# Patient Record
Sex: FEMALE | Race: WHITE | Employment: OTHER | URBAN - METROPOLITAN AREA
[De-identification: names, ages, dates, MRNs, and addresses within clinical notes are randomized per-mention and may not be internally consistent; named-entity substitution may affect disease eponyms.]

---

## 2017-01-03 ENCOUNTER — APPOINTMENT (OUTPATIENT)
Dept: CARDIAC REHAB | Age: 67
End: 2017-01-03
Payer: MEDICARE

## 2017-01-05 ENCOUNTER — HOSPITAL ENCOUNTER (OUTPATIENT)
Dept: PHYSICAL THERAPY | Age: 67
Discharge: HOME | End: 2017-01-05
Payer: MEDICARE

## 2017-01-05 ENCOUNTER — APPOINTMENT (OUTPATIENT)
Dept: CARDIAC REHAB | Age: 67
End: 2017-01-05
Payer: MEDICARE

## 2017-01-10 ENCOUNTER — HOSPITAL ENCOUNTER (OUTPATIENT)
Dept: PHYSICAL THERAPY | Age: 67
Discharge: HOME | End: 2017-01-10
Payer: MEDICARE

## 2017-01-10 ENCOUNTER — HOSPITAL ENCOUNTER (OUTPATIENT)
Dept: CARDIAC REHAB | Age: 67
Discharge: HOME | End: 2017-01-10
Payer: MEDICARE

## 2017-01-10 PROCEDURE — 97110 THERAPEUTIC EXERCISES: CPT

## 2017-01-10 PROCEDURE — 93798 PHYS/QHP OP CAR RHAB W/ECG: CPT

## 2017-01-10 NOTE — PROGRESS NOTES
Babita Silva   (XIF:4/34/6191) [address] at   [facility]  [address], [address], [place]  [phone]   [phone]       Outpatient PHYSICAL THERAPY: Daily Note 1/10/2017  Fall Risk Score: 0 (5+ = High Risk)    ICD-10: Treatment Diagnosis: Pain in right knee (M25.561)                                                      Effusion, right knee (M25.461)                                                      Aftercare following joint replacement surgery (Z47.1)    REFERRING PHYSICIAN: Simon Trevino MD MD Orders: PT eval and treat  Return Physician Appointment: Not scheduled  MEDICAL/REFERRING DIAGNOSIS: R TKA  DATE OF ONSET: 10/10/16   PRIOR LEVEL OF FUNCTION: Independent  PRECAUTIONS/ALLERGIES:   Allergies   Allergen Reactions    Latex Rash    Sulfa (Sulfonamide Antibiotics) Anaphylaxis    Macrobid [Nitrofurantoin Monohyd/M-Cryst] Other (comments)     Causes Gout    Other Plant, Animal, Environmental Itching     Pt itched after wearing a plastic blood pressure cuff. Pt reports BP will be higher in right arm     Oxycodone Other (comments)     Hallucination, anxiety    Tape [Adhesive] Rash     Paper tape ok       ASSESSMENT:  ?????? ? ? This section established at most recent assessment?????????? Babita Silva presents approx 7 weeks s/p R TKA. Pt demonstrates mild joint effusion, decreased ROM and strength, impaired balance, difficulty with stairs and difficulty walking long distances. Pt will benefit from skilled PT services to address impairments and maximize her function. PROBLEM LIST (Impairments causing functional limitations):  1. Decreased Strength affecting function  2. Edema affecting function  3. Decreased ADL/Functional Activities  4. Decreased Flexibility/joint mobility  5. Increased Pain affecting function  6.  Decreased Activity tolerance   GOALS: (Goals have been discussed and agreed upon with patient.)  SHORT-TERM FUNCTIONAL GOALS: Time Frame: 4 weeks  1. Pt will be I and compliant with initial HEP  2.  Pt will increase R knee flexion to at least 110 degrees  3. Pt will increase B LE strength by at least 1/3 grade where needed  4. Pt will navigate stairs with step-to pattern without requiring railing  DISCHARGE GOALS: Time Frame: 8 weeks  1. Pt will demo at least 115 degrees of R knee flexion in order to be fully functional with stairs and bending/squatting  2. Pt will demo at least 4/5 B LE strength for ability to squat and bend for function  3. Pt will be able to walk at least 30 minutes with no rest breaks  4. Pt will score at least 50/80 on LEFS  5. Pt will navigate stairs with reciprocal pattern and no railings 80% of the time  REHABILITATION POTENTIAL FOR STATED GOALS: GoodPLAN OF CARE:  INTERVENTIONS PLANNED: (Benefits and precautions of physical therapy have been discussed with the patient.)  1. balance exercise  2. cold  3. home exercise program (HEP)  4. manual therapy  5. range of motion: active/assisted/passive  6. therapeutic exercise/strengthening  TREATMENT PLAN EFFECTIVE DATES: 11/29/16 TO 2/3/17  FREQUENCY/DURATION: Follow patient 2 times a week for 8 weeks to address above goals. Regarding Estefani Esparza's therapy, I certify that the treatment plan above will be carried out by a therapist or under their direction. Thank you for this referral,  Rhonda Castro                     SUBJECTIVE:  Present Symptoms:  Pt has had several cancellations recently due to having stomach issues and pain. She reports she has stomach ulcers that have been causing her a lot of pain. Pain Intensity 1: 5     History of Present Injury/Illness (Reason for Referral): Babita Silva presents 6-7 weeks s/p R TKA after several years of R knee OA. Pt was in hospital for 5 days, had home care for 3 weeks and has been home for approx. 3 weeks with no therapy. Pt has pain in L knee as well.   Pt also had valvular heart surgery 6/26/16 which complicated her timing for her TKA. Dominant Side: right  Past Medical History:   Past Medical History   Diagnosis Date    Adverse effect of anesthesia      woke up on a vent - panic      Arthritis     Chronic kidney disease      pt denies 9/19/16    Chronic pain      r/t arthritis    Follow-up visit for aortic valve replacement with bioprosthetic valve 7/25/2016    GERD (gastroesophageal reflux disease)      controlled w/med    Hypertension      controlled w/med    Kidney stones     Morbid obesity ([address]     Psychiatric disorder      claustraphobia (severe)    PUD (peptic ulcer disease) 06/2016     dx 2016    Valvular heart disease      Pt has nerve damage and associated foot drop on the L side from a fall in mid 1990's    Current Medications:   Current Outpatient Prescriptions:     amoxicillin-clavulanate (AUGMENTIN) 1,000-62.5 mg per tablet, Take 1 Tab by mouth two (2) times a day., Disp: 20 Tab, Rfl: 0    HYDROcodone-acetaminophen (NORCO) 7.5-325 mg per tablet, Take 1 Tab by mouth three (3) times daily as needed for Pain. Max Daily Amount: 3 Tabs., Disp: 90 Tab, Rfl: 0    zolpidem (AMBIEN) 5 mg tablet, Take 1 Tab by mouth nightly as needed for Sleep. Max Daily Amount: 5 mg., Disp: 90 Tab, Rfl: 1    amLODIPine-Olmesartan (LEA) 5-40 mg tab, Take 1 Tab by mouth daily. , Disp: 90 Tab, Rfl: 4    apixaban (ELIQUIS) 2.5 mg tablet, Take 1 Tab by mouth every twelve (12) hours. (Patient taking differently: Take 2.5 mg by mouth every twelve (12) hours. Patient states Dr. Purvi Sheth took her off Eliquis.), Disp: 60 Tab, Rfl: 0    atorvastatin (LIPITOR) 40 mg tablet, Take 1 Tab by mouth daily. (Patient taking differently: Take 40 mg by mouth daily. Per anesthesia protocol:instructed to take am of surgery.), Disp: 90 Tab, Rfl: 4    esomeprazole (NEXIUM) 40 mg capsule, Take 1 Cap by mouth two (2) times a day.  (Patient taking differently: Take 40 mg by mouth daily.), Disp: 90 Cap, Rfl: 1    carvedilol (COREG) 12.5 mg tablet, Take 1 Tab by mouth two (2) times daily (with meals). , Disp: 180 Tab, Rfl: 4    calcium-vitamin D 600 mg(1,500mg) -200 unit tab, Take 1 Tab by mouth two (2) times daily (with meals). , Disp: , Rfl:     polyethylene glycol (MIRALAX) 17 gram/dose powder, Take 17 g by mouth daily as needed. , Disp: , Rfl:     potassium chloride SR (KLOR-CON 10) 10 mEq tablet, Take 1 Tab by mouth daily. (Patient taking differently: Take 10 mEq by mouth daily. Per anesthesia protocol:instructed to take am of surgery. Indications: HYPOKALEMIA PREVENTION), Disp: 21 Tab, Rfl: 0    ferrous sulfate 325 mg (65 mg iron) tablet, Take 1 Tab by mouth daily (with breakfast). , Disp: 30 Tab, Rfl: 1    furosemide (LASIX) 20 mg tablet, Take 1 Tab by mouth every morning., Disp: 21 Tab, Rfl: 0    senna-docusate (PERICOLACE) 8.6-50 mg per tablet, Take 2 Tabs by mouth nightly., Disp: 20 Tab, Rfl: 0    febuxostat (ULORIC) 40 mg tab tablet, Take 1 Tab by mouth every morning. Indications: GOUT PREVENTION (Patient taking differently: Take 40 mg by mouth every morning. Take day of surgery per anesthesia protocol. Indications: GOUT PREVENTION), Disp: 90 Tab, Rfl: 3    cyanocobalamin (VITAMIN B-12) 1,000 mcg/mL injection, Use weekly for 4 weeks, then once monthly (Patient taking differently: Use weekly for 4 weeks, then once monthly Stop seven days prior to surgery per anesthesia protocol.), Disp: 4 Vial, Rfl: 2     Date Last Reviewed: 1/10/2017    Social History/Home Situation: Lives with son, no pets, in 1-floor house with ramp in back, 6-8 HEAVENLY bilateral railings    DME: Using shower seat currently     Work/Activity History: Retired, activities include housework. Pt has not been able to walk long distances for over 2 years ago due to heart and knee issues  OBJECTIVE:    Outcome Measure:    Tool Used: Lower Extremity Functional Scale (LEFS)  Score:  Initial: 36/80 Most Recent: X/80 (Date: -- )   Interpretation of Score: 20 questions each scored on a 5 point scale with 0 representing \"extreme difficulty or unable to perform\" and 4 representing \"no difficulty\". The lower the score, the greater the functional disability. 80/80 represents no disability. Minimal detectable change is 9 points. Score 80 79-63 62-48 47-32 31-16 15-1 0   Modifier CH CI CJ CK CL CM CN     ? Mobility - Walking and Moving Around:     - CURRENT STATUS: CK - 40%-59% impaired, limited or restricted    - GOAL STATUS:  CJ - 20%-39% impaired, limited or restricted    - D/C STATUS:  ---------------To be determined---------------    Observation/Orthostatic Postural Assessment:   Mild genu varus on the L, mild foot pronation bilaterally. Decreased muscle mass of R quads noted, mild visible swelling the R knee joint. Surgical incision pink and healed except 1 place actively healing just below patella. Palpation:   Ttp medial tibial plateau, medial/lateral joint lines  ROM:       Knee AROM R L   Flexion 99 122   Extension (6) (9)     Ankle AROM R L   Dorsiflexion 5 0                  Strength:     Out of 5  LE MMT R L   Hip Flexion 3+ 4   Hip ER 4- 4   Hip Abduction 3 4-   Knee Flexion 4 5   Knee Extension 4 5   Ankle DF 4 4     Quad set:  Poor on R    Unable to test hip extension; pt unable to lie prone due to dizziness                  Functional Mobility:         Gait: Decreased theo, normal and symmetrical step length     Stairs - ascending:  Uses 1 rail, step-to leading L     Stairs - descending:  Uses bilateral railings, step-to leading R    Balance:   Tandem stance:  R front: 30s  L front: 24s                     SLS:  R: 2s  L: 10s  TREATMENT:    (In addition to Assessment/Re-Assessment sessions the following treatments were rendered)  THERAPEUTIC EXERCISE: (40 minutes):  Exercises per grid below to improve mobility, strength and balance.   Required visual, manual and tactile cues to promote proper body alignment, promote proper body posture and promote proper body mechanics. Progressed resistance, range and repetitions as indicated. Date:  11/29/16 Date:  12/6/16 Date:  12/9/16 Date:  12/15/16 Date:  12/27/16    Activity/Exercise Parameters Parameters Parameters      Pt education Findings, POC, HEP        Nustep L 1.5, 10 min L 1.5, 10 min L 1, 10 min L 3.0, 8 min L 2.0, 12 min Bike 5 min then nustep L1.0 for 10 min   Knee flexion stretch  Long sitting propped up 10s holds x5, with assist  Foot on chair- 15x reps, then 20s holds x3 - Standing knee flexed w/ therapist assist - 1-2 min holds x2  - Standing foot on chair in front, 30s holds x3    Patellar glides   X 10 ea way      Knee extension stretch   Ankle propped on foam roll 1 min x 2      Quad set   5 sec hold x 10; and isometric hold against therapist pressure 5 sec hold x 10      Straight leg raise   2 x 8  Focus on keeping quad contraction and limiting knee extension lag      SAQ  3s holds x30 3 sec hold   2 x 10 1.5# 3 x 10, 3s holds     Heel slide   With belt for self-overpressure, in reclined supine, 2 x 10      Hamstring curls  Standing, 2#, 3 x 10   Standing 2# 3 x 10     Clamshells  3 x 10       Sit to stand   X 10      Wall squat   Against door, ROM tolerance x 10 2 x 10 3 x 10    Shuttle press    3 bands, 3 x 10  Leg press 40# 3 x 10   Heel raises    2 x 10, 75% weight on R     Hamstring curl machine     25# 3 x 10 25# 3 x 10   Knee extension machine     25# 3 x 10 25# 3 x 10   Standing hip abduction     2 x 10 B    Steps      6\":   - step-ups 15x B  - Side step-ups 15x B         Manual Therapy (      0):   Therapeutic Modalities:  NOT TODAY - Game ready to R knee, 38 degrees, low compression for 10 minutes                                                                                                 HEP: Instructed to start going for walks.   ______________________________________________________________________________________________________    Treatment Assessment:  Pt was more fatigued than usual with therex today reporting many times that she felt \"weak. \"  However she was able to perform all exercises as desired      Please explain any variance from above plan of care: None  Progression/Medical Necessity:   · Skilled intervention continues to be required due to decreased functional status. Compliance with Program/Exercises: Will assess as treatment progresses. Reason for Continuation of Services/Other Comments:  · Patient continues to require skilled intervention due to decreased functional status. Recommendations/Intent for next treatment session: Treatment next visit will focus on advancements to more challenging activities.       Total Treatment Duration:  PT Patient Time In/Time Out  Time In: 0915  Time Out: 0955    Soledad Kelly

## 2017-01-12 ENCOUNTER — HOSPITAL ENCOUNTER (OUTPATIENT)
Dept: CARDIAC REHAB | Age: 67
End: 2017-01-12
Payer: MEDICARE

## 2017-01-17 ENCOUNTER — HOSPITAL ENCOUNTER (OUTPATIENT)
Dept: PHYSICAL THERAPY | Age: 67
Discharge: HOME | End: 2017-01-17
Payer: MEDICARE

## 2017-01-17 ENCOUNTER — APPOINTMENT (OUTPATIENT)
Dept: CARDIAC REHAB | Age: 67
End: 2017-01-17
Payer: MEDICARE

## 2017-01-17 NOTE — PROGRESS NOTES
Anil Chowdary   (TYT:4/28/9759) [address]  at   [facility]  [address], [address], [place] [address]  [phone]   [phone]         OUTPATIENT DAILY NOTE    NAME/AGE/GENDER: Anil Chowdary is a 78 y.o. female. DATE: 1/17/2017    Pt called to cancel her PT appointments this week. She reports her left knee (the non-surgical knee) has been hurting her more and more, and that she is walking with a walker at home. She is following up with her MD this week regarding this situation. I explained the role of PT in keeping the left knee strong and mobile if she does decide to continue therapy. She stated she would call this clinic after her MD appt to update us on her status.     Varsha Arroyo, PT, DPT

## 2017-01-19 ENCOUNTER — APPOINTMENT (OUTPATIENT)
Dept: CARDIAC REHAB | Age: 67
End: 2017-01-19
Payer: MEDICARE

## 2017-01-19 ENCOUNTER — HOSPITAL ENCOUNTER (OUTPATIENT)
Dept: PHYSICAL THERAPY | Age: 67
Discharge: HOME | End: 2017-01-19
Payer: MEDICARE

## 2017-01-19 NOTE — PROGRESS NOTES
Amaya Howe   (PXK:3/33/9985) [address]  at   [facility]  [address], [address], [place] [address]  [phone]   [phone]         OUTPATIENT DAILY NOTE    NAME/AGE/GENDER: Amaya Howe is a 78 y.o. female. DATE: 1/19/2017    Pt called to cancel her PT appointments this week. She reports her left knee (the non-surgical knee) has been hurting her more and more, and that she is walking with a walker at home. She is following up with her MD this week regarding this situation. I explained the role of PT in keeping the left knee strong and mobile if she does decide to continue therapy. She stated she would call this clinic after her MD appt to update us on her status.     Rocky Avila, PT, DPT

## 2017-01-24 ENCOUNTER — APPOINTMENT (OUTPATIENT)
Dept: CARDIAC REHAB | Age: 67
End: 2017-01-24
Payer: MEDICARE

## 2017-01-26 ENCOUNTER — APPOINTMENT (OUTPATIENT)
Dept: CARDIAC REHAB | Age: 67
End: 2017-01-26
Payer: MEDICARE

## 2017-01-31 ENCOUNTER — APPOINTMENT (OUTPATIENT)
Dept: CARDIAC REHAB | Age: 67
End: 2017-01-31
Payer: MEDICARE

## 2017-02-02 ENCOUNTER — APPOINTMENT (OUTPATIENT)
Dept: CARDIAC REHAB | Age: 67
End: 2017-02-02

## 2017-02-07 ENCOUNTER — HOSPITAL ENCOUNTER (OUTPATIENT)
Dept: CARDIAC REHAB | Age: 67
Discharge: HOME | End: 2017-02-07

## 2017-02-09 ENCOUNTER — HOSPITAL ENCOUNTER (OUTPATIENT)
Dept: CARDIAC REHAB | Age: 67
Discharge: HOME | End: 2017-02-09

## 2017-02-14 ENCOUNTER — APPOINTMENT (OUTPATIENT)
Dept: CARDIAC REHAB | Age: 67
End: 2017-02-14

## 2017-02-15 PROBLEM — M1A.09X0 CHRONIC GOUT OF MULTIPLE SITES: Chronic | Status: ACTIVE | Noted: 2017-02-15

## 2017-02-15 PROBLEM — M17.12 PRIMARY OSTEOARTHRITIS OF LEFT KNEE: Status: ACTIVE | Noted: 2017-02-15

## 2017-02-16 ENCOUNTER — APPOINTMENT (OUTPATIENT)
Dept: CARDIAC REHAB | Age: 67
End: 2017-02-16

## 2017-02-21 ENCOUNTER — APPOINTMENT (OUTPATIENT)
Dept: CARDIAC REHAB | Age: 67
End: 2017-02-21

## 2017-02-23 ENCOUNTER — APPOINTMENT (OUTPATIENT)
Dept: CARDIAC REHAB | Age: 67
End: 2017-02-23

## 2017-03-06 NOTE — PROGRESS NOTES
Laura Larkin   (MQX:4/67/9742) [address]  at   [facility]  [address], [address], [place] [address]  [phone]   [phone]       Outpatient PHYSICAL THERAPY: Discontinuation Summary 3/6/17  Fall Risk Score: 0 (5+ = High Risk)    ICD-10: Treatment Diagnosis: Pain in right knee (M25.561)                                                      Effusion, right knee (M25.461)                                                      Aftercare following joint replacement surgery (Z47.1)    REFERRING PHYSICIAN: Sherita Greene MD MD Orders: PT eval and treat  MEDICAL/REFERRING DIAGNOSIS: R TKA  DATE OF ONSET: 10/10/16     ASSESSMENT:  ?????? ? ? This section established at most recent assessment?????????? Laura Larkin participated in 5 skilled visits of PT after her initial evaluation for R TKA which occurred on 11/29/16. She had 9 cancellations between 11/29 and 1/19/16 due to illnesses, 2 due to her family being in town, and 2 at the end due to her other knee bothering her. After speaking with her on the phone regarding the last cancellation on 1/19, she was going to be following up with her MD and stated she would contact this clinic to follow-up. She never called to follow-up and was not seen in this clinic after 1/19. Goals below were not able to be assessed due to patient not being seen in this clinic since 1/0/17. SHORT-TERM FUNCTIONAL GOALS: Time Frame: 4 weeks  1. Pt will be I and compliant with initial HEP  2.  Pt will increase R knee flexion to at least 110 degrees  3. Pt will increase B LE strength by at least 1/3 grade where needed  4. Pt will navigate stairs with step-to pattern without requiring railing  DISCHARGE GOALS: Time Frame: 8 weeks  1. Pt will demo at least 115 degrees of R knee flexion in order to be fully functional with stairs and bending/squatting  2. Pt will demo at least 4/5 B LE strength for ability to squat and bend for function  3.  Pt will be able to walk at least 30 minutes with no rest breaks  4. Pt will score at least 50/80 on LEFS  5.  Pt will navigate stairs with reciprocal pattern and no railings 80% of the time      TREATMENT PLAN EFFECTIVE DATES: 11/29/16 TO 2/3/17    Thank you for this referral,  Varsha Arroyo, PT

## 2017-03-10 RX ORDER — CEFAZOLIN SODIUM IN 0.9 % NACL 2 G/50 ML
2 INTRAVENOUS SOLUTION, PIGGYBACK (ML) INTRAVENOUS ONCE
Status: CANCELLED | OUTPATIENT
Start: 2017-03-10 | End: 2017-03-10

## 2017-03-10 NOTE — H&P
[address] [facility]  Pre Operative History and Physical Exam    Patient ID:  Gage Snider  [id number]  23 y.o.  1950    Today: March 10, 2017          CC:  Left  Knee pain    HPI:   The patient has end stage arthritis of the left knee. The patient was evaluated and examined during a consultation prior to today. The patient complains of knee pain with activities, reports pain as mostly occurring along the joint lines, reports stiffness of the knee with prolonged inactivity, and swelling/pain at the end of the day and after increased physical activity. The pain affects the patients activities of daily living and quality of life. The patient has attempted and exhausted conservative treatment. There have been no changes to the patient's orthopedic condition since the last office visit.     Past Medical History:  Past Medical History:   Diagnosis Date    Adverse effect of anesthesia     woke up on a vent - panic      Arthritis     Chronic kidney disease     pt denies 9/19/16    Chronic pain     r/t arthritis    Follow-up visit for aortic valve replacement with bioprosthetic valve 7/25/2016    GERD (gastroesophageal reflux disease)     controlled w/med    Hypertension     controlled w/med    Kidney stones     Morbid obesity ([address]     Psychiatric disorder     claustraphobia (severe)    PUD (peptic ulcer disease) 06/2016    dx 2016    Valvular heart disease        Past Surgical History:  Past Surgical History:   Procedure Laterality Date    COLONOSCOPY N/A 6/15/2016    COLONOSCOPY/ BMI 41  ROOM 2235 performed by Luis [facility] MD at [facility] ENDOSCOPY    HX AORTIC VALVE REPLACEMENT  6/9/2016    Dr. Rosmery Colby    HX BILATERAL SALPINGO-OOPHORECTOMY      HX CERVICAL FUSION      HX GASTRIC BYPASS      ans reversal     HX GI      repaired \"hole in stomach\" from gastric bypass    HX HEART CATHETERIZATION      no stents     HX HEART VALVE SURGERY      HX HYSTERECTOMY      HX KNEE REPLACEMENT Right 10/10/2016    Dr. Dina Ross, POA    HX LAP CHOLECYSTECTOMY      HX SHOULDER ARTHROSCOPY Right      times 2 \"shaved bone\"     HX UROLOGICAL  Multiple dates    Mulitiple open Nephrolithotomies        Medications:     Prior to Admission medications    Medication Sig Start Date End Date Taking? Authorizing Provider   docusate sodium (STOOL SOFTENER) 50 mg capsule Take 50 mg by mouth once as needed for Constipation. Historical Provider   carvedilol (COREG) 25 mg tablet Take 1 Tab by mouth two (2) times daily (with meals). 2/15/17   Leti Michaud MD   febuxostat (ULORIC) 40 mg tab tablet Take 1 Tab by mouth every morning. Indications: GOUT PREVENTION 2/15/17   Leti Michaud MD   HYDROcodone-acetaminophen [facility]) 7.5-325 mg per tablet Take 1 Tab by mouth three (3) times daily as needed for Pain. Max Daily Amount: 4 Tabs. 2/15/17   Leti Michaud MD   HYDROcodone-acetaminophen [facility]) 7.5-325 mg per tablet Take 1 Tab by mouth every six (6) hours as needed for Pain. Max Daily Amount: 4 Tabs. 3/15/17   Leti Michaud MD   amLODIPine (NORVASC) 10 mg tablet Take 1 Tab by mouth daily. 1/25/17   Leti Michaud MD   olmesartan (BENICAR) 40 mg tablet Take 1 Tab by mouth daily. 1/25/17   Leti Michaud MD   zolpidem (AMBIEN) 5 mg tablet Take 1 Tab by mouth nightly as needed for Sleep. Max Daily Amount: 5 mg. 11/15/16   Leti Michaud MD   apixaban (ELIQUIS) 2.5 mg tablet Take 1 Tab by mouth every twelve (12) hours. Patient taking differently: Take 2.5 mg by mouth every twelve (12) hours. Patient states Dr. Mallika Whitfield took her off Eliquis. 10/11/16   Kianna Slater MD   atorvastatin (LIPITOR) 40 mg tablet Take 1 Tab by mouth daily. Patient taking differently: Take 40 mg by mouth daily. Per anesthesia protocol:instructed to take am of surgery. 8/1/16   Leti Michaud MD   esomeprazole (NEXIUM) 40 mg capsule Take 1 Cap by mouth two (2) times a day.   Patient taking differently: Take 40 mg by mouth daily. 8/1/16   Tra Bray MD   calcium-vitamin D 600 mg(1,500mg) -200 unit tab Take 1 Tab by mouth two (2) times daily (with meals). Historical Provider   polyethylene glycol (MIRALAX) 17 gram/dose powder Take 17 g by mouth daily as needed. Historical Provider   potassium chloride SR (KLOR-CON 10) 10 mEq tablet Take 1 Tab by mouth daily. Patient taking differently: Take 10 mEq by mouth daily. Per anesthesia protocol:instructed to take am of surgery. Indications: HYPOKALEMIA PREVENTION 6/17/16   Georgiana Arana NP   ferrous sulfate 325 mg (65 mg iron) tablet Take 1 Tab by mouth daily (with breakfast). 6/17/16   Georgiana Arana NP   furosemide (LASIX) 20 mg tablet Take 1 Tab by mouth every morning. 6/17/16   Georgiana Arana NP   cyanocobalamin (VITAMIN B-12) 1,000 mcg/mL injection Use weekly for 4 weeks, then once monthly  Patient taking differently: Use weekly for 4 weeks, then once monthly  Stop seven days prior to surgery per anesthesia protocol. 9/24/15   Tra Bray MD       Family History:     Family History   Problem Relation Age of Onset    Cancer Father      lung- smoker    Hypertension Father     Lung Disease Father     Cancer Mother      lung -smoker    Lung Disease Mother     Diabetes Maternal Grandmother     Diabetes Paternal Grandmother     Breast Cancer Neg Hx        Social History:      Social History   Substance Use Topics    Smoking status: Never Smoker    Smokeless tobacco: Never Used    Alcohol use No         Allergies: Allergies   Allergen Reactions    Latex Rash    Sulfa (Sulfonamide Antibiotics) Anaphylaxis    Macrobid [Nitrofurantoin Monohyd/M-Cryst] Other (comments)     Causes Gout    Other Plant, Animal, Environmental Itching     Pt itched after wearing a plastic blood pressure cuff.   Pt reports BP will be higher in right arm     Oxycodone Other (comments)     Hallucination, anxiety    Tape [Adhesive] Rash     Paper tape ok        Vitals: There were no vitals taken for this visit. Objective:         General: No Acute distress                   HEENT: Normocephalic/atramatic                   Lungs:  Breathing non-labored                   Heart:   RRR                    Abdomen: soft       Extremities:  Pain with ROM of the left knee. Trace effusion. Crepitus present. Distally the patient shows no neurologic deficit. Antalgic gait appreciated. Meds:   Current Outpatient Prescriptions   Medication Sig    docusate sodium (STOOL SOFTENER) 50 mg capsule Take 50 mg by mouth once as needed for Constipation.  carvedilol (COREG) 25 mg tablet Take 1 Tab by mouth two (2) times daily (with meals).  febuxostat (ULORIC) 40 mg tab tablet Take 1 Tab by mouth every morning. Indications: GOUT PREVENTION    HYDROcodone-acetaminophen (NORCO) 7.5-325 mg per tablet Take 1 Tab by mouth three (3) times daily as needed for Pain. Max Daily Amount: 4 Tabs.  [START ON 3/15/2017] HYDROcodone-acetaminophen (NORCO) 7.5-325 mg per tablet Take 1 Tab by mouth every six (6) hours as needed for Pain. Max Daily Amount: 4 Tabs.  amLODIPine (NORVASC) 10 mg tablet Take 1 Tab by mouth daily.  olmesartan (BENICAR) 40 mg tablet Take 1 Tab by mouth daily.  zolpidem (AMBIEN) 5 mg tablet Take 1 Tab by mouth nightly as needed for Sleep. Max Daily Amount: 5 mg.  apixaban (ELIQUIS) 2.5 mg tablet Take 1 Tab by mouth every twelve (12) hours. (Patient taking differently: Take 2.5 mg by mouth every twelve (12) hours. Patient states Dr. Raheem Lopez took her off Eliquis.)    atorvastatin (LIPITOR) 40 mg tablet Take 1 Tab by mouth daily. (Patient taking differently: Take 40 mg by mouth daily. Per anesthesia protocol:instructed to take am of surgery.)    esomeprazole (NEXIUM) 40 mg capsule Take 1 Cap by mouth two (2) times a day.  (Patient taking differently: Take 40 mg by mouth daily.)    calcium-vitamin D 600 mg(1,500mg) -200 unit tab Take 1 Tab by mouth two (2) times daily (with meals).  polyethylene glycol (MIRALAX) 17 gram/dose powder Take 17 g by mouth daily as needed.  potassium chloride SR (KLOR-CON 10) 10 mEq tablet Take 1 Tab by mouth daily. (Patient taking differently: Take 10 mEq by mouth daily. Per anesthesia protocol:instructed to take am of surgery. Indications: HYPOKALEMIA PREVENTION)    ferrous sulfate 325 mg (65 mg iron) tablet Take 1 Tab by mouth daily (with breakfast).  furosemide (LASIX) 20 mg tablet Take 1 Tab by mouth every morning.  cyanocobalamin (VITAMIN B-12) 1,000 mcg/mL injection Use weekly for 4 weeks, then once monthly (Patient taking differently: Use weekly for 4 weeks, then once monthly  Stop seven days prior to surgery per anesthesia protocol.)     No current facility-administered medications for this encounter.         Patient Active Problem List   Diagnosis Code    Essential hypertension, benign I10    Arthritis M19.90    GERD (gastroesophageal reflux disease) K21.9    Nocturnal hypoxemia G47.34    Mixed hyperlipidemia E78.2    Morbid obesity ([address] E66.01    Vitamin B12 deficiency E53.8    Vitamin D deficiency E55.9    Impaired fasting blood sugar R73.01    Carotid artery stenosis without cerebral infarction I65.29    Aortic valve stenosis I35.0    Pulmonary hypertension (HCC) I27.2    Atelectasis J98.11    CKD (chronic kidney disease) stage 3, GFR 30-59 ml/min N18.3    Chronic anemia D64.9    Chronic pain G89.29    Fibromyalgia M79.7    Full dentures Z97.2, K08.109    Pleural effusion J90    Postoperative atrial fibrillation (HCC) I97.89, I48.91    Iron deficiency anemia D50.9    Gastritis K29.70    Gastric ulcer K25.9    Acute diastolic CHF (congestive heart failure) (HCC) I50.31    Duodenal ulcer K26.9    Paroxysmal atrial fibrillation (HCC) I48.0    Follow-up visit for aortic valve replacement with bioprosthetic valve Z09, Z95.4    Peptic ulcer disease K27.9    UTI (urinary tract infection) N39.0    Snoring R06.83    Preop cardiovascular exam Z01.810    OA (osteoarthritis) of knee M17.9    Chronic gout of multiple sites M1A. 75T1    Primary osteoarthritis of left knee M17.12       Assessment:   1. Arthritis of the Left knee    Plan:   The patient has failed previous conservative treatment for this condition including anti-inflammatories, injections and lifestyle modifications. The necessity for joint replacement is present. Risks, benefits, alternatives and possible complications of left knee arthroplasty have been discussed with the patient including but not limited to infection, bleeding, damage to nerves and/or blood vessels, stiffness of the knee after surgery, potential for patellar maltracking, potential for fracture both intra-op and post-op, polyethylene wear, implant loosening, risk for revision surgery should any of the above occur, venous thrombo-embolism including deep vein thrombosis and pulmonary embolism, and potential for continued pain after surgery. We have also previously discussed the potential of morbidity and mortality associated with, but not limited to, the actual surgical procedure, anesthesia, prior medical conditions, and/or the administration of medications perioperatively. The patient has been given the opportunity to ask questions all of which have been answered and the patient wishes to proceed. The patient will be admitted the day of surgery for left total knee replacement.         Signed By: Emily Paris MD  March 10, 2017

## 2017-03-15 ENCOUNTER — HOSPITAL ENCOUNTER (OUTPATIENT)
Dept: SURGERY | Age: 67
Discharge: HOME | End: 2017-03-15
Attending: ORTHOPAEDIC SURGERY
Payer: MEDICARE

## 2017-03-15 VITALS
WEIGHT: 232.6 LBS | DIASTOLIC BLOOD PRESSURE: 63 MMHG | SYSTOLIC BLOOD PRESSURE: 155 MMHG | HEART RATE: 62 BPM | HEIGHT: 65 IN | BODY MASS INDEX: 38.75 KG/M2 | OXYGEN SATURATION: 97 % | TEMPERATURE: 97.9 F | RESPIRATION RATE: 18 BRPM

## 2017-03-15 LAB
ALBUMIN SERPL BCP-MCNC: 3.6 G/DL (ref 3.2–4.6)
ANION GAP BLD CALC-SCNC: 11 MMOL/L (ref 7–16)
APPEARANCE UR: CLEAR
APTT PPP: 25.3 SEC (ref 25.3–32.9)
BACTERIA SPEC CULT: ABNORMAL
BASOPHILS # BLD AUTO: 0 K/UL (ref 0–0.2)
BASOPHILS # BLD: 0 % (ref 0–2)
BILIRUB UR QL: NEGATIVE
BUN SERPL-MCNC: 21 MG/DL (ref 8–23)
CALCIUM SERPL-MCNC: 8.9 MG/DL (ref 8.3–10.4)
CHLORIDE SERPL-SCNC: 104 MMOL/L (ref 98–107)
CO2 SERPL-SCNC: 26 MMOL/L (ref 21–32)
COLOR UR: YELLOW
CREAT SERPL-MCNC: 1.13 MG/DL (ref 0.6–1)
DIFFERENTIAL METHOD BLD: ABNORMAL
EOSINOPHIL # BLD: 0.1 K/UL (ref 0–0.8)
EOSINOPHIL NFR BLD: 1 % (ref 0.5–7.8)
ERYTHROCYTE [DISTWIDTH] IN BLOOD BY AUTOMATED COUNT: 15.4 % (ref 11.9–14.6)
EST. AVERAGE GLUCOSE BLD GHB EST-MCNC: 137 MG/DL
GLUCOSE SERPL-MCNC: 137 MG/DL (ref 65–100)
GLUCOSE UR STRIP.AUTO-MCNC: NEGATIVE MG/DL
HBA1C MFR BLD: 6.4 % (ref 4.8–6)
HCT VFR BLD AUTO: 38.7 % (ref 35.8–46.3)
HGB BLD-MCNC: 12.1 G/DL (ref 11.7–15.4)
HGB UR QL STRIP: NEGATIVE
IMM GRANULOCYTES # BLD: 0 K/UL (ref 0–0.5)
IMM GRANULOCYTES NFR BLD AUTO: 0.1 % (ref 0–5)
INR PPP: 1 (ref 0.9–1.2)
KETONES UR QL STRIP.AUTO: NEGATIVE MG/DL
LEUKOCYTE ESTERASE UR QL STRIP.AUTO: NEGATIVE
LYMPHOCYTES # BLD AUTO: 15 % (ref 13–44)
LYMPHOCYTES # BLD: 1.1 K/UL (ref 0.5–4.6)
MCH RBC QN AUTO: 23.8 PG (ref 26.1–32.9)
MCHC RBC AUTO-ENTMCNC: 31.3 G/DL (ref 31.4–35)
MCV RBC AUTO: 76.2 FL (ref 79.6–97.8)
MONOCYTES # BLD: 0.7 K/UL (ref 0.1–1.3)
MONOCYTES NFR BLD AUTO: 10 % (ref 4–12)
NEUTS SEG # BLD: 5.5 K/UL (ref 1.7–8.2)
NEUTS SEG NFR BLD AUTO: 74 % (ref 43–78)
NITRITE UR QL STRIP.AUTO: NEGATIVE
PH UR STRIP: 6 [PH] (ref 5–9)
PLATELET # BLD AUTO: 184 K/UL (ref 150–450)
PMV BLD AUTO: 10.3 FL (ref 10.8–14.1)
POTASSIUM SERPL-SCNC: 4.2 MMOL/L (ref 3.5–5.1)
PROT UR STRIP-MCNC: NEGATIVE MG/DL
PROTHROMBIN TIME: 10.5 SEC (ref 9.6–12)
RBC # BLD AUTO: 5.08 M/UL (ref 4.05–5.25)
SERVICE CMNT-IMP: ABNORMAL
SODIUM SERPL-SCNC: 141 MMOL/L (ref 136–145)
SP GR UR REFRACTOMETRY: 1.01 (ref 1–1.02)
UROBILINOGEN UR QL STRIP.AUTO: 0.2 EU/DL (ref 0.2–1)
WBC # BLD AUTO: 7.5 K/UL (ref 4.3–11.1)

## 2017-03-15 PROCEDURE — 85730 THROMBOPLASTIN TIME PARTIAL: CPT | Performed by: ORTHOPAEDIC SURGERY

## 2017-03-15 PROCEDURE — 83036 HEMOGLOBIN GLYCOSYLATED A1C: CPT | Performed by: ORTHOPAEDIC SURGERY

## 2017-03-15 PROCEDURE — 85610 PROTHROMBIN TIME: CPT | Performed by: ORTHOPAEDIC SURGERY

## 2017-03-15 PROCEDURE — 82040 ASSAY OF SERUM ALBUMIN: CPT | Performed by: ORTHOPAEDIC SURGERY

## 2017-03-15 PROCEDURE — 81003 URINALYSIS AUTO W/O SCOPE: CPT | Performed by: ORTHOPAEDIC SURGERY

## 2017-03-15 PROCEDURE — 87641 MR-STAPH DNA AMP PROBE: CPT | Performed by: ORTHOPAEDIC SURGERY

## 2017-03-15 PROCEDURE — 85025 COMPLETE CBC W/AUTO DIFF WBC: CPT | Performed by: ORTHOPAEDIC SURGERY

## 2017-03-15 PROCEDURE — 36415 COLL VENOUS BLD VENIPUNCTURE: CPT | Performed by: ORTHOPAEDIC SURGERY

## 2017-03-15 PROCEDURE — 80307 DRUG TEST PRSMV CHEM ANLYZR: CPT | Performed by: ORTHOPAEDIC SURGERY

## 2017-03-15 PROCEDURE — 80048 BASIC METABOLIC PNL TOTAL CA: CPT | Performed by: ORTHOPAEDIC SURGERY

## 2017-03-15 RX ORDER — ASPIRIN 81 MG/1
81 TABLET ORAL DAILY
COMMUNITY
End: 2017-03-24

## 2017-03-15 NOTE — PERIOP NOTES
Patient verified name, , and surgery as listed in [facility]. Type 3 surgery, Joint assessment complete. Labs per surgeon: cbc,bmp,pt,ptt,ua, mssa, urine nicotene,albumin, hgb a1c ; results pending. EKG:dated 17 and cardiologist note same date printed and placed in chart for reference. Cardiologist note states \" low CV risk for left knee replacement\". Noted in EMR: prior ekgs, echo,cath report. Hibiclens and instructions given per hospital policy. Nasal Swab collected per MD order and instructions for Mupirocin nasal ointment if required. Patient provided with handouts including Guide to Surgery, Pain Management, Hand Hygiene, Blood Transfusion Education, and [place] [facility] Brochure. Patient answered medical/surgical history questions at their best of ability. All prior to admission medications documented in [facility]. Original medication prescription bottle not visualized during patient appointment. Patient instructed to hold all vitamins 7 days prior to surgery and NSAIDS 5 days prior to surgery. Medications to be held prior to surgery :none. Patient instructed to continue previous medications as prescribed prior to surgery and to take the following medications the day of surgery according to anesthesia guidelines with a small sip of water: aspirin, atorvastatin, carvedilol, nexium, uloric, norco if needed. Patient taught back and verbalized understanding.

## 2017-03-15 NOTE — PERIOP NOTES
Urine nicotene pending. All other lab results are within limits per anesthesia protocol. Copy of labs faxed to PCP and surgeon.

## 2017-03-15 NOTE — ADVANCED PRACTICE NURSE
Total Joint Surgery Preoperative Chart Review      Patient ID:  Vianca Hatch  [id number]  28 y.o.  1950  Surgeon: Camden Powell  Date of Surgery: 3/21/2017  Procedure: Total Left Knee Arthroplasty  Primary Care Physician: Te Umanzor [phone]       Subjective:   Vianca Hatch is a 75 y.o. WHITE OR  female who presents for preoperative evaluation for Total Left Knee arthroplasty. This is a preoperative chart review note based on data collected by the nurse at the surgical Pre-Assessment visit.     Past Medical History:   Diagnosis Date    Adverse effect of anesthesia     woke up on a vent s/p cholecystectomy- panic      Arthritis     Chronic kidney disease     pt denies 9/19/16    Chronic pain     r/t arthritis    Follow-up visit for aortic valve replacement with bioprosthetic valve 7/25/2016    GERD (gastroesophageal reflux disease)     controlled w/med    Hypertension     controlled w/med    Kidney stones     Morbid obesity ([address]     Murmur, cardiac     Nausea & vomiting     Psychiatric disorder     claustraphobia (severe)    PUD (peptic ulcer disease) 06/2016    dx 2016    Valvular heart disease 2016    AVR      Past Surgical History:   Procedure Laterality Date    COLONOSCOPY N/A 6/15/2016    COLONOSCOPY/ BMI 41  ROOM 2235 performed by John [facility] at [facility] ENDOSCOPY    HX AORTIC VALVE REPLACEMENT  6/9/2016    Dr. Rebel Allred    HX BILATERAL SALPINGO-OOPHORECTOMY      HX CERVICAL FUSION      HX GASTRIC BYPASS      ans reversal     HX GI      repaired \"hole in stomach\" from gastric bypass    HX HEART CATHETERIZATION      no stents     HX HEART VALVE SURGERY      HX HYSTERECTOMY      HX KNEE REPLACEMENT Right 10/10/2016    Dr. Camden Powell, POA    HX LAP CHOLECYSTECTOMY      HX SHOULDER ARTHROSCOPY Right      times 2 \"shaved bone\"     HX UROLOGICAL  Multiple dates    Mulitiple open Nephrolithotomies     Family History   Problem Relation Age of Onset    Cancer Father lung- smoker    Hypertension Father     Lung Disease Father     Cancer Mother      lung -smoker    Lung Disease Mother     Diabetes Maternal Grandmother     Diabetes Paternal Grandmother     Breast Cancer Neg Hx       Social History   Substance Use Topics    Smoking status: Never Smoker    Smokeless tobacco: Never Used    Alcohol use No       Prior to Admission medications    Medication Sig Start Date End Date Taking? Authorizing Provider   aspirin delayed-release 81 mg tablet Take 81 mg by mouth daily. Yes Historical Provider   docusate sodium (STOOL SOFTENER) 50 mg capsule Take 50 mg by mouth nightly. Historical Provider   carvedilol (COREG) 25 mg tablet Take 1 Tab by mouth two (2) times daily (with meals). 2/15/17   Thanh Castro MD   febuxostat (ULORIC) 40 mg tab tablet Take 1 Tab by mouth every morning. Indications: GOUT PREVENTION 2/15/17   Thanh Castro MD   HYDROcodone-acetaminophen [facility]) 7.5-325 mg per tablet Take 1 Tab by mouth every six (6) hours as needed for Pain. Max Daily Amount: 4 Tabs. 3/15/17   Thanh Castro MD   olmesartan (BENICAR) 40 mg tablet Take 1 Tab by mouth daily. 1/25/17   Thanh Castro MD   zolpidem (AMBIEN) 5 mg tablet Take 1 Tab by mouth nightly as needed for Sleep. Max Daily Amount: 5 mg. 11/15/16   Thanh Castro MD   atorvastatin (LIPITOR) 40 mg tablet Take 1 Tab by mouth daily. Patient taking differently: Take 40 mg by mouth daily. Per anesthesia protocol:instructed to take am of surgery. 8/1/16   Thanh Castro MD   esomeprazole (NEXIUM) 40 mg capsule Take 1 Cap by mouth two (2) times a day. Patient taking differently: Take 40 mg by mouth daily. 8/1/16   Thanh Castro MD   calcium-vitamin D 600 mg(1,500mg) -200 unit tab Take 1 Tab by mouth two (2) times daily (with meals). Historical Provider   polyethylene glycol (MIRALAX) 17 gram/dose powder Take 17 g by mouth daily as needed.     Historical Provider potassium chloride SR (KLOR-CON 10) 10 mEq tablet Take 1 Tab by mouth daily. Patient taking differently: Take 10 mEq by mouth daily. Per anesthesia protocol:instructed to take am of surgery. Indications: HYPOKALEMIA PREVENTION 6/17/16   Varsha Ramon NP   ferrous sulfate 325 mg (65 mg iron) tablet Take 1 Tab by mouth daily (with breakfast). 6/17/16   Varsha Ramon NP   furosemide (LASIX) 20 mg tablet Take 1 Tab by mouth every morning. 6/17/16   Varsha Ramon NP   cyanocobalamin (VITAMIN B-12) 1,000 mcg/mL injection Use weekly for 4 weeks, then once monthly  Patient taking differently: Use weekly for 4 weeks, then once monthly  Stop seven days prior to surgery per anesthesia protocol. 9/24/15   Babar Vasquez MD     Allergies   Allergen Reactions    Latex Rash    Sulfa (Sulfonamide Antibiotics) Anaphylaxis    Macrobid [Nitrofurantoin Monohyd/M-Cryst] Other (comments)     Causes Gout    Other Plant, Animal, Environmental Itching     Pt itched after wearing a plastic blood pressure cuff.   Pt reports BP will be higher in right arm     Oxycodone Other (comments)     Hallucination, anxiety with oxycontin    Tape [Adhesive] Rash     Paper tape ok          Objective:     Physical Exam:   Patient Vitals for the past 24 hrs:   Temp Pulse Resp BP SpO2   03/15/17 0902 97.9 °F (36.6 °C) 62 18 155/63 97 %       ECG:    EKG Results     Procedure 720 Value Units Date/Time    EKG, 12 LEAD, INITIAL [[id number]]     Order Status:  Canceled           Data Review:   Labs:   Recent Results (from the past 24 hour(s))   URINALYSIS W/ RFLX MICROSCOPIC    Collection Time: 03/15/17  9:40 AM   Result Value Ref Range    Color YELLOW      Appearance CLEAR      Specific gravity 1.008 1.001 - 1.023      pH (UA) 6.0 5.0 - 9.0      Protein NEGATIVE  NEG mg/dL    Glucose NEGATIVE  mg/dL    Ketone NEGATIVE  NEG mg/dL    Bilirubin NEGATIVE  NEG      Blood NEGATIVE  NEG      Urobilinogen 0.2 0.2 - 1.0 EU/dL    Nitrites NEGATIVE  NEG      Leukocyte Esterase NEGATIVE  NEG     MSSA/MRSA SC BY PCR, NASAL SWAB    Collection Time: 03/15/17  9:40 AM   Result Value Ref Range    Special Requests: NO SPECIAL REQUESTS      Culture result: (A)       MRSA target DNA not detected, SA target DNA detected. A MRSA negative, SA positive test result does not preclude MRSA nasal colonization. CBC WITH AUTOMATED DIFF    Collection Time: 03/15/17 10:00 AM   Result Value Ref Range    WBC 7.5 4.3 - 11.1 K/uL    RBC 5.08 4.05 - 5.25 M/uL    HGB 12.1 11.7 - 15.4 g/dL    HCT 38.7 35.8 - 46.3 %    MCV 76.2 (L) 79.6 - 97.8 FL    MCH 23.8 (L) 26.1 - 32.9 PG    MCHC 31.3 (L) 31.4 - 35.0 g/dL    RDW 15.4 (H) 11.9 - 14.6 %    PLATELET 049 903 - 193 K/uL    MPV 10.3 (L) 10.8 - 14.1 FL    DF AUTOMATED      NEUTROPHILS 74 43 - 78 %    LYMPHOCYTES 15 13 - 44 %    MONOCYTES 10 4.0 - 12.0 %    EOSINOPHILS 1 0.5 - 7.8 %    BASOPHILS 0 0.0 - 2.0 %    IMMATURE GRANULOCYTES 0.1 0.0 - 5.0 %    ABS. NEUTROPHILS 5.5 1.7 - 8.2 K/UL    ABS. LYMPHOCYTES 1.1 0.5 - 4.6 K/UL    ABS. MONOCYTES 0.7 0.1 - 1.3 K/UL    ABS. EOSINOPHILS 0.1 0.0 - 0.8 K/UL    ABS. BASOPHILS 0.0 0.0 - 0.2 K/UL    ABS. IMM.  GRANS. 0.0 0.0 - 0.5 K/UL   PROTHROMBIN TIME + INR    Collection Time: 03/15/17 10:00 AM   Result Value Ref Range    Prothrombin time 10.5 9.6 - 12.0 sec    INR 1.0 0.9 - 1.2     PTT    Collection Time: 03/15/17 10:00 AM   Result Value Ref Range    aPTT 25.3 25.3 - 76.0 SEC   METABOLIC PANEL, BASIC    Collection Time: 03/15/17 10:00 AM   Result Value Ref Range    Sodium 141 136 - 145 mmol/L    Potassium 4.2 3.5 - 5.1 mmol/L    Chloride 104 98 - 107 mmol/L    CO2 26 21 - 32 mmol/L    Anion gap 11 7 - 16 mmol/L    Glucose 137 (H) 65 - 100 mg/dL    BUN 21 8 - 23 MG/DL    Creatinine 1.13 (H) 0.6 - 1.0 MG/DL    GFR est AA >60 >60 ml/min/1.73m2    GFR est non-AA 51 (L) >60 ml/min/1.73m2    Calcium 8.9 8.3 - 10.4 MG/DL   ALBUMIN    Collection Time: 03/15/17 10:00 AM   Result Value Ref Range Albumin 3.6 3.2 - 4.6 g/dL   HEMOGLOBIN A1C WITH EAG    Collection Time: 03/15/17 10:00 AM   Result Value Ref Range    Hemoglobin A1c 6.4 (H) 4.8 - 6.0 %    Est. average glucose 137 mg/dL         Problem List:  )  Patient Active Problem List   Diagnosis Code    Essential hypertension, benign I10    Arthritis M19.90    GERD (gastroesophageal reflux disease) K21.9    Nocturnal hypoxemia G47.34    Mixed hyperlipidemia E78.2    Morbid obesity ([address] E66.01    Vitamin B12 deficiency E53.8    Vitamin D deficiency E55.9    Impaired fasting blood sugar R73.01    Carotid artery stenosis without cerebral infarction I65.29    Aortic valve stenosis I35.0    Pulmonary hypertension (HCC) I27.2    Atelectasis J98.11    CKD (chronic kidney disease) stage 3, GFR 30-59 ml/min N18.3    Chronic anemia D64.9    Chronic pain G89.29    Fibromyalgia M79.7    Full dentures Z97.2, K08.109    Pleural effusion J90    Postoperative atrial fibrillation (HCC) I97.89, I48.91    Iron deficiency anemia D50.9    Gastritis K29.70    Gastric ulcer K25.9    Acute diastolic CHF (congestive heart failure) (HCC) I50.31    Duodenal ulcer K26.9    Paroxysmal atrial fibrillation (HCC) I48.0    Follow-up visit for aortic valve replacement with bioprosthetic valve Z09, Z95.4    Peptic ulcer disease K27.9    UTI (urinary tract infection) N39.0    Snoring R06.83    Preop cardiovascular exam Z01.810    OA (osteoarthritis) of knee M17.9    Chronic gout of multiple sites M1A. 28N8    Primary osteoarthritis of left knee M17.12       Total Joint Surgery Pre-Assessment Recommendations:             Signed By: LEESA Rodriguez    March 15, 2017

## 2017-03-15 NOTE — PERIOP NOTES
Recent Results (from the past 12 hour(s))   URINALYSIS W/ RFLX MICROSCOPIC    Collection Time: 03/15/17  9:40 AM   Result Value Ref Range    Color YELLOW      Appearance CLEAR      Specific gravity 1.008 1.001 - 1.023      pH (UA) 6.0 5.0 - 9.0      Protein NEGATIVE  NEG mg/dL    Glucose NEGATIVE  mg/dL    Ketone NEGATIVE  NEG mg/dL    Bilirubin NEGATIVE  NEG      Blood NEGATIVE  NEG      Urobilinogen 0.2 0.2 - 1.0 EU/dL    Nitrites NEGATIVE  NEG      Leukocyte Esterase NEGATIVE  NEG     MSSA/MRSA SC BY PCR, NASAL SWAB    Collection Time: 03/15/17  9:40 AM   Result Value Ref Range    Special Requests: NO SPECIAL REQUESTS      Culture result: (A)       MRSA target DNA not detected, SA target DNA detected. A MRSA negative, SA positive test result does not preclude MRSA nasal colonization. CBC WITH AUTOMATED DIFF    Collection Time: 03/15/17 10:00 AM   Result Value Ref Range    WBC 7.5 4.3 - 11.1 K/uL    RBC 5.08 4.05 - 5.25 M/uL    HGB 12.1 11.7 - 15.4 g/dL    HCT 38.7 35.8 - 46.3 %    MCV 76.2 (L) 79.6 - 97.8 FL    MCH 23.8 (L) 26.1 - 32.9 PG    MCHC 31.3 (L) 31.4 - 35.0 g/dL    RDW 15.4 (H) 11.9 - 14.6 %    PLATELET 355 973 - 150 K/uL    MPV 10.3 (L) 10.8 - 14.1 FL    DF AUTOMATED      NEUTROPHILS 74 43 - 78 %    LYMPHOCYTES 15 13 - 44 %    MONOCYTES 10 4.0 - 12.0 %    EOSINOPHILS 1 0.5 - 7.8 %    BASOPHILS 0 0.0 - 2.0 %    IMMATURE GRANULOCYTES 0.1 0.0 - 5.0 %    ABS. NEUTROPHILS 5.5 1.7 - 8.2 K/UL    ABS. LYMPHOCYTES 1.1 0.5 - 4.6 K/UL    ABS. MONOCYTES 0.7 0.1 - 1.3 K/UL    ABS. EOSINOPHILS 0.1 0.0 - 0.8 K/UL    ABS. BASOPHILS 0.0 0.0 - 0.2 K/UL    ABS. IMM.  GRANS. 0.0 0.0 - 0.5 K/UL   PROTHROMBIN TIME + INR    Collection Time: 03/15/17 10:00 AM   Result Value Ref Range    Prothrombin time 10.5 9.6 - 12.0 sec    INR 1.0 0.9 - 1.2     PTT    Collection Time: 03/15/17 10:00 AM   Result Value Ref Range    aPTT 25.3 25.3 - 14.6 SEC   METABOLIC PANEL, BASIC    Collection Time: 03/15/17 10:00 AM   Result Value Ref Range    Sodium 141 136 - 145 mmol/L    Potassium 4.2 3.5 - 5.1 mmol/L    Chloride 104 98 - 107 mmol/L    CO2 26 21 - 32 mmol/L    Anion gap 11 7 - 16 mmol/L    Glucose 137 (H) 65 - 100 mg/dL    BUN 21 8 - 23 MG/DL    Creatinine 1.13 (H) 0.6 - 1.0 MG/DL    GFR est AA >60 >60 ml/min/1.73m2    GFR est non-AA 51 (L) >60 ml/min/1.73m2    Calcium 8.9 8.3 - 10.4 MG/DL   ALBUMIN    Collection Time: 03/15/17 10:00 AM   Result Value Ref Range    Albumin 3.6 3.2 - 4.6 g/dL   HEMOGLOBIN A1C WITH EAG    Collection Time: 03/15/17 10:00 AM   Result Value Ref Range    Hemoglobin A1c 6.4 (H) 4.8 - 6.0 %    Est. average glucose 137 mg/dL

## 2017-03-16 LAB
COTININE UR QL SCN: NEGATIVE NG/ML
DRUG SCREEN COMMENT:, 753798: NORMAL

## 2017-03-16 NOTE — PERIOP NOTES
Prescription for Mupirocin ointment 22g tube, apply intranasally to both nostrils BID x5 days pre-operatively or for a total of 10 doses; called to [facility] at [phone]. Patient notified of positive MSSA nasal swab, verbalizes understanding of usage starting on 3/20/17.      3/15/2017 11:25 AM - Carlos Alberto, [facility]   Component Results   Component Value Flag Ref Range Units Status   Special Requests: NO SPECIAL REQUESTS     Final   Culture result:  (A)    Final   MRSA target DNA not detected, SA target DNA detected.   A MRSA negative, SA positive test result does not preclude MRSA nasal colonization.

## 2017-03-17 NOTE — PERIOP NOTES
3/16/2017 12:37 PM - Carlos Alberto, Lab In [employer] [place]   Component Results   Component Value Flag Ref Range Units Status   Cotinine Screen, urine NEGATIVE   Agvppg=291 ng/mL Final   Drug Screen Comment: Comment     Final

## 2017-03-20 ENCOUNTER — ANESTHESIA EVENT (OUTPATIENT)
Dept: SURGERY | Age: 67
DRG: 470 | End: 2017-03-20
Payer: MEDICARE

## 2017-03-21 ENCOUNTER — SURGERY (OUTPATIENT)
Age: 67
End: 2017-03-21

## 2017-03-21 ENCOUNTER — APPOINTMENT (OUTPATIENT)
Dept: GENERAL RADIOLOGY | Age: 67
DRG: 470 | End: 2017-03-21
Attending: ORTHOPAEDIC SURGERY
Payer: MEDICARE

## 2017-03-21 ENCOUNTER — HOSPITAL ENCOUNTER (INPATIENT)
Age: 67
LOS: 3 days | Discharge: NURSING, REHAB OR LONG-TERM CARE | DRG: 470 | End: 2017-03-24
Attending: ORTHOPAEDIC SURGERY | Admitting: ORTHOPAEDIC SURGERY
Payer: MEDICARE

## 2017-03-21 ENCOUNTER — ANESTHESIA (OUTPATIENT)
Dept: SURGERY | Age: 67
DRG: 470 | End: 2017-03-21
Payer: MEDICARE

## 2017-03-21 LAB
ABO + RH BLD: NORMAL
BLOOD GROUP ANTIBODIES SERPL: NORMAL
GLUCOSE BLD STRIP.AUTO-MCNC: 134 MG/DL (ref 65–100)
HGB BLD-MCNC: 10.6 G/DL (ref 11.7–15.4)
SPECIMEN EXP DATE BLD: NORMAL

## 2017-03-21 PROCEDURE — 77030002933 HC SUT MCRYL J&J -A: Performed by: ORTHOPAEDIC SURGERY

## 2017-03-21 PROCEDURE — 77030034479 HC ADH SKN CLSR PRINEO J&J -B: Performed by: ORTHOPAEDIC SURGERY

## 2017-03-21 PROCEDURE — 77030033067 HC SUT PDO STRATFX SPIR J&J -B: Performed by: ORTHOPAEDIC SURGERY

## 2017-03-21 PROCEDURE — 94762 N-INVAS EAR/PLS OXIMTRY CONT: CPT

## 2017-03-21 PROCEDURE — 77030003665 HC NDL SPN BBMI -A: Performed by: ANESTHESIOLOGY

## 2017-03-21 PROCEDURE — 77030020782 HC GWN BAIR PAWS FLX 3M -B: Performed by: ANESTHESIOLOGY

## 2017-03-21 PROCEDURE — 0SRD0JA REPLACEMENT OF LEFT KNEE JOINT WITH SYNTHETIC SUBSTITUTE, UNCEMENTED, OPEN APPROACH: ICD-10-PCS | Performed by: ORTHOPAEDIC SURGERY

## 2017-03-21 PROCEDURE — 65270000029 HC RM PRIVATE

## 2017-03-21 PROCEDURE — 77030011640 HC PAD GRND REM COVD -A: Performed by: ORTHOPAEDIC SURGERY

## 2017-03-21 PROCEDURE — 77030032490 HC SLV COMPR SCD KNE COVD -B

## 2017-03-21 PROCEDURE — C1776 JOINT DEVICE (IMPLANTABLE): HCPCS | Performed by: ORTHOPAEDIC SURGERY

## 2017-03-21 PROCEDURE — 77030003602 HC NDL NRV BLK BBMI -B: Performed by: ANESTHESIOLOGY

## 2017-03-21 PROCEDURE — 85018 HEMOGLOBIN: CPT | Performed by: ORTHOPAEDIC SURGERY

## 2017-03-21 PROCEDURE — 74011250636 HC RX REV CODE- 250/636: Performed by: ANESTHESIOLOGY

## 2017-03-21 PROCEDURE — 77030033138 HC SUT PGA STRATFX J&J -B: Performed by: ORTHOPAEDIC SURGERY

## 2017-03-21 PROCEDURE — 76210000016 HC OR PH I REC 1 TO 1.5 HR: Performed by: ORTHOPAEDIC SURGERY

## 2017-03-21 PROCEDURE — 74011250636 HC RX REV CODE- 250/636

## 2017-03-21 PROCEDURE — 97161 PT EVAL LOW COMPLEX 20 MIN: CPT

## 2017-03-21 PROCEDURE — 77030034849: Performed by: ORTHOPAEDIC SURGERY

## 2017-03-21 PROCEDURE — 74011000250 HC RX REV CODE- 250: Performed by: ANESTHESIOLOGY

## 2017-03-21 PROCEDURE — 74011000250 HC RX REV CODE- 250

## 2017-03-21 PROCEDURE — 77030003666 HC NDL SPINAL BD -A: Performed by: ORTHOPAEDIC SURGERY

## 2017-03-21 PROCEDURE — 77030012890

## 2017-03-21 PROCEDURE — 76010000162 HC OR TIME 1.5 TO 2 HR INTENSV-TIER 1: Performed by: ORTHOPAEDIC SURGERY

## 2017-03-21 PROCEDURE — 77030013727 HC IRR FAN PULSVC ZIMM -B: Performed by: ORTHOPAEDIC SURGERY

## 2017-03-21 PROCEDURE — 77030036688 HC BLNKT CLD THER S2SG -B

## 2017-03-21 PROCEDURE — 82962 GLUCOSE BLOOD TEST: CPT

## 2017-03-21 PROCEDURE — 74011000250 HC RX REV CODE- 250: Performed by: ORTHOPAEDIC SURGERY

## 2017-03-21 PROCEDURE — 76060000034 HC ANESTHESIA 1.5 TO 2 HR: Performed by: ORTHOPAEDIC SURGERY

## 2017-03-21 PROCEDURE — 77030031139 HC SUT VCRL2 J&J -A: Performed by: ORTHOPAEDIC SURGERY

## 2017-03-21 PROCEDURE — 74011000302 HC RX REV CODE- 302: Performed by: ORTHOPAEDIC SURGERY

## 2017-03-21 PROCEDURE — 36415 COLL VENOUS BLD VENIPUNCTURE: CPT | Performed by: ORTHOPAEDIC SURGERY

## 2017-03-21 PROCEDURE — 76942 ECHO GUIDE FOR BIOPSY: CPT | Performed by: ORTHOPAEDIC SURGERY

## 2017-03-21 PROCEDURE — 94760 N-INVAS EAR/PLS OXIMETRY 1: CPT

## 2017-03-21 PROCEDURE — 74011250636 HC RX REV CODE- 250/636: Performed by: ORTHOPAEDIC SURGERY

## 2017-03-21 PROCEDURE — 77030002922 HC SUT FBRWRE ARTH -B: Performed by: ORTHOPAEDIC SURGERY

## 2017-03-21 PROCEDURE — 86580 TB INTRADERMAL TEST: CPT | Performed by: ORTHOPAEDIC SURGERY

## 2017-03-21 PROCEDURE — 97165 OT EVAL LOW COMPLEX 30 MIN: CPT

## 2017-03-21 PROCEDURE — 77030018836 HC SOL IRR NACL ICUM -A: Performed by: ORTHOPAEDIC SURGERY

## 2017-03-21 PROCEDURE — 74011250637 HC RX REV CODE- 250/637: Performed by: ORTHOPAEDIC SURGERY

## 2017-03-21 PROCEDURE — 86900 BLOOD TYPING SEROLOGIC ABO: CPT | Performed by: ORTHOPAEDIC SURGERY

## 2017-03-21 PROCEDURE — 76010010054 HC POST OP PAIN BLOCK: Performed by: ORTHOPAEDIC SURGERY

## 2017-03-21 PROCEDURE — 77030027138 HC INCENT SPIROMETER -A

## 2017-03-21 PROCEDURE — 77030007880 HC KT SPN EPDRL BBMI -B: Performed by: ANESTHESIOLOGY

## 2017-03-21 DEVICE — BASEPLT TIB PC TRITNM SZ 5 -- TRIATHLON: Type: IMPLANTABLE DEVICE | Site: KNEE | Status: FUNCTIONAL

## 2017-03-21 DEVICE — IMPLANTABLE DEVICE: Type: IMPLANTABLE DEVICE | Site: KNEE | Status: FUNCTIONAL

## 2017-03-21 DEVICE — COMPNT FEM CR TRIATHLN 5 L PA --: Type: IMPLANTABLE DEVICE | Site: KNEE | Status: FUNCTIONAL

## 2017-03-21 DEVICE — COMPONENT PAT DIA35MM THK10MM SUPERIOR/INFERIOR KNEE: Type: IMPLANTABLE DEVICE | Site: KNEE | Status: FUNCTIONAL

## 2017-03-21 RX ORDER — DEXAMETHASONE SODIUM PHOSPHATE 4 MG/ML
INJECTION, SOLUTION INTRA-ARTICULAR; INTRALESIONAL; INTRAMUSCULAR; INTRAVENOUS; SOFT TISSUE AS NEEDED
Status: DISCONTINUED | OUTPATIENT
Start: 2017-03-21 | End: 2017-03-21 | Stop reason: HOSPADM

## 2017-03-21 RX ORDER — CEFAZOLIN SODIUM IN 0.9 % NACL 2 G/50 ML
2 INTRAVENOUS SOLUTION, PIGGYBACK (ML) INTRAVENOUS ONCE
Status: COMPLETED | OUTPATIENT
Start: 2017-03-21 | End: 2017-03-21

## 2017-03-21 RX ORDER — GUAIFENESIN 100 MG/5ML
81 LIQUID (ML) ORAL 2 TIMES DAILY
Status: DISCONTINUED | OUTPATIENT
Start: 2017-03-21 | End: 2017-03-24 | Stop reason: HOSPADM

## 2017-03-21 RX ORDER — HYDROMORPHONE HYDROCHLORIDE 2 MG/ML
0.5 INJECTION, SOLUTION INTRAMUSCULAR; INTRAVENOUS; SUBCUTANEOUS
Status: DISCONTINUED | OUTPATIENT
Start: 2017-03-21 | End: 2017-03-21 | Stop reason: HOSPADM

## 2017-03-21 RX ORDER — OLMESARTAN MEDOXOMIL 20 MG/1
40 TABLET ORAL DAILY
Status: DISCONTINUED | OUTPATIENT
Start: 2017-03-22 | End: 2017-03-24

## 2017-03-21 RX ORDER — ROPIVACAINE HYDROCHLORIDE 2 MG/ML
INJECTION, SOLUTION EPIDURAL; INFILTRATION; PERINEURAL AS NEEDED
Status: DISCONTINUED | OUTPATIENT
Start: 2017-03-21 | End: 2017-03-21 | Stop reason: HOSPADM

## 2017-03-21 RX ORDER — LIDOCAINE HYDROCHLORIDE 10 MG/ML
0.1 INJECTION INFILTRATION; PERINEURAL AS NEEDED
Status: DISCONTINUED | OUTPATIENT
Start: 2017-03-21 | End: 2017-03-21 | Stop reason: HOSPADM

## 2017-03-21 RX ORDER — LANOLIN ALCOHOL/MO/W.PET/CERES
325 CREAM (GRAM) TOPICAL
Status: DISCONTINUED | OUTPATIENT
Start: 2017-03-22 | End: 2017-03-24 | Stop reason: HOSPADM

## 2017-03-21 RX ORDER — CARVEDILOL 25 MG/1
25 TABLET ORAL 2 TIMES DAILY WITH MEALS
Status: DISCONTINUED | OUTPATIENT
Start: 2017-03-21 | End: 2017-03-24 | Stop reason: HOSPADM

## 2017-03-21 RX ORDER — FERROUS SULFATE, DRIED 160(50) MG
1 TABLET, EXTENDED RELEASE ORAL 2 TIMES DAILY WITH MEALS
Status: DISCONTINUED | OUTPATIENT
Start: 2017-03-21 | End: 2017-03-24 | Stop reason: HOSPADM

## 2017-03-21 RX ORDER — MIDAZOLAM HYDROCHLORIDE 1 MG/ML
INJECTION, SOLUTION INTRAMUSCULAR; INTRAVENOUS AS NEEDED
Status: DISCONTINUED | OUTPATIENT
Start: 2017-03-21 | End: 2017-03-21 | Stop reason: HOSPADM

## 2017-03-21 RX ORDER — OXYCODONE HCL 10 MG/1
10 TABLET, FILM COATED, EXTENDED RELEASE ORAL EVERY 12 HOURS
Status: DISCONTINUED | OUTPATIENT
Start: 2017-03-21 | End: 2017-03-21

## 2017-03-21 RX ORDER — FLUMAZENIL 0.1 MG/ML
0.2 INJECTION INTRAVENOUS
Status: DISCONTINUED | OUTPATIENT
Start: 2017-03-21 | End: 2017-03-21 | Stop reason: HOSPADM

## 2017-03-21 RX ORDER — FUROSEMIDE 20 MG/1
20 TABLET ORAL DAILY
Status: DISCONTINUED | OUTPATIENT
Start: 2017-03-22 | End: 2017-03-24 | Stop reason: HOSPADM

## 2017-03-21 RX ORDER — SODIUM CHLORIDE 0.9 % (FLUSH) 0.9 %
5-10 SYRINGE (ML) INJECTION EVERY 8 HOURS
Status: DISCONTINUED | OUTPATIENT
Start: 2017-03-21 | End: 2017-03-21

## 2017-03-21 RX ORDER — ONDANSETRON 2 MG/ML
4 INJECTION INTRAMUSCULAR; INTRAVENOUS
Status: DISCONTINUED | OUTPATIENT
Start: 2017-03-21 | End: 2017-03-21 | Stop reason: HOSPADM

## 2017-03-21 RX ORDER — NALOXONE HYDROCHLORIDE 0.4 MG/ML
0.1 INJECTION, SOLUTION INTRAMUSCULAR; INTRAVENOUS; SUBCUTANEOUS
Status: DISCONTINUED | OUTPATIENT
Start: 2017-03-21 | End: 2017-03-21 | Stop reason: HOSPADM

## 2017-03-21 RX ORDER — PROPOFOL 10 MG/ML
INJECTION, EMULSION INTRAVENOUS
Status: DISCONTINUED | OUTPATIENT
Start: 2017-03-21 | End: 2017-03-21 | Stop reason: HOSPADM

## 2017-03-21 RX ORDER — LIDOCAINE HYDROCHLORIDE 20 MG/ML
INJECTION, SOLUTION EPIDURAL; INFILTRATION; INTRACAUDAL; PERINEURAL AS NEEDED
Status: DISCONTINUED | OUTPATIENT
Start: 2017-03-21 | End: 2017-03-21 | Stop reason: HOSPADM

## 2017-03-21 RX ORDER — BUPIVACAINE HYDROCHLORIDE 7.5 MG/ML
INJECTION, SOLUTION INTRASPINAL AS NEEDED
Status: DISCONTINUED | OUTPATIENT
Start: 2017-03-21 | End: 2017-03-21 | Stop reason: HOSPADM

## 2017-03-21 RX ORDER — SODIUM CHLORIDE 0.9 % (FLUSH) 0.9 %
5-10 SYRINGE (ML) INJECTION AS NEEDED
Status: DISCONTINUED | OUTPATIENT
Start: 2017-03-21 | End: 2017-03-21 | Stop reason: HOSPADM

## 2017-03-21 RX ORDER — SODIUM CHLORIDE, SODIUM LACTATE, POTASSIUM CHLORIDE, CALCIUM CHLORIDE 600; 310; 30; 20 MG/100ML; MG/100ML; MG/100ML; MG/100ML
100 INJECTION, SOLUTION INTRAVENOUS CONTINUOUS
Status: DISCONTINUED | OUTPATIENT
Start: 2017-03-21 | End: 2017-03-21 | Stop reason: HOSPADM

## 2017-03-21 RX ORDER — HYDROMORPHONE HYDROCHLORIDE 1 MG/ML
1 INJECTION, SOLUTION INTRAMUSCULAR; INTRAVENOUS; SUBCUTANEOUS
Status: DISCONTINUED | OUTPATIENT
Start: 2017-03-21 | End: 2017-03-24 | Stop reason: HOSPADM

## 2017-03-21 RX ORDER — ONDANSETRON 2 MG/ML
INJECTION INTRAMUSCULAR; INTRAVENOUS AS NEEDED
Status: DISCONTINUED | OUTPATIENT
Start: 2017-03-21 | End: 2017-03-21 | Stop reason: HOSPADM

## 2017-03-21 RX ORDER — HYDROMORPHONE HYDROCHLORIDE 2 MG/1
2 TABLET ORAL
Status: DISCONTINUED | OUTPATIENT
Start: 2017-03-21 | End: 2017-03-24 | Stop reason: HOSPADM

## 2017-03-21 RX ORDER — GABAPENTIN 100 MG/1
100 CAPSULE ORAL 2 TIMES DAILY
Status: DISCONTINUED | OUTPATIENT
Start: 2017-03-21 | End: 2017-03-24 | Stop reason: HOSPADM

## 2017-03-21 RX ORDER — NALOXONE HYDROCHLORIDE 0.4 MG/ML
.2-.4 INJECTION, SOLUTION INTRAMUSCULAR; INTRAVENOUS; SUBCUTANEOUS
Status: DISCONTINUED | OUTPATIENT
Start: 2017-03-21 | End: 2017-03-24 | Stop reason: HOSPADM

## 2017-03-21 RX ORDER — NEOMYCIN AND POLYMYXIN B SULFATES 40; 200000 MG/ML; [USP'U]/ML
SOLUTION IRRIGATION AS NEEDED
Status: DISCONTINUED | OUTPATIENT
Start: 2017-03-21 | End: 2017-03-21 | Stop reason: HOSPADM

## 2017-03-21 RX ORDER — NALBUPHINE HYDROCHLORIDE 10 MG/ML
5 INJECTION, SOLUTION INTRAMUSCULAR; INTRAVENOUS; SUBCUTANEOUS
Status: DISCONTINUED | OUTPATIENT
Start: 2017-03-21 | End: 2017-03-21 | Stop reason: HOSPADM

## 2017-03-21 RX ORDER — KETOROLAC TROMETHAMINE 30 MG/ML
30 INJECTION, SOLUTION INTRAMUSCULAR; INTRAVENOUS ONCE
Status: COMPLETED | OUTPATIENT
Start: 2017-03-22 | End: 2017-03-22

## 2017-03-21 RX ORDER — POLYETHYLENE GLYCOL 3350 17 G/17G
17 POWDER, FOR SOLUTION ORAL
Status: DISCONTINUED | OUTPATIENT
Start: 2017-03-21 | End: 2017-03-21 | Stop reason: SDUPTHER

## 2017-03-21 RX ORDER — SODIUM CHLORIDE 9 MG/ML
100 INJECTION, SOLUTION INTRAVENOUS CONTINUOUS
Status: DISPENSED | OUTPATIENT
Start: 2017-03-21 | End: 2017-03-22

## 2017-03-21 RX ORDER — SODIUM CHLORIDE 0.9 % (FLUSH) 0.9 %
5-10 SYRINGE (ML) INJECTION AS NEEDED
Status: DISCONTINUED | OUTPATIENT
Start: 2017-03-21 | End: 2017-03-24 | Stop reason: HOSPADM

## 2017-03-21 RX ORDER — TRANEXAMIC ACID 100 MG/ML
INJECTION, SOLUTION INTRAVENOUS AS NEEDED
Status: DISCONTINUED | OUTPATIENT
Start: 2017-03-21 | End: 2017-03-21 | Stop reason: HOSPADM

## 2017-03-21 RX ORDER — AMOXICILLIN 250 MG
2 CAPSULE ORAL DAILY
Status: DISCONTINUED | OUTPATIENT
Start: 2017-03-22 | End: 2017-03-24 | Stop reason: HOSPADM

## 2017-03-21 RX ORDER — SODIUM CHLORIDE 0.9 % (FLUSH) 0.9 %
5-10 SYRINGE (ML) INJECTION EVERY 8 HOURS
Status: DISCONTINUED | OUTPATIENT
Start: 2017-03-21 | End: 2017-03-21 | Stop reason: HOSPADM

## 2017-03-21 RX ORDER — ATORVASTATIN CALCIUM 40 MG/1
40 TABLET, FILM COATED ORAL DAILY
Status: DISCONTINUED | OUTPATIENT
Start: 2017-03-22 | End: 2017-03-23

## 2017-03-21 RX ORDER — CEFAZOLIN SODIUM IN 0.9 % NACL 2 G/50 ML
2 INTRAVENOUS SOLUTION, PIGGYBACK (ML) INTRAVENOUS EVERY 8 HOURS
Status: COMPLETED | OUTPATIENT
Start: 2017-03-21 | End: 2017-03-22

## 2017-03-21 RX ORDER — ACETAMINOPHEN 500 MG
1000 TABLET ORAL EVERY 6 HOURS
Status: DISCONTINUED | OUTPATIENT
Start: 2017-03-22 | End: 2017-03-24 | Stop reason: HOSPADM

## 2017-03-21 RX ORDER — DIPHENHYDRAMINE HCL 25 MG
25 CAPSULE ORAL
Status: DISCONTINUED | OUTPATIENT
Start: 2017-03-21 | End: 2017-03-24 | Stop reason: HOSPADM

## 2017-03-21 RX ORDER — KETOROLAC TROMETHAMINE 30 MG/ML
INJECTION, SOLUTION INTRAMUSCULAR; INTRAVENOUS AS NEEDED
Status: DISCONTINUED | OUTPATIENT
Start: 2017-03-21 | End: 2017-03-21 | Stop reason: HOSPADM

## 2017-03-21 RX ORDER — MIDAZOLAM HYDROCHLORIDE 1 MG/ML
2 INJECTION, SOLUTION INTRAMUSCULAR; INTRAVENOUS
Status: DISCONTINUED | OUTPATIENT
Start: 2017-03-21 | End: 2017-03-21 | Stop reason: HOSPADM

## 2017-03-21 RX ORDER — ZOLPIDEM TARTRATE 5 MG/1
5 TABLET ORAL
Status: DISCONTINUED | OUTPATIENT
Start: 2017-03-21 | End: 2017-03-24 | Stop reason: HOSPADM

## 2017-03-21 RX ORDER — MORPHINE SULFATE 10 MG/ML
INJECTION, SOLUTION INTRAMUSCULAR; INTRAVENOUS AS NEEDED
Status: DISCONTINUED | OUTPATIENT
Start: 2017-03-21 | End: 2017-03-21 | Stop reason: HOSPADM

## 2017-03-21 RX ORDER — SODIUM CHLORIDE, SODIUM LACTATE, POTASSIUM CHLORIDE, CALCIUM CHLORIDE 600; 310; 30; 20 MG/100ML; MG/100ML; MG/100ML; MG/100ML
INJECTION, SOLUTION INTRAVENOUS
Status: DISCONTINUED | OUTPATIENT
Start: 2017-03-21 | End: 2017-03-21 | Stop reason: HOSPADM

## 2017-03-21 RX ORDER — POLYETHYLENE GLYCOL 3350 17 G/17G
17 POWDER, FOR SOLUTION ORAL
Status: DISCONTINUED | OUTPATIENT
Start: 2017-03-21 | End: 2017-03-24 | Stop reason: HOSPADM

## 2017-03-21 RX ORDER — FENTANYL CITRATE 50 UG/ML
100 INJECTION, SOLUTION INTRAMUSCULAR; INTRAVENOUS ONCE
Status: DISCONTINUED | OUTPATIENT
Start: 2017-03-21 | End: 2017-03-21 | Stop reason: HOSPADM

## 2017-03-21 RX ORDER — FEBUXOSTAT 40 MG/1
40 TABLET, FILM COATED ORAL DAILY
Status: DISCONTINUED | OUTPATIENT
Start: 2017-03-22 | End: 2017-03-24 | Stop reason: HOSPADM

## 2017-03-21 RX ORDER — CYCLOBENZAPRINE HCL 10 MG
5 TABLET ORAL 3 TIMES DAILY
Status: DISCONTINUED | OUTPATIENT
Start: 2017-03-21 | End: 2017-03-24 | Stop reason: HOSPADM

## 2017-03-21 RX ORDER — MIDAZOLAM HYDROCHLORIDE 1 MG/ML
2 INJECTION, SOLUTION INTRAMUSCULAR; INTRAVENOUS ONCE
Status: DISCONTINUED | OUTPATIENT
Start: 2017-03-21 | End: 2017-03-21 | Stop reason: HOSPADM

## 2017-03-21 RX ORDER — DEXAMETHASONE SODIUM PHOSPHATE 100 MG/10ML
10 INJECTION INTRAMUSCULAR; INTRAVENOUS ONCE
Status: COMPLETED | OUTPATIENT
Start: 2017-03-22 | End: 2017-03-22

## 2017-03-21 RX ORDER — TRANEXAMIC ACID 650 1/1
1300 TABLET ORAL DAILY
Status: DISPENSED | OUTPATIENT
Start: 2017-03-21 | End: 2017-03-23

## 2017-03-21 RX ADMIN — HYDROMORPHONE HYDROCHLORIDE 2 MG: 2 TABLET ORAL at 16:41

## 2017-03-21 RX ADMIN — NEOMYCIN AND POLYMYXIN B SULFATES 3 AMPULE: 40; 200000 SOLUTION IRRIGATION at 08:13

## 2017-03-21 RX ADMIN — MIDAZOLAM HYDROCHLORIDE 1 MG: 1 INJECTION, SOLUTION INTRAMUSCULAR; INTRAVENOUS at 07:55

## 2017-03-21 RX ADMIN — BUPIVACAINE HYDROCHLORIDE 1.8 ML: 7.5 INJECTION, SOLUTION INTRASPINAL at 07:19

## 2017-03-21 RX ADMIN — CALCIUM CARBONATE 500 MG (1,250 MG)-VITAMIN D3 200 UNIT TABLET 1 TABLET: at 16:41

## 2017-03-21 RX ADMIN — HYDROMORPHONE HYDROCHLORIDE 2 MG: 2 TABLET ORAL at 23:50

## 2017-03-21 RX ADMIN — ASPIRIN 81 MG CHEWABLE TABLET 81 MG: 81 TABLET CHEWABLE at 16:41

## 2017-03-21 RX ADMIN — KETOROLAC TROMETHAMINE 30 MG: 30 INJECTION, SOLUTION INTRAMUSCULAR; INTRAVENOUS at 08:12

## 2017-03-21 RX ADMIN — CYCLOBENZAPRINE HYDROCHLORIDE 5 MG: 10 TABLET, FILM COATED ORAL at 21:59

## 2017-03-21 RX ADMIN — HYDROMORPHONE HYDROCHLORIDE 1 MG: 1 INJECTION, SOLUTION INTRAMUSCULAR; INTRAVENOUS; SUBCUTANEOUS at 14:32

## 2017-03-21 RX ADMIN — CEFAZOLIN 2 G: 1 INJECTION, POWDER, FOR SOLUTION INTRAMUSCULAR; INTRAVENOUS; PARENTERAL at 07:07

## 2017-03-21 RX ADMIN — MIDAZOLAM HYDROCHLORIDE 1 MG: 1 INJECTION, SOLUTION INTRAMUSCULAR; INTRAVENOUS at 07:15

## 2017-03-21 RX ADMIN — SODIUM CHLORIDE, SODIUM LACTATE, POTASSIUM CHLORIDE, CALCIUM CHLORIDE: 600; 310; 30; 20 INJECTION, SOLUTION INTRAVENOUS at 07:05

## 2017-03-21 RX ADMIN — CEFAZOLIN 2 G: 1 INJECTION, POWDER, FOR SOLUTION INTRAMUSCULAR; INTRAVENOUS; PARENTERAL at 14:33

## 2017-03-21 RX ADMIN — ONDANSETRON 4 MG: 2 INJECTION INTRAMUSCULAR; INTRAVENOUS at 07:45

## 2017-03-21 RX ADMIN — HYDROMORPHONE HYDROCHLORIDE 1 MG: 1 INJECTION, SOLUTION INTRAMUSCULAR; INTRAVENOUS; SUBCUTANEOUS at 18:38

## 2017-03-21 RX ADMIN — GABAPENTIN 100 MG: 100 CAPSULE ORAL at 21:59

## 2017-03-21 RX ADMIN — MIDAZOLAM HYDROCHLORIDE 1 MG: 1 INJECTION, SOLUTION INTRAMUSCULAR; INTRAVENOUS at 07:45

## 2017-03-21 RX ADMIN — ROPIVACAINE HYDROCHLORIDE 60 ML: 2 INJECTION, SOLUTION EPIDURAL; INFILTRATION at 08:13

## 2017-03-21 RX ADMIN — CARVEDILOL 25 MG: 25 TABLET, FILM COATED ORAL at 16:41

## 2017-03-21 RX ADMIN — HYDROMORPHONE HYDROCHLORIDE 2 MG: 2 TABLET ORAL at 12:01

## 2017-03-21 RX ADMIN — MORPHINE SULFATE 10 MG: 10 INJECTION INTRAMUSCULAR; INTRAVENOUS; SUBCUTANEOUS at 08:14

## 2017-03-21 RX ADMIN — PROPOFOL 50 MCG/KG/MIN: 10 INJECTION, EMULSION INTRAVENOUS at 07:31

## 2017-03-21 RX ADMIN — CYCLOBENZAPRINE HYDROCHLORIDE 5 MG: 10 TABLET, FILM COATED ORAL at 16:42

## 2017-03-21 RX ADMIN — TRANEXAMIC ACID 1000 MG: 100 INJECTION, SOLUTION INTRAVENOUS at 07:32

## 2017-03-21 RX ADMIN — LIDOCAINE HYDROCHLORIDE 0.1 ML: 10 INJECTION, SOLUTION INFILTRATION; PERINEURAL at 06:23

## 2017-03-21 RX ADMIN — SODIUM CHLORIDE, SODIUM LACTATE, POTASSIUM CHLORIDE, AND CALCIUM CHLORIDE 100 ML/HR: 600; 310; 30; 20 INJECTION, SOLUTION INTRAVENOUS at 06:21

## 2017-03-21 RX ADMIN — SODIUM CHLORIDE 100 ML/HR: 900 INJECTION, SOLUTION INTRAVENOUS at 10:00

## 2017-03-21 RX ADMIN — SODIUM CHLORIDE, SODIUM LACTATE, POTASSIUM CHLORIDE, CALCIUM CHLORIDE: 600; 310; 30; 20 INJECTION, SOLUTION INTRAVENOUS at 07:45

## 2017-03-21 RX ADMIN — LIDOCAINE HYDROCHLORIDE 20 MG: 20 INJECTION, SOLUTION EPIDURAL; INFILTRATION; INTRACAUDAL; PERINEURAL at 07:31

## 2017-03-21 RX ADMIN — MIDAZOLAM HYDROCHLORIDE 1 MG: 1 INJECTION, SOLUTION INTRAMUSCULAR; INTRAVENOUS at 07:12

## 2017-03-21 RX ADMIN — TRANEXAMIC ACID 1000 MG: 100 INJECTION, SOLUTION INTRAVENOUS at 08:38

## 2017-03-21 RX ADMIN — TUBERCULIN PURIFIED PROTEIN DERIVATIVE 5 UNITS: 5 INJECTION, SOLUTION INTRADERMAL at 06:00

## 2017-03-21 RX ADMIN — CEFAZOLIN 2 G: 1 INJECTION, POWDER, FOR SOLUTION INTRAMUSCULAR; INTRAVENOUS; PARENTERAL at 23:50

## 2017-03-21 RX ADMIN — DEXAMETHASONE SODIUM PHOSPHATE 10 MG: 4 INJECTION, SOLUTION INTRA-ARTICULAR; INTRALESIONAL; INTRAMUSCULAR; INTRAVENOUS; SOFT TISSUE at 07:45

## 2017-03-21 NOTE — ANESTHESIA PREPROCEDURE EVALUATION
Anesthetic History   No history of anesthetic complications       Comments: States she had a reaction to plastic BP cuff. Claustrophobia, likes to breath through circuit without mask for induction. Review of Systems / Medical History  Patient summary reviewed and pertinent labs reviewed    Pulmonary  Within defined limits                 Neuro/Psych         Psychiatric history (Claustrophobia)     Cardiovascular    Hypertension: well controlled        Dysrhythmias : atrial fibrillation      Exercise tolerance: >4 METS: Syncopal spells and [place],  no chest pain  Comments: S/p AVR 6/2016. Cardiologist aware she is having TKA and thinks she is low cardiac risk. Has bovine valve and is on ASA 81mg.    GI/Hepatic/Renal     GERD: well controlled    Renal disease: stones      Comments: S/p gastric bypass Endo/Other        Morbid obesity and arthritis     Other Findings   Comments: Gout  Chronic pain  Fibromyalgia           Physical Exam    Airway  Mallampati: II  TM Distance: 4 - 6 cm  Neck ROM: normal range of motion   Mouth opening: Normal     Cardiovascular  Regular rate and rhythm,  S1 and S2 normal,  no murmur, click, rub, or gallop  Rhythm: regular  Rate: normal         Dental    Dentition: Full upper dentures and Lower partial plate     Pulmonary  Breath sounds clear to auscultation               Abdominal  GI exam deferred       Other Findings            Anesthetic Plan    ASA: 3  Anesthesia type: spinal      Post-op pain plan if not by surgeon: peripheral nerve block single      Anesthetic plan and risks discussed with: Patient      Did well with right TKA 10/2016

## 2017-03-21 NOTE — ANESTHESIA PROCEDURE NOTES
Spinal Block    Start time: 3/21/2017 7:18 AM  End time: 3/21/2017 7:20 AM  Performed by: Ebony Hayes  Authorized by: Ebony Hayes     Pre-procedure:   Indications: primary anesthetic  Preanesthetic Checklist: patient identified, risks and benefits discussed, anesthesia consent, patient being monitored and timeout performed    Timeout Time: 07:17          Spinal Block:   Patient Position:  Seated  Prep Region:  Lumbar  Prep: chlorhexidine and patient draped      Location:  L3-4  Technique:  Single shot  Local:  Lidocaine 1%  Local Dose (mL):  2    Needle:   Needle Type:  Pencan  Needle Gauge:  25 G  Attempts:  1      Events: CSF confirmed, no blood with aspiration and no paresthesia        Assessment:  Insertion:  Uncomplicated  Patient tolerance:  Patient tolerated the procedure well with no immediate complications

## 2017-03-21 NOTE — ANESTHESIA PROCEDURE NOTES
Peripheral Block    Start time: 3/21/2017 6:58 AM  End time: 3/21/2017 7:01 AM  Performed by: Hugo Patiño  Authorized by: Hugo Patiño       Pre-procedure: Indications: at surgeon's request and post-op pain management    Preanesthetic Checklist: patient identified, risks and benefits discussed, site marked, timeout performed, anesthesia consent given and patient being monitored    Timeout Time: 06:57          Block Type:   Block Type:   Adductor canal  Monitoring:  Standard ASA monitoring, continuous pulse ox, frequent vital sign checks, heart rate, responsive to questions and oxygen  Injection Technique:  Single shot  Procedures: ultrasound guided    Patient Position: supine  Prep: chlorhexidine    Location:  Mid thigh  Needle Type:  Stimuplex  Needle Gauge:  22 G  Needle Localization:  Ultrasound guidance  Medication Injected:  0.2%  ropivacaine  Adds:  Epi 1:200K  Volume (mL):  20    Assessment:  Number of attempts:  1  Injection Assessment:  Incremental injection every 5 mL, local visualized surrounding nerve on ultrasound, negative aspiration for CSF, negative aspiration for blood, no paresthesia, no intravascular symptoms and ultrasound image on chart  Patient tolerance:  Patient tolerated the procedure well with no immediate complications

## 2017-03-21 NOTE — PROGRESS NOTES
TRANSFER - IN REPORT:    Verbal report received from [place]) on [employer]  being received from PACU(unit) for routine progression of care      Report consisted of patients Situation, Background, Assessment and   Recommendations(SBAR). Information from the following report(s) SBAR, OR Summary, Procedure Summary, Intake/Output, MAR, Accordion, Recent Results and Med Rec Status was reviewed with the receiving nurse. Opportunity for questions and clarification was provided. Assessment completed upon patients arrival to unit and care assumed.

## 2017-03-21 NOTE — H&P
[facility]  Pre Operative History and Physical Exam    Patient ID:  Nina Bustamante  [id number]  39 y.o.  1950    Today: March 21, 2017          CC:  Left  Knee pain    HPI:   The patient has end stage arthritis of the left knee. The patient was evaluated and examined during a consultation prior to today. The patient complains of knee pain with activities, reports pain as mostly occurring along the joint lines, reports stiffness of the knee with prolonged inactivity, and swelling/pain at the end of the day and after increased physical activity. The pain affects the patients activities of daily living and quality of life. The patient has attempted and exhausted conservative treatment. There have been no changes to the patient's orthopedic condition since the last office visit.     Past Medical History:  Past Medical History:   Diagnosis Date    Adverse effect of anesthesia     woke up on a vent s/p cholecystectomy- panic      Arthritis     Chronic kidney disease     pt denies 9/19/16    Chronic pain     r/t arthritis    Follow-up visit for aortic valve replacement with bioprosthetic valve 7/25/2016    GERD (gastroesophageal reflux disease)     controlled w/med    Hypertension     controlled w/med    Kidney stones     Morbid obesity ([address]     Murmur, cardiac     Nausea & vomiting     Psychiatric disorder     claustraphobia (severe)    PUD (peptic ulcer disease) 06/2016    dx 2016    Valvular heart disease 2016    AVR       Past Surgical History:  Past Surgical History:   Procedure Laterality Date    COLONOSCOPY N/A 6/15/2016    COLONOSCOPY/ BMI 41  ROOM 2235 performed by Dede [facility] at [facility] ENDOSCOPY    HX AORTIC VALVE REPLACEMENT  6/9/2016    Dr. Kendrick Ibrahim    HX BILATERAL SALPINGO-OOPHORECTOMY      HX CERVICAL FUSION      HX GASTRIC BYPASS      ans reversal     HX GI      repaired \"hole in stomach\" from gastric bypass    HX HEART CATHETERIZATION      no stents     HX HEART VALVE SURGERY      HX HYSTERECTOMY      HX KNEE REPLACEMENT Right 10/10/2016    Dr. Arnel Mujica, PO[facility]    HX LAP CHOLECYSTECTOMY      HX SHOULDER ARTHROSCOPY Right      times 2 \"shaved bone\"     HX UROLOGICAL  Multiple dates    Mulitiple open Nephrolithotomies        Medications:     Prior to Admission medications    Medication Sig Start Date End Date Taking? Authorizing Provider   aspirin delayed-release 81 mg tablet Take 81 mg by mouth daily. Yes Historical Provider   carvedilol (COREG) 25 mg tablet Take 1 Tab by mouth two (2) times daily (with meals). 2/15/17  Yes Gerson Saravia MD   febuxostat (ULORIC) 40 mg tab tablet Take 1 Tab by mouth every morning. Indications: GOUT PREVENTION 2/15/17  Yes Gerson Saravia MD   HYDROcodone-acetaminophen [facility]) 7.5-325 mg per tablet Take 1 Tab by mouth every six (6) hours as needed for Pain. Max Daily Amount: 4 Tabs. 3/15/17  Yes Gerson Saravia MD   olmesartan (BENICAR) 40 mg tablet Take 1 Tab by mouth daily. 1/25/17  Yes Gerson Saravia MD   zolpidem (AMBIEN) 5 mg tablet Take 1 Tab by mouth nightly as needed for Sleep. Max Daily Amount: 5 mg. 11/15/16  Yes Gerson Saravia MD   atorvastatin (LIPITOR) 40 mg tablet Take 1 Tab by mouth daily. Patient taking differently: Take 40 mg by mouth daily. Per anesthesia protocol:instructed to take am of surgery. 8/1/16  Yes Gerson Saravia MD   esomeprazole (NEXIUM) 40 mg capsule Take 1 Cap by mouth two (2) times a day. Patient taking differently: Take 40 mg by mouth daily. 8/1/16  Yes Gerson Saravia MD   potassium chloride SR (KLOR-CON 10) 10 mEq tablet Take 1 Tab by mouth daily. Patient taking differently: Take 10 mEq by mouth daily. Per anesthesia protocol:instructed to take am of surgery. Indications: HYPOKALEMIA PREVENTION 6/17/16  Yes Sissy Childs NP   furosemide (LASIX) 20 mg tablet Take 1 Tab by mouth every morning.  6/17/16  Yes Sissy Childs NP   docusate sodium (STOOL SOFTENER) 50 mg capsule Take 50 mg by mouth nightly. Historical Provider   calcium-vitamin D 600 mg(1,500mg) -200 unit tab Take 1 Tab by mouth two (2) times daily (with meals). Historical Provider   polyethylene glycol (MIRALAX) 17 gram/dose powder Take 17 g by mouth daily as needed. Historical Provider   ferrous sulfate 325 mg (65 mg iron) tablet Take 1 Tab by mouth daily (with breakfast). 6/17/16   Татьяна More NP   cyanocobalamin (VITAMIN B-12) 1,000 mcg/mL injection Use weekly for 4 weeks, then once monthly  Patient taking differently: Use weekly for 4 weeks, then once monthly  Stop seven days prior to surgery per anesthesia protocol. 9/24/15   Viji Greenberg MD       Family History:     Family History   Problem Relation Age of Onset    Cancer Father      lung- smoker    Hypertension Father     Lung Disease Father     Cancer Mother      lung -smoker    Lung Disease Mother     Diabetes Maternal Grandmother     Diabetes Paternal Grandmother     Breast Cancer Neg Hx        Social History:      Social History   Substance Use Topics    Smoking status: Never Smoker    Smokeless tobacco: Never Used    Alcohol use No         Allergies: Allergies   Allergen Reactions    Latex Rash    Sulfa (Sulfonamide Antibiotics) Anaphylaxis    Macrobid [Nitrofurantoin Monohyd/M-Cryst] Other (comments)     Causes Gout    Other Plant, Animal, Environmental Itching     Pt itched after wearing a plastic blood pressure cuff.   Pt reports BP will be higher in right arm     Oxycodone Other (comments)     Hallucination, anxiety with oxycontin    Tape [Adhesive] Rash     Paper tape ok        Vitals:     Visit Vitals    /58 (BP 1 Location: Left arm, BP Patient Position: At rest)    Pulse 63    Temp 97.7 °F (36.5 °C)    Resp 18    Ht 5' 5\" (1.651 m)    Wt 105.5 kg (232 lb 9.6 oz)    SpO2 94%    BMI 38.71 kg/m2         Objective:         General: No Acute distress HEENT: Normocephalic/atramatic                   Lungs:  Breathing non-labored                   Heart:   RRR                    Abdomen: soft       Extremities:  Pain with ROM of the left knee. Trace effusion. Crepitus present. Distally the patient shows no neurologic deficit. Antalgic gait appreciated.      Meds:   Current Facility-Administered Medications   Medication Dose Route Frequency    lidocaine (XYLOCAINE) 10 mg/mL (1 %) injection 0.1 mL  0.1 mL SubCUTAneous PRN    lactated ringers infusion  100 mL/hr IntraVENous CONTINUOUS    sodium chloride (NS) flush 5-10 mL  5-10 mL IntraVENous Q8H    sodium chloride (NS) flush 5-10 mL  5-10 mL IntraVENous PRN    midazolam (VERSED) injection 2 mg  2 mg IntraVENous ONCE PRN    midazolam (VERSED) injection 2 mg  2 mg IntraVENous ONCE    fentaNYL citrate (PF) injection 100 mcg  100 mcg IntraVENous ONCE    ceFAZolin in 0.9% NS (ANCEF) IVPB soln 2 g  2 g IntraVENous ONCE       Patient Active Problem List   Diagnosis Code    Essential hypertension, benign I10    Arthritis M19.90    GERD (gastroesophageal reflux disease) K21.9    Nocturnal hypoxemia G47.34    Mixed hyperlipidemia E78.2    Morbid obesity (HCC) E66.01    Vitamin B12 deficiency E53.8    Vitamin D deficiency E55.9    Impaired fasting blood sugar R73.01    Carotid artery stenosis without cerebral infarction I65.29    Aortic valve stenosis I35.0    Pulmonary hypertension (HCC) I27.2    Atelectasis J98.11    CKD (chronic kidney disease) stage 3, GFR 30-59 ml/min N18.3    Chronic anemia D64.9    Chronic pain G89.29    Fibromyalgia M79.7    Full dentures Z97.2, K08.109    Pleural effusion J90    Postoperative atrial fibrillation (HCC) I97.89, I48.91    Iron deficiency anemia D50.9    Gastritis K29.70    Gastric ulcer K25.9    Acute diastolic CHF (congestive heart failure) (HCC) I50.31    Duodenal ulcer K26.9    Paroxysmal atrial fibrillation (HCC) I48.0    Follow-up visit for aortic valve replacement with bioprosthetic valve Z09, Z95.4    Peptic ulcer disease K27.9    UTI (urinary tract infection) N39.0    Snoring R06.83    Preop cardiovascular exam Z01.810    OA (osteoarthritis) of knee M17.9    Chronic gout of multiple sites M1A. 63O9    Primary osteoarthritis of left knee M17.12       Assessment:   1. Arthritis of the Left knee    Plan:   The patient has failed previous conservative treatment for this condition including anti-inflammatories, injections and lifestyle modifications. The necessity for joint replacement is present. Risks, benefits, alternatives and possible complications of left knee arthroplasty have been discussed with the patient including but not limited to infection, bleeding, damage to nerves and/or blood vessels, stiffness of the knee after surgery, potential for patellar maltracking, potential for fracture both intra-op and post-op, polyethylene wear, implant loosening, risk for revision surgery should any of the above occur, venous thrombo-embolism including deep vein thrombosis and pulmonary embolism, and potential for continued pain after surgery. We have also previously discussed the potential of morbidity and mortality associated with, but not limited to, the actual surgical procedure, anesthesia, prior medical conditions, and/or the administration of medications perioperatively. The patient has been given the opportunity to ask questions all of which have been answered and the patient wishes to proceed. The patient will be admitted the day of surgery for left total knee replacement.         Signed By: Emily Paris MD  March 21, 2017

## 2017-03-21 NOTE — PROGRESS NOTES
Problem: Mobility Impaired (Adult and Pediatric)  Goal: *Acute Goals and Plan of Care (Insert Text)  GOALS (1-4 days):  (1.)Ms. Robert Damon will move from supine to sit and sit to supine in bed with STAND BY ASSIST.  (2.)Ms. Robert Damon will transfer from bed to chair and chair to bed with STAND BY ASSIST using the least restrictive device. (3.)Ms. Robert Damon will ambulate with STAND BY ASSIST for 200 feet with the least restrictive device. (4.)Ms. Robert Damon will ambulate up/down 2 steps with bilateral railing with CONTACT GUARD ASSIST with no device. (5.)Ms. Robert Damon will increase left knee ROM to 5°-80°.  ________________________________________________________________________________________________       PHYSICAL THERAPY JOINT CAMP TKA: INITIAL ASSESSMENT, PM 3/21/2017  INPATIENT: Hospital Day: 1  [place] / [place] / Product Type: Medicare /      NAME/AGE/GENDER: Robby Horner is a 75 y.o. female   PRIMARY DIAGNOSIS:  Primary osteoarthritis of left knee [M17.12]              Procedure(s) and Anesthesia Type:     * LEFT KNEE ARTHROPLASTY TOTAL / LETTY - Spinal (Left)  ICD-10: Treatment Diagnosis:        · Pain in Left Knee (M25.562)  · Stiffness of Left Knee, Not elsewhere classified (M25.662)  · Difficulty in walking, Not elsewhere classified (R26.2)       ASSESSMENT:      Ms. Robert Damon presents with limited rom and strength of left LE as well as decreased functional mobility and gait s/p left tka. She plans to go to rehab. This section established at most recent assessment   PROBLEM LIST (Impairments causing functional limitations):  1. Decreased Strength  2. Decreased ADL/Functional Activities  3. Decreased Transfer Abilities  4. Decreased Ambulation Ability/Technique  5. Decreased Balance  6. Increased Pain  7. Decreased Activity Tolerance  8.  Decreased [place] with Home Exercise Program    INTERVENTIONS PLANNED: (Benefits and precautions of physical therapy have been discussed with the patient.)  1. Bed Mobility  2. Gait Training  3. Home Exercise Program (HEP)  4. Therapeutic Exercise/Strengthening  5. Transfer Training  6. Range of Motion: active/assisted/passive  7. Therapeutic Activities  8. Group Therapy      TREATMENT PLAN: Frequency/Duration: Follow patient BID   to address above goals. Rehabilitation Potential For Stated Goals: GOOD      RECOMMENDED REHABILITATION/EQUIPMENT: (at time of discharge pending progress): Continue Skilled Therapy, Rehab and pt. plans on rehab. HISTORY:   History of Present Injury/Illness (Reason for Referral):  S/p left tka  Past Medical History/Comorbidities:   Ms. Livan Mcgill  has a past medical history of Adverse effect of anesthesia; Arthritis; Chronic kidney disease; Chronic pain; Follow-up visit for aortic valve replacement with bioprosthetic valve (7/25/2016); GERD (gastroesophageal reflux disease); Hypertension; Kidney stones; Morbid obesity ([address] Murmur, cardiac; Nausea & vomiting; Psychiatric disorder; PUD (peptic ulcer disease) (06/2016); and Valvular heart disease (2016). She also has no past medical history of Aneurysm ([address] Asthma; Autoimmune disease ([address] Cancer ([address] Chronic obstructive pulmonary disease ([address] Coagulation disorder ([address] Diabetes ([address] Difficult intubation; Liver disease; Malignant hyperthermia due to anesthesia; Pseudocholinesterase deficiency; Rheumatic fever; Seizures ([address] Sleep apnea; Stroke [facility]); Thromboembolus ([address] or Thyroid disease. Ms. Livan Mcgill  has a past surgical history that includes urological (Multiple dates); shoulder arthroscopy (Right); lap cholecystectomy; gastric bypass; gi; cervical fusion; hysterectomy; bilateral salpingo-oophorectomy; heart catheterization; aortic valve replacement (6/9/2016); colonoscopy (N/A, 6/15/2016); knee replacement (Right, 10/10/2016); and heart valve surgery.   Social History/Living Environment:   Home Environment: Private residence  # Steps to Enter: 0  One/Two Story Residence: One story  Living Alone: No  Support Systems: Family member(s)  Patient Expects to be Discharged to[de-identified] Rehabilitation facility  Current DME Used/Available at Home: Shower chair, Walker, Commode, bedside  Prior Level of Function/Work/Activity:  independent   Number of Personal Factors/Comorbidities that affect the Plan of Care: 0: LOW COMPLEXITY   EXAMINATION:   Most Recent Physical Functioning:   Gross Assessment: Yes  Gross Assessment  AROM: Within functional limits (right LE)  Strength: Generally decreased, functional (right LE)           LLE AROM  L Knee Flexion: 60  L Knee Extension: 15   Can pump ankles        Bed Mobility  Supine to Sit: Additional time;Contact guard assistance  Sit to Supine: Additional time;Contact guard assistance     Transfers  Sit to Stand: Additional time;Minimum assistance  Stand to Sit: Additional time;Minimum assistance     Balance  Sitting: Intact  Standing: Pull to stand; With support    Posture  Posture (WDL): Exceptions to WDL  Posture Assessment: Rounded shoulders           Weight Bearing Status  Left Side Weight Bearing: As tolerated  Distance (ft): 3 Feet (ft)  Ambulation - Level of Assistance: Additional time;Minimal assistance  Assistive Device: Walker, rolling  Speed/Lauren: Delayed  Step Length: Left shortened;Right shortened  Stance: Left decreased  Gait Abnormalities: Antalgic  Interventions: Verbal cues; Tactile cues; Safety awareness training;Manual cues      Braces/Orthotics:      Left Knee Cold  Type: Cryocuff       Body Structures Involved:  1. Bones  2. Joints  3. Muscles  4. Ligaments Body Functions Affected:  1. Movement Related Activities and Participation Affected:  1.  Mobility   Number of elements that affect the Plan of Care: 1-2: LOW COMPLEXITY   CLINICAL PRESENTATION:   Presentation: Stable and uncomplicated: LOW COMPLEXITY   CLINICAL DECISION MAKIN [facility] [address] [id number] AM-PAC 6 Clicks   Basic Mobility Inpatient Short Form  How much difficulty does the patient currently have. .. Unable A Lot A Little None   1. Turning over in bed (including adjusting bedclothes, sheets and blankets)? [ ] 1   [ ] 2   [X] 3   [ ] 4   2. Sitting down on and standing up from a chair with arms ( e.g., wheelchair, bedside commode, etc.)   [ ] 1   [ ] 2   [X] 3   [ ] 4   3. Moving from lying on back to sitting on the side of the bed? [ ] 1   [ ] 2   [X] 3   [ ] 4   How much help from another person does the patient currently need. .. Total A Lot A Little None   4. Moving to and from a bed to a chair (including a wheelchair)? [ ] 1   [ ] 2   [X] 3   [ ] 4   5. Need to walk in hospital room? [ ] 1   [ ] 2   [X] 3   [ ] 4   6. Climbing 3-5 steps with a railing? [ ] 1   [X] 2   [ ] 3   [ ] 4   © 2007, Trustees of [address] [place] [address], under license to [employer]. All rights reserved       Score:  Initial: 17 Most Recent: X (Date: -- )     Interpretation of Tool:  Represents activities that are increasingly more difficult (i.e. Bed mobility, Transfers, Gait). Score 24 23 22-20 19-15 14-10 9-7 6       Modifier CH CI CJ CK CL CM CN         · Mobility - Walking and Moving Around:               - CURRENT STATUS:    CK - 40%-59% impaired, limited or restricted               - GOAL STATUS:           CJ - 20%-39% impaired, limited or restricted               - D/C STATUS:                       ---------------To be determined---------------  [place] / [place] / Product Type: Medicare /       Medical Necessity:     · Patient is expected to demonstrate progress in strength, range of motion and balance to decrease assistance required with theraputic exercises and functional mobility. Reason for Services/Other Comments:  · Patient continues to require present interventions due to patient's inability to perform theraputic exercises and functional mobility independently.    Use of outcome tool(s) and clinical judgement create a POC that gives a: Clear prediction of patient's progress: LOW COMPLEXITY                 TREATMENT:   (In addition to Assessment/Re-Assessment sessions the following treatments were rendered)      Pre-treatment Symptoms/Complaints:  none  Pain: Initial:      Post Session:  0      Assessment/Reassessment only, no treatment provided today        Date:    Date:    Date:      ACTIVITY/EXERCISE AM PM AM PM AM PM   GROUP THERAPY  [ ]  [ ]  [ ]  [ ]  [ ]  [ ]   Ankle Pumps               Quad Sets               Gluteal Sets               Hip ABd/ADduction               Straight Leg Raises               Knee Slides               Short Arc Quads               Long Arc Quads               Chair Slides                               B = bilateral; AA = active assistive; A = active; P = passive       Treatment/Session Assessment:         Response to Treatment:  Pt. Did well. Education:  [X] Home Exercises  [X] Fall Precautions  [ ] Hip Precautions [ ] Going Home Video  [X] Knee/Hip Prosthesis Review  [X] Walker Management/Safety [ ] Adaptive Equipment as Needed         Interdisciplinary Collaboration:   · Occupational Therapist  · Registered Nurse     After treatment position/precautions:   · Supine in bed  · Bed/Chair-wheels locked  · Bed in low position  · Call light within reach     Compliance with Program/Exercises: Will assess as treatment progresses. Recommendations/Intent for next treatment session:  Treatment next visit will focus on increasing Ms. Esparza's independence with bed mobility, transfers, gait training, strength/ROM exercises, modalities for pain, and patient education.        Total Treatment Duration:  PT Patient Time In/Time Out  Time In: 1250  Time Out: [address] Pradhan

## 2017-03-21 NOTE — ANESTHESIA POSTPROCEDURE EVALUATION
Post-Anesthesia Evaluation and Assessment    Patient: Corry Hinton MRN: [id number]  SSN: [id number]    YOB: 1950  Age: 75 y.o. Sex: female       Cardiovascular Function/Vital Signs  Visit Vitals    /75    Pulse 60    Temp 36.6 °C (97.9 °F)    Resp 16    Ht 5' 5\" (1.651 m)    Wt 105.5 kg (232 lb 9.6 oz)    SpO2 99%    BMI 38.71 kg/m2       Patient is status post spinal anesthesia for Procedure(s):  LEFT KNEE ARTHROPLASTY TOTAL / LETTY. Nausea/Vomiting: None    Postoperative hydration reviewed and adequate. Pain:  Pain Scale 1: Numeric (0 - 10) (03/21/17 0932)  Pain Intensity 1: 0 (03/21/17 0932)   Managed    Neurological Status:   Neuro (WDL): Exceptions to WDL (03/21/17 0902)  Neuro  Neurologic State: Drowsy (03/21/17 0902)  Cognition: Follows commands (03/21/17 0902)  LUE Motor Response: Purposeful (03/21/17 0902)  LLE Motor Response: Pharmacologically paralyzed (03/21/17 0902)  RUE Motor Response: Purposeful (03/21/17 0902)  RLE Motor Response: Pharmacologically paralyzed (03/21/17 0902)   At baseline    Mental Status and Level of Consciousness: Arousable    Pulmonary Status:   O2 Device: Nasal cannula (03/21/17 0932)   Adequate oxygenation and airway patent    Complications related to anesthesia: None    Post-anesthesia assessment completed.  No concerns    Signed By: Lupe Jacobsen MD     March 21, 2017

## 2017-03-21 NOTE — PROGRESS NOTES
Pt resting quietly in bed. Pt c/o pain. See MAR  No needs at present. Bed Low and locked. Call light w/ in reach.

## 2017-03-21 NOTE — CONSULTS
MD Elaina   Medical Director  [address] [place] [address], [address] [facility]  Tel: [phone]     Physical Medicine & Rehabilitation Note-consult    Patient: Francoise Shone MRN: [id number]  SSN: [id number]    YOB: 1950  Age: 75 y.o. Sex: female      Admit Date: 3/21/2017  Admitting Physician: Emily Paris MD    Medical Decision Making/Plan/Recommend:  Gait impairment and functional deficits. Therapy progressing steadily, without unusual barriers to progress. Patient plans for SNF discharge. Best care option is admission to a sub acute rehab program at [facility]. She will benefit from continued daily skilled rehabilitation efforts and regular medical and nursing care at SNF. Patient to continue PT, OT  To focus on left TKA ROM, strengthening, mobility, transfers, gait training. Will follow progress. Chief Complaint : Gait dysfunction secondary to below.   Admit Diagnosis: Primary osteoarthritis of left knee [M17.12]  left total knee arthroplasty  Pain  DVT risk  Post op hemorrhagic anemia  Hypertension  Morbid obesity ([address]  PUD (peptic ulcer disease)  Acute Rehab Dx:  Gait impairment  deconditioning  Mobility and ambulation deficits  Self Care/ADL deficits    Medical Dx:  Past Medical History:   Diagnosis Date    Adverse effect of anesthesia     woke up on a vent s/p cholecystectomy- panic      Arthritis     Chronic kidney disease     pt denies 9/19/16    Chronic pain     r/t arthritis    Follow-up visit for aortic valve replacement with bioprosthetic valve 7/25/2016    GERD (gastroesophageal reflux disease)     controlled w/med    Hypertension     controlled w/med    Kidney stones     Morbid obesity ([address]     Murmur, cardiac     Nausea & vomiting     Psychiatric disorder     claustraphobia (severe)    PUD (peptic ulcer disease) 06/2016    dx 2016    Valvular heart disease 2016    AVR     Subjective:     Date of Evaluation:  March 21, 2017    HPI: Amaya Howe is a 75 y.o. female patient at [address] [place] [address] who was admitted on 3/21/2017  by Vicente Goodson MD with below mentioned medical history, is being seen for Physical Medicine and Rehabilitation consult. Amaya Howe presented with worseneing left knee pain when weight bearing, walking and with activity due to end stage DJD. The symptoms were not adequately managed with conservative management and has limited the patient's function. She underwent a left total knee arthroplasty per Dr. Vicente Goodson MD on 3/21/2017. The patient's post operative course has been uncomplicated. Patient is to be WBAT LLE. Patient is starting to stand, taking steps working with acute PT and OT. She is making expected gains with ambulation, mobility, and ROM. She shows significant functional deficits, gait dysfunciton due to knee pain, decreased ROM and strength. We are consulted to assist with rehab needs and placement. Amaya Howe is seen and examined today. Medical Records reviewed. Pt had R TKA 10/2016 with good result. She denies any other functional deficits. She has been independent with ambulation, prior to admission, limited by left knee pain. Current Level of Function: bed mobility - CGA, transfers - min A, decreased balance , ambulation -  3' with min A using a RW .     Prior Level of Function/Work/Activity:  independent      Family History   Problem Relation Age of Onset    Cancer Father      lung- smoker    Hypertension Father     Lung Disease Father     Cancer Mother      lung -smoker    Lung Disease Mother     Diabetes Maternal Grandmother     Diabetes Paternal Grandmother     Breast Cancer Neg Hx       Social History   Substance Use Topics    Smoking status: Never Smoker    Smokeless tobacco: Never Used    Alcohol use No     Past Surgical History:   Procedure Laterality Date    COLONOSCOPY N/A 6/15/2016    COLONOSCOPY/ BMI 41  ROOM 2235 performed by Marcy Hager MD at [facility] ENDOSCOPY    HX AORTIC VALVE REPLACEMENT  6/9/2016    Dr. Yolanda Hartman    HX BILATERAL SALPINGO-OOPHORECTOMY      HX CERVICAL FUSION      HX GASTRIC BYPASS      ans reversal     HX GI      repaired \"hole in stomach\" from gastric bypass    HX HEART CATHETERIZATION      no stents     HX HEART VALVE SURGERY      HX HYSTERECTOMY      HX KNEE REPLACEMENT Right 10/10/2016    Dr. Derrick Spain, PO[facility]    HX LAP CHOLECYSTECTOMY      HX SHOULDER ARTHROSCOPY Right      times 2 \"shaved bone\"     HX UROLOGICAL  Multiple dates    Mulitiple open Nephrolithotomies      Prior to Admission medications    Medication Sig Start Date End Date Taking? Authorizing Provider   aspirin delayed-release 81 mg tablet Take 81 mg by mouth daily. Yes Historical Provider   carvedilol (COREG) 25 mg tablet Take 1 Tab by mouth two (2) times daily (with meals). 2/15/17  Yes Thanh Castro MD   febuxostat (ULORIC) 40 mg tab tablet Take 1 Tab by mouth every morning. Indications: GOUT PREVENTION 2/15/17  Yes Thanh Castro MD   HYDROcodone-acetaminophen [facility]) 7.5-325 mg per tablet Take 1 Tab by mouth every six (6) hours as needed for Pain. Max Daily Amount: 4 Tabs. 3/15/17  Yes Thanh Castro MD   olmesartan (BENICAR) 40 mg tablet Take 1 Tab by mouth daily. 1/25/17  Yes Thanh Castro MD   zolpidem (AMBIEN) 5 mg tablet Take 1 Tab by mouth nightly as needed for Sleep. Max Daily Amount: 5 mg. 11/15/16  Yes Thanh Castro MD   atorvastatin (LIPITOR) 40 mg tablet Take 1 Tab by mouth daily. Patient taking differently: Take 40 mg by mouth daily. Per anesthesia protocol:instructed to take am of surgery. 8/1/16  Yes Thanh Castro MD   esomeprazole (NEXIUM) 40 mg capsule Take 1 Cap by mouth two (2) times a day. Patient taking differently: Take 40 mg by mouth daily. 8/1/16  Yes Thanh Castro MD   potassium chloride SR (KLOR-CON 10) 10 mEq tablet Take 1 Tab by mouth daily.   Patient taking differently: Take 10 mEq by mouth daily. Per anesthesia protocol:instructed to take am of surgery. Indications: HYPOKALEMIA PREVENTION 16  Yes Ander Wheeler NP   furosemide (LASIX) 20 mg tablet Take 1 Tab by mouth every morning. 16  Yes Ander Wheeler NP   docusate sodium (STOOL SOFTENER) 50 mg capsule Take 50 mg by mouth nightly. Historical Provider   calcium-vitamin D 600 mg(1,500mg) -200 unit tab Take 1 Tab by mouth two (2) times daily (with meals). Historical Provider   polyethylene glycol (MIRALAX) 17 gram/dose powder Take 17 g by mouth daily as needed. Historical Provider   ferrous sulfate 325 mg (65 mg iron) tablet Take 1 Tab by mouth daily (with breakfast). 16   Ander Wheeler NP   cyanocobalamin (VITAMIN B-12) 1,000 mcg/mL injection Use weekly for 4 weeks, then once monthly  Patient taking differently: Use weekly for 4 weeks, then once monthly  Stop seven days prior to surgery per anesthesia protocol. 9/24/15   Frannie Lr MD     Allergies   Allergen Reactions    Latex Rash    Sulfa (Sulfonamide Antibiotics) Anaphylaxis    Macrobid [Nitrofurantoin Monohyd/M-Cryst] Other (comments)     Causes Gout    Other Plant, Animal, Environmental Itching     Pt itched after wearing a plastic blood pressure cuff. Pt reports BP will be higher in right arm     Oxycodone Other (comments)     Hallucination, anxiety with oxycontin    Tape [Adhesive] Rash     Paper tape ok        Review of Systems: +left knee pain, +antalgic gait. Denies chest pain, shortness of breath, cough, headache, visual problems, abdominal pain, dysurea, calf pain. Pertinent positives are as noted in the medical records and unremarkable otherwise. Objective:     Vitals:  Blood pressure 166/70, pulse 74, temperature 97.7 °F (36.5 °C), resp. rate 17, height 5' 5\" (1.651 m), weight 232 lb 9.6 oz (105.5 kg), SpO2 96 %.   Temp (24hrs), Av.2 °F (36.2 °C), Min:95.5 °F (35.3 °C), Max:97.9 °F (36.6 °C)    BMI (calculated): 38.7 (03/20/17 3623)   Intake and Output:  03/20 0701 - 03/21 1900  In: 2190 [P.O.:390; I.V.:1800]  Out: 550 [Urine:425]    Physical Exam:   General: Alert and age appropriately oriented. No acute cardio respiratory distress. HEENT: Normocephalic, no conjunctival pallor. Oral mucosa moist without cyanosis. No JVD. Lungs: Clear to auscultation bilaterally. Respiration even and unlabored   Heart: Regular rate and rhythm, S1, S2   3/6 MAYNOR. Abdomen: Soft, non-tender, non-distended. Genitourinary: defered   Neuromuscular:      Grossly no focal motor deficits. Left knee extension strong  Left ankle dorsiflexion 5/5  Left ankle plantarflexion 5/5  No sensory deficits distally BLE to soft touch. Skin/extremity: Non tender calves BLE. No rashes, no marginal erythema. Labs/Studies:  Recent Results (from the past 72 hour(s))   TYPE & SCREEN    Collection Time: 03/21/17  6:10 AM   Result Value Ref Range    Crossmatch Expiration 03/24/2017     ABO/Rh(D) A POSITIVE     Antibody screen NEG    GLUCOSE, POC    Collection Time: 03/21/17  6:30 AM   Result Value Ref Range    Glucose (POC) 134 (H) 65 - 100 mg/dL   HEMOGLOBIN    Collection Time: 03/21/17  7:36 PM   Result Value Ref Range    HGB 10.6 (L) 11.7 - 15.4 g/dL       Functional Assessment:  Reviewed participation and progress in therapies  Gross Assessment  AROM: Within functional limits (right LE) (03/21/17 1408)  Strength: Generally decreased, functional (right LE) (03/21/17 1408)   Bed Mobility  Supine to Sit: Additional time;Contact guard assistance (03/21/17 1408)  Sit to Supine: Additional time;Contact guard assistance (03/21/17 1408)   Balance  Sitting: Intact (03/21/17 1408)  Standing: Pull to stand; With support (03/21/17 1408)               Bed/Mat Mobility  Supine to Sit: Additional time;Contact guard assistance (03/21/17 1408)  Sit to Supine: Additional time;Contact guard assistance (03/21/17 1408)  Sit to Stand: Additional time;Minimum assistance (03/21/17 1408)     Ambulation:  Gait  Speed/Lauren: Delayed (03/21/17 1408)  Step Length: Left shortened;Right shortened (03/21/17 1408)  Stance: Left decreased (03/21/17 1408)  Gait Abnormalities: Antalgic (03/21/17 1408)  Ambulation - Level of Assistance: Additional time;Minimal assistance (03/21/17 1408)  Distance (ft): 3 Feet (ft) (03/21/17 1408)  Assistive Device: Walker, rolling (03/21/17 1408)  Interventions: Verbal cues; Tactile cues; Safety awareness training;Manual cues (03/21/17 1408)    Impression/Plan:     Active Problems:    OA (osteoarthritis) of knee (10/10/2016)        Current Facility-Administered Medications   Medication Dose Route Frequency Provider Last Rate Last Dose    [START ON 3/22/2017] atorvastatin (LIPITOR) tablet 40 mg  40 mg Oral DAILY Vicente Goodson MD        carvedilol (COREG) tablet 25 mg  25 mg Oral BID WITH MEALS Vicente Goodson MD   25 mg at 03/21/17 1641    [START ON 3/22/2017] ferrous sulfate tablet 325 mg  325 mg Oral DAILY WITH BREAKFAST Vicente Goodson MD        [START ON 3/22/2017] furosemide (LASIX) tablet 20 mg  20 mg Oral DAILY Vicente Goodson MD        [START ON 3/22/2017] olmesartan (BENICAR) tablet 40 mg  40 mg Oral DAILY Vicente Goodson MD        zolpidem (AMBIEN) tablet 5 mg  5 mg Oral QHS PRN Vicente Goodson MD        0.9% sodium chloride infusion  100 mL/hr IntraVENous CONTINUOUS Vicente Goodson  mL/hr at 03/21/17 1000 100 mL/hr at 03/21/17 1000    sodium chloride (NS) flush 5-10 mL  5-10 mL IntraVENous PRN Vicente Goodson MD        [START ON 3/22/2017] acetaminophen (TYLENOL) tablet 1,000 mg  1,000 mg Oral Q6H Vicente Goodson MD        naloxone [facility]) injection 0.2-0.4 mg  0.2-0.4 mg IntraVENous Q10MIN PRN Vicente Goodson MD        [START ON 3/22/2017] dexamethasone (DECADRON) injection 10 mg  10 mg IntraVENous Miah Hugo MD  diphenhydrAMINE (BENADRYL) capsule 25 mg  25 mg Oral Q4H PRN Becca Plunkett MD        ceFAZolin in 0.9% NS (ANCEF) IVPB soln 2 g  2 g IntraVENous Q8H Becca Plunkett  mL/hr at 03/21/17 1433 2 g at 03/21/17 1433    HYDROmorphone (DILAUDID) tablet 2 mg  2 mg Oral Q4H PRN Becca Plunkett MD   2 mg at 03/21/17 1641    HYDROmorphone (PF) (DILAUDID) injection 1 mg  1 mg IntraVENous Q3H PRN Becca Plunkett MD   1 mg at 03/21/17 1838    [START ON 3/22/2017] senna-docusate (Emily Pilon) 8.6-50 mg per tablet 2 Tab  2 Tab Oral DAILY Becca Plunkett MD        aspirin chewable tablet 81 mg  81 mg Oral BID Becca Plunkett MD   81 mg at 03/21/17 1641    cyclobenzaprine (FLEXERIL) tablet 5 mg  5 mg Oral TID Becca Plunkett MD   5 mg at 03/21/17 2159    gabapentin (NEURONTIN) capsule 100 mg  100 mg Oral BID Becca Plunkett MD   100 mg at 03/21/17 2159    [START ON 3/22/2017] ketorolac (TORADOL) injection 30 mg  30 mg IntraVENous ONCE Becca Plunkett MD        tranexamic acid (LYSTEDA) tablet 1,300 mg  1,300 mg Oral DAILY Becca Plunkett MD        calcium-vitamin D (OS-SHANDRA) 500 mg-200 unit tablet  1 Tab Oral BID WITH MEALS Becca Plunkett MD   1 Tab at 03/21/17 1641    [START ON 3/22/2017] febuxostat (ULORIC) tablet 40 mg  40 mg Oral DAILY Becca Plunkett MD        polyethylene glycol (MIRALAX) packet 17 g  17 g Oral DAILY PRN Becca Plunkett MD            Recommendations: Recommend sub acute rehab at [facility]. Continue Acute Rehab Program. PT, OT  to focus on  gait training, transfer training, balance activities, ROM and strengthening exercises. Coordination of rehab/medical care. Counseling of Physical Medicine & Rehab care issues management. Monitoring and management of rehab conditions per the plan of care/orders. Will follow along with you for PM&R issues and provide you follow up. Will follow with SW/ /Admissions Coordinators regarding insurance approvals and acceptance.       Rehabilitation Management/ Medical Management: 1. Devices:Walkers, Type: Rolling Walker  2. Consult:Rehab team including PT, OT,  and . 3. Disposition Rehab-discussed with patient. 4. Thigh-high or knee-high NADER's when out of bed. 5. DVT Prophylaxis - per ortho. Patient started on aspirin bid. 6. Incentive spirometer Q1H while awake  7. Post op hemorrhagic anemia-monitor. asymptomatic   8. Activity: WBAT LLE  9. Planned Labs: CBC,BMP  10. Pain Control: Fair control. Continue scheduled tylenol,  and  PRN meds. 11. Wound Care: Keep left TKA wound clean and dry and reinforce dressing PRN. May remove Aquacel 1 week post op ad replace with new one. Remove staples 12-14 post surgery, when incision appears appropriately closed and apply benzoin and 1/2\" steristrips. Follow up with Dr Charity Rod  2 weeks after discharge from rehab. Follow up with ORTHO per instructions. Thank you for the opportunity to participate in the care of this patient.     Signed By: Gladys Chery MD     March 21, 2017

## 2017-03-21 NOTE — OP NOTES
[address] [facility]  Total Knee Arthroplasty  Patient:Estefani Esparza   : 1950  Medical Record [id number]      Pre-operative Diagnosis:  Primary osteoarthritis of left knee [M17.12]  Post-operative Diagnosis: Primary osteoarthritis of left knee [M17.12]  Location: [place] [address]    Date of Procedure: 3/21/2017  Surgeon: Hilda Guidry MD  Assistant: None    Anesthesia: Spinal and adductor nerve block    Procedure:  Left Total Knee Arthroplasty     Tourniquet Time: 0 minutes    EBL: less than 485ME    Complications: None    Patient Condition upon Completion of Procedure: Stable    Implants:   Implant Name Type Inv. Item Serial No.  Lot No. LRB No. Used   COMPNT FEM CR TRIATHLN 5 L PA --  - [id number]  COMPNT FEM CR TRIATHLN 5 L PA --  [id number] [employer] HOW [id number] Left 1   BASEPLT TIB PC TRITNM SZ 5 -- TRIATHLON - [id number]  BASEPLT TIB PC TRITNM SZ 5 -- TRIATHLON [id number] [employer] [facility] [id number] Left 1   INSERT TIB CR TRIATHLN 5 11MM --  - [id number]  INSERT TIB CR TRIATHLN 5 11MM --  [id number] [employer] [facility] [id number] Left 1   PAT ASYM MTL-BK 10MM SZ A35 -- TRIATHLON - [id number]   PAT ASYM MTL-BK 10MM SZ A35 -- TRIATHLON AY3L [employer] HOW [id number] Left 1       Intra-Operative Findings: Pre-operatively we assesd the motion of the patients knee and noted that the knee lacked approximately 5 degrees of extension. Intra-operatively we noted that the articular surfaces were arthritis with cartilage loss of both the medial and lateral compartments. The patella was examined and found to be in poor condition. The patella was resurfaced. With trial components in place we assessed knee motion and found that the knee was able to fully extend. Flexion was felt to be 140 degrees. Description of Procedure:    Sebas Melvin was brought to the operating room. A adductor canal block had been done in the preop holding area.  After proper identification was performed  spinal anesthesia was administered. A leary catheter was placed and one gram tranexemic acid was given. The patient was positioned supine on the operating room table. The left leg and was then prepped and draped in the usual sterile manner. Prior to the incision being made a timeout was called identifying the patient, procedure, operative side and surgeon. An anterior longitudinal incision was made just medial to the tibial tubercle and extending proximal.  A medial parapatellar capsular incision was performed. The medial capsular flap was elevated around to the midcoronal plane. The patella was subluxed laterally and patellar fat pat partially excised. The knee was flexed and externally rotated. The articular surface revealed cartilage loss with exposed bone and bone spurs throughout all three compartments. The anterior cruciate ligament was resected. We first addressed our distal femoral resection. Using intramedullary instrumentation we resected 8mm of distal femur using a 6 degrees valgus cut angle. Medial and lateral condylar cuts were verified to be level using the capture of the distal femoral cutting jig. We next addressed our proximal tibia. Using extramedullary intrstrumentation we resected 2mm of bone as measured from the more involved compartment. Our cut was verified to be perpendicular to the meachanical axis of the tibia as referenced from the tibial tubercle, the long axis of the tibia, the center of the ankle, and the patients 2nd toe. Our slope was cut with the goal of 0-3 degrees posterior slope. At this point a spacer block was used to verify that the knee was able to obtain full extension and was balanced in extension. We did not have to resect additional bone to achieve this goal. Once the knee was felt to fully extend and showed good balance the distal femoral pins were removed and we moved on to finishing our distal femoral preparation.     Prior to sizing our distal femur we marked [place]'s Line and our transepicondylar axis. Using sizing instrumentation the femur was sized to a #5 component. The anterior and posterior cuts along with the anterior and posterior chamfer cuts were then made using the 4-in-1 cutting block. Osteophytes were removed from the tibial and femoral surfaces. We assessed the PCL and felt it to be stable and in good condition. Medial and lateral meniscus were removed along any redundant tissue. Any posterior osteophytes were removed carefully. The tibia was then sized to a #5 component. The tibial base plate was pinned into place with what we felt to be appropriate rotation with the center of the tibial tray bisecting the function between the medial and middle 1/3rd of the tibial tubercle. We then performed a trial of the knee. We felt that with a 11 mm polyethylene trial in place the knee had acceptable varus and valgus stability throughout arc of motion, was able to fully extend, was not too tight in flexion, and had acceptable stability with anterior drawer testing. No additional surgical releases were required. Again our PCL was assessed and felt to be in good condition and was felt to be very stable. The patella was then everted and examined. The patella was felt to be in poor condition and the decision was made to  resurface the patella. Bone was resected to accomodate a size 35mm patella button. A trial reduction revealed appropriate tracking through the patellofemoral groove with no lateral retinacular release needed. The trial polyethylene component and femoral component were removed. Again we assessed the position and rotation of the tibial tray. We did not have to adjust its position. Once we verified that its position was acceptable the proximal tibia was punched and drilled per protocol for a cementless tibia.     All trial components were removed and cut surfaces were irrigated and debrided of loose bone and/or soft tissue. A #5 [employer] cementless tritanium-backed tibial component was impacted into place with good seating noted at time of final impaction. We then impacted a #5 [employer] cementless hydroxyapatite-backed femoral component into place with good seating noted of the implant at final impaction. We then inserted our final polyethylene component which was 11 mm thick. We then inserted a 35mm tritanium-backed cementless patellar component. The patellar component was noted to be fully seated after compression. We did assess the patellar components fixation and ability to be elevated using a tonsil and felt the component to be very stable. Estefani Esparza's knee was placed through a range of motion and noted to be stable as mentioned above with the trial components. We again checked patellar tracking and felt the patellar component tracked well within the trochlear groove. A lavage of diluted betadine solution of 17.5 ml Betadine in 500 of 0.9% normal saline was allowed to soak in the wound for 3 minutes after implanting of the prosthesis. The wound was irrigated with saline again before closure. We then carefully injected periarticular cocktail about and capsule and subcutaneous tissue. In addition, one additional gram of IV transexemic acid was given at completion of the case for a total of two grams for the procedure. No drain was placed. The capsular layer was closed using a combination of 0-Stratafix bidirectional barbed suture and #2 Fiberwire, while the subcutaneous layers were closed using a 2-0 Vicryl interrupted suture. The skin was closed using  3-0 Stratafix barbed monocril suture and the Prineo skin closure system. A sterile dressing was applied. A cryo pad was applied on the operative leg. The sponge count and needle counts were correct. Patient was transferred to the hospital stretcher and transported to recovery in stable condition.       Signed By: Kianna Slater MD

## 2017-03-21 NOTE — PROGRESS NOTES
03/21/17 1045   Oxygen Therapy   O2 Sat (%) 94 %   Pulse via Oximetry 64 beats per minute   O2 Device Room air   O2 Flow Rate (L/min) 0 l/min   ETCO2 (mmHg) 21 mmHg   Pt instructed in the use of Incentive Spirometry. Joint camp notes reviewed; C/S # CB13 at bedside.  No distress noted

## 2017-03-21 NOTE — PROGRESS NOTES
Admission Assessment Complete. Pt had LTKA today . Pt is A&Ox 3.  +2 pedal pulses. Bilateral LE pharmacologically paralyzed. Dressing is clean, dry and intact. IVF inusing. Martini is draining straw yellow urine. Pt denies any pain or need at this time. Family at bedside. Oriented to room. Bed low and locked. Side rails x3. Call light with in reach. Pt verbalizes understanding of call light.

## 2017-03-22 LAB
ANION GAP BLD CALC-SCNC: 11 MMOL/L (ref 7–16)
BUN SERPL-MCNC: 25 MG/DL (ref 8–23)
CALCIUM SERPL-MCNC: 8.4 MG/DL (ref 8.3–10.4)
CHLORIDE SERPL-SCNC: 107 MMOL/L (ref 98–107)
CO2 SERPL-SCNC: 20 MMOL/L (ref 21–32)
CREAT SERPL-MCNC: 0.95 MG/DL (ref 0.6–1)
GLUCOSE BLD STRIP.AUTO-MCNC: 130 MG/DL (ref 65–100)
GLUCOSE SERPL-MCNC: 117 MG/DL (ref 65–100)
HGB BLD-MCNC: 10.4 G/DL (ref 11.7–15.4)
POTASSIUM SERPL-SCNC: 5.2 MMOL/L (ref 3.5–5.1)
SODIUM SERPL-SCNC: 138 MMOL/L (ref 136–145)

## 2017-03-22 PROCEDURE — 36415 COLL VENOUS BLD VENIPUNCTURE: CPT | Performed by: ORTHOPAEDIC SURGERY

## 2017-03-22 PROCEDURE — 97110 THERAPEUTIC EXERCISES: CPT

## 2017-03-22 PROCEDURE — 82962 GLUCOSE BLOOD TEST: CPT

## 2017-03-22 PROCEDURE — 94762 N-INVAS EAR/PLS OXIMTRY CONT: CPT

## 2017-03-22 PROCEDURE — 74011250637 HC RX REV CODE- 250/637: Performed by: ORTHOPAEDIC SURGERY

## 2017-03-22 PROCEDURE — 94760 N-INVAS EAR/PLS OXIMETRY 1: CPT

## 2017-03-22 PROCEDURE — 85018 HEMOGLOBIN: CPT | Performed by: ORTHOPAEDIC SURGERY

## 2017-03-22 PROCEDURE — 97116 GAIT TRAINING THERAPY: CPT

## 2017-03-22 PROCEDURE — 74011250636 HC RX REV CODE- 250/636: Performed by: ORTHOPAEDIC SURGERY

## 2017-03-22 PROCEDURE — 80048 BASIC METABOLIC PNL TOTAL CA: CPT | Performed by: ORTHOPAEDIC SURGERY

## 2017-03-22 PROCEDURE — 65270000029 HC RM PRIVATE

## 2017-03-22 RX ORDER — GUAIFENESIN 100 MG/5ML
81 LIQUID (ML) ORAL 2 TIMES DAILY
Qty: 60 TAB | Refills: 0 | Status: SHIPPED | OUTPATIENT
Start: 2017-03-22 | End: 2017-08-28

## 2017-03-22 RX ORDER — GABAPENTIN 100 MG/1
100 CAPSULE ORAL 2 TIMES DAILY
Qty: 60 CAP | Refills: 0 | Status: SHIPPED | OUTPATIENT
Start: 2017-03-22 | End: 2017-04-20

## 2017-03-22 RX ORDER — CYCLOBENZAPRINE HCL 10 MG
5 TABLET ORAL 3 TIMES DAILY
Qty: 60 TAB | Refills: 0 | Status: SHIPPED | OUTPATIENT
Start: 2017-03-22 | End: 2017-04-20

## 2017-03-22 RX ORDER — ACETAMINOPHEN 500 MG
1000 TABLET ORAL EVERY 6 HOURS
Qty: 120 TAB | Refills: 0 | Status: SHIPPED | OUTPATIENT
Start: 2017-03-22 | End: 2017-04-20 | Stop reason: ALTCHOICE

## 2017-03-22 RX ORDER — AMOXICILLIN 250 MG
2 CAPSULE ORAL DAILY
Qty: 120 TAB | Refills: 0 | Status: SHIPPED | OUTPATIENT
Start: 2017-03-22 | End: 2017-05-25

## 2017-03-22 RX ORDER — HYDROMORPHONE HYDROCHLORIDE 2 MG/1
2 TABLET ORAL
Qty: 90 TAB | Refills: 0 | Status: SHIPPED | OUTPATIENT
Start: 2017-03-22 | End: 2017-04-20 | Stop reason: SINTOL

## 2017-03-22 RX ADMIN — ASPIRIN 81 MG CHEWABLE TABLET 81 MG: 81 TABLET CHEWABLE at 08:29

## 2017-03-22 RX ADMIN — HYDROMORPHONE HYDROCHLORIDE 1 MG: 1 INJECTION, SOLUTION INTRAMUSCULAR; INTRAVENOUS; SUBCUTANEOUS at 07:20

## 2017-03-22 RX ADMIN — CARVEDILOL 25 MG: 25 TABLET, FILM COATED ORAL at 08:28

## 2017-03-22 RX ADMIN — HYDROMORPHONE HYDROCHLORIDE 1 MG: 1 INJECTION, SOLUTION INTRAMUSCULAR; INTRAVENOUS; SUBCUTANEOUS at 21:21

## 2017-03-22 RX ADMIN — HYDROMORPHONE HYDROCHLORIDE 2 MG: 2 TABLET ORAL at 11:17

## 2017-03-22 RX ADMIN — CALCIUM CARBONATE 500 MG (1,250 MG)-VITAMIN D3 200 UNIT TABLET 1 TABLET: at 16:09

## 2017-03-22 RX ADMIN — SENNOSIDES AND DOCUSATE SODIUM 2 TABLET: 8.6; 5 TABLET ORAL at 08:26

## 2017-03-22 RX ADMIN — ASPIRIN 81 MG CHEWABLE TABLET 81 MG: 81 TABLET CHEWABLE at 17:08

## 2017-03-22 RX ADMIN — ACETAMINOPHEN 1000 MG: 500 TABLET, FILM COATED ORAL at 17:08

## 2017-03-22 RX ADMIN — GABAPENTIN 100 MG: 100 CAPSULE ORAL at 08:27

## 2017-03-22 RX ADMIN — FUROSEMIDE 20 MG: 20 TABLET ORAL at 08:28

## 2017-03-22 RX ADMIN — HYDROMORPHONE HYDROCHLORIDE 1 MG: 1 INJECTION, SOLUTION INTRAMUSCULAR; INTRAVENOUS; SUBCUTANEOUS at 13:48

## 2017-03-22 RX ADMIN — KETOROLAC TROMETHAMINE 30 MG: 30 INJECTION, SOLUTION INTRAMUSCULAR at 17:08

## 2017-03-22 RX ADMIN — ACETAMINOPHEN 1000 MG: 500 TABLET, FILM COATED ORAL at 11:17

## 2017-03-22 RX ADMIN — FERROUS SULFATE TAB 325 MG (65 MG ELEMENTAL FE) 325 MG: 325 (65 FE) TAB at 08:28

## 2017-03-22 RX ADMIN — HYDROMORPHONE HYDROCHLORIDE 1 MG: 1 INJECTION, SOLUTION INTRAMUSCULAR; INTRAVENOUS; SUBCUTANEOUS at 17:08

## 2017-03-22 RX ADMIN — CARVEDILOL 25 MG: 25 TABLET, FILM COATED ORAL at 17:08

## 2017-03-22 RX ADMIN — OLMESARTAN MEDOXOMIL 20 MG: 20 TABLET, FILM COATED ORAL at 08:27

## 2017-03-22 RX ADMIN — HYDROMORPHONE HYDROCHLORIDE 1 MG: 1 INJECTION, SOLUTION INTRAMUSCULAR; INTRAVENOUS; SUBCUTANEOUS at 01:35

## 2017-03-22 RX ADMIN — HYDROMORPHONE HYDROCHLORIDE 1 MG: 1 INJECTION, SOLUTION INTRAMUSCULAR; INTRAVENOUS; SUBCUTANEOUS at 04:00

## 2017-03-22 RX ADMIN — DEXAMETHASONE SODIUM PHOSPHATE 10 MG: 10 INJECTION INTRAMUSCULAR; INTRAVENOUS at 11:17

## 2017-03-22 RX ADMIN — CALCIUM CARBONATE 500 MG (1,250 MG)-VITAMIN D3 200 UNIT TABLET 1 TABLET: at 08:27

## 2017-03-22 RX ADMIN — HYDROMORPHONE HYDROCHLORIDE 2 MG: 2 TABLET ORAL at 16:09

## 2017-03-22 RX ADMIN — CYCLOBENZAPRINE HYDROCHLORIDE 5 MG: 10 TABLET, FILM COATED ORAL at 21:20

## 2017-03-22 RX ADMIN — TRANEXAMIC ACID 1300 MG: 650 TABLET, FILM COATED ORAL at 08:26

## 2017-03-22 RX ADMIN — GABAPENTIN 100 MG: 100 CAPSULE ORAL at 17:08

## 2017-03-22 RX ADMIN — CYCLOBENZAPRINE HYDROCHLORIDE 5 MG: 10 TABLET, FILM COATED ORAL at 16:08

## 2017-03-22 RX ADMIN — CYCLOBENZAPRINE HYDROCHLORIDE 5 MG: 10 TABLET, FILM COATED ORAL at 08:27

## 2017-03-22 NOTE — PROGRESS NOTES
Shift Assessment Complete. Pt is post op day 1 from [facility]. Pt is A&Ox 3.  +2 pedal pulses with purposeful movement in all four extremities. Dressing is clean, dry and intact. PIV capped. Bed low and locked. Side rails x3. Call light with in reach. Pt verbalizes understanding of call light.

## 2017-03-22 NOTE — PROGRESS NOTES
Problem: Mobility Impaired (Adult and Pediatric)  Goal: *Acute Goals and Plan of Care (Insert Text)  GOALS (1-4 days):  (1.)Ms. Kasie Lei will move from supine to sit and sit to supine in bed with STAND BY ASSIST.  (2.)Ms. Kasie Lei will transfer from bed to chair and chair to bed with STAND BY ASSIST using the least restrictive device. (3.)Ms. Kasie Lei will ambulate with STAND BY ASSIST for 200 feet with the least restrictive device. (4.)Ms. Kasie Lei will ambulate up/down 2 steps with bilateral railing with CONTACT GUARD ASSIST with no device. (5.)Ms. Kasie Lei will increase left knee ROM to 5°-80°.  ________________________________________________________________________________________________       PHYSICAL THERAPY JOINT CAMP TKA: Daily Note and AM 3/22/2017  INPATIENT: Hospital Day: 2  [place] / Product Type: Medicare /      NAME/AGE/GENDER: Wilver Dooley is a 75 y.o. female   PRIMARY DIAGNOSIS:  Primary osteoarthritis of left knee [M17.12]              Procedure(s) and Anesthesia Type:     * LEFT KNEE ARTHROPLASTY TOTAL / LETTY - Spinal (Left)  ICD-10: Treatment Diagnosis:        · Pain in Left Knee (M25.562)  · Stiffness of Left Knee, Not elsewhere classified (M25.662)  · Difficulty in walking, Not elsewhere classified (R26.2)       ASSESSMENT:      Ms. Kasie Lei presents with limited rom and strength of left LE as well as decreased functional mobility and gait s/p left tka. She plans to go to rehab. Pt. Reports doing well this am.  She ambulated in the room with walker and performed tka exercises with cues and assistance. No questions. This section established at most recent assessment   PROBLEM LIST (Impairments causing functional limitations):  1. Decreased Strength  2. Decreased ADL/Functional Activities  3. Decreased Transfer Abilities  4. Decreased Ambulation Ability/Technique  5. Decreased Balance  6. Increased Pain  7.  Decreased Activity Tolerance  8. Decreased [place] with Home Exercise Program    INTERVENTIONS PLANNED: (Benefits and precautions of physical therapy have been discussed with the patient.)  1. Bed Mobility  2. Gait Training  3. Home Exercise Program (HEP)  4. Therapeutic Exercise/Strengthening  5. Transfer Training  6. Range of Motion: active/assisted/passive  7. Therapeutic Activities  8. Group Therapy      TREATMENT PLAN: Frequency/Duration: Follow patient BID   to address above goals. Rehabilitation Potential For Stated Goals: GOOD      RECOMMENDED REHABILITATION/EQUIPMENT: (at time of discharge pending progress): Continue Skilled Therapy, Rehab and pt. plans on rehab. HISTORY:   History of Present Injury/Illness (Reason for Referral):  S/p left tka  Past Medical History/Comorbidities:   Ms. Caridad Fernando  has a past medical history of Adverse effect of anesthesia; Arthritis; Chronic kidney disease; Chronic pain; Follow-up visit for aortic valve replacement with bioprosthetic valve (7/25/2016); GERD (gastroesophageal reflux disease); Hypertension; Kidney stones; Morbid obesity ([address] Murmur, cardiac; Nausea & vomiting; Psychiatric disorder; PUD (peptic ulcer disease) (06/2016); and Valvular heart disease (2016). She also has no past medical history of Aneurysm ([address] Asthma; Autoimmune disease ([address] Cancer ([address] Chronic obstructive pulmonary disease ([address] Coagulation disorder ([address] Diabetes ([address] Difficult intubation; Liver disease; Malignant hyperthermia due to anesthesia; Pseudocholinesterase deficiency; Rheumatic fever; Seizures ([address] Sleep apnea; Stroke [facility]); Thromboembolus ([address] or Thyroid disease.   Ms. Caridad Fernando  has a past surgical history that includes urological (Multiple dates); shoulder arthroscopy (Right); lap cholecystectomy; gastric bypass; gi; cervical fusion; hysterectomy; bilateral salpingo-oophorectomy; heart catheterization; aortic valve replacement (6/9/2016); colonoscopy (N/A, 6/15/2016); knee replacement (Right, 10/10/2016); and heart valve surgery. Social History/Living Environment:   Home Environment: Private residence  # Steps to Enter: 0  One/Two Story Residence: One story  Living Alone: No  Support Systems: Family member(s)  Patient Expects to be Discharged to[de-identified] Rehabilitation facility  Current DME Used/Available at Home: Shower chair, Walker, Commode, bedside  Prior Level of Function/Work/Activity:  independent   Number of Personal Factors/Comorbidities that affect the Plan of Care: 0: LOW COMPLEXITY   EXAMINATION:   Most Recent Physical Functioning:                  LLE AROM  L Knee Flexion: 60  L Knee Extension: 10   Can pump ankles        Bed Mobility  Supine to Sit: Contact guard assistance     Transfers  Sit to Stand: Minimum assistance  Stand to Sit: Minimum assistance  Bed to Chair: Minimum assistance     Balance  Sitting: Intact  Standing: With support;Pull to stand                Weight Bearing Status  Left Side Weight Bearing: As tolerated  Distance (ft): 15 Feet (ft)  Ambulation - Level of Assistance: Contact guard assistance  Assistive Device: Walker, rolling  Speed/Lauren: Delayed  Step Length: Left shortened;Right shortened  Stance: Left decreased  Gait Abnormalities: Antalgic;Decreased step clearance  Interventions: Safety awareness training;Verbal cues      Braces/Orthotics:      Left Knee Cold  Type: Cryocuff       Body Structures Involved:  1. Bones  2. Joints  3. Muscles  4. Ligaments Body Functions Affected:  1. Movement Related Activities and Participation Affected:  1. Mobility   Number of elements that affect the Plan of Care: 1-2: LOW COMPLEXITY   CLINICAL PRESENTATION:   Presentation: Stable and uncomplicated: LOW COMPLEXITY   CLINICAL DECISION MAKIN [facility] [address] [id number] AM-PAC 6 Clicks   Basic Mobility Inpatient Short Form  How much difficulty does the patient currently have. .. Unable A Lot A Little None   1.   Turning over in bed (including adjusting bedclothes, sheets and blankets)? [ ] 1   [ ] 2   [X] 3   [ ] 4   2. Sitting down on and standing up from a chair with arms ( e.g., wheelchair, bedside commode, etc.)   [ ] 1   [ ] 2   [X] 3   [ ] 4   3. Moving from lying on back to sitting on the side of the bed? [ ] 1   [ ] 2   [X] 3   [ ] 4   How much help from another person does the patient currently need. .. Total A Lot A Little None   4. Moving to and from a bed to a chair (including a wheelchair)? [ ] 1   [ ] 2   [X] 3   [ ] 4   5. Need to walk in hospital room? [ ] 1   [ ] 2   [X] 3   [ ] 4   6. Climbing 3-5 steps with a railing? [ ] 1   [X] 2   [ ] 3   [ ] 4   © 2007, Trustees of [address] [place], [place] [address], under license to [employer]. All rights reserved       Score:  Initial: 17 Most Recent: X (Date: -- )     Interpretation of Tool:  Represents activities that are increasingly more difficult (i.e. Bed mobility, Transfers, Gait). Score 24 23 22-20 19-15 14-10 9-7 6       Modifier CH CI CJ CK CL CM CN         · Mobility - Walking and Moving Around:               - CURRENT STATUS:    CK - 40%-59% impaired, limited or restricted               - GOAL STATUS:           CJ - 20%-39% impaired, limited or restricted               - D/C STATUS:                       ---------------To be determined---------------  [place] / Product Type: Medicare /       Medical Necessity:     · Patient is expected to demonstrate progress in strength, range of motion and balance to decrease assistance required with theraputic exercises and functional mobility. Reason for Services/Other Comments:  · Patient continues to require present interventions due to patient's inability to perform theraputic exercises and functional mobility independently.    Use of outcome tool(s) and clinical judgement create a POC that gives a: Clear prediction of patient's progress: LOW COMPLEXITY                 TREATMENT:   (In addition to Assessment/Re-Assessment sessions the following treatments were rendered)      Pre-treatment Symptoms/Complaints:  none  Pain: Initial:     0 Post Session:  5      Gait Training (15 Minutes):  Gait training to improve and/or restore physical functioning as related to mobility, strength and balance. Ambulated 15 Feet (ft) with Contact guard assistance using a Walker, rolling and minimal Safety awareness training;Verbal cues related to their stance phase to promote proper body alignment, promote proper body posture and promote proper body mechanics. Therapeutic Exercise: (15 Minutes):  Exercises per grid below to improve mobility and strength. Required minimal visual, verbal and manual cues to promote proper body alignment, promote proper body posture and promote proper body mechanics. Progressed range and repetitions as indicated. Date:  3/22  Date:    Date:      ACTIVITY/EXERCISE AM PM AM PM AM PM   GROUP THERAPY  [ ]  [ ]  [ ]  [ ]  [ ]  [ ]   Ankle Pumps 15 a             Quad Sets  10a             Gluteal Sets  10a             Hip ABd/ADduction  10a             Straight Leg Raises  10aa             Knee Slides  10aa             Short Arc Quads               Long Arc Quads               Chair Slides                               B = bilateral; AA = active assistive; A = active; P = passive       Treatment/Session Assessment:         Response to Treatment:  Pt. Doing fine. Education:  [X] Home Exercises  [X] Fall Precautions  [ ] Hip Precautions [ ] Going Home Video  [X] Knee/Hip Prosthesis Review  [X] Walker Management/Safety [ ] Adaptive Equipment as Needed         Interdisciplinary Collaboration:   · Registered Nurse and Rehabilitation Attendant     After treatment position/precautions:   · Up in chair, Bed/Chair-wheels locked, Bed in low position and Call light within reach     Compliance with Program/Exercises: Will assess as treatment progresses.      Recommendations/Intent for next treatment session:  Treatment next visit will focus on increasing Ms. Esparza's independence with bed mobility, transfers, gait training, strength/ROM exercises, modalities for pain, and patient education.        Total Treatment Duration:  PT Patient Time In/Time Out  Time In: 0830  Time Out: [facility],

## 2017-03-22 NOTE — PROGRESS NOTES
03/21/17 2057   Oxygen Therapy   O2 Sat (%) 96 %   Pulse via Oximetry 72 beats per minute   O2 Device Room air   O2 Flow Rate (L/min) 0 l/min    Continuous sat monitor set up as ordered for  [facility] use.

## 2017-03-22 NOTE — PROGRESS NOTES
Problem: Mobility Impaired (Adult and Pediatric)  Goal: *Acute Goals and Plan of Care (Insert Text)  GOALS (1-4 days):  (1.)Ms. Carol Gan will move from supine to sit and sit to supine in bed with STAND BY ASSIST.  (2.)Ms. Carol Gan will transfer from bed to chair and chair to bed with STAND BY ASSIST using the least restrictive device. (3.)Ms. Carol Gan will ambulate with STAND BY ASSIST for 200 feet with the least restrictive device. (4.)Ms. Carol Gan will ambulate up/down 2 steps with bilateral railing with CONTACT GUARD ASSIST with no device. (5.)Ms. Carol Gan will increase left knee ROM to 5°-80°.  ________________________________________________________________________________________________       PHYSICAL THERAPY JOINT CAMP TKA: Daily Note and PM 3/22/2017  INPATIENT: Hospital Day: 2  [place] / Product Type: Medicare /      NAME/AGE/GENDER: Ree Carrasco is a 75 y.o. female   PRIMARY DIAGNOSIS:  Primary osteoarthritis of left knee [M17.12]              Procedure(s) and Anesthesia Type:     * LEFT KNEE ARTHROPLASTY TOTAL / LETTY - Spinal (Left)  ICD-10: Treatment Diagnosis:        · Pain in Left Knee (M25.562)  · Stiffness of Left Knee, Not elsewhere classified (M25.662)  · Difficulty in walking, Not elsewhere classified (R26.2)       ASSESSMENT:      Ms. Carol Gan presents with limited rom and strength of left LE as well as decreased functional mobility and gait s/p left tka. She plans to go to rehab. Pt. Increased her gait distance into the hernandez with walker CGA. Some left foot drop which pt. Reports is chronic from her back problems. She performed tka exercises with cues and assistance. No questions. This section established at most recent assessment   PROBLEM LIST (Impairments causing functional limitations):  1. Decreased Strength  2. Decreased ADL/Functional Activities  3. Decreased Transfer Abilities  4. Decreased Ambulation Ability/Technique  5.  Decreased Balance  6. Increased Pain  7. Decreased Activity Tolerance  8. Decreased [place] with Home Exercise Program    INTERVENTIONS PLANNED: (Benefits and precautions of physical therapy have been discussed with the patient.)  1. Bed Mobility  2. Gait Training  3. Home Exercise Program (HEP)  4. Therapeutic Exercise/Strengthening  5. Transfer Training  6. Range of Motion: active/assisted/passive  7. Therapeutic Activities  8. Group Therapy      TREATMENT PLAN: Frequency/Duration: Follow patient BID   to address above goals. Rehabilitation Potential For Stated Goals: GOOD      RECOMMENDED REHABILITATION/EQUIPMENT: (at time of discharge pending progress): Continue Skilled Therapy, Rehab and pt. plans on rehab. HISTORY:   History of Present Injury/Illness (Reason for Referral):  S/p left tka  Past Medical History/Comorbidities:   Ms. Luana Gaspar  has a past medical history of Adverse effect of anesthesia; Arthritis; Chronic kidney disease; Chronic pain; Follow-up visit for aortic valve replacement with bioprosthetic valve (7/25/2016); GERD (gastroesophageal reflux disease); Hypertension; Kidney stones; Morbid obesity ([address] Murmur, cardiac; Nausea & vomiting; Psychiatric disorder; PUD (peptic ulcer disease) (06/2016); and Valvular heart disease (2016). She also has no past medical history of Aneurysm ([address] Asthma; Autoimmune disease ([address] Cancer ([address] Chronic obstructive pulmonary disease ([address] Coagulation disorder ([address] Diabetes ([address] Difficult intubation; Liver disease; Malignant hyperthermia due to anesthesia; Pseudocholinesterase deficiency; Rheumatic fever; Seizures ([address] Sleep apnea; Stroke [facility]); Thromboembolus ([address] or Thyroid disease.   Ms. Luana Gaspar  has a past surgical history that includes urological (Multiple dates); shoulder arthroscopy (Right); lap cholecystectomy; gastric bypass; gi; cervical fusion; hysterectomy; bilateral salpingo-oophorectomy; heart catheterization; aortic valve replacement (2016); colonoscopy (N/A, 6/15/2016); knee replacement (Right, 10/10/2016); and heart valve surgery. Social History/Living Environment:   Home Environment: Private residence  # Steps to Enter: 0  One/Two Story Residence: One story  Living Alone: No  Support Systems: Family member(s)  Patient Expects to be Discharged to[de-identified] Rehabilitation facility  Current DME Used/Available at Home: Shower chair, Walker, Commode, bedside  Prior Level of Function/Work/Activity:  independent   Number of Personal Factors/Comorbidities that affect the Plan of Care: 0: LOW COMPLEXITY   EXAMINATION:   Most Recent Physical Functioning:                  LLE AROM  L Knee Flexion: 60  L Knee Extension: 10   Can pump ankles        Bed Mobility  Supine to Sit: Contact guard assistance  Sit to Supine: Minimum assistance     Transfers  Sit to Stand: Minimum assistance  Stand to Sit: Contact guard assistance  Bed to Chair: Minimum assistance     Balance  Sitting: Intact  Standing: Pull to stand; With support                Weight Bearing Status  Left Side Weight Bearing: As tolerated  Distance (ft): 60 Feet (ft)  Ambulation - Level of Assistance: Contact guard assistance  Assistive Device: Walker, rolling  Speed/Lauren: Delayed  Step Length: Left shortened;Right shortened  Stance: Left decreased  Gait Abnormalities: Antalgic; Foot drop (left foot drop chronic)  Interventions: Safety awareness training;Verbal cues      Braces/Orthotics:      Left Knee Cold  Type: Cryocuff       Body Structures Involved:  1. Bones  2. Joints  3. Muscles  4. Ligaments Body Functions Affected:  1. Movement Related Activities and Participation Affected:  1.  Mobility   Number of elements that affect the Plan of Care: 1-2: LOW COMPLEXITY   CLINICAL PRESENTATION:   Presentation: Stable and uncomplicated: LOW COMPLEXITY   CLINICAL DECISION MAKIN [facility] [address] [address] AM-PAC 6 Clicks   Basic Mobility Inpatient Short Form  How much difficulty does the patient currently have. .. Unable A Lot A Little None   1. Turning over in bed (including adjusting bedclothes, sheets and blankets)? [ ] 1   [ ] 2   [X] 3   [ ] 4   2. Sitting down on and standing up from a chair with arms ( e.g., wheelchair, bedside commode, etc.)   [ ] 1   [ ] 2   [X] 3   [ ] 4   3. Moving from lying on back to sitting on the side of the bed? [ ] 1   [ ] 2   [X] 3   [ ] 4   How much help from another person does the patient currently need. .. Total A Lot A Little None   4. Moving to and from a bed to a chair (including a wheelchair)? [ ] 1   [ ] 2   [X] 3   [ ] 4   5. Need to walk in hospital room? [ ] 1   [ ] 2   [X] 3   [ ] 4   6. Climbing 3-5 steps with a railing? [ ] 1   [X] 2   [ ] 3   [ ] 4   © 2007, Trustees of [address] [place] [address] [address] [address], under license to [employer]. All rights reserved       Score:  Initial: 17 Most Recent: X (Date: -- )     Interpretation of Tool:  Represents activities that are increasingly more difficult (i.e. Bed mobility, Transfers, Gait). Score 24 23 22-20 19-15 14-10 9-7 6       Modifier CH CI CJ CK CL CM CN         · Mobility - Walking and Moving Around:               - CURRENT STATUS:    CK - 40%-59% impaired, limited or restricted               - GOAL STATUS:           CJ - 20%-39% impaired, limited or restricted               - D/C STATUS:                       ---------------To be determined---------------  [place] / Product Type: Medicare /       Medical Necessity:     · Patient is expected to demonstrate progress in strength, range of motion and balance to decrease assistance required with theraputic exercises and functional mobility. Reason for Services/Other Comments:  · Patient continues to require present interventions due to patient's inability to perform theraputic exercises and functional mobility independently.    Use of outcome tool(s) and clinical judgement create a POC that gives a: Clear prediction of patient's progress: LOW COMPLEXITY                 TREATMENT:   (In addition to Assessment/Re-Assessment sessions the following treatments were rendered)      Pre-treatment Symptoms/Complaints:  No complaints  Pain: Initial:     0 Post Session:  5      Gait Training (15 Minutes):  Gait training to improve and/or restore physical functioning as related to mobility, strength and balance. Ambulated 60 Feet (ft) with Contact guard assistance using a Walker, rolling and minimal Safety awareness training;Verbal cues related to their stance phase to promote proper body alignment, promote proper body posture and promote proper body mechanics. Therapeutic Exercise: (15 Minutes):  Exercises per grid below to improve mobility and strength. Required minimal visual, verbal and manual cues to promote proper body alignment, promote proper body posture and promote proper body mechanics. Progressed range and repetitions as indicated. Date:  3/22  Date:    Date:      ACTIVITY/EXERCISE AM PM AM PM AM PM   GROUP THERAPY  [ ]  [ ]  [ ]  [ ]  [ ]  [ ]   Ankle Pumps 15 a 15 a           Quad Sets  10a  15a           Gluteal Sets  10a  15a           Hip ABd/ADduction  10a  15a           Straight Leg Raises  10aa  15aa           Knee Slides  10aa  15aa           Short Arc Quads               Long Arc Quads               Chair Slides                               B = bilateral; AA = active assistive; A = active; P = passive       Treatment/Session Assessment:         Response to Treatment:  Pt. Making progress.      Education:  [X] Home Exercises  [X] Fall Precautions  [ ] Hip Precautions [ ] Going Home Video  [X] Knee/Hip Prosthesis Review  [X] Walker Management/Safety [ ] Adaptive Equipment as Needed         Interdisciplinary Collaboration:   · Registered Nurse     After treatment position/precautions:   · Supine in bed, Bed/Chair-wheels locked, Bed in low position and Call light within reach Compliance with Program/Exercises: Will assess as treatment progresses. No questions. Recommendations/Intent for next treatment session:  Treatment next visit will focus on increasing Ms. Esparza's independence with bed mobility, transfers, gait training, strength/ROM exercises, modalities for pain, and patient education.        Total Treatment Duration:  PT Patient Time In/Time Out  Time In: 1430  Time Out: [address], PT

## 2017-03-22 NOTE — PROGRESS NOTES
Care Management Interventions  Mode of Transport at Discharge: Self  Transition of Care Consult (CM Consult): SNF  Occupational Therapy Consult: Yes  Current Support Network: Own Home  Confirm Follow Up Transport: Family  Plan discussed with Pt/Family/Caregiver: Yes  Freedom of Choice Offered: Yes  Discharge Location  Discharge Placement: Skilled nursing facility    Patient is a 75 yr old female admitted for a left TKA. Patient reports she has made arrangements to go to [facility] for rehab on d/c. I called and spoke with their admission coordinator and confirmed plans. Referral sent for their review. We will make plans for transfer on Friday 3-24 after her three night Inpt stay. Will follow.   [place] Con[place]

## 2017-03-22 NOTE — PROGRESS NOTES
Pt resting quietly in bed. Pt denies pain at this time. No needs at present. Bed Low and locked. Call light w/ in reach.

## 2017-03-22 NOTE — PROGRESS NOTES
2017         Post Op day: 1 Day Post-Op     Admit Date: 3/21/2017  Admit Diagnosis: Primary osteoarthritis of left knee [M17.12]        Subjective: Patient doing well. No concerns/complaints. No shortness of breath, chest pain or nausea/vomiting. Pain well controlled     Objective:   Visit Vitals    /71 (BP 1 Location: Right arm, BP Patient Position: At rest)    Pulse 65    Temp 96.7 °F (35.9 °C)    Resp 16    Ht 5' 5\" (1.651 m)    Wt 105.5 kg (232 lb 9.6 oz)    SpO2 95%    BMI 38.71 kg/m2    Temp (24hrs), Av °F (36.1 °C), Min:95.5 °F (35.3 °C), Max:97.9 °F (36.6 °C)    Lab Results   Component Value Date/Time    HGB 10.6 2017 07:36 PM     Extremity Exam  Prieno intact. Incision benign (no erythema, induration)  +Tibialis Anterior and Gastroc-Soleus left lower extremity  Sensation intact to light touch  Extremity perfused  TEDS/SCDS  No sign of DVT     Assessment / Plan :  WBAT LLE  PT/OT  ASA along with SCDs/TEDS for DVT prophylaxis in house.  Home on ASA 81mg BID  DIspo-Home with HH  Patient Active Problem List   Diagnosis Code    Essential hypertension, benign I10    Arthritis M19.90    GERD (gastroesophageal reflux disease) K21.9    Nocturnal hypoxemia G47.34    Mixed hyperlipidemia E78.2    Morbid obesity ([address] E66.01    Vitamin B12 deficiency E53.8    Vitamin D deficiency E55.9    Impaired fasting blood sugar R73.01    Carotid artery stenosis without cerebral infarction I65.29    Aortic valve stenosis I35.0    Pulmonary hypertension (HCC) I27.2    Atelectasis J98.11    CKD (chronic kidney disease) stage 3, GFR 30-59 ml/min N18.3    Chronic anemia D64.9    Chronic pain G89.29    Fibromyalgia M79.7    Full dentures Z97.2, K08.109    Pleural effusion J90    Postoperative atrial fibrillation (HCC) I97.89, I48.91    Iron deficiency anemia D50.9    Gastritis K29.70    Gastric ulcer K25.9    Acute diastolic CHF (congestive heart failure) ([facility]) I50.31    Duodenal ulcer K26.9    Paroxysmal atrial fibrillation (HCC) I48.0    Follow-up visit for aortic valve replacement with bioprosthetic valve Z09, Z95.4    Peptic ulcer disease K27.9    UTI (urinary tract infection) N39.0    Snoring R06.83    Preop cardiovascular exam Z01.810    OA (osteoarthritis) of knee M17.9    Chronic gout of multiple sites M1A. 22H7    Primary osteoarthritis of left knee M17.12          Signed By: Rachel Srivastava MD

## 2017-03-22 NOTE — PROGRESS NOTES
HOB elevated, no acute distress, pulse ox monitoring 98%, neurovascular checks WNL, ace wrap dressing dry and intact, iceman, pain level 5, right knee, no complaints of calf pain.

## 2017-03-22 NOTE — PROGRESS NOTES
LLE ace wrap dressing removed, scant amount serosanguinous drainage distal end incision line, well approximated.

## 2017-03-23 LAB
HGB BLD-MCNC: 9.8 G/DL (ref 11.7–15.4)
MM INDURATION POC: 0 MM (ref 0–5)
MM INDURATION POC: 0 MM (ref 0–5)
PPD POC: NORMAL NEGATIVE
PPD POC: NORMAL NEGATIVE

## 2017-03-23 PROCEDURE — 65270000029 HC RM PRIVATE

## 2017-03-23 PROCEDURE — 36415 COLL VENOUS BLD VENIPUNCTURE: CPT | Performed by: ORTHOPAEDIC SURGERY

## 2017-03-23 PROCEDURE — 74011250637 HC RX REV CODE- 250/637: Performed by: ORTHOPAEDIC SURGERY

## 2017-03-23 PROCEDURE — 97110 THERAPEUTIC EXERCISES: CPT

## 2017-03-23 PROCEDURE — 97150 GROUP THERAPEUTIC PROCEDURES: CPT

## 2017-03-23 PROCEDURE — 74011250636 HC RX REV CODE- 250/636: Performed by: ORTHOPAEDIC SURGERY

## 2017-03-23 PROCEDURE — 85018 HEMOGLOBIN: CPT | Performed by: ORTHOPAEDIC SURGERY

## 2017-03-23 PROCEDURE — 97116 GAIT TRAINING THERAPY: CPT

## 2017-03-23 RX ORDER — KETOROLAC TROMETHAMINE 15 MG/ML
15 INJECTION, SOLUTION INTRAMUSCULAR; INTRAVENOUS ONCE
Status: COMPLETED | OUTPATIENT
Start: 2017-03-23 | End: 2017-03-23

## 2017-03-23 RX ORDER — KETOROLAC TROMETHAMINE 30 MG/ML
30 INJECTION, SOLUTION INTRAMUSCULAR; INTRAVENOUS ONCE
Status: CANCELLED | OUTPATIENT
Start: 2017-03-23 | End: 2017-03-24

## 2017-03-23 RX ORDER — KETOROLAC TROMETHAMINE 30 MG/ML
15 INJECTION, SOLUTION INTRAMUSCULAR; INTRAVENOUS
Status: DISCONTINUED | OUTPATIENT
Start: 2017-03-23 | End: 2017-03-23

## 2017-03-23 RX ORDER — PANTOPRAZOLE SODIUM 40 MG/1
40 TABLET, DELAYED RELEASE ORAL
Status: DISCONTINUED | OUTPATIENT
Start: 2017-03-23 | End: 2017-03-24 | Stop reason: HOSPADM

## 2017-03-23 RX ADMIN — CARVEDILOL 25 MG: 25 TABLET, FILM COATED ORAL at 08:22

## 2017-03-23 RX ADMIN — CYCLOBENZAPRINE HYDROCHLORIDE 5 MG: 10 TABLET, FILM COATED ORAL at 19:50

## 2017-03-23 RX ADMIN — HYDROMORPHONE HYDROCHLORIDE 2 MG: 2 TABLET ORAL at 17:50

## 2017-03-23 RX ADMIN — CYCLOBENZAPRINE HYDROCHLORIDE 5 MG: 10 TABLET, FILM COATED ORAL at 08:19

## 2017-03-23 RX ADMIN — GABAPENTIN 100 MG: 100 CAPSULE ORAL at 08:23

## 2017-03-23 RX ADMIN — HYDROMORPHONE HYDROCHLORIDE 2 MG: 2 TABLET ORAL at 11:41

## 2017-03-23 RX ADMIN — Medication 10 ML: at 19:50

## 2017-03-23 RX ADMIN — CARVEDILOL 25 MG: 25 TABLET, FILM COATED ORAL at 17:50

## 2017-03-23 RX ADMIN — HYDROMORPHONE HYDROCHLORIDE 2 MG: 2 TABLET ORAL at 06:49

## 2017-03-23 RX ADMIN — OLMESARTAN MEDOXOMIL 20 MG: 20 TABLET, FILM COATED ORAL at 08:19

## 2017-03-23 RX ADMIN — CALCIUM CARBONATE 500 MG (1,250 MG)-VITAMIN D3 200 UNIT TABLET 1 TABLET: at 08:23

## 2017-03-23 RX ADMIN — ACETAMINOPHEN 1000 MG: 500 TABLET, FILM COATED ORAL at 23:47

## 2017-03-23 RX ADMIN — ACETAMINOPHEN 1000 MG: 500 TABLET, FILM COATED ORAL at 11:41

## 2017-03-23 RX ADMIN — ASPIRIN 81 MG CHEWABLE TABLET 81 MG: 81 TABLET CHEWABLE at 17:50

## 2017-03-23 RX ADMIN — HYDROMORPHONE HYDROCHLORIDE 2 MG: 2 TABLET ORAL at 23:47

## 2017-03-23 RX ADMIN — CALCIUM CARBONATE 500 MG (1,250 MG)-VITAMIN D3 200 UNIT TABLET 1 TABLET: at 17:50

## 2017-03-23 RX ADMIN — HYDROMORPHONE HYDROCHLORIDE 1 MG: 1 INJECTION, SOLUTION INTRAMUSCULAR; INTRAVENOUS; SUBCUTANEOUS at 19:51

## 2017-03-23 RX ADMIN — ACETAMINOPHEN 1000 MG: 500 TABLET, FILM COATED ORAL at 03:01

## 2017-03-23 RX ADMIN — SENNOSIDES AND DOCUSATE SODIUM 2 TABLET: 8.6; 5 TABLET ORAL at 08:21

## 2017-03-23 RX ADMIN — ACETAMINOPHEN 1000 MG: 500 TABLET, FILM COATED ORAL at 06:49

## 2017-03-23 RX ADMIN — KETOROLAC TROMETHAMINE 15 MG: 15 INJECTION, SOLUTION INTRAMUSCULAR; INTRAVENOUS at 19:50

## 2017-03-23 RX ADMIN — HYDROMORPHONE HYDROCHLORIDE 1 MG: 1 INJECTION, SOLUTION INTRAMUSCULAR; INTRAVENOUS; SUBCUTANEOUS at 03:06

## 2017-03-23 RX ADMIN — ACETAMINOPHEN 1000 MG: 500 TABLET, FILM COATED ORAL at 17:49

## 2017-03-23 RX ADMIN — FUROSEMIDE 20 MG: 20 TABLET ORAL at 08:21

## 2017-03-23 RX ADMIN — PANTOPRAZOLE SODIUM 40 MG: 40 TABLET, DELAYED RELEASE ORAL at 09:00

## 2017-03-23 RX ADMIN — GABAPENTIN 100 MG: 100 CAPSULE ORAL at 17:50

## 2017-03-23 RX ADMIN — FERROUS SULFATE TAB 325 MG (65 MG ELEMENTAL FE) 325 MG: 325 (65 FE) TAB at 08:23

## 2017-03-23 RX ADMIN — HYDROMORPHONE HYDROCHLORIDE 1 MG: 1 INJECTION, SOLUTION INTRAMUSCULAR; INTRAVENOUS; SUBCUTANEOUS at 14:52

## 2017-03-23 RX ADMIN — CYCLOBENZAPRINE HYDROCHLORIDE 5 MG: 10 TABLET, FILM COATED ORAL at 17:49

## 2017-03-23 RX ADMIN — ASPIRIN 81 MG CHEWABLE TABLET 81 MG: 81 TABLET CHEWABLE at 08:19

## 2017-03-23 NOTE — PROGRESS NOTES
Problem: Mobility Impaired (Adult and Pediatric)  Goal: *Acute Goals and Plan of Care (Insert Text)  GOALS (1-4 days):  (1.)Ms. Jayson Clements will move from supine to sit and sit to supine in bed with STAND BY ASSIST.  (2.)Ms. Jayson Clements will transfer from bed to chair and chair to bed with STAND BY ASSIST using the least restrictive device. (3.)Ms. Jayson Clements will ambulate with STAND BY ASSIST for 200 feet with the least restrictive device. (4.)Ms. Jayson Clements will ambulate up/down 2 steps with bilateral railing with CONTACT GUARD ASSIST with no device. (5.)Ms. Jayson Clements will increase left knee ROM to 5°-80°.  ________________________________________________________________________________________________       PHYSICAL THERAPY JOINT CAMP TKA: Daily Note and AM 3/23/2017  INPATIENT: Hospital Day: 3  [place] / [place] / Product Type: Medicare /      NAME/AGE/GENDER: Jaime Pablo is a 75 y.o. female   PRIMARY DIAGNOSIS:  Primary osteoarthritis of left knee [M17.12]              Procedure(s) and Anesthesia Type:     * LEFT KNEE ARTHROPLASTY TOTAL / LETTY - Spinal (Left)  ICD-10: Treatment Diagnosis:        · Pain in Left Knee (M25.562)  · Stiffness of Left Knee, Not elsewhere classified (M25.662)  · Difficulty in walking, Not elsewhere classified (R26.2)       ASSESSMENT:      Ms. Jayson Clements presents with limited rom and strength of left LE as well as decreased functional mobility and gait s/p left tka. She plans to go to rehab. Pt. Doing well this am with increased distance with walker. She performed tka exercises with cues and assistance, needs help with several exercises. No questions. This section established at most recent assessment   PROBLEM LIST (Impairments causing functional limitations):  1. Decreased Strength  2. Decreased ADL/Functional Activities  3. Decreased Transfer Abilities  4. Decreased Ambulation Ability/Technique  5. Decreased Balance  6. Increased Pain  7.  Decreased Activity Tolerance  8. Decreased [place] with Home Exercise Program    INTERVENTIONS PLANNED: (Benefits and precautions of physical therapy have been discussed with the patient.)  1. Bed Mobility  2. Gait Training  3. Home Exercise Program (HEP)  4. Therapeutic Exercise/Strengthening  5. Transfer Training  6. Range of Motion: active/assisted/passive  7. Therapeutic Activities  8. Group Therapy      TREATMENT PLAN: Frequency/Duration: Follow patient BID   to address above goals. Rehabilitation Potential For Stated Goals: GOOD      RECOMMENDED REHABILITATION/EQUIPMENT: (at time of discharge pending progress): Continue Skilled Therapy, Rehab and pt. plans on rehab. HISTORY:   History of Present Injury/Illness (Reason for Referral):  S/p left tka  Past Medical History/Comorbidities:   Ms. Shine Arreola  has a past medical history of Adverse effect of anesthesia; Arthritis; Chronic kidney disease; Chronic pain; Follow-up visit for aortic valve replacement with bioprosthetic valve (7/25/2016); GERD (gastroesophageal reflux disease); Hypertension; Kidney stones; Morbid obesity ([address] Murmur, cardiac; Nausea & vomiting; Psychiatric disorder; PUD (peptic ulcer disease) (06/2016); and Valvular heart disease (2016). She also has no past medical history of Aneurysm ([address] Asthma; Autoimmune disease ([address] Cancer ([address] Chronic obstructive pulmonary disease ([address] Coagulation disorder ([address] Diabetes ([address] Difficult intubation; Liver disease; Malignant hyperthermia due to anesthesia; Pseudocholinesterase deficiency; Rheumatic fever; Seizures ([address] Sleep apnea; Stroke [facility]); Thromboembolus ([address] or Thyroid disease.   Ms. Shine Arreola  has a past surgical history that includes urological (Multiple dates); shoulder arthroscopy (Right); lap cholecystectomy; gastric bypass; gi; cervical fusion; hysterectomy; bilateral salpingo-oophorectomy; heart catheterization; aortic valve replacement (6/9/2016); colonoscopy (N/A, 6/15/2016); knee replacement (Right, 10/10/2016); and heart valve surgery. Social History/Living Environment:   Home Environment: Private residence  # Steps to Enter: 0  One/Two Story Residence: One story  Living Alone: No  Support Systems: Family member(s)  Patient Expects to be Discharged to[de-identified] Rehabilitation facility  Current DME Used/Available at Home: Shower chair, Walker, Commode, bedside  Prior Level of Function/Work/Activity:  independent   Number of Personal Factors/Comorbidities that affect the Plan of Care: 0: LOW COMPLEXITY   EXAMINATION:   Most Recent Physical Functioning:                  LLE AROM  L Knee Flexion: 86  L Knee Extension: 9   Can pump ankles  LLE Strength  L Knee Flexion: 2+  L Knee Extension: 2+           Transfers  Sit to Stand: Contact guard assistance  Stand to Sit: Contact guard assistance  Bed to Chair: Contact guard assistance     Balance  Sitting: Intact  Standing: With support                Weight Bearing Status  Left Side Weight Bearing: As tolerated  Distance (ft): 300 Feet (ft)  Ambulation - Level of Assistance: Contact guard assistance  Assistive Device: Walker, rolling  Speed/Lauren: Delayed  Step Length: Left shortened;Right shortened  Stance: Left decreased  Gait Abnormalities: Antalgic;Decreased step clearance  Interventions: Safety awareness training;Verbal cues      Braces/Orthotics:      Left Knee Cold  Type: Cryocuff       Body Structures Involved:  1. Bones  2. Joints  3. Muscles  4. Ligaments Body Functions Affected:  1. Movement Related Activities and Participation Affected:  1. Mobility   Number of elements that affect the Plan of Care: 1-2: LOW COMPLEXITY   CLINICAL PRESENTATION:   Presentation: Stable and uncomplicated: LOW COMPLEXITY   CLINICAL DECISION MAKIN [facility] [place] [address] [address] AM-PAC 6 Clicks   Basic Mobility Inpatient Short Form  How much difficulty does the patient currently have. .. Unable A Lot A Little None   1.   Turning over in bed (including adjusting bedclothes, sheets and blankets)? [ ] 1   [ ] 2   [X] 3   [ ] 4   2. Sitting down on and standing up from a chair with arms ( e.g., wheelchair, bedside commode, etc.)   [ ] 1   [ ] 2   [X] 3   [ ] 4   3. Moving from lying on back to sitting on the side of the bed? [ ] 1   [ ] 2   [X] 3   [ ] 4   How much help from another person does the patient currently need. .. Total A Lot A Little None   4. Moving to and from a bed to a chair (including a wheelchair)? [ ] 1   [ ] 2   [X] 3   [ ] 4   5. Need to walk in hospital room? [ ] 1   [ ] 2   [X] 3   [ ] 4   6. Climbing 3-5 steps with a railing? [ ] 1   [X] 2   [ ] 3   [ ] 4   © 2007, Trustees of [address] [place] [address], under license to [employer]. All rights reserved       Score:  Initial: 17 Most Recent: X (Date: -- )     Interpretation of Tool:  Represents activities that are increasingly more difficult (i.e. Bed mobility, Transfers, Gait). Score 24 23 22-20 19-15 14-10 9-7 6       Modifier CH CI CJ CK CL CM CN         · Mobility - Walking and Moving Around:               - CURRENT STATUS:    CK - 40%-59% impaired, limited or restricted               - GOAL STATUS:           CJ - 20%-39% impaired, limited or restricted               - D/C STATUS:                       ---------------To be determined---------------  [place] / [place] / Product Type: Medicare /       Medical Necessity:     · Patient is expected to demonstrate progress in strength, range of motion and balance to decrease assistance required with theraputic exercises and functional mobility. Reason for Services/Other Comments:  · Patient continues to require present interventions due to patient's inability to perform theraputic exercises and functional mobility independently.    Use of outcome tool(s) and clinical judgement create a POC that gives a: Clear prediction of patient's progress: LOW COMPLEXITY                 TREATMENT: (In addition to Assessment/Re-Assessment sessions the following treatments were rendered)      Pre-treatment Symptoms/Complaints:  No complaints  Pain: Initial:     0 Post Session:  Did not rate      Gait Training (15 Minutes):  Gait training to improve and/or restore physical functioning as related to mobility, strength and balance. Ambulated 300 Feet (ft) with Contact guard assistance using a Walker, rolling and minimal Safety awareness training;Verbal cues related to their stance phase to promote proper body alignment, promote proper body posture and promote proper body mechanics. Therapeutic Exercise: (45 Minutes):  Exercises per grid below to improve mobility and strength. Required minimal visual, verbal and manual cues to promote proper body alignment, promote proper body posture and promote proper body mechanics. Progressed range and repetitions as indicated. Date:  3/22  Date:  3/23  Date:      ACTIVITY/EXERCISE AM PM AM PM AM PM   GROUP THERAPY  [ ]  [ ]  [x ]  [ ]  [ ]  [ ]   Ankle Pumps 15 a 15 a 20a          Quad Sets  10a  15a  20a         Gluteal Sets  10a  15a  20a         Hip ABd/ADduction  10a  15a  20a         Straight Leg Raises  10aa  15aa  20aa         Knee Slides  10aa  15aa  20aa         Short Arc Quads      20aa         Long Arc Quads               Chair Slides      20aa                         B = bilateral; AA = active assistive; A = active; P = passive       Treatment/Session Assessment:         Response to Treatment:  Pt. Progressing with therapy.      Education:  [X] Home Exercises  [X] Fall Precautions  [ ] Hip Precautions [ ] Going Home Video  [X] Knee/Hip Prosthesis Review  [X] Walker Management/Safety [ ] Adaptive Equipment as Needed         Interdisciplinary Collaboration:   · Physical Therapist, Physical Therapy Assistant and Rehabilitation Attendant     After treatment position/precautions:   · Up in chair, Bed/Chair-wheels locked, Bed in low position and Call light within reach     Compliance with Program/Exercises: Will assess as treatment progresses. No questions. Recommendations/Intent for next treatment session:  Treatment next visit will focus on increasing Ms. Esparza's independence with bed mobility, transfers, gait training, strength/ROM exercises, modalities for pain, and patient education.        Total Treatment Duration:  PT Patient Time In/Time Out  Time In: 1030  Time Out: Jaclyn Rojas., PT

## 2017-03-23 NOTE — PROGRESS NOTES
2017         Post Op day: 2 Days Post-Op     Admit Date: 3/21/2017  Admit Diagnosis: Primary osteoarthritis of left knee [M17.12]        Subjective: Patient doing well. No concerns/complaints. No shortness of breath, chest pain or nausea/vomiting. Did well with PT yesterday. Pain well controlled     Objective:   Visit Vitals    /66    Pulse 61    Temp 96.1 °F (35.6 °C)    Resp 16    Ht 5' 5\" (1.651 m)    Wt 105.5 kg (232 lb 9.6 oz)    SpO2 96%    BMI 38.71 kg/m2    Temp (24hrs), Av.7 °F (35.9 °C), Min:96 °F (35.6 °C), Max:97.5 °F (36.4 °C)    Lab Results   Component Value Date/Time    HGB 9.8 2017 04:00 AM     Extremity Exam  Prieno intact. Incision benign (no erythema, induration)  +Tibialis Anterior and Gastroc-Soleus left lower extremity  Sensation intact to light touch  Extremity perfused  TEDS/SCDS  No sign of DVT     Assessment / Plan :  WBAT LLE  PT/OT  ASA along with SCDs/TEDS for DVT prophylaxis in house.  Home on ASA 81mg BID  DIspo-SNF  Patient Active Problem List   Diagnosis Code    Essential hypertension, benign I10    Arthritis M19.90    GERD (gastroesophageal reflux disease) K21.9    Nocturnal hypoxemia G47.34    Mixed hyperlipidemia E78.2    Morbid obesity ([facility] E66.01    Vitamin B12 deficiency E53.8    Vitamin D deficiency E55.9    Impaired fasting blood sugar R73.01    Carotid artery stenosis without cerebral infarction I65.29    Aortic valve stenosis I35.0    Pulmonary hypertension (HCC) I27.2    Atelectasis J98.11    CKD (chronic kidney disease) stage 3, GFR 30-59 ml/min N18.3    Chronic anemia D64.9    Chronic pain G89.29    Fibromyalgia M79.7    Full dentures Z97.2, K08.109    Pleural effusion J90    Postoperative atrial fibrillation (HCC) I97.89, I48.91    Iron deficiency anemia D50.9    Gastritis K29.70    Gastric ulcer K25.9    Acute diastolic CHF (congestive heart failure) (HCC) I50.31    Duodenal ulcer K26.9    Paroxysmal atrial fibrillation ([address] I48.0    Follow-up visit for aortic valve replacement with bioprosthetic valve Z09, Z95.4    Peptic ulcer disease K27.9    UTI (urinary tract infection) N39.0    Snoring R06.83    Preop cardiovascular exam Z01.810    OA (osteoarthritis) of knee M17.9    Chronic gout of multiple sites M1A. 95H9    Primary osteoarthritis of left knee M17.12          Signed By: Ferdinand Mcnair MD

## 2017-03-23 NOTE — PROGRESS NOTES
Shift Assessment Complete. Pt is post op day 2 from [address]. Pt is A&Ox 3.  +2 pedal pulses with purposeful movement in all four extremities. Dressing is clean, dry and intact. PIV capped. Pt denies any pain or need at this time. Bed low and locked. Side rails x3. Call light with in reach. Pt verbalizes understanding of call light.

## 2017-03-23 NOTE — PROGRESS NOTES
OT Note:  Attempted to see patient for OT ADL. Pt's daughter assisted patient with shower and dressing. Will continue to follow.     Bennie Pablo OTR/L

## 2017-03-23 NOTE — PROGRESS NOTES
03/22/17 2209   Oxygen Therapy   O2 Sat (%) 95 %   Pulse via Oximetry 65 beats per minute   O2 Device Room air   O2 Flow Rate (L/min) 0 l/min    Continuous sat monitor ordered QHS

## 2017-03-23 NOTE — PROGRESS NOTES
Shift assessment complete. Patient is A&O x4. Prineo dressing to left knee dry and intact. Movement and sensation present to BLE's. Positive dorsi and plantar flexion. Pulses +2. SCDs and Ice man on. IS at bedside and encouraged. No difficulties with urination. Pain is well controlled with ordered medication. Bed low and locked, call light within reach, side rails up x3. Plan of care for the night discussed with patient. Opportunity for questions provided. Instructed to call for any assistance needed. Pt. verbalized understanding.

## 2017-03-23 NOTE — PROGRESS NOTES
Problem: Mobility Impaired (Adult and Pediatric)  Goal: *Acute Goals and Plan of Care (Insert Text)  GOALS (1-4 days):  (1.)Ms. Luana Gaspar will move from supine to sit and sit to supine in bed with STAND BY ASSIST.  (2.)Ms. Luana Gaspar will transfer from bed to chair and chair to bed with STAND BY ASSIST using the least restrictive device. (3.)Ms. Luana Gaspar will ambulate with STAND BY ASSIST for 200 feet with the least restrictive device. (4.)Ms. Luana Gaspar will ambulate up/down 2 steps with bilateral railing with CONTACT GUARD ASSIST with no device. (5.)Ms. Luana Gaspar will increase left knee ROM to 5°-80°.  ________________________________________________________________________________________________       PHYSICAL THERAPY JOINT CAMP TKA: Daily Note and PM 3/23/2017  INPATIENT: Hospital Day: 3  [place] / [place] / Product Type: Medicare /      NAME/AGE/GENDER: Corry Hinton is a 75 y.o. female   PRIMARY DIAGNOSIS:  Primary osteoarthritis of left knee [M17.12]              Procedure(s) and Anesthesia Type:     * LEFT KNEE ARTHROPLASTY TOTAL / LETTY - Spinal (Left)  ICD-10: Treatment Diagnosis:        · Pain in Left Knee (M25.562)  · Stiffness of Left Knee, Not elsewhere classified (M25.662)  · Difficulty in walking, Not elsewhere classified (R26.2)       ASSESSMENT:      Ms. Luana Gaspar presents with limited rom and strength of left LE as well as decreased functional mobility and gait s/p left tka. She plans to go to rehab. Pt. Agreeable for therapy. Pt. Did tka exercises and ambulated with walker again this pm.  Plans for rehab tomorrow. No questions. This section established at most recent assessment   PROBLEM LIST (Impairments causing functional limitations):  1. Decreased Strength  2. Decreased ADL/Functional Activities  3. Decreased Transfer Abilities  4. Decreased Ambulation Ability/Technique  5. Decreased Balance  6. Increased Pain  7. Decreased Activity Tolerance  8.  Decreased [place] with Home Exercise Program    INTERVENTIONS PLANNED: (Benefits and precautions of physical therapy have been discussed with the patient.)  1. Bed Mobility  2. Gait Training  3. Home Exercise Program (HEP)  4. Therapeutic Exercise/Strengthening  5. Transfer Training  6. Range of Motion: active/assisted/passive  7. Therapeutic Activities  8. Group Therapy      TREATMENT PLAN: Frequency/Duration: Follow patient BID   to address above goals. Rehabilitation Potential For Stated Goals: GOOD      RECOMMENDED REHABILITATION/EQUIPMENT: (at time of discharge pending progress): Continue Skilled Therapy, Rehab and pt. plans on rehab. HISTORY:   History of Present Injury/Illness (Reason for Referral):  S/p left tka  Past Medical History/Comorbidities:   Ms. Marcin Riley  has a past medical history of Adverse effect of anesthesia; Arthritis; Chronic kidney disease; Chronic pain; Follow-up visit for aortic valve replacement with bioprosthetic valve (7/25/2016); GERD (gastroesophageal reflux disease); Hypertension; Kidney stones; Morbid obesity ([address] Murmur, cardiac; Nausea & vomiting; Psychiatric disorder; PUD (peptic ulcer disease) (06/2016); and Valvular heart disease (2016). She also has no past medical history of Aneurysm ([address] Asthma; Autoimmune disease ([address] Cancer ([address] Chronic obstructive pulmonary disease ([address] Coagulation disorder ([address] Diabetes ([address] Difficult intubation; Liver disease; Malignant hyperthermia due to anesthesia; Pseudocholinesterase deficiency; Rheumatic fever; Seizures ([address] Sleep apnea; Stroke [facility]); Thromboembolus ([address] or Thyroid disease.   Ms. Marcin Riley  has a past surgical history that includes urological (Multiple dates); shoulder arthroscopy (Right); lap cholecystectomy; gastric bypass; gi; cervical fusion; hysterectomy; bilateral salpingo-oophorectomy; heart catheterization; aortic valve replacement (6/9/2016); colonoscopy (N/A, 6/15/2016); knee replacement (Right, 10/10/2016); and heart valve surgery. Social History/Living Environment:   Home Environment: Private residence  # Steps to Enter: 0  One/Two Story Residence: One story  Living Alone: No  Support Systems: Family member(s)  Patient Expects to be Discharged to[de-identified] Rehabilitation facility  Current DME Used/Available at Home: Shower chair, Walker, Commode, bedside  Prior Level of Function/Work/Activity:  independent   Number of Personal Factors/Comorbidities that affect the Plan of Care: 0: LOW COMPLEXITY   EXAMINATION:   Most Recent Physical Functioning:                  LLE AROM  L Knee Flexion: 86  L Knee Extension: 9   Can pump ankles  LLE Strength  L Knee Flexion: 2+  L Knee Extension: 2+           Transfers  Sit to Stand: Contact guard assistance  Stand to Sit: Contact guard assistance  Bed to Chair: Contact guard assistance     Balance  Sitting: Intact  Standing: With support                Weight Bearing Status  Left Side Weight Bearing: As tolerated  Distance (ft): 300 Feet (ft)  Ambulation - Level of Assistance: Contact guard assistance  Assistive Device: Walker, rolling  Speed/Lauren: Delayed  Step Length: Left shortened;Right shortened  Stance: Left decreased  Gait Abnormalities: Antalgic;Decreased step clearance  Interventions: Safety awareness training;Verbal cues      Braces/Orthotics:      Left Knee Cold  Type: Cryocuff       Body Structures Involved:  1. Bones  2. Joints  3. Muscles  4. Ligaments Body Functions Affected:  1. Movement Related Activities and Participation Affected:  1. Mobility   Number of elements that affect the Plan of Care: 1-2: LOW COMPLEXITY   CLINICAL PRESENTATION:   Presentation: Stable and uncomplicated: LOW COMPLEXITY   CLINICAL DECISION MAKIN [facility] [address] [id number] AM-PAC 6 Clicks   Basic Mobility Inpatient Short Form  How much difficulty does the patient currently have. .. Unable A Lot A Little None   1.   Turning over in bed (including adjusting bedclothes, sheets and blankets)? [ ] 1   [ ] 2   [X] 3   [ ] 4   2. Sitting down on and standing up from a chair with arms ( e.g., wheelchair, bedside commode, etc.)   [ ] 1   [ ] 2   [X] 3   [ ] 4   3. Moving from lying on back to sitting on the side of the bed? [ ] 1   [ ] 2   [X] 3   [ ] 4   How much help from another person does the patient currently need. .. Total A Lot A Little None   4. Moving to and from a bed to a chair (including a wheelchair)? [ ] 1   [ ] 2   [X] 3   [ ] 4   5. Need to walk in hospital room? [ ] 1   [ ] 2   [X] 3   [ ] 4   6. Climbing 3-5 steps with a railing? [ ] 1   [X] 2   [ ] 3   [ ] 4   © 2007, Trustees of [address] [place] [id number], under license to [employer]. All rights reserved       Score:  Initial: 17 Most Recent: X (Date: -- )     Interpretation of Tool:  Represents activities that are increasingly more difficult (i.e. Bed mobility, Transfers, Gait). Score 24 23 22-20 19-15 14-10 9-7 6       Modifier CH CI CJ CK CL CM CN         · Mobility - Walking and Moving Around:               - CURRENT STATUS:    CK - 40%-59% impaired, limited or restricted               - GOAL STATUS:           CJ - 20%-39% impaired, limited or restricted               - D/C STATUS:                       ---------------To be determined---------------  [place] / [place] / Product Type: Medicare /       Medical Necessity:     · Patient is expected to demonstrate progress in strength, range of motion and balance to decrease assistance required with theraputic exercises and functional mobility. Reason for Services/Other Comments:  · Patient continues to require present interventions due to patient's inability to perform theraputic exercises and functional mobility independently.    Use of outcome tool(s) and clinical judgement create a POC that gives a: Clear prediction of patient's progress: LOW COMPLEXITY                 TREATMENT:   (In addition to Assessment/Re-Assessment sessions the following treatments were rendered)      Pre-treatment Symptoms/Complaints:  No complaints  Pain: Initial:     did not rate Post Session:  5      Gait Training (10 Minutes):  Gait training to improve and/or restore physical functioning as related to mobility, strength and balance. Ambulated 300 Feet (ft) with Contact guard assistance using a Walker, rolling and minimal Safety awareness training;Verbal cues related to their stance phase to promote proper body alignment, promote proper body posture and promote proper body mechanics. Therapeutic Exercise: (15 Minutes):  Exercises per grid below to improve mobility and strength. Required minimal visual, verbal and manual cues to promote proper body alignment, promote proper body posture and promote proper body mechanics. Progressed range and repetitions as indicated. Date:  3/22  Date:  3/23  Date:      ACTIVITY/EXERCISE AM PM AM PM AM PM   GROUP THERAPY  [ ]  [ ]  [x ]  [ ]  [ ]  [ ]   Ankle Pumps 15 a 15 a 20a  20 a       Quad Sets  10a  15a  20a  20a       Gluteal Sets  10a  15a  20a  20a       Hip ABd/ADduction  10a  15a  20a  20a       Straight Leg Raises  10aa  15aa  20aa  20aa       Knee Slides  10aa  15aa  20aa  20aa       Short Arc Quads      20aa  20aa       Long Arc Quads               Chair Slides      20aa  20aa                       B = bilateral; AA = active assistive; A = active; P = passive       Treatment/Session Assessment:         Response to Treatment:  Pt. Making progress, doing good.      Education:  [X] Home Exercises  [X] Fall Precautions  [ ] Hip Precautions [ ] Going Home Video  [X] Knee/Hip Prosthesis Review  [X] Walker Management/Safety [ ] Adaptive Equipment as Needed         Interdisciplinary Collaboration:   · Physical Therapist, Physical Therapy Assistant and Rehabilitation Attendant     After treatment position/precautions:   · Up in chair, Bed/Chair-wheels locked, Bed in low position, Caregiver at bedside, Call light within reach, RN notified and Family at bedside     Compliance with Program/Exercises: Will assess as treatment progresses. No questions. Recommendations/Intent for next treatment session:  Treatment next visit will focus on increasing Ms. Esparza's independence with bed mobility, transfers, gait training, strength/ROM exercises, modalities for pain, and patient education.        Total Treatment Duration:  PT Patient Time In/Time Out  Time In: 1345  Time Out: Carolin Corado [id number], PT

## 2017-03-23 NOTE — PROGRESS NOTES
Dilaudid 1 mg iv for c/o pain at 10. Dressing dry and intact to lt. Knee. neuro vas. cks good to both feet. Had to wake her up. She falls asleep quickly. Refused pain pill.

## 2017-03-24 VITALS
HEART RATE: 56 BPM | SYSTOLIC BLOOD PRESSURE: 177 MMHG | BODY MASS INDEX: 38.75 KG/M2 | DIASTOLIC BLOOD PRESSURE: 72 MMHG | WEIGHT: 232.6 LBS | OXYGEN SATURATION: 96 % | RESPIRATION RATE: 16 BRPM | TEMPERATURE: 96.9 F | HEIGHT: 65 IN

## 2017-03-24 LAB — HGB BLD-MCNC: 9.8 G/DL (ref 11.7–15.4)

## 2017-03-24 PROCEDURE — 36416 COLLJ CAPILLARY BLOOD SPEC: CPT | Performed by: ORTHOPAEDIC SURGERY

## 2017-03-24 PROCEDURE — 85018 HEMOGLOBIN: CPT | Performed by: ORTHOPAEDIC SURGERY

## 2017-03-24 PROCEDURE — 74011250637 HC RX REV CODE- 250/637: Performed by: ORTHOPAEDIC SURGERY

## 2017-03-24 PROCEDURE — 97150 GROUP THERAPEUTIC PROCEDURES: CPT

## 2017-03-24 PROCEDURE — 97116 GAIT TRAINING THERAPY: CPT

## 2017-03-24 RX ORDER — OLMESARTAN MEDOXOMIL 20 MG/1
20 TABLET ORAL DAILY
Status: DISCONTINUED | OUTPATIENT
Start: 2017-03-25 | End: 2017-03-24 | Stop reason: HOSPADM

## 2017-03-24 RX ADMIN — HYDROMORPHONE HYDROCHLORIDE 2 MG: 2 TABLET ORAL at 08:11

## 2017-03-24 RX ADMIN — Medication 10 ML: at 06:15

## 2017-03-24 RX ADMIN — ASPIRIN 81 MG CHEWABLE TABLET 81 MG: 81 TABLET CHEWABLE at 08:13

## 2017-03-24 RX ADMIN — HYDROMORPHONE HYDROCHLORIDE 2 MG: 2 TABLET ORAL at 11:16

## 2017-03-24 RX ADMIN — CALCIUM CARBONATE 500 MG (1,250 MG)-VITAMIN D3 200 UNIT TABLET 1 TABLET: at 08:12

## 2017-03-24 RX ADMIN — PANTOPRAZOLE SODIUM 40 MG: 40 TABLET, DELAYED RELEASE ORAL at 06:15

## 2017-03-24 RX ADMIN — CARVEDILOL 25 MG: 25 TABLET, FILM COATED ORAL at 08:12

## 2017-03-24 RX ADMIN — FUROSEMIDE 20 MG: 20 TABLET ORAL at 08:12

## 2017-03-24 RX ADMIN — HYDROMORPHONE HYDROCHLORIDE 2 MG: 2 TABLET ORAL at 04:05

## 2017-03-24 RX ADMIN — OLMESARTAN MEDOXOMIL 20 MG: 20 TABLET, FILM COATED ORAL at 08:12

## 2017-03-24 RX ADMIN — ACETAMINOPHEN 1000 MG: 500 TABLET, FILM COATED ORAL at 06:15

## 2017-03-24 RX ADMIN — SENNOSIDES AND DOCUSATE SODIUM 2 TABLET: 8.6; 5 TABLET ORAL at 08:13

## 2017-03-24 RX ADMIN — CYCLOBENZAPRINE HYDROCHLORIDE 5 MG: 10 TABLET, FILM COATED ORAL at 08:12

## 2017-03-24 RX ADMIN — FERROUS SULFATE TAB 325 MG (65 MG ELEMENTAL FE) 325 MG: 325 (65 FE) TAB at 08:12

## 2017-03-24 RX ADMIN — GABAPENTIN 100 MG: 100 CAPSULE ORAL at 08:12

## 2017-03-24 NOTE — PROGRESS NOTES
TRANSFER - OUT REPORT:    Verbal report given to Beatriz DIMAS(name) on Evelio Morales  being transferred to [facility](unit) for routine progression of care       Report consisted of patients Situation, Background, Assessment and   Recommendations(SBAR). Information from the following report(s) SBAR, Kardex, OR Summary, Procedure Summary, Intake/Output and MAR was reviewed with the receiving nurse. Lines:       Opportunity for questions and clarification was provided.       Patient transported with:

## 2017-03-24 NOTE — PROGRESS NOTES
Patient is alert and oriented x4. Able to verbalize needs. Resting quietly with no distress noted. Prineo to left knee is dry and intact. Neurovascular and peripheral vascular checks WNL. Voiding clear yellow urine. Pain is being managed with PRN Dilaudid, tolerating well. Bed low and locked. Call light within reach. Instructed to call for assistance. Patient verbalizes understanding. Will monitor.

## 2017-03-24 NOTE — DISCHARGE SUMMARY
Samantha Lazaro MD  Medical Director  [address] [place] [address], [address] [facility]  Tel: [phone]       REHABILITATION DISCHARGE SUMMARY     Patient: Corry Hinton MRN: [id number]  SSN: [id number]    YOB: 1950  Age: 75 y.o. Sex: female      Date: 3/24/2017  Admit Date: 3/21/2017  Discharge Date: 3/24/2017    Admitting Physician: Sylvia Hall MD   Primary Care Physician: Rogelio Frank MD     Admission Condition: good    Chief Complaint : Gait dysfunction secondary to below. Admit Diagnosis: Primary osteoarthritis of left knee [M17.12]  left total knee arthroplasty  Pain  DVT risk  Post op hemorrhagic anemia  Hypertension  Morbid obesity ([address]  PUD (peptic ulcer disease)  Acute Rehab Dx:  Gait impairment  deconditioning  Mobility and ambulation deficits  Self Care/ADL deficits       HPI: Corry Hinton is a 75 y.o. female patient at [address] [place] [address] who was admitted on 3/21/2017 by Sylvia Hall MD with below mentioned medical history, is being seen for Physical Medicine and Rehabilitation. Corry Hinton presented with worseneing left knee pain when weight bearing, walking and with activity due to end stage DJD. The symptoms were not adequately managed with conservative management and has limited the patient's function. She underwent a left total knee arthroplasty per Dr. Sylvia Hall MD on 3/21/2017. The patient's post operative course has been uncomplicated. Patient is to be WBAT LLE. Patient is starting to stand, taking steps working with acute PT and OT. She is making expected gains with ambulation, mobility, and ROM. She shows significant functional deficits, gait dysfunciton due to knee pain, decreased ROM and strength. Corry Hinton is seen and examined today. Medical Records reviewed. Pt had R TKA 10/2016 with good result. She denies any other functional deficits.  She has been independent with ambulation, prior to admission, limited by left knee pain.        Rehabiitation Course:   Functional  Level On Admission: Walk: Moderate [place]  Functional Level At Discharge: Walk: Contact Guard Assist  Home Architecture: Home Situation  Home Environment: Private residence (03/21/17 1000)  # Steps to Enter: 0 (03/21/17 1000)  One/Two Story Residence: One story (03/21/17 1000)  Living Alone: No (03/21/17 1000)  Support Systems: Family member(s) (03/21/17 1000)  Patient Expects to be Discharged to[de-identified] Rehabilitation facility (03/21/17 1000)  Current DME Used/Available at Home: Shower chair;Walker;Commode, bedside (03/21/17 1000)     Past Medical History:   Diagnosis Date    Adverse effect of anesthesia     woke up on a vent s/p cholecystectomy- panic      Arthritis     Chronic kidney disease     pt denies 9/19/16    Chronic pain     r/t arthritis    Follow-up visit for aortic valve replacement with bioprosthetic valve 7/25/2016    GERD (gastroesophageal reflux disease)     controlled w/med    Hypertension     controlled w/med    Kidney stones     Morbid obesity ([address]     Murmur, cardiac     Nausea & vomiting     Psychiatric disorder     claustraphobia (severe)    PUD (peptic ulcer disease) 06/2016    dx 2016    Valvular heart disease 2016    AVR      Past Surgical History:   Procedure Laterality Date    COLONOSCOPY N/A 6/15/2016    COLONOSCOPY/ BMI 41  ROOM 2235 performed by Leanne Forte MD at [facility] ENDOSCOPY    HX AORTIC VALVE REPLACEMENT  6/9/2016    Dr. Yesy Hearn    HX BILATERAL SALPINGO-OOPHORECTOMY      HX CERVICAL FUSION      HX GASTRIC BYPASS      ans reversal     HX GI      repaired \"hole in stomach\" from gastric bypass    HX HEART CATHETERIZATION      no stents     HX HEART VALVE SURGERY      HX HYSTERECTOMY      HX KNEE REPLACEMENT Right 10/10/2016    Dr. Kenya Arauz, POA    HX LAP CHOLECYSTECTOMY      HX SHOULDER ARTHROSCOPY Right      times 2 \"shaved bone\"     HX UROLOGICAL  Multiple dates    Mulitiple open Nephrolithotomies      Family History   Problem Relation Age of Onset    Cancer Father      lung- smoker    Hypertension Father     Lung Disease Father     Cancer Mother      lung -smoker    Lung Disease Mother     Diabetes Maternal Grandmother     Diabetes Paternal Grandmother     Breast Cancer Neg Hx       Social History   Substance Use Topics    Smoking status: Never Smoker    Smokeless tobacco: Never Used    Alcohol use No       Allergies   Allergen Reactions    Latex Rash    Sulfa (Sulfonamide Antibiotics) Anaphylaxis    Macrobid [Nitrofurantoin Monohyd/M-Cryst] Other (comments)     Causes Gout    Other Plant, Animal, Environmental Itching     Pt itched after wearing a plastic blood pressure cuff. Pt reports BP will be higher in right arm     Oxycodone Other (comments)     Hallucination, anxiety with oxycontin    Tape [Adhesive] Rash     Paper tape ok       Prior to Admission medications    Medication Sig Start Date End Date Taking? Authorizing Provider   acetaminophen (TYLENOL) 500 mg tablet Take 2 Tabs by mouth every six (6) hours. 3/22/17  Yes Kianna Slater MD   aspirin 81 mg chewable tablet Take 1 Tab by mouth two (2) times a day. 3/22/17  Yes Kianna Slater MD   cyclobenzaprine (FLEXERIL) 10 mg tablet Take 0.5 Tabs by mouth three (3) times daily. 3/22/17  Yes Kianna Slater MD   gabapentin (NEURONTIN) 100 mg capsule Take 1 Cap by mouth two (2) times a day. 3/22/17  Yes Kianna Slater MD   HYDROmorphone (DILAUDID) 2 mg tablet Take 1 Tab by mouth every four (4) hours as needed. Max Daily Amount: 12 mg. 3/22/17  Yes Kianna Slater MD   senna-docusate (PERICOLACE) 8.6-50 mg per tablet Take 2 Tabs by mouth daily. 3/22/17  Yes Kianna Slater MD   aspirin delayed-release 81 mg tablet Take 81 mg by mouth daily. Yes Historical Provider   carvedilol (COREG) 25 mg tablet Take 1 Tab by mouth two (2) times daily (with meals).  2/15/17  Yes Leti Michaud MD   febuxostat (ULORIC) 40 mg tab tablet Take 1 Tab by mouth every morning. Indications: GOUT PREVENTION 2/15/17  Yes Te Umanzor MD   HYDROcodone-acetaminophen [facility]) 7.5-325 mg per tablet Take 1 Tab by mouth every six (6) hours as needed for Pain. Max Daily Amount: 4 Tabs. 3/15/17  Yes Te Umanzor MD   olmesartan (BENICAR) 40 mg tablet Take 1 Tab by mouth daily. 1/25/17  Yes Te Umanzor MD   zolpidem (AMBIEN) 5 mg tablet Take 1 Tab by mouth nightly as needed for Sleep. Max Daily Amount: 5 mg. 11/15/16  Yes Te Umanzor MD   atorvastatin (LIPITOR) 40 mg tablet Take 1 Tab by mouth daily. Patient taking differently: Take 40 mg by mouth daily. Per anesthesia protocol:instructed to take am of surgery. 8/1/16  Yes Te Umanzor MD   esomeprazole (NEXIUM) 40 mg capsule Take 1 Cap by mouth two (2) times a day. Patient taking differently: Take 40 mg by mouth daily. 8/1/16  Yes Te Umanzor MD   potassium chloride SR (KLOR-CON 10) 10 mEq tablet Take 1 Tab by mouth daily. Patient taking differently: Take 10 mEq by mouth daily. Per anesthesia protocol:instructed to take am of surgery. Indications: HYPOKALEMIA PREVENTION 6/17/16  Yes Bernadette Diallo NP   furosemide (LASIX) 20 mg tablet Take 1 Tab by mouth every morning. 6/17/16  Yes Bernadette Diallo NP   docusate sodium (STOOL SOFTENER) 50 mg capsule Take 50 mg by mouth nightly. Historical Provider   calcium-vitamin D 600 mg(1,500mg) -200 unit tab Take 1 Tab by mouth two (2) times daily (with meals). Historical Provider   polyethylene glycol (MIRALAX) 17 gram/dose powder Take 17 g by mouth daily as needed. Historical Provider   ferrous sulfate 325 mg (65 mg iron) tablet Take 1 Tab by mouth daily (with breakfast).  6/17/16   Bernadette Diallo NP   cyanocobalamin (VITAMIN B-12) 1,000 mcg/mL injection Use weekly for 4 weeks, then once monthly  Patient taking differently: Use weekly for 4 weeks, then once monthly  Stop seven days prior to surgery per anesthesia protocol.  9/24/15   Ricardo Gay MD       Current Medications:  Current Facility-Administered Medications   Medication Dose Route Frequency Provider Last Rate Last Dose    [START ON 3/25/2017] olmesartan (BENICAR) tablet 20 mg  20 mg Oral DAILY Emely Zuñiga MD        pantoprazole (PROTONIX) tablet 40 mg  40 mg Oral ACB Emely Zuñiga MD   40 mg at 03/24/17 0615    carvedilol (COREG) tablet 25 mg  25 mg Oral BID WITH MEALS Emely Zuñiga MD   25 mg at 03/24/17 4121    ferrous sulfate tablet 325 mg  325 mg Oral DAILY WITH BREAKFAST Emely Zuñiga MD   325 mg at 03/24/17 2247    furosemide (LASIX) tablet 20 mg  20 mg Oral DAILY Emely Zuñiga MD   20 mg at 03/24/17 6397    zolpidem (AMBIEN) tablet 5 mg  5 mg Oral QHS PRN Emely Zuñiga MD        sodium chloride (NS) flush 5-10 mL  5-10 mL IntraVENous PRN Emely Zuñiga MD   10 mL at 03/24/17 0615    acetaminophen (TYLENOL) tablet 1,000 mg  1,000 mg Oral Q6H Emely Zuñiga MD   1,000 mg at 03/24/17 0615    naloxone (NARCAN) injection 0.2-0.4 mg  0.2-0.4 mg IntraVENous Q10MIN PRN Emely Zuñiga MD        diphenhydrAMINE (BENADRYL) capsule 25 mg  25 mg Oral Q4H PRN Emely Zuñiga MD        HYDROmorphone (DILAUDID) tablet 2 mg  2 mg Oral Q4H PRN Emely Zuñiga MD   2 mg at 03/24/17 0811    HYDROmorphone (PF) (DILAUDID) injection 1 mg  1 mg IntraVENous Q3H PRN Emely Zuñiga MD   1 mg at 03/23/17 1951    senna-docusate (PERICOLACE) 8.6-50 mg per tablet 2 Tab  2 Tab Oral DAILY Emely Zuñiga MD   2 Tab at 03/24/17 0813    aspirin chewable tablet 81 mg  81 mg Oral BID Emely Zuñiga MD   81 mg at 03/24/17 0813    cyclobenzaprine (FLEXERIL) tablet 5 mg  5 mg Oral TID Emely Zuñiga MD   5 mg at 03/24/17 1454    gabapentin (NEURONTIN) capsule 100 mg  100 mg Oral BID Emely Zuñiga MD   100 mg at 03/24/17 8703    calcium-vitamin D (OS-SHANDRA) 500 mg-200 unit tablet  1 Tab Oral BID WITH Galina Lyn MD 1 Tab at 03/24/17 8852    febuxostat (ULORIC) tablet 40 mg  40 mg Oral DAILY Becca Plunkett MD        polyethylene glycol (MIRALAX) packet 17 g  17 g Oral DAILY PRN Becca Plunkett MD            Review of Systems: Denies chest pain, shortness of breath, cough, headache, visual problems, abdominal pain, dysurea, calf pain. Pertinent positives are as noted in the medical records and unremarkable otherwise. Vital Signs:   Patient Vitals for the past 8 hrs:   BP Temp Pulse Resp SpO2   03/24/17 0759 177/72 96.9 °F (36.1 °C) (!) 56 16 96 %   03/24/17 0400 134/68 96 °F (35.6 °C) 62 18 97 %        Physical Exam:   General: Alert and age appropriately oriented. No acute cardio respiratory distress. HEENT: Normocephalic. Oral mucosa moist without cyanosis. Lungs: Clear to auscultation  bilaterally. Respiration even and unlabored   Heart: Regular rate and rhythm, S1, S2   3/6 MAYNOR. Abdomen: Soft, non-tender, nondistended. Bowel sounds present   Genitourinary: defered   Neuromuscular:      Grossly no focal neurological deficits noted. L Ankle dorsiflexion 5/5   L Ankle plantarflexion 5/5  R Ankle dorsiflexion 5/5   R Ankle plantarflexion 5/5  No sensory deficits. Skin/extremity: No rashes, no erythema. Calves soft. Wound covered.      Skin Incision(s)/Wound(s):        Lab Review:   Recent Results (from the past 72 hour(s))   HEMOGLOBIN    Collection Time: 03/21/17  7:36 PM   Result Value Ref Range    HGB 10.6 (L) 11.7 - 15.4 g/dL   HEMOGLOBIN    Collection Time: 03/22/17  6:10 AM   Result Value Ref Range    HGB 10.4 (L) 11.7 - 53.3 g/dL   METABOLIC PANEL, BASIC    Collection Time: 03/22/17  6:10 AM   Result Value Ref Range    Sodium 138 136 - 145 mmol/L    Potassium 5.2 (H) 3.5 - 5.1 mmol/L    Chloride 107 98 - 107 mmol/L    CO2 20 (L) 21 - 32 mmol/L    Anion gap 11 7 - 16 mmol/L    Glucose 117 (H) 65 - 100 mg/dL    BUN 25 (H) 8 - 23 MG/DL    Creatinine 0.95 0.6 - 1.0 MG/DL    GFR est AA >60 >60 ml/min/1.73m2 GFR est non-AA >60 >60 ml/min/1.73m2    Calcium 8.4 8.3 - 10.4 MG/DL   PLEASE READ & DOCUMENT PPD TEST IN 24 HRS    Collection Time: 03/22/17 10:02 AM   Result Value Ref Range    PPD neg Negative    mm Induration 0 mm   GLUCOSE, POC    Collection Time: 03/22/17 10:09 PM   Result Value Ref Range    Glucose (POC) 130 (H) 65 - 100 mg/dL   HEMOGLOBIN    Collection Time: 03/23/17  4:00 AM   Result Value Ref Range    HGB 9.8 (L) 11.7 - 15.4 g/dL   PLEASE READ & DOCUMENT PPD TEST IN 48 HRS    Collection Time: 03/23/17 10:03 AM   Result Value Ref Range    PPD neg Negative    mm Induration 0 mm   HEMOGLOBIN    Collection Time: 03/24/17  4:35 AM   Result Value Ref Range    HGB 9.8 (L) 11.7 - 15.4 g/dL       PT Initial  PT Most Recent   AROM: Within functional limits (right LE) (03/21/17 1408) Within functional limits (right LE) (03/21/17 1408)         Strength: Generally decreased, functional (right LE) (03/21/17 1408) Generally decreased, functional (right LE) (03/21/17 1408)                           Distance (ft): 3 Feet (ft) (03/21/17 1408) 300 Feet (ft) (03/23/17 1400)   Assistive Device: Walker, rolling (03/21/17 1408) Walker, rolling (03/23/17 1400)       OT Initial OT Most Recent                                               ST Initial ST Most Recent                        Active Problems:    OA (osteoarthritis) of knee (10/10/2016)          Condition on discharge from [facility] to SNF:  good  Rehabilitation  potential : Good   Goals in Rehab : Patient to reach maximal rehabilitation potential in functional mobility,ambulation and ADL/ self care skills ; to improve strength and endurance  Plan / Disposition :STR-Rehab  as discussed with patient/ family and the Case management/ Social service team  Expected Length Of Stay : Depending on pace of progress in mobility and self care in PT and OT ,therapies    Discharge Instructions:  Rehabilitation Management/ Medical Management: 1. Devices:Walkers, Type: Rolling Walker  2. Consult:Rehab team including PT, OT,  and . 3. Disposition Rehab-discussed with patient. 4. Thigh-high or knee-high NADER's when out of bed. 5. DVT Prophylaxis - per ortho. Patient started on aspirin bid x 30 days. Please notify surgeon at [facility] if DVT diagnosed. 6. Incentive spirometer Q1H while awake  7. Post op hemorrhagic anemia- monitor. 8. Activity:WBAT LLE   9. Planned Labs: CBC,BMP  10. Pain Control: fair control. Continue scheduled tylenol, celebrex and  PRN meds. Continue current management. 11. Wound Care: Keep wound clean and dry and reinforce dressing PRN. May remove Aquacel 1 week post op ad replace with new one. Remove staples 12-14 post surgery, when incision appears appropriately closed and apply benzoin and 1/2\" steristrips. 12. In case of complications: Please notify surgeon at [facility] if suspect infection, cellulitis, wound dehiscence or instrument failure, and if considering starting antibiotic. Follow up with ORTHO per instructions. [address] [place] [address] [phone]  Follow up with Dr Eleno Smith  2 weeks after discharge from rehab. [phone]. Transfer Medications:       acetaminophen (TYLENOL) tablet 1,000 mg Ordered Dose: 1,000 mg Route: Oral Frequency: EVERY 8 HOURS x 1 week then change to prn. Current Discharge Medication List      START taking these medications    Details           aspirin 81 mg chewable tablet Take 1 Tab by mouth two (2) times a day x 30 days then stop. Take with food or milk or full glass of water. cyclobenzaprine (FLEXERIL) 10 mg tablet Take 0.5 Tabs by mouth three (3) times daily as needed. gabapentin (NEURONTIN) 100 mg capsule Take 1 Cap by mouth two (2) times a day. HYDROmorphone (DILAUDID) 2 mg tablet Take 1 Tab by mouth every four (4) hours as needed.  Max Daily Amount: 12 mg.        senna-docusate (PERICOLACE) 8.6-50 mg per tablet Take 2 Tabs by mouth daily. CONTINUE these medications which have NOT CHANGED    Details   carvedilol (COREG) 25 mg tablet Take 1 Tab by mouth two (2) times daily (with meals). Associated Diagnoses: Essential hypertension, benign      febuxostat (ULORIC) 40 mg tab tablet Take 1 Tab by mouth every morning. Indications: GOUT PREVENTION         olmesartan (BENICAR) 40 mg tablet Take 1 Tab by mouth daily. atorvastatin (LIPITOR) 40 mg tablet Take 1 Tab by mouth daily. HOLD.          esomeprazole (NEXIUM) 40 mg capsule Take 1 Cap by mouth two (2) times a day. Associated Diagnoses: Peptic ulcer disease      potassium chloride SR (KLOR-CON 10) 10 mEq tablet Take 1 Tab by mouth daily. Associated Diagnoses: Hypokalemia      furosemide (LASIX) 20 mg tablet Take 1 Tab by mouth every morning. calcium-vitamin D 600 mg(1,500mg) -200 unit tab Take 1 Tab by mouth two (2) times daily (with meals). polyethylene glycol (MIRALAX) 17 gram/dose powder Take 17 g by mouth daily as needed. ferrous sulfate 325 mg (65 mg iron) tablet Take 1 Tab by mouth daily (with breakfast). cyanocobalamin (VITAMIN B-12) 1,000 mcg/mL injection Use weekly for 4 weeks, then once monthly       Associated Diagnoses: Other B-complex deficiencies         STOP taking these medications       aspirin delayed-release 81 mg tablet Comments:   Reason for Stopping:         HYDROcodone-acetaminophen (NORCO) 7.5-325 mg per tablet Comments:   Reason for Stopping:         docusate sodium (STOOL SOFTENER) 50 mg capsule Comments:   Reason for Stopping:             O.K. Admit to sub acute rehab today. Discharge time: 35 minutes.     Signed By: Dre Crawford MD     March 24, 2017

## 2017-03-24 NOTE — PROGRESS NOTES
2017         Post Op day: 3 Days Post-Op     Admit Date: 3/21/2017  Admit Diagnosis: Primary osteoarthritis of left knee [M17.12]        Subjective: Patient doing well. No concerns/complaints. No shortness of breath, chest pain or nausea/vomiting. Did well with PT yesterday. Pain well controlled     Objective:   Visit Vitals    /68 (BP 1 Location: Right arm, BP Patient Position: At rest)    Pulse 62    Temp 96 °F (35.6 °C)    Resp 18    Ht 5' 5\" (1.651 m)    Wt 105.5 kg (232 lb 9.6 oz)    SpO2 97%    BMI 38.71 kg/m2    Temp (24hrs), Av.4 °F (35.8 °C), Min:95.5 °F (35.3 °C), Max:97.7 °F (36.5 °C)    Lab Results   Component Value Date/Time    HGB 9.8 2017 04:35 AM     Extremity Exam  Prieno intact. Incision benign (no erythema, induration)  +Tibialis Anterior and Gastroc-Soleus left lower extremity  Sensation intact to light touch  Extremity perfused  TEDS/SCDS  No sign of DVT     Assessment / Plan :  WBAT LLE  PT/OT  ASA along with SCDs/TEDS for DVT prophylaxis in house.  Home on ASA 81mg BID  DIspo-SNF  Patient Active Problem List   Diagnosis Code    Essential hypertension, benign I10    Arthritis M19.90    GERD (gastroesophageal reflux disease) K21.9    Nocturnal hypoxemia G47.34    Mixed hyperlipidemia E78.2    Morbid obesity ([facility] E66.01    Vitamin B12 deficiency E53.8    Vitamin D deficiency E55.9    Impaired fasting blood sugar R73.01    Carotid artery stenosis without cerebral infarction I65.29    Aortic valve stenosis I35.0    Pulmonary hypertension (HCC) I27.2    Atelectasis J98.11    CKD (chronic kidney disease) stage 3, GFR 30-59 ml/min N18.3    Chronic anemia D64.9    Chronic pain G89.29    Fibromyalgia M79.7    Full dentures Z97.2, K08.109    Pleural effusion J90    Postoperative atrial fibrillation (HCC) I97.89, I48.91    Iron deficiency anemia D50.9    Gastritis K29.70    Gastric ulcer K25.9    Acute diastolic CHF (congestive heart failure) ([facility] [address] I50.31    Duodenal ulcer K26.9    Paroxysmal atrial fibrillation (HCC) I48.0    Follow-up visit for aortic valve replacement with bioprosthetic valve Z09, Z95.4    Peptic ulcer disease K27.9    UTI (urinary tract infection) N39.0    Snoring R06.83    Preop cardiovascular exam Z01.810    OA (osteoarthritis) of knee M17.9    Chronic gout of multiple sites M1A. 34F3    Primary osteoarthritis of left knee M17.12          Signed By: Diana Madrid MD

## 2017-03-24 NOTE — PROGRESS NOTES
Problem: Mobility Impaired (Adult and Pediatric)  Goal: *Acute Goals and Plan of Care (Insert Text)  GOALS (1-4 days):  (1.)Ms. Torrie Bush will move from supine to sit and sit to supine in bed with STAND BY ASSIST.  (2.)Ms. Torrie Bush will transfer from bed to chair and chair to bed with STAND BY ASSIST using the least restrictive device. Goal met  (3.)Ms. Torrie Bush will ambulate with STAND BY ASSIST for 200 feet with the least restrictive device. Goal met  (4.)Ms. Torrie Bush will ambulate up/down 2 steps with bilateral railing with CONTACT GUARD ASSIST with no device. (5.)Ms. Torrie Bush will increase left knee ROM to 5°-80°.  ________________________________________________________________________________________________       PHYSICAL THERAPY JOINT CAMP TKA: Daily Note and AM 3/24/2017  INPATIENT: Hospital Day: 4  [place] / [place] / Product Type: Medicare /      NAME/AGE/GENDER: Solange Osorio is a 75 y.o. female   PRIMARY DIAGNOSIS:  Primary osteoarthritis of left knee [M17.12]              Procedure(s) and Anesthesia Type:     * LEFT KNEE ARTHROPLASTY TOTAL / LETTY - Spinal (Left)  ICD-10: Treatment Diagnosis:        · Pain in Left Knee (M25.562)  · Stiffness of Left Knee, Not elsewhere classified (M25.662)  · Difficulty in walking, Not elsewhere classified (R26.2)       ASSESSMENT:      Ms. Torrie Bush presents with limited rom and strength of left LE as well as decreased functional mobility and gait s/p left tka. She plans to go to rehab. Pt. Reporting pain but otherwise good. She did well again with ambulation with walker SBA. She performed tka exercises with cues and assistance. No questions. She plans on rehab today. This section established at most recent assessment   PROBLEM LIST (Impairments causing functional limitations):  1. Decreased Strength  2. Decreased ADL/Functional Activities  3. Decreased Transfer Abilities  4. Decreased Ambulation Ability/Technique  5.  Decreased Balance  6. Increased Pain  7. Decreased Activity Tolerance  8. Decreased [place] with Home Exercise Program    INTERVENTIONS PLANNED: (Benefits and precautions of physical therapy have been discussed with the patient.)  1. Bed Mobility  2. Gait Training  3. Home Exercise Program (HEP)  4. Therapeutic Exercise/Strengthening  5. Transfer Training  6. Range of Motion: active/assisted/passive  7. Therapeutic Activities  8. Group Therapy      TREATMENT PLAN: Frequency/Duration: Follow patient BID   to address above goals. Rehabilitation Potential For Stated Goals: GOOD      RECOMMENDED REHABILITATION/EQUIPMENT: (at time of discharge pending progress): Continue Skilled Therapy, Rehab and pt. plans on rehab. HISTORY:   History of Present Injury/Illness (Reason for Referral):  S/p left tka  Past Medical History/Comorbidities:   Ms. Aureliano Garcia  has a past medical history of Adverse effect of anesthesia; Arthritis; Chronic kidney disease; Chronic pain; Follow-up visit for aortic valve replacement with bioprosthetic valve (7/25/2016); GERD (gastroesophageal reflux disease); Hypertension; Kidney stones; Morbid obesity ([address] Murmur, cardiac; Nausea & vomiting; Psychiatric disorder; PUD (peptic ulcer disease) (06/2016); and Valvular heart disease (2016). She also has no past medical history of Aneurysm ([address] Asthma; Autoimmune disease ([address] Cancer ([address] Chronic obstructive pulmonary disease ([address] Coagulation disorder ([address] Diabetes ([address] Difficult intubation; Liver disease; Malignant hyperthermia due to anesthesia; Pseudocholinesterase deficiency; Rheumatic fever; Seizures ([address] Sleep apnea; Stroke [facility]); Thromboembolus ([address] or Thyroid disease.   Ms. Aureliano Garcia  has a past surgical history that includes urological (Multiple dates); shoulder arthroscopy (Right); lap cholecystectomy; gastric bypass; gi; cervical fusion; hysterectomy; bilateral salpingo-oophorectomy; heart catheterization; aortic valve replacement (2016); colonoscopy (N/A, 6/15/2016); knee replacement (Right, 10/10/2016); and heart valve surgery. Social History/Living Environment:   Home Environment: Private residence  # Steps to Enter: 0  One/Two Story Residence: One story  Living Alone: No  Support Systems: Family member(s)  Patient Expects to be Discharged to[de-identified] Rehabilitation facility  Current DME Used/Available at Home: Shower chair, Walker, Commode, bedside  Prior Level of Function/Work/Activity:  independent   Number of Personal Factors/Comorbidities that affect the Plan of Care: 0: LOW COMPLEXITY   EXAMINATION:   Most Recent Physical Functioning:                  LLE AROM  L Knee Flexion: 80  L Knee Extension: 7   Can pump ankles  LLE Strength  L Knee Flexion: 2+  L Knee Extension: 2+           Transfers  Sit to Stand: Stand-by asssistance  Stand to Sit: Stand-by asssistance  Bed to Chair: Stand-by asssistance     Balance  Sitting: Intact  Standing: With support                Weight Bearing Status  Left Side Weight Bearing: As tolerated  Distance (ft): 300 Feet (ft)  Ambulation - Level of Assistance: Stand-by asssistance  Assistive Device: Walker, rolling  Speed/Lauren: Delayed  Step Length: Left shortened;Right shortened  Stance: Left decreased  Gait Abnormalities: Antalgic;Decreased step clearance  Interventions: Safety awareness training;Verbal cues      Braces/Orthotics:      Left Knee Cold  Type: Cryocuff       Body Structures Involved:  1. Bones  2. Joints  3. Muscles  4. Ligaments Body Functions Affected:  1. Movement Related Activities and Participation Affected:  1. Mobility   Number of elements that affect the Plan of Care: 1-2: LOW COMPLEXITY   CLINICAL PRESENTATION:   Presentation: Stable and uncomplicated: LOW COMPLEXITY   CLINICAL DECISION MAKIN [facility] [address] [id number] AM-PAC 6 Clicks   Basic Mobility Inpatient Short Form  How much difficulty does the patient currently have. .. Unable A Lot A Little None   1.   Turning over in bed (including adjusting bedclothes, sheets and blankets)? [ ] 1   [ ] 2   [X] 3   [ ] 4   2. Sitting down on and standing up from a chair with arms ( e.g., wheelchair, bedside commode, etc.)   [ ] 1   [ ] 2   [X] 3   [ ] 4   3. Moving from lying on back to sitting on the side of the bed? [ ] 1   [ ] 2   [X] 3   [ ] 4   How much help from another person does the patient currently need. .. Total A Lot A Little None   4. Moving to and from a bed to a chair (including a wheelchair)? [ ] 1   [ ] 2   [X] 3   [ ] 4   5. Need to walk in hospital room? [ ] 1   [ ] 2   [X] 3   [ ] 4   6. Climbing 3-5 steps with a railing? [ ] 1   [X] 2   [ ] 3   [ ] 4   © 2007, Trustees of [address] [place] [address], under license to [employer]. All rights reserved       Score:  Initial: 17 Most Recent: X (Date: -- )     Interpretation of Tool:  Represents activities that are increasingly more difficult (i.e. Bed mobility, Transfers, Gait). Score 24 23 22-20 19-15 14-10 9-7 6       Modifier CH CI CJ CK CL CM CN         · Mobility - Walking and Moving Around:               - CURRENT STATUS:    CK - 40%-59% impaired, limited or restricted               - GOAL STATUS:           CJ - 20%-39% impaired, limited or restricted               - D/C STATUS:                       ---------------To be determined---------------  [place] / [place] / Product Type: Medicare /       Medical Necessity:     · Patient is expected to demonstrate progress in strength, range of motion and balance to decrease assistance required with theraputic exercises and functional mobility. Reason for Services/Other Comments:  · Patient continues to require present interventions due to patient's inability to perform theraputic exercises and functional mobility independently.    Use of outcome tool(s) and clinical judgement create a POC that gives a: Clear prediction of patient's progress: LOW COMPLEXITY TREATMENT:   (In addition to Assessment/Re-Assessment sessions the following treatments were rendered)      Pre-treatment Symptoms/Complaints:  No complaints  Pain: Initial:     did not rate Post Session:  9 with walking      Gait Training (15 Minutes):  Gait training to improve and/or restore physical functioning as related to mobility, strength and balance. Ambulated 300 Feet (ft) with Stand-by asssistance using a Walker, rolling and minimal Safety awareness training;Verbal cues related to their stance phase to promote proper body alignment, promote proper body posture and promote proper body mechanics. Therapeutic Exercise: (30 Minutes):  Exercises per grid below to improve mobility and strength. Required minimal visual, verbal and manual cues to promote proper body alignment, promote proper body posture and promote proper body mechanics. Progressed range and repetitions as indicated. Date:  3/22  Date:  3/23  Date:  3/24    ACTIVITY/EXERCISE AM PM AM PM AM PM   GROUP THERAPY  [ ]  [ ]  [x ]  [ ]  [ x]  [ ]   Ankle Pumps 15 a 15 a 20a  20 a  25a     Quad Sets  10a  15a  20a  20a  25a     Gluteal Sets  10a  15a  20a  20a  25a     Hip ABd/ADduction  10a  15a  20a  20a  25a     Straight Leg Raises  10aa  15aa  20aa  20aa  25aa     Knee Slides  10aa  15aa  20aa  20aa  25aa     Short Arc Quads      20aa  20aa  25aa     Long Arc Quads               Chair Slides      20aa  [address]                     B = bilateral; AA = active assistive; A = active; P = passive       Treatment/Session Assessment:         Response to Treatment:  Pt. Has done well.      Education:  [X] Home Exercises  [X] Fall Precautions  [ ] Hip Precautions [ ] Going Home Video  [X] Knee/Hip Prosthesis Review  [X] Walker Management/Safety [ ] Adaptive Equipment as Needed         Interdisciplinary Collaboration:   · Rehabilitation Attendant     After treatment position/precautions:   · Up in chair, Bed/Chair-wheels locked, Bed in low position and Call light within reach     Compliance with Program/Exercises: yes. .  No questions. Recommendations/Intent for next treatment session:  Treatment next visit will focus on increasing Ms. Esparza's independence with bed mobility, transfers, gait training, strength/ROM exercises, modalities for pain, and patient education.        Total Treatment Duration:  PT Patient Time In/Time Out  Time In: 0900  Time Out: [address], PT

## 2017-03-26 ENCOUNTER — PATIENT OUTREACH (OUTPATIENT)
Dept: CASE MANAGEMENT | Age: 67
End: 2017-03-26

## 2017-03-26 NOTE — PROGRESS NOTES
Per chart review patient was discharged to SNF following hospital visit. MIGUEL care coordinator will f/u with patient in 21 days for [facility] [place] outreach. This note will not be viewable in [address]

## 2017-04-13 ENCOUNTER — HOME HEALTH ADMISSION (OUTPATIENT)
Dept: HOME HEALTH SERVICES | Facility: HOME HEALTH | Age: 67
End: 2017-04-13
Payer: MEDICARE

## 2017-04-16 ENCOUNTER — PATIENT OUTREACH (OUTPATIENT)
Dept: CASE MANAGEMENT | Age: 67
End: 2017-04-16

## 2017-04-16 NOTE — PROGRESS NOTES
Initial SNF f/u outreach attempt to patient was unsuccessful. Second outreach attempt will be made within 5 days. This note will not be viewable in [address]

## 2017-04-17 ENCOUNTER — HOME CARE VISIT (OUTPATIENT)
Dept: SCHEDULING | Facility: HOME HEALTH | Age: 67
End: 2017-04-17
Payer: MEDICARE

## 2017-04-17 VITALS — RESPIRATION RATE: 16 BRPM | OXYGEN SATURATION: 98 % | HEART RATE: 62 BPM | TEMPERATURE: 97.7 F

## 2017-04-17 PROCEDURE — 400013 HH SOC

## 2017-04-17 PROCEDURE — G0151 HHCP-SERV OF PT,EA 15 MIN: HCPCS

## 2017-04-17 PROCEDURE — 3331090002 HH PPS REVENUE DEBIT

## 2017-04-17 PROCEDURE — 3331090001 HH PPS REVENUE CREDIT

## 2017-04-18 PROCEDURE — 3331090002 HH PPS REVENUE DEBIT

## 2017-04-18 PROCEDURE — 3331090001 HH PPS REVENUE CREDIT

## 2017-04-19 ENCOUNTER — HOME CARE VISIT (OUTPATIENT)
Dept: SCHEDULING | Facility: HOME HEALTH | Age: 67
End: 2017-04-19
Payer: MEDICARE

## 2017-04-19 VITALS
HEART RATE: 70 BPM | TEMPERATURE: 98.5 F | DIASTOLIC BLOOD PRESSURE: 90 MMHG | SYSTOLIC BLOOD PRESSURE: 170 MMHG | RESPIRATION RATE: 18 BRPM

## 2017-04-19 PROCEDURE — G0157 HHC PT ASSISTANT EA 15: HCPCS

## 2017-04-19 PROCEDURE — 3331090001 HH PPS REVENUE CREDIT

## 2017-04-19 PROCEDURE — 3331090002 HH PPS REVENUE DEBIT

## 2017-04-20 ENCOUNTER — HOME CARE VISIT (OUTPATIENT)
Dept: SCHEDULING | Facility: HOME HEALTH | Age: 67
End: 2017-04-20
Payer: MEDICARE

## 2017-04-20 VITALS — OXYGEN SATURATION: 98 % | DIASTOLIC BLOOD PRESSURE: 88 MMHG | SYSTOLIC BLOOD PRESSURE: 170 MMHG | HEART RATE: 76 BPM

## 2017-04-20 PROCEDURE — G0152 HHCP-SERV OF OT,EA 15 MIN: HCPCS

## 2017-04-20 PROCEDURE — 3331090001 HH PPS REVENUE CREDIT

## 2017-04-20 PROCEDURE — 3331090002 HH PPS REVENUE DEBIT

## 2017-04-21 ENCOUNTER — PATIENT OUTREACH (OUTPATIENT)
Dept: CASE MANAGEMENT | Age: 67
End: 2017-04-21

## 2017-04-21 ENCOUNTER — HOME CARE VISIT (OUTPATIENT)
Dept: SCHEDULING | Facility: HOME HEALTH | Age: 67
End: 2017-04-21
Payer: MEDICARE

## 2017-04-21 VITALS
SYSTOLIC BLOOD PRESSURE: 178 MMHG | HEART RATE: 88 BPM | OXYGEN SATURATION: 98 % | DIASTOLIC BLOOD PRESSURE: 80 MMHG | TEMPERATURE: 97 F | RESPIRATION RATE: 18 BRPM

## 2017-04-21 PROCEDURE — 3331090001 HH PPS REVENUE CREDIT

## 2017-04-21 PROCEDURE — G0157 HHC PT ASSISTANT EA 15: HCPCS

## 2017-04-21 PROCEDURE — 3331090002 HH PPS REVENUE DEBIT

## 2017-04-21 NOTE — PROGRESS NOTES
Second SNF f/u outreach attempt to patient was unsuccessful. Will close case. This note will not be viewable in [address]

## 2017-04-22 PROCEDURE — 3331090002 HH PPS REVENUE DEBIT

## 2017-04-22 PROCEDURE — 3331090001 HH PPS REVENUE CREDIT

## 2017-04-23 PROCEDURE — 3331090002 HH PPS REVENUE DEBIT

## 2017-04-23 PROCEDURE — 3331090001 HH PPS REVENUE CREDIT

## 2017-04-24 ENCOUNTER — HOME CARE VISIT (OUTPATIENT)
Dept: SCHEDULING | Facility: HOME HEALTH | Age: 67
End: 2017-04-24
Payer: MEDICARE

## 2017-04-24 VITALS
TEMPERATURE: 97.8 F | DIASTOLIC BLOOD PRESSURE: 90 MMHG | SYSTOLIC BLOOD PRESSURE: 198 MMHG | HEART RATE: 96 BPM | RESPIRATION RATE: 18 BRPM

## 2017-04-24 PROCEDURE — 3331090001 HH PPS REVENUE CREDIT

## 2017-04-24 PROCEDURE — 3331090002 HH PPS REVENUE DEBIT

## 2017-04-25 PROCEDURE — 3331090002 HH PPS REVENUE DEBIT

## 2017-04-25 PROCEDURE — 3331090001 HH PPS REVENUE CREDIT

## 2017-04-26 ENCOUNTER — HOME CARE VISIT (OUTPATIENT)
Dept: SCHEDULING | Facility: HOME HEALTH | Age: 67
End: 2017-04-26
Payer: MEDICARE

## 2017-04-26 PROCEDURE — 3331090001 HH PPS REVENUE CREDIT

## 2017-04-26 PROCEDURE — 3331090002 HH PPS REVENUE DEBIT

## 2017-04-26 PROCEDURE — G0151 HHCP-SERV OF PT,EA 15 MIN: HCPCS

## 2017-04-27 ENCOUNTER — HOSPITAL ENCOUNTER (OUTPATIENT)
Dept: PHYSICAL THERAPY | Age: 67
Discharge: HOME | End: 2017-04-27
Payer: MEDICARE

## 2017-04-27 VITALS
RESPIRATION RATE: 18 BRPM | SYSTOLIC BLOOD PRESSURE: 144 MMHG | TEMPERATURE: 97.2 F | DIASTOLIC BLOOD PRESSURE: 72 MMHG | HEART RATE: 72 BPM | OXYGEN SATURATION: 93 %

## 2017-04-27 PROCEDURE — 97140 MANUAL THERAPY 1/> REGIONS: CPT | Performed by: PHYSICAL THERAPIST

## 2017-04-27 PROCEDURE — 97110 THERAPEUTIC EXERCISES: CPT | Performed by: PHYSICAL THERAPIST

## 2017-04-27 PROCEDURE — 3331090001 HH PPS REVENUE CREDIT

## 2017-04-27 PROCEDURE — 97014 ELECTRIC STIMULATION THERAPY: CPT | Performed by: PHYSICAL THERAPIST

## 2017-04-27 PROCEDURE — 3331090002 HH PPS REVENUE DEBIT

## 2017-04-27 PROCEDURE — G8978 MOBILITY CURRENT STATUS: HCPCS | Performed by: PHYSICAL THERAPIST

## 2017-04-27 PROCEDURE — G8979 MOBILITY GOAL STATUS: HCPCS | Performed by: PHYSICAL THERAPIST

## 2017-04-27 PROCEDURE — 97162 PT EVAL MOD COMPLEX 30 MIN: CPT | Performed by: PHYSICAL THERAPIST

## 2017-04-27 NOTE — PROGRESS NOTES
Heike Walsh  : [id number] [facility],[address]  [address] [place] [address].  [phone]   [phone]        OUTPATIENT PHYSICAL THERAPY:Initial Assessment 2017      ICD-10: Treatment Diagnosis: Pain in left knee (M25.562)  Difficulty in walking, not elsewhere classified (R26.2)  Precautions/Allergies:   Latex; Sulfa (sulfonamide antibiotics); Macrobid [nitrofurantoin monohyd/m-cryst]; Other plant, animal, environmental; Oxycodone; and Tape [adhesive]   Fall Risk Score: 1 (? 5 = High Risk)  MD Orders: Evaluate and treat  MEDICAL/REFERRING DIAGNOSIS:  Total knee replacement status [Z96.659]   DATE OF ONSET: 3/21/17  REFERRING PHYSICIAN: Idalia Huddleston MD  RETURN PHYSICIAN APPOINTMENT: 17     INITIAL ASSESSMENT:  Ms. Derick Robison is a 76 y/o female presenting with L knee pain, edema, decreased ROM, decreased strength and decreased balance with difficulty walking s/p left TKA. Pt has increased difficulty with transfers, squatting, stair negotiation, and walking. Pt will benefit from skilled PT to address these deficits to progress towards goals below. Pt will benefit from initial use of aquatic environment to increase ease of movement and decrease load bearing activity and then will transition to land based treatment prior to discharge. PROBLEM LIST (Impacting functional limitations):  1. Decreased Strength  2. Decreased ADL/Functional Activities  3. Decreased Transfer Abilities  4. Decreased Ambulation Ability/Technique  5. Decreased Balance  6. Increased Pain  7. Decreased Activity Tolerance  8. Decreased Flexibility/Joint Mobility  9. Edema/Girth  10. Decreased [place] with Home Exercise Program INTERVENTIONS PLANNED:  1. Balance Exercise  2. Cold  3. Electrical Stimulation  4. Gait Training  5. Heat  6. Home Exercise Program (HEP)  7. Manual Therapy  8. Neuromuscular Re-education/Strengthening  9. Range of Motion (ROM)  10.  Therapeutic Activites  11. Therapeutic Exercise/Strengthening  12. Transfer Training  13. aquatics   TREATMENT PLAN:  Effective Dates: 4/27/17 TO 07/20/17. Frequency/Duration: 2-3 times a week for 12 weeks  GOALS: (Goals have been discussed and agreed upon with patient.)  Short-Term Functional Goals: Time Frame: 4 weeks  1. Pt will be independent and compliant with HEP. 2. Pt will be able to report at least 25% less c/o pain on pain scale ratings for at least a week with ADLs. 3. Pt will be able to increase AROM L knee flexion to at least 110 ° for increased functional mobility. 4. Pt will be able to decrease AROM L knee flexion to within 2 ° of neutral for increased mobility to improve gait mechanics. Discharge Goals: Time Frame: 10 weeks  1. Pt will exhibit AROM 0-120 ° with L knee for functional mobility. 2. Pt will be able to perform at least one flight of stairs reciprocally with minor use of handrail. 3. Pt will be able to safely ambulate independently on various surfaces community distances with L knee c/o 1/10 or less. Rehabilitation Potential For Stated Goals: Excellent  Regarding Estefani Esparza's therapy, I certify that the treatment plan above will be carried out by a therapist or under their direction. Thank you for this referral,  Chapis Beasley, PT       Referring Physician Signature: Yamilka Arriaga MD              Date                      HISTORY:   History of Present Injury/Illness (Reason for Referral):   Pt reports chronic knee pain secondary to OA. Pt underwent recent left TKA 3/21/17. Pt reports she had her R knee replaced in Oct 2016 and is doing well with that \"new knee. \"  However, this knee (left) had more OA and worse symptoms prior to surgery and she reports increased pain with this recovery. Pt reports she is only using her walker now intermittently; however, feeling weak due to new blood pressure medication (hydralazine?).  Pt reports that she would like to return to more active lifestyle to play with her grandkids. Pt was discharged from [facility] PT yesterday 4/27/17  Past Medical History/Comorbidities:   Ms. Falguni Sanchez  has a past medical history of Adverse effect of anesthesia; Arthritis; Chronic kidney disease; Chronic pain; Follow-up visit for aortic valve replacement with bioprosthetic valve (7/25/2016); GERD (gastroesophageal reflux disease); Hypertension; Kidney stones; Morbid obesity ([address] Murmur, cardiac; Nausea & vomiting; Psychiatric disorder; PUD (peptic ulcer disease) (06/2016); and Valvular heart disease (2016). She also has no past medical history of Aneurysm ([address] Asthma; Autoimmune disease ([address] Cancer ([address] Chronic obstructive pulmonary disease ([address] Coagulation disorder ([address] Diabetes ([address] Difficult intubation; Liver disease; Malignant hyperthermia due to anesthesia; Pseudocholinesterase deficiency; Rheumatic fever; Seizures ([address] Sleep apnea; Stroke [facility]); Thromboembolus ([address] or Thyroid disease. Ms. Falguni Sanchez  has a past surgical history that includes urological (Multiple dates); shoulder arthroscopy (Right); lap cholecystectomy; gastric bypass; gi; cervical fusion; hysterectomy; bilateral salpingo-oophorectomy; heart catheterization; aortic valve replacement (6/9/2016); colonoscopy (N/A, 6/15/2016); knee replacement (Right, 10/10/2016); and heart valve surgery. Social History/Living Environment:     lives in one story home, her grown son lives in the home with her;   Prior Level of Function/Work/Activity: independent  Independent with all housework, cooking, etc.   Previous Treatment Approaches:          Pt had inpatient rehab at [facility] and [facility]  Current Medications:    Current Outpatient Prescriptions:     hydrALAZINE (APRESOLINE) 25 mg tablet, TAKE 1 TABLET BY MOUTH THREE TIMES DAILY, Disp: 270 Tab, Rfl: 0    zolpidem (AMBIEN) 5 mg tablet, Take 1 Tab by mouth nightly as needed for Sleep.  Max Daily Amount: 5 mg., Disp: 90 Tab, Rfl: 1    esomeprazole (NEXIUM) 40 mg capsule, Take 1 Cap by mouth daily. , Disp: 90 Cap, Rfl: 1    [START ON 6/20/2017] HYDROcodone-acetaminophen (NORCO) 7.5-325 mg per tablet, Take 1 Tab by mouth every six (6) hours as needed for Pain. Max Daily Amount: 4 Tabs., Disp: 120 Tab, Rfl: 0    [START ON 5/20/2017] HYDROcodone-acetaminophen (NORCO) 7.5-325 mg per tablet, Take 1 Tab by mouth every six (6) hours as needed for Pain. Max Daily Amount: 4 Tabs., Disp: 120 Tab, Rfl: 0    HYDROcodone-acetaminophen (NORCO) 7.5-325 mg per tablet, Take 1 Tab by mouth every six (6) hours as needed for Pain. Max Daily Amount: 4 Tabs., Disp: 120 Tab, Rfl: 0    BENICAR 40 mg tablet, TAKE 1 TABLET BY MOUTH DAILY, Disp: 90 Tab, Rfl: 5    aspirin 81 mg chewable tablet, Take 1 Tab by mouth two (2) times a day., Disp: 60 Tab, Rfl: 0    senna-docusate (PERICOLACE) 8.6-50 mg per tablet, Take 2 Tabs by mouth daily. , Disp: 120 Tab, Rfl: 0    carvedilol (COREG) 25 mg tablet, Take 1 Tab by mouth two (2) times daily (with meals). , Disp: 180 Tab, Rfl: 1    febuxostat (ULORIC) 40 mg tab tablet, Take 1 Tab by mouth every morning. Indications: GOUT PREVENTION, Disp: 90 Tab, Rfl: 3    atorvastatin (LIPITOR) 40 mg tablet, Take 1 Tab by mouth daily. (Patient taking differently: Take 40 mg by mouth daily. Per anesthesia protocol:instructed to take am of surgery.), Disp: 90 Tab, Rfl: 4    calcium-vitamin D 600 mg(1,500mg) -200 unit tab, Take 1 Tab by mouth two (2) times daily (with meals). , Disp: , Rfl:     polyethylene glycol (MIRALAX) 17 gram/dose powder, Take 17 g by mouth daily as needed. , Disp: , Rfl:     potassium chloride SR (KLOR-CON 10) 10 mEq tablet, Take 1 Tab by mouth daily. (Patient taking differently: Take 10 mEq by mouth daily. Per anesthesia protocol:instructed to take am of surgery. Indications: HYPOKALEMIA PREVENTION), Disp: 21 Tab, Rfl: 0    ferrous sulfate 325 mg (65 mg iron) tablet, Take 1 Tab by mouth daily (with breakfast). , Disp: 30 Tab, Rfl: 1   furosemide (LASIX) 20 mg tablet, Take 1 Tab by mouth every morning., Disp: 21 Tab, Rfl: 0    cyanocobalamin (VITAMIN B-12) 1,000 mcg/mL injection, Use weekly for 4 weeks, then once monthly (Patient taking differently: Use weekly for 4 weeks, then once monthly Stop seven days prior to surgery per anesthesia protocol.), Disp: 4 Vial, Rfl: 2   Date Last Reviewed:  4/27/2017   # of Personal Factors/Comorbidities that affect the Plan of Care: 1-2: MODERATE COMPLEXITY   EXAMINATION:   Observation/Orthostatic Postural Assessment:          Decreased weight bearing through LLE and increased flexion L knee  Palpation:          No erythema L knee; tightness around L knee with mod decreased scar mobility anterior L knee  ROM:    L knee AROM -15-90 °  AAROM -10-95 °             R knee AROM 0-115 °      Strength:     Date:  4/27/17 Date:   Date:     LE MMT Left Right Left Right Left Right   Hip Flex (L1/L2) 4-/5 4/5       Quad    ( L3/4) 4-/5 4+/5       Hamstring 4-/5 4+/5       Anterior Tib (L4/L5) 3-/5 drop foot for \"years\" 4+/5       Gastroc (S1/2) 4-/5 4+/5         Special Tests:  deferred  Neurological Screen: intact to light touch BLEs  Functional Mobility:         Gait/Ambulation:  Slow antalgic gait with decreased terminal knee extension LLE and decreased L knee flex during swing; R foot excessive supination (pt reports due to numerous foot fractures) and L foot mild foot drop        Transfers: Mod I with use of hands;         Bed Mobility:  Sleeps in recliner and reports cannot ly supine due to dizziness which is from previous surgery in past        Stairs:  Reports step-to pattern but has avoided her front stair entry-way and enters home without stairs  Balance:          Unable to single stance with either leg; significantly decreased dynamic balance   Body Structures Involved:  1. Nerves  2. Bones  3. Joints  4. Muscles Body Functions Affected:  1. Sensory/Pain  2. Neuromusculoskeletal  3.  Movement Related Activities and Participation Affected:  1. General Tasks and Demands  2. Mobility  3. Self Care  4. Domestic Life  5. Community, Social and [place]   # of elements that affect the Plan of Care: 3: MODERATE COMPLEXITY   CLINICAL PRESENTATION:   Presentation: Evolving clinical presentation with changing clinical characteristics: MODERATE COMPLEXITY   CLINICAL DECISION MAKING:   Outcome Measure: Tool Used: Lower Extremity Functional Scale (LEFS)  Score:  Initial: 52/80 Most Recent: X/80 (Date: -- )   Interpretation of Score: 20 questions each scored on a 5 point scale with 0 representing \"extreme difficulty or unable to perform\" and 4 representing \"no difficulty\". The lower the score, the greater the functional disability. 80/80 represents no disability. Minimal detectable change is 9 points. Score 80 79-63 62-48 47-32 31-16 15-1 0   Modifier CH CI CJ CK CL CM CN     ? Mobility - Walking and Moving Around:     - CURRENT STATUS: CJ - 20%-39% impaired, limited or restricted    - GOAL STATUS: CI - 1%-19% impaired, limited or restricted    - D/C STATUS:  ---------------To be determined---------------    Medical Necessity:   · Patient is expected to demonstrate progress in strength, range of motion, balance and coordination to increase independence with gait and functional activities. Reason for Services/Other Comments:  · Patient has demonstrated an improvement in functional level by becoming independent with HEP. · Patient continues to require skilled intervention due to needing to increase complexity of exercises . Use of outcome tool(s) and clinical judgement create a POC that gives a: Questionable prediction of patient's progress: MODERATE COMPLEXITY   TREATMENT:   (In addition to Assessment/Re-Assessment sessions the following treatments were rendered)    Therapeutic Exercise: (see flow sheet below for minutes):  Exercises per grid below to improve mobility and strength.   Required moderate verbal and tactile cues to promote proper body alignment and promote proper body mechanics. Aquatic Therapy (see flow sheet below for minutes): Aquatic treatment performed per flow grid for Decreased muscle strength, Decreased static/dynamic balance and reactive control, Decreased range of motion, Ease of movement and Low impact and reduced weight bearing activity. Cues provided for technique. Assistance by therapist provided for progression of exercises. Patient has difficulty with L knee mobility and strength. Date: 4/27/17       Modalities: 15 minutes       IFC e-stim with ice L knee 15 minutes                       Manual Therapy: 8 minutes       Scar mobilization and soft tissue mobilization L knee 8 minutes       Patella mobs ( all directions) x30 each               Aquatic Exercise: Next 1.5#      Gait F/B/S/M        Heel/Toe walk        4-way        PF/DF        Hamstring Curls        Hip Flexion with knee ext.   (rockette)        Squats          SLR march        Lunges        Hip circles        Hip horizontal abduction/adduction        Core:          Core:        Deep well bike        Deep well jumping don        Deep well scissors        Deep well:  traction                Hamstring Stretch        Step Lunge        Piriformis stretch        PF Stretch        Balance: Single leg Stance        Balance: Tandem                          Therapeutic Exercise: 10 minutes       Quad sets x15 H 5       Heel slides x15 with green strap       Long seated hamstring/gastroc stretch 2 H 30       LAQ x15                       F=forward  B=Backward  S=sidesteps  M=marches    H=hold    Manual: (see flow sheet above for minutes) soft tissue mobilization distal L quad, ITB, hamstrings to increase mobility; scar cross-friction mobilization to decrease scar tissue; patella mobilizations superior/inferior and medial/lateral to increase mobility  Modalities: (see flow sheet above for minutes) IFC anterior L knee with ice anterior and posterior knee to decrease edema and pain to promote increased functional mobility; pt monitored and skin WNL afterwards    HEP: Reviewed current HEP from home health and advised pt continue to use ice for edema at least 3x/day for 10 minutes. Pt also instructed to focus on gaining extension ROM focusing on keeping LE neutral position in long seated position rather than allowing for external rotation of LE to ease pain  Treatment/Session Assessment:   Today's reading was 148/80 at initial evaluation and pt felt nausea at the beginning of session, which she attributes to her new blood pressure med. Pt states the home health PT already contacted her MD yesterday stating her discomfort since beginning the new medicine which she could not remember the name. Pt has not taken her pain medicine today yet. Pt was ok to begin assessment though. Performed initial assessment and manual therapy above. Used exercises above to increase L knee mobility. Pt had significant pain sitting in long seated position. Placed a pillow under the length of entire leg. Pt had to be reminded to keep foot/leg in neutral position since tends to externally rotate LE for comfort. Used ice/e-stim afterwards which decrease pt's pain significantly. Pt instructed to continue to monitor blood pressure since had elevated levels earlier this week and contact MD if does not hear anything back from them today. · Pain/ Symptoms: Initial:   6/10 L knee ache; difficulty walking, bending, squatting Post Session:  4/10  \"My knee feels much better after the ice. \" ·   Compliance with Program/Exercises: Will assess as treatment progresses. · Recommendations/Intent for next treatment session: \"Next visit will focus on begin aquatic\".   Total Treatment Duration:  PT Patient Time In/Time Out  Time In: 0850  Time Out: 0950     Chapis Beasley, PT

## 2017-04-27 NOTE — PROGRESS NOTES
Ambulatory/Rehab Services H2 Model Falls Risk Assessment    Risk Factor Pts. ·   Confusion/Disorientation/Impulsivity  []    4 ·   Symptomatic Depression  []   2 ·   Altered Elimination  []   1 ·   Dizziness/Vertigo  []   1 ·   Gender (Male)  []   1 ·   Any administered antiepileptics (anticonvulsants):  []   2 ·   Any administered benzodiazepines:  []   1 ·   Visual Impairment (specify):  []   1 ·   Portable Oxygen Use  []   1 ·   Orthostatic ? BP  []   1 ·   History of Recent Falls (within 3 mos.)  []   5     Ability to Rise from Chair (choose one) Pts. ·   Ability to rise in a single movement  []   0 ·   Pushes up, successful in one attempt  [x]   1 ·   Multiple attempts, but successful  []   3 ·   Unable to rise without assistance  []   4   Total: (5 or greater = High Risk) 1     Falls Prevention Plan:   []                Physical Limitations to Exercise (specify):   []                Mobility Assistance Device (type):   []                Exercise/Equipment Adaptation (specify):    ©2010 [facility] [place] Patent #[id number].  Federal Law prohibits the replication, distribution or use without written permission from [facility] [employer]

## 2017-04-28 PROBLEM — Z95.3 S/P AORTIC VALVE REPLACEMENT WITH BIOPROSTHETIC VALVE: Status: ACTIVE | Noted: 2017-04-28

## 2017-04-28 PROCEDURE — 3331090002 HH PPS REVENUE DEBIT

## 2017-04-28 PROCEDURE — 3331090001 HH PPS REVENUE CREDIT

## 2017-04-29 PROCEDURE — 3331090002 HH PPS REVENUE DEBIT

## 2017-04-29 PROCEDURE — 3331090001 HH PPS REVENUE CREDIT

## 2017-04-30 PROCEDURE — 3331090002 HH PPS REVENUE DEBIT

## 2017-04-30 PROCEDURE — 3331090001 HH PPS REVENUE CREDIT

## 2017-05-01 PROCEDURE — 3331090002 HH PPS REVENUE DEBIT

## 2017-05-01 PROCEDURE — 3331090001 HH PPS REVENUE CREDIT

## 2017-05-02 PROCEDURE — 3331090002 HH PPS REVENUE DEBIT

## 2017-05-02 PROCEDURE — 3331090001 HH PPS REVENUE CREDIT

## 2017-05-03 ENCOUNTER — HOSPITAL ENCOUNTER (OUTPATIENT)
Dept: PHYSICAL THERAPY | Age: 67
Discharge: HOME | End: 2017-05-03
Payer: MEDICARE

## 2017-05-03 PROCEDURE — 3331090002 HH PPS REVENUE DEBIT

## 2017-05-03 PROCEDURE — 3331090001 HH PPS REVENUE CREDIT

## 2017-05-04 PROCEDURE — 3331090002 HH PPS REVENUE DEBIT

## 2017-05-04 PROCEDURE — 3331090001 HH PPS REVENUE CREDIT

## 2017-05-05 PROCEDURE — 3331090002 HH PPS REVENUE DEBIT

## 2017-05-05 PROCEDURE — 3331090001 HH PPS REVENUE CREDIT

## 2017-05-06 PROCEDURE — 3331090001 HH PPS REVENUE CREDIT

## 2017-05-06 PROCEDURE — 3331090002 HH PPS REVENUE DEBIT

## 2017-05-08 ENCOUNTER — HOSPITAL ENCOUNTER (OUTPATIENT)
Dept: PHYSICAL THERAPY | Age: 67
Discharge: HOME | End: 2017-05-08
Payer: MEDICARE

## 2017-05-08 PROCEDURE — 97014 ELECTRIC STIMULATION THERAPY: CPT

## 2017-05-08 PROCEDURE — 97140 MANUAL THERAPY 1/> REGIONS: CPT

## 2017-05-08 PROCEDURE — 97113 AQUATIC THERAPY/EXERCISES: CPT

## 2017-05-08 NOTE — PROGRESS NOTES
Morris Saini  : 1950 [id number] @ [address] [place] [address].  [phone]   [phone]        OUTPATIENT PHYSICAL THERAPY:Daily Note 2017      ICD-10: Treatment Diagnosis: Pain in left knee (M25.562)  Difficulty in walking, not elsewhere classified (R26.2)  Precautions/Allergies:   Latex; Sulfa (sulfonamide antibiotics); Macrobid [nitrofurantoin monohyd/m-cryst]; Other plant, animal, environmental; Oxycodone; and Tape [adhesive]   Fall Risk Score: 1 (? 5 = High Risk)  MD Orders: Evaluate and treat  MEDICAL/REFERRING DIAGNOSIS:  Total knee replacement status [Z96.659]   DATE OF ONSET: 3/21/17  REFERRING PHYSICIAN: Que Will MD  RETURN PHYSICIAN APPOINTMENT: 17     INITIAL ASSESSMENT:  Ms. Joanna Rivera is a 74 y/o female presenting with L knee pain, edema, decreased ROM, decreased strength and decreased balance with difficulty walking s/p left TKA. Pt has increased difficulty with transfers, squatting, stair negotiation, and walking. Pt will benefit from skilled PT to address these deficits to progress towards goals below. Pt will benefit from initial use of aquatic environment to increase ease of movement and decrease load bearing activity and then will transition to land based treatment prior to discharge. PROBLEM LIST (Impacting functional limitations):  1. Decreased Strength  2. Decreased ADL/Functional Activities  3. Decreased Transfer Abilities  4. Decreased Ambulation Ability/Technique  5. Decreased Balance  6. Increased Pain  7. Decreased Activity Tolerance  8. Decreased Flexibility/Joint Mobility  9. Edema/Girth  10. Decreased [place] with Home Exercise Program INTERVENTIONS PLANNED:  1. Balance Exercise  2. Cold  3. Electrical Stimulation  4. Gait Training  5. Heat  6. Home Exercise Program (HEP)  7. Manual Therapy  8. Neuromuscular Re-education/Strengthening  9. Range of Motion (ROM)  10.  Therapeutic Activites  11. Therapeutic Exercise/Strengthening  12. Transfer Training  13. aquatics   TREATMENT PLAN:  Effective Dates: 4/27/17 TO 07/20/17. Frequency/Duration: 2-3 times a week for 12 weeks  GOALS: (Goals have been discussed and agreed upon with patient.)  Short-Term Functional Goals: Time Frame: 4 weeks  1. Pt will be independent and compliant with HEP. 2. Pt will be able to report at least 25% less c/o pain on pain scale ratings for at least a week with ADLs. 3. Pt will be able to increase AROM L knee flexion to at least 110 ° for increased functional mobility. 4. Pt will be able to decrease AROM L knee flexion to within 2 ° of neutral for increased mobility to improve gait mechanics. Discharge Goals: Time Frame: 10 weeks  1. Pt will exhibit AROM 0-120 ° with L knee for functional mobility. 2. Pt will be able to perform at least one flight of stairs reciprocally with minor use of handrail. 3. Pt will be able to safely ambulate independently on various surfaces community distances with L knee c/o 1/10 or less. Rehabilitation Potential For Stated Goals: Excellent                 HISTORY:   History of Present Injury/Illness (Reason for Referral):   Pt reports chronic knee pain secondary to OA. Pt underwent recent left TKA 3/21/17. Pt reports she had her R knee replaced in Oct 2016 and is doing well with that \"new knee. \"  However, this knee (left) had more OA and worse symptoms prior to surgery and she reports increased pain with this recovery. Pt reports she is only using her walker now intermittently; however, feeling weak due to new blood pressure medication (hydralazine?). Pt reports that she would like to return to more active lifestyle to play with her grandkids. Pt was discharged from home health PT yesterday 4/27/17  Past Medical History/Comorbidities:   Ms. Abby Mcwilliams  has a past medical history of Adverse effect of anesthesia; Arthritis; Chronic kidney disease; Chronic pain;  Follow-up visit for aortic valve replacement with bioprosthetic valve (7/25/2016); GERD (gastroesophageal reflux disease); Hypertension; Kidney stones; Morbid obesity ([address] Murmur, cardiac; Nausea & vomiting; Psychiatric disorder; PUD (peptic ulcer disease) (06/2016); S/P aortic valve replacement with bioprosthetic valve (4/28/2017); and Valvular heart disease (2016). She also has no past medical history of Aneurysm ([address] Asthma; Autoimmune disease ([address] Cancer ([address] Chronic obstructive pulmonary disease ([address] Coagulation disorder ([address] Diabetes ([address] Difficult intubation; Liver disease; Malignant hyperthermia due to anesthesia; Pseudocholinesterase deficiency; Rheumatic fever; Seizures ([address] Sleep apnea; Stroke [facility]); Thromboembolus ([address] or Thyroid disease. Ms. Jerod Sethi  has a past surgical history that includes urological (Multiple dates); shoulder arthroscopy (Right); lap cholecystectomy; gastric bypass; gi; cervical fusion; hysterectomy; bilateral salpingo-oophorectomy; heart catheterization; aortic valve replacement (6/9/2016); colonoscopy (N/A, 6/15/2016); knee replacement (Right, 10/10/2016); and heart valve surgery. Social History/Living Environment:     lives in one story home, her grown son lives in the home with her;   Prior Level of Function/Work/Activity: independent  Independent with all housework, cooking, etc.   Previous Treatment Approaches:          Pt had inpatient rehab at [facility] and [facility]  Current Medications:    Current Outpatient Prescriptions:     predniSONE (DELTASONE) 10 mg tablet, 4 tabs po x 3 days, then 3 tabs po x 3 days, then 2 tabs po x 3 days, then 1 tab po x3 days, then stop, Disp: 30 Tab, Rfl: 0    furosemide (LASIX) 20 mg tablet, Take  by mouth daily. , Disp: , Rfl:     DOCUSATE CALCIUM (STOOL SOFTENER PO), Take  by mouth., Disp: , Rfl:     amLODIPine (NORVASC) 5 mg tablet, Take 1 Tab by mouth daily.  Indications: hypertension, Disp: 30 Tab, Rfl: 6    zolpidem (AMBIEN) 5 mg tablet, Take 1 Tab by mouth nightly as needed for Sleep. Max Daily Amount: 5 mg., Disp: 90 Tab, Rfl: 1    esomeprazole (NEXIUM) 40 mg capsule, Take 1 Cap by mouth daily. , Disp: 90 Cap, Rfl: 1    [START ON 6/20/2017] HYDROcodone-acetaminophen (NORCO) 7.5-325 mg per tablet, Take 1 Tab by mouth every six (6) hours as needed for Pain. Max Daily Amount: 4 Tabs., Disp: 120 Tab, Rfl: 0    BENICAR 40 mg tablet, TAKE 1 TABLET BY MOUTH DAILY, Disp: 90 Tab, Rfl: 5    aspirin 81 mg chewable tablet, Take 1 Tab by mouth two (2) times a day., Disp: 60 Tab, Rfl: 0    senna-docusate (PERICOLACE) 8.6-50 mg per tablet, Take 2 Tabs by mouth daily. , Disp: 120 Tab, Rfl: 0    carvedilol (COREG) 25 mg tablet, Take 1 Tab by mouth two (2) times daily (with meals). , Disp: 180 Tab, Rfl: 1    febuxostat (ULORIC) 40 mg tab tablet, Take 1 Tab by mouth every morning. Indications: GOUT PREVENTION, Disp: 90 Tab, Rfl: 3    atorvastatin (LIPITOR) 40 mg tablet, Take 1 Tab by mouth daily. (Patient taking differently: Take 40 mg by mouth daily. Per anesthesia protocol:instructed to take am of surgery.), Disp: 90 Tab, Rfl: 4    calcium-vitamin D 600 mg(1,500mg) -200 unit tab, Take 1 Tab by mouth two (2) times daily (with meals). , Disp: , Rfl:     polyethylene glycol (MIRALAX) 17 gram/dose powder, Take 17 g by mouth daily as needed. , Disp: , Rfl:     ferrous sulfate 325 mg (65 mg iron) tablet, Take 1 Tab by mouth daily (with breakfast). , Disp: 30 Tab, Rfl: 1    cyanocobalamin (VITAMIN B-12) 1,000 mcg/mL injection, Use weekly for 4 weeks, then once monthly (Patient taking differently: Use weekly for 4 weeks, then once monthly Stop seven days prior to surgery per anesthesia protocol.), Disp: 4 Vial, Rfl: 2   Date Last Reviewed:  5/8/2017   EXAMINATION:   Observation/Orthostatic Postural Assessment:          Decreased weight bearing through LLE and increased flexion L knee  Palpation:          No erythema L knee; tightness around L knee with mod decreased scar mobility anterior L knee  ROM:    L knee AROM -15-90 °  AAROM -10-95 °             R knee AROM 0-115 °      Strength:     Date:  4/27/17 Date:   Date:     LE MMT Left Right Left Right Left Right   Hip Flex (L1/L2) 4-/5 4/5       Quad    ( L3/4) 4-/5 4+/5       Hamstring 4-/5 4+/5       Anterior Tib (L4/L5) 3-/5 drop foot for \"years\" 4+/5       Gastroc (S1/2) 4-/5 4+/5         Special Tests:  deferred  Neurological Screen: intact to light touch BLEs  Functional Mobility:         Gait/Ambulation:  Slow antalgic gait with decreased terminal knee extension LLE and decreased L knee flex during swing; R foot excessive supination (pt reports due to numerous foot fractures) and L foot mild foot drop        Transfers: Mod I with use of hands;         Bed Mobility:  Sleeps in recliner and reports cannot ly supine due to dizziness which is from previous surgery in past        Stairs:  Reports step-to pattern but has avoided her front stair entry-way and enters home without stairs  Balance:          Unable to single stance with either leg; significantly decreased dynamic balance   Body Structures Involved:  1. Nerves  2. Bones  3. Joints  4. Muscles Body Functions Affected:  1. Sensory/Pain  2. Neuromusculoskeletal  3. Movement Related Activities and Participation Affected:  1. General Tasks and Demands  2. Mobility  3. Self Care  4. Domestic Life  5. Community, Social and Civic Life   CLINICAL PRESENTATION:   CLINICAL DECISION MAKING:   Outcome Measure: Tool Used: Lower Extremity Functional Scale (LEFS)  Score:  Initial: 52/80 Most Recent: X/80 (Date: -- )   Interpretation of Score: 20 questions each scored on a 5 point scale with 0 representing \"extreme difficulty or unable to perform\" and 4 representing \"no difficulty\". The lower the score, the greater the functional disability. 80/80 represents no disability. Minimal detectable change is 9 points.   Score 80 79-63 62-48 47-32 31-16 15-1 0   Modifier CH CI CJ CK CL CM CN     ? Mobility - Walking and Moving Around:     - CURRENT STATUS: CJ - 20%-39% impaired, limited or restricted    - GOAL STATUS: CI - 1%-19% impaired, limited or restricted    - D/C STATUS:  ---------------To be determined---------------    Medical Necessity:   · Patient is expected to demonstrate progress in strength, range of motion, balance and coordination to increase independence with gait and functional activities. Reason for Services/Other Comments:  · Patient has demonstrated an improvement in functional level by becoming independent with HEP. · Patient continues to require skilled intervention due to needing to increase complexity of exercises . TREATMENT:   (In addition to Assessment/Re-Assessment sessions the following treatments were rendered)    Therapeutic Exercise: (see flow sheet below for minutes):  Exercises per grid below to improve mobility and strength. Required moderate verbal and tactile cues to promote proper body alignment and promote proper body mechanics. Aquatic Therapy (see flow sheet below for minutes): Aquatic treatment performed per flow grid for Decreased muscle strength, Decreased static/dynamic balance and reactive control, Decreased range of motion, Ease of movement and Low impact and reduced weight bearing activity. Cues provided for technique. Assistance by therapist provided for progression of exercises. Patient has difficulty with L knee mobility and strength. Date: 4/27/17 5/8/17      Modalities: 15 minutes 10 min      IFC e-stim with ice L knee 15 minutes 10 min                      Manual Therapy: 8 minutes 10 min      Scar mobilization and soft tissue mobilization L knee 8 minutes 10 min      Patella mobs ( all directions) x30 each               Aquatic Exercise: Next 1.5# 50 min      Gait F/B/S/M  x4L      Heel/Toe walk  x15      4-way  x10 BLE      PF/DF        Hamstring Curls  x4L      Hip Flexion with knee ext.   (rockedilberto) Squats    x15      SLR march  x4L      Lunges        Hip circles        Hip horizontal abduction/adduction        Core:          Core:        Deep well bike  4 min      Deep well jumping don  2 min      Deep well scissors  2 min      Deep well:  traction                Hamstring Stretch  2H30      Step Lunge  10H5      Piriformis stretch        PF Stretch        Balance: Single leg Stance        Balance: Tandem                          Therapeutic Exercise: 10 minutes       Quad sets x15 H 5       Heel slides x15 with green strap       Long seated hamstring/gastroc stretch 2 H 30       LAQ x15                       F=forward  B=Backward  S=sidesteps  M=marches    H=hold    Manual: (see flow sheet above for minutes) soft tissue mobilization distal L quad, ITB, hamstrings to increase mobility; scar cross-friction mobilization to decrease scar tissue; patella mobilizations superior/inferior and medial/lateral to increase mobility  Modalities: (see flow sheet above for minutes) IFC anterior L knee with ice anterior and posterior knee to decrease edema and pain to promote increased functional mobility; pt monitored and skin WNL afterwards    HEP: Reviewed current HEP from home health and advised pt continue to use ice for edema at least 3x/day for 10 minutes. Pt also instructed to focus on gaining extension ROM focusing on keeping LE neutral position in long seated position rather than allowing for external rotation of LE to ease pain  Treatment/Session Assessment: Pt returns to therapy after cancelling last visit. Began with STM in long sitting with pt demonstrating difficulty with LLE extension. Initiated aquatics and tolerated well. Therapist instructed pt to take walker with her next week to [facility] in case of fatigue/pain with long distance walking, also instructed to ice/elevate frequently with increased activity. Followed aquatics with IFC/ice in long sitting.     · Pain/ Symptoms: Initial:  3/10 'I feel pretty good today'\". Pt reports vacation to [facility] with family next week with a lot of walking planned Post Session:  0/10 ·   Compliance with Program/Exercises: Will assess as treatment progresses.   · Recommendations/Intent for next treatment session: Next visit will focus on continuing aquatics and more challenging activities  Total Treatment Duration:  PT Patient Time In/Time Out  Time In: 1315  Time Out: [address], PTA

## 2017-05-10 ENCOUNTER — HOSPITAL ENCOUNTER (OUTPATIENT)
Dept: PHYSICAL THERAPY | Age: 67
Discharge: HOME | End: 2017-05-10
Payer: MEDICARE

## 2017-05-10 PROCEDURE — 97113 AQUATIC THERAPY/EXERCISES: CPT

## 2017-05-10 PROCEDURE — 97140 MANUAL THERAPY 1/> REGIONS: CPT

## 2017-05-10 PROCEDURE — 97014 ELECTRIC STIMULATION THERAPY: CPT

## 2017-05-10 NOTE — PROGRESS NOTES
Yessy Justus  : 1950 [id number] @ [address] [address].  [phone]   [phone]        OUTPATIENT PHYSICAL THERAPY:Daily Note 5/10/2017      ICD-10: Treatment Diagnosis: Pain in left knee (M25.562)  Difficulty in walking, not elsewhere classified (R26.2)  Precautions/Allergies:   Latex; Sulfa (sulfonamide antibiotics); Macrobid [nitrofurantoin monohyd/m-cryst]; Other plant, animal, environmental; Oxycodone; and Tape [adhesive]   Fall Risk Score: 1 (? 5 = High Risk)  MD Orders: Evaluate and treat  MEDICAL/REFERRING DIAGNOSIS:  Total knee replacement status [Z96.659]   DATE OF ONSET: 3/21/17  REFERRING PHYSICIAN: Salvatore Sales MD  RETURN PHYSICIAN APPOINTMENT: 17     INITIAL ASSESSMENT:  Ms. Mariia Linares is a 76 y/o female presenting with L knee pain, edema, decreased ROM, decreased strength and decreased balance with difficulty walking s/p left TKA. Pt has increased difficulty with transfers, squatting, stair negotiation, and walking. Pt will benefit from skilled PT to address these deficits to progress towards goals below. Pt will benefit from initial use of aquatic environment to increase ease of movement and decrease load bearing activity and then will transition to land based treatment prior to discharge. PROBLEM LIST (Impacting functional limitations):  1. Decreased Strength  2. Decreased ADL/Functional Activities  3. Decreased Transfer Abilities  4. Decreased Ambulation Ability/Technique  5. Decreased Balance  6. Increased Pain  7. Decreased Activity Tolerance  8. Decreased Flexibility/Joint Mobility  9. Edema/Girth  10. Decreased [place] with Home Exercise Program INTERVENTIONS PLANNED:  1. Balance Exercise  2. Cold  3. Electrical Stimulation  4. Gait Training  5. Heat  6. Home Exercise Program (HEP)  7. Manual Therapy  8. Neuromuscular Re-education/Strengthening  9. Range of Motion (ROM)  10.  Therapeutic Activites  11. Therapeutic Exercise/Strengthening  12. Transfer Training  13. aquatics   TREATMENT PLAN:  Effective Dates: 4/27/17 TO 07/20/17. Frequency/Duration: 2-3 times a week for 12 weeks  GOALS: (Goals have been discussed and agreed upon with patient.)  Short-Term Functional Goals: Time Frame: 4 weeks  1. Pt will be independent and compliant with HEP. 2. Pt will be able to report at least 25% less c/o pain on pain scale ratings for at least a week with ADLs. 3. Pt will be able to increase AROM L knee flexion to at least 110 ° for increased functional mobility. 4. Pt will be able to decrease AROM L knee flexion to within 2 ° of neutral for increased mobility to improve gait mechanics. Discharge Goals: Time Frame: 10 weeks  1. Pt will exhibit AROM 0-120 ° with L knee for functional mobility. 2. Pt will be able to perform at least one flight of stairs reciprocally with minor use of handrail. 3. Pt will be able to safely ambulate independently on various surfaces community distances with L knee c/o 1/10 or less. Rehabilitation Potential For Stated Goals: Excellent                 HISTORY:   History of Present Injury/Illness (Reason for Referral):   Pt reports chronic knee pain secondary to OA. Pt underwent recent left TKA 3/21/17. Pt reports she had her R knee replaced in Oct 2016 and is doing well with that \"new knee. \"  However, this knee (left) had more OA and worse symptoms prior to surgery and she reports increased pain with this recovery. Pt reports she is only using her walker now intermittently; however, feeling weak due to new blood pressure medication (hydralazine?). Pt reports that she would like to return to more active lifestyle to play with her grandkids. Pt was discharged from home health PT yesterday 4/27/17  Past Medical History/Comorbidities:   Ms. Alla Espinoza  has a past medical history of Adverse effect of anesthesia; Arthritis; Chronic kidney disease; Chronic pain;  Follow-up visit for aortic valve replacement with bioprosthetic valve (7/25/2016); GERD (gastroesophageal reflux disease); Hypertension; Kidney stones; Morbid obesity ([address] Murmur, cardiac; Nausea & vomiting; Psychiatric disorder; PUD (peptic ulcer disease) (06/2016); S/P aortic valve replacement with bioprosthetic valve (4/28/2017); and Valvular heart disease (2016). She also has no past medical history of Aneurysm ([address] Asthma; Autoimmune disease ([address] Cancer ([address] Chronic obstructive pulmonary disease ([address] Coagulation disorder ([address] Diabetes ([address] Difficult intubation; Liver disease; Malignant hyperthermia due to anesthesia; Pseudocholinesterase deficiency; Rheumatic fever; Seizures ([address] Sleep apnea; Stroke [facility]); Thromboembolus ([address] or Thyroid disease. Ms. Yang Grant  has a past surgical history that includes urological (Multiple dates); shoulder arthroscopy (Right); lap cholecystectomy; gastric bypass; gi; cervical fusion; hysterectomy; bilateral salpingo-oophorectomy; heart catheterization; aortic valve replacement (6/9/2016); colonoscopy (N/A, 6/15/2016); knee replacement (Right, 10/10/2016); and heart valve surgery. Social History/Living Environment:     lives in one story home, her grown son lives in the home with her;   Prior Level of Function/Work/Activity: independent  Independent with all housework, cooking, etc.   Previous Treatment Approaches:          Pt had inpatient rehab at [facility] and [facility]  Current Medications:    Current Outpatient Prescriptions:     predniSONE (DELTASONE) 10 mg tablet, 4 tabs po x 3 days, then 3 tabs po x 3 days, then 2 tabs po x 3 days, then 1 tab po x3 days, then stop, Disp: 30 Tab, Rfl: 0    furosemide (LASIX) 20 mg tablet, Take  by mouth daily. , Disp: , Rfl:     DOCUSATE CALCIUM (STOOL SOFTENER PO), Take  by mouth., Disp: , Rfl:     amLODIPine (NORVASC) 5 mg tablet, Take 1 Tab by mouth daily.  Indications: hypertension, Disp: 30 Tab, Rfl: 6    zolpidem (AMBIEN) 5 mg tablet, Take 1 Tab by mouth nightly as needed for Sleep. Max Daily Amount: 5 mg., Disp: 90 Tab, Rfl: 1    esomeprazole (NEXIUM) 40 mg capsule, Take 1 Cap by mouth daily. , Disp: 90 Cap, Rfl: 1    [START ON 6/20/2017] HYDROcodone-acetaminophen (NORCO) 7.5-325 mg per tablet, Take 1 Tab by mouth every six (6) hours as needed for Pain. Max Daily Amount: 4 Tabs., Disp: 120 Tab, Rfl: 0    BENICAR 40 mg tablet, TAKE 1 TABLET BY MOUTH DAILY, Disp: 90 Tab, Rfl: 5    aspirin 81 mg chewable tablet, Take 1 Tab by mouth two (2) times a day., Disp: 60 Tab, Rfl: 0    senna-docusate (PERICOLACE) 8.6-50 mg per tablet, Take 2 Tabs by mouth daily. , Disp: 120 Tab, Rfl: 0    carvedilol (COREG) 25 mg tablet, Take 1 Tab by mouth two (2) times daily (with meals). , Disp: 180 Tab, Rfl: 1    febuxostat (ULORIC) 40 mg tab tablet, Take 1 Tab by mouth every morning. Indications: GOUT PREVENTION, Disp: 90 Tab, Rfl: 3    atorvastatin (LIPITOR) 40 mg tablet, Take 1 Tab by mouth daily. (Patient taking differently: Take 40 mg by mouth daily. Per anesthesia protocol:instructed to take am of surgery.), Disp: 90 Tab, Rfl: 4    calcium-vitamin D 600 mg(1,500mg) -200 unit tab, Take 1 Tab by mouth two (2) times daily (with meals). , Disp: , Rfl:     polyethylene glycol (MIRALAX) 17 gram/dose powder, Take 17 g by mouth daily as needed. , Disp: , Rfl:     ferrous sulfate 325 mg (65 mg iron) tablet, Take 1 Tab by mouth daily (with breakfast). , Disp: 30 Tab, Rfl: 1    cyanocobalamin (VITAMIN B-12) 1,000 mcg/mL injection, Use weekly for 4 weeks, then once monthly (Patient taking differently: Use weekly for 4 weeks, then once monthly Stop seven days prior to surgery per anesthesia protocol.), Disp: 4 Vial, Rfl: 2   Date Last Reviewed:  5/10/2017   EXAMINATION:   Observation/Orthostatic Postural Assessment:          Decreased weight bearing through LLE and increased flexion L knee  Palpation:          No erythema L knee; tightness around L knee with mod decreased scar mobility anterior L knee  ROM:    L knee AROM -15-90 °  AAROM -10-95 °             R knee AROM 0-115 °      Strength:     Date:  4/27/17 Date:   Date:     LE MMT Left Right Left Right Left Right   Hip Flex (L1/L2) 4-/5 4/5       Quad    ( L3/4) 4-/5 4+/5       Hamstring 4-/5 4+/5       Anterior Tib (L4/L5) 3-/5 drop foot for \"years\" 4+/5       Gastroc (S1/2) 4-/5 4+/5         Special Tests:  deferred  Neurological Screen: intact to light touch BLEs  Functional Mobility:         Gait/Ambulation:  Slow antalgic gait with decreased terminal knee extension LLE and decreased L knee flex during swing; R foot excessive supination (pt reports due to numerous foot fractures) and L foot mild foot drop        Transfers: Mod I with use of hands;         Bed Mobility:  Sleeps in recliner and reports cannot ly supine due to dizziness which is from previous surgery in past        Stairs:  Reports step-to pattern but has avoided her front stair entry-way and enters home without stairs  Balance:          Unable to single stance with either leg; significantly decreased dynamic balance   Body Structures Involved:  1. Nerves  2. Bones  3. Joints  4. Muscles Body Functions Affected:  1. Sensory/Pain  2. Neuromusculoskeletal  3. Movement Related Activities and Participation Affected:  1. General Tasks and Demands  2. Mobility  3. Self Care  4. Domestic Life  5. Community, Social and Civic Life   CLINICAL PRESENTATION:   CLINICAL DECISION MAKING:   Outcome Measure: Tool Used: Lower Extremity Functional Scale (LEFS)  Score:  Initial: 52/80 Most Recent: X/80 (Date: -- )   Interpretation of Score: 20 questions each scored on a 5 point scale with 0 representing \"extreme difficulty or unable to perform\" and 4 representing \"no difficulty\". The lower the score, the greater the functional disability. 80/80 represents no disability. Minimal detectable change is 9 points.   Score 80 79-63 62-48 47-32 31-16 15-1 0   Modifier CH CI CJ CK CL CM CN     ? Mobility - Walking and Moving Around:     - CURRENT STATUS: CJ - 20%-39% impaired, limited or restricted    - GOAL STATUS: CI - 1%-19% impaired, limited or restricted    - D/C STATUS:  ---------------To be determined---------------    Medical Necessity:   · Patient is expected to demonstrate progress in strength, range of motion, balance and coordination to increase independence with gait and functional activities. Reason for Services/Other Comments:  · Patient has demonstrated an improvement in functional level by becoming independent with HEP. · Patient continues to require skilled intervention due to needing to increase complexity of exercises . TREATMENT:   (In addition to Assessment/Re-Assessment sessions the following treatments were rendered)    Therapeutic Exercise: (see flow sheet below for minutes):  Exercises per grid below to improve mobility and strength. Required moderate verbal and tactile cues to promote proper body alignment and promote proper body mechanics. Aquatic Therapy (see flow sheet below for minutes): Aquatic treatment performed per flow grid for Decreased muscle strength, Decreased static/dynamic balance and reactive control, Decreased range of motion, Ease of movement and Low impact and reduced weight bearing activity. Cues provided for technique. Assistance by therapist provided for progression of exercises. Patient has difficulty with L knee mobility and strength.      Date: 4/27/17 5/8/17 5/10/17     Modalities: 15 minutes 10 min 10 min     IFC e-stim with ice L knee 15 minutes 10 min 10 min long sitting                     Manual Therapy: 8 minutes 10 min 10 min     Scar mobilization and soft tissue mobilization L knee 8 minutes 10 min 10 min     Patella mobs ( all directions) x30 each               Aquatic Exercise: Next 1.5# 50 min 2.5# 50 min     Gait F/B/S/M  x4L x4L     Heel/Toe walk  x15 x15     4-way  x10 BLE x10 BLE     PF/DF Hamstring Curls  x4L x4L     Hip Flexion with knee ext. (rockette)        Squats    x15 x15     SLR march  x4L x4L     Lunges   x15 BLE     Hip circles        Hip horizontal abduction/adduction        Core:          Core:        Deep well bike  4 min 4 min     Deep well jumping don  2 min 2 min     Deep well scissors  2 min 2 min     Deep well:  traction                Hamstring Stretch  2H30 2H30     Step Lunge  10H5 10H5     Piriformis stretch        PF Stretch        Balance: Single leg Stance        Balance: Tandem                          Therapeutic Exercise: 10 minutes       Quad sets x15 H 5       Heel slides x15 with green strap       Long seated hamstring/gastroc stretch 2 H 30       LAQ x15                       F=forward  B=Backward  S=sidesteps  M=marches    H=hold    Manual: (see flow sheet above for minutes) soft tissue mobilization distal L quad, ITB, hamstrings to increase mobility; scar cross-friction mobilization to decrease scar tissue; patella mobilizations superior/inferior and medial/lateral to increase mobility  Modalities: (see flow sheet above for minutes) IFC anterior L knee with ice anterior and posterior knee to decrease edema and pain to promote increased functional mobility; pt monitored and skin WNL afterwards    HEP: Reviewed current HEP from home health and advised pt continue to use ice for edema at least 3x/day for 10 minutes. Pt also instructed to focus on gaining extension ROM focusing on keeping LE neutral position in long seated position rather than allowing for external rotation of LE to ease pain  Treatment/Session Assessment: Began with STM noting tightness and increased edema at medial posterior knee. Continued with aquatics increasing weight to 2.5#, pt tolerated well. Pt demonstrates difficulty with balance/stability, plan to perform gait activities without rail next visit and add balance work as pt progresses.  Followed with IFC/ice in long sitting for pain/edema management. · Pain/ Symptoms: Initial: 4/10 Pt reports more pain today due to increased housework yesterday Post Session:  0/10 ·   Compliance with Program/Exercises: Will assess as treatment progresses.   · Recommendations/Intent for next treatment session: Next visit will focus on continuing aquatics and more challenging activities  Total Treatment Duration:  PT Patient Time In/Time Out  Time In: 1405  Time Out: 807 [facility]

## 2017-05-12 ENCOUNTER — HOSPITAL ENCOUNTER (OUTPATIENT)
Dept: PHYSICAL THERAPY | Age: 67
Discharge: HOME | End: 2017-05-12
Payer: MEDICARE

## 2017-05-12 PROCEDURE — 97140 MANUAL THERAPY 1/> REGIONS: CPT

## 2017-05-12 PROCEDURE — 97113 AQUATIC THERAPY/EXERCISES: CPT

## 2017-05-12 PROCEDURE — 97014 ELECTRIC STIMULATION THERAPY: CPT

## 2017-05-12 NOTE — PROGRESS NOTES
Matthew Eber  : 1950 [address] @ [address]  [address] [address].  [phone]   [phone]        OUTPATIENT PHYSICAL THERAPY:Daily Note 2017      ICD-10: Treatment Diagnosis: Pain in left knee (M25.562)  Difficulty in walking, not elsewhere classified (R26.2)  Precautions/Allergies:   Latex; Sulfa (sulfonamide antibiotics); Macrobid [nitrofurantoin monohyd/m-cryst]; Other plant, animal, environmental; Oxycodone; and Tape [adhesive]   Fall Risk Score: 1 (? 5 = High Risk)  MD Orders: Evaluate and treat  MEDICAL/REFERRING DIAGNOSIS:  Total knee replacement status [Z96.659]   DATE OF ONSET: 3/21/17  REFERRING PHYSICIAN: Sherren Kennedy, MD  RETURN PHYSICIAN APPOINTMENT: 17     INITIAL ASSESSMENT:  Ms. Danie Burger is a 76 y/o female presenting with L knee pain, edema, decreased ROM, decreased strength and decreased balance with difficulty walking s/p left TKA. Pt has increased difficulty with transfers, squatting, stair negotiation, and walking. Pt will benefit from skilled PT to address these deficits to progress towards goals below. Pt will benefit from initial use of aquatic environment to increase ease of movement and decrease load bearing activity and then will transition to land based treatment prior to discharge. PROBLEM LIST (Impacting functional limitations):  1. Decreased Strength  2. Decreased ADL/Functional Activities  3. Decreased Transfer Abilities  4. Decreased Ambulation Ability/Technique  5. Decreased Balance  6. Increased Pain  7. Decreased Activity Tolerance  8. Decreased Flexibility/Joint Mobility  9. Edema/Girth  10. Decreased [place] with Home Exercise Program INTERVENTIONS PLANNED:  1. Balance Exercise  2. Cold  3. Electrical Stimulation  4. Gait Training  5. Heat  6. Home Exercise Program (HEP)  7. Manual Therapy  8. Neuromuscular Re-education/Strengthening  9. Range of Motion (ROM)  10.  Therapeutic Activites  11. Therapeutic Exercise/Strengthening  12. Transfer Training  13. aquatics   TREATMENT PLAN:  Effective Dates: 4/27/17 TO 07/20/17. Frequency/Duration: 2-3 times a week for 12 weeks  GOALS: (Goals have been discussed and agreed upon with patient.)  Short-Term Functional Goals: Time Frame: 4 weeks  1. Pt will be independent and compliant with HEP. 2. Pt will be able to report at least 25% less c/o pain on pain scale ratings for at least a week with ADLs. 3. Pt will be able to increase AROM L knee flexion to at least 110 ° for increased functional mobility. 4. Pt will be able to decrease AROM L knee flexion to within 2 ° of neutral for increased mobility to improve gait mechanics. Discharge Goals: Time Frame: 10 weeks  1. Pt will exhibit AROM 0-120 ° with L knee for functional mobility. 2. Pt will be able to perform at least one flight of stairs reciprocally with minor use of handrail. 3. Pt will be able to safely ambulate independently on various surfaces community distances with L knee c/o 1/10 or less. Rehabilitation Potential For Stated Goals: Excellent                 HISTORY:   History of Present Injury/Illness (Reason for Referral):   Pt reports chronic knee pain secondary to OA. Pt underwent recent left TKA 3/21/17. Pt reports she had her R knee replaced in Oct 2016 and is doing well with that \"new knee. \"  However, this knee (left) had more OA and worse symptoms prior to surgery and she reports increased pain with this recovery. Pt reports she is only using her walker now intermittently; however, feeling weak due to new blood pressure medication (hydralazine?). Pt reports that she would like to return to more active lifestyle to play with her grandkids. Pt was discharged from home health PT yesterday 4/27/17  Past Medical History/Comorbidities:   Ms. Derick Robison  has a past medical history of Adverse effect of anesthesia; Arthritis; Chronic kidney disease; Chronic pain;  Follow-up visit for aortic valve replacement with bioprosthetic valve (7/25/2016); GERD (gastroesophageal reflux disease); Hypertension; Kidney stones; Morbid obesity ([address] Murmur, cardiac; Nausea & vomiting; Psychiatric disorder; PUD (peptic ulcer disease) (06/2016); S/P aortic valve replacement with bioprosthetic valve (4/28/2017); and Valvular heart disease (2016). She also has no past medical history of Aneurysm ([address] Asthma; Autoimmune disease ([address] Cancer ([address] Chronic obstructive pulmonary disease ([address] Coagulation disorder ([address] Diabetes ([address] Difficult intubation; Liver disease; Malignant hyperthermia due to anesthesia; Pseudocholinesterase deficiency; Rheumatic fever; Seizures ([address] Sleep apnea; Stroke [facility]); Thromboembolus ([facility] [place] [address] or Thyroid disease. Ms. Abby Mcwilliams  has a past surgical history that includes urological (Multiple dates); shoulder arthroscopy (Right); lap cholecystectomy; gastric bypass; gi; cervical fusion; hysterectomy; bilateral salpingo-oophorectomy; heart catheterization; aortic valve replacement (6/9/2016); colonoscopy (N/A, 6/15/2016); knee replacement (Right, 10/10/2016); and heart valve surgery. Social History/Living Environment:     lives in one story home, her grown son lives in the home with her;   Prior Level of Function/Work/Activity: independent  Independent with all housework, cooking, etc.   Previous Treatment Approaches:          Pt had inpatient rehab at [facility] and [facility]  Current Medications:    Current Outpatient Prescriptions:     predniSONE (DELTASONE) 10 mg tablet, 4 tabs po x 3 days, then 3 tabs po x 3 days, then 2 tabs po x 3 days, then 1 tab po x3 days, then stop, Disp: 30 Tab, Rfl: 0    furosemide (LASIX) 20 mg tablet, Take  by mouth daily. , Disp: , Rfl:     DOCUSATE CALCIUM (STOOL SOFTENER PO), Take  by mouth., Disp: , Rfl:     amLODIPine (NORVASC) 5 mg tablet, Take 1 Tab by mouth daily.  Indications: hypertension, Disp: 30 Tab, Rfl: 6    zolpidem (AMBIEN) 5 mg tablet, Take 1 Tab by mouth nightly as needed for Sleep. Max Daily Amount: 5 mg., Disp: 90 Tab, Rfl: 1    esomeprazole (NEXIUM) 40 mg capsule, Take 1 Cap by mouth daily. , Disp: 90 Cap, Rfl: 1    [START ON 6/20/2017] HYDROcodone-acetaminophen (NORCO) 7.5-325 mg per tablet, Take 1 Tab by mouth every six (6) hours as needed for Pain. Max Daily Amount: 4 Tabs., Disp: 120 Tab, Rfl: 0    BENICAR 40 mg tablet, TAKE 1 TABLET BY MOUTH DAILY, Disp: 90 Tab, Rfl: 5    aspirin 81 mg chewable tablet, Take 1 Tab by mouth two (2) times a day., Disp: 60 Tab, Rfl: 0    senna-docusate (PERICOLACE) 8.6-50 mg per tablet, Take 2 Tabs by mouth daily. , Disp: 120 Tab, Rfl: 0    carvedilol (COREG) 25 mg tablet, Take 1 Tab by mouth two (2) times daily (with meals). , Disp: 180 Tab, Rfl: 1    febuxostat (ULORIC) 40 mg tab tablet, Take 1 Tab by mouth every morning. Indications: GOUT PREVENTION, Disp: 90 Tab, Rfl: 3    atorvastatin (LIPITOR) 40 mg tablet, Take 1 Tab by mouth daily. (Patient taking differently: Take 40 mg by mouth daily. Per anesthesia protocol:instructed to take am of surgery.), Disp: 90 Tab, Rfl: 4    calcium-vitamin D 600 mg(1,500mg) -200 unit tab, Take 1 Tab by mouth two (2) times daily (with meals). , Disp: , Rfl:     polyethylene glycol (MIRALAX) 17 gram/dose powder, Take 17 g by mouth daily as needed. , Disp: , Rfl:     ferrous sulfate 325 mg (65 mg iron) tablet, Take 1 Tab by mouth daily (with breakfast). , Disp: 30 Tab, Rfl: 1    cyanocobalamin (VITAMIN B-12) 1,000 mcg/mL injection, Use weekly for 4 weeks, then once monthly (Patient taking differently: Use weekly for 4 weeks, then once monthly Stop seven days prior to surgery per anesthesia protocol.), Disp: 4 Vial, Rfl: 2   Date Last Reviewed:  5/12/2017   EXAMINATION:   Observation/Orthostatic Postural Assessment:          Decreased weight bearing through LLE and increased flexion L knee  Palpation:          No erythema L knee; tightness around L knee with mod decreased scar mobility anterior L knee  ROM:    L knee AROM -15-90 °  AAROM -10-95 °    5/12/17 AROM L knee -10 -100 °           R knee AROM 0-115 °      Strength:     Date:  4/27/17 Date:   Date:     LE MMT Left Right Left Right Left Right   Hip Flex (L1/L2) 4-/5 4/5       Quad    ( L3/4) 4-/5 4+/5       Hamstring 4-/5 4+/5       Anterior Tib (L4/L5) 3-/5 drop foot for \"years\" 4+/5       Gastroc (S1/2) 4-/5 4+/5         Special Tests:  deferred  Neurological Screen: intact to light touch BLEs  Functional Mobility:         Gait/Ambulation:  Slow antalgic gait with decreased terminal knee extension LLE and decreased L knee flex during swing; R foot excessive supination (pt reports due to numerous foot fractures) and L foot mild foot drop        Transfers: Mod I with use of hands;         Bed Mobility:  Sleeps in recliner and reports cannot ly supine due to dizziness which is from previous surgery in past        Stairs:  Reports step-to pattern but has avoided her front stair entry-way and enters home without stairs  Balance:          Unable to single stance with either leg; significantly decreased dynamic balance   Body Structures Involved:  1. Nerves  2. Bones  3. Joints  4. Muscles Body Functions Affected:  1. Sensory/Pain  2. Neuromusculoskeletal  3. Movement Related Activities and Participation Affected:  1. General Tasks and Demands  2. Mobility  3. Self Care  4. Domestic Life  5. Community, Social and Civic Life   CLINICAL PRESENTATION:   CLINICAL DECISION MAKING:   Outcome Measure: Tool Used: Lower Extremity Functional Scale (LEFS)  Score:  Initial: 52/80 Most Recent: X/80 (Date: -- )   Interpretation of Score: 20 questions each scored on a 5 point scale with 0 representing \"extreme difficulty or unable to perform\" and 4 representing \"no difficulty\". The lower the score, the greater the functional disability. 80/80 represents no disability. Minimal detectable change is 9 points.   Score 80 79-63 62-48 47-32 31-16 15-1 0   Modifier CH CI CJ CK CL CM CN     ? Mobility - Walking and Moving Around:     - CURRENT STATUS: CJ - 20%-39% impaired, limited or restricted    - GOAL STATUS: CI - 1%-19% impaired, limited or restricted    - D/C STATUS:  ---------------To be determined---------------    Medical Necessity:   · Patient is expected to demonstrate progress in strength, range of motion, balance and coordination to increase independence with gait and functional activities. Reason for Services/Other Comments:  · Patient has demonstrated an improvement in functional level by becoming independent with HEP. · Patient continues to require skilled intervention due to needing to increase complexity of exercises . TREATMENT:   (In addition to Assessment/Re-Assessment sessions the following treatments were rendered)    Therapeutic Exercise: (see flow sheet below for minutes):  Exercises per grid below to improve mobility and strength. Required moderate verbal and tactile cues to promote proper body alignment and promote proper body mechanics. Aquatic Therapy (see flow sheet below for minutes): Aquatic treatment performed per flow grid for Decreased muscle strength, Decreased static/dynamic balance and reactive control, Decreased range of motion, Ease of movement and Low impact and reduced weight bearing activity. Cues provided for technique. Assistance by therapist provided for progression of exercises. Patient has difficulty with L knee mobility and strength.      Date: 4/27/17 5/8/17 5/10/17 5/12/17    Modalities: 15 minutes 10 min 10 min 10 min    IFC e-stim with ice L knee 15 minutes 10 min 10 min long sitting 10 min long sitting                    Manual Therapy: 8 minutes 10 min 10 min 10 min    Scar mobilization and soft tissue mobilization L knee 8 minutes 10 min 10 min 10 min    Patella mobs ( all directions) x30 each               Aquatic Exercise: Next 1.5# 50 min 2.5# 50 min 2.5# 50 min Gait F/B/S/M  x4L x4L x4L    Heel/Toe walk  x15 x15 x2L ea    4-way  x10 BLE x10 BLE x15 BLE    PF/DF        Hamstring Curls  x4L x4L x4L    Hip Flexion with knee ext. (rockette)    x2L    Squats    x15 x15     SLR march  x4L x4L x4L    Lunges   x15 BLE x15 BLE    Hip circles        Hip horizontal abduction/adduction        Core:          Core:        Deep well bike  4 min 4 min 4 min    Deep well jumping don  2 min 2 min 2 min    Deep well scissors  2 min 2 min 2 min    Deep well:  traction                Hamstring Stretch  2H30 2H30 2H30    Step Lunge  10H5 10H5 10H5    Piriformis stretch        PF Stretch        Balance: Single leg Stance        Balance: Tandem                          Therapeutic Exercise: 10 minutes       Quad sets x15 H 5       Heel slides x15 with green strap       Long seated hamstring/gastroc stretch 2 H 30       LAQ x15                       F=forward  B=Backward  S=sidesteps  M=marches    H=hold    Manual: (see flow sheet above for minutes) soft tissue mobilization distal L quad, ITB, hamstrings to increase mobility; scar cross-friction mobilization to decrease scar tissue; patella mobilizations superior/inferior and medial/lateral to increase mobility  Modalities: (see flow sheet above for minutes) IFC anterior L knee with ice anterior and posterior knee to decrease edema and pain to promote increased functional mobility; pt monitored and skin WNL afterwards    HEP: Reviewed current HEP from home health and advised pt continue to use ice for edema at least 3x/day for 10 minutes. Pt also instructed to focus on gaining extension ROM focusing on keeping LE neutral position in long seated position rather than allowing for external rotation of LE to ease pain  Treatment/Session Assessment: Began with STM followed by [employer]. Pt tolerated well with no increased pain, difficulty with balance/stability. Plan to challenge balance more at next visit. Pt ROM improved to - ° AROM. Progress as tolerated. · Pain/ Symptoms: Initial: 3/10 Pt reports minimal pain today but states she mowed the lawn yesterday and was hurting a lot last night. C/o tightness today. Post Session:  0/10 ·   Compliance with Program/Exercises: Will assess as treatment progresses.   · Recommendations/Intent for next treatment session: Next visit will focus on continuing aquatics and more challenging activities  Total Treatment Duration:  PT Patient Time In/Time Out  Time In: 1325  Time Out: [address], PTA

## 2017-05-15 ENCOUNTER — HOSPITAL ENCOUNTER (OUTPATIENT)
Dept: PHYSICAL THERAPY | Age: 67
Discharge: HOME | End: 2017-05-15
Payer: MEDICARE

## 2017-05-17 ENCOUNTER — HOSPITAL ENCOUNTER (OUTPATIENT)
Dept: PHYSICAL THERAPY | Age: 67
Discharge: HOME | End: 2017-05-17
Payer: MEDICARE

## 2017-05-17 PROCEDURE — 97140 MANUAL THERAPY 1/> REGIONS: CPT

## 2017-05-17 PROCEDURE — 97113 AQUATIC THERAPY/EXERCISES: CPT

## 2017-05-17 PROCEDURE — 97014 ELECTRIC STIMULATION THERAPY: CPT

## 2017-05-17 NOTE — PROGRESS NOTES
Dung Lopez  : 1950 [address] [facility],[address] @ [address]  [address] [place] [address].  [phone]   [phone]        OUTPATIENT PHYSICAL THERAPY:Daily Note 2017      ICD-10: Treatment Diagnosis: Pain in left knee (M25.562)  Difficulty in walking, not elsewhere classified (R26.2)  Precautions/Allergies:   Latex; Sulfa (sulfonamide antibiotics); Macrobid [nitrofurantoin monohyd/m-cryst]; Other plant, animal, environmental; Oxycodone; and Tape [adhesive]   Fall Risk Score: 1 (? 5 = High Risk)  MD Orders: Evaluate and treat  MEDICAL/REFERRING DIAGNOSIS:  Total knee replacement status [Z96.659]   DATE OF ONSET: 3/21/17  REFERRING PHYSICIAN: Denver Rodriguez MD  RETURN PHYSICIAN APPOINTMENT: 17     INITIAL ASSESSMENT:  Ms. Mahamed West is a 76 y/o female presenting with L knee pain, edema, decreased ROM, decreased strength and decreased balance with difficulty walking s/p left TKA. Pt has increased difficulty with transfers, squatting, stair negotiation, and walking. Pt will benefit from skilled PT to address these deficits to progress towards goals below. Pt will benefit from initial use of aquatic environment to increase ease of movement and decrease load bearing activity and then will transition to land based treatment prior to discharge. PROBLEM LIST (Impacting functional limitations):  1. Decreased Strength  2. Decreased ADL/Functional Activities  3. Decreased Transfer Abilities  4. Decreased Ambulation Ability/Technique  5. Decreased Balance  6. Increased Pain  7. Decreased Activity Tolerance  8. Decreased Flexibility/Joint Mobility  9. Edema/Girth  10. Decreased [place] with Home Exercise Program INTERVENTIONS PLANNED:  1. Balance Exercise  2. Cold  3. Electrical Stimulation  4. Gait Training  5. Heat  6. Home Exercise Program (HEP)  7. Manual Therapy  8. Neuromuscular Re-education/Strengthening  9. Range of Motion (ROM)  10.  Therapeutic Activites  11. Therapeutic Exercise/Strengthening  12. Transfer Training  13. aquatics   TREATMENT PLAN:  Effective Dates: 4/27/17 TO 07/20/17. Frequency/Duration: 2-3 times a week for 12 weeks  GOALS: (Goals have been discussed and agreed upon with patient.)  Short-Term Functional Goals: Time Frame: 4 weeks  1. Pt will be independent and compliant with HEP. 2. Pt will be able to report at least 25% less c/o pain on pain scale ratings for at least a week with ADLs. 3. Pt will be able to increase AROM L knee flexion to at least 110 ° for increased functional mobility. 4. Pt will be able to decrease AROM L knee flexion to within 2 ° of neutral for increased mobility to improve gait mechanics. Discharge Goals: Time Frame: 10 weeks  1. Pt will exhibit AROM 0-120 ° with L knee for functional mobility. 2. Pt will be able to perform at least one flight of stairs reciprocally with minor use of handrail. 3. Pt will be able to safely ambulate independently on various surfaces community distances with L knee c/o 1/10 or less. Rehabilitation Potential For Stated Goals: Excellent                 HISTORY:   History of Present Injury/Illness (Reason for Referral):   Pt reports chronic knee pain secondary to OA. Pt underwent recent left TKA 3/21/17. Pt reports she had her R knee replaced in Oct 2016 and is doing well with that \"new knee. \"  However, this knee (left) had more OA and worse symptoms prior to surgery and she reports increased pain with this recovery. Pt reports she is only using her walker now intermittently; however, feeling weak due to new blood pressure medication (hydralazine?). Pt reports that she would like to return to more active lifestyle to play with her grandkids. Pt was discharged from home health PT yesterday 4/27/17  Past Medical History/Comorbidities:   Ms. Derick Robison  has a past medical history of Adverse effect of anesthesia; Arthritis; Chronic kidney disease; Chronic pain;  Follow-up visit for aortic valve replacement with bioprosthetic valve (7/25/2016); GERD (gastroesophageal reflux disease); Hypertension; Kidney stones; Morbid obesity ([address] Murmur, cardiac; Nausea & vomiting; Psychiatric disorder; PUD (peptic ulcer disease) (06/2016); S/P aortic valve replacement with bioprosthetic valve (4/28/2017); and Valvular heart disease (2016). She also has no past medical history of Aneurysm ([address] Asthma; Autoimmune disease ([address] Cancer ([address] Chronic obstructive pulmonary disease ([address] Coagulation disorder ([address] Diabetes ([address] Difficult intubation; Liver disease; Malignant hyperthermia due to anesthesia; Pseudocholinesterase deficiency; Rheumatic fever; Seizures ([address] Sleep apnea; Stroke [facility]); Thromboembolus ([facility] [address] [facility] or Thyroid disease. Ms. Brent Welsh  has a past surgical history that includes urological (Multiple dates); shoulder arthroscopy (Right); lap cholecystectomy; gastric bypass; gi; cervical fusion; hysterectomy; bilateral salpingo-oophorectomy; heart catheterization; aortic valve replacement (6/9/2016); colonoscopy (N/A, 6/15/2016); knee replacement (Right, 10/10/2016); and heart valve surgery. Social History/Living Environment:     lives in one story home, her grown son lives in the home with her;   Prior Level of Function/Work/Activity: independent  Independent with all housework, cooking, etc.   Previous Treatment Approaches:          Pt had inpatient rehab at [facility] and [facility]  Current Medications:    Current Outpatient Prescriptions:     mometasone (NASONEX) 50 mcg/actuation nasal spray, 2 Sprays by Both Nostrils route daily. , Disp: 1 Container, Rfl: 12    meclizine (ANTIVERT) 25 mg tablet, Take 1 Tab by mouth three (3) times daily as needed. , Disp: 60 Tab, Rfl: 0    predniSONE (DELTASONE) 10 mg tablet, 4 tabs po x 3 days, then 3 tabs po x 3 days, then 2 tabs po x 3 days, then 1 tab po x3 days, then stop, Disp: 30 Tab, Rfl: 0    furosemide (LASIX) 20 mg tablet, Take  by mouth daily. , Disp: , Rfl:     DOCUSATE CALCIUM (STOOL SOFTENER PO), Take  by mouth., Disp: , Rfl:     amLODIPine (NORVASC) 5 mg tablet, Take 1 Tab by mouth daily. Indications: hypertension, Disp: 30 Tab, Rfl: 6    zolpidem (AMBIEN) 5 mg tablet, Take 1 Tab by mouth nightly as needed for Sleep. Max Daily Amount: 5 mg., Disp: 90 Tab, Rfl: 1    esomeprazole (NEXIUM) 40 mg capsule, Take 1 Cap by mouth daily. , Disp: 90 Cap, Rfl: 1    [START ON 6/20/2017] HYDROcodone-acetaminophen (NORCO) 7.5-325 mg per tablet, Take 1 Tab by mouth every six (6) hours as needed for Pain. Max Daily Amount: 4 Tabs., Disp: 120 Tab, Rfl: 0    BENICAR 40 mg tablet, TAKE 1 TABLET BY MOUTH DAILY, Disp: 90 Tab, Rfl: 5    aspirin 81 mg chewable tablet, Take 1 Tab by mouth two (2) times a day., Disp: 60 Tab, Rfl: 0    senna-docusate (PERICOLACE) 8.6-50 mg per tablet, Take 2 Tabs by mouth daily. , Disp: 120 Tab, Rfl: 0    carvedilol (COREG) 25 mg tablet, Take 1 Tab by mouth two (2) times daily (with meals). , Disp: 180 Tab, Rfl: 1    febuxostat (ULORIC) 40 mg tab tablet, Take 1 Tab by mouth every morning. Indications: GOUT PREVENTION, Disp: 90 Tab, Rfl: 3    atorvastatin (LIPITOR) 40 mg tablet, Take 1 Tab by mouth daily. (Patient taking differently: Take 40 mg by mouth daily. Per anesthesia protocol:instructed to take am of surgery.), Disp: 90 Tab, Rfl: 4    calcium-vitamin D 600 mg(1,500mg) -200 unit tab, Take 1 Tab by mouth two (2) times daily (with meals). , Disp: , Rfl:     polyethylene glycol (MIRALAX) 17 gram/dose powder, Take 17 g by mouth daily as needed. , Disp: , Rfl:     ferrous sulfate 325 mg (65 mg iron) tablet, Take 1 Tab by mouth daily (with breakfast). , Disp: 30 Tab, Rfl: 1    cyanocobalamin (VITAMIN B-12) 1,000 mcg/mL injection, Use weekly for 4 weeks, then once monthly (Patient taking differently: Use weekly for 4 weeks, then once monthly Stop seven days prior to surgery per anesthesia protocol.), Disp: 4 Vial, Rfl: 2   Date Last Reviewed:  5/17/2017   EXAMINATION:   Observation/Orthostatic Postural Assessment:          Decreased weight bearing through LLE and increased flexion L knee  Palpation:          No erythema L knee; tightness around L knee with mod decreased scar mobility anterior L knee  ROM:    L knee AROM -15-90 °  AAROM -10-95 °    5/12/17 AROM L knee -10 -100 °           R knee AROM 0-115 °      Strength:     Date:  4/27/17 Date:   Date:     LE MMT Left Right Left Right Left Right   Hip Flex (L1/L2) 4-/5 4/5       Quad    ( L3/4) 4-/5 4+/5       Hamstring 4-/5 4+/5       Anterior Tib (L4/L5) 3-/5 drop foot for \"years\" 4+/5       Gastroc (S1/2) 4-/5 4+/5         Special Tests:  deferred  Neurological Screen: intact to light touch BLEs  Functional Mobility:         Gait/Ambulation:  Slow antalgic gait with decreased terminal knee extension LLE and decreased L knee flex during swing; R foot excessive supination (pt reports due to numerous foot fractures) and L foot mild foot drop        Transfers: Mod I with use of hands;         Bed Mobility:  Sleeps in recliner and reports cannot ly supine due to dizziness which is from previous surgery in past        Stairs:  Reports step-to pattern but has avoided her front stair entry-way and enters home without stairs  Balance:          Unable to single stance with either leg; significantly decreased dynamic balance   Body Structures Involved:  1. Nerves  2. Bones  3. Joints  4. Muscles Body Functions Affected:  1. Sensory/Pain  2. Neuromusculoskeletal  3. Movement Related Activities and Participation Affected:  1. General Tasks and Demands  2. Mobility  3. Self Care  4. Domestic Life  5. Community, Social and Civic Life   CLINICAL PRESENTATION:   CLINICAL DECISION MAKING:   Outcome Measure:    Tool Used: Lower Extremity Functional Scale (LEFS)  Score:  Initial: 52/80 Most Recent: X/80 (Date: -- )   Interpretation of Score: 20 questions each scored on a 5 point scale with 0 representing \"extreme difficulty or unable to perform\" and 4 representing \"no difficulty\". The lower the score, the greater the functional disability. 80/80 represents no disability. Minimal detectable change is 9 points. Score 80 79-63 62-48 47-32 31-16 15-1 0   Modifier CH CI CJ CK CL CM CN     ? Mobility - Walking and Moving Around:     - CURRENT STATUS: CJ - 20%-39% impaired, limited or restricted    - GOAL STATUS: CI - 1%-19% impaired, limited or restricted    - D/C STATUS:  ---------------To be determined---------------    Medical Necessity:   · Patient is expected to demonstrate progress in strength, range of motion, balance and coordination to increase independence with gait and functional activities. Reason for Services/Other Comments:  · Patient has demonstrated an improvement in functional level by becoming independent with HEP. · Patient continues to require skilled intervention due to needing to increase complexity of exercises . TREATMENT:   (In addition to Assessment/Re-Assessment sessions the following treatments were rendered)    Therapeutic Exercise: (see flow sheet below for minutes):  Exercises per grid below to improve mobility and strength. Required moderate verbal and tactile cues to promote proper body alignment and promote proper body mechanics. Aquatic Therapy (see flow sheet below for minutes): Aquatic treatment performed per flow grid for Decreased muscle strength, Decreased static/dynamic balance and reactive control, Decreased range of motion, Ease of movement and Low impact and reduced weight bearing activity. Cues provided for technique. Assistance by therapist provided for progression of exercises. Patient has difficulty with L knee mobility and strength.      Date: 4/27/17 5/8/17 5/10/17 5/12/17 5/17/17   Modalities: 15 minutes 10 min 10 min 10 min 10 min   IFC e-stim with ice L knee 15 minutes 10 min 10 min long sitting 10 min long sitting 10 min long sitting Manual Therapy: 8 minutes 10 min 10 min 10 min 10 min   Scar mobilization and soft tissue mobilization L knee 8 minutes 10 min 10 min 10 min 10 min   Patella mobs ( all directions) x30 each               Aquatic Exercise: Next 1.5# 50 min 2.5# 50 min 2.5# 50 min 2.5# 50 min   Gait F/B/S/M  x4L x4L x4L x4L no rail   Heel/Toe walk  x15 x15 x2L ea x2L   4-way  x10 BLE x10 BLE x15 BLE    PF/DF        Hamstring Curls  x4L x4L x4L x4L   Hip Flexion with knee ext. (rockette)    x2L x4L   Squats    x15 x15     SLR march  x4L x4L x4L x4L   Lunges   x15 BLE x15 BLE x15 BLE   Hip circles        Hip horizontal abduction/adduction        Core:          Core:        Deep well bike  4 min 4 min 4 min 4 min   Deep well jumping don  2 min 2 min 2 min 2 min   Deep well scissors  2 min 2 min 2 min 2 min   Deep well:  traction     3 min           Hamstring Stretch  2H30 2H30 2H30 2h30   Step Lunge  10H5 10H5 10H5 10H5   Piriformis stretch        PF Stretch        Balance: Single leg Stance        Balance: Tandem                          Therapeutic Exercise: 10 minutes       Quad sets x15 H 5       Heel slides x15 with green strap       Long seated hamstring/gastroc stretch 2 H 30       LAQ x15                       F=forward  B=Backward  S=sidesteps  M=marches    H=hold    Manual: (see flow sheet above for minutes) soft tissue mobilization distal L quad, ITB, hamstrings to increase mobility; scar cross-friction mobilization to decrease scar tissue; patella mobilizations superior/inferior and medial/lateral to increase mobility  Modalities: (see flow sheet above for minutes) IFC anterior L knee with ice anterior and posterior knee to decrease edema and pain to promote increased functional mobility; pt monitored and skin WNL afterwards    HEP: Reviewed current HEP from home health and advised pt continue to use ice for edema at least 3x/day for 10 minutes.   Pt also instructed to focus on gaining extension ROM focusing on keeping LE neutral position in long seated position rather than allowing for external rotation of LE to ease pain  Treatment/Session Assessment: Instructed pt on getting out to walk and stretch during road trip in car tomorrow to prevent tightness/pain in knee. Began with STM in long sitting, pt unable to tolerate long sitting without pillow underneath leg due to decreased extension. Instructed pt in long sitting with foot out on stool for HEP to promote increased extension and HS flexibility. Continued aquatics performing gait activities without railing to increase challenge to balance/stability, pt demonstrates decreased balance requiring railing to right self during LOB. IFC/ice in long sitting at end of treatment. · Pain/ Symptoms: Initial: 2/10 Pt reports minimal pain and states she is doing well with no pain meds. Plans to go on road trip to [place] tomorrow through Sunday Post Session:  0/10 ·   Compliance with Program/Exercises: Will assess as treatment progresses.   · Recommendations/Intent for next treatment session: Next visit will focus on continuing aquatics and more challenging activities  Total Treatment Duration:  PT Patient Time In/Time Out  Time In: 1020  Time Out: [facility], [address], PTA

## 2017-05-22 ENCOUNTER — HOSPITAL ENCOUNTER (OUTPATIENT)
Dept: PHYSICAL THERAPY | Age: 67
Discharge: HOME | End: 2017-05-22
Payer: MEDICARE

## 2017-05-24 ENCOUNTER — HOSPITAL ENCOUNTER (OUTPATIENT)
Dept: PHYSICAL THERAPY | Age: 67
Discharge: HOME | End: 2017-05-24
Payer: MEDICARE

## 2017-05-26 ENCOUNTER — HOSPITAL ENCOUNTER (OUTPATIENT)
Dept: PHYSICAL THERAPY | Age: 67
Discharge: HOME | End: 2017-05-26
Payer: MEDICARE

## 2017-05-26 NOTE — PROGRESS NOTES
Pt called to cancel today's physical therapy appointment. Will resume with POC at next scheduled appt.

## 2017-05-31 ENCOUNTER — HOSPITAL ENCOUNTER (OUTPATIENT)
Dept: PHYSICAL THERAPY | Age: 67
Discharge: HOME | End: 2017-05-31
Payer: MEDICARE

## 2017-05-31 NOTE — PROGRESS NOTES
Pt called to cancel today's physical therapy appointment, she is sick. Will resume with POC at next scheduled appt.

## 2017-06-02 ENCOUNTER — APPOINTMENT (OUTPATIENT)
Dept: PHYSICAL THERAPY | Age: 67
End: 2017-06-02

## 2017-06-05 ENCOUNTER — APPOINTMENT (OUTPATIENT)
Dept: PHYSICAL THERAPY | Age: 67
End: 2017-06-05

## 2017-06-05 ENCOUNTER — HOSPITAL ENCOUNTER (OUTPATIENT)
Dept: PHYSICAL THERAPY | Age: 67
End: 2017-06-05

## 2017-06-06 ENCOUNTER — HOSPITAL ENCOUNTER (OUTPATIENT)
Dept: MRI IMAGING | Age: 67
Discharge: HOME | End: 2017-06-06
Attending: FAMILY MEDICINE
Payer: MEDICARE

## 2017-06-06 DIAGNOSIS — M54.2 CERVICAL SPINE PAIN: ICD-10-CM

## 2017-06-06 DIAGNOSIS — M54.12 RADICULOPATHY, CERVICAL: ICD-10-CM

## 2017-06-06 PROCEDURE — 72141 MRI NECK SPINE W/O DYE: CPT

## 2017-06-07 ENCOUNTER — HOSPITAL ENCOUNTER (OUTPATIENT)
Dept: PHYSICAL THERAPY | Age: 67
Discharge: HOME | End: 2017-06-07

## 2017-06-07 ENCOUNTER — APPOINTMENT (OUTPATIENT)
Dept: PHYSICAL THERAPY | Age: 67
End: 2017-06-07

## 2017-06-07 NOTE — PROGRESS NOTES
Pt called back following appointment on 6/7/17 stating she had an MRI and was unable to make PT appointment. Will resume with POC at next scheduled appt.

## 2017-06-08 ENCOUNTER — APPOINTMENT (OUTPATIENT)
Dept: PHYSICAL THERAPY | Age: 67
End: 2017-06-08

## 2017-06-09 ENCOUNTER — APPOINTMENT (OUTPATIENT)
Dept: PHYSICAL THERAPY | Age: 67
End: 2017-06-09

## 2017-06-12 ENCOUNTER — HOSPITAL ENCOUNTER (OUTPATIENT)
Dept: PHYSICAL THERAPY | Age: 67
Discharge: HOME | End: 2017-06-12

## 2017-06-12 ENCOUNTER — APPOINTMENT (OUTPATIENT)
Dept: PHYSICAL THERAPY | Age: 67
End: 2017-06-12

## 2017-06-12 NOTE — PROGRESS NOTES
Patient cancelled appointment on 6/12/17 due to low hemoglobin and stated she is planning to see the neuro surgeon due to narrowing of the spine. Pt wants to cancel all further June appointments and will call and set up appointments for July, if she is able to continue aquatics at that time. Kavita Kaplan

## 2017-06-13 ENCOUNTER — APPOINTMENT (OUTPATIENT)
Dept: PHYSICAL THERAPY | Age: 67
End: 2017-06-13

## 2017-06-14 ENCOUNTER — APPOINTMENT (OUTPATIENT)
Dept: PHYSICAL THERAPY | Age: 67
End: 2017-06-14

## 2017-06-14 ENCOUNTER — HOSPITAL ENCOUNTER (OUTPATIENT)
Dept: PHYSICAL THERAPY | Age: 67
Discharge: HOME | End: 2017-06-14

## 2017-06-14 NOTE — PROGRESS NOTES
Patient cancelled appointment on 6/14/17. Pt wants to cancel all further June appointments and will call and set up appointments for July, if she is able to continue aquatics at that time. Adebayo Saldaña

## 2017-06-15 ENCOUNTER — APPOINTMENT (OUTPATIENT)
Dept: PHYSICAL THERAPY | Age: 67
End: 2017-06-15

## 2017-06-16 ENCOUNTER — APPOINTMENT (OUTPATIENT)
Dept: PHYSICAL THERAPY | Age: 67
End: 2017-06-16

## 2017-06-19 ENCOUNTER — APPOINTMENT (OUTPATIENT)
Dept: PHYSICAL THERAPY | Age: 67
End: 2017-06-19

## 2017-06-20 ENCOUNTER — APPOINTMENT (OUTPATIENT)
Dept: PHYSICAL THERAPY | Age: 67
End: 2017-06-20

## 2017-06-21 ENCOUNTER — APPOINTMENT (OUTPATIENT)
Dept: PHYSICAL THERAPY | Age: 67
End: 2017-06-21

## 2017-06-23 ENCOUNTER — APPOINTMENT (OUTPATIENT)
Dept: PHYSICAL THERAPY | Age: 67
End: 2017-06-23

## 2017-06-26 ENCOUNTER — APPOINTMENT (OUTPATIENT)
Dept: PHYSICAL THERAPY | Age: 67
End: 2017-06-26

## 2017-06-27 ENCOUNTER — APPOINTMENT (OUTPATIENT)
Dept: PHYSICAL THERAPY | Age: 67
End: 2017-06-27

## 2017-06-28 ENCOUNTER — APPOINTMENT (OUTPATIENT)
Dept: PHYSICAL THERAPY | Age: 67
End: 2017-06-28

## 2017-06-30 ENCOUNTER — APPOINTMENT (OUTPATIENT)
Dept: PHYSICAL THERAPY | Age: 67
End: 2017-06-30

## 2017-06-30 ENCOUNTER — HOSPITAL ENCOUNTER (OUTPATIENT)
Dept: PHYSICAL THERAPY | Age: 67
End: 2017-06-30

## 2017-07-20 PROBLEM — M54.12 CERVICAL RADICULOPATHY: Status: ACTIVE | Noted: 2017-07-20

## 2017-07-20 PROBLEM — M50.30 DDD (DEGENERATIVE DISC DISEASE), CERVICAL: Status: ACTIVE | Noted: 2017-07-20

## 2017-07-20 PROBLEM — M54.2 NECK PAIN: Status: ACTIVE | Noted: 2017-07-20

## 2017-08-08 NOTE — PROGRESS NOTES
Meng Hernandez  : [id number] [facility],[address] @ [address]  [address] [address].  [phone]   [phone]        OUTPATIENT PHYSICAL THERAPY:Daily Note 2017      ICD-10: Treatment Diagnosis: Pain in left knee (M25.562)  Difficulty in walking, not elsewhere classified (R26.2)  Precautions/Allergies:   Latex; Sulfa (sulfonamide antibiotics); Macrobid [nitrofurantoin monohyd/m-cryst]; Other plant, animal, environmental; Oxycodone; and Tape [adhesive]   Fall Risk Score: 1 (? 5 = High Risk)  MD Orders: Evaluate and treat  MEDICAL/REFERRING DIAGNOSIS:  Total knee replacement status [Z96.659]   DATE OF ONSET: 3/21/17  REFERRING PHYSICIAN: Vicky Zelaya MD  RETURN PHYSICIAN APPOINTMENT: 17     INITIAL ASSESSMENT:  Ms. Nawaf Washington is a 74 y/o female presenting with L knee pain, edema, decreased ROM, decreased strength and decreased balance with difficulty walking s/p left TKA. Pt has increased difficulty with transfers, squatting, stair negotiation, and walking. Pt will benefit from skilled PT to address these deficits to progress towards goals below. Pt will benefit from initial use of aquatic environment to increase ease of movement and decrease load bearing activity and then will transition to land based treatment prior to discharge. At discontinuation 17: Pt attended 6 visits of PT including aquatic PT, manual therapy, and modalities. Pt was making progress with knee ROM and LE strength; however, pt's last attended session of PT was 17. Pt cancelled the next 7 appointments and no showed once with the last phone contact 17 stating she was seeing a neurosurgeon and would call back in July if needs further PT. PROBLEM LIST (Impacting functional limitations):  1. Decreased Strength  2. Decreased ADL/Functional Activities  3. Decreased Transfer Abilities  4. Decreased Ambulation Ability/Technique  5. Decreased Balance  6.  Increased Pain  7. Decreased Activity Tolerance  8. Decreased Flexibility/Joint Mobility  9. Edema/Girth  10. Decreased [place] with Home Exercise Program INTERVENTIONS PLANNED:  1. Balance Exercise  2. Cold  3. Electrical Stimulation  4. Gait Training  5. Heat  6. Home Exercise Program (HEP)  7. Manual Therapy  8. Neuromuscular Re-education/Strengthening  9. Range of Motion (ROM)  10. Therapeutic Activites  11. Therapeutic Exercise/Strengthening  12. Transfer Training  13. aquatics   TREATMENT PLAN:  Effective Dates: 4/27/17 TO 07/20/17. Frequency/Duration: 2-3 times a week for 12 weeks  GOALS: (Goals have been discussed and agreed upon with patient.)  Short-Term Functional Goals: Time Frame: 4 weeks (unable to formally assess secondary to pt not returning after 5/17/17)  1. Pt will be independent and compliant with HEP. 2. Pt will be able to report at least 25% less c/o pain on pain scale ratings for at least a week with ADLs. 3. Pt will be able to increase AROM L knee flexion to at least 110 ° for increased functional mobility. 4. Pt will be able to decrease AROM L knee flexion to within 2 ° of neutral for increased mobility to improve gait mechanics. Discharge Goals: Time Frame: 10 weeks  1. Pt will exhibit AROM 0-120 ° with L knee for functional mobility. 2. Pt will be able to perform at least one flight of stairs reciprocally with minor use of handrail. 3. Pt will be able to safely ambulate independently on various surfaces community distances with L knee c/o 1/10 or less. Rehabilitation Potential For Stated Goals: Excellent                 HISTORY:   History of Present Injury/Illness (Reason for Referral):   Pt reports chronic knee pain secondary to OA. Pt underwent recent left TKA 3/21/17. Pt reports she had her R knee replaced in Oct 2016 and is doing well with that \"new knee. \"  However, this knee (left) had more OA and worse symptoms prior to surgery and she reports increased pain with this recovery. Pt reports she is only using her walker now intermittently; however, feeling weak due to new blood pressure medication (hydralazine?). Pt reports that she would like to return to more active lifestyle to play with her grandkids. Pt was discharged from [facility] yesterday 4/27/17  Past Medical History/Comorbidities:   Ms. Mery Freeman  has a past medical history of Adverse effect of anesthesia; Anemia; Arthritis; Chronic kidney disease; Chronic pain; Follow-up visit for aortic valve replacement with bioprosthetic valve (7/25/2016); GERD (gastroesophageal reflux disease); Hypertension; Kidney stones; Morbid obesity ([address] Murmur, cardiac; Nausea & vomiting; Psychiatric disorder; PUD (peptic ulcer disease) (06/2016); S/P aortic valve replacement with bioprosthetic valve (4/28/2017); and Valvular heart disease (2016). Ms. Mery Freeman  has a past surgical history that includes urological (Multiple dates); shoulder arthroscopy (Right); lap cholecystectomy; gastric bypass; gi; cervical fusion; hysterectomy; bilateral salpingo-oophorectomy; heart catheterization; aortic valve replacement (6/9/2016); colonoscopy (N/A, 6/15/2016); knee replacement (Right, 10/10/2016); and heart valve surgery. Social History/Living Environment:     lives in one story home, her grown son lives in the home with her;   Prior Level of Function/Work/Activity: independent  Independent with all housework, cooking, etc.   Previous Treatment Approaches:          Pt had inpatient rehab at [facility] and [facility]  Current Medications:    Current Outpatient Prescriptions:     HYDROcodone-acetaminophen (Sofy Cruise) 7.5-325 mg per tablet, Take 1 Tab by mouth every six (6) hours as needed for Pain. Max Daily Amount: 4 Tabs., Disp: 120 Tab, Rfl: 0    [START ON 8/25/2017] HYDROcodone-acetaminophen (NORCO) 7.5-325 mg per tablet, Take 1 Tab by mouth every six (6) hours as needed for Pain.  Max Daily Amount: 4 Tabs., Disp: 60 Tab, Rfl: 0    [START ON 9/24/2017] HYDROcodone-acetaminophen (NORCO) 7.5-325 mg per tablet, Take 1 Tab by mouth every six (6) hours as needed for Pain. Max Daily Amount: 4 Tabs., Disp: 60 Tab, Rfl: 0    doxycycline (MONODOX) 100 mg capsule, Take 1 Cap by mouth two (2) times a day., Disp: 20 Cap, Rfl: 0    furosemide (LASIX) 20 mg tablet, Take 1 Tab by mouth daily. , Disp: 30 Tab, Rfl: 0    DOCUSATE CALCIUM (STOOL SOFTENER PO), Take  by mouth daily. , Disp: , Rfl:     amLODIPine (NORVASC) 5 mg tablet, Take 1 Tab by mouth daily. Indications: hypertension, Disp: 30 Tab, Rfl: 6    zolpidem (AMBIEN) 5 mg tablet, Take 1 Tab by mouth nightly as needed for Sleep. Max Daily Amount: 5 mg., Disp: 90 Tab, Rfl: 1    esomeprazole (NEXIUM) 40 mg capsule, Take 1 Cap by mouth daily. (Patient taking differently: Take 40 mg by mouth two (2) times a day.), Disp: 90 Cap, Rfl: 1    BENICAR 40 mg tablet, TAKE 1 TABLET BY MOUTH DAILY, Disp: 90 Tab, Rfl: 5    aspirin 81 mg chewable tablet, Take 1 Tab by mouth two (2) times a day. (Patient taking differently: Take 81 mg by mouth daily.), Disp: 60 Tab, Rfl: 0    carvedilol (COREG) 25 mg tablet, Take 1 Tab by mouth two (2) times daily (with meals). , Disp: 180 Tab, Rfl: 1    febuxostat (ULORIC) 40 mg tab tablet, Take 1 Tab by mouth every morning. Indications: GOUT PREVENTION, Disp: 90 Tab, Rfl: 3    atorvastatin (LIPITOR) 40 mg tablet, Take 1 Tab by mouth daily. (Patient taking differently: Take 40 mg by mouth daily. Per anesthesia protocol:instructed to take am of surgery.), Disp: 90 Tab, Rfl: 4    calcium-vitamin D 600 mg(1,500mg) -200 unit tab, Take 1 Tab by mouth daily. , Disp: , Rfl:     polyethylene glycol (MIRALAX) 17 gram/dose powder, Take 17 g by mouth daily as needed. , Disp: , Rfl:     ferrous sulfate 325 mg (65 mg iron) tablet, Take 1 Tab by mouth daily (with breakfast). , Disp: 30 Tab, Rfl: 1   Date Last Reviewed:  5/17/2017   EXAMINATION:   Observation/Orthostatic Postural Assessment:          Decreased weight bearing through LLE and increased flexion L knee  Palpation:          No erythema L knee; tightness around L knee with mod decreased scar mobility anterior L knee  ROM:    L knee AROM -15-90 °  AAROM -10-95 °    5/12/17 AROM L knee -10 -100 °           R knee AROM 0-115 °      Strength:     Date:  4/27/17 Date:   Date:     LE MMT Left Right Left Right Left Right   Hip Flex (L1/L2) 4-/5 4/5       Quad    ( L3/4) 4-/5 4+/5       Hamstring 4-/5 4+/5       Anterior Tib (L4/L5) 3-/5 drop foot for \"years\" 4+/5       Gastroc (S1/2) 4-/5 4+/5         Special Tests:  deferred  Neurological Screen: intact to light touch BLEs  Functional Mobility:         Gait/Ambulation:  Slow antalgic gait with decreased terminal knee extension LLE and decreased L knee flex during swing; R foot excessive supination (pt reports due to numerous foot fractures) and L foot mild foot drop        Transfers: Mod I with use of hands;         Bed Mobility:  Sleeps in recliner and reports cannot ly supine due to dizziness which is from previous surgery in past        Stairs:  Reports step-to pattern but has avoided her front stair entry-way and enters home without stairs  Balance:          Unable to single stance with either leg; significantly decreased dynamic balance   Body Structures Involved:  1. Nerves  2. Bones  3. Joints  4. Muscles Body Functions Affected:  1. Sensory/Pain  2. Neuromusculoskeletal  3. Movement Related Activities and Participation Affected:  1. General Tasks and Demands  2. Mobility  3. Self Care  4. Domestic Life  5. Community, Social and Civic Life   CLINICAL PRESENTATION:   CLINICAL DECISION MAKING:   Outcome Measure: Tool Used: Lower Extremity Functional Scale (LEFS)  Score:  Initial: 52/80 Most Recent: X/80 (Date: -- )   Interpretation of Score: 20 questions each scored on a 5 point scale with 0 representing \"extreme difficulty or unable to perform\" and 4 representing \"no difficulty\".   The lower the score, the greater the functional disability. 80/80 represents no disability. Minimal detectable change is 9 points. Score 80 79-63 62-48 47-32 31-16 15-1 0   Modifier CH CI CJ CK CL CM CN     ? Mobility - Walking and Moving Around:     - CURRENT STATUS: CJ - 20%-39% impaired, limited or restricted    - GOAL STATUS: CI - 1%-19% impaired, limited or restricted    - D/C STATUS:  ---------------To be determined---------------    Medical Necessity:   · Patient is expected to demonstrate progress in strength, range of motion, balance and coordination to increase independence with gait and functional activities. Reason for Services/Other Comments:  · Patient has demonstrated an improvement in functional level by becoming independent with HEP. · Patient continues to require skilled intervention due to needing to increase complexity of exercises . TREATMENT:   (In addition to Assessment/Re-Assessment sessions the following treatments were rendered)    Therapeutic Exercise: (see flow sheet below for minutes):  Exercises per grid below to improve mobility and strength. Required moderate verbal and tactile cues to promote proper body alignment and promote proper body mechanics. Aquatic Therapy (see flow sheet below for minutes): Aquatic treatment performed per flow grid for Decreased muscle strength, Decreased static/dynamic balance and reactive control, Decreased range of motion, Ease of movement and Low impact and reduced weight bearing activity. Cues provided for technique. Assistance by therapist provided for progression of exercises. Patient has difficulty with L knee mobility and strength.      Date: 4/27/17 5/8/17 5/10/17 5/12/17 5/17/17   Modalities: 15 minutes 10 min 10 min 10 min 10 min   IFC e-stim with ice L knee 15 minutes 10 min 10 min long sitting 10 min long sitting 10 min long sitting                   Manual Therapy: 8 minutes 10 min 10 min 10 min 10 min   Scar mobilization and soft tissue mobilization L knee 8 minutes 10 min 10 min 10 min 10 min   Patella mobs ( all directions) x30 each               Aquatic Exercise: Next 1.5# 50 min 2.5# 50 min 2.5# 50 min 2.5# 50 min   Gait F/B/S/M  x4L x4L x4L x4L no rail   Heel/Toe walk  x15 x15 x2L ea x2L   4-way  x10 BLE x10 BLE x15 BLE    PF/DF        Hamstring Curls  x4L x4L x4L x4L   Hip Flexion with knee ext. (rockette)    x2L x4L   Squats    x15 x15     SLR march  x4L x4L x4L x4L   Lunges   x15 BLE x15 BLE x15 BLE   Hip circles        Hip horizontal abduction/adduction        Core:          Core:        Deep well bike  4 min 4 min 4 min 4 min   Deep well jumping don  2 min 2 min 2 min 2 min   Deep well scissors  2 min 2 min 2 min 2 min   Deep well:  traction     3 min           Hamstring Stretch  2H30 2H30 2H30 2h30   Step Lunge  10H5 10H5 10H5 10H5   Piriformis stretch        PF Stretch        Balance: Single leg Stance        Balance: Tandem                          Therapeutic Exercise: 10 minutes       Quad sets x15 H 5       Heel slides x15 with green strap       Long seated hamstring/gastroc stretch 2 H 30       LAQ x15                       F=forward  B=Backward  S=sidesteps  M=marches    H=hold    Manual: (see flow sheet above for minutes) soft tissue mobilization distal L quad, ITB, hamstrings to increase mobility; scar cross-friction mobilization to decrease scar tissue; patella mobilizations superior/inferior and medial/lateral to increase mobility  Modalities: (see flow sheet above for minutes) IFC anterior L knee with ice anterior and posterior knee to decrease edema and pain to promote increased functional mobility; pt monitored and skin WNL afterwards    HEP: Reviewed current HEP from home health and advised pt continue to use ice for edema at least 3x/day for 10 minutes.   Pt also instructed to focus on gaining extension ROM focusing on keeping LE neutral position in long seated position rather than allowing for external rotation of LE to ease pain  Treatment/Session Assessment: Discontinue PT secondary to pt not returning    Recommendations/Intent for next treatment session: Discontinue PT secondary to pt cancelling remaining appointments and not returning        Chapis Beasley, PT

## 2017-08-15 ENCOUNTER — HOSPITAL ENCOUNTER (OUTPATIENT)
Dept: PHYSICAL THERAPY | Age: 67
Discharge: HOME | End: 2017-08-15
Payer: MEDICARE

## 2017-08-15 PROCEDURE — G8979 MOBILITY GOAL STATUS: HCPCS | Performed by: PHYSICAL THERAPIST

## 2017-08-15 PROCEDURE — 97110 THERAPEUTIC EXERCISES: CPT | Performed by: PHYSICAL THERAPIST

## 2017-08-15 PROCEDURE — G8978 MOBILITY CURRENT STATUS: HCPCS | Performed by: PHYSICAL THERAPIST

## 2017-08-15 PROCEDURE — 97163 PT EVAL HIGH COMPLEX 45 MIN: CPT | Performed by: PHYSICAL THERAPIST

## 2017-08-15 NOTE — PROGRESS NOTES
Ambulatory/Rehab Services H2 Model Falls Risk Assessment    Risk Factor Pts. ·   Confusion/Disorientation/Impulsivity  []    4 ·   Symptomatic Depression  []   2 ·   Altered Elimination  []   1 ·   Dizziness/Vertigo  []   1 ·   Gender (Male)  []   1 ·   Any administered antiepileptics (anticonvulsants):  []   2 ·   Any administered benzodiazepines:  []   1 ·   Visual Impairment (specify):  []   1 ·   Portable Oxygen Use  []   1 ·   Orthostatic ? BP  []   1 ·   History of Recent Falls (within 3 mos.)  []   5     Ability to Rise from Chair (choose one) Pts. ·   Ability to rise in a single movement  []   0 ·   Pushes up, successful in one attempt  [x]   1 ·   Multiple attempts, but successful  []   3 ·   Unable to rise without assistance  []   4   Total: (5 or greater = High Risk) 1     Falls Prevention Plan:   []                Physical Limitations to Exercise (specify):   []                Mobility Assistance Device (type):   []                Exercise/Equipment Adaptation (specify):    ©2010 [facility] [address] Patent #[id number].  Federal Law prohibits the replication, distribution or use without written permission from [facility] [employer]

## 2017-08-15 NOTE — PROGRESS NOTES
Jacqui Rodriguez  : 1950 [address] [facility] [address]  [address] [place] [address].  [phone]   [phone]        OUTPATIENT PHYSICAL THERAPY:Initial Assessment 8/15/2017      ICD-10: Treatment Diagnosis: Cervicalgia (M54.2)  Low back pain (M54.5)  Difficulty in walking, not elsewhere classified (R26.2)  Precautions/Allergies:   Latex; Sulfa (sulfonamide antibiotics); Macrobid [nitrofurantoin monohyd/m-cryst]; Other plant, animal, environmental; Oxycodone; and Tape [adhesive]   Fall Risk Score: 1 (? 5 = High Risk)  MD Orders: Evaluate and Treat Cervical ponyand LBP MEDICAL/REFERRING DIAGNOSIS:  Cervical spondylosis [M47.812]  DDD (degenerative disc disease), cervical [M50.30]  DDD (degenerative disc disease), lumbar [M51.36]  Radiculopathy, unspecified spinal region [M54.10]   DATE OF ONSET: chronic  REFERRING PHYSICIAN: Da Olivier MD  RETURN PHYSICIAN APPOINTMENT: 17     INITIAL ASSESSMENT:  Ms. Samia Gan presents with chronic LBP and cervical pain. Pt has DDD, stenosis, cervical spondylosis, and radiculopathy into UEs worse with R. Pt exhibits decreased cervical ROM, R shoulder ROM, decreased knee AROM, and decreased hip flexibility. Pt has poor posture and will benefit from PT for core and extremity strengthening to progress towards functional goals below. Pt has decreased tolerance for walking, transfers, standing, reaching, and lifting. Pt will benefit from aquatic PT to decrease load bearing on joints. PROBLEM LIST (Impacting functional limitations):  1. Decreased Strength  2. Decreased ADL/Functional Activities  3. Decreased Transfer Abilities  4. Decreased Ambulation Ability/Technique  5. Decreased Balance  6. Increased Pain  7. Decreased Activity Tolerance  8. Decreased Flexibility/Joint Mobility  9. Decreased Donley with Home Exercise Program INTERVENTIONS PLANNED:  1. Balance Exercise  2. Cold  3. Electrical Stimulation  4.  Gait Training  5. Heat  6. Home Exercise Program (HEP)  7. Manual Therapy  8. Neuromuscular Re-education/Strengthening  9. Range of Motion (ROM)  10. Therapeutic Activites  11. Therapeutic Exercise/Strengthening  12. Transcutaneous Electrical Nerve Stimulation (TENS)  13. Ultrasound (US)  14. aquatics   TREATMENT PLAN:  Effective Dates: 8/15/17 TO 11/7/17. Frequency/Duration: 2 times a week for 12 weeks  GOALS: (Goals have been discussed and agreed upon with patient.)  Short-Term Functional Goals: Time Frame: 4 weeks  1. Pt will be independent with HEP. 2. Pt will decrease LBP and cervical symptoms with ADLs by at least 25% with ADLs. 3. Pt will increase cervical AROM to R 5-10 ° to improve rotation with driving. 4. Pt will increase shoulder AROM flexion by at least 10 ° for reaching overhead. Discharge Goals: Time Frame: 8-12 weeks  1. Pt will be able to increase B UE strength to 4+/5 for lifting groceries. 2. Pt will be able to improve balance and have decreased fear of falling by being able to tandem stance at least 15 seconds. 3. Pt will be able to sit to stand at least 5x without use of UEs noting increasing functional strength. 4. Pt will decrease Oswestry score 15/50 or less noting improving functional status. 5. Pt will be able to report cervical symptoms less than 3/10 at all times with ADLs without c/o dropping objects with UEs due to symptoms. Rehabilitation Potential For Stated Goals: Good  Regarding Estefani Esparza's therapy, I certify that the treatment plan above will be carried out by a therapist or under their direction. Thank you for this referral,  Chapis Beasley, PT       Referring Physician Signature: Da Olivier MD              Date                      HISTORY:   History of Present Injury/Illness (Reason for Referral): Pt reports to PT due to cervical and lumbar DDD with cervical spondylosis/stenosis. Pt reports fear of falling due to decreased balance.  Pt reports LBP and bilateral knee stiffness. Pt had recent L TKA and March 2017 and had to stop recent PT here due to having low hemoglobin and then she also was having back/neck pain and was referred to Dr. Royer Parekh. Pt reports Dr. Tony Ontiveros did not want to do surgery and referred her to pain management. She received FERNIE in her cervical spine last week 8/10/17 which lessened some of the pain in her R cervical/chest area. Pt reports she has a 2nd injection scheduled next week. Pt has history of cervical fusion in 1997 and had 3 lumbar surgeries in the early 1980s. Pt also has increased cervical pain with intermittent numbness/tingling in fingers and increased difficulty reaching with R UE. Pt has had limited R shoulder ROM but pt notes it has worsened as she knows her posture has declined. Pt would like to decrease pain and be able to walk better, transfer more easily, as well as use her R UE better for reaching. Pt reports she has dropped light bjects due to UE symptoms. Past Medical History/Comorbidities:   Ms. Ayala Westfall  has a past medical history of Adverse effect of anesthesia; Anemia; Arthritis; Chronic kidney disease; Chronic pain; Follow-up visit for aortic valve replacement with bioprosthetic valve (7/25/2016); GERD (gastroesophageal reflux disease); Hypertension; Kidney stones; Morbid obesity ([address] Murmur, cardiac; Nausea & vomiting; Psychiatric disorder; PUD (peptic ulcer disease) (06/2016); S/P aortic valve replacement with bioprosthetic valve (4/28/2017); and Valvular heart disease (2016). Ms. Ayala Westfall  has a past surgical history that includes urological (Multiple dates); shoulder arthroscopy (Right); lap cholecystectomy; gastric bypass; gi; cervical fusion; hysterectomy; bilateral salpingo-oophorectomy; heart catheterization; aortic valve replacement (6/9/2016); colonoscopy (N/A, 6/15/2016); knee replacement (Right, 10/10/2016); and heart valve surgery.  L TKA (3/21/17) cervical fusion was in 1997, 3x lumbar surgeries in the early 46s   Social History/Living Environment:    lives in one story home, her grown son lives in the home with her  Prior Level of Function/Work/Activity:  Independent with all housework, cooking, etc.   Current Medications:    Current Outpatient Prescriptions:     furosemide (LASIX) 20 mg tablet, Take 1 Tab by mouth daily. Indications: Edema, Disp: 90 Tab, Rfl: 0    zolpidem (AMBIEN) 5 mg tablet, Take 1 Tab by mouth nightly as needed for Sleep. Max Daily Amount: 5 mg., Disp: 90 Tab, Rfl: 1    HYDROcodone-acetaminophen (NORCO) 7.5-325 mg per tablet, Take 1 Tab by mouth every six (6) hours as needed for Pain. Max Daily Amount: 4 Tabs., Disp: 120 Tab, Rfl: 0    [START ON 8/25/2017] HYDROcodone-acetaminophen (NORCO) 7.5-325 mg per tablet, Take 1 Tab by mouth every six (6) hours as needed for Pain. Max Daily Amount: 4 Tabs., Disp: 60 Tab, Rfl: 0    [START ON 9/24/2017] HYDROcodone-acetaminophen (NORCO) 7.5-325 mg per tablet, Take 1 Tab by mouth every six (6) hours as needed for Pain. Max Daily Amount: 4 Tabs., Disp: 60 Tab, Rfl: 0    doxycycline (MONODOX) 100 mg capsule, Take 1 Cap by mouth two (2) times a day., Disp: 20 Cap, Rfl: 0    DOCUSATE CALCIUM (STOOL SOFTENER PO), Take  by mouth daily. , Disp: , Rfl:     amLODIPine (NORVASC) 5 mg tablet, Take 1 Tab by mouth daily. Indications: hypertension, Disp: 30 Tab, Rfl: 6    esomeprazole (NEXIUM) 40 mg capsule, Take 1 Cap by mouth daily. (Patient taking differently: Take 40 mg by mouth two (2) times a day.), Disp: 90 Cap, Rfl: 1    BENICAR 40 mg tablet, TAKE 1 TABLET BY MOUTH DAILY, Disp: 90 Tab, Rfl: 5    aspirin 81 mg chewable tablet, Take 1 Tab by mouth two (2) times a day. (Patient taking differently: Take 81 mg by mouth daily.), Disp: 60 Tab, Rfl: 0    carvedilol (COREG) 25 mg tablet, Take 1 Tab by mouth two (2) times daily (with meals). , Disp: 180 Tab, Rfl: 1    febuxostat (ULORIC) 40 mg tab tablet, Take 1 Tab by mouth every morning.  Indications: GOUT PREVENTION, Disp: 90 Tab, Rfl: 3    calcium-vitamin D 600 mg(1,500mg) -200 unit tab, Take 1 Tab by mouth daily. , Disp: , Rfl:     polyethylene glycol (MIRALAX) 17 gram/dose powder, Take 17 g by mouth daily as needed. , Disp: , Rfl:     ferrous sulfate 325 mg (65 mg iron) tablet, Take 1 Tab by mouth daily (with breakfast). , Disp: 30 Tab, Rfl: 1   Potassium   Gabapentin   Takes Norco only half pill every 4-5days  Date Last Reviewed:  8/15/2017   # of Personal Factors/Comorbidities that affect the Plan of Care: 3+: HIGH COMPLEXITY   EXAMINATION:   Observation/Orthostatic Postural Assessment:          Significant forward head posture with increased kyphosis; in standing weight shifted more to RLE   Palpation:          Very tight upper traps bilaterally with trigger points throughout  ROM:    Cervical flexion WNL  extension limited to 40 °  Cervical lateral flexion to R increases pain and rotation to R increases pain  AROM cervical Rotation R 65 °  L 80 °    Lumbar flexion WFL  Lumbar extension limited to ~20 °  sidebending WFL    LEs hip flexion and ER WFL but limited IR to 10 ° bilaterally    AROM knee l -5-110 °  R  0-115 °    SLR 68 ° bilaterally    Note L dorsiflexion limited secondary to drop foot but also decreased flexibility    Shoulder AROM R 90 °  L 150 °     Abduction R 85 °      L 160 °     ER           R: 5 °        L: WFL    Elbow and wrist WFL        Strength:     Date:  8/15/17 Date:   Date:     LE MMT Left Right Left Right Left Right   Hip Flex (L1/L2) 4-/5 4/5       Hip Abd (glut med) 4-/5 4-/5       Quad    ( L3/4) 4-/5 4+/5       Hamstring 4/5 4+/5       Anterior Tib (L4/L5) 3-/5 4+/5       Gastroc (S1/2) 3/5 3-/5       EHL (L5) Not tested                 UE grossly  4-/5 4-/5         Special Tests: Spurlings (+ R)  SLR (--), Hip Scour (---)  Neurological Screen: intact to light touch but reports intermittent numbness and tingling fingers; relieves R finger tingling by cervical sidebending to the L  Functional Mobility:         Gait/Ambulation:  Slow antalgic gait with decreased terminal knee extension LLE and decreased stance time LLE;  R foot excessive supination (pt reports due to numerous foot fractures) and L foot mild foot drop        Transfers: Mod I sit to stand but uses hands         Bed Mobility:  Independent but has to avoid supine as reports old injury and surgeries causing dizziness        Stairs:  Step-to pattern   Balance:          Decreased dynamic; Romberg can stand at least 30 seconds; unable to single leg stance or tandem   Body Structures Involved:  1. Nerves  2. Bones  3. Joints  4. Muscles Body Functions Affected:  1. Sensory/Pain  2. Neuromusculoskeletal  3. Movement Related Activities and Participation Affected:  1. General Tasks and Demands  2. Mobility  3. Self Care  4. Domestic Life  5. Community, Social and [place]   # of elements that affect the Plan of Care: 4+: HIGH COMPLEXITY   CLINICAL PRESENTATION:   Presentation: Evolving clinical presentation with unstable and unpredictable characteristics: HIGH COMPLEXITY   CLINICAL DECISION MAKING:   Outcome Measure: Tool Used: Modified Oswestry Low Back Pain Questionnaire  Score:  Initial: 23/50  Most Recent: X/50 (Date: -- )   Interpretation of Score: Each section is scored on a 0-5 scale, 5 representing the greatest disability. The scores of each section are added together for a total score of 50. Score 0 1-10 11-20 21-30 31-40 41-49 50   Modifier CH CI CJ CK CL CM CN     ? Mobility - Walking and Moving Around:     - CURRENT STATUS: CK - 40%-59% impaired, limited or restricted    - GOAL STATUS: CJ - 20%-39% impaired, limited or restricted    - D/C STATUS:  ---------------To be determined---------------      Medical Necessity:   · Patient is expected to demonstrate progress in strength, range of motion, balance and functional technique to decrease pain and improve tolerance with daily activities.   Reason for Services/Other Comments:  · Patient continues to require modification of therapeutic interventions to increase complexity of exercises. Use of outcome tool(s) and clinical judgement create a POC that gives a: Difficult prediction of patient's progress: HIGH COMPLEXITY   TREATMENT:   (In addition to Assessment/Re-Assessment sessions the following treatments were rendered)  Therapeutic Exercise: (see flow sheet below for minutes):  Exercises per grid below to improve mobility and strength. Required minimal verbal and tactile cues to promote proper body alignment and promote proper body mechanics. Aquatic Therapy (see flow sheet below for minutes): Aquatic treatment performed per flow grid for Decreased muscle strength, Decreased range of motion, Decompression and Low impact and reduced weight bearing activity. Verbal and visual cues provided for technique and core stabilization. Assistance by therapist provided for progression of exercises. Date: 8/15/17       Modalities:                                Manual Therapy:                                Aquatic Exercise: next       Gait F/B/S/M        Heel/Toe walk        4-way        PF/DF        Hamstring Curls        Hip Flexion with knee ext.   (rockette)        Squats          SLR march        Lunges        Hip circles        Hip horizontal abduction/adduction        Core:          Core:        Deep well bike        Deep well jumping don        Deep well scissors        Deep well:  traction                Hamstring Stretch        Step Lunge        Piriformis stretch        PF Stretch        Balance: Single leg Stance        Balance: Tandem                          Therapeutic Exercise: 15 minutes       Shoulder AAROM t-bar supine external rotation at 20 ° abduction x15       Supine shoulder flexion t-bar AAROM x10       scapt-retract x12       Upper trap stretch R only so sidebending to L  2 H 20       Hamstring stretch with strap 3 H 30       Single knee to chest 2 H 20               F=forward  B=Backward  S=sidesteps  M=marches    H=hold    Manual: (see flow sheet above for minutes) ----  Modalities: (see flow sheet above for minutes) ----    HEP: Pt instructed in above exercises at evaluation and issued handout. Treatment/Session Assessment: During evaluation, pt had 3 episodes of hamstring cramping. Pt was able to rest and stretch to relieve those symptoms. Pt was instructed with HEP and  Pt demonstrated good form with exercises with very little verbal and tactile feedback. Pt reports hamstring stretching helps relieve LBP and scapular retractions as well as R upper trap stretching decreased cervical symptoms. · Pain/ Symptoms: Initial:   7/10 cervical radiating pain in R chest area sharp/stabbing, LBP, B knee stiffness    Pt reports fear of falling due to decreased balance. Pt reports LBP with bilateral knee stiffness. Pt also has increased cervical pain with intermittent numbness/tingling in fingers and increased difficulty reaching with R UE. Post Session:  6/10 \"The exercises felt good. I do not have as much pain. \" ·   Compliance with Program/Exercises: Will assess as treatment progresses. · Recommendations/Intent for next treatment session: \"Next visit will focus on beginning aquatics\".   Total Treatment Duration:  PT Patient Time In/Time Out  Time In: 1610  Time Out: 1700     Chapis Beasley, PT

## 2017-08-18 ENCOUNTER — APPOINTMENT (OUTPATIENT)
Dept: PHYSICAL THERAPY | Age: 67
End: 2017-08-18
Payer: MEDICARE

## 2017-08-21 ENCOUNTER — APPOINTMENT (OUTPATIENT)
Dept: PHYSICAL THERAPY | Age: 67
End: 2017-08-21
Payer: MEDICARE

## 2017-08-21 ENCOUNTER — HOSPITAL ENCOUNTER (OUTPATIENT)
Dept: PHYSICAL THERAPY | Age: 67
Discharge: HOME | End: 2017-08-21
Payer: MEDICARE

## 2017-08-21 PROCEDURE — 97113 AQUATIC THERAPY/EXERCISES: CPT

## 2017-08-23 ENCOUNTER — APPOINTMENT (OUTPATIENT)
Dept: PHYSICAL THERAPY | Age: 67
End: 2017-08-23
Payer: MEDICARE

## 2017-08-25 ENCOUNTER — APPOINTMENT (OUTPATIENT)
Dept: PHYSICAL THERAPY | Age: 67
End: 2017-08-25
Payer: MEDICARE

## 2017-08-28 ENCOUNTER — APPOINTMENT (OUTPATIENT)
Dept: PHYSICAL THERAPY | Age: 67
End: 2017-08-28
Payer: MEDICARE

## 2017-08-28 ENCOUNTER — HOSPITAL ENCOUNTER (OUTPATIENT)
Dept: PHYSICAL THERAPY | Age: 67
Discharge: HOME | End: 2017-08-28
Payer: MEDICARE

## 2017-08-28 PROCEDURE — 97113 AQUATIC THERAPY/EXERCISES: CPT

## 2017-08-28 NOTE — PROGRESS NOTES
Calista Rdidle  : 1950 [id number] [facility],[address]  [address] [place] [address].  [phone]   [phone]        OUTPATIENT PHYSICAL THERAPY:Daily Note 2017      ICD-10: Treatment Diagnosis: Cervicalgia (M54.2)  Low back pain (M54.5)  Difficulty in walking, not elsewhere classified (R26.2)  Precautions/Allergies:   Latex; Sulfa (sulfonamide antibiotics); Macrobid [nitrofurantoin monohyd/m-cryst]; Other plant, animal, environmental; Oxycodone; and Tape [adhesive]   Fall Risk Score: 1 (? 5 = High Risk)  MD Orders: Evaluate and Treat Cervical ponyand LBP MEDICAL/REFERRING DIAGNOSIS:  Cervical spondylosis [M47.812]  DDD (degenerative disc disease), cervical [M50.30]  DDD (degenerative disc disease), lumbar [M51.36]  Radiculopathy, unspecified spinal region [M54.10]   DATE OF ONSET: chronic  REFERRING PHYSICIAN: Glenroy Nolan MD  RETURN PHYSICIAN APPOINTMENT: 17     INITIAL ASSESSMENT:  Ms. Thalia Garcia presents with chronic LBP and cervical pain. Pt has DDD, stenosis, cervical spondylosis, and radiculopathy into UEs worse with R. Pt exhibits decreased cervical ROM, R shoulder ROM, decreased knee AROM, and decreased hip flexibility. Pt has poor posture and will benefit from PT for core and extremity strengthening to progress towards functional goals below. Pt has decreased tolerance for walking, transfers, standing, reaching, and lifting. Pt will benefit from aquatic PT to decrease load bearing on joints. PROBLEM LIST (Impacting functional limitations):  1. Decreased Strength  2. Decreased ADL/Functional Activities  3. Decreased Transfer Abilities  4. Decreased Ambulation Ability/Technique  5. Decreased Balance  6. Increased Pain  7. Decreased Activity Tolerance  8. Decreased Flexibility/Joint Mobility  9. Decreased [place] with Home Exercise Program INTERVENTIONS PLANNED:  1. Balance Exercise  2. Cold  3. Electrical Stimulation  4.  Gait Training  5. Heat  6. Home Exercise Program (HEP)  7. Manual Therapy  8. Neuromuscular Re-education/Strengthening  9. Range of Motion (ROM)  10. Therapeutic Activites  11. Therapeutic Exercise/Strengthening  12. Transcutaneous Electrical Nerve Stimulation (TENS)  13. Ultrasound (US)  14. aquatics   TREATMENT PLAN:  Effective Dates: 8/15/17 TO 11/7/17. Frequency/Duration: 2 times a week for 12 weeks  GOALS: (Goals have been discussed and agreed upon with patient.)  Short-Term Functional Goals: Time Frame: 4 weeks  1. Pt will be independent with HEP. 2. Pt will decrease LBP and cervical symptoms with ADLs by at least 25% with ADLs. 3. Pt will increase cervical AROM to R 5-10 ° to improve rotation with driving. 4. Pt will increase shoulder AROM flexion by at least 10 ° for reaching overhead. Discharge Goals: Time Frame: 8-12 weeks  1. Pt will be able to increase B UE strength to 4+/5 for lifting groceries. 2. Pt will be able to improve balance and have decreased fear of falling by being able to tandem stance at least 15 seconds. 3. Pt will be able to sit to stand at least 5x without use of UEs noting increasing functional strength. 4. Pt will decrease Oswestry score 15/50 or less noting improving functional status. 5. Pt will be able to report cervical symptoms less than 3/10 at all times with ADLs without c/o dropping objects with UEs due to symptoms. Rehabilitation Potential For Stated Goals: Good                       HISTORY:   History of Present Injury/Illness (Reason for Referral): Pt reports to PT due to cervical and lumbar DDD with cervical spondylosis/stenosis. Pt reports fear of falling due to decreased balance. Pt reports LBP and bilateral knee stiffness. Pt had recent L TKA and March 2017 and had to stop recent PT here due to having low hemoglobin and then she also was having back/neck pain and was referred to Dr. Yesenia Fofana.  Pt reports Dr. Miguelito Barajas did not want to do surgery and referred her to pain management. She received FERNIE in her cervical spine last week 8/10/17 which lessened some of the pain in her R cervical/chest area. Pt reports she has a 2nd injection scheduled next week. Pt has history of cervical fusion in 1997 and had 3 lumbar surgeries in the early 1980s. Pt also has increased cervical pain with intermittent numbness/tingling in fingers and increased difficulty reaching with R UE. Pt has had limited R shoulder ROM but pt notes it has worsened as she knows her posture has declined. Pt would like to decrease pain and be able to walk better, transfer more easily, as well as use her R UE better for reaching. Pt reports she has dropped light bjects due to UE symptoms. Past Medical History/Comorbidities:   Ms. Cornelio Hernandez  has a past medical history of Adverse effect of anesthesia; Anemia; Arthritis; Chronic kidney disease; Chronic pain; Follow-up visit for aortic valve replacement with bioprosthetic valve (7/25/2016); GERD (gastroesophageal reflux disease); Hypertension; Kidney stones; Morbid obesity ([address] Murmur, cardiac; Nausea & vomiting; Psychiatric disorder; PUD (peptic ulcer disease) (06/2016); S/P aortic valve replacement with bioprosthetic valve (4/28/2017); and Valvular heart disease (2016). Ms. Cornelio Hernandez  has a past surgical history that includes urological (Multiple dates); shoulder arthroscopy (Right); lap cholecystectomy; gastric bypass; gi; cervical fusion; hysterectomy; bilateral salpingo-oophorectomy; heart catheterization; aortic valve replacement (6/9/2016); colonoscopy (N/A, 6/15/2016); knee replacement (Right, 10/10/2016); and heart valve surgery.  L TKA (3/21/17) cervical fusion was in 1997, 3x lumbar surgeries in the early 1980s   Social History/Living Environment:    lives in one story home, her grown son lives in the home with her  Prior Level of Function/Work/Activity:  Independent with all housework, cooking, etc.   Current Medications:    Current Outpatient Prescriptions:     furosemide (LASIX) 20 mg tablet, Take 1 Tab by mouth daily. Indications: Edema, Disp: 90 Tab, Rfl: 0    zolpidem (AMBIEN) 5 mg tablet, Take 1 Tab by mouth nightly as needed for Sleep. Max Daily Amount: 5 mg., Disp: 90 Tab, Rfl: 1    HYDROcodone-acetaminophen (NORCO) 7.5-325 mg per tablet, Take 1 Tab by mouth every six (6) hours as needed for Pain. Max Daily Amount: 4 Tabs., Disp: 120 Tab, Rfl: 0    [START ON 8/25/2017] HYDROcodone-acetaminophen (NORCO) 7.5-325 mg per tablet, Take 1 Tab by mouth every six (6) hours as needed for Pain. Max Daily Amount: 4 Tabs., Disp: 60 Tab, Rfl: 0    [START ON 9/24/2017] HYDROcodone-acetaminophen (NORCO) 7.5-325 mg per tablet, Take 1 Tab by mouth every six (6) hours as needed for Pain. Max Daily Amount: 4 Tabs., Disp: 60 Tab, Rfl: 0    doxycycline (MONODOX) 100 mg capsule, Take 1 Cap by mouth two (2) times a day., Disp: 20 Cap, Rfl: 0    DOCUSATE CALCIUM (STOOL SOFTENER PO), Take  by mouth daily. , Disp: , Rfl:     amLODIPine (NORVASC) 5 mg tablet, Take 1 Tab by mouth daily. Indications: hypertension, Disp: 30 Tab, Rfl: 6    esomeprazole (NEXIUM) 40 mg capsule, Take 1 Cap by mouth daily. (Patient taking differently: Take 40 mg by mouth two (2) times a day.), Disp: 90 Cap, Rfl: 1    BENICAR 40 mg tablet, TAKE 1 TABLET BY MOUTH DAILY, Disp: 90 Tab, Rfl: 5    aspirin 81 mg chewable tablet, Take 1 Tab by mouth two (2) times a day. (Patient taking differently: Take 81 mg by mouth daily.), Disp: 60 Tab, Rfl: 0    carvedilol (COREG) 25 mg tablet, Take 1 Tab by mouth two (2) times daily (with meals). , Disp: 180 Tab, Rfl: 1    febuxostat (ULORIC) 40 mg tab tablet, Take 1 Tab by mouth every morning. Indications: GOUT PREVENTION, Disp: 90 Tab, Rfl: 3    calcium-vitamin D 600 mg(1,500mg) -200 unit tab, Take 1 Tab by mouth daily. , Disp: , Rfl:     polyethylene glycol (MIRALAX) 17 gram/dose powder, Take 17 g by mouth daily as needed. , Disp: , Rfl:     ferrous sulfate 325 mg (65 mg iron) tablet, Take 1 Tab by mouth daily (with breakfast). , Disp: 30 Tab, Rfl: 1   Potassium   Gabapentin   Takes Norco only half pill every 4-5days  Date Last Reviewed:  8/28/2017   EXAMINATION:   Observation/Orthostatic Postural Assessment:          Significant forward head posture with increased kyphosis; in standing weight shifted more to RLE   Palpation:          Very tight upper traps bilaterally with trigger points throughout  ROM:    Cervical flexion WNL  extension limited to 40 °  Cervical lateral flexion to R increases pain and rotation to R increases pain  AROM cervical Rotation R 65 °  L 80 °    Lumbar flexion WFL  Lumbar extension limited to ~20 °  sidebending WFL    LEs hip flexion and ER WFL but limited IR to 10 ° bilaterally    AROM knee l -5-110 °  R  0-115 °    SLR 68 ° bilaterally    Note L dorsiflexion limited secondary to drop foot but also decreased flexibility    Shoulder AROM R 90 °  L 150 °     Abduction R 85 °      L 160 °     ER           R: 5 °        L: WFL    Elbow and wrist WFL        Strength:     Date:  8/15/17 Date:   Date:     LE MMT Left Right Left Right Left Right   Hip Flex (L1/L2) 4-/5 4/5       Hip Abd (glut med) 4-/5 4-/5       Quad    ( L3/4) 4-/5 4+/5       Hamstring 4/5 4+/5       Anterior Tib (L4/L5) 3-/5 4+/5       Gastroc (S1/2) 3/5 3-/5       EHL (L5) Not tested                 UE grossly  4-/5 4-/5         Special Tests: Spurlings (+ R)  SLR (--), Hip Scour (---)  Neurological Screen: intact to light touch but reports intermittent numbness and tingling fingers; relieves R finger tingling by cervical sidebending to the L  Functional Mobility:         Gait/Ambulation:  Slow antalgic gait with decreased terminal knee extension LLE and decreased stance time LLE;  R foot excessive supination (pt reports due to numerous foot fractures) and L foot mild foot drop        Transfers:   Mod I sit to stand but uses hands         Bed Mobility:  Independent but has to avoid supine as reports old injury and surgeries causing dizziness        Stairs:  Step-to pattern   Balance:          Decreased dynamic; Romberg can stand at least 30 seconds; unable to single leg stance or tandem   Body Structures Involved:  1. Nerves  2. Bones  3. Joints  4. Muscles Body Functions Affected:  1. Sensory/Pain  2. Neuromusculoskeletal  3. Movement Related Activities and Participation Affected:  1. General Tasks and Demands  2. Mobility  3. Self Care  4. Domestic Life  5. Community, Social and Civic Life   CLINICAL PRESENTATION:   CLINICAL DECISION MAKING:   Outcome Measure: Tool Used: Modified Oswestry Low Back Pain Questionnaire  Score:  Initial: 23/50  Most Recent: X/50 (Date: -- )   Interpretation of Score: Each section is scored on a 0-5 scale, 5 representing the greatest disability. The scores of each section are added together for a total score of 50. Score 0 1-10 11-20 21-30 31-40 41-49 50   Modifier CH CI CJ CK CL CM CN     ? Mobility - Walking and Moving Around:     - CURRENT STATUS: CK - 40%-59% impaired, limited or restricted    - GOAL STATUS: CJ - 20%-39% impaired, limited or restricted    - D/C STATUS:  ---------------To be determined---------------      Medical Necessity:   · Patient is expected to demonstrate progress in strength, range of motion, balance and functional technique to decrease pain and improve tolerance with daily activities. Reason for Services/Other Comments:  · Patient continues to require modification of therapeutic interventions to increase complexity of exercises. TREATMENT:   (In addition to Assessment/Re-Assessment sessions the following treatments were rendered)  Therapeutic Exercise: (see flow sheet below for minutes):  Exercises per grid below to improve mobility and strength. Required minimal verbal and tactile cues to promote proper body alignment and promote proper body mechanics.      Aquatic Therapy (see flow sheet below for minutes): Aquatic treatment performed per flow grid for Decreased muscle strength, Decreased range of motion, Decompression and Low impact and reduced weight bearing activity. Verbal and visual cues provided for technique and core stabilization. Assistance by therapist provided for progression of exercises. Date: 8/15/17 8/21/17 8/28/17     Modalities:                                Manual Therapy:                                Aquatic Exercise: next 1.5# 45 min 1.5# 55 min     Gait F/B/S/M  x4L x4L     Heel/Toe walk        4-way        PF/DF        Hamstring Curls  x4L x4L     Hip Flexion with knee ext. (rockette)        Squats     x15 w/rail     SLR march  x4L x4L     Lunges        Hip circles        Hip horizontal abduction/adduction        Core:          Core:        Deep well bike  2 min 3 min     Deep well jumping don  1 min 2 min     Deep well scissors  1 min 2 min     Deep well:  traction   8 min             Hamstring Stretch   2h 30 sec     Step Lunge        Piriformis stretch   2h 30 sec     PF Stretch        Balance: Single leg Stance        Balance: Tandem                          Therapeutic Exercise: 15 minutes       Shoulder AAROM t-bar supine external rotation at 20 ° abduction x15       Supine shoulder flexion t-bar AAROM x10       scapt-retract x12       Upper trap stretch R only so sidebending to L  2 H 20       Hamstring stretch with strap 3 H 30       Single knee to chest 2 H 20               F=forward  B=Backward  S=sidesteps  M=marches    H=hold    Manual: (see flow sheet above for minutes) ----  Modalities: (see flow sheet above for minutes) ----    HEP: Pt instructed in above exercises at evaluation and issued handout. Treatment/Session Assessment: Continued aquatics with pt tolerating well, demonstrates decreased core strength/stability. Pt progressed today from previous visit, but continues to show signs of fatigue due to decreased endurance/strength.  Plan to progress as tolerated with increased weight at next visit. · Pain/ Symptoms: Initial:  0/10. Post Session:  0/10 ·   Compliance with Program/Exercises: Will assess as treatment progresses. · Recommendations/Intent for next treatment session: \"Next visit will focus on more challenging activities\". Continue aquatics.   Total Treatment Duration:  PT Patient Time In/Time Out  Time In: 0930  Time Out: 840 [facility] -old CVA found on CT  -patient would benefit from aspirin and possibly statin however given's patient age and non-compliance pt may not be ideal candidate for starting new medications -patient with history of HTN, not taking medications  -will start norvasc 5mg and promote compliance

## 2017-08-30 ENCOUNTER — APPOINTMENT (OUTPATIENT)
Dept: PHYSICAL THERAPY | Age: 67
End: 2017-08-30
Payer: MEDICARE

## 2017-08-31 ENCOUNTER — HOSPITAL ENCOUNTER (OUTPATIENT)
Dept: PHYSICAL THERAPY | Age: 67
Discharge: HOME | End: 2017-08-31
Payer: MEDICARE

## 2017-08-31 PROCEDURE — 97113 AQUATIC THERAPY/EXERCISES: CPT

## 2017-08-31 NOTE — PROGRESS NOTES
Chinyere Jones  : 1950 [id number] [facility],[address]  [address] [place] [address].  [phone]   [phone]        OUTPATIENT PHYSICAL THERAPY:Daily Note 2017      ICD-10: Treatment Diagnosis: Cervicalgia (M54.2)  Low back pain (M54.5)  Difficulty in walking, not elsewhere classified (R26.2)  Precautions/Allergies:   Latex; Sulfa (sulfonamide antibiotics); Macrobid [nitrofurantoin monohyd/m-cryst]; Other plant, animal, environmental; Oxycodone; and Tape [adhesive]   Fall Risk Score: 1 (? 5 = High Risk)  MD Orders: Evaluate and Treat Cervical ponyand LBP MEDICAL/REFERRING DIAGNOSIS:  Cervical spondylosis [M47.812]  DDD (degenerative disc disease), cervical [M50.30]  DDD (degenerative disc disease), lumbar [M51.36]  Radiculopathy, unspecified spinal region [M54.10]   DATE OF ONSET: chronic  REFERRING PHYSICIAN: Jabier Olson MD  RETURN PHYSICIAN APPOINTMENT: 17     INITIAL ASSESSMENT:  Ms. Karina Campbell presents with chronic LBP and cervical pain. Pt has DDD, stenosis, cervical spondylosis, and radiculopathy into UEs worse with R. Pt exhibits decreased cervical ROM, R shoulder ROM, decreased knee AROM, and decreased hip flexibility. Pt has poor posture and will benefit from PT for core and extremity strengthening to progress towards functional goals below. Pt has decreased tolerance for walking, transfers, standing, reaching, and lifting. Pt will benefit from aquatic PT to decrease load bearing on joints. PROBLEM LIST (Impacting functional limitations):  1. Decreased Strength  2. Decreased ADL/Functional Activities  3. Decreased Transfer Abilities  4. Decreased Ambulation Ability/Technique  5. Decreased Balance  6. Increased Pain  7. Decreased Activity Tolerance  8. Decreased Flexibility/Joint Mobility  9. Decreased Turner with Home Exercise Program INTERVENTIONS PLANNED:  1. Balance Exercise  2. Cold  3. Electrical Stimulation  4.  Gait Training  5. Heat  6. Home Exercise Program (HEP)  7. Manual Therapy  8. Neuromuscular Re-education/Strengthening  9. Range of Motion (ROM)  10. Therapeutic Activites  11. Therapeutic Exercise/Strengthening  12. Transcutaneous Electrical Nerve Stimulation (TENS)  13. Ultrasound (US)  14. aquatics   TREATMENT PLAN:  Effective Dates: 8/15/17 TO 11/7/17. Frequency/Duration: 2 times a week for 12 weeks  GOALS: (Goals have been discussed and agreed upon with patient.)  Short-Term Functional Goals: Time Frame: 4 weeks  1. Pt will be independent with HEP. 2. Pt will decrease LBP and cervical symptoms with ADLs by at least 25% with ADLs. 3. Pt will increase cervical AROM to R 5-10 ° to improve rotation with driving. 4. Pt will increase shoulder AROM flexion by at least 10 ° for reaching overhead. Discharge Goals: Time Frame: 8-12 weeks  1. Pt will be able to increase B UE strength to 4+/5 for lifting groceries. 2. Pt will be able to improve balance and have decreased fear of falling by being able to tandem stance at least 15 seconds. 3. Pt will be able to sit to stand at least 5x without use of UEs noting increasing functional strength. 4. Pt will decrease Oswestry score 15/50 or less noting improving functional status. 5. Pt will be able to report cervical symptoms less than 3/10 at all times with ADLs without c/o dropping objects with UEs due to symptoms. Rehabilitation Potential For Stated Goals: Good                       HISTORY:   History of Present Injury/Illness (Reason for Referral): Pt reports to PT due to cervical and lumbar DDD with cervical spondylosis/stenosis. Pt reports fear of falling due to decreased balance. Pt reports LBP and bilateral knee stiffness. Pt had recent L TKA and March 2017 and had to stop recent PT here due to having low hemoglobin and then she also was having back/neck pain and was referred to Dr. Jorgensen Dears.  Pt reports Dr. Mohan Bartlett did not want to do surgery and referred her to pain management. She received FERNIE in her cervical spine last week 8/10/17 which lessened some of the pain in her R cervical/chest area. Pt reports she has a 2nd injection scheduled next week. Pt has history of cervical fusion in 1997 and had 3 lumbar surgeries in the early 1980s. Pt also has increased cervical pain with intermittent numbness/tingling in fingers and increased difficulty reaching with R UE. Pt has had limited R shoulder ROM but pt notes it has worsened as she knows her posture has declined. Pt would like to decrease pain and be able to walk better, transfer more easily, as well as use her R UE better for reaching. Pt reports she has dropped light bjects due to UE symptoms. Past Medical History/Comorbidities:   Ms. Brenda Hinds  has a past medical history of Adverse effect of anesthesia; Anemia; Arthritis; Chronic kidney disease; Chronic pain; Follow-up visit for aortic valve replacement with bioprosthetic valve (7/25/2016); GERD (gastroesophageal reflux disease); Hypertension; Kidney stones; Morbid obesity ([address] Murmur, cardiac; Nausea & vomiting; Psychiatric disorder; PUD (peptic ulcer disease) (06/2016); S/P aortic valve replacement with bioprosthetic valve (4/28/2017); and Valvular heart disease (2016). Ms. Brenda Hinds  has a past surgical history that includes urological (Multiple dates); shoulder arthroscopy (Right); lap cholecystectomy; gastric bypass; gi; cervical fusion; hysterectomy; bilateral salpingo-oophorectomy; heart catheterization; aortic valve replacement (6/9/2016); colonoscopy (N/A, 6/15/2016); knee replacement (Right, 10/10/2016); and heart valve surgery.  L TKA (3/21/17) cervical fusion was in 1997, 3x lumbar surgeries in the early 1980s   Social History/Living Environment:    lives in one story home, her grown son lives in the home with her  Prior Level of Function/Work/Activity:  Independent with all housework, cooking, etc.   Current Medications:    Current Outpatient Prescriptions:     furosemide (LASIX) 20 mg tablet, Take 1 Tab by mouth daily. Indications: Edema, Disp: 90 Tab, Rfl: 0    zolpidem (AMBIEN) 5 mg tablet, Take 1 Tab by mouth nightly as needed for Sleep. Max Daily Amount: 5 mg., Disp: 90 Tab, Rfl: 1    HYDROcodone-acetaminophen (NORCO) 7.5-325 mg per tablet, Take 1 Tab by mouth every six (6) hours as needed for Pain. Max Daily Amount: 4 Tabs., Disp: 120 Tab, Rfl: 0    [START ON 8/25/2017] HYDROcodone-acetaminophen (NORCO) 7.5-325 mg per tablet, Take 1 Tab by mouth every six (6) hours as needed for Pain. Max Daily Amount: 4 Tabs., Disp: 60 Tab, Rfl: 0    [START ON 9/24/2017] HYDROcodone-acetaminophen (NORCO) 7.5-325 mg per tablet, Take 1 Tab by mouth every six (6) hours as needed for Pain. Max Daily Amount: 4 Tabs., Disp: 60 Tab, Rfl: 0    doxycycline (MONODOX) 100 mg capsule, Take 1 Cap by mouth two (2) times a day., Disp: 20 Cap, Rfl: 0    DOCUSATE CALCIUM (STOOL SOFTENER PO), Take  by mouth daily. , Disp: , Rfl:     amLODIPine (NORVASC) 5 mg tablet, Take 1 Tab by mouth daily. Indications: hypertension, Disp: 30 Tab, Rfl: 6    esomeprazole (NEXIUM) 40 mg capsule, Take 1 Cap by mouth daily. (Patient taking differently: Take 40 mg by mouth two (2) times a day.), Disp: 90 Cap, Rfl: 1    BENICAR 40 mg tablet, TAKE 1 TABLET BY MOUTH DAILY, Disp: 90 Tab, Rfl: 5    aspirin 81 mg chewable tablet, Take 1 Tab by mouth two (2) times a day. (Patient taking differently: Take 81 mg by mouth daily.), Disp: 60 Tab, Rfl: 0    carvedilol (COREG) 25 mg tablet, Take 1 Tab by mouth two (2) times daily (with meals). , Disp: 180 Tab, Rfl: 1    febuxostat (ULORIC) 40 mg tab tablet, Take 1 Tab by mouth every morning. Indications: GOUT PREVENTION, Disp: 90 Tab, Rfl: 3    calcium-vitamin D 600 mg(1,500mg) -200 unit tab, Take 1 Tab by mouth daily. , Disp: , Rfl:     polyethylene glycol (MIRALAX) 17 gram/dose powder, Take 17 g by mouth daily as needed. , Disp: , Rfl:     ferrous sulfate 325 mg (65 mg iron) tablet, Take 1 Tab by mouth daily (with breakfast). , Disp: 30 Tab, Rfl: 1   Potassium   Gabapentin   Takes Norco only half pill every 4-5days  Date Last Reviewed:  8/31/2017   EXAMINATION:   Observation/Orthostatic Postural Assessment:          Significant forward head posture with increased kyphosis; in standing weight shifted more to RLE   Palpation:          Very tight upper traps bilaterally with trigger points throughout  ROM:    Cervical flexion WNL  extension limited to 40 °  Cervical lateral flexion to R increases pain and rotation to R increases pain  AROM cervical Rotation R 65 °  L 80 °    Lumbar flexion WFL  Lumbar extension limited to ~20 °  sidebending WFL    LEs hip flexion and ER WFL but limited IR to 10 ° bilaterally    AROM knee l -5-110 °  R  0-115 °    SLR 68 ° bilaterally    Note L dorsiflexion limited secondary to drop foot but also decreased flexibility    Shoulder AROM R 90 °  L 150 °     Abduction R 85 °      L 160 °     ER           R: 5 °        L: WFL    Elbow and wrist WFL        Strength:     Date:  8/15/17 Date:   Date:     LE MMT Left Right Left Right Left Right   Hip Flex (L1/L2) 4-/5 4/5       Hip Abd (glut med) 4-/5 4-/5       Quad    ( L3/4) 4-/5 4+/5       Hamstring 4/5 4+/5       Anterior Tib (L4/L5) 3-/5 4+/5       Gastroc (S1/2) 3/5 3-/5       EHL (L5) Not tested                 UE grossly  4-/5 4-/5         Special Tests: Spurlings (+ R)  SLR (--), Hip Scour (---)  Neurological Screen: intact to light touch but reports intermittent numbness and tingling fingers; relieves R finger tingling by cervical sidebending to the L  Functional Mobility:         Gait/Ambulation:  Slow antalgic gait with decreased terminal knee extension LLE and decreased stance time LLE;  R foot excessive supination (pt reports due to numerous foot fractures) and L foot mild foot drop        Transfers:   Mod I sit to stand but uses hands         Bed Mobility:  Independent but has to avoid supine as reports old injury and surgeries causing dizziness        Stairs:  Step-to pattern   Balance:          Decreased dynamic; Romberg can stand at least 30 seconds; unable to single leg stance or tandem   Body Structures Involved:  1. Nerves  2. Bones  3. Joints  4. Muscles Body Functions Affected:  1. Sensory/Pain  2. Neuromusculoskeletal  3. Movement Related Activities and Participation Affected:  1. General Tasks and Demands  2. Mobility  3. Self Care  4. Domestic Life  5. Community, Social and Civic Life   CLINICAL PRESENTATION:   CLINICAL DECISION MAKING:   Outcome Measure: Tool Used: Modified Oswestry Low Back Pain Questionnaire  Score:  Initial: 23/50  Most Recent: X/50 (Date: -- )   Interpretation of Score: Each section is scored on a 0-5 scale, 5 representing the greatest disability. The scores of each section are added together for a total score of 50. Score 0 1-10 11-20 21-30 31-40 41-49 50   Modifier CH CI CJ CK CL CM CN     ? Mobility - Walking and Moving Around:     - CURRENT STATUS: CK - 40%-59% impaired, limited or restricted    - GOAL STATUS: CJ - 20%-39% impaired, limited or restricted    - D/C STATUS:  ---------------To be determined---------------      Medical Necessity:   · Patient is expected to demonstrate progress in strength, range of motion, balance and functional technique to decrease pain and improve tolerance with daily activities. Reason for Services/Other Comments:  · Patient continues to require modification of therapeutic interventions to increase complexity of exercises. TREATMENT:   (In addition to Assessment/Re-Assessment sessions the following treatments were rendered)  Therapeutic Exercise: (see flow sheet below for minutes):  Exercises per grid below to improve mobility and strength. Required minimal verbal and tactile cues to promote proper body alignment and promote proper body mechanics.      Aquatic Therapy (see flow sheet below for minutes): Aquatic treatment performed per flow grid for Decreased muscle strength, Decreased range of motion, Decompression and Low impact and reduced weight bearing activity. Verbal and visual cues provided for technique and core stabilization. Assistance by therapist provided for progression of exercises. Date: 8/15/17 8/21/17 8/28/17 8/31/17    Modalities:                                Manual Therapy:                                Aquatic Exercise: next 1.5# 45 min 1.5# 55 min 1.5# 55 min    Gait F/B/S/M  x4L x4L x4L    Heel/Toe walk        4-way    x15 BLE    PF/DF        Hamstring Curls  x4L x4L x4L    Hip Flexion with knee ext. (rockette)        Squats     x15 w/rail x15 w/rail    SLR march  x4L x4L x4L w rail    Lunges        Hip circles        Hip horizontal abduction/adduction        Core: row      x15 open paddles    Core:        Deep well bike  2 min 3 min 3 nmin    Deep well jumping don  1 min 2 min 2 min    Deep well scissors  1 min 2 min 2 mi    Deep well:  traction   8 min 8 min            Hamstring Stretch   2h 30 sec 2H 30    Step Lunge        Piriformis stretch   2h 30 sec 2 H 30    PF Stretch        Balance: Single leg Stance        Balance: Tandem                          Therapeutic Exercise: 15 minutes       Shoulder AAROM t-bar supine external rotation at 20 ° abduction x15       Supine shoulder flexion t-bar AAROM x10       scapt-retract x12       Upper trap stretch R only so sidebending to L  2 H 20       Hamstring stretch with strap 3 H 30       Single knee to chest 2 H 20               F=forward  B=Backward  S=sidesteps  M=marches    H=hold    Manual: (see flow sheet above for minutes) ----  Modalities: (see flow sheet above for minutes) ----    HEP: Pt instructed in above exercises at evaluation and issued handout. Treatment/Session Assessment: Continued aquatics with pt tolerating well, demonstrates decreased core strength/stability.  Pt progressed today from previous treatments with addition of exercises for hip and core strength, pt stated she felt \"less fatigued than last visit\". Plan to progress as tolerated with increased weight at next visit. · Pain/ Symptoms: Initial:  2/10. Cervical spine. Pt reports seeing Dr AUNG reported worsening spinal stenosis causing increasing nerve pain in right upper chest.Pt also reported bilat hip pain with ambulation. Post Session:  2/10 ·   Compliance with Program/Exercises: Will assess as treatment progresses. · Recommendations/Intent for next treatment session: \"Next visit will focus on more challenging activities\".  Plan to progress as tolerated with increased weight at next visit  Total Treatment Duration:  PT Patient Time In/Time Out  Time In: 0935  Time Out: [address], PTA

## 2017-09-05 ENCOUNTER — HOSPITAL ENCOUNTER (OUTPATIENT)
Dept: PHYSICAL THERAPY | Age: 67
Discharge: HOME | End: 2017-09-05
Payer: MEDICARE

## 2017-09-05 PROCEDURE — 97113 AQUATIC THERAPY/EXERCISES: CPT

## 2017-09-05 PROCEDURE — 97140 MANUAL THERAPY 1/> REGIONS: CPT

## 2017-09-05 NOTE — PROGRESS NOTES
Amy Champagne  : 1950 [address] [facility] [address]  [address] [place] [address]  [phone]   [phone]        OUTPATIENT PHYSICAL THERAPY:Daily Note 2017      ICD-10: Treatment Diagnosis: Cervicalgia (M54.2)  Low back pain (M54.5)  Difficulty in walking, not elsewhere classified (R26.2)  Precautions/Allergies:   Latex; Sulfa (sulfonamide antibiotics); Macrobid [nitrofurantoin monohyd/m-cryst]; Other plant, animal, environmental; Oxycodone; and Tape [adhesive]   Fall Risk Score: 1 (? 5 = High Risk)  MD Orders: Evaluate and Treat Cervical ponyand LBP MEDICAL/REFERRING DIAGNOSIS:  Cervical spondylosis [M47.812]  DDD (degenerative disc disease), cervical [M50.30]  DDD (degenerative disc disease), lumbar [M51.36]  Radiculopathy, unspecified spinal region [M54.10]   DATE OF ONSET: chronic  REFERRING PHYSICIAN: Sada Vieira MD  RETURN PHYSICIAN APPOINTMENT: 17     INITIAL ASSESSMENT:  Ms. Ana Courtney presents with chronic LBP and cervical pain. Pt has DDD, stenosis, cervical spondylosis, and radiculopathy into UEs worse with R. Pt exhibits decreased cervical ROM, R shoulder ROM, decreased knee AROM, and decreased hip flexibility. Pt has poor posture and will benefit from PT for core and extremity strengthening to progress towards functional goals below. Pt has decreased tolerance for walking, transfers, standing, reaching, and lifting. Pt will benefit from aquatic PT to decrease load bearing on joints. PROBLEM LIST (Impacting functional limitations):  1. Decreased Strength  2. Decreased ADL/Functional Activities  3. Decreased Transfer Abilities  4. Decreased Ambulation Ability/Technique  5. Decreased Balance  6. Increased Pain  7. Decreased Activity Tolerance  8. Decreased Flexibility/Joint Mobility  9. Decreased Josephine with Home Exercise Program INTERVENTIONS PLANNED:  1. Balance Exercise  2. Cold  3. Electrical Stimulation  4.  Gait Training  5. Heat  6. Home Exercise Program (HEP)  7. Manual Therapy  8. Neuromuscular Re-education/Strengthening  9. Range of Motion (ROM)  10. Therapeutic Activites  11. Therapeutic Exercise/Strengthening  12. Transcutaneous Electrical Nerve Stimulation (TENS)  13. Ultrasound (US)  14. aquatics   TREATMENT PLAN:  Effective Dates: 8/15/17 TO 11/7/17. Frequency/Duration: 2 times a week for 12 weeks  GOALS: (Goals have been discussed and agreed upon with patient.)  Short-Term Functional Goals: Time Frame: 4 weeks  1. Pt will be independent with HEP. 2. Pt will decrease LBP and cervical symptoms with ADLs by at least 25% with ADLs. 3. Pt will increase cervical AROM to R 5-10 ° to improve rotation with driving. 4. Pt will increase shoulder AROM flexion by at least 10 ° for reaching overhead. Discharge Goals: Time Frame: 8-12 weeks  1. Pt will be able to increase B UE strength to 4+/5 for lifting groceries. 2. Pt will be able to improve balance and have decreased fear of falling by being able to tandem stance at least 15 seconds. 3. Pt will be able to sit to stand at least 5x without use of UEs noting increasing functional strength. 4. Pt will decrease Oswestry score 15/50 or less noting improving functional status. 5. Pt will be able to report cervical symptoms less than 3/10 at all times with ADLs without c/o dropping objects with UEs due to symptoms. Rehabilitation Potential For Stated Goals: Good                       HISTORY:   History of Present Injury/Illness (Reason for Referral): Pt reports to PT due to cervical and lumbar DDD with cervical spondylosis/stenosis. Pt reports fear of falling due to decreased balance. Pt reports LBP and bilateral knee stiffness. Pt had recent L TKA and March 2017 and had to stop recent PT here due to having low hemoglobin and then she also was having back/neck pain and was referred to Dr. Fuentes Espinal.  Pt reports Dr. Alejandro Reis did not want to do surgery and referred her to pain management. She received FERNIE in her cervical spine last week 8/10/17 which lessened some of the pain in her R cervical/chest area. Pt reports she has a 2nd injection scheduled next week. Pt has history of cervical fusion in 1997 and had 3 lumbar surgeries in the early 1980s. Pt also has increased cervical pain with intermittent numbness/tingling in fingers and increased difficulty reaching with R UE. Pt has had limited R shoulder ROM but pt notes it has worsened as she knows her posture has declined. Pt would like to decrease pain and be able to walk better, transfer more easily, as well as use her R UE better for reaching. Pt reports she has dropped light bjects due to UE symptoms. Past Medical History/Comorbidities:   Ms. Thalia Garcia  has a past medical history of Adverse effect of anesthesia; Anemia; Arthritis; Chronic kidney disease; Chronic pain; Follow-up visit for aortic valve replacement with bioprosthetic valve (7/25/2016); GERD (gastroesophageal reflux disease); Hypertension; Kidney stones; Morbid obesity ([address] Murmur, cardiac; Nausea & vomiting; Psychiatric disorder; PUD (peptic ulcer disease) (06/2016); S/P aortic valve replacement with bioprosthetic valve (4/28/2017); and Valvular heart disease (2016). Ms. Thalia Garcia  has a past surgical history that includes urological (Multiple dates); shoulder arthroscopy (Right); lap cholecystectomy; gastric bypass; gi; cervical fusion; hysterectomy; bilateral salpingo-oophorectomy; heart catheterization; aortic valve replacement (6/9/2016); colonoscopy (N/A, 6/15/2016); knee replacement (Right, 10/10/2016); and heart valve surgery.  L TKA (3/21/17) cervical fusion was in 1997, 3x lumbar surgeries in the early 1980s   Social History/Living Environment:    lives in one story home, her grown son lives in the home with her  Prior Level of Function/Work/Activity:  Independent with all housework, cooking, etc.   Current Medications:    Current Outpatient Prescriptions:     furosemide (LASIX) 20 mg tablet, Take 1 Tab by mouth daily. Indications: Edema, Disp: 90 Tab, Rfl: 0    zolpidem (AMBIEN) 5 mg tablet, Take 1 Tab by mouth nightly as needed for Sleep. Max Daily Amount: 5 mg., Disp: 90 Tab, Rfl: 1    HYDROcodone-acetaminophen (NORCO) 7.5-325 mg per tablet, Take 1 Tab by mouth every six (6) hours as needed for Pain. Max Daily Amount: 4 Tabs., Disp: 120 Tab, Rfl: 0    [START ON 8/25/2017] HYDROcodone-acetaminophen (NORCO) 7.5-325 mg per tablet, Take 1 Tab by mouth every six (6) hours as needed for Pain. Max Daily Amount: 4 Tabs., Disp: 60 Tab, Rfl: 0    [START ON 9/24/2017] HYDROcodone-acetaminophen (NORCO) 7.5-325 mg per tablet, Take 1 Tab by mouth every six (6) hours as needed for Pain. Max Daily Amount: 4 Tabs., Disp: 60 Tab, Rfl: 0    doxycycline (MONODOX) 100 mg capsule, Take 1 Cap by mouth two (2) times a day., Disp: 20 Cap, Rfl: 0    DOCUSATE CALCIUM (STOOL SOFTENER PO), Take  by mouth daily. , Disp: , Rfl:     amLODIPine (NORVASC) 5 mg tablet, Take 1 Tab by mouth daily. Indications: hypertension, Disp: 30 Tab, Rfl: 6    esomeprazole (NEXIUM) 40 mg capsule, Take 1 Cap by mouth daily. (Patient taking differently: Take 40 mg by mouth two (2) times a day.), Disp: 90 Cap, Rfl: 1    BENICAR 40 mg tablet, TAKE 1 TABLET BY MOUTH DAILY, Disp: 90 Tab, Rfl: 5    aspirin 81 mg chewable tablet, Take 1 Tab by mouth two (2) times a day. (Patient taking differently: Take 81 mg by mouth daily.), Disp: 60 Tab, Rfl: 0    carvedilol (COREG) 25 mg tablet, Take 1 Tab by mouth two (2) times daily (with meals). , Disp: 180 Tab, Rfl: 1    febuxostat (ULORIC) 40 mg tab tablet, Take 1 Tab by mouth every morning. Indications: GOUT PREVENTION, Disp: 90 Tab, Rfl: 3    calcium-vitamin D 600 mg(1,500mg) -200 unit tab, Take 1 Tab by mouth daily. , Disp: , Rfl:     polyethylene glycol (MIRALAX) 17 gram/dose powder, Take 17 g by mouth daily as needed. , Disp: , Rfl:     ferrous sulfate 325 mg (65 mg iron) tablet, Take 1 Tab by mouth daily (with breakfast). , Disp: 30 Tab, Rfl: 1   Potassium   Gabapentin   Takes Norco only half pill every 4-5days  Date Last Reviewed:  9/5/2017   EXAMINATION:   Observation/Orthostatic Postural Assessment:          Significant forward head posture with increased kyphosis; in standing weight shifted more to RLE   Palpation:          Very tight upper traps bilaterally with trigger points throughout  ROM:    Cervical flexion WNL  extension limited to 40 °  Cervical lateral flexion to R increases pain and rotation to R increases pain  AROM cervical Rotation R 65 °  L 80 °    Lumbar flexion WFL  Lumbar extension limited to ~20 °  sidebending WFL    LEs hip flexion and ER WFL but limited IR to 10 ° bilaterally    AROM knee l -5-110 °  R  0-115 °    SLR 68 ° bilaterally    Note L dorsiflexion limited secondary to drop foot but also decreased flexibility    Shoulder AROM R 90 °  L 150 °     Abduction R 85 °      L 160 °     ER           R: 5 °        L: WFL    Elbow and wrist WFL        Strength:     Date:  8/15/17 Date:   Date:     LE MMT Left Right Left Right Left Right   Hip Flex (L1/L2) 4-/5 4/5       Hip Abd (glut med) 4-/5 4-/5       Quad    ( L3/4) 4-/5 4+/5       Hamstring 4/5 4+/5       Anterior Tib (L4/L5) 3-/5 4+/5       Gastroc (S1/2) 3/5 3-/5       EHL (L5) Not tested                 UE grossly  4-/5 4-/5         Special Tests: Spurlings (+ R)  SLR (--), Hip Scour (---)  Neurological Screen: intact to light touch but reports intermittent numbness and tingling fingers; relieves R finger tingling by cervical sidebending to the L  Functional Mobility:         Gait/Ambulation:  Slow antalgic gait with decreased terminal knee extension LLE and decreased stance time LLE;  R foot excessive supination (pt reports due to numerous foot fractures) and L foot mild foot drop        Transfers:   Mod I sit to stand but uses hands         Bed Mobility:  Independent but has to avoid supine as reports old injury and surgeries causing dizziness        Stairs:  Step-to pattern   Balance:          Decreased dynamic; Romberg can stand at least 30 seconds; unable to single leg stance or tandem   Body Structures Involved:  1. Nerves  2. Bones  3. Joints  4. Muscles Body Functions Affected:  1. Sensory/Pain  2. Neuromusculoskeletal  3. Movement Related Activities and Participation Affected:  1. General Tasks and Demands  2. Mobility  3. Self Care  4. Domestic Life  5. Community, Social and Civic Life   CLINICAL PRESENTATION:   CLINICAL DECISION MAKING:   Outcome Measure: Tool Used: Modified Oswestry Low Back Pain Questionnaire  Score:  Initial: 23/50  Most Recent: X/50 (Date: -- )   Interpretation of Score: Each section is scored on a 0-5 scale, 5 representing the greatest disability. The scores of each section are added together for a total score of 50. Score 0 1-10 11-20 21-30 31-40 41-49 50   Modifier CH CI CJ CK CL CM CN     ? Mobility - Walking and Moving Around:     - CURRENT STATUS: CK - 40%-59% impaired, limited or restricted    - GOAL STATUS: CJ - 20%-39% impaired, limited or restricted    - D/C STATUS:  ---------------To be determined---------------      Medical Necessity:   · Patient is expected to demonstrate progress in strength, range of motion, balance and functional technique to decrease pain and improve tolerance with daily activities. Reason for Services/Other Comments:  · Patient continues to require modification of therapeutic interventions to increase complexity of exercises. TREATMENT:   (In addition to Assessment/Re-Assessment sessions the following treatments were rendered)  Therapeutic Exercise: (see flow sheet below for minutes):  Exercises per grid below to improve mobility and strength. Required minimal verbal and tactile cues to promote proper body alignment and promote proper body mechanics.      Aquatic Therapy (see flow sheet below for minutes): Aquatic treatment performed per flow grid for Decreased muscle strength, Decreased range of motion, Decompression and Low impact and reduced weight bearing activity. Verbal and visual cues provided for technique and core stabilization. Assistance by therapist provided for progression of exercises. Date: 8/15/17 8/21/17 8/28/17 8/31/17 9/5/17   Modalities:                                Manual Therapy:     15 min   STM to ant/post R shoulder     15 min                   Aquatic Exercise: next 1.5# 45 min 1.5# 55 min 1.5# 55 min 2.5# 55 min   Gait F/B/S/M  x4L x4L x4L x4L   Heel/Toe walk        4-way    x15 BLE x15 BLE   PF/DF        Hamstring Curls  x4L x4L x4L x4L   Hip Flexion with knee ext. (rockette)        Squats     x15 w/rail x15 w/rail X 15 w/rail   SLR march  x4L x4L x4L w rail x4L w rail   Lunges        Hip circles        Hip horizontal abduction/adduction        Core: row      x15 open paddles 2x15 open paddles   Core:        Deep well bike  2 min 3 min 3 min 3min   Deep well jumping don  1 min 2 min 2 min 2 min   Deep well scissors  1 min 2 min 2 mi 2 min   Deep well:  traction   8 min 8 min 8 min           Hamstring Stretch   2h 30 sec 2H 30 2H 30   Step Lunge        Piriformis stretch   2h 30 sec 2 H 30 2H 30   PF Stretch        Balance: Single leg Stance        Balance: Tandem                          Therapeutic Exercise: 15 minutes       Shoulder AAROM t-bar supine external rotation at 20 ° abduction x15       Supine shoulder flexion t-bar AAROM x10       scapt-retract x12       Upper trap stretch R only so sidebending to L  2 H 20       Hamstring stretch with strap 3 H 30       Single knee to chest 2 H 20               F=forward  B=Backward  S=sidesteps  M=marches    H=hold    Manual: (see flow sheet above for minutes) Patient Supine STM to R pec, patient L side- lying STM to R scalene, trap and scapula to decrease pain and increase mobility.   Modalities: (see flow sheet above for minutes) ----    HEP: Pt instructed in above exercises at evaluation and issued handout. Treatment/Session Assessment: Continued aquatics with pt tolerating well, demonstraotes increased core strength/stability evidenced by increased number of sets in core exercises. Pt progressed today from previous treatments with increase of wt to 2.5#, pt stated she felt more fatigued due to weight increase. Finished treatment c STM to R pec, scalene, trap, and scapula areas (pain w trigger points in all areas). Finished c instuctions in wand stretches to R shoulder, pt reports no change in L side shoulder pain following manual therapy. Plan to progress as tolerated at next visit. · Pain/ Symptoms: Initial:  2/10 in R shoulder and lower back. Post Session:  2/10 ·   Compliance with Program/Exercises: Will assess as treatment progresses. · Recommendations/Intent for next treatment session: \"Next visit will focus on more challenging activities\".  Plan to progress as tolerated next visit  Total Treatment Duration:  PT Patient Time In/Time Out  Time In: 0930  Time Out: [id number] [facility]

## 2017-09-07 ENCOUNTER — HOSPITAL ENCOUNTER (OUTPATIENT)
Dept: PHYSICAL THERAPY | Age: 67
Discharge: HOME | End: 2017-09-07
Payer: MEDICARE

## 2017-09-07 PROCEDURE — 97113 AQUATIC THERAPY/EXERCISES: CPT

## 2017-09-07 NOTE — PROGRESS NOTES
Cierra Dubois  : 1950 [address][birth year] [address]  [phone]   [phone]        OUTPATIENT PHYSICAL THERAPY:Daily Note 2017      ICD-10: Treatment Diagnosis: Cervicalgia (M54.2)  Low back pain (M54.5)  Difficulty in walking, not elsewhere classified (R26.2)  Precautions/Allergies:   Latex; Sulfa (sulfonamide antibiotics); Macrobid [nitrofurantoin monohyd/m-cryst]; Other plant, animal, environmental; Oxycodone; and Tape [adhesive]   Fall Risk Score: 1 (? 5 = High Risk)  MD Orders: Evaluate and Treat Cervical ponyand LBP MEDICAL/REFERRING DIAGNOSIS:  Cervical spondylosis [M47.812]  DDD (degenerative disc disease), cervical [M50.30]  DDD (degenerative disc disease), lumbar [M51.36]  Radiculopathy, unspecified spinal region [M54.10]   DATE OF ONSET: chronic  REFERRING PHYSICIAN: Abi Riley MD  RETURN PHYSICIAN APPOINTMENT: 17     INITIAL ASSESSMENT:  Ms. Shadia Marques presents with chronic LBP and cervical pain. Pt has DDD, stenosis, cervical spondylosis, and radiculopathy into UEs worse with R. Pt exhibits decreased cervical ROM, R shoulder ROM, decreased knee AROM, and decreased hip flexibility. Pt has poor posture and will benefit from PT for core and extremity strengthening to progress towards functional goals below. Pt has decreased tolerance for walking, transfers, standing, reaching, and lifting. Pt will benefit from aquatic PT to decrease load bearing on joints. PROBLEM LIST (Impacting functional limitations):  1. Decreased Strength  2. Decreased ADL/Functional Activities  3. Decreased Transfer Abilities  4. Decreased Ambulation Ability/Technique  5. Decreased Balance  6. Increased Pain  7. Decreased Activity Tolerance  8. Decreased Flexibility/Joint Mobility  9. Decreased [place] with Home Exercise Program INTERVENTIONS PLANNED:  1. Balance Exercise  2. Cold  3. Electrical Stimulation  4.  Gait Training  5. Heat  6. Home Exercise Program (HEP)  7. Manual Therapy  8. Neuromuscular Re-education/Strengthening  9. Range of Motion (ROM)  10. Therapeutic Activites  11. Therapeutic Exercise/Strengthening  12. Transcutaneous Electrical Nerve Stimulation (TENS)  13. Ultrasound (US)  14. aquatics   TREATMENT PLAN:  Effective Dates: 8/15/17 TO 11/7/17. Frequency/Duration: 2 times a week for 12 weeks  GOALS: (Goals have been discussed and agreed upon with patient.)  Short-Term Functional Goals: Time Frame: 4 weeks  1. Pt will be independent with HEP. 2. Pt will decrease LBP and cervical symptoms with ADLs by at least 25% with ADLs. 3. Pt will increase cervical AROM to R 5-10 ° to improve rotation with driving. 4. Pt will increase shoulder AROM flexion by at least 10 ° for reaching overhead. Discharge Goals: Time Frame: 8-12 weeks  1. Pt will be able to increase B UE strength to 4+/5 for lifting groceries. 2. Pt will be able to improve balance and have decreased fear of falling by being able to tandem stance at least 15 seconds. 3. Pt will be able to sit to stand at least 5x without use of UEs noting increasing functional strength. 4. Pt will decrease Oswestry score 15/50 or less noting improving functional status. 5. Pt will be able to report cervical symptoms less than 3/10 at all times with ADLs without c/o dropping objects with UEs due to symptoms. Rehabilitation Potential For Stated Goals: Good                       HISTORY:   History of Present Injury/Illness (Reason for Referral): Pt reports to PT due to cervical and lumbar DDD with cervical spondylosis/stenosis. Pt reports fear of falling due to decreased balance. Pt reports LBP and bilateral knee stiffness. Pt had recent L TKA and March 2017 and had to stop recent PT here due to having low hemoglobin and then she also was having back/neck pain and was referred to Dr. Olu Cornejo.  Pt reports Dr. Naga Feliz did not want to do surgery and referred her to pain management. She received FERNIE in her cervical spine last week 8/10/17 which lessened some of the pain in her R cervical/chest area. Pt reports she has a 2nd injection scheduled next week. Pt has history of cervical fusion in 1997 and had 3 lumbar surgeries in the early 1980s. Pt also has increased cervical pain with intermittent numbness/tingling in fingers and increased difficulty reaching with R UE. Pt has had limited R shoulder ROM but pt notes it has worsened as she knows her posture has declined. Pt would like to decrease pain and be able to walk better, transfer more easily, as well as use her R UE better for reaching. Pt reports she has dropped light bjects due to UE symptoms. Past Medical History/Comorbidities:   Ms. Erik Pacheco  has a past medical history of Adverse effect of anesthesia; Anemia; Arthritis; Chronic kidney disease; Chronic pain; Follow-up visit for aortic valve replacement with bioprosthetic valve (7/25/2016); GERD (gastroesophageal reflux disease); Hypertension; Kidney stones; Morbid obesity ([address] Murmur, cardiac; Nausea & vomiting; Psychiatric disorder; PUD (peptic ulcer disease) (06/2016); S/P aortic valve replacement with bioprosthetic valve (4/28/2017); and Valvular heart disease (2016). Ms. Erik Pacheco  has a past surgical history that includes urological (Multiple dates); shoulder arthroscopy (Right); lap cholecystectomy; gastric bypass; gi; cervical fusion; hysterectomy; bilateral salpingo-oophorectomy; heart catheterization; aortic valve replacement (6/9/2016); colonoscopy (N/A, 6/15/2016); knee replacement (Right, 10/10/2016); and heart valve surgery.  L TKA (3/21/17) cervical fusion was in 1997, 3x lumbar surgeries in the early 1980s   Social History/Living Environment:    lives in one story home, her grown son lives in the home with her  Prior Level of Function/Work/Activity:  Independent with all housework, cooking, etc.   Current Medications:    Current Outpatient Prescriptions:     furosemide (LASIX) 20 mg tablet, Take 1 Tab by mouth daily. Indications: Edema, Disp: 90 Tab, Rfl: 0    zolpidem (AMBIEN) 5 mg tablet, Take 1 Tab by mouth nightly as needed for Sleep. Max Daily Amount: 5 mg., Disp: 90 Tab, Rfl: 1    HYDROcodone-acetaminophen (NORCO) 7.5-325 mg per tablet, Take 1 Tab by mouth every six (6) hours as needed for Pain. Max Daily Amount: 4 Tabs., Disp: 120 Tab, Rfl: 0    [START ON 8/25/2017] HYDROcodone-acetaminophen (NORCO) 7.5-325 mg per tablet, Take 1 Tab by mouth every six (6) hours as needed for Pain. Max Daily Amount: 4 Tabs., Disp: 60 Tab, Rfl: 0    [START ON 9/24/2017] HYDROcodone-acetaminophen (NORCO) 7.5-325 mg per tablet, Take 1 Tab by mouth every six (6) hours as needed for Pain. Max Daily Amount: 4 Tabs., Disp: 60 Tab, Rfl: 0    doxycycline (MONODOX) 100 mg capsule, Take 1 Cap by mouth two (2) times a day., Disp: 20 Cap, Rfl: 0    DOCUSATE CALCIUM (STOOL SOFTENER PO), Take  by mouth daily. , Disp: , Rfl:     amLODIPine (NORVASC) 5 mg tablet, Take 1 Tab by mouth daily. Indications: hypertension, Disp: 30 Tab, Rfl: 6    esomeprazole (NEXIUM) 40 mg capsule, Take 1 Cap by mouth daily. (Patient taking differently: Take 40 mg by mouth two (2) times a day.), Disp: 90 Cap, Rfl: 1    BENICAR 40 mg tablet, TAKE 1 TABLET BY MOUTH DAILY, Disp: 90 Tab, Rfl: 5    aspirin 81 mg chewable tablet, Take 1 Tab by mouth two (2) times a day. (Patient taking differently: Take 81 mg by mouth daily.), Disp: 60 Tab, Rfl: 0    carvedilol (COREG) 25 mg tablet, Take 1 Tab by mouth two (2) times daily (with meals). , Disp: 180 Tab, Rfl: 1    febuxostat (ULORIC) 40 mg tab tablet, Take 1 Tab by mouth every morning. Indications: GOUT PREVENTION, Disp: 90 Tab, Rfl: 3    calcium-vitamin D 600 mg(1,500mg) -200 unit tab, Take 1 Tab by mouth daily. , Disp: , Rfl:     polyethylene glycol (MIRALAX) 17 gram/dose powder, Take 17 g by mouth daily as needed. , Disp: , Rfl:     ferrous sulfate 325 mg (65 mg iron) tablet, Take 1 Tab by mouth daily (with breakfast). , Disp: 30 Tab, Rfl: 1   Potassium   Gabapentin   Takes Norco only half pill every 4-5days  Date Last Reviewed:  9/7/2017   EXAMINATION:   Observation/Orthostatic Postural Assessment:          Significant forward head posture with increased kyphosis; in standing weight shifted more to RLE   Palpation:          Very tight upper traps bilaterally with trigger points throughout  ROM:    Cervical flexion WNL  extension limited to 40 °  Cervical lateral flexion to R increases pain and rotation to R increases pain  AROM cervical Rotation R 65 °  L 80 °    Lumbar flexion WFL  Lumbar extension limited to ~20 °  sidebending WFL    LEs hip flexion and ER WFL but limited IR to 10 ° bilaterally    AROM knee l -5-110 °  R  0-115 °    SLR 68 ° bilaterally    Note L dorsiflexion limited secondary to drop foot but also decreased flexibility    Shoulder AROM R 90 °  L 150 °     Abduction R 85 °      L 160 °     ER           R: 5 °        L: WFL    Elbow and wrist WFL        Strength:     Date:  8/15/17 Date:   Date:     LE MMT Left Right Left Right Left Right   Hip Flex (L1/L2) 4-/5 4/5       Hip Abd (glut med) 4-/5 4-/5       Quad    ( L3/4) 4-/5 4+/5       Hamstring 4/5 4+/5       Anterior Tib (L4/L5) 3-/5 4+/5       Gastroc (S1/2) 3/5 3-/5       EHL (L5) Not tested                 UE grossly  4-/5 4-/5         Special Tests: Spurlings (+ R)  SLR (--), Hip Scour (---)  Neurological Screen: intact to light touch but reports intermittent numbness and tingling fingers; relieves R finger tingling by cervical sidebending to the L  Functional Mobility:         Gait/Ambulation:  Slow antalgic gait with decreased terminal knee extension LLE and decreased stance time LLE;  R foot excessive supination (pt reports due to numerous foot fractures) and L foot mild foot drop        Transfers:   Mod I sit to stand but uses hands         Bed Mobility:  Independent but has to avoid supine as reports old injury and surgeries causing dizziness        Stairs:  Step-to pattern   Balance:          Decreased dynamic; Romberg can stand at least 30 seconds; unable to single leg stance or tandem   Body Structures Involved:  1. Nerves  2. Bones  3. Joints  4. Muscles Body Functions Affected:  1. Sensory/Pain  2. Neuromusculoskeletal  3. Movement Related Activities and Participation Affected:  1. General Tasks and Demands  2. Mobility  3. Self Care  4. Domestic Life  5. Community, Social and Civic Life   CLINICAL PRESENTATION:   CLINICAL DECISION MAKING:   Outcome Measure: Tool Used: Modified Oswestry Low Back Pain Questionnaire  Score:  Initial: 23/50  Most Recent: X/50 (Date: -- )   Interpretation of Score: Each section is scored on a 0-5 scale, 5 representing the greatest disability. The scores of each section are added together for a total score of 50. Score 0 1-10 11-20 21-30 31-40 41-49 50   Modifier CH CI CJ CK CL CM CN     ? Mobility - Walking and Moving Around:     - CURRENT STATUS: CK - 40%-59% impaired, limited or restricted    - GOAL STATUS: CJ - 20%-39% impaired, limited or restricted    - D/C STATUS:  ---------------To be determined---------------      Medical Necessity:   · Patient is expected to demonstrate progress in strength, range of motion, balance and functional technique to decrease pain and improve tolerance with daily activities. Reason for Services/Other Comments:  · Patient continues to require modification of therapeutic interventions to increase complexity of exercises. TREATMENT:   (In addition to Assessment/Re-Assessment sessions the following treatments were rendered)  Therapeutic Exercise: (see flow sheet below for minutes):  Exercises per grid below to improve mobility and strength. Required minimal verbal and tactile cues to promote proper body alignment and promote proper body mechanics.      Aquatic Therapy (see flow sheet below for minutes): Aquatic treatment performed per flow grid for Decreased muscle strength, Decreased range of motion, Decompression and Low impact and reduced weight bearing activity. Verbal and visual cues provided for technique and core stabilization. Assistance by therapist provided for progression of exercises. Date: 8/15/17 8/21/17 8/28/17 8/31/17 9/5/17 9/7/17   Modalities:                                    Manual Therapy:     15 min    STM to ant/post R shoulder     15 min                      Aquatic Exercise: next 1.5# 45 min 1.5# 55 min 1.5# 55 min 2.5# 55 min 2.5# 55 nmi   Gait F/B/S/M  x4L x4L x4L x4L x4L   Heel/Toe walk         4-way    x15 BLE x15 BLE x20 BLE   PF/DF         Hamstring Curls  x4L x4L x4L x4L x4L   Hip Flexion with knee ext.   (clifford)         Squats     x15 w/rail x15 w/rail X 15 w/rail X 20 c rail   SLR march  x4L x4L x4L w rail x4L w rail x4L c rail   Lunges         Hip circles         Hip horizontal abduction/adduction         Core: row      x15 open paddles 2x15 open paddles x20 open paddles   Core:         Deep well bike  2 min 3 min 3 min 3min 3 min   Deep well jumping don  1 min 2 min 2 min 2 min 2 min   Deep well scissors  1 min 2 min 2 mi 2 min 2 min   Deep well:  traction   8 min 8 min 8 min 8 min            Hamstring Stretch   2h 30 sec 2H 30 2H 30 2H30   Step Lunge         Piriformis stretch   2h 30 sec 2 H 30 2H 30 2H30   PF Stretch         Balance: Single leg Stance         Balance: Tandem                             Therapeutic Exercise: 15 minutes        Shoulder AAROM t-bar supine external rotation at 20 ° abduction x15        Supine shoulder flexion t-bar AAROM x10        scapt-retract x12        Upper trap stretch R only so sidebending to L  2 H 20        Hamstring stretch with strap 3 H 30        Single knee to chest 2 H 20                 F=forward  B=Backward  S=sidesteps  M=marches    H=hold    Manual: (see flow sheet above for minutes)   Modalities: (see flow sheet above for minutes) ----    HEP: Pt instructed in above exercises at evaluation and issued handout. Treatment/Session Assessment: Pt received cortisone shot in R shoulder yesterday, states R shoulder is stiff today. Continued aquatics with pt tolerating well, demonstraotes increased strength evidenced by increased reps in all exercises. Pt stated she felt less fatigued than previous treatment. Plan to use noodle instead of rail for gait ex next treatment and progress as tolerated at next visit. · Pain/ Symptoms: Initial:  5/10 in R shoulder due to cervical spinal stenosis. Pt reported severe pain following STM last treatment Post Session:  2/10 ·   Compliance with Program/Exercises: Will assess as treatment progresses. · Recommendations/Intent for next treatment session: \"Next visit will focus on more challenging activities\". Plan to progress as tolerated next visit using noodle instead of rail to assist in balance for gait ex next treatment.   Total Treatment Duration:  PT Patient Time In/Time Out  Time In: 0930  Time Out: 1025     Bisi Leubaldo, PTA

## 2017-09-11 ENCOUNTER — HOSPITAL ENCOUNTER (OUTPATIENT)
Dept: PHYSICAL THERAPY | Age: 67
Discharge: HOME | End: 2017-09-11
Payer: MEDICARE

## 2017-09-14 ENCOUNTER — HOSPITAL ENCOUNTER (OUTPATIENT)
Dept: PHYSICAL THERAPY | Age: 67
Discharge: HOME | End: 2017-09-14
Payer: MEDICARE

## 2017-09-14 PROCEDURE — 97140 MANUAL THERAPY 1/> REGIONS: CPT | Performed by: PHYSICAL THERAPIST

## 2017-09-14 PROCEDURE — 97110 THERAPEUTIC EXERCISES: CPT | Performed by: PHYSICAL THERAPIST

## 2017-09-14 PROCEDURE — 97014 ELECTRIC STIMULATION THERAPY: CPT | Performed by: PHYSICAL THERAPIST

## 2017-09-14 PROCEDURE — G8978 MOBILITY CURRENT STATUS: HCPCS | Performed by: PHYSICAL THERAPIST

## 2017-09-14 PROCEDURE — G8979 MOBILITY GOAL STATUS: HCPCS | Performed by: PHYSICAL THERAPIST

## 2017-09-14 NOTE — PROGRESS NOTES
Rad Manning  : [id number] [facility],[address] @ [address]  [address] [place] [address].  [phone]   [phone]        OUTPATIENT PHYSICAL THERAPY:Daily Note and Progress Report 2017      ICD-10: Treatment Diagnosis: Cervicalgia (M54.2)  Low back pain (M54.5)  Difficulty in walking, not elsewhere classified (R26.2)  Precautions/Allergies:   Latex; Sulfa (sulfonamide antibiotics); Macrobid [nitrofurantoin monohyd/m-cryst]; Other plant, animal, environmental; Oxycodone; and Tape [adhesive]   Fall Risk Score: 1 (? 5 = High Risk)  MD Orders: Evaluate and Treat Cervical ponyand LBP MEDICAL/REFERRING DIAGNOSIS:  Cervical spondylosis [M47.812]  DDD (degenerative disc disease), cervical [M50.30]  DDD (degenerative disc disease), lumbar [M51.36]  Radiculopathy, unspecified spinal region [M54.10]   DATE OF ONSET: chronic  REFERRING PHYSICIAN: Abhilash Espino MD  RETURN PHYSICIAN APPOINTMENT: 17     INITIAL ASSESSMENT:  Ms. Mirtha Harrell presents with chronic LBP and cervical pain. Pt has DDD, stenosis, cervical spondylosis, and radiculopathy into UEs worse with R. Pt exhibits decreased cervical ROM, R shoulder ROM, decreased knee AROM, and decreased hip flexibility. Pt has poor posture and will benefit from PT for core and extremity strengthening to progress towards functional goals below. Pt has decreased tolerance for walking, transfers, standing, reaching, and lifting. Pt will benefit from aquatic PT to decrease load bearing on joints. PROBLEM LIST (Impacting functional limitations):  1. Decreased Strength  2. Decreased ADL/Functional Activities  3. Decreased Transfer Abilities  4. Decreased Ambulation Ability/Technique  5. Decreased Balance  6. Increased Pain  7. Decreased Activity Tolerance  8. Decreased Flexibility/Joint Mobility  9. Decreased [place] with Home Exercise Program INTERVENTIONS PLANNED:  1. Balance Exercise  2. Cold  3.  Electrical Stimulation  4. Gait Training  5. Heat  6. Home Exercise Program (HEP)  7. Manual Therapy  8. Neuromuscular Re-education/Strengthening  9. Range of Motion (ROM)  10. Therapeutic Activites  11. Therapeutic Exercise/Strengthening  12. Transcutaneous Electrical Nerve Stimulation (TENS)  13. Ultrasound (US)  14. aquatics   TREATMENT PLAN:  Effective Dates: 8/15/17 TO 11/7/17. Frequency/Duration: 2 times a week for 12 weeks  GOALS: (Goals have been discussed and agreed upon with patient.)  Short-Term Functional Goals: Time Frame: 4 weeks  1. Pt will be independent with HEP. (MET--but ongoing)  2. Pt will decrease LBP and cervical symptoms with ADLs by at least 25% with ADLs. (partially met--met for LBP but not cervical/shoulder)  3. Pt will increase cervical AROM to R 5-10 ° to improve rotation with driving. (MET)  4. Pt will increase shoulder AROM flexion by at least 10 ° for reaching overhead. (MET)  Discharge Goals: Time Frame: 8-12 weeks  1. Pt will be able to increase B UE strength to 4+/5 for lifting groceries. (ongoing)  2. Pt will be able to improve balance and have decreased fear of falling by being able to tandem stance at least 15 seconds. (progressing)  3. Pt will be able to sit to stand at least 5x without use of UEs noting increasing functional strength. (MET)  4. Pt will decrease Oswestry score 15/50 or less noting improving functional status. (ongoing)  5. Pt will be able to report cervical symptoms less than 3/10 at all times with ADLs without c/o dropping objects with UEs due to symptoms. (ongoing)  Rehabilitation Potential For Stated Goals: Good                       HISTORY:   History of Present Injury/Illness (Reason for Referral): Pt reports to PT due to cervical and lumbar DDD with cervical spondylosis/stenosis. Pt reports fear of falling due to decreased balance. Pt reports LBP and bilateral knee stiffness.  Pt had recent L TKA and March 2017 and had to stop recent PT here due to having low hemoglobin and then she also was having back/neck pain and was referred to Dr. Olu Cornejo. Pt reports Dr. Naga Feliz did not want to do surgery and referred her to pain management. She received FERNIE in her cervical spine last week 8/10/17 which lessened some of the pain in her R cervical/chest area. Pt reports she has a 2nd injection scheduled next week. Pt has history of cervical fusion in 1997 and had 3 lumbar surgeries in the early 1980s. Pt also has increased cervical pain with intermittent numbness/tingling in fingers and increased difficulty reaching with R UE. Pt has had limited R shoulder ROM but pt notes it has worsened as she knows her posture has declined. Pt would like to decrease pain and be able to walk better, transfer more easily, as well as use her R UE better for reaching. Pt reports she has dropped light bjects due to UE symptoms. Past Medical History/Comorbidities:   Ms. Carlos Rhoades  has a past medical history of Adverse effect of anesthesia; Anemia; Arthritis; Chronic kidney disease; Chronic pain; Follow-up visit for aortic valve replacement with bioprosthetic valve (7/25/2016); GERD (gastroesophageal reflux disease); Hypertension; Kidney stones; Morbid obesity ([address] Murmur, cardiac; Nausea & vomiting; Psychiatric disorder; PUD (peptic ulcer disease) (06/2016); S/P aortic valve replacement with bioprosthetic valve (4/28/2017); and Valvular heart disease (2016). Ms. Carlos Rhoades  has a past surgical history that includes urological (Multiple dates); shoulder arthroscopy (Right); lap cholecystectomy; gastric bypass; gi; cervical fusion; hysterectomy; bilateral salpingo-oophorectomy; heart catheterization; aortic valve replacement (6/9/2016); colonoscopy (N/A, 6/15/2016); knee replacement (Right, 10/10/2016); and heart valve surgery.  L TKA (3/21/17) cervical fusion was in 1997, 3x lumbar surgeries in the early 1980s   Social History/Living Environment:    lives in one story home, her grown son lives in the home with her  Prior Level of Function/Work/Activity:  Independent with all housework, cooking, etc.   Current Medications:    Current Outpatient Prescriptions:     furosemide (LASIX) 20 mg tablet, Take 1 Tab by mouth daily. Indications: Edema, Disp: 90 Tab, Rfl: 0    zolpidem (AMBIEN) 5 mg tablet, Take 1 Tab by mouth nightly as needed for Sleep. Max Daily Amount: 5 mg., Disp: 90 Tab, Rfl: 1    HYDROcodone-acetaminophen (NORCO) 7.5-325 mg per tablet, Take 1 Tab by mouth every six (6) hours as needed for Pain. Max Daily Amount: 4 Tabs., Disp: 120 Tab, Rfl: 0    [START ON 8/25/2017] HYDROcodone-acetaminophen (NORCO) 7.5-325 mg per tablet, Take 1 Tab by mouth every six (6) hours as needed for Pain. Max Daily Amount: 4 Tabs., Disp: 60 Tab, Rfl: 0    [START ON 9/24/2017] HYDROcodone-acetaminophen (NORCO) 7.5-325 mg per tablet, Take 1 Tab by mouth every six (6) hours as needed for Pain. Max Daily Amount: 4 Tabs., Disp: 60 Tab, Rfl: 0    doxycycline (MONODOX) 100 mg capsule, Take 1 Cap by mouth two (2) times a day., Disp: 20 Cap, Rfl: 0    DOCUSATE CALCIUM (STOOL SOFTENER PO), Take  by mouth daily. , Disp: , Rfl:     amLODIPine (NORVASC) 5 mg tablet, Take 1 Tab by mouth daily. Indications: hypertension, Disp: 30 Tab, Rfl: 6    esomeprazole (NEXIUM) 40 mg capsule, Take 1 Cap by mouth daily. (Patient taking differently: Take 40 mg by mouth two (2) times a day.), Disp: 90 Cap, Rfl: 1    BENICAR 40 mg tablet, TAKE 1 TABLET BY MOUTH DAILY, Disp: 90 Tab, Rfl: 5    aspirin 81 mg chewable tablet, Take 1 Tab by mouth two (2) times a day. (Patient taking differently: Take 81 mg by mouth daily.), Disp: 60 Tab, Rfl: 0    carvedilol (COREG) 25 mg tablet, Take 1 Tab by mouth two (2) times daily (with meals). , Disp: 180 Tab, Rfl: 1    febuxostat (ULORIC) 40 mg tab tablet, Take 1 Tab by mouth every morning.  Indications: GOUT PREVENTION, Disp: 90 Tab, Rfl: 3    calcium-vitamin D 600 mg(1,500mg) -200 unit tab, Take 1 Tab by mouth daily. , Disp: , Rfl:     polyethylene glycol (MIRALAX) 17 gram/dose powder, Take 17 g by mouth daily as needed. , Disp: , Rfl:     ferrous sulfate 325 mg (65 mg iron) tablet, Take 1 Tab by mouth daily (with breakfast). , Disp: 30 Tab, Rfl: 1   Potassium   Gabapentin   Takes Norco only half pill every 4-5days  Date Last Reviewed:  9/14/2017   EXAMINATION:   Observation/Orthostatic Postural Assessment:          Significant forward head posture with increased kyphosis; in standing weight shifted more to RLE   Palpation:          Very tight upper traps bilaterally with trigger points throughout  ROM:    Cervical flexion WNL  extension limited to 40 °  Cervical lateral flexion to R increases pain and rotation to R increases pain  AROM cervical Rotation R 65 °  L 80 °    Lumbar flexion WFL  Lumbar extension limited to ~20 °  sidebending WFL    LEs hip flexion and ER WFL but limited IR to 10 ° bilaterally    AROM knee l -5-110 °  R  0-115 °    SLR 68 ° bilaterally    Note L dorsiflexion limited secondary to drop foot but also decreased flexibility    Shoulder AROM R 90 °  L 150 °     Abduction R 85 °      L 160 °     ER           R: 5 °        L: WFL    Elbow and wrist [facility]   9/14/17: AROM L knee   Shoulder AROM R 130 ° flexion      115 ° abduction      ER 40 °        cervical rotation R 75 °  L 80 °  sidebending to the L still decreases symptoms on R upper body    AROM L knee -5-113 °       Strength:     Date:  8/15/17 Date:  9/14/17 Date:     LE MMT Left Right Left Right Left Right   Hip Flex (L1/L2) 4-/5 4/5 4+/5 4+/5     Hip Abd (glut med) 4-/5 4-/5       Quad    ( L3/4) 4-/5 4+/5 4/5 5     Hamstring 4/5 4+/5 4+/5 5     Anterior Tib (L4/L5) 3-/5 4+/5 3- 4+     Gastroc (S1/2) 3/5 3-/5       EHL (L5) Not tested                 UE grossly  4-/5 4-/5  4-/5 grossly shoulder  4/5 elbow       Special Tests: Spurlings (+ R)  SLR (--), Hip Scour (---)  Neurological Screen: intact to light touch but reports intermittent numbness and tingling fingers; relieves R finger tingling by cervical sidebending to the L  Functional Mobility:         Gait/Ambulation:  Slow antalgic gait with decreased terminal knee extension LLE and decreased stance time LLE;  R foot excessive supination (pt reports due to numerous foot fractures) and L foot mild foot drop        Transfers: Mod I sit to stand but uses hands         Bed Mobility:  Independent but has to avoid supine as reports old injury and surgeries causing dizziness        Stairs:  Step-to pattern   Balance:          Decreased dynamic; Romberg can stand at least 30 seconds; unable to single leg stance or tandem  9/14/17: romberg 45 sec   Tandem less than 5 seconds    Body Structures Involved:  1. Nerves  2. Bones  3. Joints  4. Muscles Body Functions Affected:  1. Sensory/Pain  2. Neuromusculoskeletal  3. Movement Related Activities and Participation Affected:  1. General Tasks and Demands  2. Mobility  3. Self Care  4. Domestic Life  5. Community, Social and Civic Life   CLINICAL PRESENTATION:   CLINICAL DECISION MAKING:   Outcome Measure: Tool Used: Modified Oswestry Low Back Pain Questionnaire  Score:  Initial: 23/50  Most Recent: 22/50 (Date: 9/14/17 )   Interpretation of Score: Each section is scored on a 0-5 scale, 5 representing the greatest disability. The scores of each section are added together for a total score of 50. Score 0 1-10 11-20 21-30 31-40 41-49 50   Modifier CH CI CJ CK CL CM CN     ? Mobility - Walking and Moving Around:     - CURRENT STATUS: CK - 40%-59% impaired, limited or restricted    - GOAL STATUS: CJ - 20%-39% impaired, limited or restricted    - D/C STATUS:  ---------------To be determined---------------      Medical Necessity:   · Patient is expected to demonstrate progress in strength, range of motion, balance and functional technique to decrease pain and improve tolerance with daily activities.   Reason for Services/Other Comments:  · Patient continues to require modification of therapeutic interventions to increase complexity of exercises. TREATMENT:   (In addition to Assessment/Re-Assessment sessions the following treatments were rendered)  Therapeutic Exercise: (see flow sheet below for minutes):  Exercises per grid below to improve mobility and strength. Required minimal verbal and tactile cues to promote proper body alignment and promote proper body mechanics. Aquatic Therapy (see flow sheet below for minutes): Aquatic treatment performed per flow grid for Decreased muscle strength, Decreased range of motion, Decompression and Low impact and reduced weight bearing activity. Verbal and visual cues provided for technique and core stabilization. Assistance by therapist provided for progression of exercises. Date: 8/15/17 8/21/17 8/28/17 8/31/17 9/5/17 9/7/17 9/14/17   Modalities:       15 minutes   IFC R shoulder/cervical area       Pt in long seated position                       Manual Therapy:     15 min  15 minutes   STM to ant/post R shoulder     15 min     Scalenes, pecs, upper trap, levator area soft tissue mobilization       12 minutes   Scapular mobs       Rotation/protraction/retraction  x15 each             Aquatic Exercise: next 1.5# 45 min 1.5# 55 min 1.5# 55 min 2.5# 55 min 2.5# 55 nmi    Gait F/B/S/M  x4L x4L x4L x4L x4L    Heel/Toe walk          4-way    x15 BLE x15 BLE x20 BLE    PF/DF          Hamstring Curls  x4L x4L x4L x4L x4L    Hip Flexion with knee ext.   (rockette)          Squats     x15 w/rail x15 w/rail X 15 w/rail X 20 c rail    SLR march  x4L x4L x4L w rail x4L w rail x4L c rail    Lunges          Hip circles          Hip horizontal abduction/adduction          Core: row      x15 open paddles 2x15 open paddles x20 open paddles    Core:          Deep well bike  2 min 3 min 3 min 3min 3 min    Deep well jumping don  1 min 2 min 2 min 2 min 2 min    Deep well scissors  1 min 2 min 2 mi 2 min 2 min    Deep well:  traction   8 min 8 min 8 min 8 min              Hamstring Stretch   2h 30 sec 2H 30 2H 30 2H30    Step Lunge          Piriformis stretch   2h 30 sec 2 H 30 2H 30 2H30    PF Stretch          Balance: Single leg Stance          Balance: Tandem                                Therapeutic Exercise: 15 minutes      30 minutes   Shoulder AAROM t-bar supine external rotation at 20 ° abduction x15      x20   Supine shoulder flexion t-bar AAROM x10      x20   scapt-retract x12         Upper trap stretch R only so sidebending to L  2 H 20      Manual   2 H 20 R only so L sidebending   Hamstring stretch with strap 3 H 30      2 H 30   Single knee to chest 2 H 20         bridge       x15   Sit to stand       x10   Tandem stance       2 H 10   F=forward  B=Backward  S=sidesteps  M=marches    H=hold    Manual: (see flow sheet above for minutes) see above to decrease tightness  Modalities: (see flow sheet above for minutes) see above to decrease pain and inflammation; pt monitored and skin WNL    HEP: Pt instructed in above exercises at evaluation and issued handout. Treatment/Session Assessment: Pt is making progress towards goals above; however, still having R shoulder/cervical pain with radiculopathy into R anterior upper chest area. Performed STM to decrease muscular tightness prior to ROM with cervical and R shoulder. Pt has improved LE strengthening and improving with R shoulder and cervical mobility, but needs continued postural strengthening and cervical/R shoulder mobility. Plan to transition out of aquatics and focus on core/postural strengthening, balance, and cervical/R shoulder mobility and strengthening. · Pain/ Symptoms: Initial:  7/10 in R shoulder/cervical/anterior upper chest  \"The injection (which points along scapular area) has not helped. I'm still in excruciating pain with this R shoulder area. It's stabbing into my chest area. My knees are good now.  My back only hurts intermittently and feeling a lot better. \" Post Session:  2/10  \"I feel better right now. \" ·   Compliance with Program/Exercises: compliant  · Recommendations/Intent for next treatment session: \"Next visit will focus on more challenging activities\". Transition to focus more land core/postural strengthening and use modalities and manual therapy as needed.    Total Treatment Duration:  PT Patient Time In/Time Out  Time In: 0930  Time Out: 1030     Chapis Beasley, PT

## 2017-09-18 ENCOUNTER — HOSPITAL ENCOUNTER (OUTPATIENT)
Dept: PHYSICAL THERAPY | Age: 67
Discharge: HOME | End: 2017-09-18
Payer: MEDICARE

## 2017-09-21 ENCOUNTER — HOSPITAL ENCOUNTER (OUTPATIENT)
Dept: PHYSICAL THERAPY | Age: 67
Discharge: HOME | End: 2017-09-21
Payer: MEDICARE

## 2017-09-21 NOTE — PROGRESS NOTES
Pt cancelled appointment on 9/21/17 due to increase in pain. Contacted patient via telecommunication, pt stated her shoulder felt better following last session but then gradually began to hurt and has remained painful. Pt also stated she has been \"babying\" her arm and not using it much due to pain. I let patient know at her next visit we would discontinue manual therapy and perform only aquatic exercises followed by IFC/ice to R shoulder to avoid irritating R shoulder area again. Patient stated she would be in on Monday at 9:30 with [facility]. Will communicate POC with [facility] prior to patient care.

## 2017-09-25 ENCOUNTER — HOSPITAL ENCOUNTER (OUTPATIENT)
Dept: PHYSICAL THERAPY | Age: 67
Discharge: HOME | End: 2017-09-25
Payer: MEDICARE

## 2017-09-25 PROCEDURE — 97113 AQUATIC THERAPY/EXERCISES: CPT

## 2017-09-25 NOTE — PROGRESS NOTES
Kim Hubbard  : [id number] [facility],[address]  [address] [place] [address].  [phone]   [phone]        OUTPATIENT PHYSICAL THERAPY:Daily Note 2017      ICD-10: Treatment Diagnosis: Cervicalgia (M54.2)  Low back pain (M54.5)  Difficulty in walking, not elsewhere classified (R26.2)  Precautions/Allergies:   Latex; Sulfa (sulfonamide antibiotics); Macrobid [nitrofurantoin monohyd/m-cryst]; Other plant, animal, environmental; Oxycodone; and Tape [adhesive]   Fall Risk Score: 1 (? 5 = High Risk)  MD Orders: Evaluate and Treat Cervical ponyand LBP MEDICAL/REFERRING DIAGNOSIS:  Cervical spondylosis [M47.812]  DDD (degenerative disc disease), cervical [M50.30]  DDD (degenerative disc disease), lumbar [M51.36]  Radiculopathy, unspecified spinal region [M54.10]   DATE OF ONSET: chronic  REFERRING PHYSICIAN: Gilbert Campos MD  RETURN PHYSICIAN APPOINTMENT: 17     INITIAL ASSESSMENT:  Ms. Grey Dietrich presents with chronic LBP and cervical pain. Pt has DDD, stenosis, cervical spondylosis, and radiculopathy into UEs worse with R. Pt exhibits decreased cervical ROM, R shoulder ROM, decreased knee AROM, and decreased hip flexibility. Pt has poor posture and will benefit from PT for core and extremity strengthening to progress towards functional goals below. Pt has decreased tolerance for walking, transfers, standing, reaching, and lifting. Pt will benefit from aquatic PT to decrease load bearing on joints. PROBLEM LIST (Impacting functional limitations):  1. Decreased Strength  2. Decreased ADL/Functional Activities  3. Decreased Transfer Abilities  4. Decreased Ambulation Ability/Technique  5. Decreased Balance  6. Increased Pain  7. Decreased Activity Tolerance  8. Decreased Flexibility/Joint Mobility  9. Decreased [place] with Home Exercise Program INTERVENTIONS PLANNED:  1. Balance Exercise  2. Cold  3. Electrical Stimulation  4.  Gait Training  5. Heat  6. Home Exercise Program (HEP)  7. Manual Therapy  8. Neuromuscular Re-education/Strengthening  9. Range of Motion (ROM)  10. Therapeutic Activites  11. Therapeutic Exercise/Strengthening  12. Transcutaneous Electrical Nerve Stimulation (TENS)  13. Ultrasound (US)  14. aquatics   TREATMENT PLAN:  Effective Dates: 8/15/17 TO 11/7/17. Frequency/Duration: 2 times a week for 12 weeks  GOALS: (Goals have been discussed and agreed upon with patient.)  Short-Term Functional Goals: Time Frame: 4 weeks  1. Pt will be independent with HEP. (MET--but ongoing)  2. Pt will decrease LBP and cervical symptoms with ADLs by at least 25% with ADLs. (partially met--met for LBP but not cervical/shoulder)  3. Pt will increase cervical AROM to R 5-10 ° to improve rotation with driving. (MET)  4. Pt will increase shoulder AROM flexion by at least 10 ° for reaching overhead. (MET)  Discharge Goals: Time Frame: 8-12 weeks  1. Pt will be able to increase B UE strength to 4+/5 for lifting groceries. (ongoing)  2. Pt will be able to improve balance and have decreased fear of falling by being able to tandem stance at least 15 seconds. (progressing)  3. Pt will be able to sit to stand at least 5x without use of UEs noting increasing functional strength. (MET)  4. Pt will decrease Oswestry score 15/50 or less noting improving functional status. (ongoing)  5. Pt will be able to report cervical symptoms less than 3/10 at all times with ADLs without c/o dropping objects with UEs due to symptoms. (ongoing)  Rehabilitation Potential For Stated Goals: Good                       HISTORY:   History of Present Injury/Illness (Reason for Referral): Pt reports to PT due to cervical and lumbar DDD with cervical spondylosis/stenosis. Pt reports fear of falling due to decreased balance. Pt reports LBP and bilateral knee stiffness.  Pt had recent L TKA and March 2017 and had to stop recent PT here due to having low hemoglobin and then she also was having back/neck pain and was referred to Dr. Prerna Hendricks. Pt reports Dr. Anatoliy Zuñiga did not want to do surgery and referred her to pain management. She received FERNIE in her cervical spine last week 8/10/17 which lessened some of the pain in her R cervical/chest area. Pt reports she has a 2nd injection scheduled next week. Pt has history of cervical fusion in 1997 and had 3 lumbar surgeries in the early 1980s. Pt also has increased cervical pain with intermittent numbness/tingling in fingers and increased difficulty reaching with R UE. Pt has had limited R shoulder ROM but pt notes it has worsened as she knows her posture has declined. Pt would like to decrease pain and be able to walk better, transfer more easily, as well as use her R UE better for reaching. Pt reports she has dropped light bjects due to UE symptoms. Past Medical History/Comorbidities:   Ms. Karime Orourke  has a past medical history of Adverse effect of anesthesia; Anemia; Arthritis; Chronic kidney disease; Chronic pain; Follow-up visit for aortic valve replacement with bioprosthetic valve (7/25/2016); GERD (gastroesophageal reflux disease); Hypertension; Kidney stones; Morbid obesity ([address] Murmur, cardiac; Nausea & vomiting; Psychiatric disorder; PUD (peptic ulcer disease) (06/2016); S/P aortic valve replacement with bioprosthetic valve (4/28/2017); and Valvular heart disease (2016). Ms. Karime Orourke  has a past surgical history that includes urological (Multiple dates); shoulder arthroscopy (Right); lap cholecystectomy; gastric bypass; gi; cervical fusion; hysterectomy; bilateral salpingo-oophorectomy; heart catheterization; aortic valve replacement (6/9/2016); colonoscopy (N/A, 6/15/2016); knee replacement (Right, 10/10/2016); and heart valve surgery.  L TKA (3/21/17) cervical fusion was in 1997, 3x lumbar surgeries in the early 1980s   Social History/Living Environment:    lives in one story home, her grown son lives in the home with her  Prior Level of Function/Work/Activity:  Independent with all housework, cooking, etc.   Current Medications:    Current Outpatient Prescriptions:     furosemide (LASIX) 20 mg tablet, Take 1 Tab by mouth daily. Indications: Edema, Disp: 90 Tab, Rfl: 0    zolpidem (AMBIEN) 5 mg tablet, Take 1 Tab by mouth nightly as needed for Sleep. Max Daily Amount: 5 mg., Disp: 90 Tab, Rfl: 1    HYDROcodone-acetaminophen (NORCO) 7.5-325 mg per tablet, Take 1 Tab by mouth every six (6) hours as needed for Pain. Max Daily Amount: 4 Tabs., Disp: 120 Tab, Rfl: 0    [START ON 8/25/2017] HYDROcodone-acetaminophen (NORCO) 7.5-325 mg per tablet, Take 1 Tab by mouth every six (6) hours as needed for Pain. Max Daily Amount: 4 Tabs., Disp: 60 Tab, Rfl: 0    [START ON 9/24/2017] HYDROcodone-acetaminophen (NORCO) 7.5-325 mg per tablet, Take 1 Tab by mouth every six (6) hours as needed for Pain. Max Daily Amount: 4 Tabs., Disp: 60 Tab, Rfl: 0    doxycycline (MONODOX) 100 mg capsule, Take 1 Cap by mouth two (2) times a day., Disp: 20 Cap, Rfl: 0    DOCUSATE CALCIUM (STOOL SOFTENER PO), Take  by mouth daily. , Disp: , Rfl:     amLODIPine (NORVASC) 5 mg tablet, Take 1 Tab by mouth daily. Indications: hypertension, Disp: 30 Tab, Rfl: 6    esomeprazole (NEXIUM) 40 mg capsule, Take 1 Cap by mouth daily. (Patient taking differently: Take 40 mg by mouth two (2) times a day.), Disp: 90 Cap, Rfl: 1    BENICAR 40 mg tablet, TAKE 1 TABLET BY MOUTH DAILY, Disp: 90 Tab, Rfl: 5    aspirin 81 mg chewable tablet, Take 1 Tab by mouth two (2) times a day. (Patient taking differently: Take 81 mg by mouth daily.), Disp: 60 Tab, Rfl: 0    carvedilol (COREG) 25 mg tablet, Take 1 Tab by mouth two (2) times daily (with meals). , Disp: 180 Tab, Rfl: 1    febuxostat (ULORIC) 40 mg tab tablet, Take 1 Tab by mouth every morning. Indications: GOUT PREVENTION, Disp: 90 Tab, Rfl: 3    calcium-vitamin D 600 mg(1,500mg) -200 unit tab, Take 1 Tab by mouth daily. , Disp: , Rfl:     polyethylene glycol (MIRALAX) 17 gram/dose powder, Take 17 g by mouth daily as needed. , Disp: , Rfl:     ferrous sulfate 325 mg (65 mg iron) tablet, Take 1 Tab by mouth daily (with breakfast). , Disp: 30 Tab, Rfl: 1   Potassium   Gabapentin   Takes Norco only half pill every 4-5days  Date Last Reviewed:  9/25/2017   EXAMINATION:   Observation/Orthostatic Postural Assessment:          Significant forward head posture with increased kyphosis; in standing weight shifted more to RLE   Palpation:          Very tight upper traps bilaterally with trigger points throughout  ROM:    Cervical flexion WNL  extension limited to 40 °  Cervical lateral flexion to R increases pain and rotation to R increases pain  AROM cervical Rotation R 65 °  L 80 °    Lumbar flexion WFL  Lumbar extension limited to ~20 °  sidebending WFL    LEs hip flexion and ER WFL but limited IR to 10 ° bilaterally    AROM knee l -5-110 °  R  0-115 °    SLR 68 ° bilaterally    Note L dorsiflexion limited secondary to drop foot but also decreased flexibility    Shoulder AROM R 90 °  L 150 °     Abduction R 85 °      L 160 °     ER           R: 5 °        L: WFL    Elbow and wrist [facility]   9/14/17: AROM L knee   Shoulder AROM R 130 ° flexion      115 ° abduction      ER 40 °        cervical rotation R 75 °  L 80 °  sidebending to the L still decreases symptoms on R upper body    AROM L knee -5-113 °       Strength:     Date:  8/15/17 Date:  9/14/17 Date:     LE MMT Left Right Left Right Left Right   Hip Flex (L1/L2) 4-/5 4/5 4+/5 4+/5     Hip Abd (glut med) 4-/5 4-/5       Quad    ( L3/4) 4-/5 4+/5 4/5 5     Hamstring 4/5 4+/5 4+/5 5     Anterior Tib (L4/L5) 3-/5 4+/5 3- 4+     Gastroc (S1/2) 3/5 3-/5       EHL (L5) Not tested                 UE grossly  4-/5 4-/5  4-/5 grossly shoulder  4/5 elbow       Special Tests: Spurlings (+ R)  SLR (--), Hip Scour (---)  Neurological Screen: intact to light touch but reports intermittent numbness and tingling fingers; relieves R finger tingling by cervical sidebending to the L  Functional Mobility:         Gait/Ambulation:  Slow antalgic gait with decreased terminal knee extension LLE and decreased stance time LLE;  R foot excessive supination (pt reports due to numerous foot fractures) and L foot mild foot drop        Transfers: Mod I sit to stand but uses hands         Bed Mobility:  Independent but has to avoid supine as reports old injury and surgeries causing dizziness        Stairs:  Step-to pattern   Balance:          Decreased dynamic; Romberg can stand at least 30 seconds; unable to single leg stance or tandem  9/14/17: romberg 45 sec   Tandem less than 5 seconds    Body Structures Involved:  1. Nerves  2. Bones  3. Joints  4. Muscles Body Functions Affected:  1. Sensory/Pain  2. Neuromusculoskeletal  3. Movement Related Activities and Participation Affected:  1. General Tasks and Demands  2. Mobility  3. Self Care  4. Domestic Life  5. Community, Social and Civic Life   CLINICAL PRESENTATION:   CLINICAL DECISION MAKING:   Outcome Measure: Tool Used: Modified Oswestry Low Back Pain Questionnaire  Score:  Initial: 23/50  Most Recent: 22/50 (Date: 9/14/17 )   Interpretation of Score: Each section is scored on a 0-5 scale, 5 representing the greatest disability. The scores of each section are added together for a total score of 50. Score 0 1-10 11-20 21-30 31-40 41-49 50   Modifier CH CI CJ CK CL CM CN     ? Mobility - Walking and Moving Around:     - CURRENT STATUS: CK - 40%-59% impaired, limited or restricted    - GOAL STATUS: CJ - 20%-39% impaired, limited or restricted    - D/C STATUS:  ---------------To be determined---------------      Medical Necessity:   · Patient is expected to demonstrate progress in strength, range of motion, balance and functional technique to decrease pain and improve tolerance with daily activities.   Reason for Services/Other Comments:  · Patient continues to require modification of therapeutic interventions to increase complexity of exercises. TREATMENT:   (In addition to Assessment/Re-Assessment sessions the following treatments were rendered)  Therapeutic Exercise: (see flow sheet below for minutes):  Exercises per grid below to improve mobility and strength. Required minimal verbal and tactile cues to promote proper body alignment and promote proper body mechanics. Aquatic Therapy (see flow sheet below for minutes): Aquatic treatment performed per flow grid for Decreased muscle strength, Decreased range of motion, Decompression and Low impact and reduced weight bearing activity. Verbal and visual cues provided for technique and core stabilization. Assistance by therapist provided for progression of exercises. Date: 8/15/17 8/21/17 8/28/17 8/31/17 9/5/17 9/7/17 9/14/17 9/25/17   Modalities:       15 minutes    IFC R shoulder/cervical area       Pt in long seated position                          Manual Therapy:     15 min  15 minutes    STM to ant/post R shoulder     15 min      Scalenes, pecs, upper trap, levator area soft tissue mobilization       12 minutes    Scapular mobs       Rotation/protraction/retraction  x15 each               Aquatic Exercise: next 1.5# 45 min 1.5# 55 min 1.5# 55 min 2.5# 55 min 2.5# 55 nmi  2.2.5# 55 min   Gait F/B/S/M  x4L x4L x4L x4L x4L  x4L   Heel/Toe walk           4-way    x15 BLE x15 BLE x20 BLE  x15   PF/DF           Hamstring Curls  x4L x4L x4L x4L x4L  x4L   Hip Flexion with knee ext.   (rockette)           Squats     x15 w/rail x15 w/rail X 15 w/rail X 20 c rail  x20 c rail   SLR march  x4L x4L x4L w rail x4L w rail x4L c rail  x4L   Lunges           Hip circles        x10 cw/ccw   Hip horizontal abduction/adduction           Core: row      x15 open paddles 2x15 open paddles x20 open paddles     Core:           Deep well bike  2 min 3 min 3 min 3min 3 min  3 min   Deep well jumping don  1 min 2 min 2 min 2 min 2 min  2 min   Deep well scissors  1 min 2 min 2 mi 2 min 2 min  2 min   Deep well:  traction   8 min 8 min 8 min 8 min  8 min              Hamstring Stretch   2h 30 sec 2H 30 2H 30 2H30     Step Lunge           Piriformis stretch   2h 30 sec 2 H 30 2H 30 2H30  2H30   PF Stretch           Balance: Single leg Stance           Balance: Tandem                                   Therapeutic Exercise: 15 minutes      30 minutes    Shoulder AAROM t-bar supine external rotation at 20 ° abduction x15      x20    Supine shoulder flexion t-bar AAROM x10      x20    scapt-retract x12          Upper trap stretch R only so sidebending to L  2 H 20      Manual   2 H 20 R only so L sidebending    Hamstring stretch with strap 3 H 30      2 H 30    Single knee to chest 2 H 20          bridge       x15    Sit to stand       x10    Tandem stance       2 H 10    F=forward  B=Backward  S=sidesteps  M=marches    H=hold    Manual: (see flow sheet above for minutes) see above to decrease tightness  Modalities: (see flow sheet above for minutes) see above to decrease pain and inflammation; pt monitored and skin WNL    HEP: Pt instructed in above exercises at evaluation and issued handout. Treatment/Session Assessment: Pt returns to therapy after cancelling a few times due to pain exacerbation from last treatment. Performed aquatics due to pain from shoulder radiating into back  Pt has decreased pain in the water and is able to complete all exercises. Pt has appt with pain management MD 9/27/17 and anticipates referral to Neurosurgeon MD. .    · Pain/ Symptoms: Initial: 5/10 Pt reports significant pain after last treatment and was unable to do anything for days. Post Session:  2/10  \"I feel better right now. \" ·   Compliance with Program/Exercises: compliant  · Recommendations/Intent for next treatment session: \"Next visit will focus on more challenging activities\".  Transition to focus more land core/postural strengthening and use modalities and manual therapy as needed.    Total Treatment Duration:  PT Patient Time In/Time Out  Time In: 0930  Time Out: 840 [facility]

## 2017-09-28 ENCOUNTER — HOSPITAL ENCOUNTER (OUTPATIENT)
Dept: PHYSICAL THERAPY | Age: 67
Discharge: HOME | End: 2017-09-28
Payer: MEDICARE

## 2017-09-28 NOTE — PROGRESS NOTES
Patient cancelled appointment on 9/28/17 due to having car trouble. Plan to continue with current POC next appointment.

## 2017-10-09 ENCOUNTER — HOSPITAL ENCOUNTER (OUTPATIENT)
Dept: ULTRASOUND IMAGING | Age: 67
Discharge: HOME | End: 2017-10-09
Attending: UROLOGY
Payer: MEDICARE

## 2017-10-09 DIAGNOSIS — N20.0 KIDNEY STONE: ICD-10-CM

## 2017-10-09 PROCEDURE — 76770 US EXAM ABDO BACK WALL COMP: CPT

## 2017-10-09 NOTE — PROGRESS NOTES
The renal ultrasound shows no stones of significance on the right. She does have a 1 cm stone in the left kidney and just very mild holdup of drainage on the left. I would like to repeat this study in 6 months.

## 2017-10-10 NOTE — PROGRESS NOTES
Turned to a phone note I called and lm for the pt with all the info and placed the order for the U/S //dh

## 2017-10-26 NOTE — PROGRESS NOTES
Leanne Aguila  : 1950 [id number] @ [address] [place] [address].  [phone]   [phone]        OUTPATIENT PHYSICAL THERAPY:Discontinuation Summary 10/26/2017      ICD-10: Treatment Diagnosis: Cervicalgia (M54.2)  Low back pain (M54.5)  Difficulty in walking, not elsewhere classified (R26.2)  Precautions/Allergies:   Latex; Sulfa (sulfonamide antibiotics); Macrobid [nitrofurantoin monohyd/m-cryst]; Other plant, animal, environmental; Oxycodone; and Tape [adhesive]   Fall Risk Score: 1 (? 5 = High Risk)  MD Orders: Evaluate and Treat Cervical ponyand LBP MEDICAL/REFERRING DIAGNOSIS:  Cervical spondylosis [M47.812]  DDD (degenerative disc disease), cervical [M50.30]  DDD (degenerative disc disease), lumbar [M51.36]  Radiculopathy, unspecified spinal region [M54.10]   DATE OF ONSET: chronic  REFERRING PHYSICIAN: Kellie Partida MD  RETURN PHYSICIAN APPOINTMENT: 17     INITIAL ASSESSMENT:  Ms. Roberto Oconnor presents with chronic LBP and cervical pain. Pt has DDD, stenosis, cervical spondylosis, and radiculopathy into UEs worse with R. Pt exhibits decreased cervical ROM, R shoulder ROM, decreased knee AROM, and decreased hip flexibility. Pt has poor posture and will benefit from PT for core and extremity strengthening to progress towards functional goals below. Pt has decreased tolerance for walking, transfers, standing, reaching, and lifting. Pt will benefit from aquatic PT to decrease load bearing on joints. Pt made some progress towards goals below with LBP and increasing cervical and shoulder AROM; however, pt remained with significant R shoulder/cervical radicular pain into anterior upper chest area. Pt responded well to aquatics; however, pt responded poorly to light tissue mobilization. Pt still exhibited UE weakness and decreased core/postural strength. Pt was making good progress with LE strength at time of last visit.  Pt was last seen on 9/25/17 and then cancelled next visit due to car trouble, and then no showed her last visit. Pt was following up with pain management. PROBLEM LIST (Impacting functional limitations):  1. Decreased Strength  2. Decreased ADL/Functional Activities  3. Decreased Transfer Abilities  4. Decreased Ambulation Ability/Technique  5. Decreased Balance  6. Increased Pain  7. Decreased Activity Tolerance  8. Decreased Flexibility/Joint Mobility  9. Decreased [place] with Home Exercise Program INTERVENTIONS PLANNED:  1. Balance Exercise  2. Cold  3. Electrical Stimulation  4. Gait Training  5. Heat  6. Home Exercise Program (HEP)  7. Manual Therapy  8. Neuromuscular Re-education/Strengthening  9. Range of Motion (ROM)  10. Therapeutic Activites  11. Therapeutic Exercise/Strengthening  12. Transcutaneous Electrical Nerve Stimulation (TENS)  13. Ultrasound (US)  14. aquatics   TREATMENT PLAN:  Effective Dates: 8/15/17 TO 11/7/17. Frequency/Duration: 2 times a week for 12 weeks  GOALS: (Goals have been discussed and agreed upon with patient.)  Short-Term Functional Goals: Time Frame: 4 weeks  1. Pt will be independent with HEP. (MET--but ongoing)  2. Pt will decrease LBP and cervical symptoms with ADLs by at least 25% with ADLs. (partially met--met for LBP but not cervical/shoulder)  3. Pt will increase cervical AROM to R 5-10 ° to improve rotation with driving. (MET)  4. Pt will increase shoulder AROM flexion by at least 10 ° for reaching overhead. (MET)  Discharge Goals: Time Frame: 8-12 weeks  1. Pt will be able to increase B UE strength to 4+/5 for lifting groceries. (ongoing)  2. Pt will be able to improve balance and have decreased fear of falling by being able to tandem stance at least 15 seconds. (progressing)  3. Pt will be able to sit to stand at least 5x without use of UEs noting increasing functional strength. (MET)  4. Pt will decrease Oswestry score 15/50 or less noting improving functional status. (ongoing)  5. Pt will be able to report cervical symptoms less than 3/10 at all times with ADLs without c/o dropping objects with UEs due to symptoms. (ongoing)  Rehabilitation Potential For Stated Goals: Good                       HISTORY:   History of Present Injury/Illness (Reason for Referral): Pt reports to PT due to cervical and lumbar DDD with cervical spondylosis/stenosis. Pt reports fear of falling due to decreased balance. Pt reports LBP and bilateral knee stiffness. Pt had recent L TKA and March 2017 and had to stop recent PT here due to having low hemoglobin and then she also was having back/neck pain and was referred to Dr. Constanza Martinez. Pt reports Dr. Sarah Lieberman did not want to do surgery and referred her to pain management. She received FERNIE in her cervical spine last week 8/10/17 which lessened some of the pain in her R cervical/chest area. Pt reports she has a 2nd injection scheduled next week. Pt has history of cervical fusion in 1997 and had 3 lumbar surgeries in the early 1980s. Pt also has increased cervical pain with intermittent numbness/tingling in fingers and increased difficulty reaching with R UE. Pt has had limited R shoulder ROM but pt notes it has worsened as she knows her posture has declined. Pt would like to decrease pain and be able to walk better, transfer more easily, as well as use her R UE better for reaching. Pt reports she has dropped light bjects due to UE symptoms. Past Medical History/Comorbidities:   Ms. Thalia Garcia  has a past medical history of Adverse effect of anesthesia; Anemia; Arthritis; Chronic kidney disease; Chronic pain; Follow-up visit for aortic valve replacement with bioprosthetic valve (7/25/2016); GERD (gastroesophageal reflux disease); Hypertension; Kidney stones; Morbid obesity ([facility] [address] Murmur, cardiac; Nausea & vomiting; Psychiatric disorder; PUD (peptic ulcer disease) (06/2016);  S/P aortic valve replacement with bioprosthetic valve (4/28/2017); and Valvular heart disease (2016). Ms. Erik Pacheco  has a past surgical history that includes urological (Multiple dates); shoulder arthroscopy (Right); lap cholecystectomy; gastric bypass; gi; cervical fusion; hysterectomy; bilateral salpingo-oophorectomy; heart catheterization; aortic valve replacement (6/9/2016); colonoscopy (N/A, 6/15/2016); knee replacement (Right, 10/10/2016); heart valve surgery; and knee replacement (Bilateral). L TKA (3/21/17) cervical fusion was in 1997, 3x lumbar surgeries in the early 1980s   Social History/Living Environment:    lives in one story home, her grown son lives in the home with her  Prior Level of Function/Work/Activity:  Independent with all housework, cooking, etc.   Current Medications:    Current Outpatient Prescriptions:     furosemide (LASIX) 20 mg tablet, Take 1 Tab by mouth daily. Indications: Edema, Disp: 90 Tab, Rfl: 0    zolpidem (AMBIEN) 5 mg tablet, Take 1 Tab by mouth nightly as needed for Sleep. Max Daily Amount: 5 mg., Disp: 90 Tab, Rfl: 1    HYDROcodone-acetaminophen (NORCO) 7.5-325 mg per tablet, Take 1 Tab by mouth every six (6) hours as needed for Pain. Max Daily Amount: 4 Tabs., Disp: 120 Tab, Rfl: 0    [START ON 8/25/2017] HYDROcodone-acetaminophen (NORCO) 7.5-325 mg per tablet, Take 1 Tab by mouth every six (6) hours as needed for Pain. Max Daily Amount: 4 Tabs., Disp: 60 Tab, Rfl: 0    [START ON 9/24/2017] HYDROcodone-acetaminophen (NORCO) 7.5-325 mg per tablet, Take 1 Tab by mouth every six (6) hours as needed for Pain. Max Daily Amount: 4 Tabs., Disp: 60 Tab, Rfl: 0    doxycycline (MONODOX) 100 mg capsule, Take 1 Cap by mouth two (2) times a day., Disp: 20 Cap, Rfl: 0    DOCUSATE CALCIUM (STOOL SOFTENER PO), Take  by mouth daily. , Disp: , Rfl:     amLODIPine (NORVASC) 5 mg tablet, Take 1 Tab by mouth daily. Indications: hypertension, Disp: 30 Tab, Rfl: 6    esomeprazole (NEXIUM) 40 mg capsule, Take 1 Cap by mouth daily.  (Patient taking differently: Take 40 mg by mouth two (2) times a day.), Disp: 90 Cap, Rfl: 1    BENICAR 40 mg tablet, TAKE 1 TABLET BY MOUTH DAILY, Disp: 90 Tab, Rfl: 5    aspirin 81 mg chewable tablet, Take 1 Tab by mouth two (2) times a day. (Patient taking differently: Take 81 mg by mouth daily.), Disp: 60 Tab, Rfl: 0    carvedilol (COREG) 25 mg tablet, Take 1 Tab by mouth two (2) times daily (with meals). , Disp: 180 Tab, Rfl: 1    febuxostat (ULORIC) 40 mg tab tablet, Take 1 Tab by mouth every morning. Indications: GOUT PREVENTION, Disp: 90 Tab, Rfl: 3    calcium-vitamin D 600 mg(1,500mg) -200 unit tab, Take 1 Tab by mouth daily. , Disp: , Rfl:     polyethylene glycol (MIRALAX) 17 gram/dose powder, Take 17 g by mouth daily as needed. , Disp: , Rfl:     ferrous sulfate 325 mg (65 mg iron) tablet, Take 1 Tab by mouth daily (with breakfast). , Disp: 30 Tab, Rfl: 1   Potassium   Gabapentin   Takes Norco only half pill every 4-5days  Date Last Reviewed:  9/25/2017   EXAMINATION:   Observation/Orthostatic Postural Assessment:          Significant forward head posture with increased kyphosis; in standing weight shifted more to RLE   Palpation:          Very tight upper traps bilaterally with trigger points throughout  ROM:    Cervical flexion WNL  extension limited to 40 °  Cervical lateral flexion to R increases pain and rotation to R increases pain  AROM cervical Rotation R 65 °  L 80 °    Lumbar flexion WFL  Lumbar extension limited to ~20 °  sidebending WFL    LEs hip flexion and ER WFL but limited IR to 10 ° bilaterally    AROM knee l -5-110 °  R  0-115 °    SLR 68 ° bilaterally    Note L dorsiflexion limited secondary to drop foot but also decreased flexibility    Shoulder AROM R 90 °  L 150 °     Abduction R 85 °      L 160 °     ER           R: 5 °        L: WFL    Elbow and wrist [facility]   9/14/17: AROM L knee   Shoulder AROM R 130 ° flexion      115 ° abduction      ER 40 °        cervical rotation R 75 °  L 80 °  sidebending to the L still decreases symptoms on R upper body    AROM L knee -5-113 °       Strength:     Date:  8/15/17 Date:  9/14/17 Date:     LE MMT Left Right Left Right Left Right   Hip Flex (L1/L2) 4-/5 4/5 4+/5 4+/5     Hip Abd (glut med) 4-/5 4-/5       Quad    ( L3/4) 4-/5 4+/5 4/5 5     Hamstring 4/5 4+/5 4+/5 5     Anterior Tib (L4/L5) 3-/5 4+/5 3- 4+     Gastroc (S1/2) 3/5 3-/5       EHL (L5) Not tested                 UE grossly  4-/5 4-/5  4-/5 grossly shoulder  4/5 elbow       Special Tests: Spurlings (+ R)  SLR (--), Hip Scour (---)  Neurological Screen: intact to light touch but reports intermittent numbness and tingling fingers; relieves R finger tingling by cervical sidebending to the L  Functional Mobility:         Gait/Ambulation:  Slow antalgic gait with decreased terminal knee extension LLE and decreased stance time LLE;  R foot excessive supination (pt reports due to numerous foot fractures) and L foot mild foot drop        Transfers: Mod I sit to stand but uses hands         Bed Mobility:  Independent but has to avoid supine as reports old injury and surgeries causing dizziness        Stairs:  Step-to pattern   Balance:          Decreased dynamic; Romberg can stand at least 30 seconds; unable to single leg stance or tandem  9/14/17: romberg 45 sec   Tandem less than 5 seconds    Body Structures Involved:  1. Nerves  2. Bones  3. Joints  4. Muscles Body Functions Affected:  1. Sensory/Pain  2. Neuromusculoskeletal  3. Movement Related Activities and Participation Affected:  1. General Tasks and Demands  2. Mobility  3. Self Care  4. Domestic Life  5. Community, Social and Civic Life   CLINICAL PRESENTATION:   CLINICAL DECISION MAKING:   Outcome Measure: Tool Used: Modified Oswestry Low Back Pain Questionnaire  Score:  Initial: 23/50  Most Recent: 22/50 (Date: 9/14/17 )   Interpretation of Score: Each section is scored on a 0-5 scale, 5 representing the greatest disability.   The scores of each section are added together for a total score of 50. Score 0 1-10 11-20 21-30 31-40 41-49 50   Modifier CH CI CJ CK CL CM CN     ? Mobility - Walking and Moving Around:     - CURRENT STATUS: CK - 40%-59% impaired, limited or restricted    - GOAL STATUS: CJ - 20%-39% impaired, limited or restricted    - D/C STATUS:  unable to assess secondary to pt not returning      Medical Necessity:   · Patient is expected to demonstrate progress in strength, range of motion, balance and functional technique to decrease pain and improve tolerance with daily activities. Reason for Services/Other Comments:  · Patient continues to require modification of therapeutic interventions to increase complexity of exercises. TREATMENT:   (In addition to Assessment/Re-Assessment sessions the following treatments were rendered)  Therapeutic Exercise: (see flow sheet below for minutes):  Exercises per grid below to improve mobility and strength. Required minimal verbal and tactile cues to promote proper body alignment and promote proper body mechanics. Aquatic Therapy (see flow sheet below for minutes): Aquatic treatment performed per flow grid for Decreased muscle strength, Decreased range of motion, Decompression and Low impact and reduced weight bearing activity. Verbal and visual cues provided for technique and core stabilization. Assistance by therapist provided for progression of exercises.       Date: 8/15/17 8/21/17 8/28/17 8/31/17 9/5/17 9/7/17 9/14/17 9/25/17   Modalities:       15 minutes    IFC R shoulder/cervical area       Pt in long seated position                          Manual Therapy:     15 min  15 minutes    STM to ant/post R shoulder     15 min      Scalenes, pecs, upper trap, levator area soft tissue mobilization       12 minutes    Scapular mobs       Rotation/protraction/retraction  x15 each               Aquatic Exercise: next 1.5# 45 min 1.5# 55 min 1.5# 55 min 2.5# 55 min 2.5# 55 nmi  2.2.5# 55 min Gait F/B/S/M  x4L x4L x4L x4L x4L  x4L   Heel/Toe walk           4-way    x15 BLE x15 BLE x20 BLE  x15   PF/DF           Hamstring Curls  x4L x4L x4L x4L x4L  x4L   Hip Flexion with knee ext. (rockette)           Squats     x15 w/rail x15 w/rail X 15 w/rail X 20 c rail  x20 c rail   SLR march  x4L x4L x4L w rail x4L w rail x4L c rail  x4L   Lunges           Hip circles        x10 cw/ccw   Hip horizontal abduction/adduction           Core: row      x15 open paddles 2x15 open paddles x20 open paddles     Core:           Deep well bike  2 min 3 min 3 min 3min 3 min  3 min   Deep well jumping don  1 min 2 min 2 min 2 min 2 min  2 min   Deep well scissors  1 min 2 min 2 mi 2 min 2 min  2 min   Deep well:  traction   8 min 8 min 8 min 8 min  8 min              Hamstring Stretch   2h 30 sec 2H 30 2H 30 2H30     Step Lunge           Piriformis stretch   2h 30 sec 2 H 30 2H 30 2H30  2H30   PF Stretch           Balance: Single leg Stance           Balance: Tandem                                   Therapeutic Exercise: 15 minutes      30 minutes    Shoulder AAROM t-bar supine external rotation at 20 ° abduction x15      x20    Supine shoulder flexion t-bar AAROM x10      x20    scapt-retract x12          Upper trap stretch R only so sidebending to L  2 H 20      Manual   2 H 20 R only so L sidebending    Hamstring stretch with strap 3 H 30      2 H 30    Single knee to chest 2 H 20          bridge       x15    Sit to stand       x10    Tandem stance       2 H 10    F=forward  B=Backward  S=sidesteps  M=marches    H=hold    Manual: (see flow sheet above for minutes) see above to decrease tightness  Modalities: (see flow sheet above for minutes) see above to decrease pain and inflammation; pt monitored and skin WNL    HEP: Pt instructed in above exercises at evaluation and issued handout.   Treatment/Session Assessment: see above     · Pain/ Symptoms at last visit 9/25/17: Initial: 5/10 Pt reports significant pain after last treatment and was unable to do anything for days. Post Session:  2/10  \"I feel better right now. \" ·   Compliance with Program/Exercises: compliant  ·   Recommendations/Intent for next treatment session: Discontinue PT as pt did not return. Pt was following up with pain management.          Chapis Beasley, PT

## 2017-11-17 ENCOUNTER — HOSPITAL ENCOUNTER (OUTPATIENT)
Dept: MAMMOGRAPHY | Age: 67
Discharge: HOME | End: 2017-11-17
Attending: FAMILY MEDICINE
Payer: MEDICARE

## 2017-11-17 DIAGNOSIS — Z78.0 POSTMENOPAUSAL: ICD-10-CM

## 2017-11-17 DIAGNOSIS — Z12.31 ENCOUNTER FOR SCREENING MAMMOGRAM FOR MALIGNANT NEOPLASM OF BREAST: ICD-10-CM

## 2017-11-17 PROCEDURE — 77080 DXA BONE DENSITY AXIAL: CPT

## 2017-11-17 PROCEDURE — 77067 SCR MAMMO BI INCL CAD: CPT

## 2017-11-17 NOTE — PROGRESS NOTES
Bone density shows osteopenia - borderline osteoporosis.   Will repeat test in 2 yrs and see if getting worse, then decide on treatment

## 2018-01-22 PROBLEM — M47.812 CERVICAL SPONDYLOSIS WITHOUT MYELOPATHY: Status: ACTIVE | Noted: 2017-12-08

## 2018-04-18 PROBLEM — F51.01 PRIMARY INSOMNIA: Status: ACTIVE | Noted: 2018-04-18

## 2018-05-23 PROBLEM — N20.0 RENAL STONE: Status: ACTIVE | Noted: 2018-05-23

## 2018-05-23 PROBLEM — N39.0 RECURRENT UTI: Status: ACTIVE | Noted: 2018-05-23

## 2018-06-16 ENCOUNTER — HOSPITAL ENCOUNTER (OUTPATIENT)
Dept: ULTRASOUND IMAGING | Age: 68
Discharge: HOME | End: 2018-06-16
Attending: NURSE PRACTITIONER
Payer: MEDICARE

## 2018-06-16 DIAGNOSIS — N20.0 RENAL STONE: ICD-10-CM

## 2018-06-16 DIAGNOSIS — N39.0 RECURRENT UTI: ICD-10-CM

## 2018-06-16 PROCEDURE — 76770 US EXAM ABDO BACK WALL COMP: CPT

## 2018-06-18 NOTE — PROGRESS NOTES
US showed 1 small renal stone in each kidney. No etiology for recurrent UTI was seen. Continue conservative prevention of UTI. May come to office PRN for spec check. For renal stones. F/u in 6 months for OV with [facility] (with Dr. Didier Post, she is a former [facility] patient) for f/u of stones.

## 2018-06-19 ENCOUNTER — HOSPITAL ENCOUNTER (OUTPATIENT)
Dept: PHYSICAL THERAPY | Age: 68
Discharge: HOME | End: 2018-06-19
Payer: MEDICARE

## 2018-06-19 PROCEDURE — 97162 PT EVAL MOD COMPLEX 30 MIN: CPT | Performed by: PHYSICAL THERAPIST

## 2018-06-19 PROCEDURE — G8979 MOBILITY GOAL STATUS: HCPCS | Performed by: PHYSICAL THERAPIST

## 2018-06-19 PROCEDURE — G8978 MOBILITY CURRENT STATUS: HCPCS | Performed by: PHYSICAL THERAPIST

## 2018-06-19 PROCEDURE — 97110 THERAPEUTIC EXERCISES: CPT | Performed by: PHYSICAL THERAPIST

## 2018-06-19 NOTE — THERAPY EVALUATION
Holland Whitlock  : 1950 [address]  [address] [place] [address]  [phone]   [phone]        OUTPATIENT PHYSICAL THERAPY:Initial Assessment 2018        ICD-10: Treatment Diagnosis: Pain in right knee (M25.561)  Pain in left knee (M25.562)  Difficulty in walking, not elsewhere classified (R26.2)    Precautions/Allergies:   Latex; Sulfa (sulfonamide antibiotics); Macrobid [nitrofurantoin monohyd/m-cryst]; Other plant, animal, environmental; Oxycodone; and Tape [adhesive]   Fall Risk Score: 6 (? 5 = High Risk)  MD Orders: Evaluate and treat B knees: rehab and strengthening MEDICAL/REFERRING DIAGNOSIS:  Presence of left artificial knee joint [Z96.652]  Presence of right artificial knee joint [Z96.651]   DATE OF ONSET: chronic with acute pain ~5 weeks ago  REFERRING PHYSICIAN: Erica Ahn MD  RETURN PHYSICIAN APPOINTMENT: TBD     INITIAL ASSESSMENT:  Ms. Josi Menchaca presents B knee pain and decreased flexibility causing difficulty walking and prolonged standing. Pt has B TKAs and had recent fall onto knee R more than L. Pt had TKAs cleared by MD and referred for rehab. Pt has poor balance and has history of frequent falls with a feeling of imbalance. Pt does not report any vertigo. Pt reports R knee more painful than L and has poor ROM B knees and poor LE flexibility. Pt has poor core/LE strength BLEs and also has history of LLE weakness with drop foot from previous lumbar disc herniation. P's PMH also includes cervical and lumbar stenosis and extensive chronic neck and R shoulder pain. Pt will benefit from PT to focus on LE/core strengthening, knee ROM, LE flexibility, gait and balance to decrease fall risk and improve functional mobility. PROBLEM LIST (Impacting functional limitations):  1. Decreased Strength  2. Decreased ADL/Functional Activities  3. Decreased Transfer Abilities  4.  Decreased Ambulation Ability/Technique  5. Decreased Balance  6. Increased Pain  7. Decreased Activity Tolerance  8. Decreased Flexibility/Joint Mobility  9. Decreased [place] with Home Exercise Program INTERVENTIONS PLANNED:  1. Balance Exercise  2. Cold  3. Electrical Stimulation  4. Gait Training  5. Heat  6. Home Exercise Program (HEP)  7. Manual Therapy  8. Neuromuscular Re-education/Strengthening  9. Range of Motion (ROM)  10. Therapeutic Activites  11. Therapeutic Exercise/Strengthening  12. Transcutaneous Electrical Nerve Stimulation (TENS)  13. Transfer Training  14. aquatics    TREATMENT PLAN:  Effective Dates: 6/19/2018 TO 9/17/2018 (90 days). Frequency/Duration: 2 times a week for 60- 90 Days    GOALS: (Goals have been discussed and agreed upon with patient.)  Short-Term Functional Goals: Time Frame: 3 weeks  Pt will increase B knee AROM by 5-10 ° increasing functional mobility. Pt will be able to participate in 45 minutes of aquatic exercises for increasing LE flexibility, strength, and to progress balance/gait in safe environment without increased symptoms. Pt will be able to sit to stand 10x without use of UEs noting improving functional strength. Discharge Goals: Time Frame: 8 weeks  1. Pt will be able to safely navigate up/down one flight of stairs (at least 14) reciprocally with mild use of one handrail. 2. Pt will be able to report no falls or feeling of LOB for at least a 3 week period and no longer reporting furniture surfing. 3. Pt will be able to report symptoms with B knees 2/10 or less during standing and walking during housework or grocery shopping 45-60 minutes. Pt will be able to increase B knee AROM flexion 120 ° or greater for full functional mobility. Rehabilitation Potential For Stated Goals: Good  Regarding Estefani Esparza's therapy, I certify that the treatment plan above will be carried out by a therapist or under their direction.   Thank you for this referral,  Chapis Beasley, PT Referring Physician Signature: Millicent Cooks, MD              Date                      HISTORY:   History of Present Injury/Illness (Reason for Referral):  Pt reports B knee pain and stiffness, worsening over the past few months. Pt reports R knee pain is worse than L because she fell more onto her R knee off a step about 5 weeks ago. Pt reports probably having ~5 falls just this year. She feels like she has to furniture walk at times because she is off balance. Pt reports having lower cervical fusion in March 2018 due to disc herniation and stenosis. She has been more inactive after that surgery and noticed B knee stiffness and pain. Pt would like to be able to walk better, navigate stairs better, and improve her balance. She has been using a walker for longer distances but no assistive device in her home. Past Medical History/Comorbidities:   Ms. Ana Courtney  has a past medical history of Adverse effect of anesthesia; Anemia; Arthritis; Chronic kidney disease; Chronic pain; Follow-up visit for aortic valve replacement with bioprosthetic valve (7/25/2016); GERD (gastroesophageal reflux disease); Hypertension; Kidney stones; Morbid obesity ([address] Murmur, cardiac; Nausea & vomiting; Psychiatric disorder; PUD (peptic ulcer disease) (06/2016); S/P aortic valve replacement with bioprosthetic valve (4/28/2017); and Valvular heart disease (2016). Ms. Ana Courtney  has a past surgical history that includes hx urological (Multiple dates); hx shoulder arthroscopy (Right); hx lap cholecystectomy; hx gastric bypass; hx gi; hx cervical fusion; hx hysterectomy; hx bilateral salpingo-oophorectomy; hx heart catheterization; hx aortic valve replacement (6/9/2016); colonoscopy (N/A, 6/15/2016); hx knee replacement (Right, 10/10/2016); hx heart valve surgery; and hx knee replacement (Bilateral). L TKA (2015)  Social History/Living Environment:     lives in one story home  Prior Level of Function/Work/Activity:  Independent with all housework, cooking, etc.  Current Medications:    Current Outpatient Prescriptions:     Lactobacillus acidophilus (ACIDOPHILUS) cap, Take 1 Cap by mouth daily. , Disp: 120 Cap, Rfl: 0    HYDROcodone-acetaminophen (NORCO) 7.5-325 mg per tablet, Take 1 Tab by mouth every six (6) hours as needed for Pain. Max Daily Amount: 4 Tabs., Disp: 120 Tab, Rfl: 0    HYDROcodone-acetaminophen (NORCO) 7.5-325 mg per tablet, Take 1 Tab by mouth every six (6) hours as needed for Pain. Max Daily Amount: 4 Tabs., Disp: 120 Tab, Rfl: 0    HYDROcodone-acetaminophen (NORCO) 7.5-325 mg per tablet, Take 1 Tab by mouth every six (6) hours as needed for Pain. Max Daily Amount: 4 Tabs., Disp: 120 Tab, Rfl: 0    carvedilol (COREG) 25 mg tablet, Take 1 Tab by mouth two (2) times daily (with meals). , Disp: 180 Tab, Rfl: 1    febuxostat (ULORIC) 40 mg tab tablet, Take 1 Tab by mouth every morning. Indications: prevention of acute gout attack, Disp: 90 Tab, Rfl: 3    potassium chloride (KLOR-CON) 10 mEq tablet, Take 1 Tab by mouth daily. , Disp: 90 Tab, Rfl: 3    zolpidem (AMBIEN) 5 mg tablet, Take 1 Tab by mouth nightly as needed for Sleep. Max Daily Amount: 5 mg., Disp: 90 Tab, Rfl: 1    atorvastatin (LIPITOR) 40 mg tablet, Take 1 Tab by mouth daily. , Disp: 90 Tab, Rfl: 3    amLODIPine (NORVASC) 5 mg tablet, Take 1 Tab by mouth daily. Indications: hypertension, Disp: 90 Tab, Rfl: 3    omeprazole (PRILOSEC) 40 mg capsule, Take 1 Cap by mouth daily. , Disp: 90 Cap, Rfl: 3    furosemide (LASIX) 20 mg tablet, Take 1 Tab by mouth daily. Indications: Edema, Disp: 90 Tab, Rfl: 3    olmesartan (BENICAR) 40 mg tablet, Take 1 Tab by mouth daily. , Disp: 90 Tab, Rfl: 3    aspirin delayed-release 81 mg tablet, Take  by mouth daily. , Disp: , Rfl:     DOCUSATE CALCIUM (STOOL SOFTENER PO), Take  by mouth daily. , Disp: , Rfl:     calcium-vitamin D 600 mg(1,500mg) -200 unit tab, Take 1 Tab by mouth daily. , Disp: , Rfl:     polyethylene glycol (MIRALAX) 17 gram/dose powder, Take 17 g by mouth daily as needed. , Disp: , Rfl:     ferrous sulfate 325 mg (65 mg iron) tablet, Take 1 Tab by mouth daily (with breakfast). , Disp: 30 Tab, Rfl: 1   Date Last Reviewed:  6/19/2018   # of Personal Factors/Comorbidities that affect the Plan of Care: 3+: HIGH COMPLEXITY   EXAMINATION:   Observation/Orthostatic Postural Assessment:          Significant forward head posture with increased kyphosis; in standing weight shifted more to RLE; stands on lateral border R foot  ROM:    AROM L knee -1-100 °   0-105 ° AAROM    L hip lacks 10 ° of hip extension in supine noting poor flexibility B hips L worse than R             AROM R knee 0-95 °  AAROM 0-100 °         Strength:     Date:  6/19/18 Date:   Date:     LE MMT Left Right Left Right Left Right   Hip Flex (L1/L2) 4/5 4-/5       Hip Abd (glut med) 3/5 3-/5       Hip Ext         Quad    ( L3/4) 4-/5 4/5       Hamstring 4/5 and cramped 4-/5       Anterior Tib (L4/L5) 3+/5 4+/5       Gastroc (S1/2) 3+/5 with B calf raise 3+/5 with B calf raise       EHL (L5) 4+/5 4+/5                           Special Tests:  SLR (--)   Neurological Screen:        Sensation: intact to light touch and denies numbness/tingling BLEs; occasional tingling B fingers  Functional Mobility:         Gait/Ambulation:  Slow and Independent in clinic, uses walker for long community distances per pt report; R foot excessive supination/weight on lateral border of her foot (pt reports due to numerous foot fractures) and L foot mild foot drop        Transfers: Mod I with use of hands; sit to stand x4 without use of hands but increased effort and once had to use plinth to regain balance        Bed Mobility:  Increased effort and time but Mod I        Stairs:  Reports step-to pattern with rail  Balance:          Poor dynamic balance; \"furniture surfs\";  Fair + static; Romberg can hold for 60 seconds but slightly unsteady   Body Structures Involved:  1. Nerves  2. Bones  3. Joints  4. Muscles Body Functions Affected:  1. Mental  2. Sensory/Pain  3. Neuromusculoskeletal  4. Movement Related Activities and Participation Affected:  1. General Tasks and Demands  2. Mobility  3. Self Care  4. Domestic Life  5. Community, Social and [place]   # of elements that affect the Plan of Care: 3: MODERATE COMPLEXITY   CLINICAL PRESENTATION:   Presentation: Evolving clinical presentation with changing clinical characteristics: MODERATE COMPLEXITY   CLINICAL DECISION MAKING:   Outcome Measure: Tool Used: Lower Extremity Functional Scale (LEFS)  Score:  Initial: 38/80 Most Recent: X/80 (Date: -- )   Interpretation of Score: 20 questions each scored on a 5 point scale with 0 representing \"extreme difficulty or unable to perform\" and 4 representing \"no difficulty\". The lower the score, the greater the functional disability. 80/80 represents no disability. Minimal detectable change is 9 points. Score 80 79-63 62-48 47-32 31-16 15-1 0   Modifier CH CI CJ CK CL CM CN     ? Mobility - Walking and Moving Around:     - CURRENT STATUS: CL - 60%-79% impaired, limited or restricted    - GOAL STATUS: CJ - 20%-39% impaired, limited or restricted    - D/C STATUS:  ---------------To be determined---------------    Medical Necessity:   · Patient is expected to demonstrate progress in strength, range of motion and balance to increase independence with gait and functional tasks and improve safety during gait and stair negotiation. Reason for Services/Other Comments:  · Patient continues to require modification of therapeutic interventions to increase complexity of exercises.    Use of outcome tool(s) and clinical judgement create a POC that gives a: Questionable prediction of patient's progress: MODERATE COMPLEXITY   TREATMENT:   (In addition to Assessment/Re-Assessment sessions the following treatments were rendered)  Therapeutic Exercise: (see flow sheet below for minutes):  Exercises per grid below to improve mobility, strength and balance. Required minimal visual, verbal and tactile cues to promote proper body mechanics. Aquatic Therapy (see flow sheet below for minutes): Aquatic treatment performed per flow grid for Decreased muscle strength, Decreased static/dynamic balance and reactive control, Decreased range of motion, Ease of movement and Low impact and reduced weight bearing activity. Cues provided for technique. Date: 6/19/18       Modalities:                                Manual Therapy:                                Aquatic Exercise:        Gait F/B/S/M        Heel/Toe walk        4-way        PF/DF        Hamstring Curls        Hip Flexion with knee ext. (rockette)        Squats          SLR march        Lunges        Hip circles        Hip horizontal abduction/adduction        Core:          Core:        Deep well bike        Deep well jumping don        Deep well scissors        Deep well:  traction                Hamstring Stretch        Step Lunge        Piriformis stretch        PF Stretch        Balance: Single leg Stance        Balance: Tandem                          Therapeutic Exercise: 25 minutes       Quad sets/heel slides 15 H 5       Supine hamstring stretch 3 H 30       bridging x15        Gastroc stretch 3 H 30 slantboard       Heel/toe raises x15       Sit to stand x5       F=forward  B=Backward  S=sidesteps  M=marches    H=hold    Manual: (see flow sheet above for minutes) ---  Modalities: (see flow sheet above for minutes) ---    HEP: Pt instructed with above exercises at initial evaluation and issued handout. Treatment/Session Assessment:  Initial evaluation performed followed by HEP instruction. Pt had to stop and stretch her hamstrings L more than R after MMT and intermittently throughout bridging due to frequent spasm in hamstring area.  Discussed benefits of aquatic exercises to begin pt's PT in a safe environment that will help facilitate increased mobility and working on LE/core strengthening and balance. Pt agreeable to aquatics. · Pain/ Symptoms: Initial:   up to 8/10 B (R>L) knees with standing and walking in her home; No c/o knee pain sitting, only stiffness  Post Session:  \"My knees feel looser and I do not really feel any pain with my knees right now. \" ·   Compliance with Program/Exercises: Will assess as treatment progresses. · Recommendations/Intent for next treatment session: \"Next visit will focus on advancements to more challenging activities\". Plan to initiate aquatics next visit.    Total Treatment Duration:  PT Patient Time In/Time Out  Time In: 0930  Time Out: 1020     Chapis Beasley, PT

## 2018-06-21 ENCOUNTER — HOSPITAL ENCOUNTER (OUTPATIENT)
Dept: PHYSICAL THERAPY | Age: 68
Discharge: HOME | End: 2018-06-21
Payer: MEDICARE

## 2018-06-21 PROCEDURE — 97113 AQUATIC THERAPY/EXERCISES: CPT | Performed by: PHYSICAL THERAPIST

## 2018-06-21 NOTE — PROGRESS NOTES
Jocelynn Diss  : 1950 [id number] [facility] [address]  [address] [place] [address].  [phone]   [phone]        OUTPATIENT PHYSICAL THERAPY:Daily Note 2018        ICD-10: Treatment Diagnosis: Pain in right knee (M25.561)  Pain in left knee (M25.562)  Difficulty in walking, not elsewhere classified (R26.2)    Precautions/Allergies:   Latex; Sulfa (sulfonamide antibiotics); Macrobid [nitrofurantoin monohyd/m-cryst]; Other plant, animal, environmental; Oxycodone; and Tape [adhesive]   Fall Risk Score: 6 (? 5 = High Risk)  MD Orders: Evaluate and treat B knees: rehab and strengthening MEDICAL/REFERRING DIAGNOSIS:  Presence of left artificial knee joint [Z96.652]  Presence of right artificial knee joint [Z96.651]   DATE OF ONSET: chronic with acute pain ~5 weeks ago  REFERRING PHYSICIAN: Malia Kay MD  RETURN PHYSICIAN APPOINTMENT: TBD     INITIAL ASSESSMENT:  Ms. Karime Orourke presents B knee pain and decreased flexibility causing difficulty walking and prolonged standing. Pt has B TKAs and had recent fall onto knee R more than L. Pt had TKAs cleared by MD and referred for rehab. Pt has poor balance and has history of frequent falls with a feeling of imbalance. Pt does not report any vertigo. Pt reports R knee more painful than L and has poor ROM B knees and poor LE flexibility. Pt has poor core/LE strength BLEs and also has history of LLE weakness with drop foot from previous lumbar disc herniation. P's PMH also includes cervical and lumbar stenosis and extensive chronic neck and R shoulder pain. Pt will benefit from PT to focus on LE/core strengthening, knee ROM, LE flexibility, gait and balance to decrease fall risk and improve functional mobility. PROBLEM LIST (Impacting functional limitations):  1. Decreased Strength  2. Decreased ADL/Functional Activities  3. Decreased Transfer Abilities  4. Decreased Ambulation Ability/Technique  5.  Decreased Balance  6. Increased Pain  7. Decreased Activity Tolerance  8. Decreased Flexibility/Joint Mobility  9. Decreased [place] with Home Exercise Program INTERVENTIONS PLANNED:  1. Balance Exercise  2. Cold  3. Electrical Stimulation  4. Gait Training  5. Heat  6. Home Exercise Program (HEP)  7. Manual Therapy  8. Neuromuscular Re-education/Strengthening  9. Range of Motion (ROM)  10. Therapeutic Activites  11. Therapeutic Exercise/Strengthening  12. Transcutaneous Electrical Nerve Stimulation (TENS)  13. Transfer Training  14. aquatics    TREATMENT PLAN:  Effective Dates: 6/19/2018 TO 9/17/2018 (90 days). Frequency/Duration: 2 times a week for 60- 90 Days    GOALS: (Goals have been discussed and agreed upon with patient.)  Short-Term Functional Goals: Time Frame: 3 weeks  Pt will increase B knee AROM by 5-10 ° increasing functional mobility. Pt will be able to participate in 45 minutes of aquatic exercises for increasing LE flexibility, strength, and to progress balance/gait in safe environment without increased symptoms. Pt will be able to sit to stand 10x without use of UEs noting improving functional strength. Discharge Goals: Time Frame: 8 weeks  1. Pt will be able to safely navigate up/down one flight of stairs (at least 14) reciprocally with mild use of one handrail. 2. Pt will be able to report no falls or feeling of LOB for at least a 3 week period and no longer reporting furniture surfing. 3. Pt will be able to report symptoms with B knees 2/10 or less during standing and walking during housework or grocery shopping 45-60 minutes. Pt will be able to increase B knee AROM flexion 120 ° or greater for full functional mobility. Rehabilitation Potential For Stated Goals: Good  Regarding Estefani Esparza's therapy, I certify that the treatment plan above will be carried out by a therapist or under their direction.   Thank you for this referral,  Chapis Beasley, PT                     HISTORY:   History of Present Injury/Illness (Reason for Referral):  Pt reports B knee pain and stiffness, worsening over the past few months. Pt reports R knee pain is worse than L because she fell more onto her R knee off a step about 5 weeks ago. Pt reports probably having ~5 falls just this year. She feels like she has to furniture walk at times because she is off balance. Pt reports having lower cervical fusion in March 2018 due to disc herniation and stenosis. She has been more inactive after that surgery and noticed B knee stiffness and pain. Pt would like to be able to walk better, navigate stairs better, and improve her balance. She has been using a walker for longer distances but no assistive device in her home. Past Medical History/Comorbidities:   Ms. Lisandra Velásquez  has a past medical history of Adverse effect of anesthesia; Anemia; Arthritis; Chronic kidney disease; Chronic pain; Follow-up visit for aortic valve replacement with bioprosthetic valve (7/25/2016); GERD (gastroesophageal reflux disease); Hypertension; Kidney stones; Morbid obesity ([address] Murmur, cardiac; Nausea & vomiting; Psychiatric disorder; PUD (peptic ulcer disease) (06/2016); S/P aortic valve replacement with bioprosthetic valve (4/28/2017); and Valvular heart disease (2016). Ms. Lisandra Velásquez  has a past surgical history that includes hx urological (Multiple dates); hx shoulder arthroscopy (Right); hx lap cholecystectomy; hx gastric bypass; hx gi; hx cervical fusion; hx hysterectomy; hx bilateral salpingo-oophorectomy; hx heart catheterization; hx aortic valve replacement (6/9/2016); colonoscopy (N/A, 6/15/2016); hx knee replacement (Right, 10/10/2016); hx heart valve surgery; and hx knee replacement (Bilateral). L TKA (2015)  Social History/Living Environment:     lives in one story home  Prior Level of Function/Work/Activity:  Independent with all housework, cooking, etc.  Current Medications:    Current Outpatient Prescriptions:     Lactobacillus acidophilus (ACIDOPHILUS) cap, Take 1 Cap by mouth daily. , Disp: 120 Cap, Rfl: 0    HYDROcodone-acetaminophen (NORCO) 7.5-325 mg per tablet, Take 1 Tab by mouth every six (6) hours as needed for Pain. Max Daily Amount: 4 Tabs., Disp: 120 Tab, Rfl: 0    HYDROcodone-acetaminophen (NORCO) 7.5-325 mg per tablet, Take 1 Tab by mouth every six (6) hours as needed for Pain. Max Daily Amount: 4 Tabs., Disp: 120 Tab, Rfl: 0    HYDROcodone-acetaminophen (NORCO) 7.5-325 mg per tablet, Take 1 Tab by mouth every six (6) hours as needed for Pain. Max Daily Amount: 4 Tabs., Disp: 120 Tab, Rfl: 0    carvedilol (COREG) 25 mg tablet, Take 1 Tab by mouth two (2) times daily (with meals). , Disp: 180 Tab, Rfl: 1    febuxostat (ULORIC) 40 mg tab tablet, Take 1 Tab by mouth every morning. Indications: prevention of acute gout attack, Disp: 90 Tab, Rfl: 3    potassium chloride (KLOR-CON) 10 mEq tablet, Take 1 Tab by mouth daily. , Disp: 90 Tab, Rfl: 3    zolpidem (AMBIEN) 5 mg tablet, Take 1 Tab by mouth nightly as needed for Sleep. Max Daily Amount: 5 mg., Disp: 90 Tab, Rfl: 1    atorvastatin (LIPITOR) 40 mg tablet, Take 1 Tab by mouth daily. , Disp: 90 Tab, Rfl: 3    amLODIPine (NORVASC) 5 mg tablet, Take 1 Tab by mouth daily. Indications: hypertension, Disp: 90 Tab, Rfl: 3    omeprazole (PRILOSEC) 40 mg capsule, Take 1 Cap by mouth daily. , Disp: 90 Cap, Rfl: 3    furosemide (LASIX) 20 mg tablet, Take 1 Tab by mouth daily. Indications: Edema, Disp: 90 Tab, Rfl: 3    olmesartan (BENICAR) 40 mg tablet, Take 1 Tab by mouth daily. , Disp: 90 Tab, Rfl: 3    aspirin delayed-release 81 mg tablet, Take  by mouth daily. , Disp: , Rfl:     DOCUSATE CALCIUM (STOOL SOFTENER PO), Take  by mouth daily. , Disp: , Rfl:     calcium-vitamin D 600 mg(1,500mg) -200 unit tab, Take 1 Tab by mouth daily. , Disp: , Rfl:     polyethylene glycol (MIRALAX) 17 gram/dose powder, Take 17 g by mouth daily as needed. , Disp: , Rfl:     ferrous sulfate 325 mg (65 mg iron) tablet, Take 1 Tab by mouth daily (with breakfast). , Disp: 30 Tab, Rfl: 1   Date Last Reviewed:  6/21/2018   EXAMINATION:   Observation/Orthostatic Postural Assessment:          Significant forward head posture with increased kyphosis; in standing weight shifted more to RLE; stands on lateral border R foot  ROM:    AROM L knee -1-100 °   0-105 ° AAROM    L hip lacks 10 ° of hip extension in supine noting poor flexibility B hips L worse than R             AROM R knee 0-95 °  AAROM 0-100 °         Strength:     Date:  6/19/18 Date:   Date:     LE MMT Left Right Left Right Left Right   Hip Flex (L1/L2) 4/5 4-/5       Hip Abd (glut med) 3/5 3-/5       Hip Ext         Quad    ( L3/4) 4-/5 4/5       Hamstring 4/5 and cramped 4-/5       Anterior Tib (L4/L5) 3+/5 4+/5       Gastroc (S1/2) 3+/5 with B calf raise 3+/5 with B calf raise       EHL (L5) 4+/5 4+/5                           Special Tests:  SLR (--)   Neurological Screen:        Sensation: intact to light touch and denies numbness/tingling BLEs; occasional tingling B fingers  Functional Mobility:         Gait/Ambulation:  Slow and Independent in clinic, uses walker for long community distances per pt report; R foot excessive supination/weight on lateral border of her foot (pt reports due to numerous foot fractures) and L foot mild foot drop        Transfers: Mod I with use of hands; sit to stand x4 without use of hands but increased effort and once had to use plinth to regain balance        Bed Mobility:  Increased effort and time but Mod I        Stairs:  Reports step-to pattern with rail  Balance:          Poor dynamic balance; \"furniture surfs\"; Fair + static; Romberg can hold for 60 seconds but slightly unsteady   Body Structures Involved:  1. Nerves  2. Bones  3. Joints  4. Muscles Body Functions Affected:  1. Mental  2. Sensory/Pain  3. Neuromusculoskeletal  4. Movement Related Activities and Participation Affected:  1.  General Tasks and Demands  2. Mobility  3. Self Care  4. Domestic Life  5. Community, Social and Civic Life   CLINICAL DECISION MAKING:   Outcome Measure: Tool Used: Lower Extremity Functional Scale (LEFS)  Score:  Initial: 38/80 Most Recent: X/80 (Date: -- )   Interpretation of Score: 20 questions each scored on a 5 point scale with 0 representing \"extreme difficulty or unable to perform\" and 4 representing \"no difficulty\". The lower the score, the greater the functional disability. 80/80 represents no disability. Minimal detectable change is 9 points. Score 80 79-63 62-48 47-32 31-16 15-1 0   Modifier CH CI CJ CK CL CM CN     ? Mobility - Walking and Moving Around:     - CURRENT STATUS: CL - 60%-79% impaired, limited or restricted    - GOAL STATUS: CJ - 20%-39% impaired, limited or restricted    - D/C STATUS:  ---------------To be determined---------------    Medical Necessity:   · Patient is expected to demonstrate progress in strength, range of motion and balance to increase independence with gait and functional tasks and improve safety during gait and stair negotiation. Reason for Services/Other Comments:  · Patient continues to require modification of therapeutic interventions to increase complexity of exercises. TREATMENT:   (In addition to Assessment/Re-Assessment sessions the following treatments were rendered)  Therapeutic Exercise: (see flow sheet below for minutes):  Exercises per grid below to improve mobility, strength and balance. Required minimal visual, verbal and tactile cues to promote proper body mechanics. Aquatic Therapy (see flow sheet below for minutes): Aquatic treatment performed per flow grid for Decreased muscle strength, Decreased static/dynamic balance and reactive control, Decreased range of motion, Ease of movement and Low impact and reduced weight bearing activity. Cues provided for technique.      Date: 6/19/18 6/21/18      Modalities:                                Manual Therapy:                                Aquatic Exercise:  1.5#/50 minutes      Gait F/B/S/M  x4L each      Heel/Toe walk        4-way  x15 BLE      PF/DF  x15      Hamstring Curls  x15 BLE      Hip Flexion with knee ext. (rockette)  x10 BLE      Squats    x15       SLR march        Lunges        Hip circles        Hip horizontal abduction/adduction        Core:          Core:        Deep well bike  3 min      Deep well jumping don  1 min      Deep well scissors  1 min      Deep well:  traction                Hamstring Stretch  2 H 30 BLE      Step Lunge  Knee flexion x10       Piriformis stretch        PF Stretch        Balance: Single leg Stance        Balance: Tandem                          Therapeutic Exercise: 25 minutes       Quad sets/heel slides 15 H 5       Supine hamstring stretch 3 H 30       bridging x15        Gastroc stretch 3 H 30 slantboard       Heel/toe raises x15       Sit to stand x5       F=forward  B=Backward  S=sidesteps  M=marches    H=hold    Manual: (see flow sheet above for minutes) ---  Modalities: (see flow sheet above for minutes) ---    HEP: Pt instructed with above exercises at initial evaluation and issued handout. Treatment/Session Assessment:  Initiated aquatic therapy per flow sheet above. During aquatics, pt's knee pain is relieved. She did c/o mild cervical pain and stopped to forward flex neck and thoracic area resting her head on her forearm to relieve symptoms. Pt was instructed with correct gait mechanics. Pt rolls onto R foot lateral border. May benefit from using a lace-up ankle stabilizing orthosis. · Pain/ Symptoms: Initial:   up to 8/10 B (R>L) knees with standing and walking in her home; No c/o knee pain sitting, only stiffness     \"My knees are just a bear. \" Post Session:  \"Right now, there is no pain in my knees. \" ·   Compliance with Program/Exercises: Will assess as treatment progresses. · Recommendations/Intent for next treatment session:  \"Next visit will focus on advancements to more challenging activities\". Plan to continue aquatics next visit.    Total Treatment Duration:  PT Patient Time In/Time Out  Time In: 0930  Time Out: 1020     Chapis Beasley, PT

## 2018-06-26 ENCOUNTER — HOSPITAL ENCOUNTER (OUTPATIENT)
Dept: PHYSICAL THERAPY | Age: 68
Discharge: HOME | End: 2018-06-26
Payer: MEDICARE

## 2018-06-28 ENCOUNTER — HOSPITAL ENCOUNTER (OUTPATIENT)
Dept: PHYSICAL THERAPY | Age: 68
Discharge: HOME | End: 2018-06-28
Payer: MEDICARE

## 2018-06-28 PROCEDURE — 97113 AQUATIC THERAPY/EXERCISES: CPT | Performed by: PHYSICAL THERAPIST

## 2018-06-28 NOTE — PROGRESS NOTES
Wilver Dooley  : 1950 [id number] [facility],[address]  [address] [place] [address].  [phone]   [phone]        OUTPATIENT PHYSICAL THERAPY:Daily Note 2018        ICD-10: Treatment Diagnosis: Pain in right knee (M25.561)  Pain in left knee (M25.562)  Difficulty in walking, not elsewhere classified (R26.2)    Precautions/Allergies:   Latex; Sulfa (sulfonamide antibiotics); Macrobid [nitrofurantoin monohyd/m-cryst]; Other plant, animal, environmental; Oxycodone; and Tape [adhesive]   Fall Risk Score: 6 (? 5 = High Risk)  MD Orders: Evaluate and treat B knees: rehab and strengthening MEDICAL/REFERRING DIAGNOSIS:  Presence of left artificial knee joint [Z96.652]  Presence of right artificial knee joint [Z96.651]   DATE OF ONSET: chronic with acute pain ~5 weeks ago  REFERRING PHYSICIAN: Sharyle Heath, MD  RETURN PHYSICIAN APPOINTMENT: TBD     INITIAL ASSESSMENT:  Ms. Kasie Lei presents B knee pain and decreased flexibility causing difficulty walking and prolonged standing. Pt has B TKAs and had recent fall onto knee R more than L. Pt had TKAs cleared by MD and referred for rehab. Pt has poor balance and has history of frequent falls with a feeling of imbalance. Pt does not report any vertigo. Pt reports R knee more painful than L and has poor ROM B knees and poor LE flexibility. Pt has poor core/LE strength BLEs and also has history of LLE weakness with drop foot from previous lumbar disc herniation. P's PMH also includes cervical and lumbar stenosis and extensive chronic neck and R shoulder pain. Pt will benefit from PT to focus on LE/core strengthening, knee ROM, LE flexibility, gait and balance to decrease fall risk and improve functional mobility. PROBLEM LIST (Impacting functional limitations):  1. Decreased Strength  2. Decreased ADL/Functional Activities  3. Decreased Transfer Abilities  4. Decreased Ambulation Ability/Technique  5.  Decreased Balance  6. Increased Pain  7. Decreased Activity Tolerance  8. Decreased Flexibility/Joint Mobility  9. Decreased Hall with Home Exercise Program INTERVENTIONS PLANNED:  1. Balance Exercise  2. Cold  3. Electrical Stimulation  4. Gait Training  5. Heat  6. Home Exercise Program (HEP)  7. Manual Therapy  8. Neuromuscular Re-education/Strengthening  9. Range of Motion (ROM)  10. Therapeutic Activites  11. Therapeutic Exercise/Strengthening  12. Transcutaneous Electrical Nerve Stimulation (TENS)  13. Transfer Training  14. aquatics    TREATMENT PLAN:  Effective Dates: 6/19/2018 TO 9/17/2018 (90 days). Frequency/Duration: 2 times a week for 60- 90 Days    GOALS: (Goals have been discussed and agreed upon with patient.)  Short-Term Functional Goals: Time Frame: 3 weeks  Pt will increase B knee AROM by 5-10 ° increasing functional mobility. Pt will be able to participate in 45 minutes of aquatic exercises for increasing LE flexibility, strength, and to progress balance/gait in safe environment without increased symptoms. Pt will be able to sit to stand 10x without use of UEs noting improving functional strength. Discharge Goals: Time Frame: 8 weeks  1. Pt will be able to safely navigate up/down one flight of stairs (at least 14) reciprocally with mild use of one handrail. 2. Pt will be able to report no falls or feeling of LOB for at least a 3 week period and no longer reporting furniture surfing. 3. Pt will be able to report symptoms with B knees 2/10 or less during standing and walking during housework or grocery shopping 45-60 minutes. Pt will be able to increase B knee AROM flexion 120 ° or greater for full functional mobility. Rehabilitation Potential For Stated Goals: Good  Regarding Estefani Esparza's therapy, I certify that the treatment plan above will be carried out by a therapist or under their direction.   Thank you for this referral,  Chapis Beasley, PT                     HISTORY:   History of Present Injury/Illness (Reason for Referral):  Pt reports B knee pain and stiffness, worsening over the past few months. Pt reports R knee pain is worse than L because she fell more onto her R knee off a step about 5 weeks ago. Pt reports probably having ~5 falls just this year. She feels like she has to furniture walk at times because she is off balance. Pt reports having lower cervical fusion in March 2018 due to disc herniation and stenosis. She has been more inactive after that surgery and noticed B knee stiffness and pain. Pt would like to be able to walk better, navigate stairs better, and improve her balance. She has been using a walker for longer distances but no assistive device in her home. Past Medical History/Comorbidities:   Ms. Marjorie Mcrae  has a past medical history of Adverse effect of anesthesia; Anemia; Arthritis; Chronic kidney disease; Chronic pain; Follow-up visit for aortic valve replacement with bioprosthetic valve (7/25/2016); GERD (gastroesophageal reflux disease); Hypertension; Kidney stones; Morbid obesity ([address] Murmur, cardiac; Nausea & vomiting; Psychiatric disorder; PUD (peptic ulcer disease) (06/2016); S/P aortic valve replacement with bioprosthetic valve (4/28/2017); and Valvular heart disease (2016). Ms. Marjorie Mcrae  has a past surgical history that includes hx urological (Multiple dates); hx shoulder arthroscopy (Right); hx lap cholecystectomy; hx gastric bypass; hx gi; hx cervical fusion; hx hysterectomy; hx bilateral salpingo-oophorectomy; hx heart catheterization; hx aortic valve replacement (6/9/2016); colonoscopy (N/A, 6/15/2016); hx knee replacement (Right, 10/10/2016); hx heart valve surgery; and hx knee replacement (Bilateral). L TKA (2015)  Social History/Living Environment:     lives in one story home  Prior Level of Function/Work/Activity:  Independent with all housework, cooking, etc.  Current Medications:    Current Outpatient Prescriptions:     Lactobacillus acidophilus (ACIDOPHILUS) cap, Take 1 Cap by mouth daily. , Disp: 120 Cap, Rfl: 0    HYDROcodone-acetaminophen (NORCO) 7.5-325 mg per tablet, Take 1 Tab by mouth every six (6) hours as needed for Pain. Max Daily Amount: 4 Tabs., Disp: 120 Tab, Rfl: 0    HYDROcodone-acetaminophen (NORCO) 7.5-325 mg per tablet, Take 1 Tab by mouth every six (6) hours as needed for Pain. Max Daily Amount: 4 Tabs., Disp: 120 Tab, Rfl: 0    HYDROcodone-acetaminophen (NORCO) 7.5-325 mg per tablet, Take 1 Tab by mouth every six (6) hours as needed for Pain. Max Daily Amount: 4 Tabs., Disp: 120 Tab, Rfl: 0    carvedilol (COREG) 25 mg tablet, Take 1 Tab by mouth two (2) times daily (with meals). , Disp: 180 Tab, Rfl: 1    febuxostat (ULORIC) 40 mg tab tablet, Take 1 Tab by mouth every morning. Indications: prevention of acute gout attack, Disp: 90 Tab, Rfl: 3    potassium chloride (KLOR-CON) 10 mEq tablet, Take 1 Tab by mouth daily. , Disp: 90 Tab, Rfl: 3    zolpidem (AMBIEN) 5 mg tablet, Take 1 Tab by mouth nightly as needed for Sleep. Max Daily Amount: 5 mg., Disp: 90 Tab, Rfl: 1    atorvastatin (LIPITOR) 40 mg tablet, Take 1 Tab by mouth daily. , Disp: 90 Tab, Rfl: 3    amLODIPine (NORVASC) 5 mg tablet, Take 1 Tab by mouth daily. Indications: hypertension, Disp: 90 Tab, Rfl: 3    omeprazole (PRILOSEC) 40 mg capsule, Take 1 Cap by mouth daily. , Disp: 90 Cap, Rfl: 3    furosemide (LASIX) 20 mg tablet, Take 1 Tab by mouth daily. Indications: Edema, Disp: 90 Tab, Rfl: 3    olmesartan (BENICAR) 40 mg tablet, Take 1 Tab by mouth daily. , Disp: 90 Tab, Rfl: 3    aspirin delayed-release 81 mg tablet, Take  by mouth daily. , Disp: , Rfl:     DOCUSATE CALCIUM (STOOL SOFTENER PO), Take  by mouth daily. , Disp: , Rfl:     calcium-vitamin D 600 mg(1,500mg) -200 unit tab, Take 1 Tab by mouth daily. , Disp: , Rfl:     polyethylene glycol (MIRALAX) 17 gram/dose powder, Take 17 g by mouth daily as needed. , Disp: , Rfl:     ferrous sulfate 325 mg (65 mg iron) tablet, Take 1 Tab by mouth daily (with breakfast). , Disp: 30 Tab, Rfl: 1   Date Last Reviewed:  6/28/2018   EXAMINATION:   Observation/Orthostatic Postural Assessment:          Significant forward head posture with increased kyphosis; in standing weight shifted more to RLE; stands on lateral border R foot  ROM:    AROM L knee -1-100 °   0-105 ° AAROM    L hip lacks 10 ° of hip extension in supine noting poor flexibility B hips L worse than R             AROM R knee 0-95 °  AAROM 0-100 °         Strength:     Date:  6/19/18 Date:   Date:     LE MMT Left Right Left Right Left Right   Hip Flex (L1/L2) 4/5 4-/5       Hip Abd (glut med) 3/5 3-/5       Hip Ext         Quad    ( L3/4) 4-/5 4/5       Hamstring 4/5 and cramped 4-/5       Anterior Tib (L4/L5) 3+/5 4+/5       Gastroc (S1/2) 3+/5 with B calf raise 3+/5 with B calf raise       EHL (L5) 4+/5 4+/5                           Special Tests:  SLR (--)   Neurological Screen:        Sensation: intact to light touch and denies numbness/tingling BLEs; occasional tingling B fingers  Functional Mobility:         Gait/Ambulation:  Slow and Independent in clinic, uses walker for long community distances per pt report; R foot excessive supination/weight on lateral border of her foot (pt reports due to numerous foot fractures) and L foot mild foot drop        Transfers: Mod I with use of hands; sit to stand x4 without use of hands but increased effort and once had to use plinth to regain balance        Bed Mobility:  Increased effort and time but Mod I        Stairs:  Reports step-to pattern with rail  Balance:          Poor dynamic balance; \"furniture surfs\"; Fair + static; Romberg can hold for 60 seconds but slightly unsteady   Body Structures Involved:  1. Nerves  2. Bones  3. Joints  4. Muscles Body Functions Affected:  1. Mental  2. Sensory/Pain  3. Neuromusculoskeletal  4. Movement Related Activities and Participation Affected:  1.  General Tasks and Demands  2. Mobility  3. Self Care  4. Domestic Life  5. Community, Social and Civic Life   CLINICAL DECISION MAKING:   Outcome Measure: Tool Used: Lower Extremity Functional Scale (LEFS)  Score:  Initial: 38/80 Most Recent: X/80 (Date: -- )   Interpretation of Score: 20 questions each scored on a 5 point scale with 0 representing \"extreme difficulty or unable to perform\" and 4 representing \"no difficulty\". The lower the score, the greater the functional disability. 80/80 represents no disability. Minimal detectable change is 9 points. Score 80 79-63 62-48 47-32 31-16 15-1 0   Modifier CH CI CJ CK CL CM CN     ? Mobility - Walking and Moving Around:     - CURRENT STATUS: CL - 60%-79% impaired, limited or restricted    - GOAL STATUS: CJ - 20%-39% impaired, limited or restricted    - D/C STATUS:  ---------------To be determined---------------    Medical Necessity:   · Patient is expected to demonstrate progress in strength, range of motion and balance to increase independence with gait and functional tasks and improve safety during gait and stair negotiation. Reason for Services/Other Comments:  · Patient continues to require modification of therapeutic interventions to increase complexity of exercises. TREATMENT:   (In addition to Assessment/Re-Assessment sessions the following treatments were rendered)  Therapeutic Exercise: (see flow sheet below for minutes):  Exercises per grid below to improve mobility, strength and balance. Required minimal visual, verbal and tactile cues to promote proper body mechanics. Aquatic Therapy (see flow sheet below for minutes): Aquatic treatment performed per flow grid for Decreased muscle strength, Decreased static/dynamic balance and reactive control, Decreased range of motion, Ease of movement and Low impact and reduced weight bearing activity. Cues provided for technique.      Date: 6/19/18 6/21/18 6/28/18     Modalities: Manual Therapy:                                Aquatic Exercise:  1.5#/50 minutes 1.5#/55 minutes     Gait F/B/S/M  x4L each x4L     Heel/Toe walk        4-way  x15 BLE x20 BLE     PF/DF  x15 x20     Hamstring Curls  x15 BLE x4L     Hip Flexion with knee ext. (rockette)  x10 BLE x20 BLE     Squats    x15  x20     SLR march   x4L     Lunges   x15 BLE stationary     Step-ups   x15 BLE last step into pool      Hip circles        Hip horizontal abduction/adduction        Core:          Core:        Deep well bike  3 min 4 min     Deep well jumping don  1 min 2 min     Deep well scissors  1 min 1 min     Deep well:  traction                Hamstring Stretch  2 H 30 BLE 2 H 30 BLE     Step Lunge  Knee flexion x10  x10     Piriformis stretch        PF Stretch        Balance: Single leg Stance        Balance: Tandem                          Therapeutic Exercise: 25 minutes       Quad sets/heel slides 15 H 5       Supine hamstring stretch 3 H 30       bridging x15        Gastroc stretch 3 H 30 slantboard       Heel/toe raises x15       Sit to stand x5       F=forward  B=Backward  S=sidesteps  M=marches    H=hold    Manual: (see flow sheet above for minutes) ---  Modalities: (see flow sheet above for minutes) ---    HEP: Pt instructed with above exercises at initial evaluation and issued handout. Treatment/Session Assessment: Pt already reporting less c/o pain and tightness; however, pt's chief c/o during PT is stiffness with B knees. Added lunges and step-ups and reports fatigue with RLE and mild pain but no other c/o during aquatics and no lingering pain afterwards. After aquatic session, pt was able to ascend stairs reciprocally as she exited the pool. In the past, pt was only able to use step-to pattern. Issued an ASO to increase R ankle stability as pt tends to have excessive lateral rolling with R ankle. Pt felt more stable and improved gait mechanics minimizing what was excessive.      · Pain/ Symptoms: Initial: up to 6/10 B (R>L) knees with standing and walking in her home; No c/o knee pain sitting, only stiffness     \"I thought I had an UTI but it was kidney stones. My back has been hurting my knees are much better. I do not have as much pain as I do stiffness. \" Post Session:  No c/o afterwards and \"the ankle brace even feels good to my back. \" ·   Compliance with Program/Exercises: compliant  · Recommendations/Intent for next treatment session: \"Next visit will focus on advancements to more challenging activities\". Plan to continue progressing aquatics next visit.    Total Treatment Duration: extra time spent dressing prior to fitting for ASO  PT Patient Time In/Time Out  Time In: 0930  Time Out: 1035     Chapis Beasley, PT

## 2018-07-03 ENCOUNTER — HOSPITAL ENCOUNTER (OUTPATIENT)
Dept: PHYSICAL THERAPY | Age: 68
Discharge: HOME | End: 2018-07-03

## 2018-07-03 NOTE — PROGRESS NOTES
Pt came into PT and stated she was cancelling PT due to feeling sick with stomach pain. She brought in cash for her ASO and she states it has been beneficial. She will resume PT at next scheduled appointment 7/5/18.

## 2018-07-05 ENCOUNTER — HOSPITAL ENCOUNTER (OUTPATIENT)
Dept: PHYSICAL THERAPY | Age: 68
Discharge: HOME | End: 2018-07-05

## 2018-07-12 ENCOUNTER — APPOINTMENT (OUTPATIENT)
Dept: PHYSICAL THERAPY | Age: 68
End: 2018-07-12

## 2018-07-17 ENCOUNTER — APPOINTMENT (OUTPATIENT)
Dept: PHYSICAL THERAPY | Age: 68
End: 2018-07-17

## 2018-07-18 PROBLEM — E11.9 CONTROLLED TYPE 2 DIABETES MELLITUS WITHOUT COMPLICATION, WITHOUT LONG-TERM CURRENT USE OF INSULIN (HCC): Status: ACTIVE | Noted: 2018-07-18

## 2018-07-19 ENCOUNTER — APPOINTMENT (OUTPATIENT)
Dept: PHYSICAL THERAPY | Age: 68
End: 2018-07-19

## 2018-07-19 PROBLEM — E11.21 TYPE 2 DIABETES WITH NEPHROPATHY (HCC): Status: ACTIVE | Noted: 2018-07-19

## 2018-07-24 ENCOUNTER — APPOINTMENT (OUTPATIENT)
Dept: PHYSICAL THERAPY | Age: 68
End: 2018-07-24

## 2018-07-26 ENCOUNTER — APPOINTMENT (OUTPATIENT)
Dept: PHYSICAL THERAPY | Age: 68
End: 2018-07-26

## 2018-08-14 NOTE — PROGRESS NOTES
[facility]  : 1950 [id number] [facility],[address]  [facility] [address]  [phone]   [phone]        OUTPATIENT PHYSICAL THERAPY:Discontinuation Summary 2018        ICD-10: Treatment Diagnosis: Pain in right knee (M25.561)  Pain in left knee (M25.562)  Difficulty in walking, not elsewhere classified (R26.2)    Precautions/Allergies:   Latex; Sulfa (sulfonamide antibiotics); Macrobid [nitrofurantoin monohyd/m-cryst]; Other plant, animal, environmental; Oxycodone; and Tape [adhesive]   Fall Risk Score: 6 (? 5 = High Risk)  MD Orders: Evaluate and treat B knees: rehab and strengthening MEDICAL/REFERRING DIAGNOSIS:  Presence of left artificial knee joint [Z96.652]  Presence of right artificial knee joint [Z96.651]   DATE OF ONSET: chronic with acute pain ~5 weeks ago  REFERRING PHYSICIAN: Wes Echevarria MD  RETURN PHYSICIAN APPOINTMENT: TBD     INITIAL ASSESSMENT:  Ms. Allen Encarnacion presents B knee pain and decreased flexibility causing difficulty walking and prolonged standing. Pt has B TKAs and had recent fall onto knee R more than L. Pt had TKAs cleared by MD and referred for rehab. Pt has poor balance and has history of frequent falls with a feeling of imbalance. Pt does not report any vertigo. Pt reports R knee more painful than L and has poor ROM B knees and poor LE flexibility. Pt has poor core/LE strength BLEs and also has history of LLE weakness with drop foot from previous lumbar disc herniation. P's PMH also includes cervical and lumbar stenosis and extensive chronic neck and R shoulder pain. Pt will benefit from PT to focus on LE/core strengthening, knee ROM, LE flexibility, gait and balance to decrease fall risk and improve functional mobility. Pt was seen for 3 PT visits including aquatic therapy and HEP.  Pt was progressing with knee flexibility and strengthening; however, she had to cancel remaining PT appointments in the month of July due to other illness. Pt received an ASO for her R ankle due during her treatment to prevent excessive rolling to lateral border of her foot and pt felt good ambulating with ASO. She has not been able to return to PT at this time due to other illness. PROBLEM LIST (Impacting functional limitations):  1. Decreased Strength  2. Decreased ADL/Functional Activities  3. Decreased Transfer Abilities  4. Decreased Ambulation Ability/Technique  5. Decreased Balance  6. Increased Pain  7. Decreased Activity Tolerance  8. Decreased Flexibility/Joint Mobility  9. Decreased [place] with Home Exercise Program INTERVENTIONS PLANNED:  1. Balance Exercise  2. Cold  3. Electrical Stimulation  4. Gait Training  5. Heat  6. Home Exercise Program (HEP)  7. Manual Therapy  8. Neuromuscular Re-education/Strengthening  9. Range of Motion (ROM)  10. Therapeutic Activites  11. Therapeutic Exercise/Strengthening  12. Transcutaneous Electrical Nerve Stimulation (TENS)  13. Transfer Training  14. aquatics    TREATMENT PLAN:  Effective Dates: 6/19/2018 TO 9/17/2018 (90 days). Frequency/Duration: 2 times a week for 60- 90 Days    GOALS: (Goals have been discussed and agreed upon with patient.)  Short-Term Functional Goals: Time Frame: 3 weeks  Pt will increase B knee AROM by 5-10 ° increasing functional mobility. Pt will be able to participate in 45 minutes of aquatic exercises for increasing LE flexibility, strength, and to progress balance/gait in safe environment without increased symptoms. Pt will be able to sit to stand 10x without use of UEs noting improving functional strength. Discharge Goals: Time Frame: 8 weeks  1. Pt will be able to safely navigate up/down one flight of stairs (at least 14) reciprocally with mild use of one handrail. 2. Pt will be able to report no falls or feeling of LOB for at least a 3 week period and no longer reporting furniture surfing.   3. Pt will be able to report symptoms with B knees 2/10 or less during standing and walking during housework or grocery shopping 45-60 minutes. Pt will be able to increase B knee AROM flexion 120 ° or greater for full functional mobility. Rehabilitation Potential For Stated Goals: Good  Regarding Estefani Esparza's therapy, I certify that the treatment plan above will be carried out by a therapist or under their direction. Thank you for this referral,  Chapis Beasley, PT                     HISTORY:   History of Present Injury/Illness (Reason for Referral):  Pt reports B knee pain and stiffness, worsening over the past few months. Pt reports R knee pain is worse than L because she fell more onto her R knee off a step about 5 weeks ago. Pt reports probably having ~5 falls just this year. She feels like she has to furniture walk at times because she is off balance. Pt reports having lower cervical fusion in March 2018 due to disc herniation and stenosis. She has been more inactive after that surgery and noticed B knee stiffness and pain. Pt would like to be able to walk better, navigate stairs better, and improve her balance. She has been using a walker for longer distances but no assistive device in her home. Past Medical History/Comorbidities:   Ms. Merlinda Haff  has a past medical history of Adverse effect of anesthesia; Anemia; Arthritis; Chronic kidney disease; Chronic pain; Follow-up visit for aortic valve replacement with bioprosthetic valve (7/25/2016); GERD (gastroesophageal reflux disease); Hypertension; Kidney stones; Morbid obesity ([address] Murmur, cardiac; Nausea & vomiting; Psychiatric disorder; PUD (peptic ulcer disease) (06/2016); S/P aortic valve replacement with bioprosthetic valve (4/28/2017); and Valvular heart disease (2016).   Ms. Merlinda Haff  has a past surgical history that includes hx urological (Multiple dates); hx shoulder arthroscopy (Right); hx lap cholecystectomy; hx gastric bypass; hx gi; hx cervical fusion; hx hysterectomy; hx bilateral salpingo-oophorectomy; hx heart catheterization; hx aortic valve replacement (6/9/2016); colonoscopy (N/A, 6/15/2016); hx knee replacement (Right, 10/10/2016); hx heart valve surgery; and hx knee replacement (Bilateral). L TKA (2015)  Social History/Living Environment:     lives in one story home  Prior Level of Function/Work/Activity:  Independent with all housework, cooking, etc.  Current Medications:    Current Outpatient Prescriptions:     Lactobacillus acidophilus (ACIDOPHILUS) cap, Take 1 Cap by mouth daily. , Disp: 120 Cap, Rfl: 0    HYDROcodone-acetaminophen (NORCO) 7.5-325 mg per tablet, Take 1 Tab by mouth every six (6) hours as needed for Pain. Max Daily Amount: 4 Tabs., Disp: 120 Tab, Rfl: 0    HYDROcodone-acetaminophen (NORCO) 7.5-325 mg per tablet, Take 1 Tab by mouth every six (6) hours as needed for Pain. Max Daily Amount: 4 Tabs., Disp: 120 Tab, Rfl: 0    HYDROcodone-acetaminophen (NORCO) 7.5-325 mg per tablet, Take 1 Tab by mouth every six (6) hours as needed for Pain. Max Daily Amount: 4 Tabs., Disp: 120 Tab, Rfl: 0    carvedilol (COREG) 25 mg tablet, Take 1 Tab by mouth two (2) times daily (with meals). , Disp: 180 Tab, Rfl: 1    febuxostat (ULORIC) 40 mg tab tablet, Take 1 Tab by mouth every morning. Indications: prevention of acute gout attack, Disp: 90 Tab, Rfl: 3    potassium chloride (KLOR-CON) 10 mEq tablet, Take 1 Tab by mouth daily. , Disp: 90 Tab, Rfl: 3    zolpidem (AMBIEN) 5 mg tablet, Take 1 Tab by mouth nightly as needed for Sleep. Max Daily Amount: 5 mg., Disp: 90 Tab, Rfl: 1    atorvastatin (LIPITOR) 40 mg tablet, Take 1 Tab by mouth daily. , Disp: 90 Tab, Rfl: 3    amLODIPine (NORVASC) 5 mg tablet, Take 1 Tab by mouth daily. Indications: hypertension, Disp: 90 Tab, Rfl: 3    omeprazole (PRILOSEC) 40 mg capsule, Take 1 Cap by mouth daily. , Disp: 90 Cap, Rfl: 3    furosemide (LASIX) 20 mg tablet, Take 1 Tab by mouth daily.  Indications: Edema, Disp: 90 Tab, Rfl: 3    olmesartan (BENICAR) 40 mg tablet, Take 1 Tab by mouth daily. , Disp: 90 Tab, Rfl: 3    aspirin delayed-release 81 mg tablet, Take  by mouth daily. , Disp: , Rfl:     DOCUSATE CALCIUM (STOOL SOFTENER PO), Take  by mouth daily. , Disp: , Rfl:     calcium-vitamin D 600 mg(1,500mg) -200 unit tab, Take 1 Tab by mouth daily. , Disp: , Rfl:     polyethylene glycol (MIRALAX) 17 gram/dose powder, Take 17 g by mouth daily as needed. , Disp: , Rfl:     ferrous sulfate 325 mg (65 mg iron) tablet, Take 1 Tab by mouth daily (with breakfast). , Disp: 30 Tab, Rfl: 1   Date Last Reviewed:  6/28/2018   EXAMINATION:   Observation/Orthostatic Postural Assessment:          Significant forward head posture with increased kyphosis; in standing weight shifted more to RLE; stands on lateral border R foot  ROM:    AROM L knee -1-100 °   0-105 ° AAROM    L hip lacks 10 ° of hip extension in supine noting poor flexibility B hips L worse than R             AROM R knee 0-95 °  AAROM 0-100 °         Strength:     Date:  6/19/18 Date:   Date:     LE MMT Left Right Left Right Left Right   Hip Flex (L1/L2) 4/5 4-/5       Hip Abd (glut med) 3/5 3-/5       Hip Ext         Quad    ( L3/4) 4-/5 4/5       Hamstring 4/5 and cramped 4-/5       Anterior Tib (L4/L5) 3+/5 4+/5       Gastroc (S1/2) 3+/5 with B calf raise 3+/5 with B calf raise       EHL (L5) 4+/5 4+/5                           Special Tests:  SLR (--)   Neurological Screen:        Sensation: intact to light touch and denies numbness/tingling BLEs; occasional tingling B fingers  Functional Mobility:         Gait/Ambulation:  Slow and Independent in clinic, uses walker for long community distances per pt report; R foot excessive supination/weight on lateral border of her foot (pt reports due to numerous foot fractures) and L foot mild foot drop        Transfers:   Mod I with use of hands; sit to stand x4 without use of hands but increased effort and once had to use plinth to regain balance        Bed Mobility:  Increased effort and time but Mod I        Stairs:  Reports step-to pattern with rail  Balance:          Poor dynamic balance; \"furniture surfs\"; Fair + static; Romberg can hold for 60 seconds but slightly unsteady   Body Structures Involved:  1. Nerves  2. Bones  3. Joints  4. Muscles Body Functions Affected:  1. Mental  2. Sensory/Pain  3. Neuromusculoskeletal  4. Movement Related Activities and Participation Affected:  1. General Tasks and Demands  2. Mobility  3. Self Care  4. Domestic Life  5. Community, Social and Civic Life   CLINICAL DECISION MAKING:   Outcome Measure: Tool Used: Lower Extremity Functional Scale (LEFS)  Score:  Initial: 38/80 Most Recent: X/80 (Date: -- )   Interpretation of Score: 20 questions each scored on a 5 point scale with 0 representing \"extreme difficulty or unable to perform\" and 4 representing \"no difficulty\". The lower the score, the greater the functional disability. 80/80 represents no disability. Minimal detectable change is 9 points. Score 80 79-63 62-48 47-32 31-16 15-1 0   Modifier CH CI CJ CK CL CM CN     ? Mobility - Walking and Moving Around:     - CURRENT STATUS: CL - 60%-79% impaired, limited or restricted    - GOAL STATUS: CJ - 20%-39% impaired, limited or restricted    - D/C STATUS:  ---------------To be determined--------------- unable to assess due to pt not returning    Medical Necessity:   · Patient is expected to demonstrate progress in strength, range of motion and balance to increase independence with gait and functional tasks and improve safety during gait and stair negotiation. Reason for Services/Other Comments:  · Patient continues to require modification of therapeutic interventions to increase complexity of exercises.    TREATMENT:   (In addition to Assessment/Re-Assessment sessions the following treatments were rendered)  Therapeutic Exercise: (see flow sheet below for minutes):  Exercises per grid below to improve mobility, strength and balance. Required minimal visual, verbal and tactile cues to promote proper body mechanics. Aquatic Therapy (see flow sheet below for minutes): Aquatic treatment performed per flow grid for Decreased muscle strength, Decreased static/dynamic balance and reactive control, Decreased range of motion, Ease of movement and Low impact and reduced weight bearing activity. Cues provided for technique. Date: 6/19/18 6/21/18 6/28/18     Modalities:                                Manual Therapy:                                Aquatic Exercise:  1.5#/50 minutes 1.5#/55 minutes     Gait F/B/S/M  x4L each x4L     Heel/Toe walk        4-way  x15 BLE x20 BLE     PF/DF  x15 x20     Hamstring Curls  x15 BLE x4L     Hip Flexion with knee ext. (rockette)  x10 BLE x20 BLE     Squats    x15  x20     SLR march   x4L     Lunges   x15 BLE stationary     Step-ups   x15 BLE last step into pool      Hip circles        Hip horizontal abduction/adduction        Core:          Core:        Deep well bike  3 min 4 min     Deep well jumping don  1 min 2 min     Deep well scissors  1 min 1 min     Deep well:  traction                Hamstring Stretch  2 H 30 BLE 2 H 30 BLE     Step Lunge  Knee flexion x10  x10     Piriformis stretch        PF Stretch        Balance: Single leg Stance        Balance: Tandem                          Therapeutic Exercise: 25 minutes       Quad sets/heel slides 15 H 5       Supine hamstring stretch 3 H 30       bridging x15        Gastroc stretch 3 H 30 slantboard       Heel/toe raises x15       Sit to stand x5       F=forward  B=Backward  S=sidesteps  M=marches    H=hold    Manual: (see flow sheet above for minutes) ---  Modalities: (see flow sheet above for minutes) ---    HEP: Pt instructed with above exercises at initial evaluation and issued handout.   Treatment/Session Assessment: As of last visit 6/28/18: Pt already reporting less c/o pain and tightness; however, pt's chief c/o during PT is stiffness with B knees. Added lunges and step-ups and reports fatigue with RLE and mild pain but no other c/o during aquatics and no lingering pain afterwards. After aquatic session, pt was able to ascend stairs reciprocally as she exited the pool. In the past, pt was only able to use step-to pattern. Issued an ASO to increase R ankle stability as pt tends to have excessive lateral rolling with R ankle. Pt felt more stable and improved gait mechanics minimizing what was excessive. · Pain/ Symptoms: Initial:   up to 6/10 B (R>L) knees with standing and walking in her home; No c/o knee pain sitting, only stiffness     \"I thought I had an UTI but it was kidney stones. My back has been hurting my knees are much better. I do not have as much pain as I do stiffness. \" Post Session:  No c/o afterwards and \"the ankle brace even feels good to my back. \" ·   Compliance with Program/Exercises: compliant  · Recommendations/Intent for next treatment session: Discontinue at this time due to pt being unable to return due to other illness.       Chapis Beasley, PT

## 2018-10-16 PROBLEM — E11.9 CONTROLLED TYPE 2 DIABETES MELLITUS WITHOUT COMPLICATION, WITHOUT LONG-TERM CURRENT USE OF INSULIN (HCC): Status: RESOLVED | Noted: 2018-07-18 | Resolved: 2018-10-16

## 2018-10-16 PROBLEM — E11.21 TYPE 2 DIABETES WITH NEPHROPATHY (HCC): Status: RESOLVED | Noted: 2018-07-19 | Resolved: 2018-10-16

## 2018-10-16 PROBLEM — M48.062 SPINAL STENOSIS OF LUMBAR REGION WITH NEUROGENIC CLAUDICATION: Status: ACTIVE | Noted: 2018-10-16

## 2018-10-16 PROBLEM — Z98.890 HISTORY OF CERVICAL SPINAL SURGERY: Status: ACTIVE | Noted: 2018-10-16

## 2018-10-16 RX ORDER — CEFAZOLIN SODIUM/WATER 2 G/20 ML
2 SYRINGE (ML) INTRAVENOUS ONCE
Status: CANCELLED | OUTPATIENT
Start: 2018-10-16 | End: 2018-10-16

## 2018-10-22 ENCOUNTER — HOSPITAL ENCOUNTER (OUTPATIENT)
Dept: SURGERY | Age: 68
Discharge: HOME | End: 2018-10-22
Payer: MEDICARE

## 2018-10-22 VITALS
HEIGHT: 65 IN | HEART RATE: 69 BPM | WEIGHT: 236.06 LBS | BODY MASS INDEX: 39.33 KG/M2 | TEMPERATURE: 98.3 F | DIASTOLIC BLOOD PRESSURE: 82 MMHG | SYSTOLIC BLOOD PRESSURE: 182 MMHG

## 2018-10-22 LAB
BACTERIA SPEC CULT: NORMAL
POTASSIUM SERPL-SCNC: 4.2 MMOL/L (ref 3.5–5.1)
SERVICE CMNT-IMP: NORMAL

## 2018-10-22 PROCEDURE — 84132 ASSAY OF SERUM POTASSIUM: CPT

## 2018-10-22 PROCEDURE — 87641 MR-STAPH DNA AMP PROBE: CPT

## 2018-10-22 NOTE — PERIOP NOTES
Recent Results (from the past 12 hour(s)) POTASSIUM Collection Time: 10/22/18  1:49 PM  
Result Value Ref Range Potassium 4.2 3.5 - 5.1 mmol/L  
MSSA/MRSA SC BY PCR, NASAL SWAB Collection Time: 10/22/18  1:49 PM  
Result Value Ref Range Special Requests: NO SPECIAL REQUESTS Culture result:     
  SA target not detected. A MRSA NEGATIVE, SA NEGATIVE test result does not preclude MRSA or SA nasal colonization. Reviewed

## 2018-10-22 NOTE — PERIOP NOTES
Patient verified name and . Patient provided medical/health information and PTA medications to the best of their ability. TYPE  CASE:1B Order for consent  found in EHR and matches case posting. Labs per surgeon:Mrsa. Results: pending Labs per anesthesia protocol: K+ Results pending EKG  :  2018 in [facility] Patient provided with and instructed on education handouts including Guide to Surgery, blood transfusions, pain management, and hand hygiene for the family and community, and [facility] brochure. Hibiclens and instructions given per hospital policy. Instructed patient to continue previous medications as prescribed prior to surgery unless otherwise directed and to take the following medications the day of surgery according to anesthesia guidelines : Amlodipine,Carvedilol, Ceftin, Cymbalta, Uloric, Omeprazole . Instructed patient to hold  the following medications: Calcium, Iron . Original medication prescription bottles not visualized during patient appointment. Patient teach back successful and patient demonstrates knowledge of instruction.

## 2018-10-29 NOTE — H&P
Chief complaint: Back and buttock pain with activities. History of present illness: This is a very pleasant 70year old patient who presents with a greater than 1 year history of low back pain with episodic radiation to the buttocks and lower extremities, primarily on the bilateral side. The onset of the symptoms was rather insidious. The patient describes the quality of the pain as a deep ache. The patient has noticed a progressive decrease in her ability to walk or stand for any extended period of time. Her standing tolerance is about 2 minutes and walking distance limited to less than 200 feet. Her walking and standing pain is usually relieved with sitting. She has noted that pushing a cart in the store seems to help. She denies any change in bowel or bladder function since the onset of the symptoms. This patient has had lumbar surgery in the past.    
 
PMHx/PSHx/Social History/Medications/Allergies/ROS are listed and have been reviewed. Review of systems was noted. Pertinent positives and negatives were discussed with the patient particularly those that related to musculoskeletal complaints. Nonorthopedic complaints were directed to the primary care physician. Medications: Ambien; AmLODIPine Besylate; Amoxicillin (500 MG, Take four tablets po one hour prior to dental procedure); Aspirin; Atorvastatin Calcium; Benicar;Calcium;Carvedilol;Dilaudid (2 MG, Take 1 tablet(s) by mouth every 4-6 hours as needed [PRN]);Iron;Lasix; NexIUM; Potassimin;Uloric 
????? Allergies: Latex; OxyCODONE HCl;OxyCONTIN;Sulfa 
????? 
 
Physical Exam: This is a well developed well nourished adult female in no acute distress. Mood and affect are appropriate. Oriented to person, place, and time. Respirations are unlabored and there is no evidence of cyanosis Chest is clear to auscultation bilaterally. Heart is regular rate and rhythm. Inspection of the back reveals no evidence of rash, such as zoster. Examination of the lumbar spine reveals a relative hypolordosis, and no evidence of significant saggital plane deformity. There is exacerbation of symptoms with lumbar extension. There is not significant tenderness to palpation along the spinous processes or paraspinal musculature. The patient ambulates with a slightly crouched posture. Straight leg testing is negative. There is minimal hip irritability with internal or external rotation bilaterally. Sensory testing reveals intact sensation to light touch and pin prick in the distribution of the L3-S1 dermatomes bilaterally, though there is subjective tingling over the bilateral posterior thighs. Reflexes Right Left Quadriceps (L4) 2 2 Achilles (S1) 2 2 Ankle jerk is negative for clonus Strength testing in the lower extremity reveals the following based on the 5 point grading scale: 
 
 HF (L2) H Ab (L5) KE (L3/4) ADF (L4) EHL (L5) A Ev (S1) APF (S1) Right 5 5 5 5 5 5 5 Left 5 5 5 5 5 5 5 The feet are warm with good capillary refill and palpable pedal pulses. Radiographic Studies: MRI Lumbar spine, report and images independently reviewed:  Spondylotic changes are noted at L1-4 and result in severe lumbar stenosis. Assessment/Plan: This patients clinical history and physical exam is consistent with neurogenic claudication which is likely due to lumbar stenosis. I discussed the natural history of lumbar stenosis in that it is a degenerative condition that is usually slowly progressive resulting in gradual loss of mobility. ????? We discussed the details of the surgery including a midline incision in over the low back followed by dissection to the area of stenosis. The nerves would be freed up by trimming any impinging structures including ligaments and bone. Once the nerves are freed the wound would be closed with suture and covered with sterile dressings.   The patient would expect to stay in recovery or in the hospital overnight until she can get about safely with minimal assistance. A short stay in a rehabilitation facility could also be considered depending on how quickly she recovers. Follow-up would be scheduled for 2-3 weeks and she would have restrictions including no driving, and no lifting greater than 15 lbs until follow up with me. We also discussed the potential risks of the surgery including, but not limited to infection, spinal fluid leak and potential headaches requiring her to remain supine or have a lumbar drain inserted post-operatively; injury to the cauda equina or peripheral nerve root resulting in paralysis, bowel or bladder dysfunction, or loss of use of an extremity; persistent back or leg symptoms, recurrence of stenosis or the development of instability possibly needing additional surgery;  blood loss requiring transfusion; and the risks of anesthesia including, but not limited heart attack, stroke, and blood clot. The patient voiced an understanding of these issues and would like to discuss them over with her family and will get back with me with her desired treatment course. The surgery that I believe would be most beneficial here is a L1-L4 laminectomy. Sonu table with sling.  
 
  
 
Electronically Signed By Graciela Noguera MD

## 2018-10-30 ENCOUNTER — ANESTHESIA EVENT (OUTPATIENT)
Dept: SURGERY | Age: 68
DRG: 517 | End: 2018-10-30
Payer: MEDICARE

## 2018-10-30 ENCOUNTER — ANESTHESIA (OUTPATIENT)
Dept: SURGERY | Age: 68
DRG: 517 | End: 2018-10-30
Payer: MEDICARE

## 2018-10-30 ENCOUNTER — HOSPITAL ENCOUNTER (INPATIENT)
Age: 68
LOS: 1 days | Discharge: HOME | DRG: 517 | End: 2018-11-01
Attending: ORTHOPAEDIC SURGERY | Admitting: ORTHOPAEDIC SURGERY
Payer: MEDICARE

## 2018-10-30 ENCOUNTER — APPOINTMENT (OUTPATIENT)
Dept: GENERAL RADIOLOGY | Age: 68
DRG: 517 | End: 2018-10-30
Attending: ORTHOPAEDIC SURGERY
Payer: MEDICARE

## 2018-10-30 DIAGNOSIS — M48.062 SPINAL STENOSIS OF LUMBAR REGION WITH NEUROGENIC CLAUDICATION: ICD-10-CM

## 2018-10-30 LAB — GLUCOSE BLD STRIP.AUTO-MCNC: 138 MG/DL (ref 65–100)

## 2018-10-30 PROCEDURE — 74011000250 HC RX REV CODE- 250: Performed by: ORTHOPAEDIC SURGERY

## 2018-10-30 PROCEDURE — 99218 HC RM OBSERVATION: CPT

## 2018-10-30 PROCEDURE — 74011000258 HC RX REV CODE- 258

## 2018-10-30 PROCEDURE — 76210000016 HC OR PH I REC 1 TO 1.5 HR: Performed by: ORTHOPAEDIC SURGERY

## 2018-10-30 PROCEDURE — 74011000250 HC RX REV CODE- 250: Performed by: ANESTHESIOLOGY

## 2018-10-30 PROCEDURE — 77030008703 HC TU ET UNCUF COVD -A: Performed by: ANESTHESIOLOGY

## 2018-10-30 PROCEDURE — 77030020255 HC SOL INJ LR 1000ML BG

## 2018-10-30 PROCEDURE — 74011250636 HC RX REV CODE- 250/636: Performed by: ANESTHESIOLOGY

## 2018-10-30 PROCEDURE — 77030019908 HC STETH ESOPH SIMS -A: Performed by: ANESTHESIOLOGY

## 2018-10-30 PROCEDURE — 74011250636 HC RX REV CODE- 250/636: Performed by: ORTHOPAEDIC SURGERY

## 2018-10-30 PROCEDURE — 76010000162 HC OR TIME 1.5 TO 2 HR INTENSV-TIER 1: Performed by: ORTHOPAEDIC SURGERY

## 2018-10-30 PROCEDURE — 01NB0ZZ RELEASE LUMBAR NERVE, OPEN APPROACH: ICD-10-PCS | Performed by: OBSTETRICS & GYNECOLOGY

## 2018-10-30 PROCEDURE — 74011250637 HC RX REV CODE- 250/637: Performed by: ORTHOPAEDIC SURGERY

## 2018-10-30 PROCEDURE — 77030025623 HC BUR RND PRECIS STRY -D: Performed by: ORTHOPAEDIC SURGERY

## 2018-10-30 PROCEDURE — 77030020782 HC GWN BAIR PAWS FLX 3M -B: Performed by: ANESTHESIOLOGY

## 2018-10-30 PROCEDURE — 77030014650 HC SEAL MTRX FLOSEL BAXT -C: Performed by: ORTHOPAEDIC SURGERY

## 2018-10-30 PROCEDURE — 77030039425 HC BLD LARYNG TRULITE DISP TELE -A: Performed by: ANESTHESIOLOGY

## 2018-10-30 PROCEDURE — 77030034850: Performed by: ORTHOPAEDIC SURGERY

## 2018-10-30 PROCEDURE — 77030012894: Performed by: ORTHOPAEDIC SURGERY

## 2018-10-30 PROCEDURE — 74011250636 HC RX REV CODE- 250/636

## 2018-10-30 PROCEDURE — 72020 X-RAY EXAM OF SPINE 1 VIEW: CPT

## 2018-10-30 PROCEDURE — 82962 GLUCOSE BLOOD TEST: CPT

## 2018-10-30 PROCEDURE — 77030008477 HC STYL SATN SLP COVD -A: Performed by: ANESTHESIOLOGY

## 2018-10-30 PROCEDURE — 77030031139 HC SUT VCRL2 J&J -A: Performed by: ORTHOPAEDIC SURGERY

## 2018-10-30 PROCEDURE — 77030032490 HC SLV COMPR SCD KNE COVD -B: Performed by: ORTHOPAEDIC SURGERY

## 2018-10-30 PROCEDURE — 77030018836 HC SOL IRR NACL ICUM -A: Performed by: ORTHOPAEDIC SURGERY

## 2018-10-30 PROCEDURE — 77030030163 HC BN WAX J&J -A: Performed by: ORTHOPAEDIC SURGERY

## 2018-10-30 PROCEDURE — 74011250637 HC RX REV CODE- 250/637: Performed by: ANESTHESIOLOGY

## 2018-10-30 PROCEDURE — 76060000035 HC ANESTHESIA 2 TO 2.5 HR: Performed by: ORTHOPAEDIC SURGERY

## 2018-10-30 PROCEDURE — 77030014368: Performed by: ORTHOPAEDIC SURGERY

## 2018-10-30 PROCEDURE — 74011000250 HC RX REV CODE- 250

## 2018-10-30 RX ORDER — AMLODIPINE BESYLATE 5 MG/1
5 TABLET ORAL DAILY
Status: DISCONTINUED | OUTPATIENT
Start: 2018-10-31 | End: 2018-11-01 | Stop reason: HOSPADM

## 2018-10-30 RX ORDER — BACITRACIN 50000 [IU]/1
INJECTION, POWDER, FOR SOLUTION INTRAMUSCULAR AS NEEDED
Status: DISCONTINUED | OUTPATIENT
Start: 2018-10-30 | End: 2018-10-30 | Stop reason: HOSPADM

## 2018-10-30 RX ORDER — FENTANYL CITRATE 50 UG/ML
INJECTION, SOLUTION INTRAMUSCULAR; INTRAVENOUS AS NEEDED
Status: DISCONTINUED | OUTPATIENT
Start: 2018-10-30 | End: 2018-10-30 | Stop reason: HOSPADM

## 2018-10-30 RX ORDER — SODIUM CHLORIDE, SODIUM LACTATE, POTASSIUM CHLORIDE, CALCIUM CHLORIDE 600; 310; 30; 20 MG/100ML; MG/100ML; MG/100ML; MG/100ML
75 INJECTION, SOLUTION INTRAVENOUS CONTINUOUS
Status: DISCONTINUED | OUTPATIENT
Start: 2018-10-30 | End: 2018-10-30 | Stop reason: HOSPADM

## 2018-10-30 RX ORDER — ACETAMINOPHEN 325 MG/1
650 TABLET ORAL EVERY 6 HOURS
Status: DISCONTINUED | OUTPATIENT
Start: 2018-10-30 | End: 2018-11-01 | Stop reason: HOSPADM

## 2018-10-30 RX ORDER — ZOLPIDEM TARTRATE 5 MG/1
5 TABLET ORAL
Status: DISCONTINUED | OUTPATIENT
Start: 2018-10-30 | End: 2018-11-01 | Stop reason: HOSPADM

## 2018-10-30 RX ORDER — POTASSIUM CHLORIDE 750 MG/1
10 TABLET, EXTENDED RELEASE ORAL DAILY
Status: DISCONTINUED | OUTPATIENT
Start: 2018-10-31 | End: 2018-11-01 | Stop reason: HOSPADM

## 2018-10-30 RX ORDER — DULOXETIN HYDROCHLORIDE 30 MG/1
30 CAPSULE, DELAYED RELEASE ORAL DAILY
Status: DISCONTINUED | OUTPATIENT
Start: 2018-10-31 | End: 2018-11-01 | Stop reason: HOSPADM

## 2018-10-30 RX ORDER — GUAIFENESIN 100 MG/5ML
81 LIQUID (ML) ORAL
Status: COMPLETED | OUTPATIENT
Start: 2018-10-30 | End: 2018-10-30

## 2018-10-30 RX ORDER — EPHEDRINE SULFATE 50 MG/ML
INJECTION, SOLUTION INTRAVENOUS AS NEEDED
Status: DISCONTINUED | OUTPATIENT
Start: 2018-10-30 | End: 2018-10-30 | Stop reason: HOSPADM

## 2018-10-30 RX ORDER — NALOXONE HYDROCHLORIDE 0.4 MG/ML
0.2 INJECTION, SOLUTION INTRAMUSCULAR; INTRAVENOUS; SUBCUTANEOUS AS NEEDED
Status: DISCONTINUED | OUTPATIENT
Start: 2018-10-30 | End: 2018-10-30 | Stop reason: HOSPADM

## 2018-10-30 RX ORDER — SODIUM CHLORIDE 0.9 % (FLUSH) 0.9 %
5-10 SYRINGE (ML) INJECTION AS NEEDED
Status: DISCONTINUED | OUTPATIENT
Start: 2018-10-30 | End: 2018-10-30 | Stop reason: HOSPADM

## 2018-10-30 RX ORDER — SODIUM CHLORIDE 0.9 % (FLUSH) 0.9 %
5-10 SYRINGE (ML) INJECTION AS NEEDED
Status: DISCONTINUED | OUTPATIENT
Start: 2018-10-30 | End: 2018-11-01 | Stop reason: HOSPADM

## 2018-10-30 RX ORDER — ONDANSETRON 2 MG/ML
INJECTION INTRAMUSCULAR; INTRAVENOUS AS NEEDED
Status: DISCONTINUED | OUTPATIENT
Start: 2018-10-30 | End: 2018-10-30 | Stop reason: HOSPADM

## 2018-10-30 RX ORDER — DIPHENHYDRAMINE HCL 25 MG
25 CAPSULE ORAL
Status: DISCONTINUED | OUTPATIENT
Start: 2018-10-30 | End: 2018-11-01 | Stop reason: HOSPADM

## 2018-10-30 RX ORDER — SODIUM CHLORIDE, SODIUM LACTATE, POTASSIUM CHLORIDE, CALCIUM CHLORIDE 600; 310; 30; 20 MG/100ML; MG/100ML; MG/100ML; MG/100ML
75 INJECTION, SOLUTION INTRAVENOUS CONTINUOUS
Status: DISPENSED | OUTPATIENT
Start: 2018-10-30 | End: 2018-10-31

## 2018-10-30 RX ORDER — HYDROCODONE BITARTRATE AND ACETAMINOPHEN 7.5; 325 MG/1; MG/1
1 TABLET ORAL
Qty: 40 TAB | Refills: 0 | Status: SHIPPED | OUTPATIENT
Start: 2018-10-30 | End: 2018-11-26 | Stop reason: SDUPTHER

## 2018-10-30 RX ORDER — BUPIVACAINE HYDROCHLORIDE AND EPINEPHRINE 5; 5 MG/ML; UG/ML
INJECTION, SOLUTION EPIDURAL; INTRACAUDAL; PERINEURAL AS NEEDED
Status: DISCONTINUED | OUTPATIENT
Start: 2018-10-30 | End: 2018-10-30 | Stop reason: HOSPADM

## 2018-10-30 RX ORDER — VANCOMYCIN HYDROCHLORIDE 1 G/20ML
INJECTION, POWDER, LYOPHILIZED, FOR SOLUTION INTRAVENOUS AS NEEDED
Status: DISCONTINUED | OUTPATIENT
Start: 2018-10-30 | End: 2018-10-30 | Stop reason: HOSPADM

## 2018-10-30 RX ORDER — DOCUSATE SODIUM 100 MG/1
100 CAPSULE, LIQUID FILLED ORAL 2 TIMES DAILY
Status: DISCONTINUED | OUTPATIENT
Start: 2018-10-30 | End: 2018-11-01 | Stop reason: HOSPADM

## 2018-10-30 RX ORDER — CYCLOBENZAPRINE HCL 10 MG
10 TABLET ORAL
Status: DISCONTINUED | OUTPATIENT
Start: 2018-10-30 | End: 2018-11-01 | Stop reason: HOSPADM

## 2018-10-30 RX ORDER — HYDROMORPHONE HYDROCHLORIDE 2 MG/ML
0.5 INJECTION, SOLUTION INTRAMUSCULAR; INTRAVENOUS; SUBCUTANEOUS
Status: DISCONTINUED | OUTPATIENT
Start: 2018-10-30 | End: 2018-10-30 | Stop reason: HOSPADM

## 2018-10-30 RX ORDER — CEFAZOLIN SODIUM/WATER 2 G/20 ML
2 SYRINGE (ML) INTRAVENOUS ONCE
Status: COMPLETED | OUTPATIENT
Start: 2018-10-30 | End: 2018-10-30

## 2018-10-30 RX ORDER — GLYCOPYRROLATE 0.2 MG/ML
INJECTION INTRAMUSCULAR; INTRAVENOUS AS NEEDED
Status: DISCONTINUED | OUTPATIENT
Start: 2018-10-30 | End: 2018-10-30 | Stop reason: HOSPADM

## 2018-10-30 RX ORDER — CARVEDILOL 25 MG/1
25 TABLET ORAL 2 TIMES DAILY WITH MEALS
Status: DISCONTINUED | OUTPATIENT
Start: 2018-10-30 | End: 2018-11-01 | Stop reason: HOSPADM

## 2018-10-30 RX ORDER — ROCURONIUM BROMIDE 10 MG/ML
INJECTION, SOLUTION INTRAVENOUS AS NEEDED
Status: DISCONTINUED | OUTPATIENT
Start: 2018-10-30 | End: 2018-10-30 | Stop reason: HOSPADM

## 2018-10-30 RX ORDER — LIDOCAINE HYDROCHLORIDE 20 MG/ML
INJECTION, SOLUTION EPIDURAL; INFILTRATION; INTRACAUDAL; PERINEURAL AS NEEDED
Status: DISCONTINUED | OUTPATIENT
Start: 2018-10-30 | End: 2018-10-30 | Stop reason: HOSPADM

## 2018-10-30 RX ORDER — FUROSEMIDE 20 MG/1
20 TABLET ORAL DAILY
Status: DISCONTINUED | OUTPATIENT
Start: 2018-10-31 | End: 2018-11-01 | Stop reason: HOSPADM

## 2018-10-30 RX ORDER — CEFAZOLIN SODIUM/WATER 2 G/20 ML
2 SYRINGE (ML) INTRAVENOUS EVERY 8 HOURS
Status: DISPENSED | OUTPATIENT
Start: 2018-10-30 | End: 2018-10-31

## 2018-10-30 RX ORDER — LIDOCAINE HYDROCHLORIDE 10 MG/ML
0.1 INJECTION INFILTRATION; PERINEURAL AS NEEDED
Status: DISCONTINUED | OUTPATIENT
Start: 2018-10-30 | End: 2018-10-30 | Stop reason: HOSPADM

## 2018-10-30 RX ORDER — FEBUXOSTAT 40 MG/1
40 TABLET, FILM COATED ORAL DAILY
Status: DISCONTINUED | OUTPATIENT
Start: 2018-10-31 | End: 2018-11-01 | Stop reason: HOSPADM

## 2018-10-30 RX ORDER — FAMOTIDINE 20 MG/1
20 TABLET, FILM COATED ORAL EVERY 12 HOURS
Status: DISCONTINUED | OUTPATIENT
Start: 2018-10-30 | End: 2018-11-01 | Stop reason: HOSPADM

## 2018-10-30 RX ORDER — NEOSTIGMINE METHYLSULFATE 1 MG/ML
INJECTION INTRAVENOUS AS NEEDED
Status: DISCONTINUED | OUTPATIENT
Start: 2018-10-30 | End: 2018-10-30 | Stop reason: HOSPADM

## 2018-10-30 RX ORDER — PROPOFOL 10 MG/ML
INJECTION, EMULSION INTRAVENOUS AS NEEDED
Status: DISCONTINUED | OUTPATIENT
Start: 2018-10-30 | End: 2018-10-30 | Stop reason: HOSPADM

## 2018-10-30 RX ORDER — MIDAZOLAM HYDROCHLORIDE 1 MG/ML
2 INJECTION, SOLUTION INTRAMUSCULAR; INTRAVENOUS
Status: DISCONTINUED | OUTPATIENT
Start: 2018-10-30 | End: 2018-10-30 | Stop reason: HOSPADM

## 2018-10-30 RX ORDER — ONDANSETRON 2 MG/ML
4 INJECTION INTRAMUSCULAR; INTRAVENOUS
Status: DISCONTINUED | OUTPATIENT
Start: 2018-10-30 | End: 2018-11-01 | Stop reason: HOSPADM

## 2018-10-30 RX ORDER — OXYCODONE AND ACETAMINOPHEN 5; 325 MG/1; MG/1
1 TABLET ORAL AS NEEDED
Status: DISCONTINUED | OUTPATIENT
Start: 2018-10-30 | End: 2018-10-30 | Stop reason: HOSPADM

## 2018-10-30 RX ORDER — HYDROCODONE BITARTRATE AND ACETAMINOPHEN 5; 325 MG/1; MG/1
1 TABLET ORAL
Status: DISCONTINUED | OUTPATIENT
Start: 2018-10-30 | End: 2018-11-01 | Stop reason: HOSPADM

## 2018-10-30 RX ORDER — MORPHINE SULFATE 2 MG/ML
2 INJECTION, SOLUTION INTRAMUSCULAR; INTRAVENOUS
Status: DISCONTINUED | OUTPATIENT
Start: 2018-10-30 | End: 2018-11-01 | Stop reason: HOSPADM

## 2018-10-30 RX ORDER — NALOXONE HYDROCHLORIDE 0.4 MG/ML
0.4 INJECTION, SOLUTION INTRAMUSCULAR; INTRAVENOUS; SUBCUTANEOUS
Status: DISCONTINUED | OUTPATIENT
Start: 2018-10-30 | End: 2018-11-01 | Stop reason: HOSPADM

## 2018-10-30 RX ORDER — OLMESARTAN MEDOXOMIL 40 MG/1
40 TABLET ORAL DAILY
Status: DISCONTINUED | OUTPATIENT
Start: 2018-10-31 | End: 2018-11-01 | Stop reason: HOSPADM

## 2018-10-30 RX ORDER — POLYETHYLENE GLYCOL 3350 17 G/17G
17 POWDER, FOR SOLUTION ORAL
Status: DISCONTINUED | OUTPATIENT
Start: 2018-10-30 | End: 2018-11-01 | Stop reason: HOSPADM

## 2018-10-30 RX ORDER — SODIUM CHLORIDE 0.9 % (FLUSH) 0.9 %
5-10 SYRINGE (ML) INJECTION EVERY 8 HOURS
Status: DISCONTINUED | OUTPATIENT
Start: 2018-10-30 | End: 2018-11-01 | Stop reason: HOSPADM

## 2018-10-30 RX ORDER — DEXAMETHASONE SODIUM PHOSPHATE 4 MG/ML
INJECTION, SOLUTION INTRA-ARTICULAR; INTRALESIONAL; INTRAMUSCULAR; INTRAVENOUS; SOFT TISSUE AS NEEDED
Status: DISCONTINUED | OUTPATIENT
Start: 2018-10-30 | End: 2018-10-30 | Stop reason: HOSPADM

## 2018-10-30 RX ADMIN — ROCURONIUM BROMIDE 40 MG: 10 INJECTION, SOLUTION INTRAVENOUS at 08:56

## 2018-10-30 RX ADMIN — ACETAMINOPHEN 650 MG: 325 TABLET, FILM COATED ORAL at 18:01

## 2018-10-30 RX ADMIN — HYDROMORPHONE HYDROCHLORIDE 0.5 MG: 2 INJECTION, SOLUTION INTRAMUSCULAR; INTRAVENOUS; SUBCUTANEOUS at 11:24

## 2018-10-30 RX ADMIN — DOCUSATE SODIUM 100 MG: 100 CAPSULE, LIQUID FILLED ORAL at 18:01

## 2018-10-30 RX ADMIN — EPHEDRINE SULFATE 10 MG: 50 INJECTION, SOLUTION INTRAVENOUS at 09:32

## 2018-10-30 RX ADMIN — EPHEDRINE SULFATE 10 MG: 50 INJECTION, SOLUTION INTRAVENOUS at 09:43

## 2018-10-30 RX ADMIN — HYDROCODONE BITARTRATE AND ACETAMINOPHEN 1 TABLET: 5; 325 TABLET ORAL at 21:24

## 2018-10-30 RX ADMIN — DEXAMETHASONE SODIUM PHOSPHATE 4 MG: 4 INJECTION, SOLUTION INTRA-ARTICULAR; INTRALESIONAL; INTRAMUSCULAR; INTRAVENOUS; SOFT TISSUE at 10:10

## 2018-10-30 RX ADMIN — LIDOCAINE HYDROCHLORIDE 100 MG: 20 INJECTION, SOLUTION EPIDURAL; INFILTRATION; INTRACAUDAL; PERINEURAL at 08:54

## 2018-10-30 RX ADMIN — EPHEDRINE SULFATE 5 MG: 50 INJECTION, SOLUTION INTRAVENOUS at 09:25

## 2018-10-30 RX ADMIN — Medication 2 G: at 21:25

## 2018-10-30 RX ADMIN — Medication 5 ML: at 18:17

## 2018-10-30 RX ADMIN — ACETAMINOPHEN 650 MG: 325 TABLET, FILM COATED ORAL at 23:21

## 2018-10-30 RX ADMIN — Medication 2 G: at 09:11

## 2018-10-30 RX ADMIN — MORPHINE SULFATE 2 MG: 2 INJECTION, SOLUTION INTRAMUSCULAR; INTRAVENOUS at 17:53

## 2018-10-30 RX ADMIN — FAMOTIDINE 20 MG: 20 TABLET ORAL at 21:24

## 2018-10-30 RX ADMIN — GLYCOPYRROLATE 0.4 MG: 0.2 INJECTION INTRAMUSCULAR; INTRAVENOUS at 10:26

## 2018-10-30 RX ADMIN — Medication 1 AMPULE: at 21:24

## 2018-10-30 RX ADMIN — EPHEDRINE SULFATE 5 MG: 50 INJECTION, SOLUTION INTRAVENOUS at 09:39

## 2018-10-30 RX ADMIN — FENTANYL CITRATE 12.5 MCG: 50 INJECTION, SOLUTION INTRAMUSCULAR; INTRAVENOUS at 10:27

## 2018-10-30 RX ADMIN — FENTANYL CITRATE 100 MCG: 50 INJECTION, SOLUTION INTRAMUSCULAR; INTRAVENOUS at 08:54

## 2018-10-30 RX ADMIN — Medication 3 AMPULE: at 07:50

## 2018-10-30 RX ADMIN — SODIUM CHLORIDE, SODIUM LACTATE, POTASSIUM CHLORIDE, AND CALCIUM CHLORIDE 75 ML/HR: 600; 310; 30; 20 INJECTION, SOLUTION INTRAVENOUS at 08:10

## 2018-10-30 RX ADMIN — SODIUM CHLORIDE, SODIUM LACTATE, POTASSIUM CHLORIDE, AND CALCIUM CHLORIDE 75 ML/HR: 600; 310; 30; 20 INJECTION, SOLUTION INTRAVENOUS at 18:11

## 2018-10-30 RX ADMIN — EPHEDRINE SULFATE 7.5 MG: 50 INJECTION, SOLUTION INTRAVENOUS at 09:46

## 2018-10-30 RX ADMIN — EPHEDRINE SULFATE 5 MG: 50 INJECTION, SOLUTION INTRAVENOUS at 08:56

## 2018-10-30 RX ADMIN — Medication 10 ML: at 21:25

## 2018-10-30 RX ADMIN — SODIUM CHLORIDE 6.25 MG: 9 INJECTION INTRAMUSCULAR; INTRAVENOUS; SUBCUTANEOUS at 11:15

## 2018-10-30 RX ADMIN — ONDANSETRON 4 MG: 2 INJECTION INTRAMUSCULAR; INTRAVENOUS at 10:10

## 2018-10-30 RX ADMIN — FENTANYL CITRATE 12.5 MCG: 50 INJECTION, SOLUTION INTRAMUSCULAR; INTRAVENOUS at 10:35

## 2018-10-30 RX ADMIN — CARVEDILOL 25 MG: 25 TABLET, FILM COATED ORAL at 18:01

## 2018-10-30 RX ADMIN — EPHEDRINE SULFATE 5 MG: 50 INJECTION, SOLUTION INTRAVENOUS at 09:30

## 2018-10-30 RX ADMIN — SODIUM CHLORIDE, SODIUM LACTATE, POTASSIUM CHLORIDE, AND CALCIUM CHLORIDE: 600; 310; 30; 20 INJECTION, SOLUTION INTRAVENOUS at 10:22

## 2018-10-30 RX ADMIN — ASPIRIN 81 MG: 81 TABLET, CHEWABLE ORAL at 08:45

## 2018-10-30 RX ADMIN — PROPOFOL 200 MG: 10 INJECTION, EMULSION INTRAVENOUS at 08:55

## 2018-10-30 RX ADMIN — ROCURONIUM BROMIDE 5 MG: 10 INJECTION, SOLUTION INTRAVENOUS at 10:00

## 2018-10-30 RX ADMIN — NEOSTIGMINE METHYLSULFATE 3 MG: 1 INJECTION INTRAVENOUS at 10:26

## 2018-10-30 NOTE — PERIOP NOTES
TRANSFER - OUT REPORT: 
 
Verbal report given to Jordan Rob RN(name) on [employer]  being transferred to [facility](unit) for routine post - op Report consisted of patients Situation, Background, Assessment and  
Recommendations(SBAR). Information from the following report(s) Kardex, OR Summary, Intake/Output and MAR was reviewed with the receiving nurse. Lines:  
Peripheral IV 10/30/18 Left Hand (Active) Site Assessment Clean, dry, & intact 10/30/2018 11:18 AM  
Phlebitis Assessment 0 10/30/2018 11:18 AM  
Infiltration Assessment 0 10/30/2018 11:18 AM  
Dressing Status Clean, dry, & intact 10/30/2018 11:18 AM  
Dressing Type Transparent;Tape 10/30/2018 11:18 AM  
Hub Color/Line Status Pink; Infusing 10/30/2018 11:18 AM  
Alcohol Cap Used No 10/30/2018 11:18 AM  
  
 
Opportunity for questions and clarification was provided. Patient transported with: 
 O2 @ 2 liters VTE prophylaxis orders have been written for [employer]. Patient and family given floor number and nurses name. Family updated re: pt status after security code verified.

## 2018-10-30 NOTE — OP NOTES
[address] [address]   [phone]    OPERATIVE REPORT    Patient ID:Estefani Mcgraw  [id number]  1950  70 y.o. DATE OF SURGERY: 10/30/2018    SURGEON: Dr. Elizabeth Heaton. PREOPERATIVE DIAGNOSIS: Lumbar stenosis    POSTOPERATIVE DIAGNOSIS: Lumbar stenosis    PROCEDURE:    1. Lumbar laminectomy  L1  through L4  with bilateral foraminotomies. 2. Use of intraoperative microscope for visualization of the neural elements. ANESTHESIA: General.    ESTIMATED BLOOD LOSS: 800 ml    POSTOPERATIVE CONDITION: Stable. INTRAOPERATIVE COMPLICATIONS: None. INDICATIONS FOR PROCEDURE: Back and leg pain consistent with claudication/lumbar radiculitis that is no longer responsive to conservative measures. Walking and standing tolerances have diminished. Imaging studies are concordant, showing lumbar stenosis. In the outpatient setting, the risks, benefits, and potential complications of the above listed procedure were discussed and an informed consent was obtained. DESCRIPTION OF PROCEDURE: After adequate induction of general anesthesia, the patient was positioned prone on the [facility] spinal table. Care was taken to pad all bony prominences. The shoulders and elbows were placed in the 90/90 position. The abdomen was allowed to hang free to decrease intraabdominal and venous pressure. The lumbar area was prepped and draped in the usual sterile fashion. Preoperative antibiotic was administered. A time out was called to confirm the appropriate patient, proposed procedure and proposed incision sites. With this conformation, an incision was created midline, over the lumbar spinous processes. Dissection was carried down to the lumbodorsal fascia. A Kocher clamp was attached to a spinous process and a cross-table lateral fluoroscopic image was obtained to confirm appropriate spinal level.  With this confirmation, the spinous process was marked with a DANIELLE Escobedo rongeur and the lumbodorsal fascia and paraspinous musculature were elevated in a subperiosteal fashion, laterally off of the spinous processes and lamina. The pars interarticularis was exposed at each level. Care was taken to preserve the facet capsule at each level. The deep retractors were placed to facilitate visualization. A Leksell rongeur was used to resect the spinous processes of  L1 - L4. The 4 mm patricia was used to thin the lamina to an eggshell like thickness. The operative microscope was brought to the field and used to visualize the neural elements during the decompression. A triple-0 angled curet was used to elevated the ligamentum flavum off of its origin on the caudal surface of the L4  lamina as well as off the cephalad surface of the adjacent lamina. The ligamentum flavum was elevated from the thecal sac and a plane was created in the epidural space with the [facility]. A 4 mm Kerrison was used to perform a central laminectomy of  L4 - L1 . The central laminectomy was widened to the medial border of the pedicle at each level. With the central laminectomy completed, a 4 mm Kerrison was used to under cut the lateral recess along the entire length of the laminectomy site. Then using the [facility] elevator to retract the thecal sac and identify each of the nerve roots, partial foraminotomies were performed and each nerve was visualized and decompressed bilaterally. A couple of the foramen were not able to be decompressed to the lateral margin due to osteopenia and the potential for postoperative instability. There was felt to be no significant facet instability and a fusion was not deemed to be necessary. With the decompression completed, the wound was liberally irrigated with saline solution. A Hemovac was inserted through a separate incision. The lumbodorsal fascia was approximated with a #1 Vicryl suture in an interrupted fashion.  The subcutaneous tissue and skin were approximated in a layered fashion. Benzoin and Steri-Strips were applied. Sterile dressings were applied. The patient tolerated the procedure well and was returned to the postanesthesia care unit in stable condition. At the end of the case, all sponge, needle, and instrument counts were correct.        Betsy Roman MD

## 2018-10-30 NOTE — ANESTHESIA PREPROCEDURE EVALUATION
Anesthetic History PONV Review of Systems / Medical History Patient summary reviewed and pertinent labs reviewed Pulmonary Within defined limits Neuro/Psych Psychiatric history Cardiovascular Hypertension: well controlled Comments: AVR, biomechanical 
 
H/O a fib after valve surgery GI/Hepatic/Renal 
  
GERD: well controlled Endo/Other Obesity and arthritis Comments: Borderline DM Other Findings Physical Exam 
 
Airway Mallampati: II 
TM Distance: 4 - 6 cm Neck ROM: normal range of motion Mouth opening: Normal 
 
 Cardiovascular Rhythm: regular Dental 
 
Dentition: Full upper dentures and Lower dentition intact Pulmonary Breath sounds clear to auscultation Abdominal 
 
 
 
 Other Findings Anesthetic Plan ASA: 3 Anesthesia type: general 
 
 
 
 
Induction: Intravenous Anesthetic plan and risks discussed with: Patient

## 2018-10-30 NOTE — ANESTHESIA POSTPROCEDURE EVALUATION
Procedure(s): 
L1-L4 SPINE LUMBAR LAMINECTOMY. Anesthesia Post Evaluation Multimodal analgesia: multimodal analgesia used between 6 hours prior to anesthesia start to PACU discharge Patient location during evaluation: PACU Patient participation: complete - patient participated Level of consciousness: awake and alert Pain management: adequate Airway patency: patent Anesthetic complications: no 
Cardiovascular status: acceptable Respiratory status: acceptable Hydration status: acceptable Comments: Nausea controlled Visit Vitals /58 Pulse 61 Temp 36.6 °C (97.9 °F) Resp 14 Ht 5' 5\" (1.651 m) Wt 106.9 kg (235 lb 11.2 oz) SpO2 96% BMI 39.22 kg/m²

## 2018-10-30 NOTE — PROGRESS NOTES
10/30/18 1840 Dual Skin Pressure Injury Assessment Dual Skin Pressure Injury Assessment WDL Second Care Provider (Based on [address] [place] [address]) [facility] [address] Skin Integumentary Skin Integumentary (WDL) X Skin Integrity Incision (comment) Small reddened area to sacrum. Dressing to back, clean dry intact. Scars to chest 
No other skin issues noted at this time

## 2018-10-30 NOTE — PROGRESS NOTES
TRANSFER - IN REPORT: 
 
Verbal report received from [facility] RN(name) on [employer]  being received from PACU 
(unit) for routine post - op Report consisted of patients Situation, Background, Assessment and  
Recommendations(SBAR). Information from the following report(s) SBAR, Kardex, OR Summary, Procedure Summary, Intake/Output, MAR and Recent Results was reviewed with the receiving nurse. Opportunity for questions and clarification was provided. Assessment completed upon patients arrival to unit and care assumed.

## 2018-10-31 PROCEDURE — 97161 PT EVAL LOW COMPLEX 20 MIN: CPT

## 2018-10-31 PROCEDURE — 97530 THERAPEUTIC ACTIVITIES: CPT

## 2018-10-31 PROCEDURE — 99218 HC RM OBSERVATION: CPT

## 2018-10-31 PROCEDURE — 97165 OT EVAL LOW COMPLEX 30 MIN: CPT

## 2018-10-31 PROCEDURE — 74011250637 HC RX REV CODE- 250/637: Performed by: ORTHOPAEDIC SURGERY

## 2018-10-31 PROCEDURE — 74011250636 HC RX REV CODE- 250/636: Performed by: ORTHOPAEDIC SURGERY

## 2018-10-31 PROCEDURE — 97110 THERAPEUTIC EXERCISES: CPT

## 2018-10-31 PROCEDURE — G8987 SELF CARE CURRENT STATUS: HCPCS

## 2018-10-31 PROCEDURE — 65270000029 HC RM PRIVATE

## 2018-10-31 PROCEDURE — G8979 MOBILITY GOAL STATUS: HCPCS

## 2018-10-31 PROCEDURE — G8988 SELF CARE GOAL STATUS: HCPCS

## 2018-10-31 PROCEDURE — 97535 SELF CARE MNGMENT TRAINING: CPT

## 2018-10-31 PROCEDURE — G8978 MOBILITY CURRENT STATUS: HCPCS

## 2018-10-31 RX ADMIN — HYDROCODONE BITARTRATE AND ACETAMINOPHEN 1 TABLET: 5; 325 TABLET ORAL at 12:19

## 2018-10-31 RX ADMIN — HYDROCODONE BITARTRATE AND ACETAMINOPHEN 1 TABLET: 5; 325 TABLET ORAL at 04:22

## 2018-10-31 RX ADMIN — HYDROCODONE BITARTRATE AND ACETAMINOPHEN 1 TABLET: 5; 325 TABLET ORAL at 20:33

## 2018-10-31 RX ADMIN — Medication 10 ML: at 22:00

## 2018-10-31 RX ADMIN — CARVEDILOL 25 MG: 25 TABLET, FILM COATED ORAL at 17:00

## 2018-10-31 RX ADMIN — FAMOTIDINE 20 MG: 20 TABLET ORAL at 20:33

## 2018-10-31 RX ADMIN — Medication 1 AMPULE: at 09:00

## 2018-10-31 RX ADMIN — ACETAMINOPHEN 650 MG: 325 TABLET, FILM COATED ORAL at 04:22

## 2018-10-31 RX ADMIN — OLMESARTAN MEDOXOMIL 40 MG: 40 TABLET, FILM COATED ORAL at 08:35

## 2018-10-31 RX ADMIN — DOCUSATE SODIUM 100 MG: 100 CAPSULE, LIQUID FILLED ORAL at 18:00

## 2018-10-31 RX ADMIN — Medication 1 AMPULE: at 21:00

## 2018-10-31 RX ADMIN — ACETAMINOPHEN 650 MG: 325 TABLET, FILM COATED ORAL at 23:59

## 2018-10-31 RX ADMIN — Medication 2 G: at 04:22

## 2018-10-31 RX ADMIN — POTASSIUM CHLORIDE 10 MEQ: 10 TABLET, EXTENDED RELEASE ORAL at 08:34

## 2018-10-31 RX ADMIN — FAMOTIDINE 20 MG: 20 TABLET ORAL at 08:35

## 2018-10-31 RX ADMIN — FUROSEMIDE 20 MG: 20 TABLET ORAL at 08:35

## 2018-10-31 RX ADMIN — ACETAMINOPHEN 650 MG: 325 TABLET, FILM COATED ORAL at 12:20

## 2018-10-31 RX ADMIN — DULOXETINE 30 MG: 30 CAPSULE, DELAYED RELEASE ORAL at 08:35

## 2018-10-31 RX ADMIN — DOCUSATE SODIUM 100 MG: 100 CAPSULE, LIQUID FILLED ORAL at 08:35

## 2018-10-31 RX ADMIN — AMLODIPINE BESYLATE 5 MG: 5 TABLET ORAL at 08:35

## 2018-10-31 RX ADMIN — CARVEDILOL 25 MG: 25 TABLET, FILM COATED ORAL at 08:34

## 2018-10-31 RX ADMIN — Medication 10 ML: at 04:22

## 2018-10-31 NOTE — PROGRESS NOTES
Problem: Mobility Impaired (Adult and Pediatric) Goal: *Acute Goals and Plan of Care (Insert Text) LTG: 
(1.)Ms. Anupama Carey will move from supine to sit and sit to supine and roll side to side, using log roll technique, with MODIFIED INDEPENDENCE within 7 day(s). (2.)Ms. Anupama Carey will transfer from bed to chair and chair to bed with SUPERVISION using the least restrictive device within 7 treatment day(s). (3.)Ms. Anupama Carey will ambulate with SUPERVISION for 500 feet with the least restrictive device within 7 treatment day(s). (4.)Ms. Anupama Carey will verbalize 3/3 post-op spinal precautions with INDEPENDENCE within 7 day(s). (5.)Ms. Anupama Carey will participate in therapeutic activity/exerices x 23 minutes for increased strength within 7 days. ________________________________________________________________________________________________ PHYSICAL THERAPY: Daily Note, Treatment Day: Day of Assessment, PM 10/31/2018INPATIENT: Hospital Day: 2 [place] / Product Type: Medicare /   
Spinal precautions NAME/AGE/GENDER: Audrey Current is a 70 y.o. female PRIMARY DIAGNOSIS: Lumbar stenosis with neurogenic claudication [M48.062] <principal problem not specified> <principal problem not specified> Procedure(s) (LRB): 
L1-L4 SPINE LUMBAR LAMINECTOMY (N/A) 1 Day Post-Op ICD-10: Treatment Diagnosis:  
 · Generalized Muscle Weakness (M62.81) · History of falling (Z91.81) Precaution/Allergies: 
Latex; Metformin; Sulfa (sulfonamide antibiotics); Macrobid [nitrofurantoin monohyd/m-cryst]; Other plant, animal, environmental; Oxycodone; and Tape [adhesive] ASSESSMENT:  
Ms. Anupama Carey was supine in bed upon arrival and agreeable to PT. Pt performed log roll with supervision and STS with SBA/RW. Pt transferred to toilet with SBA before ambulating in hernandez. Pt was able to walk this PM without rest break.   She transferred to chair with SBA and was able to participated in seated TE. Pt given several rest breaks throughout due to fatigue. Pt with complaints of R knee \"giving way\" and she feel that maybe it was hyper-extended during surgery. Pt progressed in functional mobility and activity. This section established at most recent assessment PROBLEM LIST (Impairments causing functional limitations): 1. Decreased Strength 2. Decreased Ambulation Ability/Technique 3. Decreased Balance INTERVENTIONS PLANNED: (Benefits and precautions of physical therapy have been discussed with the patient.) 1. Balance Exercise 2. Bed Mobility 3. Gait Training 4. Therapeutic Activites 5. Therapeutic Exercise/Strengthening 6. Transfer Training TREATMENT PLAN: Frequency/Duration: twice daily for duration of hospital stay Rehabilitation Potential For Stated Goals: Excellent RECOMMENDED REHABILITATION/EQUIPMENT: (at time of discharge pending progress): Due to the probability of continued deficits (see above) this patient will not likely need continued skilled physical therapy after discharge. Equipment:  
? None at this time HISTORY:  
History of Present Injury/Illness (Reason for Referral): 
Se H&P Past Medical History/Comorbidities:  
Ms. Arti Crystal  has a past medical history of Adverse effect of anesthesia, Anemia, Arthritis, Chronic kidney disease, Chronic pain, Diabetes ([address] Follow-up visit for aortic valve replacement with bioprosthetic valve, GERD (gastroesophageal reflux disease), Hypertension, Kidney stones, Morbid obesity ([address] Murmur, cardiac, Nausea & vomiting, Psychiatric disorder, PUD (peptic ulcer disease), S/P aortic valve replacement with bioprosthetic valve, Spinal stenosis of lumbar region with neurogenic claudication, and Valvular heart disease.   Ms. Arti Crystal  has a past surgical history that includes hx gi; hx cervical fusion; hx bilateral salpingo-oophorectomy; hx heart valve surgery; hx heart catheterization; hx aortic valve replacement (6/9/2016); hx lap cholecystectomy; hx gastric bypass; hx hysterectomy; hx urological (Multiple dates); hx shoulder arthroscopy (Right); hx knee replacement (Right, 10/10/2016); hx knee replacement (Bilateral); L1-L4 SPINE LUMBAR LAMINECTOMY (N/A, 10/30/2018); LEFT KNEE ARTHROPLASTY TOTAL / LETTY (Left, 3/21/2017); RIGHT KNEE ARTHROPLASTY TOTAL (Right, 10/10/2016); ESOPHAGOGASTRODUODENOSCOPY (EGD) (N/A, 6/15/2016); COLONOSCOPY/ BMI 41  ROOM 2235 (N/A, 6/15/2016); ESOPHAGOGASTRODUODENAL (EGD) BIOPSY (N/A, 6/15/2016); ULTRASOUND (Bilateral, 6/11/2016); AORTIC VALVE REPLACEMENT (AVR)    (N/A, 6/9/2016); ESOPHAGEAL TRANS ECHOCARDIOGRAM (N/A, 6/9/2016); LITHOTRIPSY EXTRACORPORAL SHOCKWAVE ESWL LEFT (Left, 7/23/2015); LITHOTRIPSY EXTRACORPORAL SHOCKWAVE ESWL/ LEFT (Left, 6/26/2015); and CYSTOSCOPY/ LEFT RETROGRADE PYELOGRAM/ LEFT URETERAL STENT INSERTION (Left, 6/26/2015). Social History/Living Environment:  
Home Environment: Private residence # Steps to Enter: 0 One/Two Story Residence: One story Living Alone: No 
Support Systems: Child(josesito) Patient Expects to be Discharged to[de-identified] Private residence Current DME Used/Available at Home: Walker, rolling, Grab bars, Raised toilet seat, Shower chair Tub or Shower Type: Tub/Shower combination Prior Level of Function/Work/Activity: 
Pt lives in one story home with son and PTA pt was independent with ADLs and ambulation. PRN use of RW and recent fall reported. Number of Personal Factors/Comorbidities that affect the Plan of Care: 1-2: MODERATE COMPLEXITY EXAMINATION:  
Most Recent Physical Functioning:  
Gross Assessment: 
AROM: Generally decreased, functional(R shoulder flexion) Strength: Generally decreased, functional(B hip flexors) Sensation: Intact Posture: 
  
Balance: 
Sitting: Intact Standing: Impaired Standing - Static: Fair(+) Standing - Dynamic : Fair Bed Mobility: 
Rolling: Supervision Supine to Sit: Supervision Wheelchair Mobility: 
  
Transfers: 
Sit to Stand: Stand-by assistance Stand to Sit: Stand-by assistance Bed to Chair: Contact guard assistance;Stand-by assistance Interventions: Verbal cues; Safety awareness training Gait: 
  
Base of Support: Narrowed Distance (ft): 500 Feet (ft) Assistive Device: Walker, rolling Ambulation - Level of Assistance: Contact guard assistance;Stand-by assistance Body Structures Involved: 1. Nerves 2. Bones 3. Muscles Body Functions Affected: 1. Sensory/Pain 2. Neuromusculoskeletal 
3. Movement Related Activities and Participation Affected: 1. Mobility 2. Community, Social and [place] Number of elements that affect the Plan of Care: 4+: HIGH COMPLEXITY CLINICAL PRESENTATION:  
Presentation: Stable and uncomplicated: LOW COMPLEXITY CLINICAL DECISION MAKING:  
[facility] [employer] AM-PAC 6 Clicks Basic Mobility Inpatient Short Form How much difficulty does the patient currently have. .. Unable A Lot A Little None 1. Turning over in bed (including adjusting bedclothes, sheets and blankets)? [] 1   [] 2   [] 3   [x] 4  
2. Sitting down on and standing up from a chair with arms ( e.g., wheelchair, bedside commode, etc.)   [] 1   [] 2   [] 3   [x] 4  
3. Moving from lying on back to sitting on the side of the bed? [] 1   [] 2   [] 3   [x] 4 How much help from another person does the patient currently need. .. Total A Lot A Little None 4. Moving to and from a bed to a chair (including a wheelchair)? [] 1   [] 2   [x] 3   [] 4  
5. Need to walk in hospital room? [] 1   [] 2   [x] 3   [] 4  
6. Climbing 3-5 steps with a railing? [] 1   [] 2   [x] 3   [] 4  
© 2007, Trustees of [facility] [employer], under license to [employer]. All rights reserved Score:  Initial: 21 Most Recent: X (Date: -- ) Interpretation of Tool:  Represents activities that are increasingly more difficult (i.e. Bed mobility, Transfers, Gait). Score 24 23 22-20 19-15 14-10 9-7 6 Modifier CH CI CJ CK CL CM CN   
 
? Mobility - Walking and Moving Around:  
  - CURRENT STATUS: CJ - 20%-39% impaired, limited or restricted  - GOAL STATUS: CI - 1%-19% impaired, limited or restricted  - D/C STATUS:  ---------------To be determined--------------- [place] / [place] B / Product Type: Medicare /   
 
Medical Necessity:    
· Patient demonstrates good rehab potential due to higher previous functional level. Reason for Services/Other Comments: 
· Patient continues to require skilled intervention due to decreased balance and functional mobility. Use of outcome tool(s) and clinical judgement create a POC that gives a: Clear prediction of patient's progress: LOW COMPLEXITY  
  
 
 
 
TREATMENT:  
(In addition to Assessment/Re-Assessment sessions the following treatments were rendered) Pre-treatment Symptoms/Complaints:  Post op pain 
Pain: Initial:  
Pain Intensity 1: 4 Pain Location 1: Back Pain Orientation 1: Lower  Post Session:  4/10 Therapeutic Activity: (   10 minutes): Therapeutic activities including Chair transfers, toilet transfers, Ambulation on level ground and STS transfers to improve mobility, strength, balance and activity tolerance. Required minimal   to promote dynamic balance in standing and proper transfer techniques. Therapeutic Exercise: (13 Minutes):  Exercises per grid below to improve mobility, strength and activity tolerance. Required minimal visual and verbal cues to promote proper body alignment, promote proper body mechanics and promote proper body breathing techniques. Progressed repetitions and complexity of movement as indicated. Date: 
10/31/18 Date: 
 Date: 
  
ACTIVITY/EXERCISE AM PM AM PM AM PM  
Seated LAQ  3 x 20 L Seated marching  3 x 20 B Seated AP  2 x 20 R 
1 x 20 L Seated hip abd/add with knee ext  2 x 20 B Heel taps  1 x 20 L Toe taps  1 x 20 L       
        
B = bilateral; AA = active assistive; A = active; P = passive Braces/Orthotics/Lines/Etc:  
· drain hemovac Treatment/Session Assessment:   
· Response to Treatment:  Tolerated very well. · Interdisciplinary Collaboration:  
o Physical Therapist 
· After treatment position/precautions:  
o Up in chair 
o Bed/Chair-wheels locked 
o Bed in low position 
o Call light within reach · Compliance with Program/Exercises: Will assess as treatment progresses · Recommendations/Intent for next treatment session: \"Next visit will focus on advancements to more challenging activities and reduction in assistance provided\". Total Treatment Duration: PT Patient Time In/Time Out Time In: 8481 Time Out: 4185 Maureen Ventura, PT, DPT

## 2018-10-31 NOTE — PROGRESS NOTES
Problem: Self Care Deficits Care Plan (Adult) Goal: *Acute Goals and Plan of Care (Insert Text) 1. Patient will verbalize and demonstrate understanding of spinal precautions with 100% accuracy during ADLs. 2. Patient will complete lower body bathing and dressing with setup and adaptive equipment as needed. 3. Patient will complete functional transfers with supervision and adaptive equipment as needed. 4. Patient will complete toileting and toilet transfer with supervision. 5. Patient will complete functional mobility of household distances with supervision and adaptive equipment as needed. 6. Patient will demonstrate ability to log roll in bed with modified independence and no verbal cues from therapist.  
7. Patient will complete grooming in standing at sink with modified independence. Timeframe: 7 visits OCCUPATIONAL THERAPY: Initial Assessment, Daily Note and Treatment Day: 1st 10/31/2018INPATIENT: Hospital Day: 2 [place] / Product Type: Medicare /  
  
NAME/AGE/GENDER: Delma Roberts is a 70 y.o. female PRIMARY DIAGNOSIS:  Lumbar stenosis with neurogenic claudication [M48.062] <principal problem not specified> <principal problem not specified> Procedure(s) (LRB): 
L1-L4 SPINE LUMBAR LAMINECTOMY (N/A) 1 Day Post-Op ICD-10: Treatment Diagnosis:  
 · Low Back Pain (M54.5) Precautions/Allergies: 
  spinal precautions Latex; Metformin; Sulfa (sulfonamide antibiotics); Macrobid [nitrofurantoin monohyd/m-cryst]; Other plant, animal, environmental; Oxycodone; and Tape [adhesive] ASSESSMENT:  
Ms. Claudio Sweet is a 70year old female who is now s/p above procedure. At baseline she lives with son and is typically independent with ADLs, IADLs and driving. She is R hand dominant. She is ambulatory without assistive device normally but does admit to 1 fall in past 8 months.  Patient seated in chair upon arrival, agreeable to OT evaluation. Reports back pain 5/10. Educated patient on spinal precautions and log rolling technique. BUE assessment reveals ROM, strength are WFL except for R shoulder with limited ROM from previous surgery. Patient able to stand with minimal assistance and demonstrates fair-poor balance in standing. Treatment initiated to include ADL activity at sink (grooming in standing, upper body bathing in standing, lower body bathing in sitting and standing, upper body dressing in sitting, and lower body dressing in sitting/ standing. See below for assistance required. Required minimal cues to maintain spinal precautions during ADL. Introduced adaptive equipment including reacher, sock aid, and long handled shoe horn for LB dressing. Patient then coming functional mobility in room and log rolling for sit to supine with SBA and no cueing needed. Patient is currently functioning below her baseline and would benefit from continued OT to increase independence and safety. This section established at most recent assessment PROBLEM LIST (Impairments causing functional limitations): 1. Decreased Strength 2. Decreased ADL/Functional Activities 3. Decreased Transfer Abilities 4. Decreased Ambulation Ability/Technique 5. Decreased Balance 6. Increased Pain 7. Decreased Activity Tolerance 8. Decreased Flexibility/Joint Mobility 9. Decreased Knowledge of Precautions INTERVENTIONS PLANNED: (Benefits and precautions of occupational therapy have been discussed with the patient.) 1. Activities of daily living training 2. Adaptive equipment training 3. Therapeutic activity 4. Therapeutic exercise TREATMENT PLAN: Frequency/Duration: Follow patient 3x/ week to address above goals. Rehabilitation Potential For Stated Goals: Good RECOMMENDED REHABILITATION/EQUIPMENT: (at time of discharge pending progress): Due to the probability of continued deficits (see above) this patient will not likely need continued skilled occupational therapy after discharge. Equipment:  
? None at this time OCCUPATIONAL PROFILE AND HISTORY:  
History of Present Injury/Illness (Reason for Referral): 
Sp above procedure Past Medical History/Comorbidities:  
Ms. Erasto Gomes  has a past medical history of Adverse effect of anesthesia, Anemia, Arthritis, Chronic kidney disease, Chronic pain, Diabetes ([facility] [address] Follow-up visit for aortic valve replacement with bioprosthetic valve, GERD (gastroesophageal reflux disease), Hypertension, Kidney stones, Morbid obesity ([address] Murmur, cardiac, Nausea & vomiting, Psychiatric disorder, PUD (peptic ulcer disease), S/P aortic valve replacement with bioprosthetic valve, Spinal stenosis of lumbar region with neurogenic claudication, and Valvular heart disease. Ms. Erasto Goems  has a past surgical history that includes hx gi; hx cervical fusion; hx bilateral salpingo-oophorectomy; hx heart valve surgery; hx heart catheterization; hx aortic valve replacement (6/9/2016); hx lap cholecystectomy; hx gastric bypass; hx hysterectomy; hx urological (Multiple dates); hx shoulder arthroscopy (Right); hx knee replacement (Right, 10/10/2016); hx knee replacement (Bilateral); LEFT KNEE ARTHROPLASTY TOTAL / LETTY (Left, 3/21/2017); RIGHT KNEE ARTHROPLASTY TOTAL (Right, 10/10/2016); ESOPHAGOGASTRODUODENOSCOPY (EGD) (N/A, 6/15/2016); COLONOSCOPY/ BMI 41  ROOM 2235 (N/A, 6/15/2016); ESOPHAGOGASTRODUODENAL (EGD) BIOPSY (N/A, 6/15/2016); ULTRASOUND (Bilateral, 6/11/2016); AORTIC VALVE REPLACEMENT (AVR)    (N/A, 6/9/2016); ESOPHAGEAL TRANS ECHOCARDIOGRAM (N/A, 6/9/2016); LITHOTRIPSY EXTRACORPORAL SHOCKWAVE ESWL LEFT (Left, 7/23/2015); LITHOTRIPSY EXTRACORPORAL SHOCKWAVE ESWL/ LEFT (Left, 6/26/2015); and CYSTOSCOPY/ LEFT RETROGRADE PYELOGRAM/ LEFT URETERAL STENT INSERTION (Left, 6/26/2015). Social History/Living Environment:  
Home Environment: Private residence # Steps to Enter: 0 One/Two Story Residence: One story Living Alone: No 
Support Systems: Child(josesito) Patient Expects to be Discharged to[de-identified] Private residence Current DME Used/Available at Home: Walker, rolling, Grab bars, Raised toilet seat, Shower chair Tub or Shower Type: Tub/Shower combination Prior Level of Function/Work/Activity: At baseline she lives with son and is typically independent with ADLs, IADLs and driving. She is R hand dominant. She is ambulatory without assistive device normally but does admit to 1 fall in past 8 months. Number of Personal Factors/Comorbidities that affect the Plan of Care: Brief history (0):  LOW COMPLEXITY ASSESSMENT OF OCCUPATIONAL PERFORMANCE[de-identified]  
Activities of Daily Living:  
Basic ADLs (From Assessment) Complex ADLs (From Assessment) Feeding: Setup Oral Facial Hygiene/Grooming: Setup Bathing: Minimum assistance Upper Body Dressing: Setup Lower Body Dressing: Moderate assistance Toileting: Minimum assistance Instrumental ADL Meal Preparation: Maximum assistance Homemaking: Maximum assistance Medication Management: Independent Financial Management: Independent Grooming/Bathing/Dressing Activities of Daily Living Grooming Grooming Assistance: Supervision/set up(in standing at sink) Washing Face: Supervision/set-up Washing Hands: Supervision/set-up Cognitive Retraining Safety/Judgement: Awareness of environment; Fall prevention; Insight into deficits Upper Body Bathing Bathing Assistance: Supervision/set-up Position Performed: Standing(@ sink) Lower Body Bathing Bathing Assistance: Contact guard assistance Perineal  : Contact guard assistance Position Performed: Standing Lower Body : Stand-by assistance Upper Body Dressing Assistance Dressing Assistance: Supervision/set-up Hospital Gown: Supervision/ set-up Pullover Shirt: Supervision/set-up Lower Body Dressing Assistance Underpants: Minimum assistance Pants With Elastic Waist: Moderate assistance Leg Crossed Method Used: No(unable due to history of TKA) Position Performed: Seated in chair;Standing Cues: Don;Doff;Verbal cues provided;Physical assistance;Visual cues provided Adaptive Equipment Used: Reacher;Sock aid; Walker Bed/Mat Mobility Rolling: Stand-by assistance Supine to Sit: Stand-by assistance Sit to Supine: Stand-by assistance Sit to Stand: Minimum assistance Bed to Chair: Contact guard assistance Scooting: Contact guard assistance Most Recent Physical Functioning:  
Gross Assessment: 
AROM: Within functional limits(BUE except limited R shoulder ROM) Strength: Within functional limits(BUE) Posture: 
  
Balance: 
Sitting: Intact Standing: Impaired Standing - Static: Fair Standing - Dynamic : Fair Bed Mobility: 
Rolling: Stand-by assistance Supine to Sit: Stand-by assistance Sit to Supine: Stand-by assistance Scooting: Contact guard assistance Wheelchair Mobility: 
  
Transfers: 
Sit to Stand: Minimum assistance Stand to Sit: Minimum assistance Bed to Chair: Contact guard assistance Patient Vitals for the past 6 hrs: 
 BP BP Patient Position SpO2 Pulse 10/31/18 0749 174/81 Sitting 97 % 66  
10/31/18 1120 158/63 Sitting 98 % 69 Mental Status Neurologic State: Alert Orientation Level: Oriented X4 Cognition: Follows commands Perception: Appears intact Perseveration: No perseveration noted Safety/Judgement: Awareness of environment, Fall prevention, Insight into deficits Physical Skills Involved: 
1. Balance 2. Strength 3. Activity Tolerance 4. Pain (acute) Cognitive Skills Affected (resulting in the inability to perform in a timely and safe manner): 1. None Psychosocial Skills Affected: 1. Habits/Routines 2. Environmental Adaptation Number of elements that affect the Plan of Care: 5+:  HIGH COMPLEXITY CLINICAL DECISION MAKING:  
 Ginger Daily Activity Inpatient Short Form How much help from another person does the patient currently need. .. Total A Lot A Little None 1. Putting on and taking off regular lower body clothing? [] 1   [x] 2   [] 3   [] 4  
2. Bathing (including washing, rinsing, drying)? [] 1   [x] 2   [] 3   [] 4  
3. Toileting, which includes using toilet, bedpan or urinal?   [] 1   [] 2   [x] 3   [] 4  
4. Putting on and taking off regular upper body clothing? [] 1   [] 2   [] 3   [x] 4  
5. Taking care of personal grooming such as brushing teeth? [] 1   [] 2   [] 3   [x] 4  
6. Eating meals? [] 1   [] 2   [] 3   [x] 4  
© 2007, Trustees of [facility] [employer], under license to [employer]. All rights reserved Score:  Initial: 19 Most Recent: X (Date: -- ) Interpretation of Tool:  Represents activities that are increasingly more difficult (i.e. Bed mobility, Transfers, Gait). Score 24 23 22-20 19-15 14-10 9-7 6 Modifier CH CI CJ CK CL CM CN   
 
? Self Care:  
  - CURRENT STATUS: CK - 40%-59% impaired, limited or restricted  - GOAL STATUS: CJ - 20%-39% impaired, limited or restricted  - D/C STATUS:  ---------------To be determined--------------- [place] / [place] / Product Type: Medicare /   
 
Medical Necessity:    
· Patient demonstrates good rehab potential due to higher previous functional level. Reason for Services/Other Comments: 
· Patient continues to require present interventions due to patient's inability to care for self at baseline level of function. Use of outcome tool(s) and clinical judgement create a POC that gives a: MODERATE COMPLEXITY  
 
 
 
TREATMENT:  
(In addition to Assessment/Re-Assessment sessions the following treatments were rendered) Pre-treatment Symptoms/Complaints:   
Pain: Initial:  
Pain Intensity 1: 5(5 before therapy, 4/10 after) Pain Location 1: Back  Post Session:  4 Self Care: (20): Procedure(s) (per grid) utilized to improve and/or restore self-care/home management as related to upper and lower body dressing, bathing and grooming. Required minimal verbal cueing to facilitate activities of daily living skills, compensatory activities and to maintain spinal precautions. Therapeutic Activity: (    8 minutes): Therapeutic activities including Bed transfers, Chair transfers, Ambulation on level ground and log rolling in bed to improve mobility, balance and activity tolerance. Required minimal   to promote dynamic balance in standing. Braces/Orthotics/Lines/Etc:  
· drain · O2 Device: Nasal cannula Treatment/Session Assessment:   
· Response to Treatment:  Tolerated well; motivated to participate · Interdisciplinary Collaboration:  
o Occupational Therapist 
o Registered Nurse · After treatment position/precautions:  
o Supine in bed 
o Bed/Chair-wheels locked 
o Bed in low position 
o Call light within reach 
o RN notified 
o Family at bedside · Compliance with Program/Exercises: Compliant all of the time. · Recommendations/Intent for next treatment session: \"Next visit will focus on advancements to more challenging activities and reduction in assistance provided\". Total Treatment Duration: OT Patient Time In/Time Out Time In: 8174 Time Out: 1020 Rossana Pinedo OTR/L

## 2018-10-31 NOTE — PROGRESS NOTES
ORTHO PROGRESS NOTE 2018 Admit Date: 10/30/2018 Admit Diagnosis: Lumbar stenosis with neurogenic claudication [M48.062] Post Op day: 1 Day Post-Op Subjective:  
 
Alma Eric is a patient who is now 1 Day Post-Op  and has complaints  of legs \"feeling like jelly\" when she stood last night. .  
 
 
Objective:  
 
PT/OT:  
  
 
Vital Signs:   
Patient Vitals for the past 8 hrs: 
 BP Temp Pulse Resp SpO2  
10/31/18 0749 174/81 98.4 °F (36.9 °C) 66 14 97 % 10/31/18 0449 175/71 97.5 °F (36.4 °C) 70 20 98 % Temp (24hrs), Av.1 °F (36.7 °C), Min:97.5 °F (36.4 °C), Max:98.6 °F (37 °C) LAB:   
No results for input(s): HGB, WBC, PLT, HGBEXT, PLTEXT in the last 72 hours. I/O: 
No intake/output data recorded. 10/29 1901 - 10/31 0700 In: 1560 [P.O.:360; I.V.:1200] Out: 1300 [Urine:375; Drains:125] Physical Exam: 
 
Awake and in no acute distress. Mood and affect appropriate. Respirations unlabored and no evidence cyanosis. Calves nontender. Abdomen soft and nontender. Dressing clean/dry No new neurologic deficit. LE strength is intact and 5/5 Assessment:  
  
Patient Active Problem List  
Diagnosis Code  Essential hypertension, benign I10  
 Arthritis M19.90  GERD (gastroesophageal reflux disease) K21.9  Nocturnal hypoxemia G47.34  
 Mixed hyperlipidemia E78.2  Morbid obesity ([facility]) E66.01  
 Vitamin B12 deficiency E53.8  Vitamin D deficiency E55.9  Impaired fasting blood sugar R73.01  
 Carotid artery stenosis without cerebral infarction I65.29  
 Aortic valve stenosis I35.0  Pulmonary hypertension ([facility]) I27.20  Atelectasis J98.11  
 CKD (chronic kidney disease) stage 3, GFR 30-59 ml/min ([facility]) N18.3  Chronic anemia D64.9  Chronic pain G89.29  
 Fibromyalgia M79.7  Full dentures Z97.2, N04.035  Iron deficiency anemia D50.9  Gastritis K29.70  Gastric ulcer K25.9  Chronic combined systolic and diastolic congestive heart failure (HCC) I50.42  Duodenal ulcer K26.9  Paroxysmal atrial fibrillation (HCC) I48.0  
 OA (osteoarthritis) of knee M17.10  Chronic gout of multiple sites M1A. 59X4  Primary osteoarthritis of left knee M17.12  
 S/P aortic valve replacement with bioprosthetic valve Z95.3  Neck pain M54.2  DDD (degenerative disc disease), cervical M50.30  Cervical radiculopathy M54.12  
 Cervical spondylosis without myelopathy M47.812  Primary insomnia F51.01  
 Recurrent UTI N39.0  Renal stone N20.0  Spinal stenosis of lumbar region with neurogenic claudication M48.062  
 History of cervical spinal surgery Z98.890  Lumbar stenosis with neurogenic claudication M48.062  
 
 
1 Day Post-Op STATUS POST Procedure(s): 
L1-L4 SPINE LUMBAR LAMINECTOMY Plan:  
 
Continue PT/OT/Rehab Discontinue:   
Consult: none Anticipate discharge to: HOME Signed By: Liliana Zapata NP

## 2018-10-31 NOTE — PROGRESS NOTES
Spiritual Care Visit, initial visit. Visited with patient at bedside. Prayed for patient's healing and health. Visit by Jaz Lundberg, Staff .  Neli., [place].SHAMIR., B.A.

## 2018-10-31 NOTE — PROGRESS NOTES
Chart screened by  for discharge planning. No needs identified at this time. Please consult  if any new issues arise.

## 2018-10-31 NOTE — PROGRESS NOTES
Problem: Mobility Impaired (Adult and Pediatric) Goal: *Acute Goals and Plan of Care (Insert Text) LTG: 
(1.)Ms. Mango Claudio will move from supine to sit and sit to supine and roll side to side, using log roll technique, with MODIFIED INDEPENDENCE within 7 day(s). (2.)Ms. Mango Claudio will transfer from bed to chair and chair to bed with SUPERVISION using the least restrictive device within 7 treatment day(s). (3.)Ms. Mango Claudio will ambulate with SUPERVISION for 500 feet with the least restrictive device within 7 treatment day(s). (4.)Ms. Mango Claudio will verbalize 3/3 post-op spinal precautions with INDEPENDENCE within 7 day(s). (5.)Ms. Mango Claudio will participate in therapeutic activity/exerices x 23 minutes for increased strength within 7 days. ________________________________________________________________________________________________ PHYSICAL THERAPY: Initial Assessment, Treatment Day: Day of Assessment, AM 10/31/2018OBSERVATION: Hospital Day: 2 [place] / Product Type: Medicare /   
Spinal precautions NAME/AGE/GENDER: Franklyn Walls is a 70 y.o. female PRIMARY DIAGNOSIS: Lumbar stenosis with neurogenic claudication [M48.062] <principal problem not specified> <principal problem not specified> Procedure(s) (LRB): 
L1-L4 SPINE LUMBAR LAMINECTOMY (N/A) 1 Day Post-Op ICD-10: Treatment Diagnosis:  
 · Generalized Muscle Weakness (M62.81) · History of falling (Z91.81) Precaution/Allergies: 
Latex; Metformin; Sulfa (sulfonamide antibiotics); Macrobid [nitrofurantoin monohyd/m-cryst]; Other plant, animal, environmental; Oxycodone; and Tape [adhesive] ASSESSMENT:  
Ms. Mango Claudio is a 70 y.o. female in the hospital for the above who was supine in bed upon arrival.  Pt reports that she lives in a one story house with her son that has 0 steps to enter.   Pt also reported that PTA she was independent with ADLs and ambulated with mostly independence (PRN use of RW). Pt mentioned history of vestibular disorder that sometimes causes dizziness. Pt admitted to at least one recent falls in the past year. Ms. Dominik Seaman presents to PT with [facility] [place] [facility] and decreased strength in B hip flexors. Pt also presents with intact sensation in B LEs. Pt educated on post op spinal precautions and log roll technique. During evaluation pt performed bed mobility with SBA and good sitting balance. Pt also stood with RW and CGA demonstrating fair standing balance. She ambulated with slightly narrowed MARIE. Ms. Dominik Seaman could benefit from skilled PT as she is currently functioning below her baseline. In addition to evaluation pt received treatment consisting of therapeutic activity. She ambulated [facility] [place] [facility] [address] and one standing rest break. Pt also transferred to chair with CGA/RW. She demonstrated ability to stand from chair as well with use of RW and SBA. Pt benefited from treatment because she learned proper transfer techniques and gait training with AD. This section established at most recent assessment PROBLEM LIST (Impairments causing functional limitations): 1. Decreased Strength 2. Decreased Ambulation Ability/Technique 3. Decreased Balance INTERVENTIONS PLANNED: (Benefits and precautions of physical therapy have been discussed with the patient.) 1. Balance Exercise 2. Bed Mobility 3. Gait Training 4. Therapeutic Activites 5. Therapeutic Exercise/Strengthening 6. Transfer Training TREATMENT PLAN: Frequency/Duration: twice daily for duration of hospital stay Rehabilitation Potential For Stated Goals: Excellent RECOMMENDED REHABILITATION/EQUIPMENT: (at time of discharge pending progress): Due to the probability of continued deficits (see above) this patient will not likely need continued skilled physical therapy after discharge. Equipment:  
? None at this time HISTORY:  
History of Present Injury/Illness (Reason for Referral): 
 Se H&P Past Medical History/Comorbidities:  
Ms. Claudio Sweet  has a past medical history of Adverse effect of anesthesia, Anemia, Arthritis, Chronic kidney disease, Chronic pain, Diabetes ([address] Follow-up visit for aortic valve replacement with bioprosthetic valve, GERD (gastroesophageal reflux disease), Hypertension, Kidney stones, Morbid obesity ([address] Murmur, cardiac, Nausea & vomiting, Psychiatric disorder, PUD (peptic ulcer disease), S/P aortic valve replacement with bioprosthetic valve, Spinal stenosis of lumbar region with neurogenic claudication, and Valvular heart disease. Ms. Claudio Sweet  has a past surgical history that includes hx gi; hx cervical fusion; hx bilateral salpingo-oophorectomy; hx heart valve surgery; hx heart catheterization; hx aortic valve replacement (6/9/2016); hx lap cholecystectomy; hx gastric bypass; hx hysterectomy; hx urological (Multiple dates); hx shoulder arthroscopy (Right); hx knee replacement (Right, 10/10/2016); hx knee replacement (Bilateral); LEFT KNEE ARTHROPLASTY TOTAL / LETTY (Left, 3/21/2017); RIGHT KNEE ARTHROPLASTY TOTAL (Right, 10/10/2016); ESOPHAGOGASTRODUODENOSCOPY (EGD) (N/A, 6/15/2016); COLONOSCOPY/ BMI 41  ROOM 2235 (N/A, 6/15/2016); ESOPHAGOGASTRODUODENAL (EGD) BIOPSY (N/A, 6/15/2016); ULTRASOUND (Bilateral, 6/11/2016); AORTIC VALVE REPLACEMENT (AVR)    (N/A, 6/9/2016); ESOPHAGEAL TRANS ECHOCARDIOGRAM (N/A, 6/9/2016); LITHOTRIPSY EXTRACORPORAL SHOCKWAVE ESWL LEFT (Left, 7/23/2015); LITHOTRIPSY EXTRACORPORAL SHOCKWAVE ESWL/ LEFT (Left, 6/26/2015); and CYSTOSCOPY/ LEFT RETROGRADE PYELOGRAM/ LEFT URETERAL STENT INSERTION (Left, 6/26/2015). Social History/Living Environment:  
Home Environment: Private residence # Steps to Enter: 0 One/Two Story Residence: One story Living Alone: No 
Support Systems: Child(josesito) Patient Expects to be Discharged to[de-identified] Private residence Current DME Used/Available at Home: Walker, rolling, Grab bars, Raised toilet seat, Shower chair Tub or Shower Type: Tub/Shower combination Prior Level of Function/Work/Activity: 
Pt lives in one story home with son and PTA pt was independent with ADLs and ambulation. PRN use of RW and recent fall reported. Number of Personal Factors/Comorbidities that affect the Plan of Care: 1-2: MODERATE COMPLEXITY EXAMINATION:  
Most Recent Physical Functioning:  
Gross Assessment: 
AROM: Generally decreased, functional(R shoulder flexion) Strength: Generally decreased, functional(B hip flexors) Sensation: Intact Posture: 
  
Balance: 
Sitting: Intact Standing: Impaired Standing - Static: Fair Standing - Dynamic : Fair Bed Mobility: 
Rolling: Stand-by assistance Supine to Sit: Stand-by assistance Wheelchair Mobility: 
  
Transfers: 
Sit to Stand: Contact guard assistance Stand to Sit: Contact guard assistance Bed to Chair: Contact guard assistance Gait: 
  
Base of Support: Narrowed Distance (ft): 500 Feet (ft) Assistive Device: Walker, rolling Ambulation - Level of Assistance: Contact guard assistance Body Structures Involved: 1. Nerves 2. Bones 3. Muscles Body Functions Affected: 1. Sensory/Pain 2. Neuromusculoskeletal 
3. Movement Related Activities and Participation Affected: 1. Mobility 2. Community, Social and [place] Number of elements that affect the Plan of Care: 4+: HIGH COMPLEXITY CLINICAL PRESENTATION:  
Presentation: Stable and uncomplicated: LOW COMPLEXITY CLINICAL DECISION MAKIN \[facility]\"" Box [id number] AM-PAC 6 Clicks Basic Mobility Inpatient Short Form How much difficulty does the patient currently have. .. Unable A Lot A Little None 1. Turning over in bed (including adjusting bedclothes, sheets and blankets)? [] 1   [] 2   [] 3   [x] 4  
2. Sitting down on and standing up from a chair with arms ( e.g., wheelchair, bedside commode, etc.)   [] 1   [] 2   [] 3   [x] 4  
3. Moving from lying on back to sitting on the side of the bed?    [] 1 [] 2   [] 3   [x] 4 How much help from another person does the patient currently need. .. Total A Lot A Little None 4. Moving to and from a bed to a chair (including a wheelchair)? [] 1   [] 2   [x] 3   [] 4  
5. Need to walk in hospital room? [] 1   [] 2   [x] 3   [] 4  
6. Climbing 3-5 steps with a railing? [] 1   [] 2   [x] 3   [] 4  
© 2007, Trustees of [facility] [employer], under license to [employer]. All rights reserved Score:  Initial: 21 Most Recent: X (Date: -- ) Interpretation of Tool:  Represents activities that are increasingly more difficult (i.e. Bed mobility, Transfers, Gait). Score 24 23 22-20 19-15 14-10 9-7 6 Modifier CH CI CJ CK CL CM CN   
 
? Mobility - Walking and Moving Around:  
  - CURRENT STATUS: CJ - 20%-39% impaired, limited or restricted  - GOAL STATUS: CI - 1%-19% impaired, limited or restricted  - D/C STATUS:  ---------------To be determined--------------- [place] / [place] / Product Type: Medicare /   
 
Medical Necessity:    
· Patient demonstrates good rehab potential due to higher previous functional level. Reason for Services/Other Comments: 
· Patient continues to require skilled intervention due to decreased balance and functional mobility. Use of outcome tool(s) and clinical judgement create a POC that gives a: Clear prediction of patient's progress: LOW COMPLEXITY  
  
 
 
 
TREATMENT:  
(In addition to Assessment/Re-Assessment sessions the following treatments were rendered) Pre-treatment Symptoms/Complaints:  Post op pain 
Pain: Initial:  
Pain Intensity 1: 6 Pain Location 1: Back Pain Orientation 1: Lower  Post Session:  5/10 Therapeutic Activity: (    8 minutes): Therapeutic activities including Chair transfers, Ambulation on level ground and STS transfers to improve mobility, strength and balance.   Required minimal   to promote static and dynamic balance in standing and proper transfer techniques. Braces/Orthotics/Lines/Etc:  
· drain hemovac · O2 Device: Nasal cannula Treatment/Session Assessment:   
· Response to Treatment:  Tolerated very well. · Interdisciplinary Collaboration:  
o Physical Therapist 
o Registered Nurse · After treatment position/precautions:  
o Up in chair 
o Bed/Chair-wheels locked 
o Bed in low position 
o Call light within reach 
o RN notified 
o Family at bedside · Compliance with Program/Exercises: Will assess as treatment progresses · Recommendations/Intent for next treatment session: \"Next visit will focus on advancements to more challenging activities and reduction in assistance provided\". Total Treatment Duration: PT Patient Time In/Time Out Time In: 4563 Time Out: 6821 Minnie Nick, PT, DPT

## 2018-10-31 NOTE — PHYSICIAN ADVISORY
Letter of Status Determination:  
Recommend hospitalization status upgraded from OBSERVATION  to INPATIENT  Status Pt Name:  Troy Winter MR#  
NITISH # [id number] / 
[id number] [place] / Plan: [facility] MEDICARE PART A AND B / Product Type: Medicare /   
VIN#  [id number] [facility]  707/01  @ [address] [place] [address] Hospitalization date  10/30/2018  6:42 AM  
Current Attending Physician  Christian Menjivar, * Principal diagnosis  <principal problem not specified>  
Lumbar stenosis with neurogenic claudication Clinicals  70 y.o. y.o  female hospitalized with above diagnosis This pt went through planned spine surgery without any complications. She is recovering slowly. It is noted that this pt suffers from complex chronic multiple medical illnesses. It is expected that due to her complex medical history and slow recovery she will need to stay in hospital > 2 midnights for appropriate care. [place] ([facility] [place]) criteria Does  NOT apply STATUS DETERMINATION  This patient is at high risk of adverse events and deterioration based on documented clinical data, comorbid conditions and current acute care course. Ms. Troy Winter is expected to meet Inpatient Admission status criteria in accordance with CMS regulation Section 43 .3. Specifically, due to medical necessity the patient's stay is expected to exceed Two Midnights. It is our recommendation that this patient's hospitalization status should be upgraded from  OBSERVATION to INPATIENT status. The final decision of the patient's hospitalization status depends on the attending physician's judgment. Additional comments [place] / [place] / Product Type: Medicare /   
  
 
Rica Russell MD MPH FAC Cell: [phone] Physician Advisor  [address] [facility] [address] Management [facility] 
 [address] [facility]  
President Medical Staff, [address] [facility]   
Cell  [phone]   
 
 
[phone] Inez Zhang

## 2018-11-01 VITALS
HEART RATE: 58 BPM | HEIGHT: 65 IN | SYSTOLIC BLOOD PRESSURE: 110 MMHG | RESPIRATION RATE: 22 BRPM | DIASTOLIC BLOOD PRESSURE: 56 MMHG | WEIGHT: 235.7 LBS | OXYGEN SATURATION: 96 % | BODY MASS INDEX: 39.27 KG/M2 | TEMPERATURE: 97.7 F

## 2018-11-01 PROCEDURE — 74011250637 HC RX REV CODE- 250/637: Performed by: ORTHOPAEDIC SURGERY

## 2018-11-01 PROCEDURE — 97110 THERAPEUTIC EXERCISES: CPT

## 2018-11-01 PROCEDURE — 97530 THERAPEUTIC ACTIVITIES: CPT

## 2018-11-01 RX ADMIN — AMLODIPINE BESYLATE 5 MG: 5 TABLET ORAL at 09:00

## 2018-11-01 RX ADMIN — DULOXETINE 30 MG: 30 CAPSULE, DELAYED RELEASE ORAL at 07:45

## 2018-11-01 RX ADMIN — HYDROCODONE BITARTRATE AND ACETAMINOPHEN 1 TABLET: 5; 325 TABLET ORAL at 04:19

## 2018-11-01 RX ADMIN — FEBUXOSTAT 40 MG: 40 TABLET, FILM COATED ORAL at 07:45

## 2018-11-01 RX ADMIN — FUROSEMIDE 20 MG: 20 TABLET ORAL at 07:45

## 2018-11-01 RX ADMIN — OLMESARTAN MEDOXOMIL 40 MG: 40 TABLET, FILM COATED ORAL at 07:45

## 2018-11-01 RX ADMIN — CARVEDILOL 25 MG: 25 TABLET, FILM COATED ORAL at 07:45

## 2018-11-01 RX ADMIN — Medication 10 ML: at 05:01

## 2018-11-01 RX ADMIN — POTASSIUM CHLORIDE 10 MEQ: 10 TABLET, EXTENDED RELEASE ORAL at 07:45

## 2018-11-01 RX ADMIN — ACETAMINOPHEN 650 MG: 325 TABLET, FILM COATED ORAL at 05:00

## 2018-11-01 NOTE — PROGRESS NOTES
Pt's D/C instructions completed. Verbalized understanding of all instructions including diet, activity, s/sx to alert MD, medications, wound care, and f/u appointment. Family at [facility].

## 2018-11-01 NOTE — PROGRESS NOTES
MCR patient status was changed from OBS to IP. Important Letter to Medicare was signed by patient's son and put in patient's chart; patient's son was provided with copy of Important Letter to Medicare. Patient's Care Managers notified.

## 2018-11-01 NOTE — DISCHARGE INSTRUCTIONS
MAY shower NO tub bathing    LEAVE dressing on incision for 3 DAYS-->then may remove   (If dressing starts to fall off may re-secure with tape)    NO lifting anything heavier than 5LBS     NO Bending, Lifting or Twisting    NO driving until directed by your doctor    Avoid sitting more than 20 - 30 minutes at a time    DO NOT take any NSAIDS (either prescribed or over the counter until directed    (Aleve, Ibuprofen, Mobic, etc) as this will interfere with bone healing    CALL the doctor if:  Fever >100.5  ([phone])                 Incision becomes red, swollen or  opens up               Incision has yellow, thick drainage or an odor               Pain is not managed with prescribed medications               Excessive nausea and/or vomiting    Avoid having pets sleep in bed with you until incision is completely healed          DISCHARGE SUMMARY from Nurse    PATIENT INSTRUCTIONS:    After general anesthesia or intravenous sedation, for 24 hours or while taking prescription Narcotics:  · Limit your activities  · Do not drive and operate hazardous machinery  · Do not make important personal or business decisions  · Do  not drink alcoholic beverages  · If you have not urinated within 8 hours after discharge, please contact your surgeon on call. Report the following to your surgeon:  · Excessive pain, swelling, redness or odor of or around the surgical area  · Temperature over 100.5  · Nausea and vomiting lasting longer than 4 hours or if unable to take medications  · Any signs of decreased circulation or nerve impairment to extremity: change in color, persistent  numbness, tingling, coldness or increase pain  · Any questions    What to do at Home:  Recommended activity: see discharge instructions  If you experience any of the following symptoms see discharg einstructions, please follow up with surgeon. *  Please give a list of your current medications to your Primary Care Provider.     *  Please update this list whenever your medications are discontinued, doses are      changed, or new medications (including over-the-counter products) are added. *  Please carry medication information at all times in case of emergency situations. These are general instructions for a healthy lifestyle:    No smoking/ No tobacco products/ Avoid exposure to second hand smoke  Surgeon General's Warning:  Quitting smoking now greatly reduces serious risk to your health. Obesity, smoking, and sedentary lifestyle greatly increases your risk for illness    A healthy diet, regular physical exercise & weight monitoring are important for maintaining a healthy lifestyle    You may be retaining fluid if you have a history of heart failure or if you experience any of the following symptoms:  Weight gain of 3 pounds or more overnight or 5 pounds in a week, increased swelling in our hands or feet or shortness of breath while lying flat in bed. Please call your doctor as soon as you notice any of these symptoms; do not wait until your next office visit. Recognize signs and symptoms of STROKE:    F-face looks uneven    A-arms unable to move or move unevenly    S-speech slurred or non-existent    T-time-call 911 as soon as signs and symptoms begin-DO NOT go       Back to bed or wait to see if you get better-TIME IS BRAIN. Warning Signs of HEART ATTACK     Call 911 if you have these symptoms:   Chest discomfort. Most heart attacks involve discomfort in the center of the chest that lasts more than a few minutes, or that goes away and comes back. It can feel like uncomfortable pressure, squeezing, fullness, or pain.  Discomfort in other areas of the upper body. Symptoms can include pain or discomfort in one or both arms, the back, neck, jaw, or stomach.  Shortness of breath with or without chest discomfort.  Other signs may include breaking out in a cold sweat, nausea, or lightheadedness.   Don't wait more than five minutes to call 911 - MINUTES MATTER! Fast action can save your life. Calling 911 is almost always the fastest way to get lifesaving treatment. Emergency Medical Services staff can begin treatment when they arrive -- up to an hour sooner than if someone gets to the hospital by car. The discharge information has been reviewed with the patient. The patient verbalized understanding. Discharge medications reviewed with the patient and appropriate educational materials and side effects teaching were provided.   ___________________________________________________________________________________________________________________________________

## 2018-11-01 NOTE — PROGRESS NOTES
ORTHO PROGRESS NOTE 2018 Admit Date: 10/30/2018 Admit Diagnosis: Lumbar stenosis with neurogenic claudication [M48.062] Post Op day: 2 Days Post-Op Subjective:  
 
Judit Campos is a patient who is now 2 Days Post-Op  and has no complaints. Objective:  
 
PT/OT: satisfactory Vital Signs:   
Patient Vitals for the past 8 hrs: 
 BP Temp Pulse Resp SpO2  
18 0806 110/56 97.7 °F (36.5 °C) (!) 58 22   
18 0409 158/79 97.4 °F (36.3 °C) (!) 56 20 96 % Temp (24hrs), Av.7 °F (36.5 °C), Min:97.4 °F (36.3 °C), Max:98 °F (36.7 °C) LAB:   
No results for input(s): HGB, WBC, PLT, HGBEXT, PLTEXT in the last 72 hours. I/O: 
No intake/output data recorded. 10/30 1901 -  0700 In: -  
Out: 455 [Urine:300; Drains:155] Physical Exam: 
 
Awake and in no acute distress. Mood and affect appropriate. Respirations unlabored and no evidence cyanosis. Calves nontender. Abdomen soft and nontender. Dressing clean/dry No new neurologic deficit. Assessment:  
  
Patient Active Problem List  
Diagnosis Code  Essential hypertension, benign I10  
 Arthritis M19.90  GERD (gastroesophageal reflux disease) K21.9  Nocturnal hypoxemia G47.34  
 Mixed hyperlipidemia E78.2  Morbid obesity (HCC) E66.01  
 Vitamin B12 deficiency E53.8  Vitamin D deficiency E55.9  Impaired fasting blood sugar R73.01  
 Carotid artery stenosis without cerebral infarction I65.29  
 Aortic valve stenosis I35.0  Pulmonary hypertension ([facility]) I27.20  Atelectasis J98.11  
 CKD (chronic kidney disease) stage 3, GFR 30-59 ml/min ([facility]) N18.3  Chronic anemia D64.9  Chronic pain G89.29  
 Fibromyalgia M79.7  Full dentures Z97.2, T31.466  Iron deficiency anemia D50.9  Gastritis K29.70  Gastric ulcer K25.9  Chronic combined systolic and diastolic congestive heart failure (HCC) I50.42  Duodenal ulcer K26.9  Paroxysmal atrial fibrillation (HCC) I48.0  
 OA (osteoarthritis) of knee M17.10  Chronic gout of multiple sites M1A. 32Y4  Primary osteoarthritis of left knee M17.12  
 S/P aortic valve replacement with bioprosthetic valve Z95.3  Neck pain M54.2  DDD (degenerative disc disease), cervical M50.30  Cervical radiculopathy M54.12  
 Cervical spondylosis without myelopathy M47.812  Primary insomnia F51.01  
 Recurrent UTI N39.0  Renal stone N20.0  Spinal stenosis of lumbar region with neurogenic claudication M48.062  
 History of cervical spinal surgery Z98.890  Lumbar stenosis with neurogenic claudication M48.062  
 
 
2 Days Post-Op STATUS POST Procedure(s): 
L1-L4 SPINE LUMBAR LAMINECTOMY Plan:  
 
Continue PT/OT/Rehab Discontinue: IV, daily dressing change and drain Consult: none Anticipate discharge to: HOME today Signed By: Monica Jacome NP 
 
 
 
 72.6

## 2018-11-01 NOTE — PROGRESS NOTES
Problem: Mobility Impaired (Adult and Pediatric) Goal: *Acute Goals and Plan of Care (Insert Text) LTG: 
(1.)Ms. Mell Iraheta will move from supine to sit and sit to supine and roll side to side, using log roll technique, with MODIFIED INDEPENDENCE within 7 day(s). (2.)Ms. Mell Iraheta will transfer from bed to chair and chair to bed with SUPERVISION using the least restrictive device within 7 treatment day(s). (3.)Ms. Mell Iraheta will ambulate with SUPERVISION for 500 feet with the least restrictive device within 7 treatment day(s). (4.)Ms. Mell Iraheta will verbalize 3/3 post-op spinal precautions with INDEPENDENCE within 7 day(s). GOAL MET 11/1/18 
(5.)Ms. Mell Iraheta will participate in therapeutic activity/exerices x 23 minutes for increased strength within 7 days. ________________________________________________________________________________________________ PHYSICAL THERAPY: Daily Note, Treatment Day: 1st, AM 11/1/2018INPATIENT: Hospital Day: 3 [place] / Product Type: Medicare /   
Spinal precautions NAME/AGE/GENDER: Artem Kowalski is a 70 y.o. female PRIMARY DIAGNOSIS: Lumbar stenosis with neurogenic claudication [M48.062] <principal problem not specified> <principal problem not specified> Procedure(s) (LRB): 
L1-L4 SPINE LUMBAR LAMINECTOMY (N/A) 2 Days Post-Op ICD-10: Treatment Diagnosis:  
 · Generalized Muscle Weakness (M62.81) · History of falling (Z91.81) Precaution/Allergies: 
Latex; Metformin; Sulfa (sulfonamide antibiotics); Macrobid [nitrofurantoin monohyd/m-cryst]; Other plant, animal, environmental; Oxycodone; and Tape [adhesive] ASSESSMENT:  
Ms. Anola Menghini was sitting EOB with son at bedside. Patient was stby A for all transfers from various heights. Patient ambulated into the hernandez for 500' with RW and CGA with verbal cueing for posture training.  Patient returned to room and sat upright in her recliner and completed 1x15 LE strengthening exercises. Patient was given a HEP to strengthen her legs and aid in her R knee \"giving away\". Patient verbalized an understanding of spinal precautions and of her HEP. Patient voiced no concerns and met the following goals above in red. Will continue efforts. This section established at most recent assessment PROBLEM LIST (Impairments causing functional limitations): 1. Decreased Strength 2. Decreased Ambulation Ability/Technique 3. Decreased Balance INTERVENTIONS PLANNED: (Benefits and precautions of physical therapy have been discussed with the patient.) 1. Balance Exercise 2. Bed Mobility 3. Gait Training 4. Therapeutic Activites 5. Therapeutic Exercise/Strengthening 6. Transfer Training TREATMENT PLAN: Frequency/Duration: twice daily for duration of hospital stay Rehabilitation Potential For Stated Goals: Excellent RECOMMENDED REHABILITATION/EQUIPMENT: (at time of discharge pending progress): Due to the probability of continued deficits (see above) this patient will not likely need continued skilled physical therapy after discharge. Equipment:  
? None at this time HISTORY:  
History of Present Injury/Illness (Reason for Referral): 
Se H&P Past Medical History/Comorbidities:  
Ms. Baron Mitchell  has a past medical history of Adverse effect of anesthesia, Anemia, Arthritis, Chronic kidney disease, Chronic pain, Diabetes ([address] Follow-up visit for aortic valve replacement with bioprosthetic valve, GERD (gastroesophageal reflux disease), Hypertension, Kidney stones, Morbid obesity ([address] Murmur, cardiac, Nausea & vomiting, Psychiatric disorder, PUD (peptic ulcer disease), S/P aortic valve replacement with bioprosthetic valve, Spinal stenosis of lumbar region with neurogenic claudication, and Valvular heart disease.   Ms. Baron Mitchell  has a past surgical history that includes hx gi; hx cervical fusion; hx bilateral salpingo-oophorectomy; hx heart valve surgery; hx heart catheterization; hx aortic valve replacement (6/9/2016); hx lap cholecystectomy; hx gastric bypass; hx hysterectomy; hx urological (Multiple dates); hx shoulder arthroscopy (Right); hx knee replacement (Right, 10/10/2016); hx knee replacement (Bilateral); L1-L4 SPINE LUMBAR LAMINECTOMY (N/A, 10/30/2018); LEFT KNEE ARTHROPLASTY TOTAL / LETTY (Left, 3/21/2017); RIGHT KNEE ARTHROPLASTY TOTAL (Right, 10/10/2016); ESOPHAGOGASTRODUODENOSCOPY (EGD) (N/A, 6/15/2016); COLONOSCOPY/ BMI 41  ROOM 2235 (N/A, 6/15/2016); ESOPHAGOGASTRODUODENAL (EGD) BIOPSY (N/A, 6/15/2016); ULTRASOUND (Bilateral, 6/11/2016); AORTIC VALVE REPLACEMENT (AVR)    (N/A, 6/9/2016); ESOPHAGEAL TRANS ECHOCARDIOGRAM (N/A, 6/9/2016); LITHOTRIPSY EXTRACORPORAL SHOCKWAVE ESWL LEFT (Left, 7/23/2015); LITHOTRIPSY EXTRACORPORAL SHOCKWAVE ESWL/ LEFT (Left, 6/26/2015); and CYSTOSCOPY/ LEFT RETROGRADE PYELOGRAM/ LEFT URETERAL STENT INSERTION (Left, 6/26/2015). Social History/Living Environment:  
Home Environment: Private residence # Steps to Enter: 0 One/Two Story Residence: One story Living Alone: No 
Support Systems: Child(josesito) Patient Expects to be Discharged to[de-identified] Private residence Current DME Used/Available at Home: Walker, rolling, Grab bars, Raised toilet seat, Shower chair Tub or Shower Type: Tub/Shower combination Prior Level of Function/Work/Activity: 
Pt lives in one story home with son and PTA pt was independent with ADLs and ambulation. PRN use of RW and recent fall reported. Number of Personal Factors/Comorbidities that affect the Plan of Care: 1-2: MODERATE COMPLEXITY EXAMINATION:  
Most Recent Physical Functioning:  
Gross Assessment: 
  
         
  
Posture: 
  
Balance: 
Sitting: Intact Standing: Intact Standing - Static: Good Standing - Dynamic : Good Bed Mobility: 
  
Wheelchair Mobility: 
  
Transfers: 
Sit to Stand: Stand-by assistance Stand to Sit: Stand-by assistance Level of Assistance: Stand-by assistance Interventions: Safety awareness training;Verbal cues Gait: 
  
Base of Support: Narrowed Distance (ft): 500 Feet (ft) Assistive Device: Gait belt;Walker, rolling Ambulation - Level of Assistance: Stand-by assistance Body Structures Involved: 1. Nerves 2. Bones 3. Muscles Body Functions Affected: 1. Sensory/Pain 2. Neuromusculoskeletal 
3. Movement Related Activities and Participation Affected: 1. Mobility 2. Community, Social and [place] Number of elements that affect the Plan of Care: 4+: HIGH COMPLEXITY CLINICAL PRESENTATION:  
Presentation: Stable and uncomplicated: LOW COMPLEXITY CLINICAL DECISION MAKIN[address] [place] [id number] AM-PAC 6 Clicks Basic Mobility Inpatient Short Form How much difficulty does the patient currently have. .. Unable A Lot A Little None 1. Turning over in bed (including adjusting bedclothes, sheets and blankets)? [] 1   [] 2   [] 3   [x] 4  
2. Sitting down on and standing up from a chair with arms ( e.g., wheelchair, bedside commode, etc.)   [] 1   [] 2   [] 3   [x] 4  
3. Moving from lying on back to sitting on the side of the bed? [] 1   [] 2   [] 3   [x] 4 How much help from another person does the patient currently need. .. Total A Lot A Little None 4. Moving to and from a bed to a chair (including a wheelchair)? [] 1   [] 2   [x] 3   [] 4  
5. Need to walk in hospital room? [] 1   [] 2   [x] 3   [] 4  
6. Climbing 3-5 steps with a railing? [] 1   [] 2   [x] 3   [] 4  
© , Trustees of [address] [place] [address] [address] [address], under license to [employer]. All rights reserved Score:  Initial: 21 Most Recent: X (Date: -- ) Interpretation of Tool:  Represents activities that are increasingly more difficult (i.e. Bed mobility, Transfers, Gait). Score 24 23 22-20 19-15 14-10 9-7 6 Modifier CH CI CJ CK CL CM CN   
 
? Mobility - Walking and Moving Around:  - CURRENT STATUS: CJ - 20%-39% impaired, limited or restricted  - GOAL STATUS: CI - 1%-19% impaired, limited or restricted  - D/C STATUS:  ---------------To be determined--------------- [place] / [place] B / Product Type: Medicare /   
 
Medical Necessity:    
· Patient demonstrates good rehab potential due to higher previous functional level. Reason for Services/Other Comments: 
· Patient continues to require skilled intervention due to decreased balance and functional mobility. Use of outcome tool(s) and clinical judgement create a POC that gives a: Clear prediction of patient's progress: LOW COMPLEXITY  
  
 
 
 
TREATMENT:  
(In addition to Assessment/Re-Assessment sessions the following treatments were rendered) Pre-treatment Symptoms/Complaints:  Post op pain 
Pain: Initial:  
Pain Intensity 1: 0  Post Session:  4/10 Therapeutic Activity: (   10 minutes): Therapeutic activities including Chair transfers,static/dynamic standing balance, static/dynamic sitting balance, Ambulation on level ground and STS transfers to improve mobility, strength, balance and activity tolerance. Required minimal   to promote dynamic balance in standing and proper transfer techniques. Therapeutic Exercise: ( 13 minutes ):  Exercises per grid below to improve mobility, strength and activity tolerance. Required minimal visual and verbal cues to promote proper body alignment, promote proper body mechanics and promote proper body breathing techniques. Progressed repetitions and complexity of movement as indicated. Date: 
10/31/18 Date: 
11/1/18 Date: 
  
ACTIVITY/EXERCISE AM PM AM PM AM PM  
Seated LAQ  3 x 20 L 1x15 B A Seated marching  3 x 20 B 1x15 B A Seated AP  2 x 20 R 
1 x 20 L 20X Seated hip abd/add with knee ext  2 x 20 B 1x15 B A Heel taps  1 x 20 L Toe taps  1 x 20 L Glute sets   1x15 B A     
 B = bilateral; AA = active assistive; A = active; P = passive Braces/Orthotics/Lines/Etc:  
· drain hemovac Treatment/Session Assessment:   
· Response to Treatment:  Tolerated very well. · Interdisciplinary Collaboration:  
o Physical Therapy Assistant 
o SPTA · After treatment position/precautions:  
o Up in chair 
o Bed/Chair-wheels locked 
o Bed in low position 
o Call light within reach · Compliance with Program/Exercises: Will assess as treatment progresses · Recommendations/Intent for next treatment session: \"Next visit will focus on advancements to more challenging activities and reduction in assistance provided\". Total Treatment Duration: PT Patient Time In/Time Out Time In: 0030 Time Out: 1114 Milad Roque, JUNIOR

## 2018-11-02 ENCOUNTER — PATIENT OUTREACH (OUTPATIENT)
Dept: CASE MANAGEMENT | Age: 68
End: 2018-11-02

## 2018-11-02 NOTE — PROGRESS NOTES
Transition of Care Discharge Follow-up Questionnaire Date/Time of Call: 
 November 2, 2018 12:34PM  
What was the patient hospitalized for? Lumbar stenosis with neurogenic claudication Does the patient understand his/her diagnosis and/or treatment and what happened during the hospitalization? Care Coordinator spoke with patient Mrs. Bri Olivier who agreed to Transitions of [employer]. Patient states understanding of diagnosis and treatment plan during hospitalization. Did the patient receive discharge instructions? Yes  
CM Assessed Risk for Readmission:  
 
 
 
 
Patient stated Risk for Readmission: Low risk for readmit related to complications from Lumbar stenosis with neurogenic claudication. Patient states she is doing great and will not readmit. Review any discharge instructions (see discharge instructions/AVS in [facility]). Ask patient if they understand these. Do they have any questions? Care Coordinator reviewed discharge medications, diet and discharge instructions with patient who verbalized understanding of discharge instructions. Were home services ordered (nursing, PT, OT, ST, etc.)? No home health services ordered. If so, has the first visit occurred? If not, why? (Assist with coordination of services if necessary. ) 
 NA Was any DME ordered? No durable medical equipment ordered. If so, has it been received? If not, why?  (Assist patient in obtaining DME orders &/or equipment if necessary. ) NA Complete a review of all medications (new, continued and discontinued meds per the D/C instructions and medication tab in [address] [place] [address]). Care Coordinator reviewed all medications with patient per connect [facility] who verbalized understanding. Start: HYDROcodone-acetaminophen (NORCO) 7.5-325 mg per tablet Were all new prescriptions filled?   If not, why?  (Assist patient in obtaining medications if necessary  escalate for CCM &/or SW if ongoing issues are verbalized by pt or anticipated) Yes Does the patient understand the purpose and dosing instructions for all medications? (If patient has questions, provide explanation and education.) Patient states understanding of medications and dosing instructions. Care Coordinator educated patient on the importance of medication compliance and reporting medication side effects to PCP/Specialist.  
  
Does the patient have any problems in performing ADLs? (If patient is unable to perform ADLs  what is the limiting factor(s)? Do they have a support system that can assist? If no support system is present, discuss possible assistance that they may be able to obtain. Escalate for CCM/SW if ongoing issues are verbalized by pt or anticipated) Patient states she is independent with ADL's and ambulation. Does the patient have all follow-up appointments scheduled? 7 day f/up with PCP?  
(f/up with PCP may be w/in 14 days if patient has a f/up with their specialist w/in 7 days) 7-14 day f/up with specialist?  
(or per discharge instructions) If f/up has not been made  what actions has the care coordinator made to accomplish this? Has transportation been arranged? Care Coordinator educated patient on the importance of scheduling follow up appointment with PCP within 7 days of hospital discharge. Patient states she was recently seen by Dr. Tova Boggs (PCP) 10/16/2018. Follow up with Dr. George Vital (PCP) 11/16/2018 @ 8:15AM. NA Yes Any other questions or concerns expressed by the patient? No further needs identified: Patient instructed to call Care Coordinator if further questions or concerns arise Schedule next appointment with CHARY Lyons or refer to RN Case Manager/ per the workflow guidelines. When is care coordinators next follow-up call scheduled? If referred for CCM  what RN care manager was the referral assigned? NA Patient states she is doing great and thanked Care Coordinator for call. Patient declined further follow up call. Care Coordinator provided contact information if assistance is needed for concerns or questions. NA  
MIGUEL Call Completed By: SHASTA Delgado Help ACO Community Care Coordinator This note will not be viewable in [address]

## 2019-01-03 PROBLEM — R07.9 CHEST PAIN: Status: ACTIVE | Noted: 2019-01-03

## 2019-01-16 PROBLEM — F33.9 RECURRENT DEPRESSION (HCC): Status: ACTIVE | Noted: 2019-01-16

## 2019-01-16 PROBLEM — R07.9 CHEST PAIN: Status: RESOLVED | Noted: 2019-01-03 | Resolved: 2019-01-16

## 2019-01-18 PROBLEM — R93.1 ABNORMAL NUCLEAR CARDIAC IMAGING TEST: Status: ACTIVE | Noted: 2019-01-18

## 2019-01-21 ENCOUNTER — HOSPITAL ENCOUNTER (OUTPATIENT)
Dept: LAB | Age: 69
Discharge: HOME | End: 2019-01-21
Payer: MEDICARE

## 2019-01-21 DIAGNOSIS — I48.0 PAROXYSMAL ATRIAL FIBRILLATION (HCC): ICD-10-CM

## 2019-01-21 DIAGNOSIS — I10 ESSENTIAL HYPERTENSION, BENIGN: Chronic | ICD-10-CM

## 2019-01-21 LAB
ANION GAP SERPL CALC-SCNC: 9 MMOL/L (ref 7–16)
BASOPHILS # BLD: 0 K/UL (ref 0–0.2)
BASOPHILS NFR BLD: 1 % (ref 0–2)
BUN SERPL-MCNC: 22 MG/DL (ref 8–23)
CALCIUM SERPL-MCNC: 8.8 MG/DL (ref 8.3–10.4)
CHLORIDE SERPL-SCNC: 105 MMOL/L (ref 98–107)
CO2 SERPL-SCNC: 26 MMOL/L (ref 21–32)
CREAT SERPL-MCNC: 1.03 MG/DL (ref 0.6–1)
DIFFERENTIAL METHOD BLD: ABNORMAL
EOSINOPHIL # BLD: 0.1 K/UL (ref 0–0.8)
EOSINOPHIL NFR BLD: 2 % (ref 0.5–7.8)
ERYTHROCYTE [DISTWIDTH] IN BLOOD BY AUTOMATED COUNT: 15.8 % (ref 11.9–14.6)
GLUCOSE SERPL-MCNC: 105 MG/DL (ref 65–100)
HCT VFR BLD AUTO: 44.7 % (ref 35.8–46.3)
HGB BLD-MCNC: 13.4 G/DL (ref 11.7–15.4)
IMM GRANULOCYTES # BLD AUTO: 0 K/UL (ref 0–0.5)
IMM GRANULOCYTES NFR BLD AUTO: 0 % (ref 0–5)
INR PPP: 1
LYMPHOCYTES # BLD: 1.2 K/UL (ref 0.5–4.6)
LYMPHOCYTES NFR BLD: 16 % (ref 13–44)
MCH RBC QN AUTO: 22.8 PG (ref 26.1–32.9)
MCHC RBC AUTO-ENTMCNC: 30 G/DL (ref 31.4–35)
MCV RBC AUTO: 75.9 FL (ref 79.6–97.8)
MONOCYTES # BLD: 0.6 K/UL (ref 0.1–1.3)
MONOCYTES NFR BLD: 8 % (ref 4–12)
NEUTS SEG # BLD: 5.9 K/UL (ref 1.7–8.2)
NEUTS SEG NFR BLD: 74 % (ref 43–78)
NRBC # BLD: 0 K/UL (ref 0–0.2)
PLATELET # BLD AUTO: 246 K/UL (ref 150–450)
PMV BLD AUTO: 10.3 FL (ref 9.4–12.3)
POTASSIUM SERPL-SCNC: 4.1 MMOL/L (ref 3.5–5.1)
PROTHROMBIN TIME: 12.7 SEC (ref 11.7–14.5)
RBC # BLD AUTO: 5.89 M/UL (ref 4.05–5.2)
SODIUM SERPL-SCNC: 140 MMOL/L (ref 136–145)
WBC # BLD AUTO: 8 K/UL (ref 4.3–11.1)

## 2019-01-21 PROCEDURE — 85610 PROTHROMBIN TIME: CPT

## 2019-01-21 PROCEDURE — 36415 COLL VENOUS BLD VENIPUNCTURE: CPT

## 2019-01-21 PROCEDURE — 80048 BASIC METABOLIC PNL TOTAL CA: CPT

## 2019-01-21 PROCEDURE — 85025 COMPLETE CBC W/AUTO DIFF WBC: CPT

## 2019-01-30 NOTE — PROGRESS NOTES
Called to pre-assess for [facility] poss with  [facility]VA [facility] , Scheduled 1/31/19. No answer & message left.

## 2019-01-31 ENCOUNTER — HOSPITAL ENCOUNTER (OUTPATIENT)
Dept: CARDIAC CATH/INVASIVE PROCEDURES | Age: 69
Discharge: HOME | End: 2019-01-31
Attending: INTERNAL MEDICINE | Admitting: INTERNAL MEDICINE
Payer: MEDICARE

## 2019-01-31 VITALS
WEIGHT: 235 LBS | DIASTOLIC BLOOD PRESSURE: 81 MMHG | BODY MASS INDEX: 37.77 KG/M2 | HEIGHT: 66 IN | SYSTOLIC BLOOD PRESSURE: 172 MMHG | HEART RATE: 55 BPM | OXYGEN SATURATION: 96 % | RESPIRATION RATE: 16 BRPM

## 2019-01-31 LAB
ANION GAP SERPL CALC-SCNC: 10 MMOL/L (ref 7–16)
BUN SERPL-MCNC: 25 MG/DL (ref 8–23)
CALCIUM SERPL-MCNC: 8.5 MG/DL (ref 8.3–10.4)
CHLORIDE SERPL-SCNC: 107 MMOL/L (ref 98–107)
CO2 SERPL-SCNC: 23 MMOL/L (ref 21–32)
CREAT SERPL-MCNC: 1.09 MG/DL (ref 0.6–1)
GLUCOSE SERPL-MCNC: 123 MG/DL (ref 65–100)
POTASSIUM SERPL-SCNC: 4 MMOL/L (ref 3.5–5.1)
SODIUM SERPL-SCNC: 140 MMOL/L (ref 136–145)

## 2019-01-31 PROCEDURE — C1894 INTRO/SHEATH, NON-LASER: HCPCS

## 2019-01-31 PROCEDURE — C1769 GUIDE WIRE: HCPCS

## 2019-01-31 PROCEDURE — 74011250636 HC RX REV CODE- 250/636

## 2019-01-31 PROCEDURE — 80048 BASIC METABOLIC PNL TOTAL CA: CPT

## 2019-01-31 PROCEDURE — 74011000250 HC RX REV CODE- 250: Performed by: INTERNAL MEDICINE

## 2019-01-31 PROCEDURE — 99152 MOD SED SAME PHYS/QHP 5/>YRS: CPT

## 2019-01-31 PROCEDURE — 99153 MOD SED SAME PHYS/QHP EA: CPT

## 2019-01-31 PROCEDURE — 77030019569 HC BND COMPR RAD TERU -B

## 2019-01-31 PROCEDURE — 77030004534 HC CATH ANGI DX INFN CARD -A

## 2019-01-31 PROCEDURE — 74011250636 HC RX REV CODE- 250/636: Performed by: INTERNAL MEDICINE

## 2019-01-31 PROCEDURE — 74011636320 HC RX REV CODE- 636/320: Performed by: INTERNAL MEDICINE

## 2019-01-31 PROCEDURE — 74011000258 HC RX REV CODE- 258: Performed by: INTERNAL MEDICINE

## 2019-01-31 PROCEDURE — 77030004559 HC CATH ANGI DX SUPT CARD -B

## 2019-01-31 PROCEDURE — 93458 L HRT ARTERY/VENTRICLE ANGIO: CPT

## 2019-01-31 RX ORDER — HEPARIN SODIUM 200 [USP'U]/100ML
3 INJECTION, SOLUTION INTRAVENOUS CONTINUOUS
Status: DISCONTINUED | OUTPATIENT
Start: 2019-01-31 | End: 2019-01-31 | Stop reason: HOSPADM

## 2019-01-31 RX ORDER — MIDAZOLAM HYDROCHLORIDE 1 MG/ML
.5-5 INJECTION, SOLUTION INTRAMUSCULAR; INTRAVENOUS
Status: DISCONTINUED | OUTPATIENT
Start: 2019-01-31 | End: 2019-01-31 | Stop reason: HOSPADM

## 2019-01-31 RX ORDER — LIDOCAINE HYDROCHLORIDE 10 MG/ML
5-40 INJECTION INFILTRATION; PERINEURAL
Status: DISCONTINUED | OUTPATIENT
Start: 2019-01-31 | End: 2019-01-31 | Stop reason: HOSPADM

## 2019-01-31 RX ORDER — DIAZEPAM 5 MG/1
5 TABLET ORAL ONCE
Status: DISCONTINUED | OUTPATIENT
Start: 2019-01-31 | End: 2019-01-31 | Stop reason: HOSPADM

## 2019-01-31 RX ORDER — FENTANYL CITRATE 50 UG/ML
25-100 INJECTION, SOLUTION INTRAMUSCULAR; INTRAVENOUS
Status: DISCONTINUED | OUTPATIENT
Start: 2019-01-31 | End: 2019-01-31 | Stop reason: HOSPADM

## 2019-01-31 RX ORDER — SODIUM CHLORIDE 9 MG/ML
75 INJECTION, SOLUTION INTRAVENOUS CONTINUOUS
Status: DISCONTINUED | OUTPATIENT
Start: 2019-01-31 | End: 2019-01-31 | Stop reason: HOSPADM

## 2019-01-31 RX ORDER — GUAIFENESIN 100 MG/5ML
81-324 LIQUID (ML) ORAL ONCE
Status: DISCONTINUED | OUTPATIENT
Start: 2019-01-31 | End: 2019-01-31 | Stop reason: HOSPADM

## 2019-01-31 RX ADMIN — LIDOCAINE HYDROCHLORIDE 3 ML: 10 INJECTION, SOLUTION INFILTRATION; PERINEURAL at 08:52

## 2019-01-31 RX ADMIN — HEPARIN SODIUM 2 ML: 10000 INJECTION INTRAVENOUS; SUBCUTANEOUS at 08:52

## 2019-01-31 RX ADMIN — MIDAZOLAM HYDROCHLORIDE 2 MG: 1 INJECTION, SOLUTION INTRAMUSCULAR; INTRAVENOUS at 08:46

## 2019-01-31 RX ADMIN — HEPARIN SODIUM 3 ML/HR: 5000 INJECTION, SOLUTION INTRAVENOUS; SUBCUTANEOUS at 08:53

## 2019-01-31 RX ADMIN — MIDAZOLAM HYDROCHLORIDE 2 MG: 1 INJECTION, SOLUTION INTRAMUSCULAR; INTRAVENOUS at 08:30

## 2019-01-31 RX ADMIN — FENTANYL CITRATE 25 MCG: 50 INJECTION, SOLUTION INTRAMUSCULAR; INTRAVENOUS at 08:51

## 2019-01-31 RX ADMIN — SODIUM CHLORIDE 75 ML/HR: 900 INJECTION, SOLUTION INTRAVENOUS at 07:00

## 2019-01-31 RX ADMIN — IOPAMIDOL 75 ML: 755 INJECTION, SOLUTION INTRAVENOUS at 09:01

## 2019-01-31 RX ADMIN — MIDAZOLAM HYDROCHLORIDE 2 MG: 1 INJECTION, SOLUTION INTRAMUSCULAR; INTRAVENOUS at 08:34

## 2019-01-31 NOTE — PROGRESS NOTES
Discharge instructions given per orders, voiced good understanding of post radial cath care, medications & follow up care. Denies any questions discharged to home via wheel chair

## 2019-01-31 NOTE — PROCEDURES
[facility]    Ashley Childs  MR#: [id number]  : 1950  ACCOUNT #: [de-identified]   DATE OF SERVICE: 2019    PROCEDURE PERFORMED:  Cardiac catheterization. HISTORY OF PRESENT ILLNESS:  This is a 26-year-old lady undergoing cardiac catheterization for evaluation of chest pain. She has a history of bioprosthetic valve replacement. A recent nuclear stress test suggests anterolateral ischemia. Echocardiography shows hyperdynamic left ventricular function with an intracavitary gradient. PROCEDURE:  Left heart catheterization with left ventriculography and coronary angiography was carried out from the right radial artery by modified Seldinger technique. The coronaries were injected with a 5-[place]ore[place] multipurpose. The aortic valve was crossed using a 6-[place] JR4, short straight wire, and exchanged J wire for a 5-[place] angled pigtail. She tolerated the procedure well. FINDINGS:  The central aortic pressure is 130/70 mmHg. Left ventricular end diastolic pressure was 20 mmHg. There was a less than 10 mm gradient on pullback across the aortic valve. On fluoroscopy, there is heavy mitral annular calcification. Coronary angiography reveals the right coronary artery to be a large vessel with minor irregularity. Left main is normal.  It divides into LAD and left circumflex. The LAD has minor atherosclerotic disease along the course of the vessel. There is some bridging in the distal segment. The left circumflex likewise has minor atherosclerotic irregularity. Left ventriculography reveals hyperdynamic left ventricular function with an ejection fraction greater than 70%. There is mild dilatation of the ascending aorta. IMPRESSION:  1. Hyperdynamic left ventricular function. 2.  No significant gradient on pullback across the bioprosthetic aortic valve. 3.  Minor nonobstructive coronary artery disease.   4.  Heavy mitral annular calcification. RECOMMENDATIONS:  Medical therapy.       MD TAISHA Irizarry / [place]  D: 01/31/2019 09:02     T: 01/31/2019 09:21  JOB #: [id number]

## 2019-01-31 NOTE — PROGRESS NOTES
Patient pre-assessment complete for [facility] poss with DR [facility]PH[facility] scheduled for 19 at 8am, arrival time 6am. Patient verified using . Patient instructed to bring all home medications in labeled bottles on the day of procedure. NPO status reinforced. Patient informed to take a full dose aspirin 325mg  or 81 mg x 4 on the day of procedure. Patient instructed to HOLD lasix in am . Instructed they can take all other medications excluding vitamins & supplements. Patient verbalizes understanding of all instructions & denies any questions at this time.

## 2019-01-31 NOTE — PROGRESS NOTES
TRANSFER - OUT REPORT: 
 
Verbal report given to Martin Lemos RN(name) on [employer]  being transferred to cpru(unit) for routine progression of care Report consisted of patients Situation, Background, Assessment and  
Recommendations(SBAR). Information from the following report(s) SBAR was reviewed with the receiving nurse. is allergic to latex; metformin; sulfa (sulfonamide antibiotics); macrobid [nitrofurantoin monohyd/m-cryst]; other plant, animal, environmental; oxycodone; and tape [adhesive]. Opportunity for questions and clarification was provided. Procedure Summary:Pt had LHC via R wrist, site sealed with R band using 14 ml at 0900 hrs. Med Administration Versed:  6 mg Fentanyl: 25 mcg Visit Vitals /74 (BP 1 Location: Left arm, BP Patient Position: Supine) Pulse 63 Resp 16 Ht 5' 6\" (1.676 m) Wt 106.6 kg (235 lb) SpO2 96% Breastfeeding? No  
BMI 37.93 kg/m² Past Medical History:  
Diagnosis Date  Adverse effect of anesthesia   
 woke up on a vent s/p cholecystectomy- panic  Anemia  Arthritis  CAD (coronary artery disease)  Chronic kidney disease   
 pt denies 9/19/16  Chronic pain   
 r/t arthritis  Diabetes ([address]   
 pt states she is no longer Diabetic  Follow-up visit for aortic valve replacement with bioprosthetic valve 7/25/2016  GERD (gastroesophageal reflux disease)   
 controlled w/med  Hypertension   
 controlled w/med  Kidney stones  Morbid obesity ([address]  Murmur, cardiac  Nausea & vomiting   
 pt can not be flat in bed  Psychiatric disorder   
 claustraphobia (severe)  PUD (peptic ulcer disease) 06/2016  
 dx 2016  S/P aortic valve replacement with bioprosthetic valve 4/28/2017  Spinal stenosis of lumbar region with neurogenic claudication 10/16/2018  Valvular heart disease 2016 AVR Peripheral IV 01/31/19 Left;Posterior Hand (Active)

## 2019-01-31 NOTE — PROCEDURES
Brief Cardiac Procedure Note    Patient: Susie Fontana MRN: [id number]  SSN: [id number]    YOB: 1950  Age: 70 y.o. Sex: female      Date of Procedure: 1/31/2019     Pre-procedure Diagnosis: Atypical Angina    Post-procedure Diagnosis: Coronary Artery Disease    Reason for Procedure: New Onset Angina < or = 2 Months    Procedure: Left Heart Catheterization    Brief Description of Procedure: via rra    Performed By: Josephine Cannon MD     Assistants:     Anesthesia: Moderate Sedation    Estimated Blood Loss: Less than 10 mL      Specimens: None    Implants: None    Findings:   Ef> 70  bAVR ok  Cors minor cad    Complications: None    Recommendations: Continue medical therapy.     Signed By: Josephine Cannon MD     January 31, 2019

## 2019-01-31 NOTE — PROGRESS NOTES
Report received from [address]. Procedural findings communicated. Intra procedural  medication administration reviewed. Progression of care discussed. Patient received into CPRU [place] post sheath removal.  
 
Right Radial access site without bleeding or swelling TR band dry and intact Patient instructed to limit movement to right upper extremity Routine post procedural vital signs and site assessment initiated

## 2019-01-31 NOTE — DISCHARGE INSTRUCTIONS

## 2019-01-31 NOTE — PROGRESS NOTES
Patient received to [address] [place] [address] room # 12  Ambulatory from [facility]. Patient scheduled for [facility] today with Dr Pippa Esquivel. Procedure reviewed & questions answered, voiced good understanding consent obtained & placed on chart. All medications and medical history reviewed. Will prep patient per orders. Patient & family updated on plan of care. The patient has a fraility score of 3-MANAGING WELL, based on ability to pe from ADLs by self

## 2019-05-29 ENCOUNTER — HOSPITAL ENCOUNTER (OUTPATIENT)
Dept: LAB | Age: 69
Discharge: HOME | End: 2019-05-29

## 2019-05-29 PROCEDURE — 88305 TISSUE EXAM BY PATHOLOGIST: CPT

## 2019-05-29 PROCEDURE — 88312 SPECIAL STAINS GROUP 1: CPT

## 2019-06-24 ENCOUNTER — HOSPITAL ENCOUNTER (OUTPATIENT)
Dept: CT IMAGING | Age: 69
Discharge: HOME | End: 2019-06-24
Attending: UROLOGY
Payer: MEDICARE

## 2019-06-24 DIAGNOSIS — N20.0 RENAL STONE: ICD-10-CM

## 2019-06-24 PROCEDURE — 74176 CT ABD & PELVIS W/O CONTRAST: CPT

## 2019-08-02 ENCOUNTER — HOSPITAL ENCOUNTER (OUTPATIENT)
Dept: NUCLEAR MEDICINE | Age: 69
Discharge: HOME | End: 2019-08-02
Attending: INTERNAL MEDICINE
Payer: MEDICARE

## 2019-08-02 DIAGNOSIS — E73.9 LACTOSE INTOLERANCE: ICD-10-CM

## 2019-08-02 PROCEDURE — 78264 GASTRIC EMPTYING IMG STUDY: CPT

## 2019-08-05 ENCOUNTER — ANESTHESIA EVENT (OUTPATIENT)
Dept: SURGERY | Age: 69
End: 2019-08-05
Payer: MEDICARE

## 2019-08-06 ENCOUNTER — HOSPITAL ENCOUNTER (OUTPATIENT)
Age: 69
Setting detail: OUTPATIENT SURGERY
Discharge: HOME | End: 2019-08-06
Attending: UROLOGY | Admitting: UROLOGY
Payer: MEDICARE

## 2019-08-06 ENCOUNTER — ANESTHESIA (OUTPATIENT)
Dept: SURGERY | Age: 69
End: 2019-08-06
Payer: MEDICARE

## 2019-08-06 VITALS
SYSTOLIC BLOOD PRESSURE: 171 MMHG | DIASTOLIC BLOOD PRESSURE: 74 MMHG | RESPIRATION RATE: 17 BRPM | OXYGEN SATURATION: 95 % | WEIGHT: 245.6 LBS | TEMPERATURE: 97.6 F | HEART RATE: 58 BPM | BODY MASS INDEX: 39.47 KG/M2 | HEIGHT: 66 IN

## 2019-08-06 DIAGNOSIS — N20.0 KIDNEY STONE: Primary | ICD-10-CM

## 2019-08-06 DIAGNOSIS — M48.062 SPINAL STENOSIS OF LUMBAR REGION WITH NEUROGENIC CLAUDICATION: ICD-10-CM

## 2019-08-06 LAB
APPEARANCE UR: ABNORMAL
BACTERIA URNS QL MICRO: 0 /HPF
BILIRUB UR QL: NEGATIVE
COLOR UR: YELLOW
EPI CELLS #/AREA URNS HPF: ABNORMAL /HPF
GLUCOSE BLD STRIP.AUTO-MCNC: 117 MG/DL (ref 65–100)
GLUCOSE UR STRIP.AUTO-MCNC: NEGATIVE MG/DL
HGB UR QL STRIP: ABNORMAL
KETONES UR QL STRIP.AUTO: NEGATIVE MG/DL
LEUKOCYTE ESTERASE UR QL STRIP.AUTO: ABNORMAL
MUCOUS THREADS URNS QL MICRO: ABNORMAL /LPF
NITRITE UR QL STRIP.AUTO: NEGATIVE
PH UR STRIP: 5.5 [PH] (ref 5–9)
PROT UR STRIP-MCNC: 100 MG/DL
RBC #/AREA URNS HPF: ABNORMAL /HPF
SP GR UR REFRACTOMETRY: 1.02 (ref 1–1.02)
UROBILINOGEN UR QL STRIP.AUTO: 0.2 EU/DL (ref 0.2–1)
WBC URNS QL MICRO: ABNORMAL /HPF

## 2019-08-06 PROCEDURE — 77030018836 HC SOL IRR NACL ICUM -A: Performed by: UROLOGY

## 2019-08-06 PROCEDURE — 74011250636 HC RX REV CODE- 250/636: Performed by: ANESTHESIOLOGY

## 2019-08-06 PROCEDURE — 74011250636 HC RX REV CODE- 250/636

## 2019-08-06 PROCEDURE — 76210000020 HC REC RM PH II FIRST 0.5 HR: Performed by: UROLOGY

## 2019-08-06 PROCEDURE — 77030035011 HC LSR FBR HOLM FLXIVA TRAC TIP BSC -F: Performed by: UROLOGY

## 2019-08-06 PROCEDURE — 77030037088 HC TUBE ENDOTRACH ORAL NSL COVD-A: Performed by: NURSE ANESTHETIST, CERTIFIED REGISTERED

## 2019-08-06 PROCEDURE — 77030019927 HC TBNG IRR CYSTO BAXT -A: Performed by: UROLOGY

## 2019-08-06 PROCEDURE — 77030018846 HC SOL IRR STRL H20 ICUM -A: Performed by: UROLOGY

## 2019-08-06 PROCEDURE — 82355 CALCULUS ANALYSIS QUAL: CPT

## 2019-08-06 PROCEDURE — 76010000171 HC OR TIME 2 TO 2.5 HR INTENSV-TIER 1: Performed by: UROLOGY

## 2019-08-06 PROCEDURE — 77030032490 HC SLV COMPR SCD KNE COVD -B: Performed by: UROLOGY

## 2019-08-06 PROCEDURE — 81001 URINALYSIS AUTO W/SCOPE: CPT

## 2019-08-06 PROCEDURE — 76210000000 HC OR PH I REC 2 TO 2.5 HR: Performed by: UROLOGY

## 2019-08-06 PROCEDURE — 88300 SURGICAL PATH GROSS: CPT

## 2019-08-06 PROCEDURE — 77030038846 HC SCPE URETSCP LITHOVUE DISP BSC -H: Performed by: UROLOGY

## 2019-08-06 PROCEDURE — 74011636320 HC RX REV CODE- 636/320: Performed by: UROLOGY

## 2019-08-06 PROCEDURE — C2617 STENT, NON-COR, TEM W/O DEL: HCPCS | Performed by: UROLOGY

## 2019-08-06 PROCEDURE — C1894 INTRO/SHEATH, NON-LASER: HCPCS | Performed by: UROLOGY

## 2019-08-06 PROCEDURE — 77030019908 HC STETH ESOPH SIMS -A: Performed by: NURSE ANESTHETIST, CERTIFIED REGISTERED

## 2019-08-06 PROCEDURE — 74011250637 HC RX REV CODE- 250/637: Performed by: ANESTHESIOLOGY

## 2019-08-06 PROCEDURE — 77030020463 HC FCPS ENDOSC STN BSC -C: Performed by: UROLOGY

## 2019-08-06 PROCEDURE — C1769 GUIDE WIRE: HCPCS | Performed by: UROLOGY

## 2019-08-06 PROCEDURE — 74011250636 HC RX REV CODE- 250/636: Performed by: UROLOGY

## 2019-08-06 PROCEDURE — 77030019940 HC BLNKT HYPOTHRM STRY -B: Performed by: NURSE ANESTHETIST, CERTIFIED REGISTERED

## 2019-08-06 PROCEDURE — 76060000035 HC ANESTHESIA 2 TO 2.5 HR: Performed by: UROLOGY

## 2019-08-06 PROCEDURE — 82962 GLUCOSE BLOOD TEST: CPT

## 2019-08-06 PROCEDURE — 77030039425 HC BLD LARYNG TRULITE DISP TELE -A: Performed by: NURSE ANESTHETIST, CERTIFIED REGISTERED

## 2019-08-06 PROCEDURE — 74011000250 HC RX REV CODE- 250

## 2019-08-06 DEVICE — URETERAL STENT
Type: IMPLANTABLE DEVICE | Site: URETER | Status: FUNCTIONAL
Brand: PERCUFLEX™ PLUS

## 2019-08-06 RX ORDER — FENTANYL CITRATE 50 UG/ML
INJECTION, SOLUTION INTRAMUSCULAR; INTRAVENOUS
Status: COMPLETED
Start: 2019-08-06 | End: 2019-08-06

## 2019-08-06 RX ORDER — PHENAZOPYRIDINE HYDROCHLORIDE 200 MG/1
200 TABLET, FILM COATED ORAL
Qty: 9 TAB | Refills: 0 | Status: SHIPPED | OUTPATIENT
Start: 2019-08-06 | End: 2019-08-09

## 2019-08-06 RX ORDER — PROPOFOL 10 MG/ML
INJECTION, EMULSION INTRAVENOUS AS NEEDED
Status: DISCONTINUED | OUTPATIENT
Start: 2019-08-06 | End: 2019-08-06 | Stop reason: HOSPADM

## 2019-08-06 RX ORDER — FENTANYL CITRATE 50 UG/ML
INJECTION, SOLUTION INTRAMUSCULAR; INTRAVENOUS AS NEEDED
Status: DISCONTINUED | OUTPATIENT
Start: 2019-08-06 | End: 2019-08-06 | Stop reason: HOSPADM

## 2019-08-06 RX ORDER — MIDAZOLAM HYDROCHLORIDE 1 MG/ML
1 INJECTION, SOLUTION INTRAMUSCULAR; INTRAVENOUS
Status: DISCONTINUED | OUTPATIENT
Start: 2019-08-06 | End: 2019-08-06

## 2019-08-06 RX ORDER — HYOSCYAMINE SULFATE 0.12 MG/1
0.12 TABLET SUBLINGUAL
Qty: 30 TAB | Refills: 1 | Status: SHIPPED | OUTPATIENT
Start: 2019-08-06 | End: 2019-10-28 | Stop reason: CLARIF

## 2019-08-06 RX ORDER — HYDROCODONE BITARTRATE AND ACETAMINOPHEN 5; 325 MG/1; MG/1
2 TABLET ORAL ONCE
Status: COMPLETED | OUTPATIENT
Start: 2019-08-06 | End: 2019-08-06

## 2019-08-06 RX ORDER — CEFAZOLIN SODIUM/WATER 2 G/20 ML
2 SYRINGE (ML) INTRAVENOUS
Status: COMPLETED | OUTPATIENT
Start: 2019-08-06 | End: 2019-08-06

## 2019-08-06 RX ORDER — MIDAZOLAM HYDROCHLORIDE 1 MG/ML
2 INJECTION, SOLUTION INTRAMUSCULAR; INTRAVENOUS
Status: DISCONTINUED | OUTPATIENT
Start: 2019-08-06 | End: 2019-08-06

## 2019-08-06 RX ORDER — ROCURONIUM BROMIDE 10 MG/ML
INJECTION, SOLUTION INTRAVENOUS AS NEEDED
Status: DISCONTINUED | OUTPATIENT
Start: 2019-08-06 | End: 2019-08-06 | Stop reason: HOSPADM

## 2019-08-06 RX ORDER — GLYCOPYRROLATE 0.2 MG/ML
INJECTION INTRAMUSCULAR; INTRAVENOUS AS NEEDED
Status: DISCONTINUED | OUTPATIENT
Start: 2019-08-06 | End: 2019-08-06 | Stop reason: HOSPADM

## 2019-08-06 RX ORDER — HYDROCODONE BITARTRATE AND ACETAMINOPHEN 7.5; 325 MG/1; MG/1
1 TABLET ORAL
Qty: 20 TAB | Refills: 0 | Status: SHIPPED | OUTPATIENT
Start: 2019-08-06 | End: 2019-08-13

## 2019-08-06 RX ORDER — NEOSTIGMINE METHYLSULFATE 1 MG/ML
INJECTION INTRAVENOUS AS NEEDED
Status: DISCONTINUED | OUTPATIENT
Start: 2019-08-06 | End: 2019-08-06 | Stop reason: HOSPADM

## 2019-08-06 RX ORDER — SODIUM CHLORIDE, SODIUM LACTATE, POTASSIUM CHLORIDE, CALCIUM CHLORIDE 600; 310; 30; 20 MG/100ML; MG/100ML; MG/100ML; MG/100ML
75 INJECTION, SOLUTION INTRAVENOUS CONTINUOUS
Status: DISCONTINUED | OUTPATIENT
Start: 2019-08-06 | End: 2019-08-06 | Stop reason: HOSPADM

## 2019-08-06 RX ORDER — CEPHALEXIN 500 MG/1
500 CAPSULE ORAL 3 TIMES DAILY
Qty: 15 CAP | Refills: 0 | Status: SHIPPED | OUTPATIENT
Start: 2019-08-06 | End: 2019-08-11

## 2019-08-06 RX ORDER — FENTANYL CITRATE 50 UG/ML
50 INJECTION, SOLUTION INTRAMUSCULAR; INTRAVENOUS ONCE
Status: COMPLETED | OUTPATIENT
Start: 2019-08-06 | End: 2019-08-06

## 2019-08-06 RX ORDER — SODIUM CHLORIDE 0.9 % (FLUSH) 0.9 %
5-40 SYRINGE (ML) INJECTION AS NEEDED
Status: DISCONTINUED | OUTPATIENT
Start: 2019-08-06 | End: 2019-08-06 | Stop reason: HOSPADM

## 2019-08-06 RX ORDER — HYDROMORPHONE HYDROCHLORIDE 2 MG/ML
0.5 INJECTION, SOLUTION INTRAMUSCULAR; INTRAVENOUS; SUBCUTANEOUS
Status: COMPLETED | OUTPATIENT
Start: 2019-08-06 | End: 2019-08-06

## 2019-08-06 RX ORDER — SODIUM CHLORIDE 0.9 % (FLUSH) 0.9 %
5-40 SYRINGE (ML) INJECTION EVERY 8 HOURS
Status: DISCONTINUED | OUTPATIENT
Start: 2019-08-06 | End: 2019-08-06 | Stop reason: HOSPADM

## 2019-08-06 RX ORDER — MIDAZOLAM HYDROCHLORIDE 1 MG/ML
2 INJECTION, SOLUTION INTRAMUSCULAR; INTRAVENOUS
Status: COMPLETED | OUTPATIENT
Start: 2019-08-06 | End: 2019-08-06

## 2019-08-06 RX ORDER — LIDOCAINE HYDROCHLORIDE 20 MG/ML
INJECTION, SOLUTION EPIDURAL; INFILTRATION; INTRACAUDAL; PERINEURAL AS NEEDED
Status: DISCONTINUED | OUTPATIENT
Start: 2019-08-06 | End: 2019-08-06 | Stop reason: HOSPADM

## 2019-08-06 RX ADMIN — GLYCOPYRROLATE 0.4 MG: 0.2 INJECTION INTRAMUSCULAR; INTRAVENOUS at 16:53

## 2019-08-06 RX ADMIN — FENTANYL CITRATE 50 MCG: 50 INJECTION INTRAMUSCULAR; INTRAVENOUS at 17:35

## 2019-08-06 RX ADMIN — FENTANYL CITRATE 50 MCG: 50 INJECTION, SOLUTION INTRAMUSCULAR; INTRAVENOUS at 17:50

## 2019-08-06 RX ADMIN — ROCURONIUM BROMIDE 10 MG: 10 INJECTION, SOLUTION INTRAVENOUS at 16:03

## 2019-08-06 RX ADMIN — MIDAZOLAM HYDROCHLORIDE 2 MG: 2 INJECTION, SOLUTION INTRAMUSCULAR; INTRAVENOUS at 14:29

## 2019-08-06 RX ADMIN — NEOSTIGMINE METHYLSULFATE 3 MG: 1 INJECTION INTRAVENOUS at 16:53

## 2019-08-06 RX ADMIN — Medication 2 G: at 15:11

## 2019-08-06 RX ADMIN — SODIUM CHLORIDE, SODIUM LACTATE, POTASSIUM CHLORIDE, AND CALCIUM CHLORIDE 75 ML/HR: 600; 310; 30; 20 INJECTION, SOLUTION INTRAVENOUS at 14:00

## 2019-08-06 RX ADMIN — HYDROCODONE BITARTRATE AND ACETAMINOPHEN 2 TABLET: 5; 325 TABLET ORAL at 18:25

## 2019-08-06 RX ADMIN — HYDROMORPHONE HYDROCHLORIDE 0.5 MG: 2 INJECTION INTRAMUSCULAR; INTRAVENOUS; SUBCUTANEOUS at 18:09

## 2019-08-06 RX ADMIN — HYDROMORPHONE HYDROCHLORIDE 0.5 MG: 2 INJECTION INTRAMUSCULAR; INTRAVENOUS; SUBCUTANEOUS at 18:45

## 2019-08-06 RX ADMIN — HYDROMORPHONE HYDROCHLORIDE 0.5 MG: 2 INJECTION INTRAMUSCULAR; INTRAVENOUS; SUBCUTANEOUS at 18:00

## 2019-08-06 RX ADMIN — PROPOFOL 200 MG: 10 INJECTION, EMULSION INTRAVENOUS at 15:04

## 2019-08-06 RX ADMIN — HYDROMORPHONE HYDROCHLORIDE 0.5 MG: 2 INJECTION INTRAMUSCULAR; INTRAVENOUS; SUBCUTANEOUS at 18:40

## 2019-08-06 RX ADMIN — ROCURONIUM BROMIDE 50 MG: 10 INJECTION, SOLUTION INTRAVENOUS at 15:04

## 2019-08-06 RX ADMIN — LIDOCAINE HYDROCHLORIDE 100 MG: 20 INJECTION, SOLUTION EPIDURAL; INFILTRATION; INTRACAUDAL; PERINEURAL at 15:04

## 2019-08-06 RX ADMIN — FENTANYL CITRATE 100 MCG: 50 INJECTION, SOLUTION INTRAMUSCULAR; INTRAVENOUS at 15:04

## 2019-08-06 NOTE — DISCHARGE INSTRUCTIONS
Ureteral Stent Placement: What to Expect at [address] [place] [address]  A ureteral (say \"you-REE-ter-ul\") stent is a thin, hollow tube that is placed in the ureter to help urine pass from the kidney into the bladder. Ureters are the tubes that connect the kidneys to the bladder. You may have a small amount of blood in your urine for 1 to 3 days after the procedure. While the stent is in place, you may have to urinate more often, feel a sudden need to urinate, or feel like you cannot completely empty your bladder. You may feel some pain when you urinate or do strenuous activity. You also may notice a small amount of blood in your urine after strenuous activities. These side effects usually do not prevent people from doing their normal daily activities. You may have a string attached to your stent. Do not to pull the string unless the doctor tells you to. The doctor will use the string to pull out the stent when you no longer need it. After the procedure, urine may flow better from your kidneys to your bladder. A ureteral stent may be left in place for several days or for as long as several months. Your doctor will take it out when you no longer need it. Cystoscopy: What to Expect at [address] [facility]  A cystoscopy is a procedure that lets a doctor look inside of the bladder and the urethra. The urethra is the tube that carries urine from the bladder to outside the body. The doctor uses a thin, lighted tube called a cystoscope to look for kidney or bladder stones, tumors, bleeding, or infection. After the cystoscopy, your urethra may be sore at first, and it may burn when you urinate for the first few days after the procedure. You may feel the need to urinate more often, and your urine may be pink. These symptoms should get better in 1 or 2 days. You will probably be able to go back to work or most of your usual activities in 1 or 2 days. How can you care for yourself at home? Activity  1.  Rest when you feel tired. Getting enough sleep will help you recover. 2. Try to walk each day. Start by walking a little more than you did the day before. Bit by bit, increase the amount you walk. Walking boosts blood flow and helps prevent pneumonia and constipation. 3. Avoid strenuous activities, such as bicycle riding, jogging, weight lifting, or aerobic exercise, until your doctor says it is okay. 4. Ask your doctor when you can drive again. 5. Most people are able to return to work within 1 or 2 days after the procedure. 6. You may shower and take baths as usual.  7. Ask your doctor when it is okay for you to have sex. Diet  · You can eat your normal diet. If your stomach is upset, try bland, low-fat foods like plain rice, broiled chicken, toast, and yogurt. · Drink plenty of fluids (unless your doctor tells you not to). Medicines  · Take pain medicines exactly as directed. ¨ If the doctor gave you a prescription medicine for pain, take it as prescribed. ¨ If you are not taking a prescription pain medicine, ask your doctor if you can take an over-the-counter medicine. · If you think your pain medicine is making you sick to your stomach:  ¨ Take your medicine after meals (unless your doctor has told you not to). ¨ Ask your doctor for a different pain medicine. · If your doctor prescribed antibiotics, take them as directed. Do not stop taking them just because you feel better. You need to take the full course of antibiotics. Follow-up care is a key part of your treatment and safety. Be sure to make and go to all appointments, and call your doctor if you are having problems. It's also a good idea to know your test results and keep a list of the medicines you take. When should you call for help? Call 911 anytime you think you may need emergency care. For example, call if:  · You passed out (lost consciousness). · You have severe trouble breathing.   · You have sudden chest pain and shortness of breath, or you cough up blood.  · You have severe belly pain. Call your doctor now or seek immediate medical care if:  · You are sick to your stomach or cannot keep fluids down. · Your urine is still red or you see blood clots after you have urinated several times. · You have trouble passing urine or stool, especially if you have pain or swelling in your lower belly. · You have signs of a blood clot, such as:  ¨ Pain in your calf, back of the knee, thigh, or groin. ¨ Redness and swelling in your leg or groin. · You develop a fever or severe chills. · You have pain in your back just below your rib cage. This is called flank pain. Watch closely for changes in your health, and be sure to contact your doctor if:  · A burning feeling is normal for a day or two after the test, but call if it does not get better. · You have a frequent urge to urinate but can pass only small amounts of urine. · It is normal for the urine to have a pinkish color for a few days after the test, but call if it does not get better or if your urine is cloudy, smells bad, or has pus in it. After general anesthesia or intravenous sedation, for 24 hours or while taking prescription Narcotics:  · Limit your activities  · Do not drive and operate hazardous machinery  · Do not make important personal or business decisions  · Do  not drink alcoholic beverages  · If you have not urinated within 8 hours after discharge, please contact your surgeon on call. *  Please give a list of your current medications to your Primary Care Provider. *  Please update this list whenever your medications are discontinued, doses are      changed, or new medications (including over-the-counter products) are added. *  Please carry medication information at all times in case of emergency situations.       These are general instructions for a healthy lifestyle:  No smoking/ No tobacco products/ Avoid exposure to second hand smoke  Surgeon General's Warning:  Quitting smoking now greatly reduces serious risk to your health. Obesity, smoking, and sedentary lifestyle greatly increases your risk for illness  A healthy diet, regular physical exercise & weight monitoring are important for maintaining a healthy lifestyle    You may be retaining fluid if you have a history of heart failure or if you experience any of the following symptoms:  Weight gain of 3 pounds or more overnight or 5 pounds in a week, increased swelling in our hands or feet or shortness of breath while lying flat in bed. Please call your doctor as soon as you notice any of these symptoms; do not wait until your next office visit. Recognize signs and symptoms of STROKE:    F-face looks uneven  A-arms unable to move or move unevenly  S-speech slurred or non-existent  T-time-call 911 as soon as signs and symptoms begin-DO NOT go       Back to bed or wait to see if you get better-TIME IS BRAIN.

## 2019-08-06 NOTE — ANESTHESIA POSTPROCEDURE EVALUATION
Procedure(s):  CYSTOSCOPY BILATERAL URETEROSCOPY/LASER LITHO/STENTS/BILATERAL RETROGRADE PYELOGRAMS. general    Anesthesia Post Evaluation      Multimodal analgesia: multimodal analgesia used between 6 hours prior to anesthesia start to PACU discharge  Patient location during evaluation: bedside  Patient participation: complete - patient participated  Level of consciousness: awake and responsive to light touch  Pain management: adequate  Airway patency: patent  Anesthetic complications: no  Cardiovascular status: acceptable, hemodynamically stable, blood pressure returned to baseline and stable  Respiratory status: acceptable, unassisted, spontaneous ventilation and nonlabored ventilation  Hydration status: acceptable  Post anesthesia nausea and vomiting:  controlled      Vitals Value Taken Time   /76 8/6/2019  5:12 PM   Temp     Pulse 57 8/6/2019  5:16 PM   Resp 20 8/6/2019  5:16 PM   SpO2 98 % 8/6/2019  5:16 PM   Vitals shown include unvalidated device data.

## 2019-08-06 NOTE — ANESTHESIA PREPROCEDURE EVALUATION
Anesthetic History     PONV          Review of Systems / Medical History  Patient summary reviewed and pertinent labs reviewed    Pulmonary  Within defined limits                 Neuro/Psych         Psychiatric history     Cardiovascular    Hypertension: well controlled  Valvular problems/murmurs (s/p AVR)    CHF  Dysrhythmias   Hyperlipidemia    Exercise tolerance: <4 METS  Comments: AVR, biomechanical    H/O a fib after valve surgery   GI/Hepatic/Renal     GERD: well controlled           Endo/Other    Diabetes    Obesity and arthritis    Comments: Borderline DM Other Findings              Physical Exam    Airway  Mallampati: II  TM Distance: 4 - 6 cm  Neck ROM: normal range of motion   Mouth opening: Normal     Cardiovascular    Rhythm: regular    Peripheral edema (+1)  Murmur: Grade 1, Aortic area     Dental    Dentition: Full upper dentures and Lower dentition intact     Pulmonary  Breath sounds clear to auscultation               Abdominal         Other Findings            Anesthetic Plan    ASA: 3  Anesthesia type: general          Induction: Intravenous  Anesthetic plan and risks discussed with: Patient      LUCINDA

## 2019-08-06 NOTE — BRIEF OP NOTE
BRIEF OPERATIVE NOTE    Date of Procedure: 8/6/2019   Preoperative Diagnosis: Kidney stone [N20.0]  Postoperative Diagnosis: BILATERAL Kidney stoneS [N20.0]    Procedure(s):  CYSTOSCOPY BILATERAL URETEROSCOPY/LASER LITHOTRIPSY/BASKET OF STONE/BILATERAL URETERAL STENTS/BILATERAL RETROGRADE PYELOGRAMS   Surgeon(s) and Role:     Cory Orr MD - Primary         Surgical Assistant: None    Surgical Staff:  Circ-1: Kerry Francis RN  Event Time In Time Out   Incision Start 1519    Incision Close 1646      Anesthesia: General   Estimated Blood Loss: 1 cc  Specimens:   ID Type Source Tests Collected by Time Destination   1 : right kidney stones Fresh Kidney, Right  Errol Rodriguez MD 8/6/2019 1555 Pathology      Findings: Bilateral kidney stones fragmented and removed. Complications: None   Implants:   Implant Name Type Inv.  Item Serial No.  Lot No. LRB No. Used Action   STENT URET 6F 24CM -- PERCUFLEX PLUS [id number] - [id number]  STENT URET 6F 24CM -- [id number]  [facility] [id number]  [employer] [facility] [id number] Right 1 Implanted   STENT URET 6F 24CM -- [facility] [id number] - [id number]  STENT URET 6F 24CM -- [facility] [id number]  [employer] [facility] [id number] Left 1 Implanted

## 2019-08-06 NOTE — H&P
[address] Urology H&P Note                                           08/06/19     Patient: Lisa Jones  MRN: [id number]    Admission Date:  8/6/2019, 0  Admission Diagnosis: Kidney stone [N20.0]    ASSESSMENT: 78 y.o. female with recurrent UTIs and bilateral kidney stones, which are thought to be contributing. She opted for bilateral ureteroscopy and presents today for treatment. PLAN:  -To OR today for cystoscopy, bilateral ureteroscopy, laser lithotripsy, basket extraction of stones, bilateral stent placement. Risks/benefits/alternatives reviewed and she wants to proceed. -Consented  -Antibiotics on call to OR  -NPO for surgery. __________________________________________________________________________________    HPI:     Lisa Jones is a 78 y.o. female with a PMH of recurrent UTIs and kidney stones who presented to clinic for evaluation.     At her last clinic visit, she reported being diagnosed with a recent UTI 1 week ago. Culture showed pan-sensitive ecoli. She is currently on Augmentin, but does not feel like she is improving on the medication. SHe also has sharp intermittent R flank pain and feels as though she is passing a kidney stone.       She reports at least 4 UTIs over the past year. NO problems emptying. PVR 17 cc. Most recent UTI has been associated with hematuria, urgency, frequency. No fevers.      CT A/P was obtained and showed bilateral kidney stones with no ureteral stones.   She opted for treatment today.        Past Medical History:  Past Medical History:   Diagnosis Date    Adverse effect of anesthesia     woke up on a vent s/p cholecystectomy- panic      Anemia     Arthritis     CAD (coronary artery disease)     Chronic kidney disease     pt denies 9/19/16    Chronic pain     r/t arthritis    Diabetes ([address]     pt states she is no longer Diabetic    Follow-up visit for aortic valve replacement with bioprosthetic valve 7/25/2016    GERD (gastroesophageal reflux disease)     controlled w/med    Hypertension     controlled w/med    Kidney stones     Morbid obesity (HCC)     Murmur, cardiac     Nausea & vomiting     pt can not be flat in bed    Psychiatric disorder     claustraphobia (severe)    PUD (peptic ulcer disease) 06/2016    dx 2016    S/P aortic valve replacement with bioprosthetic valve 4/28/2017    Spinal stenosis of lumbar region with neurogenic claudication 10/16/2018    Valvular heart disease 2016    AVR       Past Surgical History:  Past Surgical History:   Procedure Laterality Date    COLONOSCOPY N/A 6/15/2016    COLONOSCOPY/ BMI 41  ROOM 2235 performed by Haily Cooley MD at [facility] ENDOSCOPY    HX AORTIC VALVE REPLACEMENT  6/9/2016    Dr. Julio Cesar Rodrigues    HX BILATERAL SALPINGO-OOPHORECTOMY      HX CERVICAL FUSION      HX GASTRIC BYPASS      ans reversal     HX GI      repaired \"hole in stomach\" from gastric bypass    HX HEART CATHETERIZATION  05/26/2016    Severe AS with minimal nonobstructive CAD.  HX HEART CATHETERIZATION  01/31/2019    Hyperdynamic LV EF. No significant gradient across bioprosthetic aortic valve,mild nonobstructive CAD,and MAC.  HX HEART VALVE SURGERY      HX HYSTERECTOMY      HX KNEE REPLACEMENT Right 10/10/2016    Dr. Huber Magallanes, POA    HX KNEE REPLACEMENT Bilateral     HX LAP CHOLECYSTECTOMY      HX SHOULDER ARTHROSCOPY Right      times 2 \"shaved bone\"     HX UROLOGICAL  Multiple dates    Mulitiple open Nephrolithotomies       Medications:  No current facility-administered medications on file prior to encounter. Current Outpatient Medications on File Prior to Encounter   Medication Sig Dispense Refill    nebivolol (BYSTOLIC) 20 mg tablet Take 1 Tab by mouth daily. 90 Tab 2    furosemide (LASIX) 20 mg tablet Take 1 Tab by mouth daily. Indications: visible water retention 90 Tab 1    losartan (COZAAR) 100 mg tablet Take 1 Tab by mouth daily.  90 Tab 3    amLODIPine (NORVASC) 10 mg tablet Take 1 Tab by mouth daily. 90 Tab 3    febuxostat (ULORIC) 40 mg tab tablet Take 1 Tab by mouth every morning. 90 Tab 1    aspirin delayed-release 81 mg tablet Take  by mouth daily.  DOCUSATE CALCIUM (STOOL SOFTENER PO) Take  by mouth daily.  polyethylene glycol (MIRALAX) 17 gram/dose powder Take 17 g by mouth daily as needed.  HYDROcodone-acetaminophen (NORCO) 7.5-325 mg per tablet Take 1 Tab by mouth every eight (8) hours as needed for Pain for up to 30 days. Max Daily Amount: 3 Tabs. Indications: Chronic lumbar pain 90 Tab 0    famotidine (PEPCID) 20 mg tablet TAKE 1 TABLET BY MOUTH EVERY NIGHT 90 Tab 0    estradiol (ESTRACE) 0.01 % (0.1 mg/gram) vaginal cream Insert 2 g into vagina Every Mon, Wed & Sun. 42.5 g 3    albuterol (PROVENTIL HFA, VENTOLIN HFA, PROAIR HFA) 90 mcg/actuation inhaler Take 1 Puff by inhalation every six (6) hours as needed for Wheezing. 1 Inhaler 3    potassium chloride (KLOR-CON) 10 mEq tablet Take 1 Tab by mouth daily. 90 Tab 1    DISABLED PLACARD (DISABLED PLACARD) DMV To use with handicap placard form 1 Each 0    famotidine (PEPCID) 20 mg tablet TAKE 1 TABLET BY MOUTH EVERY NIGHT 90 Tab 1    nitroglycerin (NITROSTAT) 0.4 mg SL tablet 1 Tab by SubLINGual route every five (5) minutes as needed for Chest Pain. Up to 3 doses. 25 Tab 4    omeprazole (PRILOSEC) 40 mg capsule Take 1 Cap by mouth daily. 90 Cap 1    calcium-vitamin D 600 mg(1,500mg) -200 unit tab Take 1 Tab by mouth daily.  ferrous sulfate 325 mg (65 mg iron) tablet Take 1 Tab by mouth daily (with breakfast). 30 Tab 1       Allergies: Allergies   Allergen Reactions    Latex Rash    Metformin Diarrhea    Sulfa (Sulfonamide Antibiotics) Anaphylaxis    Macrobid [Nitrofurantoin Monohyd/M-Cryst] Other (comments)     Causes Gout    Other Plant, Animal, Environmental Itching     Pt itched after wearing a plastic blood pressure cuff.   Pt reports BP will be higher in right arm     Oxycodone Other (comments)     Hallucination, anxiety with oxycontin    Tape [Adhesive] Rash     Paper tape ok       Social History:  Social History     Socioeconomic History    Marital status:      Spouse name: Not on file    Number of children: Not on file    Years of education: Not on file    Highest education level: Not on file   Occupational History    Not on file   Social Needs    Financial resource strain: Not on file    Food insecurity:     Worry: Not on file     Inability: Not on file    Transportation needs:     Medical: Not on file     Non-medical: Not on file   Tobacco Use    Smoking status: Never Smoker    Smokeless tobacco: Never Used   Substance and Sexual Activity    Alcohol use: No    Drug use: No    Sexual activity: Not on file   Lifestyle    Physical activity:     Days per week: Not on file     Minutes per session: Not on file    Stress: Not on file   Relationships    Social connections:     Talks on phone: Not on file     Gets together: Not on file     Attends Evangelical service: Not on file     Active member of club or organization: Not on file     Attends meetings of clubs or organizations: Not on file     Relationship status: Not on file    Intimate partner violence:     Fear of current or ex partner: Not on file     Emotionally abused: Not on file     Physically abused: Not on file     Forced sexual activity: Not on file   Other Topics Concern    Not on file   Social History Narrative    Not on file       Family History:  Family History   Problem Relation Age of Onset    Cancer Father         lung- smoker    Hypertension Father     Lung Disease Father     Cancer Mother         lung -smoker    Lung Disease Mother     Diabetes Maternal Grandmother     Diabetes Paternal Grandmother     Breast Cancer Neg Hx        ROS:  Review of Systems - General ROS: negative for - chills, fatigue or fever  Respiratory ROS: no cough, shortness of breath, or wheezing  Cardiovascular ROS: no chest pain or dyspnea on exertion  Gastrointestinal ROS: no abdominal pain, change in bowel habits, or black or bloody stools  Musculoskeletal ROS: negative  negative for - muscular weakness    Vitals:  Visit Vitals  /75 (BP 1 Location: Left arm, BP Patient Position: At rest)   Pulse 61   Temp 98.7 °F (37.1 °C)   Resp 18   Ht 5' 6\" (1.676 m)   Wt 245 lb 9.6 oz (111.4 kg)   SpO2 98%   BMI 39.64 kg/m²       Intake/Output:  No intake or output data in the 24 hours ending 08/06/19 1432     Physical Exam:   Visit Vitals  /75 (BP 1 Location: Left arm, BP Patient Position: At rest)   Pulse 61   Temp 98.7 °F (37.1 °C)   Resp 18   Ht 5' 6\" (1.676 m)   Wt 245 lb 9.6 oz (111.4 kg)   SpO2 98%   BMI 39.64 kg/m²        GENERAL: No acute distress, Awake, Alert, Oriented X 3  CARDIAC: regular rate and rhythm  CHEST AND LUNG: Easy work of breathing  ABDOMEN: soft, non tender, non-distended  SKIN: No rash, no erythema, no lacerations or abrasions, no ecchymosis  NEUROLOGIC: cranial nerves 2-12 grossly intact     Lab/Radiology/Other Diagnostic Tests:  No results for input(s): HGB, HCT, WBC, NA, K, CL, CO2, BUN, CR, GLU, CA, MG, ALBUMIN, ALT, PREALB, INR, HGBEXT, HCTEXT in the last 72 hours. No lab exists for component: PLTCT, PO4, TOTPROT, TOTBILI, AST, ALKPHOS, DOMO, LIPASE, PT, PTT, INREXT      Christiano Ferrari M.D.     [facility]  [place]  [address] [place] [address] St  Phone: [phone]  Fax: [phone]

## 2019-08-07 NOTE — OP NOTES
[address] [place] [address]  OPERATIVE REPORT    Name:  Jesusita Cotter  MR#:  [id number]  :  1950  ACCOUNT #:  [de-identified]  DATE OF SERVICE:  2019      PREOPERATIVE DIAGNOSIS:  Bilateral kidney stones. POSTOPERATIVE DIAGNOSIS:  Bilateral kidney stones. PROCEDURE PERFORMED:  Cystoscopy, bilateral ureteroscopy with laser lithotripsy, basket extraction of stone, bilateral stent placement, bilateral retrograde pyelograms. Intraoperative interpretation of fluoroscopy less than 1 hour. SURGEON:  Liliane Sanchez MD    ASSISTANT:  None. ANESTHESIA:  General.    COMPLICATIONS:  None. SPECIMENS REMOVED:  Kidney stones sent for analysis. IMPLANTS:  6 x 24 double-J bilateral ureteral stents with strings. ESTIMATED BLOOD LOSS:  1 mL. FINDINGS:  Bilateral kidney stones fragmented and removed. INDICATIONS FOR OPERATIVE PROCEDURE: The patient is a 59-year-old female with a history of recurrent urinary tract infections and kidney stones, found to have multiple bilateral kidney stones with CT scan. She opted for bilateral ureteroscopy to treat the stones as they could likely be hovering bacteria causing her recurrent UTIs. She presents for surgery today. DESCRIPTION OF OPERATIVE PROCEDURE:  After informed consent was obtained and the correct patient was identified in the preoperative holding area, she was taken back to the operating room suite and placed on the table in the supine position. Time-out was performed confirming the correct patient and planned procedure. She received 2 g of IV Ancef prior to smooth induction of general endotracheal anesthesia. She was moved to the dorsal lithotomy position and prepped and draped in the usual sterile fashion. I began the case by inserting a 22-Latvian rigid cystoscope with a 30-degree lens in the urethra and advanced it into the patient's bladder. Pancystoscopy was performed which was unremarkable.   The right ureteral orifice was cannulated with a sensor wire which was passed under fluoroscopic guidance into the right renal pelvis. A second sensor wire was then passed alongside the first under fluoroscopic guidance into the right renal pelvis. I then backloaded the scopes off the wire and placed an 11/13 ureteral access catheter under fluoroscopic guidance into the proximal right ureter. I then switched to pressure bag and my flexible ureteroscope and inserted this through the access sheath into the patient's kidney. Pan-pyeloscopy was performed which revealed two areas of stone. A 200 micron laser fiber was used at the settings of 0.5 joules and 5 Hz to laser the stones into basketable pieces. Tricep basket was used to remove all the pieces from the right side. I then injected 10 mL of Conray to perform a retrograde pyelogram and obtain a map of the kidney and no filling defects were noted. No abnormalities were noted on retrograde pyelogram.  Flexible ureteroscope was used to inspect each and every maribel and no significant stone fragments remained on the right side. After this was complete, I backed my flexible ureteroscope down the patient's right ureter, leaving the sensor wire in place for later stent placement. I then switched back to my rigid cystoscope and obtained access to the left kidney in the exact same fashion as I had obtained on the right. Once the access sheath was in place I then inserted my flexible ureteroscope and performed pyeloscopy. There is one large stone in the lower pole. No other significant stone fragments were identified. I used my 200 micron laser fiber with the settings of 0.5 joules and 5 Hz to laser this into basketable pieces. A Tricep basket was used to remove all pieces.   I then performed another retrograde pyelogram on the left side which was also normal.  I used this as a map and ensured that there were no significant stone fragments in any of the patient's renal calyces, only dust.  At this point I then removed my flexible ureteroscope and backed it down the ureter removing the access sheath and flexible ureteroscope intact. I then switched over to cystoscope and systematically loaded the sensor wires onto the rigid cystoscope and passed a 6 x 24 double-J stent over the wire under fluoroscopic guidance into each kidney. When the stents were in position, the wires were pulled noting a good curl in the kidney as well as the patient's bladder. Strings were left extruding from the meatus. The bladder was then drained completely and the patient was awoken from anesthesia and transferred to PACU in stable condition. She tolerated the procedure well. There were no complications. All counts were correct at the end of procedure.       Vik Gauthier MD      [id number]  D:  08/06/2019 17:18  T:  08/06/2019 17:23  JOB #:  [id number]

## 2019-10-28 ENCOUNTER — ANESTHESIA EVENT (OUTPATIENT)
Dept: SURGERY | Age: 69
End: 2019-10-28
Payer: MEDICARE

## 2019-10-29 ENCOUNTER — HOSPITAL ENCOUNTER (OUTPATIENT)
Age: 69
Setting detail: OUTPATIENT SURGERY
Discharge: HOME | End: 2019-10-29
Attending: RADIOLOGY | Admitting: RADIOLOGY
Payer: MEDICARE

## 2019-10-29 ENCOUNTER — ANESTHESIA (OUTPATIENT)
Dept: SURGERY | Age: 69
End: 2019-10-29
Payer: MEDICARE

## 2019-10-29 ENCOUNTER — APPOINTMENT (OUTPATIENT)
Dept: MRI IMAGING | Age: 69
End: 2019-10-29
Attending: OTOLARYNGOLOGY
Payer: MEDICARE

## 2019-10-29 VITALS
TEMPERATURE: 97.9 F | OXYGEN SATURATION: 94 % | SYSTOLIC BLOOD PRESSURE: 146 MMHG | RESPIRATION RATE: 17 BRPM | HEIGHT: 66 IN | HEART RATE: 69 BPM | DIASTOLIC BLOOD PRESSURE: 62 MMHG | WEIGHT: 244.6 LBS | BODY MASS INDEX: 39.31 KG/M2

## 2019-10-29 PROCEDURE — 76210000006 HC OR PH I REC 0.5 TO 1 HR

## 2019-10-29 PROCEDURE — 74011250636 HC RX REV CODE- 250/636: Performed by: ANESTHESIOLOGY

## 2019-10-29 PROCEDURE — 77030037088 HC TUBE ENDOTRACH ORAL NSL COVD-A: Performed by: NURSE ANESTHETIST, CERTIFIED REGISTERED

## 2019-10-29 PROCEDURE — 70553 MRI BRAIN STEM W/O & W/DYE: CPT

## 2019-10-29 PROCEDURE — 74011250636 HC RX REV CODE- 250/636: Performed by: NURSE ANESTHETIST, CERTIFIED REGISTERED

## 2019-10-29 PROCEDURE — A9575 INJ GADOTERATE MEGLUMI 0.1ML: HCPCS | Performed by: RADIOLOGY

## 2019-10-29 PROCEDURE — 74011000250 HC RX REV CODE- 250: Performed by: NURSE ANESTHETIST, CERTIFIED REGISTERED

## 2019-10-29 PROCEDURE — 74011250636 HC RX REV CODE- 250/636: Performed by: RADIOLOGY

## 2019-10-29 PROCEDURE — 76060000033 HC ANESTHESIA 1 TO 1.5 HR

## 2019-10-29 PROCEDURE — 76210000020 HC REC RM PH II FIRST 0.5 HR

## 2019-10-29 PROCEDURE — 77030039425 HC BLD LARYNG TRULITE DISP TELE -A: Performed by: NURSE ANESTHETIST, CERTIFIED REGISTERED

## 2019-10-29 RX ORDER — ONDANSETRON 2 MG/ML
INJECTION INTRAMUSCULAR; INTRAVENOUS AS NEEDED
Status: DISCONTINUED | OUTPATIENT
Start: 2019-10-29 | End: 2019-10-29 | Stop reason: HOSPADM

## 2019-10-29 RX ORDER — SODIUM CHLORIDE, SODIUM LACTATE, POTASSIUM CHLORIDE, CALCIUM CHLORIDE 600; 310; 30; 20 MG/100ML; MG/100ML; MG/100ML; MG/100ML
75 INJECTION, SOLUTION INTRAVENOUS CONTINUOUS
Status: DISCONTINUED | OUTPATIENT
Start: 2019-10-29 | End: 2019-10-29 | Stop reason: HOSPADM

## 2019-10-29 RX ORDER — DIPHENHYDRAMINE HYDROCHLORIDE 50 MG/ML
12.5 INJECTION, SOLUTION INTRAMUSCULAR; INTRAVENOUS
Status: DISCONTINUED | OUTPATIENT
Start: 2019-10-29 | End: 2019-10-29 | Stop reason: HOSPADM

## 2019-10-29 RX ORDER — NALOXONE HYDROCHLORIDE 0.4 MG/ML
0.1 INJECTION, SOLUTION INTRAMUSCULAR; INTRAVENOUS; SUBCUTANEOUS AS NEEDED
Status: DISCONTINUED | OUTPATIENT
Start: 2019-10-29 | End: 2019-10-29 | Stop reason: HOSPADM

## 2019-10-29 RX ORDER — DEXAMETHASONE SODIUM PHOSPHATE 4 MG/ML
INJECTION, SOLUTION INTRA-ARTICULAR; INTRALESIONAL; INTRAMUSCULAR; INTRAVENOUS; SOFT TISSUE AS NEEDED
Status: DISCONTINUED | OUTPATIENT
Start: 2019-10-29 | End: 2019-10-29 | Stop reason: HOSPADM

## 2019-10-29 RX ORDER — EPHEDRINE SULFATE/0.9% NACL/PF 50 MG/5 ML
SYRINGE (ML) INTRAVENOUS AS NEEDED
Status: DISCONTINUED | OUTPATIENT
Start: 2019-10-29 | End: 2019-10-29 | Stop reason: HOSPADM

## 2019-10-29 RX ORDER — LIDOCAINE HYDROCHLORIDE 10 MG/ML
0.1 INJECTION INFILTRATION; PERINEURAL AS NEEDED
Status: DISCONTINUED | OUTPATIENT
Start: 2019-10-29 | End: 2019-10-29 | Stop reason: HOSPADM

## 2019-10-29 RX ORDER — GADOTERATE MEGLUMINE 376.9 MG/ML
22 INJECTION INTRAVENOUS
Status: COMPLETED | OUTPATIENT
Start: 2019-10-29 | End: 2019-10-29

## 2019-10-29 RX ORDER — SODIUM CHLORIDE, SODIUM LACTATE, POTASSIUM CHLORIDE, CALCIUM CHLORIDE 600; 310; 30; 20 MG/100ML; MG/100ML; MG/100ML; MG/100ML
100 INJECTION, SOLUTION INTRAVENOUS CONTINUOUS
Status: DISCONTINUED | OUTPATIENT
Start: 2019-10-29 | End: 2019-10-29 | Stop reason: HOSPADM

## 2019-10-29 RX ORDER — MIDAZOLAM HYDROCHLORIDE 1 MG/ML
2 INJECTION, SOLUTION INTRAMUSCULAR; INTRAVENOUS
Status: DISCONTINUED | OUTPATIENT
Start: 2019-10-29 | End: 2019-10-29 | Stop reason: HOSPADM

## 2019-10-29 RX ORDER — FLUMAZENIL 0.1 MG/ML
0.2 INJECTION INTRAVENOUS
Status: DISCONTINUED | OUTPATIENT
Start: 2019-10-29 | End: 2019-10-29 | Stop reason: HOSPADM

## 2019-10-29 RX ORDER — HYDROMORPHONE HYDROCHLORIDE 2 MG/ML
0.2 INJECTION, SOLUTION INTRAMUSCULAR; INTRAVENOUS; SUBCUTANEOUS
Status: DISCONTINUED | OUTPATIENT
Start: 2019-10-29 | End: 2019-10-29 | Stop reason: HOSPADM

## 2019-10-29 RX ORDER — ROCURONIUM BROMIDE 10 MG/ML
INJECTION, SOLUTION INTRAVENOUS AS NEEDED
Status: DISCONTINUED | OUTPATIENT
Start: 2019-10-29 | End: 2019-10-29 | Stop reason: HOSPADM

## 2019-10-29 RX ORDER — SODIUM CHLORIDE 0.9 % (FLUSH) 0.9 %
10 SYRINGE (ML) INJECTION
Status: COMPLETED | OUTPATIENT
Start: 2019-10-29 | End: 2019-10-29

## 2019-10-29 RX ORDER — LIDOCAINE HYDROCHLORIDE 20 MG/ML
INJECTION, SOLUTION EPIDURAL; INFILTRATION; INTRACAUDAL; PERINEURAL AS NEEDED
Status: DISCONTINUED | OUTPATIENT
Start: 2019-10-29 | End: 2019-10-29 | Stop reason: HOSPADM

## 2019-10-29 RX ORDER — PROPOFOL 10 MG/ML
INJECTION, EMULSION INTRAVENOUS AS NEEDED
Status: DISCONTINUED | OUTPATIENT
Start: 2019-10-29 | End: 2019-10-29 | Stop reason: HOSPADM

## 2019-10-29 RX ADMIN — GADOTERATE MEGLUMINE 22 ML: 376.9 INJECTION INTRAVENOUS at 09:25

## 2019-10-29 RX ADMIN — Medication 10 MG: at 09:30

## 2019-10-29 RX ADMIN — Medication 10 MG: at 09:08

## 2019-10-29 RX ADMIN — Medication 10 ML: at 09:25

## 2019-10-29 RX ADMIN — PHENYLEPHRINE HYDROCHLORIDE 100 MCG: 10 INJECTION INTRAVENOUS at 09:30

## 2019-10-29 RX ADMIN — PHENYLEPHRINE HYDROCHLORIDE 150 MCG: 10 INJECTION INTRAVENOUS at 09:38

## 2019-10-29 RX ADMIN — ROCURONIUM BROMIDE 30 MG: 10 INJECTION, SOLUTION INTRAVENOUS at 08:48

## 2019-10-29 RX ADMIN — PROPOFOL 200 MG: 10 INJECTION, EMULSION INTRAVENOUS at 08:48

## 2019-10-29 RX ADMIN — SODIUM CHLORIDE, SODIUM LACTATE, POTASSIUM CHLORIDE, AND CALCIUM CHLORIDE: 600; 310; 30; 20 INJECTION, SOLUTION INTRAVENOUS at 08:43

## 2019-10-29 RX ADMIN — SODIUM CHLORIDE, SODIUM LACTATE, POTASSIUM CHLORIDE, AND CALCIUM CHLORIDE 100 ML/HR: 600; 310; 30; 20 INJECTION, SOLUTION INTRAVENOUS at 06:53

## 2019-10-29 RX ADMIN — DEXAMETHASONE SODIUM PHOSPHATE 10 MG: 4 INJECTION, SOLUTION INTRAMUSCULAR; INTRAVENOUS at 09:30

## 2019-10-29 RX ADMIN — LIDOCAINE HYDROCHLORIDE 100 MG: 20 INJECTION, SOLUTION EPIDURAL; INFILTRATION; INTRACAUDAL; PERINEURAL at 08:48

## 2019-10-29 RX ADMIN — ONDANSETRON 4 MG: 2 INJECTION INTRAMUSCULAR; INTRAVENOUS at 09:30

## 2019-10-29 RX ADMIN — PHENYLEPHRINE HYDROCHLORIDE 100 MCG: 10 INJECTION INTRAVENOUS at 09:08

## 2019-10-29 NOTE — ANESTHESIA POSTPROCEDURE EVALUATION
Procedure(s):  MRI ANESTHESIA /IAC WITH AND WITHOUT.    general    Anesthesia Post Evaluation        Patient location during evaluation: PACU  Patient participation: complete - patient participated  Level of consciousness: awake and alert  Pain management: adequate  Airway patency: patent  Anesthetic complications: no  Cardiovascular status: acceptable  Respiratory status: acceptable  Hydration status: acceptable  Post anesthesia nausea and vomiting:  none      Vitals Value Taken Time   /63 10/29/2019 10:32 AM   Temp 36.6 °C (97.9 °F) 10/29/2019 10:32 AM   Pulse 68 10/29/2019 10:34 AM   Resp 17 10/29/2019 10:32 AM   SpO2 94 % 10/29/2019 10:34 AM   Vitals shown include unvalidated device data.

## 2019-10-29 NOTE — H&P
Patient: Martin Rand MRN: [id number]  SSN: [id number]    YOB: 1950  Age: 78 y.o. Sex: female      History and Physical    Martin Rand is a 78 y.o. female having Procedure(s):  MRI ANESTHESIA /IAC WITH AND WITHOUT. Allergies: Allergies   Allergen Reactions    Latex Rash    Metformin Diarrhea    Sulfa (Sulfonamide Antibiotics) Anaphylaxis    Macrobid [Nitrofurantoin Monohyd/M-Cryst] Other (comments)     Causes Gout    Other Plant, Animal, Environmental Itching     Pt itched after wearing a plastic blood pressure cuff. Pt reports BP will be higher in right arm     Oxycodone Other (comments)     Hallucination, anxiety with oxycontin-- denies rx to hydrocodone    Tape [Adhesive] Rash     Paper tape ok        Chief Complaint: Possible ear tumor for MRI with anesthesia. History of Present Illness: For full medical history see below. In brief, patient is a 77 y/o F with possible ear tumor to have MRI with anesthesia due to inability to lie flat without significant nausea and claustrophobia. No history of anesthesia problems.      Past Medical History:   Diagnosis Date    Adverse effect of anesthesia     panic attack when mask placed on face    Anemia     2016/2017 - gi bleed---  2017 blood transfusion prior to AVR    Arthritis     CAD (coronary artery disease) 2016 1/2019 Minor nonobstructive coronary artery disease/ cath report    Chronic kidney disease     kidney stones    Chronic pain     r/t arthritis/ back pain Spinal stenosis of lumbar region     Diabetes ([facility] [address]     \"prediabetic\" A1C 6.4 8/28/19- no meds    Follow-up visit for aortic valve replacement with bioprosthetic valve 7/25/2016    GERD (gastroesophageal reflux disease)     controlled w/med- well controlled with meds    History of gastric ulcer 6/16/2016    Hypertension     controlled w/med    Kidney stones     Morbid obesity ([address]     Murmur, cardiac     s/p AVR    PONV (postoperative nausea and vomiting) Pt can not be flat in bed -- severe PONV worsens when laying flat    Psychiatric disorder     claustraphobia (severe)    PUD (peptic ulcer disease) 06/2016    dx 2016- resolved per patient/ GI bleed requiring blood transfusion    S/P aortic valve replacement with bioprosthetic valve 4/28/2017    Spinal stenosis of lumbar region with neurogenic claudication 10/16/2018    Valvular heart disease 2016    AVR      Past Surgical History:   Procedure Laterality Date    ABDOMEN SURGERY PROC UNLISTED      reversal of gastric bypass reversal    COLONOSCOPY N/A 6/15/2016    COLONOSCOPY/ BMI 41  ROOM 2235 performed by [facility] H[facility] at [facility] ENDOSCOPY    HX AORTIC VALVE REPLACEMENT  6/9/2016    Dr. Sean Jimenez    HX BILATERAL SALPINGO-OOPHORECTOMY      HX CERVICAL FUSION      posterior fusion    HX GASTRIC BYPASS      HX HEART CATHETERIZATION  05/26/2016    Severe AS with minimal nonobstructive CAD.  HX HEART CATHETERIZATION  01/31/2019    Hyperdynamic LV EF. No significant gradient across bioprosthetic aortic valve,mild nonobstructive CAD,and MAC.  HX HEART VALVE SURGERY      HX HYSTERECTOMY      HX KNEE REPLACEMENT Bilateral     HX LAP CHOLECYSTECTOMY      HX SHOULDER ARTHROSCOPY Right      times 2 \"shaved bone\"     HX UROLOGICAL  Multiple dates    Mulitiple open Nephrolithotomies    HX UROLOGICAL  08/2019    CYSTOSCOPY BILATERAL URETEROSCOPY/LASER LITHO/STENTS      Family History   Problem Relation Age of Onset    Hypertension Father     Lung Disease Father     Lung Cancer Father     Lung Disease Mother     Lung Cancer Mother     Diabetes Maternal Grandmother     Diabetes Paternal Grandmother     Breast Cancer Neg Hx       Social History     Tobacco Use    Smoking status: Never Smoker    Smokeless tobacco: Never Used   Substance Use Topics    Alcohol use: No        Prior to Admission medications    Medication Sig Start Date End Date Taking?  Authorizing Provider   amLODIPine (NORVASC) 10 mg tablet TAKE 1 TABLET BY MOUTH DAILY  Patient taking differently: Take 10 mg by mouth every morning. 10/1/19  Yes Lupis Crespo MD   famotidine (PEPCID) 20 mg tablet TAKE 1 TABLET BY MOUTH EVERY NIGHT 9/30/19  Yes DIA Chou   fluticasone propionate (FLONASE) 50 mcg/actuation nasal spray INHALE 2 SPRAYS INTO EACH NOSTRIL DAILY 8/28/19  Yes BrothKellie paniagua, DO   carvedilol (COREG) 25 mg tablet Take 1 Tab by mouth two (2) times daily (with meals). 8/13/19  Yes Lupis Crespo MD   furosemide (LASIX) 20 mg tablet Take 1 Tab by mouth daily. Indications: visible water retention 6/10/19  Yes Kellie Bustos, DO   potassium chloride (KLOR-CON) 10 mEq tablet Take 1 Tab by mouth daily. 6/10/19  Yes Kellie Bustos, DO   losartan (COZAAR) 100 mg tablet Take 1 Tab by mouth daily. 5/7/19  Yes Lupis Crespo MD   DISABLED PLACARD (DISABLED PLACARD) DMV To use with handicap placard form 4/23/19  Yes Kellie Bustos, DO   famotidine (PEPCID) 20 mg tablet TAKE 1 TABLET BY MOUTH EVERY NIGHT 2/12/19  Yes Kellie Bustos, DO   febuxostat (ULORIC) 40 mg tab tablet Take 1 Tab by mouth every morning. 11/26/18  Yes BrothKellie paniagua, DO   aspirin delayed-release 81 mg tablet Take 81 mg by mouth daily. Yes Provider, Historical   DOCUSATE CALCIUM (STOOL SOFTENER PO) Take 1 Tab by mouth daily. Yes Provider, Historical   calcium-vitamin D 600 mg(1,500mg) -200 unit tab Take 1 Tab by mouth daily. Yes Provider, Historical   ferrous sulfate 325 mg (65 mg iron) tablet Take 1 Tab by mouth daily (with breakfast). 6/17/16  Yes Shandra Chatterjee NP   albuterol (PROVENTIL HFA, VENTOLIN HFA, PROAIR HFA) 90 mcg/actuation inhaler INHALE 2 PUFFS BY MOUTH EVERY 6 HOURS AS NEEDED FOR WHEEZING 10/7/19   BrothKellie paniagua, DO   HYDROcodone-acetaminophen (NORCO) 7.5-325 mg per tablet Take 1 Tab by mouth every eight (8) hours as needed for Pain for up to 30 days. Max Daily Amount: 3 Tabs.  Indications: Chronic lumbar pain 9/27/19 10/28/19  DIA Seo   estradiol (ESTRACE) 0.01 % (0.1 mg/gram) vaginal cream Insert 2 g into vagina Every Mon, Wed & Sun. 8/11/19   Tony Owens MD   nitroglycerin (NITROSTAT) 0.4 mg SL tablet 1 Tab by SubLINGual route every five (5) minutes as needed for Chest Pain. Up to 3 doses. 1/3/19   Florencia Grace MD        Visit Vitals  /61 (BP 1 Location: Right arm, BP Patient Position: At rest)   Pulse 75   Temp 37 °C (98.6 °F)   Resp 18   Ht 5' 6\" (1.676 m)   Wt 110.9 kg (244 lb 9.6 oz)   SpO2 95%   BMI 39.48 kg/m²        Assessment and Plan:   Kenney Fair is a 78 y.o. female having Procedure(s):  MRI ANESTHESIA /IAC WITH AND WITHOUT     Plan for GA with ETT due to inability to lie flat without significant nausea as well as claustrophobia.    -Plan discussed with patient - she endorsed understanding and all questions/concerns addressed         Preanesthesia Evaluation     Last edited 10/29/19 2151 by Wilton Lamar MD  Date of Service 10/29/19 0859             Anesthetic History     PONV     Comments: Patient can't lie flat without dry heaving and nausea     Review of Systems / Medical History  Patient summary reviewed and pertinent labs reviewed    Pulmonary  Within defined limits                 Neuro/Psych         Psychiatric history     Cardiovascular    Hypertension: well controlled  Valvular problems/murmurs (s/p AVR)    CHF  Dysrhythmias   Hyperlipidemia    Exercise tolerance: <4 METS  Comments: AVR, biomechanical    H/O a fib after valve surgery    LHC 1/2019 - no significant gradient across aortic valve, mild non-obstructive CAD, hyperdynamic LV function     Echo 1/2019 - hyperdynamic LV function, no RWMA, normal RV function, moderate thickening of MV but no MS, mild MR,    GI/Hepatic/Renal     GERD: well controlled           Endo/Other    Diabetes    Obesity and arthritis    Comments: Borderline DM Other Findings            Physical Exam    Airway  Mallampati: II  TM Distance: 4 - 6 cm  Neck ROM: normal range of motion   Mouth opening: Normal     Cardiovascular    Rhythm: regular      Murmur: Grade 1, Aortic area  Pertinent negatives: Peripheral edema: +1.   Dental    Dentition: Full upper dentures and Lower dentition intact     Pulmonary  Breath sounds clear to auscultation               Abdominal         Other Findings            Anesthetic Plan    ASA: 3  Anesthesia type: general  ETT        Induction: Intravenous  Anesthetic plan and risks discussed with: Patient      Patient is unable to lie flat without significant nausea and dry heaving.  Plan for GETA         Preanesthesia evaluation performed by Lisbeth Finley MD    Revision History       Date/Time User Provider Type Action    > 10/29/19 0656 Lisbeth Finley MD Physician Addend     10/29/19 5382 Lisbeth Finley MD Physician Sign                  Signed By: Beverly Venegas MD     October 29, 2019

## 2019-10-29 NOTE — DISCHARGE INSTRUCTIONS
Follow Up with the doctor that ordered the MRI    Patient Education        MRI of the Head: About This Test  What is it? MRI (magnetic resonance imaging) is a test that uses a magnetic field and pulses of radio wave energy to make pictures of the organs and structures inside the body. An MRI of the head can give your doctor information about your brain, eyes, ears, and nerves. When you have an MRI, you lie on a table and the table moves into the MRI machine. Why is this test done? An MRI of the head can help find problems such as tumors and infection. It also can check symptoms of a head injury or of diseases such as multiple sclerosis (MS) and stroke. How can you prepare for the test?  Talk to your doctor about all your health conditions before the test. For example, tell your doctor if:  · You are allergic to any medicines. · You are or might be pregnant. · You have a pacemaker, an artificial limb, any metal pins or metal parts in your body, metal heart valves, metal clips in your brain, metal implants in your ears, or any other implanted or prosthetic medical device. · You have an intrauterine device (IUD) in place. · You get nervous in confined spaces. You may need medicine to help you relax. · You wear a patch that contains medicine. · You have kidney disease. What happens before the test?  · You will remove all metal objects, such as hearing aids, dentures, jewelry, watches, and hairpins. · You may need to take off some of your clothes. You will be given a gown to wear during the test. If you do leave some clothes on, make sure you take everything out of your pockets. · You may have contrast material (dye) put into your arm through a tube called an IV. Contrast material helps doctors see specific organs, blood vessels, and most tumors. What happens during the test?  · You will lie on your back on a table that is part of the MRI scanner.  Your head, chest, and arms may be held with straps to help you lie still. · The table will slide into the space that contains the magnet. A device called a coil may be placed over or wrapped around your head. · Inside the scanner you will hear a fan and feel air moving. You may hear tapping, thumping, or snapping noises. You may be given earplugs or headphones to reduce the noise. · You will be asked to hold still during the scan. You may be asked to hold your breath for short periods. · You may be alone in the scanning room, but a technologist will be watching you through a window and talking with you during the test.  What else should you know about the test?  · An MRI does not hurt. · You may feel warmth in the area being examined. This is normal.  · A dye (contrast material) that contains gadolinium may be used in this test. Be sure to tell your doctor if:  ? You are pregnant or think you may be pregnant. ? You have kidney problems. ? You've had more than one test that used gadolinium. · The U.S. Food and Drug Administration (FDA) has safety warnings about gadolinium. But for most people, the benefit of its use in this test outweighs the risk. · If a dye is used, you may feel a quick sting or pinch and some coolness when the IV is started. The dye may give you a metallic taste in your mouth. Some people feel sick to their stomach or get a headache. · If you breastfeed and are concerned about whether the dye used in this test is safe, talk to your doctor. Most experts believe that very little dye passes into breast milk and even less is passed on to the baby. But if you prefer, you can store some of your breast milk ahead of time and use it for a day or two after the test.  How long does the test take? · The test usually takes 30 to 60 minutes but can take as long as 2 hours.   What happens after the test?  · You will probably be able to go home right away, depending on the reason for the test.  · You can go back to your usual activities right away.  Follow-up care is a key part of your treatment and safety. Be sure to make and go to all appointments, and call your doctor if you are having problems. It's also a good idea to keep a list of the medicines you take. Ask your doctor when you can expect to have your test results. Where can you learn more? Go to http://chapincito-esau.info/. Enter D017 in the search box to learn more about \"MRI of the Head: About This Test.\"  Current as of: March 28, 2019  Content Version: 12.2  © 4905-8597 [employer]. Care instructions adapted under license by [employer] (which disclaims liability or warranty for this information). If you have questions about a medical condition or this instruction, always ask your healthcare professional. [address] any warranty or liability for your use of this information. After general anesthesia or intravenous sedation, for 24 hours or while taking prescription Narcotics:  · Limit your activities  · A responsible adult needs to be with you for the next 24 hours  · Do not drive and operate hazardous machinery  · Do not make important personal or business decisions  · Do  not drink alcoholic beverages  · If you have not urinated within 8 hours after discharge, please contact your surgeon on call. · If you have sleep apnea and have a CPAP machine, please use it for all naps and sleeping. · Please use caution when taking narcotics and any of your home medications that may cause drowsiness. *  Please give a list of your current medications to your Primary Care Provider. *  Please update this list whenever your medications are discontinued, doses are      changed, or new medications (including over-the-counter products) are added. *  Please carry medication information at all times in case of emergency situations.     These are general instructions for a healthy lifestyle:  No smoking/ No tobacco products/ Avoid exposure to second hand smoke  Surgeon General's Warning:  Quitting smoking now greatly reduces serious risk to your health. Obesity, smoking, and sedentary lifestyle greatly increases your risk for illness  A healthy diet, regular physical exercise & weight monitoring are important for maintaining a healthy lifestyle    You may be retaining fluid if you have a history of heart failure or if you experience any of the following symptoms:  Weight gain of 3 pounds or more overnight or 5 pounds in a week, increased swelling in our hands or feet or shortness of breath while lying flat in bed. Please call your doctor as soon as you notice any of these symptoms; do not wait until your next office visit.

## 2019-10-29 NOTE — ANESTHESIA PREPROCEDURE EVALUATION
Anesthetic History     PONV     Comments: Patient can't lie flat without dry heaving and nausea     Review of Systems / Medical History  Patient summary reviewed and pertinent labs reviewed    Pulmonary  Within defined limits                 Neuro/Psych         Psychiatric history     Cardiovascular    Hypertension: well controlled  Valvular problems/murmurs (s/p AVR)    CHF  Dysrhythmias   Hyperlipidemia    Exercise tolerance: <4 METS  Comments: AVR, biomechanical    H/O a fib after valve surgery    LHC 1/2019 - no significant gradient across aortic valve, mild non-obstructive CAD, hyperdynamic LV function     Echo 1/2019 - hyperdynamic LV function, no RWMA, normal RV function, moderate thickening of MV but no MS, mild MR,    GI/Hepatic/Renal     GERD: well controlled           Endo/Other    Diabetes    Obesity and arthritis    Comments: Borderline DM Other Findings            Physical Exam    Airway  Mallampati: II  TM Distance: 4 - 6 cm  Neck ROM: normal range of motion   Mouth opening: Normal     Cardiovascular    Rhythm: regular      Murmur: Grade 1, Aortic area     Dental    Dentition: Full upper dentures and Lower dentition intact     Pulmonary  Breath sounds clear to auscultation               Abdominal         Other Findings            Anesthetic Plan    ASA: 3  Anesthesia type: general  ETT        Induction: Intravenous  Anesthetic plan and risks discussed with: Patient      Patient is unable to lie flat without significant nausea and dry heaving.  Plan for GETA

## 2020-03-11 ENCOUNTER — PATIENT OUTREACH (OUTPATIENT)
Dept: CASE MANAGEMENT | Age: 70
End: 2020-03-11

## 2020-03-11 NOTE — PROGRESS NOTES
This note will not be viewable in [address] Outreach with patient and subsequent conversation with PCP  [facility]  Risk Score 55% related to:  Spinal Stenosis - 2018, surgery;  Williams per PCP  AVR - 2017  CAD  GERD,  Diabetes - HgbA1c 6.4 (January 2020)    Patient does not feel the need for Care Management  Independent ADLs, drives  PCP concurs    No further outreach planned  No Episode opened  Removed self from Care Team

## 2020-06-30 ENCOUNTER — HOSPITAL ENCOUNTER (OUTPATIENT)
Dept: CT IMAGING | Age: 70
Discharge: HOME | End: 2020-06-30
Attending: PHYSICIAN ASSISTANT
Payer: MEDICARE

## 2020-06-30 DIAGNOSIS — K43.9 VENTRAL HERNIA WITHOUT OBSTRUCTION OR GANGRENE: ICD-10-CM

## 2020-06-30 DIAGNOSIS — K43.2 HERNIATION THROUGH SURGICAL SITE: ICD-10-CM

## 2020-06-30 LAB — CREAT BLD-MCNC: 1.2 MG/DL (ref 0.8–1.5)

## 2020-06-30 PROCEDURE — 74011636320 HC RX REV CODE- 636/320: Performed by: PHYSICIAN ASSISTANT

## 2020-06-30 PROCEDURE — 74011000258 HC RX REV CODE- 258: Performed by: PHYSICIAN ASSISTANT

## 2020-06-30 PROCEDURE — 82565 ASSAY OF CREATININE: CPT

## 2020-06-30 PROCEDURE — 74177 CT ABD & PELVIS W/CONTRAST: CPT

## 2020-06-30 RX ORDER — SODIUM CHLORIDE 0.9 % (FLUSH) 0.9 %
10 SYRINGE (ML) INJECTION
Status: COMPLETED | OUTPATIENT
Start: 2020-06-30 | End: 2020-06-30

## 2020-06-30 RX ADMIN — DIATRIZOATE MEGLUMINE AND DIATRIZOATE SODIUM 15 ML: 660; 100 LIQUID ORAL; RECTAL at 12:12

## 2020-06-30 RX ADMIN — Medication 10 ML: at 12:12

## 2020-06-30 RX ADMIN — SODIUM CHLORIDE 100 ML: 900 INJECTION, SOLUTION INTRAVENOUS at 12:12

## 2020-06-30 RX ADMIN — IOPAMIDOL 100 ML: 755 INJECTION, SOLUTION INTRAVENOUS at 12:12

## 2020-10-21 ENCOUNTER — ANESTHESIA EVENT (OUTPATIENT)
Dept: SURGERY | Age: 70
End: 2020-10-21
Payer: MEDICARE

## 2020-10-22 ENCOUNTER — HOSPITAL ENCOUNTER (OUTPATIENT)
Age: 70
Setting detail: OUTPATIENT SURGERY
Discharge: HOME | End: 2020-10-22
Attending: UROLOGY | Admitting: UROLOGY
Payer: MEDICARE

## 2020-10-22 ENCOUNTER — ANESTHESIA (OUTPATIENT)
Dept: SURGERY | Age: 70
End: 2020-10-22
Payer: MEDICARE

## 2020-10-22 VITALS
WEIGHT: 247 LBS | TEMPERATURE: 98 F | HEART RATE: 67 BPM | BODY MASS INDEX: 41.15 KG/M2 | DIASTOLIC BLOOD PRESSURE: 58 MMHG | RESPIRATION RATE: 15 BRPM | SYSTOLIC BLOOD PRESSURE: 131 MMHG | HEIGHT: 65 IN | OXYGEN SATURATION: 98 %

## 2020-10-22 DIAGNOSIS — N20.1 URETERAL STONE: Primary | ICD-10-CM

## 2020-10-22 LAB
APPEARANCE UR: CLEAR
BILIRUB UR QL: NEGATIVE
COLOR UR: YELLOW
GLUCOSE BLD STRIP.AUTO-MCNC: 128 MG/DL (ref 65–100)
GLUCOSE UR STRIP.AUTO-MCNC: NEGATIVE MG/DL
HGB UR QL STRIP: NEGATIVE
KETONES UR QL STRIP.AUTO: NEGATIVE MG/DL
LEUKOCYTE ESTERASE UR QL STRIP.AUTO: NEGATIVE
NITRITE UR QL STRIP.AUTO: NEGATIVE
PH UR STRIP: 6 [PH] (ref 5–9)
POTASSIUM BLD-SCNC: 3.7 MMOL/L (ref 3.5–5.1)
PROT UR STRIP-MCNC: NEGATIVE MG/DL
SP GR UR REFRACTOMETRY: 1.02 (ref 1–1.02)
UROBILINOGEN UR QL STRIP.AUTO: 0.2 EU/DL (ref 0.2–1)

## 2020-10-22 PROCEDURE — 74011250636 HC RX REV CODE- 250/636: Performed by: UROLOGY

## 2020-10-22 PROCEDURE — 77030040361 HC SLV COMPR DVT MDII -B: Performed by: UROLOGY

## 2020-10-22 PROCEDURE — 77030040922 HC BLNKT HYPOTHRM STRY -A: Performed by: ANESTHESIOLOGY

## 2020-10-22 PROCEDURE — 76060000033 HC ANESTHESIA 1 TO 1.5 HR: Performed by: UROLOGY

## 2020-10-22 PROCEDURE — 77030039425 HC BLD LARYNG TRULITE DISP TELE -A: Performed by: ANESTHESIOLOGY

## 2020-10-22 PROCEDURE — 74011250636 HC RX REV CODE- 250/636: Performed by: ANESTHESIOLOGY

## 2020-10-22 PROCEDURE — 81003 URINALYSIS AUTO W/O SCOPE: CPT

## 2020-10-22 PROCEDURE — 76210000020 HC REC RM PH II FIRST 0.5 HR: Performed by: UROLOGY

## 2020-10-22 PROCEDURE — 77030037088 HC TUBE ENDOTRACH ORAL NSL COVD-A: Performed by: ANESTHESIOLOGY

## 2020-10-22 PROCEDURE — 74011250636 HC RX REV CODE- 250/636: Performed by: NURSE ANESTHETIST, CERTIFIED REGISTERED

## 2020-10-22 PROCEDURE — 74011000250 HC RX REV CODE- 250: Performed by: NURSE ANESTHETIST, CERTIFIED REGISTERED

## 2020-10-22 PROCEDURE — 82962 GLUCOSE BLOOD TEST: CPT

## 2020-10-22 PROCEDURE — 50590 FRAGMENTING OF KIDNEY STONE: CPT | Performed by: UROLOGY

## 2020-10-22 PROCEDURE — 84132 ASSAY OF SERUM POTASSIUM: CPT

## 2020-10-22 PROCEDURE — 76010000149 HC OR TIME 1 TO 1.5 HR: Performed by: UROLOGY

## 2020-10-22 PROCEDURE — 76210000006 HC OR PH I REC 0.5 TO 1 HR: Performed by: UROLOGY

## 2020-10-22 PROCEDURE — 77030019908 HC STETH ESOPH SIMS -A: Performed by: ANESTHESIOLOGY

## 2020-10-22 RX ORDER — ONDANSETRON 2 MG/ML
INJECTION INTRAMUSCULAR; INTRAVENOUS AS NEEDED
Status: DISCONTINUED | OUTPATIENT
Start: 2020-10-22 | End: 2020-10-22 | Stop reason: HOSPADM

## 2020-10-22 RX ORDER — HYDROMORPHONE HYDROCHLORIDE 2 MG/ML
0.5 INJECTION, SOLUTION INTRAMUSCULAR; INTRAVENOUS; SUBCUTANEOUS
Status: DISCONTINUED | OUTPATIENT
Start: 2020-10-22 | End: 2020-10-22 | Stop reason: HOSPADM

## 2020-10-22 RX ORDER — HYDROCODONE BITARTRATE AND ACETAMINOPHEN 10; 325 MG/1; MG/1
1 TABLET ORAL
Qty: 20 TAB | Refills: 0 | Status: SHIPPED | OUTPATIENT
Start: 2020-10-22 | End: 2020-10-29

## 2020-10-22 RX ORDER — GLYCOPYRROLATE 0.2 MG/ML
INJECTION INTRAMUSCULAR; INTRAVENOUS AS NEEDED
Status: DISCONTINUED | OUTPATIENT
Start: 2020-10-22 | End: 2020-10-22 | Stop reason: HOSPADM

## 2020-10-22 RX ORDER — PROPOFOL 10 MG/ML
INJECTION, EMULSION INTRAVENOUS AS NEEDED
Status: DISCONTINUED | OUTPATIENT
Start: 2020-10-22 | End: 2020-10-22 | Stop reason: HOSPADM

## 2020-10-22 RX ORDER — SODIUM CHLORIDE, SODIUM LACTATE, POTASSIUM CHLORIDE, CALCIUM CHLORIDE 600; 310; 30; 20 MG/100ML; MG/100ML; MG/100ML; MG/100ML
100 INJECTION, SOLUTION INTRAVENOUS CONTINUOUS
Status: DISCONTINUED | OUTPATIENT
Start: 2020-10-22 | End: 2020-10-22 | Stop reason: HOSPADM

## 2020-10-22 RX ORDER — DIPHENHYDRAMINE HYDROCHLORIDE 50 MG/ML
12.5 INJECTION, SOLUTION INTRAMUSCULAR; INTRAVENOUS
Status: DISCONTINUED | OUTPATIENT
Start: 2020-10-22 | End: 2020-10-22 | Stop reason: HOSPADM

## 2020-10-22 RX ORDER — LIDOCAINE HYDROCHLORIDE 20 MG/ML
INJECTION, SOLUTION EPIDURAL; INFILTRATION; INTRACAUDAL; PERINEURAL AS NEEDED
Status: DISCONTINUED | OUTPATIENT
Start: 2020-10-22 | End: 2020-10-22 | Stop reason: HOSPADM

## 2020-10-22 RX ORDER — ESOMEPRAZOLE MAGNESIUM 40 MG/1
40 CAPSULE, DELAYED RELEASE ORAL DAILY
COMMUNITY
Start: 2020-10-11

## 2020-10-22 RX ORDER — NALOXONE HYDROCHLORIDE 0.4 MG/ML
0.1 INJECTION, SOLUTION INTRAMUSCULAR; INTRAVENOUS; SUBCUTANEOUS
Status: DISCONTINUED | OUTPATIENT
Start: 2020-10-22 | End: 2020-10-22 | Stop reason: HOSPADM

## 2020-10-22 RX ORDER — MIDAZOLAM HYDROCHLORIDE 1 MG/ML
2 INJECTION, SOLUTION INTRAMUSCULAR; INTRAVENOUS
Status: COMPLETED | OUTPATIENT
Start: 2020-10-22 | End: 2020-10-22

## 2020-10-22 RX ORDER — TAMSULOSIN HYDROCHLORIDE 0.4 MG/1
0.4 CAPSULE ORAL DAILY
Qty: 30 CAP | Refills: 1 | Status: SHIPPED | OUTPATIENT
Start: 2020-10-22 | End: 2020-10-22

## 2020-10-22 RX ORDER — NEOSTIGMINE METHYLSULFATE 1 MG/ML
INJECTION, SOLUTION INTRAVENOUS AS NEEDED
Status: DISCONTINUED | OUTPATIENT
Start: 2020-10-22 | End: 2020-10-22 | Stop reason: HOSPADM

## 2020-10-22 RX ORDER — CEFAZOLIN SODIUM/WATER 2 G/20 ML
2 SYRINGE (ML) INTRAVENOUS
Status: COMPLETED | OUTPATIENT
Start: 2020-10-22 | End: 2020-10-22

## 2020-10-22 RX ORDER — DEXAMETHASONE SODIUM PHOSPHATE 4 MG/ML
INJECTION, SOLUTION INTRA-ARTICULAR; INTRALESIONAL; INTRAMUSCULAR; INTRAVENOUS; SOFT TISSUE AS NEEDED
Status: DISCONTINUED | OUTPATIENT
Start: 2020-10-22 | End: 2020-10-22 | Stop reason: HOSPADM

## 2020-10-22 RX ORDER — SODIUM CHLORIDE, SODIUM LACTATE, POTASSIUM CHLORIDE, CALCIUM CHLORIDE 600; 310; 30; 20 MG/100ML; MG/100ML; MG/100ML; MG/100ML
75 INJECTION, SOLUTION INTRAVENOUS CONTINUOUS
Status: DISCONTINUED | OUTPATIENT
Start: 2020-10-22 | End: 2020-10-22 | Stop reason: HOSPADM

## 2020-10-22 RX ORDER — LIDOCAINE HYDROCHLORIDE 10 MG/ML
0.1 INJECTION INFILTRATION; PERINEURAL AS NEEDED
Status: DISCONTINUED | OUTPATIENT
Start: 2020-10-22 | End: 2020-10-22 | Stop reason: HOSPADM

## 2020-10-22 RX ORDER — EPHEDRINE SULFATE/0.9% NACL/PF 50 MG/5 ML
SYRINGE (ML) INTRAVENOUS AS NEEDED
Status: DISCONTINUED | OUTPATIENT
Start: 2020-10-22 | End: 2020-10-22 | Stop reason: HOSPADM

## 2020-10-22 RX ORDER — HYDROCODONE BITARTRATE AND ACETAMINOPHEN 7.5; 325 MG/1; MG/1
1 TABLET ORAL AS NEEDED
Status: DISCONTINUED | OUTPATIENT
Start: 2020-10-22 | End: 2020-10-22 | Stop reason: HOSPADM

## 2020-10-22 RX ORDER — FLUMAZENIL 0.1 MG/ML
0.2 INJECTION INTRAVENOUS
Status: DISCONTINUED | OUTPATIENT
Start: 2020-10-22 | End: 2020-10-22 | Stop reason: HOSPADM

## 2020-10-22 RX ORDER — HYDROCODONE BITARTRATE AND ACETAMINOPHEN 10; 325 MG/1; MG/1
1 TABLET ORAL
Status: ON HOLD | COMMUNITY
Start: 2020-09-23 | End: 2020-10-22 | Stop reason: SDUPTHER

## 2020-10-22 RX ORDER — HALOPERIDOL 5 MG/ML
1 INJECTION INTRAMUSCULAR
Status: DISCONTINUED | OUTPATIENT
Start: 2020-10-22 | End: 2020-10-22 | Stop reason: HOSPADM

## 2020-10-22 RX ORDER — FENTANYL CITRATE 50 UG/ML
INJECTION, SOLUTION INTRAMUSCULAR; INTRAVENOUS AS NEEDED
Status: DISCONTINUED | OUTPATIENT
Start: 2020-10-22 | End: 2020-10-22 | Stop reason: HOSPADM

## 2020-10-22 RX ORDER — ROCURONIUM BROMIDE 10 MG/ML
INJECTION, SOLUTION INTRAVENOUS AS NEEDED
Status: DISCONTINUED | OUTPATIENT
Start: 2020-10-22 | End: 2020-10-22 | Stop reason: HOSPADM

## 2020-10-22 RX ADMIN — Medication 10 MG: at 09:13

## 2020-10-22 RX ADMIN — LIDOCAINE HYDROCHLORIDE 100 MG: 20 INJECTION, SOLUTION EPIDURAL; INFILTRATION; INTRACAUDAL; PERINEURAL at 08:53

## 2020-10-22 RX ADMIN — Medication 2 G: at 08:58

## 2020-10-22 RX ADMIN — GLYCOPYRROLATE 0.6 MG: 0.2 INJECTION, SOLUTION INTRAMUSCULAR; INTRAVENOUS at 09:42

## 2020-10-22 RX ADMIN — PHENYLEPHRINE HYDROCHLORIDE 50 MCG: 10 INJECTION INTRAVENOUS at 09:39

## 2020-10-22 RX ADMIN — FENTANYL CITRATE 100 MCG: 50 INJECTION INTRAMUSCULAR; INTRAVENOUS at 08:53

## 2020-10-22 RX ADMIN — SODIUM CHLORIDE, SODIUM LACTATE, POTASSIUM CHLORIDE, AND CALCIUM CHLORIDE 100 ML/HR: 600; 310; 30; 20 INJECTION, SOLUTION INTRAVENOUS at 07:34

## 2020-10-22 RX ADMIN — Medication 10 MG: at 09:22

## 2020-10-22 RX ADMIN — MIDAZOLAM 2 MG: 1 INJECTION INTRAMUSCULAR; INTRAVENOUS at 07:37

## 2020-10-22 RX ADMIN — PHENYLEPHRINE HYDROCHLORIDE 50 MCG: 10 INJECTION INTRAVENOUS at 09:27

## 2020-10-22 RX ADMIN — PROPOFOL 180 MG: 10 INJECTION, EMULSION INTRAVENOUS at 08:53

## 2020-10-22 RX ADMIN — PHENYLEPHRINE HYDROCHLORIDE 100 MCG: 10 INJECTION INTRAVENOUS at 09:13

## 2020-10-22 RX ADMIN — ONDANSETRON 4 MG: 2 INJECTION INTRAMUSCULAR; INTRAVENOUS at 09:05

## 2020-10-22 RX ADMIN — DEXAMETHASONE SODIUM PHOSPHATE 8 MG: 4 INJECTION, SOLUTION INTRAMUSCULAR; INTRAVENOUS at 09:05

## 2020-10-22 RX ADMIN — PHENYLEPHRINE HYDROCHLORIDE 50 MCG: 10 INJECTION INTRAVENOUS at 09:30

## 2020-10-22 RX ADMIN — Medication 10 MG: at 09:10

## 2020-10-22 RX ADMIN — PHENYLEPHRINE HYDROCHLORIDE 50 MCG: 10 INJECTION INTRAVENOUS at 09:22

## 2020-10-22 RX ADMIN — ROCURONIUM BROMIDE 40 MG: 10 INJECTION, SOLUTION INTRAVENOUS at 08:55

## 2020-10-22 RX ADMIN — Medication 4 MG: at 09:42

## 2020-10-22 RX ADMIN — Medication 10 MG: at 09:08

## 2020-10-22 RX ADMIN — PHENYLEPHRINE HYDROCHLORIDE 50 MCG: 10 INJECTION INTRAVENOUS at 09:34

## 2020-10-22 RX ADMIN — PHENYLEPHRINE HYDROCHLORIDE 100 MCG: 10 INJECTION INTRAVENOUS at 09:05

## 2020-10-22 NOTE — ANESTHESIA POSTPROCEDURE EVALUATION
Procedure(s):  LITHOTRIPSY EXTRACORPOREAL SHOCKWAVE ESWL/ LEFT. general    Anesthesia Post Evaluation      Multimodal analgesia: multimodal analgesia used between 6 hours prior to anesthesia start to PACU discharge  Patient location during evaluation: PACU  Patient participation: complete - patient participated  Level of consciousness: awake  Pain management: adequate  Airway patency: patent  Anesthetic complications: no  Cardiovascular status: acceptable  Respiratory status: spontaneous ventilation and acceptable  Hydration status: acceptable  Post anesthesia nausea and vomiting:  none      INITIAL Post-op Vital signs:   Vitals Value Taken Time   /58 10/22/2020 10:30 AM   Temp 36.6 °C (97.9 °F) 10/22/2020  9:58 AM   Pulse 70 10/22/2020 10:36 AM   Resp 14 10/22/2020 10:19 AM   SpO2 93 % 10/22/2020 10:36 AM   Vitals shown include unvalidated device data.

## 2020-10-22 NOTE — PERIOP NOTES
Preoperative flank skin condition: intact  Lead shield: Yes  Patient ear protection: Yes   Gel applied to patient's flank and Lithotripsy head: Yes   Starting power level: 7  Shock start time (first  fluoroscopy): 0846  Shock stop time (last lithotripsy shock): 0944  Ending power level: 11  Total shocks: 3000  Total fluoroscopy time: 10:01  Postoperative flank skin condition: intact

## 2020-10-22 NOTE — DISCHARGE INSTRUCTIONS
You will be called to schedule follow up in 1 month to make sure that you have passed the kidney stone. Please call my office at [phone] with any questions / concerns or if you develop fever > 100.5, nausea, vomiting, uncontrolled pain, dizziness. ACTIVITY  · As tolerated and as directed by your doctor. · Walking and mild exercise help to pass the stone fragments. DIET  · Clear liquids until no nausea and vomiting; then resume light diet for the first day  · Advance to regular diet on second day, unless your doctor orders otherwise. · If nauseated and/ or vomiting, call your doctor. · Drink at least 8 glasses of water a day (avoid caffeinated beverages). PAIN  · Take pain medication as directed. · Call your doctor if pain is NOT relieved by medication. · DO NOT take aspirin or blood thinners until directed by your doctor. CALL YOUR DOCTOR IF   · Expect blood-tinged urine. Call your doctor if it lasts more than 72 hours or if you cannot see through the urine. · Aches, chills, or fever of 101 degree F or above    Lithotripsy does not make your stone disappear. The treatment is meant to crush your stone so that the fragments can be passed. Strain your urine, save any fragments, and take them to your doctor. The fragments may not begin to pass for up to 1-2 months. You may experience slight bruising at the treated site. After general anesthesia or intravenous sedation, for 24 hours or while taking prescription Narcotics:  · Limit your activities  · A responsible adult needs to be with you for the next 24 hours  · Do not drive and operate hazardous machinery  · Do not make important personal or business decisions  · Do not drink alcoholic beverages  · If you have not urinated within 8 hours after discharge, and you are experiencing discomfort from urinary retention, please go to the nearest ED.   · If you have sleep apnea and have a CPAP machine, please use it for all naps and sleeping. · Please use caution when taking narcotics and any of your home medications that may cause drowsiness. *  Please give a list of your current medications to your Primary Care Provider. *  Please update this list whenever your medications are discontinued, doses are      changed, or new medications (including over-the-counter products) are added. *  Please carry medication information at all times in case of emergency situations. These are general instructions for a healthy lifestyle:  No smoking/ No tobacco products/ Avoid exposure to second hand smoke  Surgeon General's Warning:  Quitting smoking now greatly reduces serious risk to your health. Obesity, smoking, and sedentary lifestyle greatly increases your risk for illness  A healthy diet, regular physical exercise & weight monitoring are important for maintaining a healthy lifestyle    You may be retaining fluid if you have a history of heart failure or if you experience any of the following symptoms:  Weight gain of 3 pounds or more overnight or 5 pounds in a week, increased swelling in our hands or feet or shortness of breath while lying flat in bed. Please call your doctor as soon as you notice any of these symptoms; do not wait until your next office visit.

## 2020-10-22 NOTE — ANESTHESIA PREPROCEDURE EVALUATION
Anesthetic History     PONV     Comments: Patient can't lie flat without dry heaving and nausea     Review of Systems / Medical History  Patient summary reviewed and pertinent labs reviewed    Pulmonary  Within defined limits                 Neuro/Psych         Psychiatric history     Cardiovascular    Hypertension: well controlled  Valvular problems/murmurs (s/p AVR in 2017. Has porcine vavle.)      Dysrhythmias (H/O a fib after valve surgery)   Hyperlipidemia    Exercise tolerance: <4 METS  Comments: LHC 1/2019 - no significant gradient across aortic valve, mild non-obstructive CAD, hyperdynamic LV function     Echo 1/2019 - hyperdynamic LV function, no RWMA, normal RV function, moderate thickening of MV but no MS, mild MR,    GI/Hepatic/Renal     GERD: well controlled           Endo/Other    Diabetes (Borderline DM)    Obesity and arthritis     Other Findings              Physical Exam    Airway  Mallampati: II  TM Distance: 4 - 6 cm  Neck ROM: normal range of motion   Mouth opening: Normal     Cardiovascular    Rhythm: regular      Murmur: Grade 1, Aortic area     Dental    Dentition: Full upper dentures and Lower dentition intact     Pulmonary  Breath sounds clear to auscultation               Abdominal         Other Findings            Anesthetic Plan    ASA: 3  Anesthesia type: general  ETT        Induction: Intravenous  Anesthetic plan and risks discussed with: Patient      Patient is unable to lie flat without significant nausea and dry heaving.  Plan for GETA

## 2020-10-22 NOTE — OP NOTES
Operative Note                Patient: Joss Domínguez [id number]    Date of Surgery: 10/22/20    Preoperative Diagnosis: Left Ureteral Stone (UPJ)    Postoperative Diagnosis:  same    Surgeon(s) and Role:     * Christiano Hampton MD - Primary     Anesthesia:  General     Procedure: Procedure(s):  LEFT EXTRACORPORAL SHOCKWAVE LITHOTRIPSY (ESWL)     Indications:     Discussed the risk of surgery including infection, hematoma, bleeding, recurrence or persistence of symptoms or stone, and the risks of general anesthetic. The patient understands the risks, any and all questions were answered to the patient's satisfaction and they freely signed the consent for operation. Procedure in Detail:  Patient was taken to the lithotripsy suite. Anesthesia was induced via the anesthesiology service. Using flouroscopy for guidance, a total of 3000 shocks were delivered in a non-gaited fashion. No cardiac ectopy was experienced. Shock rate was begun at 60 shocks per minute and then increased to 90 shocks per minute. Energy level was begun at 7 and gradually increased to a maximum of 11. Findings: Patient tolerated the procedure well. At the end of the procedure, the stone appeared to have disappeared    Estimated Blood Loss:  none    Specimens: * No specimens in log *             Drains: none                 Implants: * No implants in log *           Signed By: Sean Hampton MD

## 2020-10-31 ENCOUNTER — HOSPITAL ENCOUNTER (INPATIENT)
Age: 70
LOS: 1 days | Discharge: HOME | DRG: 194 | End: 2020-11-02
Attending: INTERNAL MEDICINE | Admitting: HOSPITALIST
Payer: MEDICARE

## 2020-10-31 PROBLEM — J15.9 BACTERIAL PNEUMONIA: Status: ACTIVE | Noted: 2020-10-31

## 2020-10-31 PROBLEM — J18.9 RIGHT LOWER LOBE PNEUMONIA: Status: ACTIVE | Noted: 2020-10-31

## 2020-10-31 PROCEDURE — 74011250636 HC RX REV CODE- 250/636: Performed by: INTERNAL MEDICINE

## 2020-10-31 PROCEDURE — 96376 TX/PRO/DX INJ SAME DRUG ADON: CPT

## 2020-10-31 PROCEDURE — 74011000258 HC RX REV CODE- 258: Performed by: INTERNAL MEDICINE

## 2020-10-31 PROCEDURE — 74011250637 HC RX REV CODE- 250/637: Performed by: INTERNAL MEDICINE

## 2020-10-31 PROCEDURE — 2709999900 HC NON-CHARGEABLE SUPPLY

## 2020-10-31 PROCEDURE — 96375 TX/PRO/DX INJ NEW DRUG ADDON: CPT

## 2020-10-31 PROCEDURE — 96374 THER/PROPH/DIAG INJ IV PUSH: CPT

## 2020-10-31 PROCEDURE — 99218 HC RM OBSERVATION: CPT

## 2020-10-31 RX ORDER — FEBUXOSTAT 40 MG/1
40 TABLET, FILM COATED ORAL DAILY
Status: DISCONTINUED | OUTPATIENT
Start: 2020-11-01 | End: 2020-11-02 | Stop reason: HOSPADM

## 2020-10-31 RX ORDER — LANOLIN ALCOHOL/MO/W.PET/CERES
325 CREAM (GRAM) TOPICAL
Status: DISCONTINUED | OUTPATIENT
Start: 2020-11-01 | End: 2020-11-01

## 2020-10-31 RX ORDER — MAG HYDROX/ALUMINUM HYD/SIMETH 200-200-20
30 SUSPENSION, ORAL (FINAL DOSE FORM) ORAL
Status: DISCONTINUED | OUTPATIENT
Start: 2020-10-31 | End: 2020-11-02 | Stop reason: HOSPADM

## 2020-10-31 RX ORDER — ACETAMINOPHEN 325 MG/1
650 TABLET ORAL
Status: DISCONTINUED | OUTPATIENT
Start: 2020-10-31 | End: 2020-11-02 | Stop reason: HOSPADM

## 2020-10-31 RX ORDER — LOSARTAN POTASSIUM 50 MG/1
100 TABLET ORAL DAILY
Status: DISCONTINUED | OUTPATIENT
Start: 2020-11-01 | End: 2020-11-02 | Stop reason: HOSPADM

## 2020-10-31 RX ORDER — FAMOTIDINE 20 MG/1
20 TABLET, FILM COATED ORAL
Status: DISCONTINUED | OUTPATIENT
Start: 2020-10-31 | End: 2020-11-02 | Stop reason: HOSPADM

## 2020-10-31 RX ORDER — TAMSULOSIN HYDROCHLORIDE 0.4 MG/1
0.4 CAPSULE ORAL DAILY
Status: DISCONTINUED | OUTPATIENT
Start: 2020-11-01 | End: 2020-11-02 | Stop reason: HOSPADM

## 2020-10-31 RX ORDER — POTASSIUM CHLORIDE 750 MG/1
10 TABLET, EXTENDED RELEASE ORAL DAILY
Status: DISCONTINUED | OUTPATIENT
Start: 2020-11-01 | End: 2020-11-02 | Stop reason: HOSPADM

## 2020-10-31 RX ORDER — ONDANSETRON 2 MG/ML
4 INJECTION INTRAMUSCULAR; INTRAVENOUS
Status: DISCONTINUED | OUTPATIENT
Start: 2020-10-31 | End: 2020-11-02 | Stop reason: HOSPADM

## 2020-10-31 RX ORDER — HYDROCODONE BITARTRATE AND ACETAMINOPHEN 5; 325 MG/1; MG/1
1 TABLET ORAL
Status: DISCONTINUED | OUTPATIENT
Start: 2020-10-31 | End: 2020-11-02 | Stop reason: HOSPADM

## 2020-10-31 RX ORDER — HYDRALAZINE HYDROCHLORIDE 50 MG/1
50 TABLET, FILM COATED ORAL
Status: DISCONTINUED | OUTPATIENT
Start: 2020-10-31 | End: 2020-11-02 | Stop reason: HOSPADM

## 2020-10-31 RX ORDER — GUAIFENESIN 600 MG/1
1200 TABLET, EXTENDED RELEASE ORAL 2 TIMES DAILY
Status: DISCONTINUED | OUTPATIENT
Start: 2020-10-31 | End: 2020-11-01

## 2020-10-31 RX ORDER — PANTOPRAZOLE SODIUM 40 MG/1
40 TABLET, DELAYED RELEASE ORAL
Status: DISCONTINUED | OUTPATIENT
Start: 2020-11-01 | End: 2020-11-02 | Stop reason: HOSPADM

## 2020-10-31 RX ORDER — ONDANSETRON 4 MG/1
4 TABLET, ORALLY DISINTEGRATING ORAL
Status: DISCONTINUED | OUTPATIENT
Start: 2020-10-31 | End: 2020-11-02 | Stop reason: HOSPADM

## 2020-10-31 RX ORDER — FUROSEMIDE 10 MG/ML
40 INJECTION INTRAMUSCULAR; INTRAVENOUS DAILY
Status: DISCONTINUED | OUTPATIENT
Start: 2020-11-01 | End: 2020-10-31

## 2020-10-31 RX ORDER — FUROSEMIDE 20 MG/1
20 TABLET ORAL DAILY
Status: DISCONTINUED | OUTPATIENT
Start: 2020-11-01 | End: 2020-10-31

## 2020-10-31 RX ORDER — ENOXAPARIN SODIUM 100 MG/ML
40 INJECTION SUBCUTANEOUS EVERY 12 HOURS
Status: DISCONTINUED | OUTPATIENT
Start: 2020-11-01 | End: 2020-11-02 | Stop reason: HOSPADM

## 2020-10-31 RX ORDER — SODIUM CHLORIDE 0.9 % (FLUSH) 0.9 %
5-40 SYRINGE (ML) INJECTION AS NEEDED
Status: DISCONTINUED | OUTPATIENT
Start: 2020-10-31 | End: 2020-11-02 | Stop reason: HOSPADM

## 2020-10-31 RX ORDER — MAGNESIUM SULFATE HEPTAHYDRATE 40 MG/ML
2 INJECTION, SOLUTION INTRAVENOUS AS NEEDED
Status: DISCONTINUED | OUTPATIENT
Start: 2020-10-31 | End: 2020-11-02 | Stop reason: HOSPADM

## 2020-10-31 RX ORDER — SODIUM CHLORIDE 0.9 % (FLUSH) 0.9 %
5-40 SYRINGE (ML) INJECTION EVERY 8 HOURS
Status: DISCONTINUED | OUTPATIENT
Start: 2020-10-31 | End: 2020-11-02 | Stop reason: HOSPADM

## 2020-10-31 RX ORDER — MIRTAZAPINE 15 MG/1
15 TABLET, FILM COATED ORAL
Status: DISCONTINUED | OUTPATIENT
Start: 2020-10-31 | End: 2020-11-02 | Stop reason: HOSPADM

## 2020-10-31 RX ORDER — FUROSEMIDE 10 MG/ML
40 INJECTION INTRAMUSCULAR; INTRAVENOUS ONCE
Status: DISCONTINUED | OUTPATIENT
Start: 2020-10-31 | End: 2020-10-31

## 2020-10-31 RX ORDER — CARVEDILOL 25 MG/1
25 TABLET ORAL 2 TIMES DAILY WITH MEALS
Status: DISCONTINUED | OUTPATIENT
Start: 2020-10-31 | End: 2020-11-02 | Stop reason: HOSPADM

## 2020-10-31 RX ORDER — AMOXICILLIN 250 MG
2 CAPSULE ORAL
Status: DISCONTINUED | OUTPATIENT
Start: 2020-10-31 | End: 2020-11-02 | Stop reason: HOSPADM

## 2020-10-31 RX ORDER — POTASSIUM CHLORIDE 7.45 MG/ML
10 INJECTION INTRAVENOUS AS NEEDED
Status: DISCONTINUED | OUTPATIENT
Start: 2020-10-31 | End: 2020-11-02 | Stop reason: HOSPADM

## 2020-10-31 RX ORDER — DOCUSATE SODIUM 100 MG/1
100 CAPSULE, LIQUID FILLED ORAL DAILY
Status: DISCONTINUED | OUTPATIENT
Start: 2020-11-01 | End: 2020-11-01

## 2020-10-31 RX ORDER — FUROSEMIDE 10 MG/ML
40 INJECTION INTRAMUSCULAR; INTRAVENOUS DAILY
Status: DISCONTINUED | OUTPATIENT
Start: 2020-10-31 | End: 2020-10-31

## 2020-10-31 RX ORDER — ASPIRIN 81 MG/1
81 TABLET ORAL DAILY
Status: DISCONTINUED | OUTPATIENT
Start: 2020-11-01 | End: 2020-11-02 | Stop reason: HOSPADM

## 2020-10-31 RX ORDER — AZITHROMYCIN 250 MG/1
500 TABLET, FILM COATED ORAL
Status: COMPLETED | OUTPATIENT
Start: 2020-10-31 | End: 2020-10-31

## 2020-10-31 RX ORDER — ACETAMINOPHEN 650 MG/1
650 SUPPOSITORY RECTAL
Status: DISCONTINUED | OUTPATIENT
Start: 2020-10-31 | End: 2020-11-02 | Stop reason: HOSPADM

## 2020-10-31 RX ORDER — FUROSEMIDE 20 MG/1
20 TABLET ORAL DAILY
Status: DISCONTINUED | OUTPATIENT
Start: 2020-11-01 | End: 2020-11-01

## 2020-10-31 RX ORDER — GUAIFENESIN 600 MG/1
1200 TABLET, EXTENDED RELEASE ORAL
Status: DISCONTINUED | OUTPATIENT
Start: 2020-10-31 | End: 2020-10-31

## 2020-10-31 RX ORDER — LORAZEPAM 0.5 MG/1
0.5 TABLET ORAL
Status: DISCONTINUED | OUTPATIENT
Start: 2020-10-31 | End: 2020-11-02 | Stop reason: HOSPADM

## 2020-10-31 RX ORDER — AZITHROMYCIN 250 MG/1
250 TABLET, FILM COATED ORAL DAILY
Status: DISCONTINUED | OUTPATIENT
Start: 2020-11-01 | End: 2020-11-02 | Stop reason: HOSPADM

## 2020-10-31 RX ORDER — CARVEDILOL 6.25 MG/1
6.25 TABLET ORAL 2 TIMES DAILY WITH MEALS
Status: DISCONTINUED | OUTPATIENT
Start: 2020-10-31 | End: 2020-10-31

## 2020-10-31 RX ORDER — FAMOTIDINE 20 MG/1
20 TABLET, FILM COATED ORAL 2 TIMES DAILY
Status: DISCONTINUED | OUTPATIENT
Start: 2020-10-31 | End: 2020-11-02 | Stop reason: HOSPADM

## 2020-10-31 RX ORDER — FUROSEMIDE 20 MG/1
20 TABLET ORAL DAILY
Status: DISCONTINUED | OUTPATIENT
Start: 2020-10-31 | End: 2020-10-31

## 2020-10-31 RX ORDER — FUROSEMIDE 10 MG/ML
40 INJECTION INTRAMUSCULAR; INTRAVENOUS 2 TIMES DAILY
Status: DISCONTINUED | OUTPATIENT
Start: 2020-10-31 | End: 2020-10-31

## 2020-10-31 RX ORDER — AMLODIPINE BESYLATE 10 MG/1
10 TABLET ORAL DAILY
Status: DISCONTINUED | OUTPATIENT
Start: 2020-11-01 | End: 2020-11-01

## 2020-10-31 RX ORDER — ZOLPIDEM TARTRATE 5 MG/1
5 TABLET ORAL
Status: DISCONTINUED | OUTPATIENT
Start: 2020-10-31 | End: 2020-11-02 | Stop reason: HOSPADM

## 2020-10-31 RX ORDER — AMLODIPINE BESYLATE 10 MG/1
10 TABLET ORAL DAILY
Status: DISCONTINUED | OUTPATIENT
Start: 2020-11-01 | End: 2020-10-31

## 2020-10-31 RX ORDER — FERROUS SULFATE, DRIED 160(50) MG
1 TABLET, EXTENDED RELEASE ORAL
Status: DISCONTINUED | OUTPATIENT
Start: 2020-11-01 | End: 2020-11-01

## 2020-10-31 RX ORDER — FUROSEMIDE 10 MG/ML
40 INJECTION INTRAMUSCULAR; INTRAVENOUS ONCE
Status: COMPLETED | OUTPATIENT
Start: 2020-10-31 | End: 2020-10-31

## 2020-10-31 RX ORDER — POLYETHYLENE GLYCOL 3350 17 G/17G
17 POWDER, FOR SOLUTION ORAL DAILY PRN
Status: DISCONTINUED | OUTPATIENT
Start: 2020-10-31 | End: 2020-11-02 | Stop reason: HOSPADM

## 2020-10-31 RX ORDER — GUAIFENESIN/DEXTROMETHORPHAN 100-10MG/5
10 SYRUP ORAL
Status: DISCONTINUED | OUTPATIENT
Start: 2020-10-31 | End: 2020-11-02 | Stop reason: HOSPADM

## 2020-10-31 RX ORDER — FACIAL-BODY WIPES
10 EACH TOPICAL DAILY PRN
Status: DISCONTINUED | OUTPATIENT
Start: 2020-10-31 | End: 2020-11-02 | Stop reason: HOSPADM

## 2020-10-31 RX ADMIN — FUROSEMIDE 40 MG: 10 INJECTION, SOLUTION INTRAMUSCULAR; INTRAVENOUS at 14:44

## 2020-10-31 RX ADMIN — AZITHROMYCIN MONOHYDRATE 500 MG: 250 TABLET ORAL at 14:29

## 2020-10-31 RX ADMIN — FUROSEMIDE 40 MG: 10 INJECTION, SOLUTION INTRAMUSCULAR; INTRAVENOUS at 14:00

## 2020-10-31 RX ADMIN — GUAIFENESIN 1200 MG: 600 TABLET ORAL at 16:54

## 2020-10-31 RX ADMIN — CARVEDILOL 25 MG: 25 TABLET, FILM COATED ORAL at 16:55

## 2020-10-31 RX ADMIN — CEFTRIAXONE SODIUM 1 G: 1 INJECTION, POWDER, FOR SOLUTION INTRAMUSCULAR; INTRAVENOUS at 14:38

## 2020-10-31 RX ADMIN — Medication 10 ML: at 14:44

## 2020-10-31 RX ADMIN — Medication 10 ML: at 22:25

## 2020-10-31 RX ADMIN — FAMOTIDINE 20 MG: 20 TABLET, FILM COATED ORAL at 16:55

## 2020-10-31 RX ADMIN — HYDROCODONE BITARTRATE AND ACETAMINOPHEN 1 TABLET: 5; 325 TABLET ORAL at 16:55

## 2020-10-31 NOTE — PROGRESS NOTES
10/31/20 1313   Dual Skin Pressure Injury Assessment   Second Care Provider (Based on [facility]) yeen, Rn   Right outer calf , scabbed area noted.

## 2020-10-31 NOTE — H&P
Hospitalist Note     Admit Date:  10/31/2020  1:01 PM   Name:  Julián Nolan   Age:  69 y.o.  :  1950   MRN:  [id number]   PCP:  Jt Mendoza MD  Treatment Team: Attending Provider: Leena Barnett MD    HPI/Subjective:   Pt is a 70 y/o F with CKD, HTN, obesity, who presented to urgent care with R lateral thorax wall pain. Described as moderate, sharp, occurring whenever she takes a deep breath. Intermittent, onset about 4 days ago. A/w SOB, [place]. Denies orthopnea, CP, fevers, chills, diarrhea, urinary changes, ageusia/anosmia, sick contacts, worsening edema. Compliant with meds and no changes recently. She was sent from Urgent care here because her sats on RA were 91% and she desatted to 84% on RA. She was not in distress and is unlabored currently as well. 10 systems reviewed and negative except as noted in HPI.   Past Medical History:   Diagnosis Date    Adverse effect of anesthesia     panic attack when mask placed on face    Anemia      - gi bleed---  2017 blood transfusion prior to AVR    Arthritis     CAD (coronary artery disease) 2019 Minor nonobstructive coronary artery disease/ cath report    Chronic kidney disease     kidney stones    Chronic pain     r/t arthritis/ back pain Spinal stenosis of lumbar region     Diabetes ([address]     \"prediabetic\" A1C 6.4 19- no meds    Follow-up visit for aortic valve replacement with bioprosthetic valve 2016    GERD (gastroesophageal reflux disease)     controlled w/med- well controlled with meds    History of gastric ulcer 2016    Hypertension     controlled w/med    Kidney stones     Morbid obesity ([address]     Murmur, cardiac     s/p AVR    PONV (postoperative nausea and vomiting)     Pt can not be flat in bed -- severe PONV worsens when laying flat    Psychiatric disorder     claustraphobia (severe)    PUD (peptic ulcer disease) 2016    dx - resolved per patient/ GI bleed requiring blood transfusion    S/P aortic valve replacement with bioprosthetic valve 4/28/2017    Spinal stenosis of lumbar region with neurogenic claudication 10/16/2018    Valvular heart disease 2016    AVR      Past Surgical History:   Procedure Laterality Date    ABDOMEN SURGERY PROC UNLISTED      reversal of gastric bypass reversal    COLONOSCOPY N/A 6/15/2016    COLONOSCOPY/ BMI 41  ROOM 2235 performed by Katharine Barnard MD at [facility] ENDOSCOPY    HX AORTIC VALVE REPLACEMENT  6/9/2016    Dr. Angelia Linares    HX BILATERAL SALPINGO-OOPHORECTOMY      HX CERVICAL FUSION      posterior fusion    HX GASTRIC BYPASS      HX HEART CATHETERIZATION  05/26/2016    Severe AS with minimal nonobstructive CAD.  HX HEART CATHETERIZATION  01/31/2019    Hyperdynamic LV EF. No significant gradient across bioprosthetic aortic valve,mild nonobstructive CAD,and MAC.  HX HEART VALVE SURGERY      HX HYSTERECTOMY      HX KNEE REPLACEMENT Bilateral     HX LAP CHOLECYSTECTOMY      HX SHOULDER ARTHROSCOPY Right      times 2 \"shaved bone\"     HX UROLOGICAL  Multiple dates    Mulitiple open Nephrolithotomies    HX UROLOGICAL  08/2019    CYSTOSCOPY BILATERAL URETEROSCOPY/LASER LITHO/STENTS      Allergies   Allergen Reactions    Latex Rash    Metformin Diarrhea    Sulfa (Sulfonamide Antibiotics) Anaphylaxis    Macrobid [Nitrofurantoin Monohyd/M-Cryst] Other (comments)     Causes Gout    Other Plant, Animal, Environmental Itching     Pt itched after wearing a plastic blood pressure cuff.   Pt reports BP will be higher in right arm     Oxycodone Other (comments)     Hallucination, anxiety with oxycontin-- denies rx to hydrocodone    Tape [Adhesive] Rash     Paper tape ok      Social History     Tobacco Use    Smoking status: Never Smoker    Smokeless tobacco: Never Used   Substance Use Topics    Alcohol use: No      Family History   Problem Relation Age of Onset    Hypertension Father     Lung Disease Father     Lung Cancer Father    [facility] Lung Disease Mother     Lung Cancer Mother     Diabetes Maternal Grandmother     Diabetes Paternal Grandmother     Breast Cancer Neg Hx       Family history reviewed and noncontributory. Immunization History   Administered Date(s) Administered    Influenza High Dose Vaccine PF 09/30/2016, 10/23/2017, 10/16/2018    Influenza Vaccine 10/02/2014    Influenza Vaccine (>6 mo Afluria QUAD Vial 02323 (0.25 mL) / 19503 (0.5 mL)) 11/10/2015    Influenza Vaccine (Tri) Adjuvanted (>65 Yrs FLUAD TRI 76964) 10/30/2019    Pneumococcal Conjugate (PCV-13) 11/10/2015    Pneumococcal Polysaccharide (PPSV-23) 10/02/2014    TB Skin Test (PPD) Intradermal 06/09/2016, 10/10/2016, 03/21/2017     PTA Medications:  Current Outpatient Medications   Medication Instructions    amLODIPine (NORVASC) 10 mg tablet TAKE 1 TABLET BY MOUTH DAILY    aspirin delayed-release 81 mg, Oral, DAILY    calcium-vitamin D 600 mg(1,500mg) -200 unit tab 1 Tab, Oral, DAILY    carvediloL (COREG) 25 mg, Oral, 2 TIMES DAILY WITH MEALS    DISABLED PLACARD (DISABLED PLACARD) DMV To use with handicap placard form    DOCUSATE CALCIUM (STOOL SOFTENER PO) 1 Tab, Oral, DAILY    esomeprazole (NEXIUM) 40 mg capsule No dose, route, or frequency recorded.  famotidine (PEPCID) 20 mg, Oral, 2 TIMES DAILY    ferrous sulfate 325 mg, Oral, DAILY WITH BREAKFAST    furosemide (LASIX) 20 mg tablet TAKE 1 TABLET BY MOUTH EVERY DAY    losartan (COZAAR) 100 mg tablet TAKE 1 TABLET BY MOUTH DAILY    nitroglycerin (NITROSTAT) 0.4 mg, SubLINGual, EVERY 5 MIN AS NEEDED, Up to 3 doses.     potassium chloride SR (KLOR-CON 10) 10 mEq tablet TAKE 1 TABLET BY MOUTH DAILY    tamsulosin (FLOMAX) 0.4 mg capsule TAKE 1 CAPSULE BY MOUTH DAILY    Uloric 40 mg tab tablet TAKE 1 TABLET BY MOUTH EVERY MORNING       Objective:     Patient Vitals for the past 24 hrs:   Temp Pulse Resp BP SpO2   10/31/20 1313 98.5 °F (36.9 °C) 80 18 (!) 141/78 92 %     Oxygen Therapy  O2 Sat (%): 92 % (10/31/20 1313)    Estimated body mass index is 41.1 kg/m² as calculated from the following:    Height as of 10/22/20: 5' 5\" (1.651 m). Weight as of 10/22/20: 112 kg (247 lb). No intake or output data in the 24 hours ending 10/31/20 1406    *Note that automatically entered I/Os may not be accurate; dependent on patient compliance with collection and accurate  by assistants. Physical Exam:  General:    Well nourished. No overt distress. Eyes:   Normal sclerae. Extraocular movements intact. HENT:  Normocephalic, atraumatic. Moist mucous membranes  CV:   RRR. No m/r/g. BLE edema 2+  Lungs:  CTAB. No wheezing, rhonchi, or rales. Unlabored  Abdomen: nondistended. Extremities: Warm and dry. No cyanosis or clubbing  Neurologic: CN II-XII grossly intact. Sensation intact. Skin:     No rashes. No jaundice. Normal coloration  Psych:  Normal mood and affect. I reviewed the labs, imaging, EKGs, telemetry, and other studies done this admission. Data Reviewed:   No results found for this or any previous visit (from the past 24 hour(s)). All Micro Results     None              Other Studies:  No results found for this visit on 10/31/20. No results found.     SARS-CoV-2 Lab Results  \"Novel Coronavirus\" Test: No results found for: COV2NT   \"Emergent Disease\" Test: No results found for: EDPR  \"SARS-COV-2\" Test: No results found for: XGCOVT  Rapid Test: No results found for: COVR           Assessment and Plan:     Hospital Problems as of 10/31/2020 Date Reviewed: 10/12/2020          Codes Class Noted - Resolved POA    * (Principal) Right lower lobe pneumonia ICD-10-CM: J18.9  ICD-9-CM: 486  10/31/2020 - Present Yes        Iron deficiency anemia (Chronic) ICD-10-CM: D50.9  ICD-9-CM: 280.9  6/16/2016 - Present Yes        Nocturnal hypoxemia (Chronic) ICD-10-CM: G47.34  ICD-9-CM: 327.24  6/11/2016 - Present Yes        Fibromyalgia (Chronic) ICD-10-CM: M79.7  ICD-9-CM: 729.1  6/11/2016 - Present Yes        Chronic bilateral pleural effusions (Chronic) ICD-10-CM: J90  ICD-9-CM: 511.9  6/11/2016 - Present Yes        CKD (chronic kidney disease) stage 3, GFR 30-59 ml/min (Chronic) ICD-10-CM: N18.30  ICD-9-CM: 585.3  6/10/2016 - Present Yes        Chronic anemia (Chronic) ICD-10-CM: D64.9  ICD-9-CM: 285.9  6/10/2016 - Present Yes        Aortic valve stenosis (Chronic) ICD-10-CM: I35.0  ICD-9-CM: 424.1  6/9/2016 - Present Yes    Overview Signed 6/10/2016  8:44 AM by Stew Emmanuel, KIT     6/9/16 (Dr Lianna Prather) Aortic valve replacement (21 mm [facility] Ease bovine  pericardial valve). Pulmonary hypertension (HCC) (Chronic) ICD-10-CM: I27.20  ICD-9-CM: 416.8  6/9/2016 - Present Yes        Morbid obesity ([address] (Chronic) ICD-10-CM: E66.01  ICD-9-CM: 278.01  2/20/2015 - Present Yes        GERD (gastroesophageal reflux disease) (Chronic) ICD-10-CM: K21.9  ICD-9-CM: 530.81  11/17/2014 - Present Yes        Essential hypertension, benign (Chronic) ICD-10-CM: I10  ICD-9-CM: 401.1  9/4/2014 - Present Yes              Plan:  · Observation  · Likely early CAP  · Rocephin/azithro  · mucinex  · Give lasix 40mg IV x1, then home PO lasix tomorrow. Per chart the bilateral effusions are chronic. Leg swelling is too  · Doubt COVID but emergency MD sent swab  · Not hypoxic on RA. If she remains stable can probably go home tomorrow on PO abx. Temporary desats with ambulation will not be harmful as long as it is not too severe; she will have to pace herself if ambulation needed.     · Ordered RT eval of ambulatory hypoxia tomorrow to see how severe it is  · Her BMI is 41 so she may have some obesity related ventilation issues also  · Other listed chronic issues stable, cont home meds    DVT ppx lovenox  Code status:  Full  Risk:  high    Signed:  Daina Gutierrez MD

## 2020-10-31 NOTE — ROUTINE PROCESS
Went down to front door to  patient to bring to 801. Dr. Symone Rosado served for patient arrival.  Vital signs stable. Bilateral lower extremities with edema noted. Room air. Denies any shortness of breath. Abdomen soft. Bowel movement yesterday. Very pleasant. Complaints of right flank pain.

## 2020-10-31 NOTE — PROGRESS NOTES
Bed side report received from [facility], [address] [place] [address]. Pt alert and oriented  X4. Auscultated S1 and S2. Heart rate regular. Lung sounds clear. C/o pain when she take a deep breathe. On RA, O2 sat 93%. No distress noted. Abdomen soft non tender. Pt stated had a BM 10/30/2020. Up ad christopher. Sitting in recliner. All needs met at this time. Safety measures in place. Call light within reach. Plan of care reviewed with pt.

## 2020-11-01 LAB
ANION GAP SERPL CALC-SCNC: 8 MMOL/L (ref 7–16)
BASOPHILS # BLD: 0 K/UL (ref 0–0.2)
BASOPHILS NFR BLD: 0 % (ref 0–2)
BNP SERPL-MCNC: 530 PG/ML (ref 5–125)
BUN SERPL-MCNC: 24 MG/DL (ref 8–23)
CALCIUM SERPL-MCNC: 8.5 MG/DL (ref 8.3–10.4)
CHLORIDE SERPL-SCNC: 102 MMOL/L (ref 98–107)
CO2 SERPL-SCNC: 30 MMOL/L (ref 21–32)
CREAT SERPL-MCNC: 1.15 MG/DL (ref 0.6–1)
DIFFERENTIAL METHOD BLD: ABNORMAL
EOSINOPHIL # BLD: 0.1 K/UL (ref 0–0.8)
EOSINOPHIL NFR BLD: 2 % (ref 0.5–7.8)
ERYTHROCYTE [DISTWIDTH] IN BLOOD BY AUTOMATED COUNT: 14 % (ref 11.9–14.6)
GLUCOSE SERPL-MCNC: 124 MG/DL (ref 65–100)
HCT VFR BLD AUTO: 41.4 % (ref 35.8–46.3)
HGB BLD-MCNC: 12.5 G/DL (ref 11.7–15.4)
IMM GRANULOCYTES # BLD AUTO: 0 K/UL (ref 0–0.5)
IMM GRANULOCYTES NFR BLD AUTO: 0 % (ref 0–5)
LYMPHOCYTES # BLD: 1.2 K/UL (ref 0.5–4.6)
LYMPHOCYTES NFR BLD: 13 % (ref 13–44)
MCH RBC QN AUTO: 24 PG (ref 26.1–32.9)
MCHC RBC AUTO-ENTMCNC: 30.2 G/DL (ref 31.4–35)
MCV RBC AUTO: 79.5 FL (ref 79.6–97.8)
MONOCYTES # BLD: 1 K/UL (ref 0.1–1.3)
MONOCYTES NFR BLD: 11 % (ref 4–12)
NEUTS SEG # BLD: 6.4 K/UL (ref 1.7–8.2)
NEUTS SEG NFR BLD: 73 % (ref 43–78)
NRBC # BLD: 0 K/UL (ref 0–0.2)
PLATELET # BLD AUTO: 195 K/UL (ref 150–450)
PMV BLD AUTO: 10 FL (ref 9.4–12.3)
POTASSIUM SERPL-SCNC: 3.4 MMOL/L (ref 3.5–5.1)
RBC # BLD AUTO: 5.21 M/UL (ref 4.05–5.2)
SODIUM SERPL-SCNC: 140 MMOL/L (ref 136–145)
WBC # BLD AUTO: 8.7 K/UL (ref 4.3–11.1)

## 2020-11-01 PROCEDURE — 74011250637 HC RX REV CODE- 250/637: Performed by: HOSPITALIST

## 2020-11-01 PROCEDURE — 74011000258 HC RX REV CODE- 258: Performed by: HOSPITALIST

## 2020-11-01 PROCEDURE — 94760 N-INVAS EAR/PLS OXIMETRY 1: CPT

## 2020-11-01 PROCEDURE — 74011250637 HC RX REV CODE- 250/637: Performed by: INTERNAL MEDICINE

## 2020-11-01 PROCEDURE — 96372 THER/PROPH/DIAG INJ SC/IM: CPT

## 2020-11-01 PROCEDURE — 85025 COMPLETE CBC W/AUTO DIFF WBC: CPT

## 2020-11-01 PROCEDURE — 99218 HC RM OBSERVATION: CPT

## 2020-11-01 PROCEDURE — 74011250636 HC RX REV CODE- 250/636: Performed by: HOSPITALIST

## 2020-11-01 PROCEDURE — 83880 ASSAY OF NATRIURETIC PEPTIDE: CPT

## 2020-11-01 PROCEDURE — 36415 COLL VENOUS BLD VENIPUNCTURE: CPT

## 2020-11-01 PROCEDURE — 96376 TX/PRO/DX INJ SAME DRUG ADON: CPT

## 2020-11-01 PROCEDURE — 74011250636 HC RX REV CODE- 250/636: Performed by: INTERNAL MEDICINE

## 2020-11-01 PROCEDURE — 80048 BASIC METABOLIC PNL TOTAL CA: CPT

## 2020-11-01 RX ORDER — DOCUSATE SODIUM 100 MG/1
100 CAPSULE, LIQUID FILLED ORAL
Status: DISCONTINUED | OUTPATIENT
Start: 2020-11-01 | End: 2020-11-02 | Stop reason: HOSPADM

## 2020-11-01 RX ORDER — FUROSEMIDE 10 MG/ML
40 INJECTION INTRAMUSCULAR; INTRAVENOUS ONCE
Status: COMPLETED | OUTPATIENT
Start: 2020-11-01 | End: 2020-11-01

## 2020-11-01 RX ORDER — GUAIFENESIN 600 MG/1
1200 TABLET, EXTENDED RELEASE ORAL
Status: DISCONTINUED | OUTPATIENT
Start: 2020-11-01 | End: 2020-11-02 | Stop reason: HOSPADM

## 2020-11-01 RX ORDER — IBUPROFEN 400 MG/1
400 TABLET ORAL 3 TIMES DAILY
Status: DISCONTINUED | OUTPATIENT
Start: 2020-11-01 | End: 2020-11-02 | Stop reason: HOSPADM

## 2020-11-01 RX ADMIN — FUROSEMIDE 20 MG: 20 TABLET ORAL at 08:10

## 2020-11-01 RX ADMIN — CARVEDILOL 25 MG: 25 TABLET, FILM COATED ORAL at 08:10

## 2020-11-01 RX ADMIN — Medication 1 TABLET: at 08:10

## 2020-11-01 RX ADMIN — Medication 10 ML: at 05:14

## 2020-11-01 RX ADMIN — PANTOPRAZOLE SODIUM 40 MG: 40 TABLET, DELAYED RELEASE ORAL at 08:10

## 2020-11-01 RX ADMIN — CARVEDILOL 25 MG: 25 TABLET, FILM COATED ORAL at 17:36

## 2020-11-01 RX ADMIN — FUROSEMIDE 40 MG: 10 INJECTION, SOLUTION INTRAMUSCULAR; INTRAVENOUS at 15:22

## 2020-11-01 RX ADMIN — AMLODIPINE BESYLATE 10 MG: 10 TABLET ORAL at 08:10

## 2020-11-01 RX ADMIN — GUAIFENESIN 1200 MG: 600 TABLET ORAL at 08:09

## 2020-11-01 RX ADMIN — CEFTRIAXONE 2 G: 2 INJECTION, POWDER, FOR SOLUTION INTRAMUSCULAR; INTRAVENOUS at 15:22

## 2020-11-01 RX ADMIN — IBUPROFEN 400 MG: 400 TABLET, FILM COATED ORAL at 15:22

## 2020-11-01 RX ADMIN — FEBUXOSTAT 40 MG: 40 TABLET, FILM COATED ORAL at 09:00

## 2020-11-01 RX ADMIN — LOSARTAN POTASSIUM 100 MG: 50 TABLET, FILM COATED ORAL at 08:10

## 2020-11-01 RX ADMIN — Medication 10 ML: at 22:20

## 2020-11-01 RX ADMIN — Medication 5 ML: at 14:00

## 2020-11-01 RX ADMIN — FERROUS SULFATE TAB 325 MG (65 MG ELEMENTAL FE) 325 MG: 325 (65 FE) TAB at 08:10

## 2020-11-01 RX ADMIN — FAMOTIDINE 20 MG: 20 TABLET, FILM COATED ORAL at 08:10

## 2020-11-01 RX ADMIN — HYDROCODONE BITARTRATE AND ACETAMINOPHEN 1 TABLET: 5; 325 TABLET ORAL at 01:51

## 2020-11-01 RX ADMIN — FAMOTIDINE 20 MG: 20 TABLET, FILM COATED ORAL at 17:36

## 2020-11-01 RX ADMIN — HYDROCODONE BITARTRATE AND ACETAMINOPHEN 1 TABLET: 5; 325 TABLET ORAL at 21:00

## 2020-11-01 RX ADMIN — AZITHROMYCIN MONOHYDRATE 250 MG: 250 TABLET ORAL at 08:10

## 2020-11-01 RX ADMIN — POTASSIUM CHLORIDE 10 MEQ: 750 TABLET, EXTENDED RELEASE ORAL at 08:10

## 2020-11-01 RX ADMIN — ASPIRIN 81 MG: 81 TABLET, COATED ORAL at 08:10

## 2020-11-01 RX ADMIN — HYDROCODONE BITARTRATE AND ACETAMINOPHEN 1 TABLET: 5; 325 TABLET ORAL at 11:17

## 2020-11-01 RX ADMIN — DOCUSATE SODIUM 100 MG: 100 CAPSULE ORAL at 08:09

## 2020-11-01 RX ADMIN — IBUPROFEN 400 MG: 400 TABLET, FILM COATED ORAL at 22:20

## 2020-11-01 RX ADMIN — ENOXAPARIN SODIUM 40 MG: 40 INJECTION SUBCUTANEOUS at 08:09

## 2020-11-01 RX ADMIN — TAMSULOSIN HYDROCHLORIDE 0.4 MG: 0.4 CAPSULE ORAL at 08:10

## 2020-11-01 RX ADMIN — ENOXAPARIN SODIUM 40 MG: 40 INJECTION SUBCUTANEOUS at 21:00

## 2020-11-01 NOTE — PROGRESS NOTES
CM made contact with patient via phone. Patient alert/orient x4. Patient verified demographic. Patient verified PCP contact information changed [phone]. Patient has no issues with purchasing medication in the community. Patient states she lives in a one level home with son with a ramp entrance into the home. States she's independent with her ADL's and do has DME's (rolling walker, grab bars) in the home. Patient states she drives herself to most of her appointments. States she's not active with any agencies at this time. Patient has no need identified at this time. CM will continue to monitor and remain available for any concerns or needs that may occur. Care Management Interventions  PCP Verified by CM: Yes(Ronn De La Torre MD)  Mode of Transport at Discharge:  Other (see comment)  Transition of Care Consult (CM Consult): Discharge Planning  Discharge Durable Medical Equipment: No  Physical Therapy Consult: No  Occupational Therapy Consult: No  Speech Therapy Consult: No  Current Support Network: Relative's Home(patient lives with son Tato Saleem))  Confirm Follow Up Transport: Family  The Patient and/or Patient Representative was Provided with a Choice of Provider and Agrees with the Discharge Plan?: No  Freedom of Choice List was Provided with Basic Dialogue that Supports the Patient's Individualized Plan of Care/Goals, Treatment Preferences and Shares the Quality Data Associated with the Providers?: No  Discharge Location  Discharge Placement: Home

## 2020-11-01 NOTE — ACP (ADVANCE CARE PLANNING)
Advance Care Planning     Advance Care Planning Activator (Inpatient)  Conversation Note      Date of ACP Conversation: 11/01/20     Conversation Conducted with:   Patient with Decision Making Capacity    ACP Activator: Henrik [place]water    *When Decision Maker makes decisions on behalf of the incapacitated patient: Decision Maker is asked to consider and make decisions based on patient values, known preferences, or best interests. Health Care Decision Maker:    Current Designated Health Care Decision Maker:   Primary Decision Maker: Loc Person - [phone]    Secondary Decision Maker: Graham Staton - Daughter - [phone]    Supplemental (Other) Decision Maker: Eliza Shafferr - Sister - [phone]  (If there is a [address] named in the \"Healthcare Decision Makers\" box in the ACP activity, but it is not visible above, be sure to open that field and then select the health care decision maker relationship (ie \"primary\") in the blank space to the right of the name.) Validate  this information as still accurate & up-to-date; edit [place] field as needed.)    Note: Assess and validate information in current ACP documents, as indicated. If no Decision Maker listed above or available through scanned documents, then:    If no Authorized Decision Maker has previously been identified, then patient chooses [place]:  \"Who would you like to name as your primary health care decision-maker? \"    Name: Emilie Mcburney  Relationship: Son  Phone number: [phone]    [facility] this person be reached easily? \" YES    \"Who would you like to name as your back-up decision maker? \"     Joss Mayers  Relationship:Daughter  Phone number: [phone]    [facility] this person be reached easily? \" YES    Note: If the relationship of these Decision-Makers to the patient does NOT follow your state's Next of Kin hierarchy, recommend that patient complete ACP document that meets state-specific requirements to allow them to act on the patient's behalf when appropriate. Care Preferences    Ventilation: \"If you were in your present state of health and suddenly became very ill and were unable to breathe on your own, what would your preference be about the use of a ventilator (breathing machine) if it were available to you? \"      If patient would desire the use of a ventilator (breathing machine), answer \"yes\", if not \"no\":no    \"If your health worsens and it becomes clear that your chance of recovery is unlikely, what would your preference be about the use of a ventilator (breathing machine) if it were available to you? \"     Would the patient desire the use of a ventilator (breathing machine)? NO      Resuscitation  \"CPR works best to restart the heart when there is a sudden event, like a heart attack, in someone who is otherwise healthy. Unfortunately, CPR does not typically restart the heart for people who have serious health conditions or who are very sick. \"    \"In the event your heart stopped as a result of an underlying serious health condition, would you want attempts to be made to restart your heart (answer \"yes\" for attempt to resuscitate) or would you prefer a natural death (answer \"no\" for do not attempt to resuscitate)? \" no      NOTE: If the patient has a valid advance directive AND now provides care preference(s) that are inconsistent with that prior directive, advise the patient to consider either: creating a new advance directive that complies with state-specific requirements; or, if that is not possible, orally revoking that prior directive in accordance with state-specific requirements, which must be documented in the EHR. [x] Yes  [] No   Educated Patient / Ross Ware regarding differences between Advance Directives and portable DNR orders.     Length of ACP Conversation in minutes:      Conversation Outcomes:  [x] ACP discussion completed-Patient doesn't has ACP completed and has declined in completing ACP.    [] Existing advance directive reviewed with patient; no changes to patient's previously recorded wishes     [] New Advance Directive completed   [] Portable Do Not Resuscitate prepared for Provider review and signature  [] POLST/POST/MOLST/MOST prepared for Provider review and signature      Follow-up plan:    [x] Schedule follow-up conversation to continue planning  [] Referred individual to Provider for additional questions/concerns   [] Advised patient/agent/surrogate to review completed ACP document and update if needed with changes in condition, patient preferences or care setting     [x] This note routed to one or more involved healthcare providers

## 2020-11-01 NOTE — PROGRESS NOTES
Hospitalist     Admit Date:  10/31/2020  1:01 PM   Name:  Zain Velázquez   Age:  69 y.o.  :  1950   MRN:  [id number]   PCP:  Sharita Steele MD  Treatment Team: Attending Provider: Aby Panchal MD; Utilization Review: Femi Goetz Commander    Subjective:     70 y/o F with CKD, HTN, obesity, who presented to urgent care with R lateral thorax wall pain. Described as moderate, sharp, occurring whenever she takes a deep breath. Intermittent, onset about 4 days ago. A/w SOB, [place]. Denies orthopnea, CP, fevers, chills, diarrhea, urinary changes, ageusia/anosmia, sick contacts, worsening edema. Compliant with meds and no changes recently. She was sent from Urgent care here because her sats on RA were 91% and she desatted to 84% on RA. She was not in distress and is unlabored currently as well. Today, has stabbing CP when take a breath, no fever, some cough    Objective:     Patient Vitals for the past 24 hrs:   Temp Pulse Resp BP SpO2   20 1105 -- -- -- -- 93 %   20 0750 98.3 °F (36.8 °C) 72 18 (!) 156/61 93 %   20 0336 98.6 °F (37 °C) 72 19 137/89 94 %   10/31/20 2312 98.7 °F (37.1 °C) 74 18 134/64 92 %   10/31/20 1922 99 °F (37.2 °C) 82 18 129/60 93 %   10/31/20 1619 99.1 °F (37.3 °C) 90 18 135/67 93 %     Oxygen Therapy  O2 Sat (%): 93 %(Mrs. Esparza's O2 saturation stayed at 93%, for walk test.) (20 110)  O2 Device: Room air (20)    Estimated body mass index is 40.72 kg/m² as calculated from the following:    Height as of 10/22/20: 5' 5\" (1.651 m). Weight as of this encounter: 111 kg (244 lb 11.4 oz). Intake/Output Summary (Last 24 hours) at 2020 1544  Last data filed at 2020 0336  Gross per 24 hour   Intake --   Output 900 ml   Net -900 ml       *Note that automatically entered I/Os may not be accurate; dependent on patient compliance with collection and accurate  by assistants. Physical Exam:  General:    Well nourished.   Moderate distress. CV:   RRR. No m/r/g. BLE edema 2+  Lungs:  Scattered [place]ana crackles  Abdomen: nondistended. Extremities: Warm and dry. No cyanosis or clubbing  Neurologic: grossly intact. Skin:     No rashes. No jaundice. Normal coloration  Psych:  Normal mood and affect. I reviewed the labs, imaging, EKGs, telemetry, and other studies done this admission. Data Reviewed:   Recent Results (from the past 24 hour(s))   METABOLIC PANEL, BASIC    Collection Time: 11/01/20  3:18 AM   Result Value Ref Range    Sodium 140 136 - 145 mmol/L    Potassium 3.4 (L) 3.5 - 5.1 mmol/L    Chloride 102 98 - 107 mmol/L    CO2 30 21 - 32 mmol/L    Anion gap 8 7 - 16 mmol/L    Glucose 124 (H) 65 - 100 mg/dL    BUN 24 (H) 8 - 23 MG/DL    Creatinine 1.15 (H) 0.6 - 1.0 MG/DL    GFR est AA 60 (L) >60 ml/min/1.73m2    GFR est non-AA 49 (L) >60 ml/min/1.73m2    Calcium 8.5 8.3 - 10.4 MG/DL   CBC WITH AUTOMATED DIFF    Collection Time: 11/01/20  3:18 AM   Result Value Ref Range    WBC 8.7 4.3 - 11.1 K/uL    RBC 5.21 (H) 4.05 - 5.2 M/uL    HGB 12.5 11.7 - 15.4 g/dL    HCT 41.4 35.8 - 46.3 %    MCV 79.5 (L) 79.6 - 97.8 FL    MCH 24.0 (L) 26.1 - 32.9 PG    MCHC 30.2 (L) 31.4 - 35.0 g/dL    RDW 14.0 11.9 - 14.6 %    PLATELET 930 815 - 534 K/uL    MPV 10.0 9.4 - 12.3 FL    ABSOLUTE NRBC 0.00 0.0 - 0.2 K/uL    DF AUTOMATED      NEUTROPHILS 73 43 - 78 %    LYMPHOCYTES 13 13 - 44 %    MONOCYTES 11 4.0 - 12.0 %    EOSINOPHILS 2 0.5 - 7.8 %    BASOPHILS 0 0.0 - 2.0 %    IMMATURE GRANULOCYTES 0 0.0 - 5.0 %    ABS. NEUTROPHILS 6.4 1.7 - 8.2 K/UL    ABS. LYMPHOCYTES 1.2 0.5 - 4.6 K/UL    ABS. MONOCYTES 1.0 0.1 - 1.3 K/UL    ABS. EOSINOPHILS 0.1 0.0 - 0.8 K/UL    ABS. BASOPHILS 0.0 0.0 - 0.2 K/UL    ABS. IMM.  GRANS. 0.0 0.0 - 0.5 K/UL   NT-PRO BNP    Collection Time: 11/01/20  2:39 PM   Result Value Ref Range    NT pro- (H) 5 - 125 PG/ML           SARS-CoV-2 Lab Results  \"Novel Coronavirus\" Test: No results found for: COV2NT   \"Emergent Disease\" Test: No results found for: EDPR  \"SARS-COV-2\" Test: No results found for: XGCOVT  Rapid Test: No results found for: COVR           Assessment and Plan:     1- Community acquired pneumonia w/ small [place] effusion, doing well on room air, with pleuritic CP  2- HTN, controlled  3- Hx of pulm HTN, AVR and CKD- all stable    Rx:  Rocephin+ Azithromycin  Follow blood CS  NSAID x 2 days  BP control    Dispo: home    Signed:  Elizabeth Friend MD

## 2020-11-01 NOTE — ACP (ADVANCE CARE PLANNING)
CM made contact with patient via phone to complete ACP. Patient alert/orient x4. Patient states she doesn't has an ACP in place and declined in completing a ACP. Patient did name a primary / secondary and supplement  listed in the healthcare decision makers. Patient did answer the following question concerning being placed on a ventilator and having CPR. Patient states she's doesn't wish to be placed on a ventilator or want CPR performed on her. Patient declined having the [address] following her hospital stay. CM will continue to monitor.

## 2020-11-02 VITALS
SYSTOLIC BLOOD PRESSURE: 156 MMHG | OXYGEN SATURATION: 95 % | BODY MASS INDEX: 40.72 KG/M2 | DIASTOLIC BLOOD PRESSURE: 59 MMHG | RESPIRATION RATE: 16 BRPM | WEIGHT: 244.71 LBS | TEMPERATURE: 97.6 F | HEART RATE: 69 BPM

## 2020-11-02 PROBLEM — J18.9 CAP (COMMUNITY ACQUIRED PNEUMONIA): Status: ACTIVE | Noted: 2020-11-02

## 2020-11-02 LAB
ANION GAP SERPL CALC-SCNC: 6 MMOL/L (ref 7–16)
BUN SERPL-MCNC: 29 MG/DL (ref 8–23)
CALCIUM SERPL-MCNC: 8.3 MG/DL (ref 8.3–10.4)
CHLORIDE SERPL-SCNC: 104 MMOL/L (ref 98–107)
CO2 SERPL-SCNC: 29 MMOL/L (ref 21–32)
CREAT SERPL-MCNC: 1.23 MG/DL (ref 0.6–1)
GLUCOSE SERPL-MCNC: 125 MG/DL (ref 65–100)
POTASSIUM SERPL-SCNC: 3.2 MMOL/L (ref 3.5–5.1)
SODIUM SERPL-SCNC: 139 MMOL/L (ref 136–145)

## 2020-11-02 PROCEDURE — 74011250637 HC RX REV CODE- 250/637: Performed by: HOSPITALIST

## 2020-11-02 PROCEDURE — 74011250636 HC RX REV CODE- 250/636: Performed by: INTERNAL MEDICINE

## 2020-11-02 PROCEDURE — 80048 BASIC METABOLIC PNL TOTAL CA: CPT

## 2020-11-02 PROCEDURE — 99218 HC RM OBSERVATION: CPT

## 2020-11-02 PROCEDURE — 74011250637 HC RX REV CODE- 250/637: Performed by: INTERNAL MEDICINE

## 2020-11-02 PROCEDURE — 65270000029 HC RM PRIVATE

## 2020-11-02 PROCEDURE — 96372 THER/PROPH/DIAG INJ SC/IM: CPT

## 2020-11-02 RX ORDER — POTASSIUM CHLORIDE 20 MEQ/1
40 TABLET, EXTENDED RELEASE ORAL
Status: COMPLETED | OUTPATIENT
Start: 2020-11-02 | End: 2020-11-02

## 2020-11-02 RX ORDER — AZITHROMYCIN 250 MG/1
250 TABLET, FILM COATED ORAL DAILY
Qty: 6 TAB | Refills: 0 | Status: SHIPPED | OUTPATIENT
Start: 2020-11-02 | End: 2021-05-04 | Stop reason: ALTCHOICE

## 2020-11-02 RX ORDER — CEFUROXIME AXETIL 500 MG/1
500 TABLET ORAL 2 TIMES DAILY
Qty: 12 TAB | Refills: 0 | Status: SHIPPED | OUTPATIENT
Start: 2020-11-02 | End: 2021-05-04 | Stop reason: ALTCHOICE

## 2020-11-02 RX ADMIN — Medication 10 ML: at 06:35

## 2020-11-02 RX ADMIN — POTASSIUM CHLORIDE 10 MEQ: 750 TABLET, EXTENDED RELEASE ORAL at 08:27

## 2020-11-02 RX ADMIN — ENOXAPARIN SODIUM 40 MG: 40 INJECTION SUBCUTANEOUS at 08:23

## 2020-11-02 RX ADMIN — AZITHROMYCIN MONOHYDRATE 250 MG: 250 TABLET ORAL at 08:22

## 2020-11-02 RX ADMIN — LOSARTAN POTASSIUM 100 MG: 50 TABLET, FILM COATED ORAL at 08:22

## 2020-11-02 RX ADMIN — PANTOPRAZOLE SODIUM 40 MG: 40 TABLET, DELAYED RELEASE ORAL at 08:22

## 2020-11-02 RX ADMIN — IBUPROFEN 400 MG: 400 TABLET, FILM COATED ORAL at 08:23

## 2020-11-02 RX ADMIN — FAMOTIDINE 20 MG: 20 TABLET, FILM COATED ORAL at 08:23

## 2020-11-02 RX ADMIN — TAMSULOSIN HYDROCHLORIDE 0.4 MG: 0.4 CAPSULE ORAL at 08:22

## 2020-11-02 RX ADMIN — HYDROCODONE BITARTRATE AND ACETAMINOPHEN 1 TABLET: 5; 325 TABLET ORAL at 05:05

## 2020-11-02 RX ADMIN — CARVEDILOL 25 MG: 25 TABLET, FILM COATED ORAL at 08:22

## 2020-11-02 RX ADMIN — ASPIRIN 81 MG: 81 TABLET, COATED ORAL at 08:23

## 2020-11-02 RX ADMIN — POTASSIUM CHLORIDE 40 MEQ: 20 TABLET, EXTENDED RELEASE ORAL at 09:00

## 2020-11-02 NOTE — PROGRESS NOTES
Discharge paperwork reviewed with pt. Due to covid restrictions, pt unable to sign. Opportunity for questions given.

## 2020-11-02 NOTE — PROGRESS NOTES
Pt is for discharge home today with no needs/supportive care orders recieved for CM at this time. Care Management Interventions  PCP Verified by CM: Yes(Calixto De La Torre MD)  Mode of Transport at Discharge:  Other (see comment)  Transition of Care Consult (CM Consult): Discharge Planning  Discharge Durable Medical Equipment: No  Physical Therapy Consult: No  Occupational Therapy Consult: No  Speech Therapy Consult: No  Current Support Network: Relative's Home(patient lives with son Jessica Vogel))  Confirm Follow Up Transport: Family  The Patient and/or Patient Representative was Provided with a Choice of Provider and Agrees with the Discharge Plan?: No  Freedom of Choice List was Provided with Basic Dialogue that Supports the Patient's Individualized Plan of Care/Goals, Treatment Preferences and Shares the Quality Data Associated with the Providers?: No  Discharge Location  Discharge Placement: Home

## 2020-11-02 NOTE — PROGRESS NOTES
Bedside and Verbal shift change report given to Cam Garza RN (oncoming nurse) by Shyam Cates RN (offgoing nurse). Report included the following information SBAR, Kardex, Intake/Output, MAR and Recent Results.

## 2020-11-02 NOTE — PROGRESS NOTES
Shift assessment complete. Pt alert and oriented x4. Respirations regular and clear heard on room air. HR regular. Abdomen soft with active bowel sounds in all 4 quadrants. Skin intact and warm. +3 pitting edema LLE. Trace of edema RLE. Legs elevated. IV patent and capped. Pt complaining of abdominal pain. 7 out 10. Will medicated, see MAR. Pt denies nausea or SOB. Safety measures in place; chair lowered and locked, call light within reach, and gripper socks on. Pt needs met at this time. Will continue to monitor.

## 2020-11-02 NOTE — DISCHARGE INSTRUCTIONS
DISCHARGE SUMMARY from Nurse    If you experience any of the following symptoms SOB, fever over 100.4, nausea and vomiting lasting more than 4 hours, please follow up with MD.    *  Please give a list of your current medications to your Primary Care Provider. *  Please update this list whenever your medications are discontinued, doses are      changed, or new medications (including over-the-counter products) are added. *  Please carry medication information at all times in case of emergency situations. These are general instructions for a healthy lifestyle:    No smoking/ No tobacco products/ Avoid exposure to second hand smoke  Surgeon General's Warning:  Quitting smoking now greatly reduces serious risk to your health. Obesity, smoking, and sedentary lifestyle greatly increases your risk for illness    A healthy diet, regular physical exercise & weight monitoring are important for maintaining a healthy lifestyle    You may be retaining fluid if you have a history of heart failure or if you experience any of the following symptoms:  Weight gain of 3 pounds or more overnight or 5 pounds in a week, increased swelling in our hands or feet or shortness of breath while lying flat in bed. Please call your doctor as soon as you notice any of these symptoms; do not wait until your next office visit. The discharge information has been reviewed with the patient. The patient verbalized understanding. Discharge medications reviewed with the patient and appropriate educational materials and side effects teaching were provided.   ___________________________________________________________________________________________________________________________________

## 2020-11-02 NOTE — PROGRESS NOTES
Problem: Patient Education: Go to Patient Education Activity  Goal: Patient/Family Education  Outcome: Progressing Towards Goal     Problem: Pneumonia: Day 1  Goal: Off Pathway (Use only if patient is Off Pathway)  Outcome: Progressing Towards Goal  Goal: Activity/Safety  Outcome: Progressing Towards Goal  Goal: Consults, if ordered  Outcome: Progressing Towards Goal  Goal: Diagnostic Test/Procedures  Outcome: Progressing Towards Goal  Goal: Nutrition/Diet  Outcome: Progressing Towards Goal  Goal: Medications  Outcome: Progressing Towards Goal  Goal: Respiratory  Outcome: Progressing Towards Goal  Goal: Treatments/Interventions/Procedures  Outcome: Progressing Towards Goal  Goal: Psychosocial  Outcome: Progressing Towards Goal  Goal: *Oxygen saturation within defined limits  Outcome: Progressing Towards Goal  Goal: *Influenza vaccine administered (October-March)  Outcome: Progressing Towards Goal  Goal: *Pneumoccocal vaccine administered  Outcome: Progressing Towards Goal  Goal: *Hemodynamically stable  Outcome: Progressing Towards Goal  Goal: *Demonstrates progressive activity  Outcome: Progressing Towards Goal  Goal: *Tolerating diet  Outcome: Progressing Towards Goal     Problem: Pneumonia: Day 2  Goal: Off Pathway (Use only if patient is Off Pathway)  Outcome: Progressing Towards Goal  Goal: Activity/Safety  Outcome: Progressing Towards Goal  Goal: Consults, if ordered  Outcome: Progressing Towards Goal  Goal: Diagnostic Test/Procedures  Outcome: Progressing Towards Goal  Goal: Nutrition/Diet  Outcome: Progressing Towards Goal  Goal: Discharge Planning  Outcome: Progressing Towards Goal  Goal: Medications  Outcome: Progressing Towards Goal  Goal: Respiratory  Outcome: Progressing Towards Goal  Goal: Treatments/Interventions/Procedures  Outcome: Progressing Towards Goal  Goal: Psychosocial  Outcome: Progressing Towards Goal  Goal: *Oxygen saturation within defined limits  Outcome: Progressing Towards Goal  Goal: *Hemodynamically stable  Outcome: Progressing Towards Goal  Goal: *Demonstrates progressive activity  Outcome: Progressing Towards Goal  Goal: *Tolerating diet  Outcome: Progressing Towards Goal  Goal: *Optimal pain control at patient's stated goal  Outcome: Progressing Towards Goal     Problem: Pneumonia: Day 3  Goal: Off Pathway (Use only if patient is Off Pathway)  Outcome: Progressing Towards Goal  Goal: Activity/Safety  Outcome: Progressing Towards Goal  Goal: Consults, if ordered  Outcome: Progressing Towards Goal  Goal: Diagnostic Test/Procedures  Outcome: Progressing Towards Goal  Goal: Nutrition/Diet  Outcome: Progressing Towards Goal  Goal: Discharge Planning  Outcome: Progressing Towards Goal  Goal: Medications  Outcome: Progressing Towards Goal  Goal: Respiratory  Outcome: Progressing Towards Goal  Goal: Treatments/Interventions/Procedures  Outcome: Progressing Towards Goal  Goal: Psychosocial  Outcome: Progressing Towards Goal  Goal: *Oxygen saturation within defined limits  Outcome: Progressing Towards Goal  Goal: *Hemodynamically stable  Outcome: Progressing Towards Goal  Goal: *Demonstrates progressive activity  Outcome: Progressing Towards Goal  Goal: *Tolerating diet  Outcome: Progressing Towards Goal  Goal: *Describes available resources and support systems  Outcome: Progressing Towards Goal  Goal: *Optimal pain control at patient's stated goal  Outcome: Progressing Towards Goal     Problem: Pneumonia: Day 4  Goal: Off Pathway (Use only if patient is Off Pathway)  Outcome: Progressing Towards Goal  Goal: Activity/Safety  Outcome: Progressing Towards Goal  Goal: Nutrition/Diet  Outcome: Progressing Towards Goal  Goal: Discharge Planning  Outcome: Progressing Towards Goal  Goal: Medications  Outcome: Progressing Towards Goal  Goal: Respiratory  Outcome: Progressing Towards Goal  Goal: Treatments/Interventions/Procedures  Outcome: Progressing Towards Goal  Goal: Psychosocial  Outcome: Progressing Towards Goal     Problem: Pneumonia: Discharge Outcomes  Goal: *Demonstrates progressive activity  Outcome: Progressing Towards Goal  Goal: *Describes follow-up/return visits to physicians  Outcome: Progressing Towards Goal  Goal: *Tolerating diet  Outcome: Progressing Towards Goal  Goal: *Verbalizes name, dosage, time, side effects, and number of days to continue medications  Outcome: Progressing Towards Goal  Goal: *Influenza immunization  Outcome: Progressing Towards Goal  Goal: *Pneumococcal immunization  Outcome: Progressing Towards Goal  Goal: *Respiratory status at baseline  Outcome: Progressing Towards Goal  Goal: *Vital signs within defined limits  Outcome: Progressing Towards Goal  Goal: *Describes available resources and support systems  Outcome: Progressing Towards Goal  Goal: *Optimal pain control at patient's stated goal  Outcome: Progressing Towards Goal     Problem: Falls - Risk of  Goal: *Absence of Falls  Description: Document Porsha Diallo Fall Risk and appropriate interventions in the flowsheet.   Outcome: Progressing Towards Goal  Note: Fall Risk Interventions:            Medication Interventions: Teach patient to arise slowly                   Problem: Patient Education: Go to Patient Education Activity  Goal: Patient/Family Education  Outcome: Progressing Towards Goal

## 2020-11-02 NOTE — DISCHARGE SUMMARY
Physician Discharge Summary     Patient ID:  Laureen Milligan  [id number]  79 y.o.  1950    Admit date: 10/31/2020    Discharge date: 11-2-2020    Diagnosis:  1- Community acquired pneumonia w/ small bilateral effusion, did well on room air, with pleuritic CP. Treated with Cephalosporins and Azithromycin. Blood ultures negative. Improved. To follow on outpatient Covid-19 pending result (collected before admission and result is expected today or tomorrow). 2- Hypertension, controlled  3- Hx of pulmonary HTN, Aortic valve replacement and CKD stage 2- all stable    Hospital course:   70 y/o F with CKD, HTN, obesity, who presented to urgent care with R lateral thorax wall pain. Described as moderate, sharp, occurring whenever she takes a deep breath. Intermittent, onset about 4 days ago. A/w SOB, [place]. Denies orthopnea, CP, fevers, chills, diarrhea, urinary changes, ageusia/anosmia, sick contacts, worsening edema. Compliant with meds and no changes recently. She was sent from Urgent care here because her sats on RA were 91% and she desatted to 84% on RA. She was not in distress and is unlabored currently as well. Patient admitted and treated as above. On day of discharge, patient exam showed mildly diminished bibasilar bs. Pt felt well, doing well on room air. PCP: Yisel Fernandez MD    Patient Instructions:   Current Discharge Medication List      START taking these medications    Details   azithromycin (ZITHROMAX) 250 mg tablet Take 1 Tab by mouth daily. Qty: 6 Tab, Refills: 0      cefUROXime (CEFTIN) 500 mg tablet Take 1 Tab by mouth two (2) times a day.   Qty: 12 Tab, Refills: 0         CONTINUE these medications which have NOT CHANGED    Details   esomeprazole (NEXIUM) 40 mg capsule       tamsulosin (FLOMAX) 0.4 mg capsule TAKE 1 CAPSULE BY MOUTH DAILY  Qty: 90 Cap, Refills: 3    Comments: **Patient requests 90 days supply**      furosemide (LASIX) 20 mg tablet TAKE 1 TABLET BY MOUTH EVERY DAY  Qty: 90 Tab, Refills: 1    Associated Diagnoses: Essential hypertension, benign      Uloric 40 mg tab tablet TAKE 1 TABLET BY MOUTH EVERY MORNING  Qty: 90 Tab, Refills: 1    Associated Diagnoses: Acute idiopathic gout involving toe of right foot      amLODIPine (NORVASC) 10 mg tablet TAKE 1 TABLET BY MOUTH DAILY  Qty: 90 Tab, Refills: 1    Associated Diagnoses: Essential hypertension, benign      nitroglycerin (NITROSTAT) 0.4 mg SL tablet 1 Tab by SubLINGual route every five (5) minutes as needed for Chest Pain. Up to 3 doses. Qty: 25 Tab, Refills: 4    Associated Diagnoses: Chest pain, unspecified type      losartan (COZAAR) 100 mg tablet TAKE 1 TABLET BY MOUTH DAILY  Qty: 90 Tab, Refills: 3      potassium chloride SR (KLOR-CON 10) 10 mEq tablet TAKE 1 TABLET BY MOUTH DAILY  Qty: 90 Tab, Refills: 1    Associated Diagnoses: Essential hypertension, benign      famotidine (PEPCID) 20 mg tablet Take 1 Tab by mouth two (2) times a day. Qty: 180 Tab, Refills: 1    Associated Diagnoses: Gastroesophageal reflux disease, esophagitis presence not specified      carvedilol (COREG) 25 mg tablet Take 1 Tab by mouth two (2) times daily (with meals). Qty: 180 Tab, Refills: 3      DISABLED PLACARD (DISABLED PLACARD) DMV To use with handicap placard form  Qty: 1 Each, Refills: 0    Associated Diagnoses: Spinal stenosis of lumbar region with neurogenic claudication      aspirin delayed-release 81 mg tablet Take 81 mg by mouth daily. DOCUSATE CALCIUM (STOOL SOFTENER PO) Take 1 Tab by mouth daily. calcium-vitamin D 600 mg(1,500mg) -200 unit tab Take 1 Tab by mouth daily. ferrous sulfate 325 mg (65 mg iron) tablet Take 1 Tab by mouth daily (with breakfast). Qty: 30 Tab, Refills: 1             Instructions:     follow up on covid-19 screen with your doctor    Diet:  Low salt, low fat    Activity: As tolerated. Condition: Stable    Follow-up with primary physician in 1-2 week.   Time 35 min    Please send copy to primary physician.     Signed:  Guanako Liu MD  11/2/2020  11:17 AM

## 2020-11-03 ENCOUNTER — PATIENT OUTREACH (OUTPATIENT)
Dept: CASE MANAGEMENT | Age: 70
End: 2020-11-03

## 2020-11-03 NOTE — PROGRESS NOTES
Initial [facility] outreach attempt to was unsuccessful. Left message. Will attempt second outreach within 24 hours.

## 2020-11-03 NOTE — PROGRESS NOTES
Second MIGUEL outreach attempt was unsuccessful. Left message to return call. Will attempt third outreach within 5 business days.

## 2020-11-04 ENCOUNTER — PATIENT OUTREACH (OUTPATIENT)
Dept: CASE MANAGEMENT | Age: 70
End: 2020-11-04

## 2020-11-04 NOTE — PROGRESS NOTES
Third MIGUEL outreach attempt without success. Left message. Unable to reach for [facility] program.  Will reopen if call is returned.

## 2021-06-06 ENCOUNTER — APPOINTMENT (OUTPATIENT)
Dept: CT IMAGING | Age: 71
DRG: 287 | End: 2021-06-06
Attending: EMERGENCY MEDICINE
Payer: MEDICARE

## 2021-06-06 ENCOUNTER — HOSPITAL ENCOUNTER (INPATIENT)
Age: 71
LOS: 10 days | Discharge: NURSING, REHAB OR LONG-TERM CARE | DRG: 287 | End: 2021-06-16
Attending: EMERGENCY MEDICINE | Admitting: HOSPITALIST
Payer: MEDICARE

## 2021-06-06 ENCOUNTER — APPOINTMENT (OUTPATIENT)
Dept: CT IMAGING | Age: 71
DRG: 287 | End: 2021-06-06
Attending: HOSPITALIST
Payer: MEDICARE

## 2021-06-06 DIAGNOSIS — Z95.3 S/P AORTIC VALVE REPLACEMENT WITH BIOPROSTHETIC VALVE: ICD-10-CM

## 2021-06-06 DIAGNOSIS — M48.062 LUMBAR STENOSIS WITH NEUROGENIC CLAUDICATION: ICD-10-CM

## 2021-06-06 DIAGNOSIS — I10 ESSENTIAL HYPERTENSION, BENIGN: Chronic | ICD-10-CM

## 2021-06-06 DIAGNOSIS — R93.1 ABNORMAL NUCLEAR CARDIAC IMAGING TEST: ICD-10-CM

## 2021-06-06 DIAGNOSIS — E66.01 MORBID OBESITY (HCC): Chronic | ICD-10-CM

## 2021-06-06 DIAGNOSIS — N18.30 STAGE 3 CHRONIC KIDNEY DISEASE, UNSPECIFIED WHETHER STAGE 3A OR 3B CKD (HCC): Chronic | ICD-10-CM

## 2021-06-06 DIAGNOSIS — M50.30 DDD (DEGENERATIVE DISC DISEASE), CERVICAL: ICD-10-CM

## 2021-06-06 DIAGNOSIS — R07.9 CHEST PAIN, UNSPECIFIED TYPE: ICD-10-CM

## 2021-06-06 DIAGNOSIS — N10 ACUTE PYELONEPHRITIS: ICD-10-CM

## 2021-06-06 DIAGNOSIS — M54.2 NECK PAIN: ICD-10-CM

## 2021-06-06 DIAGNOSIS — I48.0 PAROXYSMAL ATRIAL FIBRILLATION (HCC): ICD-10-CM

## 2021-06-06 DIAGNOSIS — N12 PYELONEPHRITIS: Primary | ICD-10-CM

## 2021-06-06 DIAGNOSIS — Z98.890 HISTORY OF CERVICAL SPINAL SURGERY: ICD-10-CM

## 2021-06-06 DIAGNOSIS — M48.062 SPINAL STENOSIS OF LUMBAR REGION WITH NEUROGENIC CLAUDICATION: ICD-10-CM

## 2021-06-06 LAB
ALBUMIN SERPL-MCNC: 2.6 G/DL (ref 3.2–4.6)
ALBUMIN/GLOB SERPL: 0.7 {RATIO} (ref 1.2–3.5)
ALP SERPL-CCNC: 87 U/L (ref 50–136)
ALT SERPL-CCNC: 18 U/L (ref 12–65)
ANION GAP SERPL CALC-SCNC: 8 MMOL/L (ref 7–16)
AST SERPL-CCNC: 19 U/L (ref 15–37)
BACTERIA URNS QL MICRO: ABNORMAL /HPF
BASOPHILS # BLD: 0.1 K/UL (ref 0–0.2)
BASOPHILS NFR BLD: 0 % (ref 0–2)
BILIRUB SERPL-MCNC: 1.1 MG/DL (ref 0.2–1.1)
BUN SERPL-MCNC: 20 MG/DL (ref 8–23)
CALCIUM SERPL-MCNC: 7.9 MG/DL (ref 8.3–10.4)
CASTS URNS QL MICRO: 0 /LPF
CHLORIDE SERPL-SCNC: 102 MMOL/L (ref 98–107)
CO2 SERPL-SCNC: 24 MMOL/L (ref 21–32)
CREAT SERPL-MCNC: 1.55 MG/DL (ref 0.6–1)
CRYSTALS URNS QL MICRO: 0 /LPF
DIFFERENTIAL METHOD BLD: ABNORMAL
EOSINOPHIL # BLD: 0 K/UL (ref 0–0.8)
EOSINOPHIL NFR BLD: 0 % (ref 0.5–7.8)
EPI CELLS #/AREA URNS HPF: ABNORMAL /HPF
ERYTHROCYTE [DISTWIDTH] IN BLOOD BY AUTOMATED COUNT: 14.6 % (ref 11.9–14.6)
GLOBULIN SER CALC-MCNC: 3.7 G/DL (ref 2.3–3.5)
GLUCOSE SERPL-MCNC: 152 MG/DL (ref 65–100)
HCT VFR BLD AUTO: 35.7 % (ref 35.8–46.3)
HGB BLD-MCNC: 11.4 G/DL (ref 11.7–15.4)
IMM GRANULOCYTES # BLD AUTO: 0.1 K/UL (ref 0–0.5)
IMM GRANULOCYTES NFR BLD AUTO: 1 % (ref 0–5)
LACTATE SERPL-SCNC: 1.3 MMOL/L (ref 0.4–2)
LIPASE SERPL-CCNC: 45 U/L (ref 73–393)
LYMPHOCYTES # BLD: 0.8 K/UL (ref 0.5–4.6)
LYMPHOCYTES NFR BLD: 4 % (ref 13–44)
MAGNESIUM SERPL-MCNC: 1.7 MG/DL (ref 1.8–2.4)
MCH RBC QN AUTO: 23.6 PG (ref 26.1–32.9)
MCHC RBC AUTO-ENTMCNC: 31.9 G/DL (ref 31.4–35)
MCV RBC AUTO: 73.9 FL (ref 79.6–97.8)
MONOCYTES # BLD: 1 K/UL (ref 0.1–1.3)
MONOCYTES NFR BLD: 5 % (ref 4–12)
MUCOUS THREADS URNS QL MICRO: 0 /LPF
NEUTS SEG # BLD: 19.4 K/UL (ref 1.7–8.2)
NEUTS SEG NFR BLD: 91 % (ref 43–78)
NRBC # BLD: 0 K/UL (ref 0–0.2)
OTHER OBSERVATIONS,UCOM: ABNORMAL
PLATELET # BLD AUTO: 173 K/UL (ref 150–450)
PMV BLD AUTO: 10.1 FL (ref 9.4–12.3)
POTASSIUM SERPL-SCNC: 3.9 MMOL/L (ref 3.5–5.1)
PROT SERPL-MCNC: 6.3 G/DL (ref 6.3–8.2)
RBC # BLD AUTO: 4.83 M/UL (ref 4.05–5.2)
RBC #/AREA URNS HPF: ABNORMAL /HPF
SODIUM SERPL-SCNC: 134 MMOL/L (ref 136–145)
TSH SERPL DL<=0.005 MIU/L-ACNC: 1.16 UIU/ML (ref 0.36–3.74)
WBC # BLD AUTO: 21.5 K/UL (ref 4.3–11.1)
WBC URNS QL MICRO: >100 /HPF

## 2021-06-06 PROCEDURE — 94762 N-INVAS EAR/PLS OXIMTRY CONT: CPT

## 2021-06-06 PROCEDURE — 80053 COMPREHEN METABOLIC PANEL: CPT

## 2021-06-06 PROCEDURE — 83605 ASSAY OF LACTIC ACID: CPT

## 2021-06-06 PROCEDURE — 74011000258 HC RX REV CODE- 258: Performed by: EMERGENCY MEDICINE

## 2021-06-06 PROCEDURE — 83690 ASSAY OF LIPASE: CPT

## 2021-06-06 PROCEDURE — 74011250637 HC RX REV CODE- 250/637: Performed by: HOSPITALIST

## 2021-06-06 PROCEDURE — 83735 ASSAY OF MAGNESIUM: CPT

## 2021-06-06 PROCEDURE — 84443 ASSAY THYROID STIM HORMONE: CPT

## 2021-06-06 PROCEDURE — 65270000029 HC RM PRIVATE

## 2021-06-06 PROCEDURE — 87205 SMEAR GRAM STAIN: CPT

## 2021-06-06 PROCEDURE — 87040 BLOOD CULTURE FOR BACTERIA: CPT

## 2021-06-06 PROCEDURE — 81015 MICROSCOPIC EXAM OF URINE: CPT

## 2021-06-06 PROCEDURE — 81003 URINALYSIS AUTO W/O SCOPE: CPT

## 2021-06-06 PROCEDURE — 99284 EMERGENCY DEPT VISIT MOD MDM: CPT

## 2021-06-06 PROCEDURE — 74011250636 HC RX REV CODE- 250/636: Performed by: HOSPITALIST

## 2021-06-06 PROCEDURE — 87077 CULTURE AEROBIC IDENTIFY: CPT

## 2021-06-06 PROCEDURE — 96374 THER/PROPH/DIAG INJ IV PUSH: CPT

## 2021-06-06 PROCEDURE — 87186 SC STD MICRODIL/AGAR DIL: CPT

## 2021-06-06 PROCEDURE — 85025 COMPLETE CBC W/AUTO DIFF WBC: CPT

## 2021-06-06 PROCEDURE — 74011000258 HC RX REV CODE- 258: Performed by: HOSPITALIST

## 2021-06-06 PROCEDURE — 2709999900 HC NON-CHARGEABLE SUPPLY

## 2021-06-06 PROCEDURE — 74011250636 HC RX REV CODE- 250/636: Performed by: EMERGENCY MEDICINE

## 2021-06-06 PROCEDURE — 87150 DNA/RNA AMPLIFIED PROBE: CPT

## 2021-06-06 PROCEDURE — 72125 CT NECK SPINE W/O DYE: CPT

## 2021-06-06 PROCEDURE — 74176 CT ABD & PELVIS W/O CONTRAST: CPT

## 2021-06-06 RX ORDER — SODIUM CHLORIDE 0.9 % (FLUSH) 0.9 %
5-10 SYRINGE (ML) INJECTION AS NEEDED
Status: DISCONTINUED | OUTPATIENT
Start: 2021-06-06 | End: 2021-06-16 | Stop reason: HOSPADM

## 2021-06-06 RX ORDER — SODIUM CHLORIDE 0.9 % (FLUSH) 0.9 %
5-10 SYRINGE (ML) INJECTION EVERY 8 HOURS
Status: DISCONTINUED | OUTPATIENT
Start: 2021-06-06 | End: 2021-06-16 | Stop reason: HOSPADM

## 2021-06-06 RX ORDER — ONDANSETRON 4 MG/1
4 TABLET, ORALLY DISINTEGRATING ORAL
COMMUNITY
End: 2022-02-09

## 2021-06-06 RX ORDER — CARVEDILOL 25 MG/1
25 TABLET ORAL 2 TIMES DAILY WITH MEALS
Status: DISCONTINUED | OUTPATIENT
Start: 2021-06-07 | End: 2021-06-16 | Stop reason: HOSPADM

## 2021-06-06 RX ORDER — ASPIRIN 81 MG/1
81 TABLET ORAL DAILY
Status: DISCONTINUED | OUTPATIENT
Start: 2021-06-07 | End: 2021-06-16 | Stop reason: HOSPADM

## 2021-06-06 RX ORDER — PANTOPRAZOLE SODIUM 40 MG/1
40 TABLET, DELAYED RELEASE ORAL
Status: DISCONTINUED | OUTPATIENT
Start: 2021-06-07 | End: 2021-06-16 | Stop reason: HOSPADM

## 2021-06-06 RX ORDER — CALCIUM CARBONATE/VITAMIN D3 250-3.125
1 TABLET ORAL DAILY
Status: DISCONTINUED | OUTPATIENT
Start: 2021-06-07 | End: 2021-06-16 | Stop reason: HOSPADM

## 2021-06-06 RX ORDER — ONDANSETRON 2 MG/ML
4 INJECTION INTRAMUSCULAR; INTRAVENOUS
Status: DISCONTINUED | OUTPATIENT
Start: 2021-06-06 | End: 2021-06-16 | Stop reason: HOSPADM

## 2021-06-06 RX ORDER — SODIUM CHLORIDE 0.9 % (FLUSH) 0.9 %
5-40 SYRINGE (ML) INJECTION AS NEEDED
Status: DISCONTINUED | OUTPATIENT
Start: 2021-06-06 | End: 2021-06-16 | Stop reason: HOSPADM

## 2021-06-06 RX ORDER — SUCRALFATE 1 G/1
1 TABLET ORAL
Status: DISCONTINUED | OUTPATIENT
Start: 2021-06-06 | End: 2021-06-16 | Stop reason: HOSPADM

## 2021-06-06 RX ORDER — HYDROCODONE BITARTRATE AND ACETAMINOPHEN 10; 325 MG/1; MG/1
1 TABLET ORAL
Status: DISCONTINUED | OUTPATIENT
Start: 2021-06-06 | End: 2021-06-07

## 2021-06-06 RX ORDER — AMLODIPINE BESYLATE 10 MG/1
10 TABLET ORAL DAILY
Status: DISCONTINUED | OUTPATIENT
Start: 2021-06-07 | End: 2021-06-16 | Stop reason: HOSPADM

## 2021-06-06 RX ORDER — SODIUM CHLORIDE 9 MG/ML
75 INJECTION, SOLUTION INTRAVENOUS CONTINUOUS
Status: DISPENSED | OUTPATIENT
Start: 2021-06-06 | End: 2021-06-08

## 2021-06-06 RX ORDER — HEPARIN SODIUM 5000 [USP'U]/ML
5000 INJECTION, SOLUTION INTRAVENOUS; SUBCUTANEOUS EVERY 8 HOURS
Status: DISCONTINUED | OUTPATIENT
Start: 2021-06-06 | End: 2021-06-16 | Stop reason: HOSPADM

## 2021-06-06 RX ORDER — AMOXICILLIN 250 MG
2 CAPSULE ORAL
Status: DISCONTINUED | OUTPATIENT
Start: 2021-06-06 | End: 2021-06-15

## 2021-06-06 RX ORDER — POLYETHYLENE GLYCOL 3350 17 G/17G
17 POWDER, FOR SOLUTION ORAL DAILY
Status: DISCONTINUED | OUTPATIENT
Start: 2021-06-07 | End: 2021-06-15

## 2021-06-06 RX ORDER — SODIUM CHLORIDE 0.9 % (FLUSH) 0.9 %
5-40 SYRINGE (ML) INJECTION EVERY 8 HOURS
Status: DISCONTINUED | OUTPATIENT
Start: 2021-06-06 | End: 2021-06-16 | Stop reason: HOSPADM

## 2021-06-06 RX ADMIN — HYDROCODONE BITARTRATE AND ACETAMINOPHEN 1 TABLET: 10; 325 TABLET ORAL at 22:36

## 2021-06-06 RX ADMIN — CEFTRIAXONE 1 G: 1 INJECTION, POWDER, FOR SOLUTION INTRAMUSCULAR; INTRAVENOUS at 18:11

## 2021-06-06 RX ADMIN — SENNOSIDES AND DOCUSATE SODIUM 2 TABLET: 8.6; 5 TABLET ORAL at 22:36

## 2021-06-06 RX ADMIN — HEPARIN SODIUM 5000 UNITS: 5000 INJECTION INTRAVENOUS; SUBCUTANEOUS at 22:36

## 2021-06-06 RX ADMIN — SUCRALFATE 1 G: 1 TABLET ORAL at 22:36

## 2021-06-06 RX ADMIN — SODIUM CHLORIDE 75 ML/HR: 900 INJECTION, SOLUTION INTRAVENOUS at 22:49

## 2021-06-06 RX ADMIN — CEFEPIME HYDROCHLORIDE 2 G: 2 INJECTION, POWDER, FOR SOLUTION INTRAVENOUS at 22:35

## 2021-06-06 RX ADMIN — ONDANSETRON 4 MG: 2 INJECTION INTRAMUSCULAR; INTRAVENOUS at 22:36

## 2021-06-06 RX ADMIN — Medication 10 ML: at 22:34

## 2021-06-06 NOTE — ED PROVIDER NOTES
76-year-old white female brought in by family due to fatigue and lethargy. Symptoms have been worsening over the past week. She has had nausea with decreased appetite. No definite fever. Family initially thought the symptoms were related to a fall which occurred May 21. Patient attempted to sit down on her walker but it rolled out causing her to fall and strike her head. She was seen by primary care after this and had a head CT which was normal.  She was diagnosed with a concussion. She is also complaining of neck pain which began after the fall. She did not have any imaging of her neck at that time. The history is provided by the patient and a relative. Fatigue  Associated symptoms include nausea. Pertinent negatives include no shortness of breath, no chest pain, no vomiting and no headaches.         Past Medical History:   Diagnosis Date    Adverse effect of anesthesia     panic attack when mask placed on face    Anemia     2016/2017 - gi bleed---  2017 blood transfusion prior to AVR    Arthritis     CAD (coronary artery disease) 2016 1/2019 Minor nonobstructive coronary artery disease/ cath report    Chronic kidney disease     kidney stones    Chronic pain     r/t arthritis/ back pain Spinal stenosis of lumbar region     Diabetes ([address]     \"prediabetic\" A1C 6.4 8/28/19- no meds    Follow-up visit for aortic valve replacement with bioprosthetic valve 7/25/2016    GERD (gastroesophageal reflux disease)     controlled w/med- well controlled with meds    History of gastric ulcer 6/16/2016    Hypertension     controlled w/med    Kidney stones     Morbid obesity ([address]     Murmur, cardiac     s/p AVR    PONV (postoperative nausea and vomiting)     Pt can not be flat in bed -- severe PONV worsens when laying flat    Psychiatric disorder     claustraphobia (severe)    PUD (peptic ulcer disease) 06/2016    dx 2016- resolved per patient/ GI bleed requiring blood transfusion    S/P aortic valve replacement with bioprosthetic valve 4/28/2017    Spinal stenosis of lumbar region with neurogenic claudication 10/16/2018    Valvular heart disease 2016    AVR       Past Surgical History:   Procedure Laterality Date    COLONOSCOPY N/A 6/15/2016    COLONOSCOPY/ BMI 41  ROOM 2235 performed by Anthony Cook MD at [facility] ENDOSCOPY    HX AORTIC VALVE REPLACEMENT  6/9/2016    Dr. Garcia Orn    HX BILATERAL SALPINGO-OOPHORECTOMY      HX CERVICAL FUSION      posterior fusion    HX GASTRIC BYPASS      HX HEART CATHETERIZATION  05/26/2016    Severe AS with minimal nonobstructive CAD.  HX HEART CATHETERIZATION  01/31/2019    Hyperdynamic LV EF. No significant gradient across bioprosthetic aortic valve,mild nonobstructive CAD,and MAC.     HX HEART VALVE SURGERY      HX HYSTERECTOMY      HX KNEE REPLACEMENT Bilateral     HX LAP CHOLECYSTECTOMY      HX SHOULDER ARTHROSCOPY Right      times 2 \"shaved bone\"     HX UROLOGICAL  Multiple dates    Mulitiple open Nephrolithotomies    HX UROLOGICAL  08/2019    CYSTOSCOPY BILATERAL URETEROSCOPY/LASER LITHO/STENTS    OH ABDOMEN SURGERY PROC UNLISTED      reversal of gastric bypass reversal         Family History:   Problem Relation Age of Onset    Hypertension Father     Lung Disease Father     Lung Cancer Father     Lung Disease Mother     Lung Cancer Mother     Diabetes Maternal Grandmother     Diabetes Paternal Grandmother     Breast Cancer Neg Hx        Social History     Socioeconomic History    Marital status:      Spouse name: Not on file    Number of children: Not on file    Years of education: Not on file    Highest education level: Not on file   Occupational History    Not on file   Tobacco Use    Smoking status: Never Smoker    Smokeless tobacco: Never Used   Substance and Sexual Activity    Alcohol use: No    Drug use: No    Sexual activity: Not on file   Other Topics Concern    Not on file   Social History Narrative    Not on file Social Determinants of Health     Financial Resource Strain:     Difficulty of Paying Living Expenses:    Food Insecurity:     Worried About Running Out of Food in the Last Year:     [address] in the Last Year:    Transportation Needs:     Lack of Transportation (Medical):  Lack of Transportation (Non-Medical):    Physical Activity:     Days of Exercise per Week:     Minutes of Exercise per Session:    Stress:     Feeling of Stress :    Social Connections:     Frequency of Communication with Friends and Family:     Frequency of Social Gatherings with Friends and Family:     Attends Spiritism Services:     Active Member of Clubs or Organizations:     Attends Club or Organization Meetings:     Marital Status:    Intimate Partner Violence:     Fear of Current or Ex-Partner:     Emotionally Abused:     Physically Abused:     Sexually Abused: ALLERGIES: Latex; Metformin; Sulfa (sulfonamide antibiotics); Nsaids (non-steroidal anti-inflammatory drug); Macrobid [nitrofurantoin monohyd/m-cryst]; Other plant, animal, environmental; Oxycodone; and Tape [adhesive]    Review of Systems   Constitutional: Positive for appetite change, chills and fatigue. HENT: Negative for congestion. Respiratory: Negative for cough and shortness of breath. Cardiovascular: Negative for chest pain. Gastrointestinal: Positive for nausea. Negative for abdominal pain and vomiting. Genitourinary: Negative for dysuria. Musculoskeletal: Positive for back pain and neck pain. Skin: Negative for rash. Neurological: Negative for headaches. All other systems reviewed and are negative. Vitals:    06/06/21 1548 06/06/21 1721 06/06/21 1740   BP: 127/68 (!) 122/57 130/70   Pulse: 94     Resp: 16     Temp: 97.9 °F (36.6 °C)     SpO2: 94% 95% 96%   Height: 5' 5\" (1.651 m)              Physical Exam  Vitals and nursing note reviewed. Constitutional:       General: She is not in acute distress. Appearance: Normal appearance. She is not toxic-appearing. HENT:      Head: Normocephalic and atraumatic. Nose: Nose normal.      Mouth/Throat:      Mouth: Mucous membranes are moist.      Pharynx: Oropharynx is clear. Eyes:      Conjunctiva/sclera: Conjunctivae normal.      Pupils: Pupils are equal, round, and reactive to light. Cardiovascular:      Rate and Rhythm: Normal rate and regular rhythm. Heart sounds: No murmur heard. Pulmonary:      Effort: Pulmonary effort is normal.      Breath sounds: Normal breath sounds. Abdominal:      General: There is no distension. Palpations: Abdomen is soft. Tenderness: There is no abdominal tenderness. There is no guarding. Musculoskeletal:         General: Normal range of motion. Cervical back: Normal range of motion and neck supple. Skin:     General: Skin is warm and dry. Neurological:      Mental Status: She is alert and oriented to person, place, and time. Psychiatric:         Mood and Affect: Mood normal.         Behavior: Behavior normal.          MDM  Number of Diagnoses or Management Options  Diagnosis management comments: Blood work shows leukocytosis 21.5 but normal lactic acid. Mild kidney injury with creatinine 1.55. Patient's baseline creatinine is around 0.9. She has been treated with IV fluids. Urine dip concerning for UTI. Culture and micro are pending. She has been treated with Rocephin. CT C-spine shows previous fusion but no acute abnormality. Patient symptoms are concerning for pyelonephritis considering UTI with leukocytosis and fatigue. Will discuss with hospitalist for possible admission.        Amount and/or Complexity of Data Reviewed  Clinical lab tests: ordered and reviewed  Tests in the medicine section of CPT®: ordered and reviewed    Risk of Complications, Morbidity, and/or Mortality  Presenting problems: moderate  Diagnostic procedures: moderate  Management options: moderate Procedures

## 2021-06-06 NOTE — PROGRESS NOTES
TRANSFER - IN REPORT:    Verbal report received from Ebenezer Lora on Tamar Urrutia  being received from ER for routine progression of care      Report consisted of patients Situation, Background, Assessment and   Recommendations(SBAR). Information from the following report(s) Kardex was reviewed with the receiving nurse. Opportunity for questions and clarification was provided. Assessment completed upon patients arrival to unit and care assumed.

## 2021-06-06 NOTE — Clinical Note
TRANSFER - IN REPORT:     Verbal report received from: [id number] . Report consisted of patient's Situation, Background, Assessment and   Recommendations(SBAR). Opportunity for questions and clarification was provided. Assessment completed upon patient's arrival to unit and care assumed. Patient transported with a Registered Nurse.

## 2021-06-06 NOTE — Clinical Note
TRANSFER - IN REPORT:     Verbal report received from: 2nd floor RN. Report consisted of patient's Situation, Background, Assessment and   Recommendations(SBAR). Opportunity for questions and clarification was provided. Assessment completed upon patient's arrival to unit and care assumed. Patient transported with a Registered Nurse.

## 2021-06-06 NOTE — ED TRIAGE NOTES
Pt ambulatory to triage with walker and daughter. Pt states she fell on 05/21/2021 and since that time she feels nauseated and decreased appetite. Pt states f/u with PCP and CT head was normal. Pt states she doesn't feel any better. Pt went to PCP at the end of the week last week and was given zofran but not working. Pt denies vomiting but states she isn't eating. Pt states she has pain in her neck.  Pt states she doesn't think neck was CT and she feels that her head is heavy on her neck

## 2021-06-06 NOTE — H&P
[facility] History and Physical       Name:  Rebel Juares  Age:71 y.o. Sex:female   :  1950    MRN:  [id number]   PCP:  Samantha Mora MD      Admit Date:  2021  4:19 PM   Chief Complaint: Generalized weakness, fatigue, abdominal pain    Reason for Admission:   Acute pyelonephritis [N10]    Assessment & Plan:     Acute pyelonephritis: Initiated on antibiotics, patient will be admitted to hospital, complete urinalysis is pending, urine microscopy shows evidence of more than 100 white cells in the urine. Follow urine cultures. Will obtain CT scan of abdomen without contrast for further evaluate any evidence of obstructing pathology as patient has a history of kidney stones. Hypertension: Continue on home medications, hold nephrotoxic medications. CKD stage III: Creatinine slightly elevated, initiated on IV fluids, will monitor creatinine. GERD: Continue on home dose of sucralfate, Nexium. Chronic back pain: Continue on home medications. Disposition/Expected LOS: 4  Diet: None  VTE ppx: Subcu heparin  GI ppx: Protonix  Code status: Full Code  Surrogate decision-maker: Self      History of Presenting Illness: This is a 66years old very nice female brought to emergency room as patient experiencing generalized weak, fatigue, sleeping a lot. Patient was initially seen by primary care physician, CT scan of head was done and reported normal to me by ER physician. Patient also experiencing falls lately. History of abdominal discomfort, back pain going on for past few days duration. Reports her back pain is around baseline, abdominal pain is getting worse. Denies any burning urination but reports she gets UTIs more frequently. Emergency room physician obtained labs shows evidence of elevated white count. Urine dipstick was done shows positive for UTI, urine analysis not completed. Urine microscopy shows evidence of more than 100 white cells.   Patient was initiated antibiotics. White count is significantly elevated. Denies any neck pain, headache, visual disturbance. Patient was sitting side of the bed and talking to me without any difficulty. Denies any chest pain, shortness of breath, cough, sputum production. History of nausea without any vomiting's. Review of Systems:  A 14 point review of systems was taken and pertinent positive as per HPI.         Past Medical History:   Diagnosis Date    Adverse effect of anesthesia     panic attack when mask placed on face    Anemia     2016/2017 - gi bleed---  2017 blood transfusion prior to AVR    Arthritis     CAD (coronary artery disease) 2016 1/2019 Minor nonobstructive coronary artery disease/ cath report    Chronic kidney disease     kidney stones    Chronic pain     r/t arthritis/ back pain Spinal stenosis of lumbar region     Diabetes [address] [address]     \"prediabetic\" A1C 6.4 8/28/19- no meds    Follow-up visit for aortic valve replacement with bioprosthetic valve 7/25/2016    GERD (gastroesophageal reflux disease)     controlled w/med- well controlled with meds    History of gastric ulcer 6/16/2016    Hypertension     controlled w/med    Kidney stones     Morbid obesity ([address]     Murmur, cardiac     s/p AVR    PONV (postoperative nausea and vomiting)     Pt can not be flat in bed -- severe PONV worsens when laying flat    Psychiatric disorder     claustraphobia (severe)    PUD (peptic ulcer disease) 06/2016    dx 2016- resolved per patient/ GI bleed requiring blood transfusion    S/P aortic valve replacement with bioprosthetic valve 4/28/2017    Spinal stenosis of lumbar region with neurogenic claudication 10/16/2018    Valvular heart disease 2016    AVR       Past Surgical History:   Procedure Laterality Date    COLONOSCOPY N/A 6/15/2016    COLONOSCOPY/ BMI 41  ROOM 2235 performed by Ciro Shepherd MD at [facility] ENDOSCOPY    HX AORTIC VALVE REPLACEMENT  6/9/2016    Dr. Roberts Kee SALPINGO-OOPHORECTOMY      HX CERVICAL FUSION      posterior fusion    HX GASTRIC BYPASS      HX HEART CATHETERIZATION  05/26/2016    Severe AS with minimal nonobstructive CAD.  HX HEART CATHETERIZATION  01/31/2019    Hyperdynamic LV EF. No significant gradient across bioprosthetic aortic valve,mild nonobstructive CAD,and MAC.  HX HEART VALVE SURGERY      HX HYSTERECTOMY      HX KNEE REPLACEMENT Bilateral     HX LAP CHOLECYSTECTOMY      HX SHOULDER ARTHROSCOPY Right      times 2 \"shaved bone\"     HX UROLOGICAL  Multiple dates    Mulitiple open Nephrolithotomies    HX UROLOGICAL  08/2019    CYSTOSCOPY BILATERAL URETEROSCOPY/LASER LITHO/STENTS    DE ABDOMEN SURGERY PROC UNLISTED      reversal of gastric bypass reversal       Family History : reviewed  Family History   Problem Relation Age of Onset    Hypertension Father     Lung Disease Father     Lung Cancer Father     Lung Disease Mother     Lung Cancer Mother     Diabetes Maternal Grandmother     Diabetes Paternal Grandmother     Breast Cancer Neg Hx         Social History     Tobacco Use    Smoking status: Never Smoker    Smokeless tobacco: Never Used   Substance Use Topics    Alcohol use: No       Allergies   Allergen Reactions    Latex Rash    Metformin Diarrhea    Sulfa (Sulfonamide Antibiotics) Anaphylaxis    Nsaids (Non-Steroidal Anti-Inflammatory Drug) Other (comments)     ulcers    Macrobid [Nitrofurantoin Monohyd/M-Cryst] Other (comments)     Causes Gout    Other Plant, Animal, Environmental Itching     Pt itched after wearing a plastic blood pressure cuff.   Pt reports BP will be higher in right arm     Oxycodone Other (comments)     Hallucination, anxiety with oxycontin-- denies rx to hydrocodone    Tape [Adhesive] Rash     Paper tape ok       Immunization History   Administered Date(s) Administered    COVID-19, PFIZER, MRNA, LNP-S, PF, 30MCG/0.3ML DOSE 01/21/2021, 02/13/2021    Influenza High Dose Vaccine PF 09/30/2016, 10/23/2017, 10/16/2018    Influenza Vaccine 10/02/2014    Influenza Vaccine (>6 mo Afluria QUAD Vial 20364 (0.25 mL) / 54281 (0.5 mL)) 11/10/2015    Influenza Vaccine (Tri) Adjuvanted (>65 Yrs FLUAD TRI 87621) 10/30/2019    Pneumococcal Conjugate (PCV-13) 11/10/2015    Pneumococcal Polysaccharide (PPSV-23) 10/02/2014    TB Skin Test (PPD) Intradermal 06/09/2016, 10/10/2016, 03/21/2017         PTA Medications:  Current Outpatient Medications   Medication Instructions    amLODIPine (NORVASC) 10 mg tablet TAKE 1 TABLET BY MOUTH DAILY    aspirin delayed-release 81 mg, Oral, DAILY    calcium-vitamin D 600 mg(1,500mg) -200 unit tab 1 Tablet, Oral, DAILY    carvediloL (COREG) 25 mg, Oral, 2 TIMES DAILY WITH MEALS    DISABLED PLACARD (DISABLED PLACARD) DMV To use with handicap placard form    DOCUSATE CALCIUM (STOOL SOFTENER PO) 1 Tablet, Oral, DAILY    esomeprazole (NEXIUM) 40 mg capsule No dose, route, or frequency recorded.  famotidine (PEPCID) 20 mg, Oral, 2 TIMES DAILY    ferrous sulfate 325 mg, Oral, DAILY WITH BREAKFAST    furosemide (LASIX) 20 mg tablet TAKE 1 TABLET BY MOUTH EVERY DAY    HYDROcodone-acetaminophen (NORCO)  mg tablet 1 Tablet, Oral, EVERY 8 HOURS AS NEEDED    losartan (COZAAR) 100 mg tablet TAKE 1 TABLET BY MOUTH DAILY    nitroglycerin (NITROSTAT) 0.4 mg, SubLINGual, EVERY 5 MIN AS NEEDED, Up to 3 doses.     polyethylene glycol (MIRALAX) 17 gram/dose powder Oral, DAILY PRN    potassium chloride SR (KLOR-CON 10) 10 mEq tablet TAKE 1 TABLET BY MOUTH DAILY    sucralfate (CARAFATE) 1 gram tablet 1 Tablet, Oral, 4 TIMES DAILY    Uloric 40 mg tab tablet TAKE 1 TABLET BY MOUTH EVERY MORNING       Objective:     Patient Vitals for the past 24 hrs:   Temp Pulse Resp BP SpO2   06/06/21 1821 -- -- -- (!) 131/58 98 %   06/06/21 1801 -- -- -- 136/84 97 %   06/06/21 1740 -- -- -- 130/70 96 %   06/06/21 1721 -- -- -- (!) 122/57 95 %   06/06/21 1548 97.9 °F (36.6 °C) 94 16 127/68 94 %       Oxygen Therapy  O2 Sat (%): 98 % (06/06/21 1821)  Pulse via Oximetry: 74 beats per minute (06/06/21 1821)  O2 Device: None (Room air) (06/06/21 1548)    Body mass index is 41.7 kg/m². Physical Exam:    General:  No acute distress, speaking in full sentences  HEENT:  Pupils equal and reactive to light and accommodation, oropharynx is clear   Neck:   Supple, no lymphadenopathy, no JVD   Lungs:  Clear to auscultation bilaterally   CV:   Regular rate and rhythm with normal S1 and S2   Abdomen:  Soft, mild suprapubic tenderness noted nondistended, normoactive bowel sounds   Extremities:  No cyanosis clubbing or edema   Neuro:  Nonfocal, A&O x3   Psych:  Normal mood and affect       Data Reviewed: I have reviewed all labs, meds, and studies. Recent Results (from the past 24 hour(s))   CBC WITH AUTOMATED DIFF    Collection Time: 06/06/21  3:52 PM   Result Value Ref Range    WBC 21.5 (H) 4.3 - 11.1 K/uL    RBC 4.83 4.05 - 5.2 M/uL    HGB 11.4 (L) 11.7 - 15.4 g/dL    HCT 35.7 (L) 35.8 - 46.3 %    MCV 73.9 (L) 79.6 - 97.8 FL    MCH 23.6 (L) 26.1 - 32.9 PG    MCHC 31.9 31.4 - 35.0 g/dL    RDW 14.6 11.9 - 14.6 %    PLATELET 334 863 - 269 K/uL    MPV 10.1 9.4 - 12.3 FL    ABSOLUTE NRBC 0.00 0.0 - 0.2 K/uL    DF AUTOMATED      NEUTROPHILS 91 (H) 43 - 78 %    LYMPHOCYTES 4 (L) 13 - 44 %    MONOCYTES 5 4.0 - 12.0 %    EOSINOPHILS 0 (L) 0.5 - 7.8 %    BASOPHILS 0 0.0 - 2.0 %    IMMATURE GRANULOCYTES 1 0.0 - 5.0 %    ABS. NEUTROPHILS 19.4 (H) 1.7 - 8.2 K/UL    ABS. LYMPHOCYTES 0.8 0.5 - 4.6 K/UL    ABS. MONOCYTES 1.0 0.1 - 1.3 K/UL    ABS. EOSINOPHILS 0.0 0.0 - 0.8 K/UL    ABS. BASOPHILS 0.1 0.0 - 0.2 K/UL    ABS. IMM.  GRANS. 0.1 0.0 - 0.5 K/UL   METABOLIC PANEL, COMPREHENSIVE    Collection Time: 06/06/21  3:52 PM   Result Value Ref Range    Sodium 134 (L) 136 - 145 mmol/L    Potassium 3.9 3.5 - 5.1 mmol/L    Chloride 102 98 - 107 mmol/L    CO2 24 21 - 32 mmol/L    Anion gap 8 7 - 16 mmol/L    Glucose 152 (H) 65 - 100 mg/dL    BUN 20 8 - 23 MG/DL    Creatinine 1.55 (H) 0.6 - 1.0 MG/DL    GFR est AA 42 (L) >60 ml/min/1.73m2    GFR est non-AA 35 (L) >60 ml/min/1.73m2    Calcium 7.9 (L) 8.3 - 10.4 MG/DL    Bilirubin, total 1.1 0.2 - 1.1 MG/DL    ALT (SGPT) 18 12 - 65 U/L    AST (SGOT) 19 15 - 37 U/L    Alk. phosphatase 87 50 - 136 U/L    Protein, total 6.3 6.3 - 8.2 g/dL    Albumin 2.6 (L) 3.2 - 4.6 g/dL    Globulin 3.7 (H) 2.3 - 3.5 g/dL    A-G Ratio 0.7 (L) 1.2 - 3.5     MAGNESIUM    Collection Time: 06/06/21  3:52 PM   Result Value Ref Range    Magnesium 1.7 (L) 1.8 - 2.4 mg/dL   LIPASE    Collection Time: 06/06/21  3:52 PM   Result Value Ref Range    Lipase 45 (L) 73 - 393 U/L   TSH 3RD GENERATION    Collection Time: 06/06/21  3:52 PM   Result Value Ref Range    TSH 1.160 0.358 - 3.740 uIU/mL   LACTIC ACID    Collection Time: 06/06/21  5:39 PM   Result Value Ref Range    Lactic acid 1.3 0.4 - 2.0 MMOL/L   URINE MICROSCOPIC    Collection Time: 06/06/21  5:53 PM   Result Value Ref Range    WBC >100 0 /hpf    RBC 10-20 0 /hpf    Epithelial cells 10-20 0 /hpf    Bacteria 4+ (H) 0 /hpf    Casts 0 0 /lpf    Crystals, urine 0 0 /LPF    Mucus 0 0 /lpf    Other observations RESULTS VERIFIED MANUALLY         EKG Results     None          All Micro Results     Procedure Component Value Units Date/Time    CULTURE, BLOOD [[id number]]     Order Status: Sent Specimen: Blood     CULTURE, BLOOD [[id number]]     Order Status: Sent Specimen: Blood     CULTURE, URINE [[id number]]     Order Status: Sent Specimen: Urine from Clean catch           Other Studies:  CT SPINE CERV WO CONT    Result Date: 6/6/2021  CT of the Cervical Spine INDICATION:  Neck pain after fall, nausea and vomiting Multiple axial images were obtained through the cervical spine without intravenous contrast. Coronal and sagittal reformatted images were also reviewed. Radiation dose reduction techniques were used for this study:   All CT scans performed at this facility use one or all of the following: Automated exposure control, adjustment of the mA and/or kVp according to patient's size, iterative reconstruction. FINDINGS: There is fusion of the C5 and C6 vertebrae. Posterior fusion changes are also present at C6 and C7, bilateral pedicle screws. There is good alignment. There are no fractures. There is degenerative disc disease at C6-7. Asymmetric left foraminal stenosis is present. There is no prevertebral soft tissue swelling. Gerldine Prost Postoperative and degenerative changes.   No acute abnormality noted in cervical spine         Medications:  Medications Administered      Medications Administered     cefTRIAXone (ROCEPHIN) 1 g in 0.9% sodium chloride (MBP/ADV) 50 mL MBP     Admin Date  06/06/2021 Action  New Bag Dose  1 g Rate  100 mL/hr Route  IntraVENous Administered By  Consuelo Lorenzo RN                      Problem List:     Hospital Problems as of 6/6/2021 Date Reviewed: 5/13/2021        Codes Class Noted - Resolved POA    * (Principal) Acute pyelonephritis ICD-10-CM: N10  ICD-9-CM: 590.10  6/6/2021 - Present Unknown        CKD (chronic kidney disease) stage 3, GFR 30-59 ml/min ([facility]) (Chronic) ICD-10-CM: N18.30  ICD-9-CM: 585.3  6/10/2016 - Present Yes        Morbid obesity ([address] (Chronic) ICD-10-CM: E66.01  ICD-9-CM: 278.01  2/20/2015 - Present Yes                 Signed By: Soy Fleming MD   [facility]    June 6, 2021  4:22 PM

## 2021-06-06 NOTE — ED NOTES
TRANSFER - OUT REPORT:    Verbal report given to Sherry Fisher RN(name) on [employer]  being transferred to [facility](unit) for routine progression of care       Report consisted of patients Situation, Background, Assessment and   Recommendations(SBAR). Information from the following report(s) SBAR, ED Summary, Procedure Summary and MAR was reviewed with the receiving nurse. Lines:   Peripheral IV 06/06/21 Left Hand (Active)        Opportunity for questions and clarification was provided.       Patient transported with:  transport

## 2021-06-06 NOTE — Clinical Note
TRANSFER - OUT REPORT:     Verbal report given to: cpru. Report consisted of patient's Situation, Background, Assessment and   Recommendations(SBAR). Opportunity for questions and clarification was provided. Patient transported with a Registered Nurse.

## 2021-06-07 LAB
ACC. NO. FROM MICRO ORDER, ACCP: ABNORMAL
ALBUMIN SERPL-MCNC: 2.5 G/DL (ref 3.2–4.6)
ALBUMIN/GLOB SERPL: 0.7 {RATIO} (ref 1.2–3.5)
ALP SERPL-CCNC: 90 U/L (ref 50–136)
ALT SERPL-CCNC: 15 U/L (ref 12–65)
ANION GAP SERPL CALC-SCNC: 11 MMOL/L (ref 7–16)
APPEARANCE UR: ABNORMAL
AST SERPL-CCNC: 18 U/L (ref 15–37)
BACTERIA URNS QL MICRO: 0 /HPF
BASOPHILS # BLD: 0 K/UL (ref 0–0.2)
BASOPHILS NFR BLD: 0 % (ref 0–2)
BILIRUB SERPL-MCNC: 0.8 MG/DL (ref 0.2–1.1)
BILIRUB UR QL: NEGATIVE
BUN SERPL-MCNC: 22 MG/DL (ref 8–23)
CALCIUM SERPL-MCNC: 8 MG/DL (ref 8.3–10.4)
CASTS URNS QL MICRO: ABNORMAL /LPF
CHLORIDE SERPL-SCNC: 105 MMOL/L (ref 98–107)
CO2 SERPL-SCNC: 23 MMOL/L (ref 21–32)
COLOR UR: YELLOW
CREAT SERPL-MCNC: 1.38 MG/DL (ref 0.6–1)
DIFFERENTIAL METHOD BLD: ABNORMAL
ENTEROCOCCUS, ENTP: DETECTED
EOSINOPHIL # BLD: 0 K/UL (ref 0–0.8)
EOSINOPHIL NFR BLD: 0 % (ref 0.5–7.8)
EPI CELLS #/AREA URNS HPF: ABNORMAL /HPF
ERYTHROCYTE [DISTWIDTH] IN BLOOD BY AUTOMATED COUNT: 14.6 % (ref 11.9–14.6)
GLOBULIN SER CALC-MCNC: 3.7 G/DL (ref 2.3–3.5)
GLUCOSE SERPL-MCNC: 92 MG/DL (ref 65–100)
GLUCOSE UR STRIP.AUTO-MCNC: NEGATIVE MG/DL
HCT VFR BLD AUTO: 37.5 % (ref 35.8–46.3)
HGB BLD-MCNC: 11.4 G/DL (ref 11.7–15.4)
HGB UR QL STRIP: NEGATIVE
IMM GRANULOCYTES # BLD AUTO: 0 K/UL (ref 0–0.5)
IMM GRANULOCYTES NFR BLD AUTO: 0 % (ref 0–5)
INTERPRETATION: ABNORMAL
KETONES UR QL STRIP.AUTO: NEGATIVE MG/DL
LEUKOCYTE ESTERASE UR QL STRIP.AUTO: ABNORMAL
LYMPHOCYTES # BLD: 1.1 K/UL (ref 0.5–4.6)
LYMPHOCYTES NFR BLD: 9 % (ref 13–44)
MCH RBC QN AUTO: 23.4 PG (ref 26.1–32.9)
MCHC RBC AUTO-ENTMCNC: 30.4 G/DL (ref 31.4–35)
MCV RBC AUTO: 76.8 FL (ref 79.6–97.8)
MONOCYTES # BLD: 0.9 K/UL (ref 0.1–1.3)
MONOCYTES NFR BLD: 7 % (ref 4–12)
NEUTS SEG # BLD: 10.7 K/UL (ref 1.7–8.2)
NEUTS SEG NFR BLD: 83 % (ref 43–78)
NITRITE UR QL STRIP.AUTO: NEGATIVE
NRBC # BLD: 0 K/UL (ref 0–0.2)
PH UR STRIP: 5.5 [PH] (ref 5–9)
PLATELET # BLD AUTO: 141 K/UL (ref 150–450)
PMV BLD AUTO: 9.8 FL (ref 9.4–12.3)
POTASSIUM SERPL-SCNC: 4 MMOL/L (ref 3.5–5.1)
PROT SERPL-MCNC: 6.2 G/DL (ref 6.3–8.2)
PROT UR STRIP-MCNC: ABNORMAL MG/DL
RBC # BLD AUTO: 4.88 M/UL (ref 4.05–5.2)
RBC #/AREA URNS HPF: ABNORMAL /HPF
SODIUM SERPL-SCNC: 139 MMOL/L (ref 136–145)
SP GR UR REFRACTOMETRY: 1.02 (ref 1–1.02)
UROBILINOGEN UR QL STRIP.AUTO: 0.2 EU/DL (ref 0.2–1)
VAN A/B (VANCOMYCIN RESISTANCE GENE), VRGP: NOT DETECTED
WBC # BLD AUTO: 12.9 K/UL (ref 4.3–11.1)
WBC URNS QL MICRO: ABNORMAL /HPF

## 2021-06-07 PROCEDURE — 74011000258 HC RX REV CODE- 258: Performed by: HOSPITALIST

## 2021-06-07 PROCEDURE — 74011250636 HC RX REV CODE- 250/636: Performed by: HOSPITALIST

## 2021-06-07 PROCEDURE — 81001 URINALYSIS AUTO W/SCOPE: CPT

## 2021-06-07 PROCEDURE — 74011250637 HC RX REV CODE- 250/637: Performed by: NURSE PRACTITIONER

## 2021-06-07 PROCEDURE — 2709999900 HC NON-CHARGEABLE SUPPLY

## 2021-06-07 PROCEDURE — 36415 COLL VENOUS BLD VENIPUNCTURE: CPT

## 2021-06-07 PROCEDURE — 87086 URINE CULTURE/COLONY COUNT: CPT

## 2021-06-07 PROCEDURE — 80053 COMPREHEN METABOLIC PANEL: CPT

## 2021-06-07 PROCEDURE — 85025 COMPLETE CBC W/AUTO DIFF WBC: CPT

## 2021-06-07 PROCEDURE — 74011250636 HC RX REV CODE- 250/636: Performed by: INTERNAL MEDICINE

## 2021-06-07 PROCEDURE — 74011250637 HC RX REV CODE- 250/637: Performed by: HOSPITALIST

## 2021-06-07 PROCEDURE — 74011250636 HC RX REV CODE- 250/636: Performed by: NURSE PRACTITIONER

## 2021-06-07 PROCEDURE — 65270000029 HC RM PRIVATE

## 2021-06-07 RX ORDER — VANCOMYCIN 1.75 GRAM/500 ML IN 0.9 % SODIUM CHLORIDE INTRAVENOUS
1750
Status: DISCONTINUED | OUTPATIENT
Start: 2021-06-08 | End: 2021-06-09

## 2021-06-07 RX ORDER — HYDROCODONE BITARTRATE AND ACETAMINOPHEN 10; 325 MG/1; MG/1
1 TABLET ORAL
Status: DISCONTINUED | OUTPATIENT
Start: 2021-06-07 | End: 2021-06-16 | Stop reason: HOSPADM

## 2021-06-07 RX ORDER — VANCOMYCIN HYDROCHLORIDE 1 G/20ML
INJECTION, POWDER, LYOPHILIZED, FOR SOLUTION INTRAVENOUS EVERY 24 HOURS
Status: DISCONTINUED | OUTPATIENT
Start: 2021-06-07 | End: 2021-06-07 | Stop reason: SDUPTHER

## 2021-06-07 RX ORDER — TRAMADOL HYDROCHLORIDE 50 MG/1
50 TABLET ORAL
Status: DISCONTINUED | OUTPATIENT
Start: 2021-06-07 | End: 2021-06-16 | Stop reason: HOSPADM

## 2021-06-07 RX ORDER — METOCLOPRAMIDE HYDROCHLORIDE 5 MG/ML
5 INJECTION INTRAMUSCULAR; INTRAVENOUS EVERY 6 HOURS
Status: COMPLETED | OUTPATIENT
Start: 2021-06-07 | End: 2021-06-07

## 2021-06-07 RX ORDER — VANCOMYCIN 2 GRAM/500 ML IN 0.9 % SODIUM CHLORIDE INTRAVENOUS
2000 ONCE
Status: COMPLETED | OUTPATIENT
Start: 2021-06-07 | End: 2021-06-07

## 2021-06-07 RX ADMIN — TRAMADOL HYDROCHLORIDE 50 MG: 50 TABLET, FILM COATED ORAL at 17:36

## 2021-06-07 RX ADMIN — Medication 10 ML: at 14:46

## 2021-06-07 RX ADMIN — Medication 10 ML: at 14:44

## 2021-06-07 RX ADMIN — ASPIRIN 81 MG: 81 TABLET ORAL at 08:27

## 2021-06-07 RX ADMIN — PANTOPRAZOLE SODIUM 40 MG: 40 TABLET, DELAYED RELEASE ORAL at 05:52

## 2021-06-07 RX ADMIN — HEPARIN SODIUM 5000 UNITS: 5000 INJECTION INTRAVENOUS; SUBCUTANEOUS at 05:52

## 2021-06-07 RX ADMIN — CARVEDILOL 25 MG: 25 TABLET, FILM COATED ORAL at 17:36

## 2021-06-07 RX ADMIN — CEFEPIME HYDROCHLORIDE 2 G: 2 INJECTION, POWDER, FOR SOLUTION INTRAVENOUS at 22:40

## 2021-06-07 RX ADMIN — CALCIUM CARBONATE-CHOLECALCIFEROL TAB 250 MG-125 UNIT 1 TABLET: 250-125 TAB at 08:29

## 2021-06-07 RX ADMIN — AMLODIPINE BESYLATE 10 MG: 10 TABLET ORAL at 08:28

## 2021-06-07 RX ADMIN — HEPARIN SODIUM 5000 UNITS: 5000 INJECTION INTRAVENOUS; SUBCUTANEOUS at 14:46

## 2021-06-07 RX ADMIN — METOCLOPRAMIDE HYDROCHLORIDE 5 MG: 5 INJECTION INTRAMUSCULAR; INTRAVENOUS at 11:59

## 2021-06-07 RX ADMIN — SUCRALFATE 1 G: 1 TABLET ORAL at 22:40

## 2021-06-07 RX ADMIN — METOCLOPRAMIDE HYDROCHLORIDE 5 MG: 5 INJECTION INTRAMUSCULAR; INTRAVENOUS at 17:36

## 2021-06-07 RX ADMIN — Medication 10 ML: at 05:51

## 2021-06-07 RX ADMIN — SENNOSIDES AND DOCUSATE SODIUM 2 TABLET: 8.6; 5 TABLET ORAL at 22:40

## 2021-06-07 RX ADMIN — HEPARIN SODIUM 5000 UNITS: 5000 INJECTION INTRAVENOUS; SUBCUTANEOUS at 22:40

## 2021-06-07 RX ADMIN — CARVEDILOL 25 MG: 25 TABLET, FILM COATED ORAL at 08:29

## 2021-06-07 RX ADMIN — VANCOMYCIN HYDROCHLORIDE 2000 MG: 10 INJECTION, POWDER, LYOPHILIZED, FOR SOLUTION INTRAVENOUS at 14:47

## 2021-06-07 NOTE — PROGRESS NOTES
Pharmacokinetic Consult to Pharmacist    Maegan Macie is a 66 y.o. female being treated for enterococcus blood stream infection with vancomycin. Height: 5' 5\" (165.1 cm)  Weight: 110.2 kg (243 lb)  Lab Results   Component Value Date/Time    BUN 22 06/07/2021 04:48 AM    Creatinine 1.38 (H) 06/07/2021 04:48 AM    WBC 12.9 (H) 06/07/2021 04:48 AM    Lactic acid 1.3 06/06/2021 05:39 PM      Estimated Creatinine Clearance: 45.5 mL/min (A) (based on SCr of 1.38 mg/dL (H)). Day 1 of vancomycin. Goal trough is 15-20. Vancomycin dose initiated at 2000 mg X 1, then 1750 mg Q18H. Will continue to follow patient and order levels when clinically indicated.     Thank you,  Du Calhoun, PharmD, [address]  Clinical Pharmacist  [phone]

## 2021-06-07 NOTE — PROGRESS NOTES
Comprehensive Nutrition Assessment    Type and Reason for Visit: Initial, Positive nutrition screen  Best Practice Alert for Malnutrition Screening Tool: Recently Lost Weight Without Trying: Yes, If Yes, How Much Weight Loss: 2-13 lbs, Eating Poorly Due to Decreased Appetite: Yes    Nutrition Recommendations/Plan:   Meals and Snacks:  Continue current diet. Nutrition Supplement Therapy:   Medical food supplement therapy:  Initiate Glucerna Shake three times per day (this provides 220 kcal and 10 grams protein per bottle)     Malnutrition Assessment:  Malnutrition Status: At risk for malnutrition (specify) (poor PO and wt loss PTA, current poor PO)    Nutrition Assessment:   Nutrition History: History per pt and family. They report usually 2 meals per day. First meal usually around lunch time and second around dinner with some snacking in the evening. Pt reports decline in PO over the last ~3 weeks and just picking right before admission. Nutrition Background: Patient with PMH significant for CAD, CKD, DM, AVR, GERD, HTN, obesity, PUD. She presented with weakness, fatigue, falls, and abdominal and back pain. She was admitted with acute pyelonephritis. Daily Update:  Patient sleeping in recliner, but wakes easily to voice. She states that she just has no appetite. She also reports nausea. She states that her bowels have not been normal, but denies constipation. Her family member states she ate a few bites of breakfast so he went to get her something else which she also only took a few bites of. She states that she is drinking better than eating and is agreeable to nutrition supplement until appetite improves.      Nutrition Related Findings:   No physical signs of malnutrition      Current Nutrition Therapies:  ADULT DIET Regular; Low Fat/Low Chol/High Fiber/2 gm Na    Current Intake:   Average Meal Intake: 1-25% Average Supplement Intake: None ordered      Anthropometric Measures:  Height: 5' 5\" (165.1 cm)  Current Body Wt: 110.2 kg (242 lb 15.2 oz) (6/6), Weight source: Not specified  BMI: 40.4, Obese class 3 (BMI 40.0 or greater)  Admission Body Weight: 242 lb 15.2 oz (last office weight 6/1)  Ideal Body Weight (lbs) (Calculated): 125 lbs (57 kg), 194.4 %  Usual Body Wt: 113.4 kg (250 lb) (per pt and review of EMR), Percent weight change: -2.8          Edema: No data recorded   Estimated Daily Nutrient Needs:  Energy (kcal/day): 3209-9763 (Kcal/kg (25-30), Weight Used: Ideal (56.8 kg))  Protein (g/day): 57-63 (1-1.1 g/kg) Weight Used: (Ideal)  Fluid (ml/day):   (1 ml/kcal)    Nutrition Diagnosis:   · Inadequate oral intake related to  (loss of appetite, nausea) as evidenced by  (patient reported barrier to PO, intake as above)    Nutrition Interventions:   Food and/or Nutrient Delivery: Continue current diet, Start oral nutrition supplement     Coordination of Nutrition Care: Continue to monitor while inpatient    Goals: Active Goal: Meet at least 75% nutrition needs by next RD follow-up    Nutrition Monitoring and Evaluation:      Food/Nutrient Intake Outcomes: Food and nutrient intake, Supplement intake       Discharge Planning:     Too soon to determine    [address] [place], LD on 6/7/2021 at 10:55 AM  Contact: [phone]

## 2021-06-07 NOTE — PROGRESS NOTES
[facility] Hospitalist Progress Note     Name:  Victoria Cade  Age:71 y.o. Sex:female   :  1950    MRN:  [id number]     Admit Date:  2021    Reason for Admission:  Acute pyelonephritis [N10]    Hospital Course/Interval history:     Ms. Gary Hahn is a 66year old female brought to ER as was experiencing weakness, fatigue, sleeping a lot. Patient was initially seen by primary care physician, CT head was done and reported normal to me by ER physician. Patient also experiencing falls lately. History of abdominal discomfort, back pain going on for past few days. Denies any burning urination but reports she gets UTIs frequently. Labs show elevated white count. Urine dipstick was done shows positive for UTI, urinalysis not completed. Urine microscopy shows evidence of more than 100 white cells. Patient was initiated on antibiotics. Denies any neck pain, headache, visual disturbance. Denies any chest pain, shortness of breath, cough, sputum production. Complaint of nausea. Subjective (21): Patient seen and examined with son, Portia Thibodeaux, at bedside. Renal fx and WBC improved this morning. Later, blood cultures returned with GPC; will start vancomycin. Patient complains of nausea, no vomiting. Will try Reglan with her lunch and dinner; monitor. F/u a.m. labs. Review of Systems: 14 point review of systems is otherwise negative with the exception of the elements mentioned above. Assessment & Plan     Acute pyelonephritis  Leukocytosis  Initiated on antibiotics, patient will be admitted to hospital, complete urinalysis is pending, urine microscopy shows evidence of more than 100 white cells in the urine. Follow urine cultures. Will obtain CT scan of abdomen without contrast for further evaluate any evidence of obstructing pathology as patient has a history of kidney stones.   : Blood cultures w/ GPC   - Start vancomycin   - Follow final and sensitivities   - Cont cefepime for now   - WBC 21.5 --> 12.9    Nephrolithiasis, non-obstructing  Jun/07: Noted on CT; no hydronephrosis, no masses. Hypertension: Continue on home medications, hold nephrotoxic medications. CKD stage III: Creatinine slightly elevated, initiated on IV fluids, will monitor creatinine. Jun/07: sCr 1.55 --> 1.38   - Cont IVF    GERD: Continue on home dose of sucralfate, PPI    Nausea  Jun/07: Try metoclopramide with lunch/dinner today; monitor response   - Has had PRN ondansetron    Chronic back pain  Hx surgical fusion  DDD  Continue on home medications. Body Mass Index 40.44      Diet:  DIET ADULT  DIET ADULT ORAL NUTRITION SUPPLEMENT  DVT PPx: heparin  Code status: Full Code  Disposition/Expected LOS: > 3 midnights      Objective:     Patient Vitals for the past 24 hrs:   Temp Pulse Resp BP SpO2   06/07/21 1330 99.1 °F (37.3 °C) 70 18 (!) 115/51 93 %   06/07/21 1100 97.5 °F (36.4 °C) 66 18 (!) 116/47 93 %   06/07/21 0925 98 °F (36.7 °C) -- -- (!) 123/49 --   06/07/21 0800 -- 88 18 (!) 129/57 94 %   06/07/21 0333 98.1 °F (36.7 °C) 74 21 107/66 95 %   06/06/21 2333 98.1 °F (36.7 °C) 85 20 105/67 94 %   06/06/21 1948 97.2 °F (36.2 °C) 70 22 (!) 107/56 98 %   06/06/21 1840 -- -- -- 126/68 98 %   06/06/21 1821 -- -- -- (!) 131/58 98 %   06/06/21 1801 -- -- -- 136/84 97 %   06/06/21 1740 -- -- -- 130/70 96 %   06/06/21 1721 -- -- -- (!) 122/57 95 %   06/06/21 1548 97.9 °F (36.6 °C) 94 16 127/68 94 %     Oxygen Therapy  O2 Sat (%): 93 % (06/07/21 1330)  Pulse via Oximetry: 74 beats per minute (06/06/21 1840)  O2 Device: None (Room air) (06/06/21 2006)    Body mass index is 40.44 kg/m².     Physical Exam:   General:  No acute distress, speaking in full sentences, no use of accessory muscles   Lungs:  Non labored on room air, no tachypnea   CV:  RRR with normal S1 and S2   Abdomen:  Soft, nontender, nondistended  Extremities:  No cyanosis clubbing or edema   Neuro:  Nonfocal, A&O x3   Psych:  Normal affect     Data Review:  I have reviewed all labs, meds, and studies from the last 24 hours:    Labs:    Recent Results (from the past 24 hour(s))   CBC WITH AUTOMATED DIFF    Collection Time: 06/06/21  3:52 PM   Result Value Ref Range    WBC 21.5 (H) 4.3 - 11.1 K/uL    RBC 4.83 4.05 - 5.2 M/uL    HGB 11.4 (L) 11.7 - 15.4 g/dL    HCT 35.7 (L) 35.8 - 46.3 %    MCV 73.9 (L) 79.6 - 97.8 FL    MCH 23.6 (L) 26.1 - 32.9 PG    MCHC 31.9 31.4 - 35.0 g/dL    RDW 14.6 11.9 - 14.6 %    PLATELET 252 662 - 010 K/uL    MPV 10.1 9.4 - 12.3 FL    ABSOLUTE NRBC 0.00 0.0 - 0.2 K/uL    DF AUTOMATED      NEUTROPHILS 91 (H) 43 - 78 %    LYMPHOCYTES 4 (L) 13 - 44 %    MONOCYTES 5 4.0 - 12.0 %    EOSINOPHILS 0 (L) 0.5 - 7.8 %    BASOPHILS 0 0.0 - 2.0 %    IMMATURE GRANULOCYTES 1 0.0 - 5.0 %    ABS. NEUTROPHILS 19.4 (H) 1.7 - 8.2 K/UL    ABS. LYMPHOCYTES 0.8 0.5 - 4.6 K/UL    ABS. MONOCYTES 1.0 0.1 - 1.3 K/UL    ABS. EOSINOPHILS 0.0 0.0 - 0.8 K/UL    ABS. BASOPHILS 0.1 0.0 - 0.2 K/UL    ABS. IMM. GRANS. 0.1 0.0 - 0.5 K/UL   METABOLIC PANEL, COMPREHENSIVE    Collection Time: 06/06/21  3:52 PM   Result Value Ref Range    Sodium 134 (L) 136 - 145 mmol/L    Potassium 3.9 3.5 - 5.1 mmol/L    Chloride 102 98 - 107 mmol/L    CO2 24 21 - 32 mmol/L    Anion gap 8 7 - 16 mmol/L    Glucose 152 (H) 65 - 100 mg/dL    BUN 20 8 - 23 MG/DL    Creatinine 1.55 (H) 0.6 - 1.0 MG/DL    GFR est AA 42 (L) >60 ml/min/1.73m2    GFR est non-AA 35 (L) >60 ml/min/1.73m2    Calcium 7.9 (L) 8.3 - 10.4 MG/DL    Bilirubin, total 1.1 0.2 - 1.1 MG/DL    ALT (SGPT) 18 12 - 65 U/L    AST (SGOT) 19 15 - 37 U/L    Alk.  phosphatase 87 50 - 136 U/L    Protein, total 6.3 6.3 - 8.2 g/dL    Albumin 2.6 (L) 3.2 - 4.6 g/dL    Globulin 3.7 (H) 2.3 - 3.5 g/dL    A-G Ratio 0.7 (L) 1.2 - 3.5     MAGNESIUM    Collection Time: 06/06/21  3:52 PM   Result Value Ref Range    Magnesium 1.7 (L) 1.8 - 2.4 mg/dL   LIPASE    Collection Time: 06/06/21  3:52 PM   Result Value Ref Range    Lipase 45 (L) 73 - 393 U/L TSH 3RD GENERATION    Collection Time: 06/06/21  3:52 PM   Result Value Ref Range    TSH 1.160 0.358 - 3.740 uIU/mL   LACTIC ACID    Collection Time: 06/06/21  5:39 PM   Result Value Ref Range    Lactic acid 1.3 0.4 - 2.0 MMOL/L   URINE MICROSCOPIC    Collection Time: 06/06/21  5:53 PM   Result Value Ref Range    WBC >100 0 /hpf    RBC 10-20 0 /hpf    Epithelial cells 10-20 0 /hpf    Bacteria 4+ (H) 0 /hpf    Casts 0 0 /lpf    Crystals, urine 0 0 /LPF    Mucus 0 0 /lpf    Other observations RESULTS VERIFIED MANUALLY     CULTURE, BLOOD    Collection Time: 06/06/21  7:29 PM    Specimen: Blood   Result Value Ref Range    Special Requests: RIGHT  ARM        GRAM STAIN GRAM POS COCCI IN CHAINS      GRAM STAIN AEROBIC BOTTLE POSITIVE      GRAM STAIN        CRITICAL RESULT NOT CALLED DUE TO PREVIOUS NOTIFICATION OF CRITICAL RESULT WITHIN THE LAST 24 HOURS. Culture result: CULTURE IN PROGRESS,FURTHER UPDATES TO FOLLOW     CULTURE, BLOOD    Collection Time: 06/06/21  7:29 PM    Specimen: Blood   Result Value Ref Range    Special Requests: LEFT  HAND        GRAM STAIN GRAM POS COCCI IN CHAINS      GRAM STAIN AEROBIC AND ANAEROBIC BOTTLES      GRAM STAIN        RESULTS VERIFIED, PHONED TO AND READ BACK BY Duncan Rodriguez RN ON 6/7/21 @1046, TA    Culture result: CULTURE IN [facility] UPDATES TO FOLLOW      Culture result:        Refer to Blood Culture ID Panel Accession  [id number]     BLOOD CULTURE ID PANEL    Collection Time: 06/06/21  7:29 PM    Specimen: Blood   Result Value Ref Range    Acc. no. from Micro Order [id number]     Enterococcus Detected (A) NOTDET      van A/B (Vancomycin Resistance Gene) NOT DETECTED NOTDET      INTERPRETATION        Gram positive cocci, identified by realtime PCR as SUSPECTED Vancomycin Sensitive Enterococcus species.    METABOLIC PANEL, COMPREHENSIVE    Collection Time: 06/07/21  4:48 AM   Result Value Ref Range    Sodium 139 136 - 145 mmol/L    Potassium 4.0 3.5 - 5.1 mmol/L Chloride 105 98 - 107 mmol/L    CO2 23 21 - 32 mmol/L    Anion gap 11 7 - 16 mmol/L    Glucose 92 65 - 100 mg/dL    BUN 22 8 - 23 MG/DL    Creatinine 1.38 (H) 0.6 - 1.0 MG/DL    GFR est AA 48 (L) >60 ml/min/1.73m2    GFR est non-AA 40 (L) >60 ml/min/1.73m2    Calcium 8.0 (L) 8.3 - 10.4 MG/DL    Bilirubin, total 0.8 0.2 - 1.1 MG/DL    ALT (SGPT) 15 12 - 65 U/L    AST (SGOT) 18 15 - 37 U/L    Alk. phosphatase 90 50 - 136 U/L    Protein, total 6.2 (L) 6.3 - 8.2 g/dL    Albumin 2.5 (L) 3.2 - 4.6 g/dL    Globulin 3.7 (H) 2.3 - 3.5 g/dL    A-G Ratio 0.7 (L) 1.2 - 3.5     CBC WITH AUTOMATED DIFF    Collection Time: 06/07/21  4:48 AM   Result Value Ref Range    WBC 12.9 (H) 4.3 - 11.1 K/uL    RBC 4.88 4.05 - 5.2 M/uL    HGB 11.4 (L) 11.7 - 15.4 g/dL    HCT 37.5 35.8 - 46.3 %    MCV 76.8 (L) 79.6 - 97.8 FL    MCH 23.4 (L) 26.1 - 32.9 PG    MCHC 30.4 (L) 31.4 - 35.0 g/dL    RDW 14.6 11.9 - 14.6 %    PLATELET 063 (L) 006 - 450 K/uL    MPV 9.8 9.4 - 12.3 FL    ABSOLUTE NRBC 0.00 0.0 - 0.2 K/uL    DF AUTOMATED      NEUTROPHILS 83 (H) 43 - 78 %    LYMPHOCYTES 9 (L) 13 - 44 %    MONOCYTES 7 4.0 - 12.0 %    EOSINOPHILS 0 (L) 0.5 - 7.8 %    BASOPHILS 0 0.0 - 2.0 %    IMMATURE GRANULOCYTES 0 0.0 - 5.0 %    ABS. NEUTROPHILS 10.7 (H) 1.7 - 8.2 K/UL    ABS. LYMPHOCYTES 1.1 0.5 - 4.6 K/UL    ABS. MONOCYTES 0.9 0.1 - 1.3 K/UL    ABS. EOSINOPHILS 0.0 0.0 - 0.8 K/UL    ABS. BASOPHILS 0.0 0.0 - 0.2 K/UL    ABS. IMM. GRANS. 0.0 0.0 - 0.5 K/UL       All Micro Results     Procedure Component Value Units Date/Time    CULTURE, URINE [[id number]]     Order Status: Sent Specimen: Urine from Clean catch     CULTURE, BLOOD [[id number]] Collected: 06/06/21 1929    Order Status: Completed Specimen: Blood Updated: 06/07/21 1242     Special Requests: --        RIGHT  ARM       GRAM STAIN GRAM POS COCCI IN CHAINS         AEROBIC BOTTLE POSITIVE               CRITICAL RESULT NOT CALLED DUE TO PREVIOUS NOTIFICATION OF CRITICAL RESULT WITHIN THE LAST 24 HOURS. Culture result:       CULTURE IN PROGRESS,FURTHER UPDATES TO FOLLOW          BLOOD CULTURE ID PANEL [[id number]]  (Abnormal) Collected: 06/06/21 1929    Order Status: Completed Specimen: Blood Updated: 06/07/21 1048     Acc. no. from Micro Order [id number]     Enterococcus Detected        Comment: RESULTS VERIFIED, PHONED TO AND READ BACK BY  Jennifer Bergeron RN ON 6/7/21 @1046, TA          van A/B (Vancomycin Resistance Gene) NOT DETECTED        INTERPRETATION       Gram positive cocci, identified by realtime PCR as SUSPECTED Vancomycin Sensitive Enterococcus species. Comment: Recommend IV vancomycin therapy until full susceptibility information available. THIS TEST DOES NOT REPLACE SENSITIVITY TESTING. CULTURE, BLOOD [[id number]] Collected: 06/06/21 1929    Order Status: Completed Specimen: Blood Updated: 06/07/21 1046     Special Requests: --        LEFT  HAND       GRAM STAIN GRAM POS COCCI IN CHAINS               AEROBIC AND ANAEROBIC BOTTLES                  RESULTS VERIFIED, PHONED TO AND READ BACK BY Jennifer Bergeron RN ON 6/7/21 @1046, TA           Culture result:       CULTURE IN [address] UPDATES TO FOLLOW                  Refer to Blood Culture ID Panel Accession  [id number]      CULTURE, URINE [[id number]]     Order Status: Sent Specimen: Urine from Clean catch           EKG Results     None          Other Studies:  CT SPINE CERV WO CONT    Result Date: 6/6/2021  CT of the Cervical Spine INDICATION:  Neck pain after fall, nausea and vomiting Multiple axial images were obtained through the cervical spine without intravenous contrast. Coronal and sagittal reformatted images were also reviewed. Radiation dose reduction techniques were used for this study: All CT scans performed at this facility use one or all of the following: Automated exposure control, adjustment of the mA and/or kVp according to patient's size, iterative reconstruction.  FINDINGS: There is fusion of the C5 and C6 vertebrae. Posterior fusion changes are also present at C6 and C7, bilateral pedicle screws. There is good alignment. There are no fractures. There is degenerative disc disease at C6-7. Asymmetric left foraminal stenosis is present. There is no prevertebral soft tissue swelling. Nitza Libman Postoperative and degenerative changes. No acute abnormality noted in cervical spine     CT ABD PELV WO CONT    Result Date: 6/6/2021  CT ABDOMEN AND PELVIS INDICATION:  Abdominal pain, weakness, recent fall Multiple axial images were obtained through the abdomen and pelvis without intravenous or oral contrast.  Radiation dose reduction techniques were used for this study: All CT scans performed at this facility use one or all of the following: Automated exposure control, adjustment of the mA and/or kVp according to patient's size, iterative reconstruction. COMPARISON: 06/30/2020 FINDINGS: - KIDNEYS/URETERS: Small nonobstructing stones in both kidneys. No hydronephrosis or significant mass. - BLADDER: Normal. - LUNG BASES: No infiltrates or masses. - LIVER: Normal in size and appearance. - GALLBLADDER/BILE DUCTS: Postcholecystectomy. No bile duct dilatation. - PANCREAS: Normal. - SPLEEN: Normal. - ADRENALS: Normal. - REPRODUCTIVE ORGANS: Post hysterectomy. No pelvic masses. - BOWEL: Post gastric bypass. No significant bowel distention. - LYMPH NODES: No significant retroperitoneal, mesenteric, or pelvic adenopathy. - BONES: Postoperative and degenerative changes in the lumbar spine. No fracture or significant bone lesion. - VASCULATURE: Normal - OTHER: Midline ventral hernia, no bowel involvement. 1.  Nonobstructing stones in both kidneys. 2.  Small ventral hernia, no bowel involvement.  ** If there are any questions about this report, I can be reached on [employer] or at [phone] **       Current Meds:   Current Facility-Administered Medications   Medication Dose Route Frequency    metoclopramide HCl (REGLAN) injection 5 mg  5 mg IntraVENous Q6H    vancomycin (VANCOCIN) in 0.9% sodium chloride 250 mL infusion   IntraVENous Q24H    sodium chloride (NS) flush 5-10 mL  5-10 mL IntraVENous Q8H    sodium chloride (NS) flush 5-10 mL  5-10 mL IntraVENous PRN    sodium chloride (NS) flush 5-40 mL  5-40 mL IntraVENous Q8H    sodium chloride (NS) flush 5-40 mL  5-40 mL IntraVENous PRN    heparin (porcine) injection 5,000 Units  5,000 Units SubCUTAneous Q8H    cefepime (MAXIPIME) 2 g in 0.9% sodium chloride (MBP/ADV) 100 mL MBP  2 g IntraVENous Q24H    0.9% sodium chloride infusion  75 mL/hr IntraVENous CONTINUOUS    ondansetron (ZOFRAN) injection 4 mg  4 mg IntraVENous Q6H PRN    amLODIPine (NORVASC) tablet 10 mg  10 mg Oral DAILY    aspirin delayed-release tablet 81 mg  81 mg Oral DAILY    calcium-vitamin D (OS-SHANDRA +D3) 250 mg-125 unit per tablet 1 Tablet  1 Tablet Oral DAILY    carvediloL (COREG) tablet 25 mg  25 mg Oral BID WITH MEALS    senna-docusate (PERICOLACE) 8.6-50 mg per tablet 2 Tablet  2 Tablet Oral QHS    pantoprazole (PROTONIX) tablet 40 mg  40 mg Oral ACB    sucralfate (CARAFATE) tablet 1 g  1 g Oral AC&HS    polyethylene glycol (MIRALAX) packet 17 g  17 g Oral DAILY    HYDROcodone-acetaminophen (NORCO)  mg tablet 1 Tablet  1 Tablet Oral Q8H PRN       Problem List:  Hospital Problems as of 6/7/2021 Date Reviewed: 5/13/2021        Codes Class Noted - Resolved POA    * (Principal) Acute pyelonephritis ICD-10-CM: N10  ICD-9-CM: 590.10  6/6/2021 - Present Unknown        CKD (chronic kidney disease) stage 3, GFR 30-59 ml/min (HCC) (Chronic) ICD-10-CM: N18.30  ICD-9-CM: 585.3  6/10/2016 - Present Yes        Morbid obesity (HCC) (Chronic) ICD-10-CM: E66.01  ICD-9-CM: 278.01  2/20/2015 - Present Yes               Part of this note was written by using a voice dictation software and the note has been proof read but may still contain some grammatical/other typographical errors.     Signed By: Mejia Lugo rFances Barajas NP   [employer]    June 7, 2021  5:15 PM

## 2021-06-07 NOTE — PROGRESS NOTES
END OF SHIFT NOTE:    INTAKE/OUTPUT  No intake/output data recorded. Voiding: YES  Catheter: NO  Drain:              Flatus: Patient does not have flatus present. Stool:  0 occurrences. Characteristics:       Emesis: 0 occurrences. Characteristics:        VITAL SIGNS  Patient Vitals for the past 12 hrs:   Temp Pulse Resp BP SpO2   06/07/21 0333 98.1 °F (36.7 °C) 74 21 107/66 95 %   06/06/21 2333 98.1 °F (36.7 °C) 85 20 105/67 94 %   06/06/21 1948 97.2 °F (36.2 °C) 70 22 (!) 107/56 98 %   06/06/21 1840 -- -- -- 126/68 98 %   06/06/21 1821 -- -- -- (!) 131/58 98 %   06/06/21 1801 -- -- -- 136/84 97 %   06/06/21 1740 -- -- -- 130/70 96 %   06/06/21 1721 -- -- -- (!) 122/57 95 %       Pain Assessment  Pain Intensity 1: 10 (06/06/21 2227)  Pain Location 1: Neck  Pain Intervention(s) 1: Medication (see MAR)  Patient Stated Pain Goal: 0    Ambulating  Yes    Shift report given to oncoming nurse at the bedside.     Brandon Bernal RN

## 2021-06-07 NOTE — PROGRESS NOTES
Care Management Interventions  PCP Verified by CM: Yes (Dr. Derrick Lam)  Mode of Transport at Discharge: Other (see comment) (son - [place])  Transition of Care Consult (CM Consult): Discharge Planning  Discharge Durable Medical Equipment: No  Physical Therapy Consult: Yes  Occupational Therapy Consult: No  Speech Therapy Consult: No  Current Support Network: Own Home, Other (son lives with patient)  Confirm Follow Up Transport: Family  The Patient and/or Patient Representative was Provided with a Choice of Provider and Agrees with the Discharge Plan?: Yes  Name of the Patient Representative Who was Provided with a Choice of Provider and Agrees with the Discharge Plan: self  Freedom of Choice List was Provided with Basic Dialogue that Supports the Patient's Individualized Plan of Care/Goals, Treatment Preferences and Shares the Quality Data Associated with the Providers?: Yes   Resource Information Provided?: No  Discharge Location  Discharge Placement: Home     CM met with patient in room, son at bedside. Patient alert and orient and verified all demographic information to be correct. Patient stated she lives in a home with her son. Patient uses a walker to ambulate and has a shower chair. Patient stated she is independent with all ADL's at this time, no longer drives much but her son takes her to her appointment. Patient stated she is able to afford all medication and obtains them from [employer]. Patient stated she has never used [facility] but has been to [facility] for [facility]. Patient stated she would like to return home at discharge. CM will continue to follow patient during hospitalization for discharge planning and needs. Please consult or notify CM of new needs.

## 2021-06-07 NOTE — PROGRESS NOTES
END OF SHIFT NOTE:    INTAKE/OUTPUT  No intake/output data recorded. Voiding: YES  Catheter: NO  Drain:              Flatus: Patient does have flatus present. Stool:  0 occurrences. Characteristics:       Emesis: 0 occurrences. Characteristics:        VITAL SIGNS  Patient Vitals for the past 12 hrs:   Temp Pulse Resp BP SpO2   06/07/21 1749 -- -- -- 115/66 --   06/07/21 1502 99.7 °F (37.6 °C) 96 18 118/60 95 %   06/07/21 1330 99.1 °F (37.3 °C) 70 18 (!) 115/51 93 %   06/07/21 1100 97.5 °F (36.4 °C) 66 18 (!) 116/47 93 %   06/07/21 0925 98 °F (36.7 °C) -- -- (!) 123/49 --   06/07/21 0800 -- 88 18 (!) 129/57 94 %       Pain Assessment  Pain Intensity 1: 0 (06/07/21 1302)  Pain Location 1: Neck  Pain Intervention(s) 1: Medication (see MAR)  Patient Stated Pain Goal: 0    Ambulating  Yes    Shift report given to oncoming nurse at the bedside.     Elaine Holder RN

## 2021-06-08 LAB
ANION GAP SERPL CALC-SCNC: 8 MMOL/L (ref 7–16)
BUN SERPL-MCNC: 26 MG/DL (ref 8–23)
CALCIUM SERPL-MCNC: 8.2 MG/DL (ref 8.3–10.4)
CHLORIDE SERPL-SCNC: 105 MMOL/L (ref 98–107)
CO2 SERPL-SCNC: 21 MMOL/L (ref 21–32)
CREAT SERPL-MCNC: 1.15 MG/DL (ref 0.6–1)
ERYTHROCYTE [DISTWIDTH] IN BLOOD BY AUTOMATED COUNT: 14.7 % (ref 11.9–14.6)
GENTAMICIN PEAK SERPL-MCNC: 5.7 UG/ML (ref 4–8)
GLUCOSE SERPL-MCNC: 160 MG/DL (ref 65–100)
HCT VFR BLD AUTO: 34.9 % (ref 35.8–46.3)
HGB BLD-MCNC: 10.8 G/DL (ref 11.7–15.4)
MCH RBC QN AUTO: 23.8 PG (ref 26.1–32.9)
MCHC RBC AUTO-ENTMCNC: 30.9 G/DL (ref 31.4–35)
MCV RBC AUTO: 76.9 FL (ref 79.6–97.8)
NRBC # BLD: 0 K/UL (ref 0–0.2)
PLATELET # BLD AUTO: 127 K/UL (ref 150–450)
PMV BLD AUTO: 10.2 FL (ref 9.4–12.3)
POTASSIUM SERPL-SCNC: 4 MMOL/L (ref 3.5–5.1)
RBC # BLD AUTO: 4.54 M/UL (ref 4.05–5.2)
SODIUM SERPL-SCNC: 134 MMOL/L (ref 136–145)
WBC # BLD AUTO: 12.4 K/UL (ref 4.3–11.1)

## 2021-06-08 PROCEDURE — 87040 BLOOD CULTURE FOR BACTERIA: CPT

## 2021-06-08 PROCEDURE — 74011250637 HC RX REV CODE- 250/637: Performed by: NURSE PRACTITIONER

## 2021-06-08 PROCEDURE — 80048 BASIC METABOLIC PNL TOTAL CA: CPT

## 2021-06-08 PROCEDURE — 74011000258 HC RX REV CODE- 258: Performed by: INTERNAL MEDICINE

## 2021-06-08 PROCEDURE — 86580 TB INTRADERMAL TEST: CPT | Performed by: NURSE PRACTITIONER

## 2021-06-08 PROCEDURE — 97530 THERAPEUTIC ACTIVITIES: CPT

## 2021-06-08 PROCEDURE — 85027 COMPLETE CBC AUTOMATED: CPT

## 2021-06-08 PROCEDURE — 2709999900 HC NON-CHARGEABLE SUPPLY

## 2021-06-08 PROCEDURE — 74011250636 HC RX REV CODE- 250/636: Performed by: HOSPITALIST

## 2021-06-08 PROCEDURE — 74011250636 HC RX REV CODE- 250/636: Performed by: INTERNAL MEDICINE

## 2021-06-08 PROCEDURE — 36415 COLL VENOUS BLD VENIPUNCTURE: CPT

## 2021-06-08 PROCEDURE — 74011000302 HC RX REV CODE- 302: Performed by: NURSE PRACTITIONER

## 2021-06-08 PROCEDURE — 74011250637 HC RX REV CODE- 250/637: Performed by: HOSPITALIST

## 2021-06-08 PROCEDURE — 65270000029 HC RM PRIVATE

## 2021-06-08 PROCEDURE — 97161 PT EVAL LOW COMPLEX 20 MIN: CPT

## 2021-06-08 PROCEDURE — 80170 ASSAY OF GENTAMICIN: CPT

## 2021-06-08 PROCEDURE — 77030027138 HC INCENT SPIROMETER -A

## 2021-06-08 RX ORDER — METOCLOPRAMIDE 10 MG/1
5 TABLET ORAL 2 TIMES DAILY
Status: COMPLETED | OUTPATIENT
Start: 2021-06-08 | End: 2021-06-08

## 2021-06-08 RX ADMIN — SUCRALFATE 1 G: 1 TABLET ORAL at 08:48

## 2021-06-08 RX ADMIN — SUCRALFATE 1 G: 1 TABLET ORAL at 17:59

## 2021-06-08 RX ADMIN — CALCIUM CARBONATE-CHOLECALCIFEROL TAB 250 MG-125 UNIT 1 TABLET: 250-125 TAB at 08:48

## 2021-06-08 RX ADMIN — CARVEDILOL 25 MG: 25 TABLET, FILM COATED ORAL at 08:48

## 2021-06-08 RX ADMIN — HEPARIN SODIUM 5000 UNITS: 5000 INJECTION INTRAVENOUS; SUBCUTANEOUS at 05:45

## 2021-06-08 RX ADMIN — Medication 10 ML: at 22:00

## 2021-06-08 RX ADMIN — CARVEDILOL 25 MG: 25 TABLET, FILM COATED ORAL at 17:58

## 2021-06-08 RX ADMIN — HYDROCODONE BITARTRATE AND ACETAMINOPHEN 1 TABLET: 10; 325 TABLET ORAL at 09:31

## 2021-06-08 RX ADMIN — HEPARIN SODIUM 5000 UNITS: 5000 INJECTION INTRAVENOUS; SUBCUTANEOUS at 13:30

## 2021-06-08 RX ADMIN — Medication 10 ML: at 14:00

## 2021-06-08 RX ADMIN — SODIUM CHLORIDE 75 ML/HR: 900 INJECTION, SOLUTION INTRAVENOUS at 07:28

## 2021-06-08 RX ADMIN — PANTOPRAZOLE SODIUM 40 MG: 40 TABLET, DELAYED RELEASE ORAL at 05:45

## 2021-06-08 RX ADMIN — SUCRALFATE 1 G: 1 TABLET ORAL at 13:30

## 2021-06-08 RX ADMIN — SUCRALFATE 1 G: 1 TABLET ORAL at 21:26

## 2021-06-08 RX ADMIN — SENNOSIDES AND DOCUSATE SODIUM 2 TABLET: 8.6; 5 TABLET ORAL at 21:26

## 2021-06-08 RX ADMIN — HYDROCODONE BITARTRATE AND ACETAMINOPHEN 1 TABLET: 10; 325 TABLET ORAL at 00:48

## 2021-06-08 RX ADMIN — METOCLOPRAMIDE 5 MG: 10 TABLET ORAL at 13:30

## 2021-06-08 RX ADMIN — GENTAMICIN SULFATE 120 MG: 40 INJECTION, SOLUTION INTRAMUSCULAR; INTRAVENOUS at 17:59

## 2021-06-08 RX ADMIN — Medication 10 ML: at 21:26

## 2021-06-08 RX ADMIN — HYDROCODONE BITARTRATE AND ACETAMINOPHEN 1 TABLET: 10; 325 TABLET ORAL at 20:05

## 2021-06-08 RX ADMIN — HEPARIN SODIUM 5000 UNITS: 5000 INJECTION INTRAVENOUS; SUBCUTANEOUS at 21:26

## 2021-06-08 RX ADMIN — VANCOMYCIN HYDROCHLORIDE 1750 MG: 10 INJECTION, POWDER, LYOPHILIZED, FOR SOLUTION INTRAVENOUS at 08:49

## 2021-06-08 RX ADMIN — TUBERCULIN PURIFIED PROTEIN DERIVATIVE 5 UNITS: 5 INJECTION, SOLUTION INTRADERMAL at 13:30

## 2021-06-08 RX ADMIN — METOCLOPRAMIDE 5 MG: 10 TABLET ORAL at 17:59

## 2021-06-08 RX ADMIN — ASPIRIN 81 MG: 81 TABLET ORAL at 08:48

## 2021-06-08 RX ADMIN — AMLODIPINE BESYLATE 10 MG: 10 TABLET ORAL at 08:48

## 2021-06-08 NOTE — PROGRESS NOTES
[facility] Hospitalist Progress Note     Name:  Sindhu Layton  Age:71 y.o. Sex:female   :  1950    MRN:  [id number]     Admit Date:  2021    Reason for Admission:  Acute pyelonephritis [N10]    Hospital Course/Interval history:     Ms. Jose Luevano is a 66year old female brought to ER as was experiencing weakness, fatigue, sleeping a lot.  Patient was initially seen by primary care physician, CT head was done and reported normal to me by ER physician. Vernal Starring also experiencing falls lately.  History of abdominal discomfort, back pain going on for past few days. Denies any burning urination but reports she gets UTIs frequently. Labs show elevated white count.  Urine dipstick was done shows positive for UTI, urinalysis not completed.  Urine microscopy shows evidence of more than 100 white cells.  Patient was initiated on antibiotics.  Denies any neck pain, headache, visual disturbance. Denies any chest pain, shortness of breath, cough, sputum production.  Complaint of nausea. : Patient seen and examined with son, Ovi Camacho, at bedside. Renal fx and WBC improved this morning. Later, blood cultures returned with GPC; will start vancomycin. Patient complains of nausea, no vomiting. Will try Reglan with her lunch and dinner; monitor. F/u a.m. labs. Subjective (21):    No AEO. Ms. Jose Luevano is sitting in the chair in NAD w/ daughter, Mehrdad Marshall, at bedside. Blood cultures are presumed enterococcal species. As patient has a history of aortic valve replacement and spinal hardware we will consult ID today for further recommendations. Repeat blood cultures and TTE were ordered this morning. Review of Systems: 14 point review of systems is otherwise negative with the exception of the elements mentioned above.   Assessment & Plan     Acute pyelonephritis  Leukocytosis  Bacteremia  Initiated on antibiotics, patient will be admitted to hospital, complete urinalysis is pending, urine microscopy shows evidence of more than 100 white cells in the urine. Follow urine cultures. Mimi Michael obtain CT scan of abdomen without contrast for further evaluate any evidence of obstructing pathology as patient has a history of kidney stones. Jun/07: Blood cultures w/ GPC              - Start vancomycin              - Follow final and sensitivities              - Cont cefepime for now              - WBC 21.5 --> 12.9  Jun/08: Blood cultures w/ presumptive enterococcus   - Vanc Day 2   - Draw new set of blood cultures now   - Cefepime Day 3   - Consult Infectious Disease for further recs     Nephrolithiasis, non-obstructing  Jun/07: Noted on CT; no hydronephrosis, no masses.     CKD stage III: Creatinine slightly elevated, initiated on IV fluids, will monitor creatinine. Jun/07: sCr 1.55 --> 1.38              - Cont IVF     Nausea  Jun/07: Try metoclopramide with lunch/dinner today; monitor response              - Has had PRN ondansetron  Jun/08: Patient stated decreased nausea and better appetite yesterday with metoclopramide   - Give again today     Chronic back pain  Hx surgical fusion w/ hardware  DDD  Continue on home medications. Hx aortic valve replacement  Jun/08: TTE to eval for vegetations    Hypertension: Continue on home medications, hold nephrotoxic medications.     GERD: Continue on home dose of sucralfate, PPI     Body Mass Index 41.40      Diet:  DIET ADULT  DIET ADULT ORAL NUTRITION SUPPLEMENT  DVT PPx: heparin  Code status: Full Code  Disposition/Expected LOS: > 3 midnights    Objective:     Patient Vitals for the past 24 hrs:   Temp Pulse Resp BP SpO2   06/08/21 0808 99.5 °F (37.5 °C) 93 28 (!) 122/51 91 %   06/08/21 0314 98.3 °F (36.8 °C) 94 18 (!) 105/58 97 %   06/07/21 2300 98.3 °F (36.8 °C) 75 18 132/72 94 %   06/07/21 1931 99 °F (37.2 °C) 83 18 (!) 109/55 98 %   06/07/21 1749 -- -- -- 115/66 --   06/07/21 1502 99.7 °F (37.6 °C) 96 18 118/60 95 %   06/07/21 1330 99.1 °F (37.3 °C) 70 18 (!) 115/51 93 % 06/07/21 1100 97.5 °F (36.4 °C) 66 18 (!) 116/47 93 %   06/07/21 0925 98 °F (36.7 °C) -- -- (!) 123/49 --     Oxygen Therapy  O2 Sat (%): 91 % (06/08/21 0808)  Pulse via Oximetry: 74 beats per minute (06/06/21 1840)  O2 Device: None (Room air) (06/07/21 2000)    Body mass index is 41.4 kg/m². Physical Exam:   General:  No acute distress, speaking in full sentences, no use of accessory muscles   Lungs:  Non labored on room air, no tachypnea  CV:  RRR   Abdomen:  Soft, nontender, nondistended  Extremities:  No cyanosis clubbing or edema   Neuro:  Nonfocal, A&O x3   Psych:  Calm, Cooperative    Data Review:  I have reviewed all labs, meds, and studies from the last 24 hours:    Labs:    Recent Results (from the past 24 hour(s))   CULTURE, URINE    Collection Time: 06/07/21  9:38 PM    Specimen: Clean catch; Urine    URINE   Result Value Ref Range    Special Requests: NO SPECIAL REQUESTS      Culture result:        NO GROWTH AFTER SHORT PERIOD OF INCUBATION. FURTHER RESULTS TO FOLLOW AFTER OVERNIGHT INCUBATION. URINALYSIS W/ RFLX MICROSCOPIC    Collection Time: 06/07/21  9:38 PM   Result Value Ref Range    Color YELLOW      Appearance CLOUDY      Specific gravity 1.017 1.001 - 1.023      pH (UA) 5.5 5.0 - 9.0      Protein TRACE (A) NEG mg/dL    Glucose Negative mg/dL    Ketone Negative NEG mg/dL    Bilirubin Negative NEG      Blood Negative NEG      Urobilinogen 0.2 0.2 - 1.0 EU/dL    Nitrites Negative NEG      Leukocyte Esterase SMALL (A) NEG      WBC 10-20 0 /hpf    RBC 0-3 0 /hpf    Epithelial cells 0-3 0 /hpf    Bacteria 0 0 /hpf    Casts 0-3 0 /lpf       All Micro Results     Procedure Component Value Units Date/Time    CULTURE, URINE [[id number]] Collected: 06/07/21 2138    Order Status: Completed Specimen: Urine from Clean catch Updated: 06/08/21 0834     Special Requests: NO SPECIAL REQUESTS        Culture result:       NO GROWTH AFTER SHORT PERIOD OF INCUBATION.  FURTHER RESULTS TO FOLLOW AFTER OVERNIGHT INCUBATION. CULTURE, BLOOD [[id number]]     Order Status: Sent Specimen: Blood     CULTURE, BLOOD [[id number]]     Order Status: Sent Specimen: Blood     CULTURE, BLOOD [[id number]]  (Abnormal) Collected: 06/06/21 1929    Order Status: Completed Specimen: Blood Updated: 06/08/21 0744     Special Requests: --        RIGHT  ARM       GRAM STAIN GRAM POS COCCI IN CHAINS               AEROBIC AND ANAEROBIC BOTTLES                  CRITICAL RESULT NOT CALLED DUE TO PREVIOUS NOTIFICATION OF CRITICAL RESULT WITHIN THE LAST 24 HOURS. Culture result:       PRESUMPTIVE ENTEROCOCCUS SPECIES                  For Susceptibility Refer to Culture  [id number]              CULTURE IN [facility] UPDATES TO FOLLOW          CULTURE, BLOOD [[id number]]  (Abnormal) Collected: 06/06/21 1929    Order Status: Completed Specimen: Blood Updated: 06/08/21 0743     Special Requests: --        LEFT  HAND       GRAM STAIN GRAM POS COCCI IN CHAINS               AEROBIC AND ANAEROBIC BOTTLES                  RESULTS VERIFIED, PHONED TO AND READ BACK BY Gonzalo Carcamo RN ON 6/7/21 @1046, TA           Culture result:       PRESUMPTIVE ENTEROCOCCUS SPECIES IDENTIFICATION AND SUSCEPTIBILITY TO FOLLOW                  Refer to Blood Culture ID Panel Accession  [id number]      CULTURE, URINE [[id number]] Collected: 06/07/21 2138    Order Status: Canceled Specimen: Urine from Clean catch     BLOOD CULTURE ID PANEL [[id number]]  (Abnormal) Collected: 06/06/21 1929    Order Status: Completed Specimen: Blood Updated: 06/07/21 1048     Acc. no. from Micro Order [id number]     Enterococcus Detected        Comment: RESULTS VERIFIED, PHONED TO AND READ BACK BY  Gonzalo Carcamo RN ON 6/7/21 @1046, TA          van A/B (Vancomycin Resistance Gene) NOT DETECTED        INTERPRETATION       Gram positive cocci, identified by realtime PCR as SUSPECTED Vancomycin Sensitive Enterococcus species.            Comment: Recommend IV vancomycin therapy until full susceptibility information available. THIS TEST DOES NOT REPLACE SENSITIVITY TESTING. EKG Results     None          Other Studies:  No results found.     Current Meds:   Current Facility-Administered Medications   Medication Dose Route Frequency    vancomycin (VANCOCIN) 1750 mg in  ml infusion  1,750 mg IntraVENous Q18H    HYDROcodone-acetaminophen (NORCO)  mg tablet 1 Tablet  1 Tablet Oral Q8H PRN    traMADoL (ULTRAM) tablet 50 mg  50 mg Oral Q6H PRN    sodium chloride (NS) flush 5-10 mL  5-10 mL IntraVENous Q8H    sodium chloride (NS) flush 5-10 mL  5-10 mL IntraVENous PRN    sodium chloride (NS) flush 5-40 mL  5-40 mL IntraVENous Q8H    sodium chloride (NS) flush 5-40 mL  5-40 mL IntraVENous PRN    heparin (porcine) injection 5,000 Units  5,000 Units SubCUTAneous Q8H    cefepime (MAXIPIME) 2 g in 0.9% sodium chloride (MBP/ADV) 100 mL MBP  2 g IntraVENous Q24H    0.9% sodium chloride infusion  75 mL/hr IntraVENous CONTINUOUS    ondansetron (ZOFRAN) injection 4 mg  4 mg IntraVENous Q6H PRN    amLODIPine (NORVASC) tablet 10 mg  10 mg Oral DAILY    aspirin delayed-release tablet 81 mg  81 mg Oral DAILY    calcium-vitamin D (OS-SHANDRA +D3) 250 mg-125 unit per tablet 1 Tablet  1 Tablet Oral DAILY    carvediloL (COREG) tablet 25 mg  25 mg Oral BID WITH MEALS    senna-docusate (PERICOLACE) 8.6-50 mg per tablet 2 Tablet  2 Tablet Oral QHS    pantoprazole (PROTONIX) tablet 40 mg  40 mg Oral ACB    sucralfate (CARAFATE) tablet 1 g  1 g Oral AC&HS    polyethylene glycol (MIRALAX) packet 17 g  17 g Oral DAILY       Problem List:  Hospital Problems as of 6/8/2021 Date Reviewed: 5/13/2021        Codes Class Noted - Resolved POA    * (Principal) Acute pyelonephritis ICD-10-CM: N10  ICD-9-CM: 590.10  6/6/2021 - Present Unknown        CKD (chronic kidney disease) stage 3, GFR 30-59 ml/min (HCC) (Chronic) ICD-10-CM: N18.30  ICD-9-CM: 585.3  6/10/2016 - Present Yes        Morbid obesity ([address] (Chronic) ICD-10-CM: E66.01  ICD-9-CM: 278.01  2/20/2015 - Present Yes               Part of this note was written by using a voice dictation software and the note has been proof read but may still contain some grammatical/other typographical errors.     Signed By: Karen Johnson NP   [facility]    June 8, 2021  5:15 PM

## 2021-06-08 NOTE — PROGRESS NOTES
Pharmacokinetic Consult to Pharmacist    Rebel Mor is a 66 y.o. 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 Height: 5' 5\" (165.1 cm)  Weight: 112.9 kg (248 lb 12.8 oz)  Lab Results   Component Value Date/Time    BUN 26 (H) 06/08/2021 09:51 AM    Creatinine 1.15 (H) 06/08/2021 09:51 AM    WBC 12.4 (H) 06/08/2021 09:51 AM    Lactic acid 1.3 06/06/2021 05:39 PM      Estimated Creatinine Clearance: 55.4 mL/min (A) (based on SCr of 1.15 mg/dL (H)). Day 1 of gentamicin therapy Goal trough is <1, goal peak is 3 to 5. Gentamicin dose initiated at 120 mg Q12H. Will continue to follow patient and order levels when clinically indicated.     Thank you,  Sujatha Jones, PharmD, [id number]  Clinical Pharmacist  [phone]

## 2021-06-08 NOTE — PROGRESS NOTES
PT has recommended STR for patient. CM discussed discharge with patient and her daughter.  Patient is in agreement and would like [facility], [phone] will send referral.

## 2021-06-08 NOTE — PROGRESS NOTES
END OF SHIFT NOTE:    INTAKE/OUTPUT  06/07 0701 - 06/08 0700  In: 8685 [P.O.:820; I.V.:2842]  Out: 300 [Urine:300]  Voiding: YES  Catheter: NO  Drain:              Flatus: Patient does have flatus present. Stool:  1 occurrences. Characteristics:  Stool Assessment  Stool Appearance: Loose (per patient report)    Emesis: 0 occurrences. Characteristics:        VITAL SIGNS  Patient Vitals for the past 12 hrs:   Temp Pulse Resp BP SpO2   06/08/21 1507 98.3 °F (36.8 °C) 95 22 106/75 92 %   06/08/21 1132 -- -- 24 -- 92 %   06/08/21 1126 98.4 °F (36.9 °C) 67 (!) 36 (!) 111/56 (!) 89 %   06/08/21 0808 99.5 °F (37.5 °C) 93 28 (!) 122/51 91 %       Pain Assessment  Pain Intensity 1: 7 (06/08/21 1126)  Pain Location 1: Back  Pain Intervention(s) 1: Repositioned  Patient Stated Pain Goal: 0    Ambulating  Yes, short distances to the bathroom and back. Shift report given to oncoming nurse at the bedside.     Omid Soria RN

## 2021-06-08 NOTE — PROGRESS NOTES
ACUTE PHYSICAL THERAPY GOALS:  (Developed with and agreed upon by patient and/or caregiver. )    LTG:  (1.)Ms. Gary Hahn will move from supine to sit and sit to supine , scoot up and down and roll side to side in flat bed without siderails with  INDEPENDENT within 7 day(s). (2.)Ms. Gary Hahn will perform all functional transfers with  MODIFIED INDEPENDENCE using the least restrictive device within 7 day(s). (3.)Ms. Gary Hahn will ambulate with  MODIFIED INDEPENDENCE for 250+ feet with normal vital sign response with the least restrictive device within 7 day(s). PHYSICAL THERAPY ASSESSMENT: Initial Assessment and Daily Note PT Treatment Day # 1      Victoria Cade is a 66 y.o. female   PRIMARY DIAGNOSIS: Acute pyelonephritis  Acute pyelonephritis [N10]       Reason for Referral:     ICD-10: Treatment Diagnosis: Other abnormalities of gait and mobility (R26.89)  Repeated Falls (R29.6)  [place]PAT[place] / [place] / Product Type: Medicare /     ASSESSMENT:     REHAB RECOMMENDATIONS:   Recommendation to date pending progress:  Setting:   Short-term Rehab  Equipment:    To Be Determined     PRIOR LEVEL OF FUNCTION:  (Prior to Hospitalization) INITIAL/CURRENT LEVEL OF FUNCTION:  (Most Recently Demonstrated)   Bed Mobility:   Independent  Sit to Stand:   Independent  Transfers:   Independent  Gait/Mobility:   Modified Independent Bed Mobility:   Not tested  Sit to Stand:   Contact Guard Assistance  Transfers:   Contact Guard Assistance  Gait/Mobility:   Contact Guard Assistance     ASSESSMENT:  Ms. Gary Hahn lives with her son who works. She ambulates with a rollator independently in home and community, drives, etc.  Today patient endorses back pain. All movement is slow and effortful. Following a short walk in the [place], she was very fatigued. Ms. Gary Hahn would benefit from skilled physical therapy (medically necessary) to address her deficits and maximize her function. Lam Bridges SUBJECTIVE:   Ms. Emily Lazaro states, \"Thank you. \"    SOCIAL HISTORY/LIVING ENVIRONMENT: Ms. Emily Lazaro lives with her son who works.   She ambulates with a rollator independently in home and community, drives, etc.  Home Environment: Private residence  # Steps to Enter: 3  One/Two Story Residence: One story  Living Alone: No  Support Systems: Family member(s)  OBJECTIVE:     PAIN: VITAL SIGNS: LINES/DRAINS:   Pre Treatment: Pain Screen  Pain Scale 1: Numeric (0 - 10)  Pain Intensity 1: 7  Pain Location 1: Back  Pain Intervention(s) 1: Repositioned  Post Treatment: 7   IV  O2 Device: None (Room air)     GROSS EVALUATION:  B LE's Within Functional Limits Abnormal/ Functional Abnormal/ Non-Functional (see comments) Not Tested Comments:   AROM [] [x] [] []    PROM [] [] [] []    Strength [] [x] [] []    Balance [] [x] [] []    Posture [] [x] [] []    Sensation [] [] [] []    Coordination [] [] [] []    Tone [] [] [] []    Edema [] [] [] []    Activity Tolerance [] [] [x] []     [] [] [] []      COGNITION/  PERCEPTION: Intact Impaired   (see comments) Comments:   Orientation [x] []    Vision [] []    Hearing [x] []    Command Following [x] []    Safety Awareness [x] []     [] []      MOBILITY: I Mod I S SBA CGA Min Mod Max Total  NT x2 Comments:   Bed Mobility    Rolling [] [] [] [] [] [] [] [] [] [] []    Supine to Sit [] [] [] [] [] [] [] [] [] [] []    Scooting [] [] [] [] [] [] [] [] [] [] []    Sit to Supine [] [] [] [] [] [] [] [] [] [] []    Transfers    Sit to Stand [] [] [] [] [] [] [] [] [] [] []    Bed to Chair [] [] [] [] [] [] [] [] [] [] []    Stand to Sit [] [] [] [] [] [] [] [] [] [] []    I=Independent, Mod I=Modified Independent, S=Supervision, SBA=Standby Assistance, CGA=Contact Guard Assistance,   Min=Minimal Assistance, Mod=Moderate Assistance, Max=Maximal Assistance, Total=Total Assistance, NT=Not Tested  GAIT: I Mod I S SBA CGA Min Mod Max Total  NT x2 Comments:   Level of Assistance [] [] [] [] [x] [] [] [] [] [] []    Distance 48    DME Rolling Walker and Gait Belt    Gait Quality Slow, short steps    Weightbearing Status N/A     I=Independent, Mod I=Modified Independent, S=Supervision, SBA=Standby Assistance, CGA=Contact Guard Assistance,   Min=Minimal Assistance, Mod=Moderate Assistance, Max=Maximal Assistance, Total=Total Assistance, NT=Not [facility]       How much difficulty does the patient currently have. .. Unable A Lot A Little None   1. Turning over in bed (including adjusting bedclothes, sheets and blankets)? [] 1   [] 2   [x] 3   [] 4   2. Sitting down on and standing up from a chair with arms ( e.g., wheelchair, bedside commode, etc.)   [] 1   [] 2   [x] 3   [] 4   3. Moving from lying on back to sitting on the side of the bed? [] 1   [] 2   [x] 3   [] 4   How much help from another person does the patient currently need. .. Total A Lot A Little None   4. Moving to and from a bed to a chair (including a wheelchair)? [] 1   [] 2   [x] 3   [] 4   5. Need to walk in hospital room? [] 1   [] 2   [x] 3   [] 4   6. Climbing 3-5 steps with a railing? [] 1   [] 2   [x] 3   [] 4   © 2007, Trustees of [address] [place] [address], under license to [employer]. All rights reserved     Score:  Initial: 18 Most Recent: X (Date: -- )    Interpretation of Tool:  Represents activities that are increasingly more difficult (i.e. Bed mobility, Transfers, Gait). PLAN:   FREQUENCY/DURATION: PT Plan of Care: 3 times/week for duration of hospital stay or until stated goals are met, whichever comes first.    PROBLEM LIST:   (Skilled intervention is medically necessary to address:)  1. Decreased Activity Tolerance  2. Decreased AROM/PROM  3. Decreased Balance  4. Decreased Gait Ability  5. Decreased Strength  6. Decreased Transfer Abilities  7.  Increased Pain   INTERVENTIONS PLANNED:   (Benefits and precautions of physical therapy have been discussed with the patient.)  1. Therapeutic Activity  2. Therapeutic Exercise/HEP  3. Neuromuscular Re-education  4. Gait Training  5. Manual Therapy  6. Education     TREATMENT:     EVALUATION: Low Complexity : (Untimed Charge)    TREATMENT:   ($$ Therapeutic Activity: 23-37 mins    )  Therapeutic Activity (23 Minutes): Therapeutic activity included Transfer Training, Ambulation on level ground and LE exercises to improve functional Mobility, Strength and Activity tolerance.     TREATMENT GRID:      DATE: 6/8/21       Ambulation        Hip Flexion x15 AB       Long Arc Quads x15 AB       Hip ab/ad        Heel Raises x20 AB       Toe Raises x20 AB                                Key:  A=active, AA=active assisted, P=passive, B=bilaterally, R=right, L=left   DF=dorsiflexion, PF=plantarflexion    AFTER TREATMENT POSITION/PRECAUTIONS:  Chair, Needs within reach, RN notified and Visitors at bedside    INTERDISCIPLINARY COLLABORATION:  RN/PCT and PT/PTA    TOTAL TREATMENT DURATION:  PT Patient Time In/Time Out  Time In: 1120  Time Out: [address], PT, DPT

## 2021-06-08 NOTE — CONSULTS
Infectious Disease Consult    Today's Date: 6/8/2021   Admit Date: 6/6/2021    Impression:   · Enterococcal bacteremia (6/6); repeat blood cultures 6/8 pending, TTE pending  · Frequent diarrhea- reports 2-3/day  · Recent fall/debility  · Bilateral hip pain with recent trochanteric injection 5/13/21  · Hx of aortic valve stenosis s/p aortic valve replacement in 2016  · S/p fusion of C6-7 and C7-T1 (3/6/18)  · S/p L1-L4 laminectomy (10/30/18)  · Hx of bilateral knee replacements in 2016 and 2017     Plan:   · Continue Vancomycin. Will add Gentamicin today given AVR. · Follow TTE. Follow repeat blood cultures. Anti-infectives:   · CTX 6/6  · Cefepime 6/6-  · Vanc 6/6-    Subjective:   Date of Consultation:  June 8, 2021  Referring Physician: Maik Goss NP    Patient is a 66 y.o. female who was brought to ED on 6/6 due to weakness, fatigue, and increased somnolence. She was seen by PCP and had CT head done after reporting increased falls on 5/25 with no acute findings. She also had complaints of low back pain over the past few days. WBC 21.5 on admission. She was admitted by hospitalist service for management of acute pyelonephritis after urine dipstick revealed > 100 WBC. She was started on IV Vanc/Cefepime. Blood cultures from admission are now positive for presumptive enterococcus. TTE ordered and repeat cultures pending. ID consulted for treatment assistance. She reports feeling poor since 5/21 when she began having shaking chills and diarrhea. She reports ongoing gagging and nausea when eating. She also complains of left sided back pain.      Patient Active Problem List   Diagnosis Code    Essential hypertension, benign I10    GERD (gastroesophageal reflux disease) K21.9    Nocturnal hypoxemia G47.34    Mixed hyperlipidemia E78.2    Morbid obesity ([address] E66.01    Vitamin B12 deficiency E53.8    Vitamin D deficiency E55.9    IFG (impaired fasting glucose) R73.01    Carotid artery stenosis without cerebral infarction I65.29    Aortic valve stenosis I35.0    Pulmonary hypertension (HCC) I27.20    CKD (chronic kidney disease) stage 3, GFR 30-59 ml/min ([facility]) N18.30    Chronic anemia D64.9    Fibromyalgia M79.7    Full dentures Z97.2, K08.109    Chronic bilateral pleural effusions J90    Iron deficiency anemia D50.9    Gastritis K29.70    History of gastric ulcer Z87.11    Chronic combined systolic and diastolic congestive heart failure (HCC) I50.42    Duodenal ulcer K26.9    Paroxysmal atrial fibrillation (HCC) I48.0    OA (osteoarthritis) of knee M17.10    Chronic gout of multiple sites M1A. 200X0    Primary osteoarthritis of left knee M17.12    S/P aortic valve replacement with bioprosthetic valve Z95.3    Neck pain M54.2    DDD (degenerative disc disease), cervical M50.30    Cervical radiculopathy M54.12    Cervical spondylosis without myelopathy M47.812    Primary insomnia F51.01    Recurrent UTI N39.0    Renal stone N20.0    Spinal stenosis of lumbar region with neurogenic claudication M48.062    History of cervical spinal surgery Z98.890    Lumbar stenosis with neurogenic claudication M48.062    Recurrent depression ([facility]) F33.9    Abnormal nuclear cardiac imaging test R93.1    Right lower lobe pneumonia J18.9    CAP (community acquired pneumonia) J18.9    Acute pyelonephritis N10     Past Medical History:   Diagnosis Date    Adverse effect of anesthesia     panic attack when mask placed on face    Anemia     2016/2017 - gi bleed---  2017 blood transfusion prior to AVR    Arthritis     CAD (coronary artery disease) 2016 1/2019 Minor nonobstructive coronary artery disease/ cath report    Chronic kidney disease     kidney stones    Chronic pain     r/t arthritis/ back pain Spinal stenosis of lumbar region     Diabetes ([facility]     \"prediabetic\" A1C 6.4 8/28/19- no meds    Follow-up visit for aortic valve replacement with bioprosthetic valve 7/25/2016    GERD (gastroesophageal reflux disease)     controlled w/med- well controlled with meds    History of gastric ulcer 6/16/2016    Hypertension     controlled w/med    Kidney stones     Morbid obesity ([address]     Murmur, cardiac     s/p AVR    PONV (postoperative nausea and vomiting)     Pt can not be flat in bed -- severe PONV worsens when laying flat    Psychiatric disorder     claustraphobia (severe)    PUD (peptic ulcer disease) 06/2016    dx 2016- resolved per patient/ GI bleed requiring blood transfusion    S/P aortic valve replacement with bioprosthetic valve 4/28/2017    Spinal stenosis of lumbar region with neurogenic claudication 10/16/2018    Valvular heart disease 2016    AVR      Family History   Problem Relation Age of Onset    Hypertension Father     Lung Disease Father     Lung Cancer Father     Lung Disease Mother     Lung Cancer Mother     Diabetes Maternal Grandmother     Diabetes Paternal Grandmother     Breast Cancer Neg Hx       Social History     Tobacco Use    Smoking status: Never Smoker    Smokeless tobacco: Never Used   Substance Use Topics    Alcohol use: No     Past Surgical History:   Procedure Laterality Date    COLONOSCOPY N/A 6/15/2016    COLONOSCOPY/ BMI 41  ROOM 2235 performed by Amanda Cordero MD at [facility] ENDOSCOPY    HX AORTIC VALVE REPLACEMENT  6/9/2016    Dr. Breanna Dumont    HX BILATERAL SALPINGO-OOPHORECTOMY      HX CERVICAL FUSION      posterior fusion    HX GASTRIC BYPASS      HX HEART CATHETERIZATION  05/26/2016    Severe AS with minimal nonobstructive CAD.  HX HEART CATHETERIZATION  01/31/2019    Hyperdynamic LV EF. No significant gradient across bioprosthetic aortic valve,mild nonobstructive CAD,and MAC.     HX HEART VALVE SURGERY      HX HYSTERECTOMY      HX KNEE REPLACEMENT Bilateral     HX LAP CHOLECYSTECTOMY      HX SHOULDER ARTHROSCOPY Right      times 2 \"shaved bone\"     HX UROLOGICAL  Multiple dates    Mulitiple open Nephrolithotomies    HX UROLOGICAL  08/2019    CYSTOSCOPY BILATERAL URETEROSCOPY/LASER LITHO/STENTS    CT ABDOMEN SURGERY PROC UNLISTED      reversal of gastric bypass reversal      Prior to Admission medications    Medication Sig Start Date End Date Taking? Authorizing Provider   ondansetron (Zofran ODT) 4 mg disintegrating tablet Take 4 mg by mouth every eight (8) hours as needed for Nausea or Vomiting. Yes Provider, Historical   HYDROcodone-acetaminophen (NORCO)  mg tablet Take 1 Tab by mouth every eight (8) hours as needed. 4/16/21  Yes Provider, Historical   furosemide (LASIX) 20 mg tablet TAKE 1 TABLET BY MOUTH EVERY DAY 4/11/21  Yes Brothers, Josep Franco T, DO   Uloric 40 mg tab tablet TAKE 1 TABLET BY MOUTH EVERY MORNING 4/11/21  Yes BrothJosep paniagua T, DO   amLODIPine (NORVASC) 10 mg tablet TAKE 1 TABLET BY MOUTH DAILY 4/11/21  Yes Brothers, Josep Franco T, DO   potassium chloride SR (KLOR-CON 10) 10 mEq tablet TAKE 1 TABLET BY MOUTH DAILY 11/5/20  Yes Brothers, Josep Franco T, DO   esomeprazole (NEXIUM) 40 mg capsule Take 40 mg by mouth daily. 10/11/20  Yes Provider, Historical   losartan (COZAAR) 100 mg tablet TAKE 1 TABLET BY MOUTH DAILY 5/26/20  Yes Shelia Arreguin MD   carvedilol (COREG) 25 mg tablet Take 1 Tab by mouth two (2) times daily (with meals). 8/13/19  Yes Shelia Arreguin MD   DISABLED PLACARD (DISABLED PLACARD) DMV To use with handicap placard form 4/23/19  Yes BrothersDaniel, DO   aspirin delayed-release 81 mg tablet Take 81 mg by mouth daily. Yes Provider, Historical   DOCUSATE CALCIUM (STOOL SOFTENER PO) Take 1 Tab by mouth daily. Yes Provider, Historical   calcium-vitamin D 600 mg(1,500mg) -200 unit tab Take 1 Tab by mouth daily. Yes Provider, Historical   ferrous sulfate 325 mg (65 mg iron) tablet Take 1 Tab by mouth daily (with breakfast). Patient taking differently: Take 325 mg by mouth daily.  After breakfast 6/17/16  Yes Cal Hairston NP   polyethylene glycol (MIRALAX) 17 gram/dose powder Take  by mouth daily as needed. Provider, Historical   sucralfate (CARAFATE) 1 gram tablet Take 1 Tablet by mouth four (4) times daily as needed. 20   Provider, Historical   nitroglycerin (NITROSTAT) 0.4 mg SL tablet 1 Tab by SubLINGual route every five (5) minutes as needed for Chest Pain. Up to 3 doses. 20   Jillian Augustine MD   famotidine (PEPCID) 20 mg tablet Take 1 Tab by mouth two (2) times a day. Patient not taking: Reported on 2021 1/10/20   Celine KAISER, DO       Allergies   Allergen Reactions    Latex Rash    Metformin Diarrhea    Sulfa (Sulfonamide Antibiotics) Anaphylaxis    Nsaids (Non-Steroidal Anti-Inflammatory Drug) Other (comments)     ulcers    Macrobid [Nitrofurantoin Monohyd/M-Cryst] Other (comments)     Causes Gout    Other Plant, Animal, Environmental Itching     Pt itched after wearing a plastic blood pressure cuff. Pt reports BP will be higher in right arm     Oxycodone Other (comments)     Hallucination, anxiety with oxycontin-- denies rx to hydrocodone    Tape [Adhesive] Rash     Paper tape ok        Review of Systems:  A comprehensive review of systems was negative except for that written in the History of Present Illness. Objective:     Visit Vitals  BP (!) 122/51   Pulse 93   Temp 99.5 °F (37.5 °C)   Resp 28   Ht 5' 5\" (1.651 m)   Wt 112.9 kg (248 lb 12.8 oz)   SpO2 91%   BMI 41.40 kg/m²     Temp (24hrs), Av.8 °F (37.1 °C), Min:97.5 °F (36.4 °C), Max:99.7 °F (37.6 °C)       Lines:  Peripheral IV:       Physical Exam:    General:  Alert, cooperative, well noursished, well developed, appears stated age   Eyes:  Sclera anicteric. Pupils equally round and reactive to light. Mouth/Throat: Mucous membranes normal, oral pharynx clear   Neck: Supple   Lungs:   Clear to auscultation bilaterally, good effort   CV:  Regular rate and rhythm,distant heart sounds   Abdomen:   Soft, non-tender.  bowel sounds normal. non-distended; Left CVA tenderness   Extremities: No cyanosis or edema   Skin: Skin color, texture, turgor normal. no acute rash or lesions   Lymph nodes: Cervical and supraclavicular normal   Musculoskeletal: No swelling or deformity   Lines/Devices:  Intact, no erythema, drainage or tenderness   Psych: Alert and oriented, normal mood affect given the setting       Data Review:     CBC:  Recent Labs     06/07/21  0448 06/06/21  1552   WBC 12.9* 21.5*   GRANS 83* 91*   MONOS 7 5   EOS 0* 0*   ANEU 10.7* 19.4*   ABL 1.1 0.8   HGB 11.4* 11.4*   HCT 37.5 35.7*   * 173       BMP:  Recent Labs     06/07/21  0448 06/06/21  1552   CREA 1.38* 1.55*   BUN 22 20    134*   K 4.0 3.9    102   CO2 23 24   AGAP 11 8   GLU 92 152*       LFTS:  Recent Labs     06/07/21  0448 06/06/21  1552   TBILI 0.8 1.1   ALT 15 18   AP 90 87   TP 6.2* 6.3   ALB 2.5* 2.6*       Microbiology:     All Micro Results     Procedure Component Value Units Date/Time    CULTURE, URINE [[id number]] Collected: 06/07/21 2138    Order Status: Completed Specimen: Urine from Clean catch Updated: 06/08/21 0834     Special Requests: NO SPECIAL REQUESTS        Culture result:       NO GROWTH AFTER SHORT PERIOD OF INCUBATION. FURTHER RESULTS TO FOLLOW AFTER OVERNIGHT INCUBATION. CULTURE, BLOOD [[id number]]     Order Status: Sent Specimen: Blood     CULTURE, BLOOD [[id number]]     Order Status: Sent Specimen: Blood     CULTURE, BLOOD [[id number]]  (Abnormal) Collected: 06/06/21 1929    Order Status: Completed Specimen: Blood Updated: 06/08/21 0744     Special Requests: --        RIGHT  ARM       GRAM STAIN GRAM POS COCCI IN CHAINS               AEROBIC AND ANAEROBIC BOTTLES                  CRITICAL RESULT NOT CALLED DUE TO PREVIOUS NOTIFICATION OF CRITICAL RESULT WITHIN THE LAST 24 HOURS.            Culture result:       PRESUMPTIVE ENTEROCOCCUS SPECIES                  For Susceptibility Refer to Culture  [id number]              CULTURE IN [facility] UPDATES TO FOLLOW          CULTURE, BLOOD [[id number]]  (Abnormal) Collected: 06/06/21 1929    Order Status: Completed Specimen: Blood Updated: 06/08/21 0743     Special Requests: --        LEFT  HAND       GRAM STAIN GRAM POS COCCI IN CHAINS               AEROBIC AND ANAEROBIC BOTTLES                  RESULTS VERIFIED, PHONED TO AND READ BACK BY Art Sims RN ON 6/7/21 @1046, TA           Culture result:       PRESUMPTIVE ENTEROCOCCUS SPECIES IDENTIFICATION AND SUSCEPTIBILITY TO FOLLOW                  Refer to Blood Culture ID Panel Accession  [id number]      CULTURE, URINE [[id number]] Collected: 06/07/21 2138    Order Status: Canceled Specimen: Urine from Clean catch     BLOOD CULTURE ID PANEL [[id number]]  (Abnormal) Collected: 06/06/21 1929    Order Status: Completed Specimen: Blood Updated: 06/07/21 1048     Acc. no. from Micro Order [id number]     Enterococcus Detected        Comment: RESULTS VERIFIED, PHONED TO AND READ BACK BY  Art Sims RN ON 6/7/21 @1046, TA          van A/B (Vancomycin Resistance Gene) NOT DETECTED        INTERPRETATION       Gram positive cocci, identified by realtime PCR as SUSPECTED Vancomycin Sensitive Enterococcus species. Comment: Recommend IV vancomycin therapy until full susceptibility information available. THIS TEST DOES NOT REPLACE SENSITIVITY TESTING.              Imaging:   Reviewed    Signed By: ISAAC Hale     June 8, 2021

## 2021-06-08 NOTE — PROGRESS NOTES
END OF SHIFT NOTE:    Hourly rounds conducted. INTAKE/OUTPUT  06/07 0701 - 06/08 0700  In: 3662 [P.O.:820; I.V.:2842]  Out: 300 [Urine:300]  Voiding: YES  Catheter: NO  Drain:              Flatus: Patient does have flatus present. Stool:  0 occurrences. Characteristics:       Emesis: 0 occurrences. Characteristics:        VITAL SIGNS  Patient Vitals for the past 12 hrs:   Temp Pulse Resp BP SpO2   06/08/21 0314 98.3 °F (36.8 °C) 94 18 (!) 105/58 97 %   06/07/21 2300 98.3 °F (36.8 °C) 75 18 132/72 94 %       Pain Assessment  Pain Intensity 1: 10 (06/08/21 0047)  Pain Location 1: Back  Pain Intervention(s) 1: Medication (see MAR)  Patient Stated Pain Goal: 0    Ambulating  Yes      Shift report given to oncoming nurse at the bedside.     Lanetta East

## 2021-06-09 ENCOUNTER — APPOINTMENT (OUTPATIENT)
Dept: GENERAL RADIOLOGY | Age: 71
DRG: 287 | End: 2021-06-09
Attending: FAMILY MEDICINE
Payer: MEDICARE

## 2021-06-09 ENCOUNTER — APPOINTMENT (OUTPATIENT)
Dept: NON INVASIVE DIAGNOSTICS | Age: 71
DRG: 287 | End: 2021-06-09
Attending: NURSE PRACTITIONER
Payer: MEDICARE

## 2021-06-09 LAB
ANION GAP SERPL CALC-SCNC: 7 MMOL/L (ref 7–16)
BACTERIA SPEC CULT: ABNORMAL
BUN SERPL-MCNC: 31 MG/DL (ref 8–23)
CALCIUM SERPL-MCNC: 8.2 MG/DL (ref 8.3–10.4)
CHLORIDE SERPL-SCNC: 107 MMOL/L (ref 98–107)
CO2 SERPL-SCNC: 21 MMOL/L (ref 21–32)
COVID-19 RAPID TEST, COVR: NOT DETECTED
CREAT SERPL-MCNC: 1.17 MG/DL (ref 0.6–1)
ECHO AV MEAN GRADIENT: 20 MMHG
ECHO AV PEAK GRADIENT: 39 MMHG
ECHO AV PEAK VELOCITY: 312 CM/S
ECHO AV VTI: 55.4 CM
ECHO LA AREA 2C: 26 CM2
ECHO LA AREA 4C: 25.8 CM2
ECHO LA MAJOR AXIS: 6.48 CM
ECHO LA MINOR AXIS: 2.99 CM
ECHO LV INTERNAL DIMENSION DIASTOLIC: 3.8 CM (ref 3.9–5.3)
ECHO LV INTERNAL DIMENSION SYSTOLIC: 2.4 CM
ECHO LV IVSD: 1.1 CM (ref 0.6–0.9)
ECHO LV MASS 2D: 143.8 G (ref 67–162)
ECHO LV MASS INDEX 2D: 66.3 G/M2 (ref 43–95)
ECHO LV POSTERIOR WALL DIASTOLIC: 1.2 CM (ref 0.6–0.9)
ECHO LVOT PEAK GRADIENT: 5 MMHG
ECHO LVOT VTI: 22.8 CM
ECHO MV MAX VELOCITY: 171 CM/S
ECHO MV MEAN GRADIENT: 3 MMHG
ECHO MV PEAK GRADIENT: 12 MMHG
ECHO MV VTI: 42.4 CM
ECHO PV REGURGITANT MAX VELOCITY: 112 CM/S
ECHO PV REGURGITANT MAX VELOCITY: 285 CM/S
ECHO RV INTERNAL DIMENSION: 2.9 CM
ECHO TV REGURGITANT PEAK GRADIENT: 32 MMHG
ERYTHROCYTE [DISTWIDTH] IN BLOOD BY AUTOMATED COUNT: 14.9 % (ref 11.9–14.6)
FERRITIN SERPL-MCNC: 286 NG/ML (ref 8–388)
GENTAMICIN TROUGH SERPL-MCNC: 2.1 UG/ML (ref 1–2)
GLUCOSE SERPL-MCNC: 101 MG/DL (ref 65–100)
GRAM STN SPEC: ABNORMAL
HCT VFR BLD AUTO: 32.2 % (ref 35.8–46.3)
HGB BLD-MCNC: 9.8 G/DL (ref 11.7–15.4)
IRON SATN MFR SERPL: 6 %
IRON SERPL-MCNC: 18 UG/DL (ref 35–150)
IRON SERPL-MCNC: 18 UG/DL (ref 35–150)
MCH RBC QN AUTO: 23.8 PG (ref 26.1–32.9)
MCHC RBC AUTO-ENTMCNC: 30.4 G/DL (ref 31.4–35)
MCV RBC AUTO: 78.3 FL (ref 79.6–97.8)
MM INDURATION POC: 0 MM (ref 0–5)
NRBC # BLD: 0 K/UL (ref 0–0.2)
PLATELET # BLD AUTO: 116 K/UL (ref 150–450)
PMV BLD AUTO: 9.8 FL (ref 9.4–12.3)
POTASSIUM SERPL-SCNC: 4.4 MMOL/L (ref 3.5–5.1)
PPD POC: NEGATIVE NEGATIVE
RBC # BLD AUTO: 4.11 M/UL (ref 4.05–5.2)
SARS-COV-2, COV2: NORMAL
SARS-COV-2, COV2: NORMAL
SERVICE CMNT-IMP: ABNORMAL
SERVICE CMNT-IMP: ABNORMAL
SODIUM SERPL-SCNC: 135 MMOL/L (ref 136–145)
SOURCE, COVRS: NORMAL
TIBC SERPL-MCNC: 297 UG/DL (ref 250–450)
VANCOMYCIN TROUGH SERPL-MCNC: 22.6 UG/ML (ref 5–20)
WBC # BLD AUTO: 8.3 K/UL (ref 4.3–11.1)

## 2021-06-09 PROCEDURE — 80048 BASIC METABOLIC PNL TOTAL CA: CPT

## 2021-06-09 PROCEDURE — 82728 ASSAY OF FERRITIN: CPT

## 2021-06-09 PROCEDURE — 80202 ASSAY OF VANCOMYCIN: CPT

## 2021-06-09 PROCEDURE — 85027 COMPLETE CBC AUTOMATED: CPT

## 2021-06-09 PROCEDURE — 74011000258 HC RX REV CODE- 258: Performed by: INTERNAL MEDICINE

## 2021-06-09 PROCEDURE — 74011000250 HC RX REV CODE- 250: Performed by: INTERNAL MEDICINE

## 2021-06-09 PROCEDURE — 71045 X-RAY EXAM CHEST 1 VIEW: CPT

## 2021-06-09 PROCEDURE — 93005 ELECTROCARDIOGRAM TRACING: CPT | Performed by: HOSPITALIST

## 2021-06-09 PROCEDURE — C8929 TTE W OR WO FOL WCON,DOPPLER: HCPCS

## 2021-06-09 PROCEDURE — 76937 US GUIDE VASCULAR ACCESS: CPT

## 2021-06-09 PROCEDURE — 83540 ASSAY OF IRON: CPT

## 2021-06-09 PROCEDURE — U0005 INFEC AGEN DETEC AMPLI PROBE: HCPCS

## 2021-06-09 PROCEDURE — 36415 COLL VENOUS BLD VENIPUNCTURE: CPT

## 2021-06-09 PROCEDURE — 74011250637 HC RX REV CODE- 250/637: Performed by: NURSE PRACTITIONER

## 2021-06-09 PROCEDURE — 87635 SARS-COV-2 COVID-19 AMP PRB: CPT

## 2021-06-09 PROCEDURE — 80170 ASSAY OF GENTAMICIN: CPT

## 2021-06-09 PROCEDURE — 74011250636 HC RX REV CODE- 250/636: Performed by: INTERNAL MEDICINE

## 2021-06-09 PROCEDURE — 83550 IRON BINDING TEST: CPT

## 2021-06-09 PROCEDURE — 74011250636 HC RX REV CODE- 250/636: Performed by: HOSPITALIST

## 2021-06-09 PROCEDURE — 2709999900 HC NON-CHARGEABLE SUPPLY

## 2021-06-09 PROCEDURE — 65270000029 HC RM PRIVATE

## 2021-06-09 PROCEDURE — 74011250637 HC RX REV CODE- 250/637: Performed by: HOSPITALIST

## 2021-06-09 PROCEDURE — 74011250636 HC RX REV CODE- 250/636: Performed by: NURSE PRACTITIONER

## 2021-06-09 RX ORDER — SIMETHICONE 80 MG
80 TABLET,CHEWABLE ORAL
Status: DISCONTINUED | OUTPATIENT
Start: 2021-06-09 | End: 2021-06-16 | Stop reason: HOSPADM

## 2021-06-09 RX ORDER — MAG HYDROX/ALUMINUM HYD/SIMETH 200-200-20
30 SUSPENSION, ORAL (FINAL DOSE FORM) ORAL
Status: DISCONTINUED | OUTPATIENT
Start: 2021-06-09 | End: 2021-06-16 | Stop reason: HOSPADM

## 2021-06-09 RX ORDER — GENTAMICIN SULFATE 80 MG/100ML
80 INJECTION, SOLUTION INTRAVENOUS EVERY 12 HOURS
Status: DISCONTINUED | OUTPATIENT
Start: 2021-06-09 | End: 2021-06-10

## 2021-06-09 RX ORDER — VANCOMYCIN 1.75 GRAM/500 ML IN 0.9 % SODIUM CHLORIDE INTRAVENOUS
1750 EVERY 24 HOURS
Status: DISCONTINUED | OUTPATIENT
Start: 2021-06-10 | End: 2021-06-10

## 2021-06-09 RX ORDER — LANOLIN ALCOHOL/MO/W.PET/CERES
1 CREAM (GRAM) TOPICAL 2 TIMES DAILY WITH MEALS
Status: DISCONTINUED | OUTPATIENT
Start: 2021-06-09 | End: 2021-06-16 | Stop reason: HOSPADM

## 2021-06-09 RX ORDER — SODIUM CHLORIDE 9 MG/ML
50 INJECTION, SOLUTION INTRAVENOUS CONTINUOUS
Status: DISPENSED | OUTPATIENT
Start: 2021-06-09 | End: 2021-06-11

## 2021-06-09 RX ADMIN — SUCRALFATE 1 G: 1 TABLET ORAL at 09:43

## 2021-06-09 RX ADMIN — Medication 10 ML: at 21:38

## 2021-06-09 RX ADMIN — VANCOMYCIN HYDROCHLORIDE 1750 MG: 10 INJECTION, POWDER, LYOPHILIZED, FOR SOLUTION INTRAVENOUS at 03:23

## 2021-06-09 RX ADMIN — HYDROCODONE BITARTRATE AND ACETAMINOPHEN 1 TABLET: 10; 325 TABLET ORAL at 19:04

## 2021-06-09 RX ADMIN — SODIUM CHLORIDE 75 ML/HR: 9 INJECTION, SOLUTION INTRAVENOUS at 08:00

## 2021-06-09 RX ADMIN — SUCRALFATE 1 G: 1 TABLET ORAL at 12:49

## 2021-06-09 RX ADMIN — SUCRALFATE 1 G: 1 TABLET ORAL at 21:37

## 2021-06-09 RX ADMIN — PERFLUTREN 1 ML: 6.52 INJECTION, SUSPENSION INTRAVENOUS at 15:10

## 2021-06-09 RX ADMIN — SENNOSIDES AND DOCUSATE SODIUM 2 TABLET: 8.6; 5 TABLET ORAL at 21:37

## 2021-06-09 RX ADMIN — CARVEDILOL 25 MG: 25 TABLET, FILM COATED ORAL at 09:00

## 2021-06-09 RX ADMIN — HEPARIN SODIUM 5000 UNITS: 5000 INJECTION INTRAVENOUS; SUBCUTANEOUS at 21:37

## 2021-06-09 RX ADMIN — Medication 10 ML: at 14:00

## 2021-06-09 RX ADMIN — PANTOPRAZOLE SODIUM 40 MG: 40 TABLET, DELAYED RELEASE ORAL at 05:58

## 2021-06-09 RX ADMIN — CARVEDILOL 25 MG: 25 TABLET, FILM COATED ORAL at 18:16

## 2021-06-09 RX ADMIN — AMLODIPINE BESYLATE 10 MG: 10 TABLET ORAL at 09:48

## 2021-06-09 RX ADMIN — CALCIUM CARBONATE-CHOLECALCIFEROL TAB 250 MG-125 UNIT 1 TABLET: 250-125 TAB at 09:48

## 2021-06-09 RX ADMIN — GENTAMICIN SULFATE 80 MG: 80 INJECTION, SOLUTION INTRAVENOUS at 14:00

## 2021-06-09 RX ADMIN — SUCRALFATE 1 G: 1 TABLET ORAL at 18:16

## 2021-06-09 RX ADMIN — ALUMINUM HYDROXIDE, MAGNESIUM HYDROXIDE, AND SIMETHICONE 30 ML: 200; 200; 20 SUSPENSION ORAL at 22:38

## 2021-06-09 RX ADMIN — FERROUS SULFATE TAB 325 MG (65 MG ELEMENTAL FE) 325 MG: 325 (65 FE) TAB at 18:16

## 2021-06-09 RX ADMIN — HEPARIN SODIUM 5000 UNITS: 5000 INJECTION INTRAVENOUS; SUBCUTANEOUS at 13:08

## 2021-06-09 RX ADMIN — HEPARIN SODIUM 5000 UNITS: 5000 INJECTION INTRAVENOUS; SUBCUTANEOUS at 05:58

## 2021-06-09 RX ADMIN — ASPIRIN 81 MG: 81 TABLET ORAL at 09:48

## 2021-06-09 NOTE — PROGRESS NOTES
END OF SHIFT NOTE:    INTAKE/OUTPUT  06/08 0701 - 06/09 0700  In: 2489 [P.O.:480; I.V.:2009]  Out: 500 [Urine:500]  Voiding: YES  Catheter: NO  Drain:              Flatus: Patient does have flatus present. Stool:  0 occurrences. Characteristics:  Stool Assessment  Stool Appearance: Loose (per patient report)    Emesis: 0 occurrences. Characteristics:        VITAL SIGNS  Patient Vitals for the past 12 hrs:   Temp Pulse Resp BP SpO2   06/09/21 0329 98.2 °F (36.8 °C) 80 20 119/70 92 %   06/08/21 2323 98.3 °F (36.8 °C) 72 20 135/70 92 %   06/08/21 1928 98.5 °F (36.9 °C) 87 22 129/81 90 %       Pain Assessment  Pain Intensity 1: 7 (06/08/21 2005)  Pain Location 1: Back  Pain Intervention(s) 1: Medication (see MAR)  Patient Stated Pain Goal: 0    Ambulating  Yes    Shift report given to oncoming nurse at the bedside.     Maureen Sheffield RN

## 2021-06-09 NOTE — PROGRESS NOTES
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 · Follow TTE. Follow repeat blood cultures. Anti-infectives:   · CTX   · Cefepime -  · Vanc -  · Gentamicin -    Subjective: Interval History:  Getting IV placed by Vascular access team. No acute events overnight. Allergies   Allergen Reactions    Latex Rash    Metformin Diarrhea    Sulfa (Sulfonamide Antibiotics) Anaphylaxis    Nsaids (Non-Steroidal Anti-Inflammatory Drug) Other (comments)     ulcers    Macrobid [Nitrofurantoin Monohyd/M-Cryst] Other (comments)     Causes Gout    Other Plant, Animal, Environmental Itching     Pt itched after wearing a plastic blood pressure cuff. Pt reports BP will be higher in right arm     Oxycodone Other (comments)     Hallucination, anxiety with oxycontin-- denies rx to hydrocodone    Tape [Adhesive] Rash     Paper tape ok        Review of Systems:  A comprehensive review of systems was negative except for that written in the History of Present Illness.     Objective:     Visit Vitals  /72   Pulse 96   Temp 97.3 °F (36.3 °C)   Resp 19   Ht 5' 5\" (1.651 m)   Wt 112.8 kg (248 lb 11.2 oz)   SpO2 91%   BMI 41.39 kg/m²     Temp (24hrs), Av.2 °F (36.8 °C), Min:97.3 °F (36.3 °C), Max:98.5 °F (36.9 °C)     Patient was seen and examined on 21 and physical exam remains unchanged since yesterday, unless noted below:    Lines:  Peripheral IV:       Physical Exam:    General:  Alert, cooperative, well noursished, well developed, appears stated age   Eyes:  Sclera anicteric. Pupils equally round and reactive to light. Mouth/Throat: Mucous membranes normal, oral pharynx clear   Neck: Supple   Lungs:   Clear to auscultation bilaterally, good effort   CV:  Regular rate and rhythm,distant heart sounds   Abdomen:   Soft, non-tender.  bowel sounds normal. non-distended; Left CVA tenderness   Extremities: No cyanosis or edema   Skin: Skin color, texture, turgor normal. no acute rash or lesions   Lymph nodes: Cervical and supraclavicular normal   Musculoskeletal: No swelling or deformity   Lines/Devices:  Intact, no erythema, drainage or tenderness   Psych: Alert and oriented, normal mood affect given the setting       Data Review:     CBC:  Recent Labs     06/09/21  0542 06/08/21  0951 06/07/21 0448 06/06/21  1552   WBC 8.3 12.4* 12.9* 21.5*   GRANS  --   --  83* 91*   MONOS  --   --  7 5   EOS  --   --  0* 0*   ANEU  --   --  10.7* 19.4*   ABL  --   --  1.1 0.8   HGB 9.8* 10.8* 11.4* 11.4*   HCT 32.2* 34.9* 37.5 35.7*   * 127* 141* 173       BMP:  Recent Labs     06/09/21 0542 06/08/21  0951 06/07/21 0448   CREA 1.17* 1.15* 1.38*   BUN 31* 26* 22   * 134* 139   K 4.4 4.0 4.0    105 105   CO2 21 21 23   AGAP 7 8 11   * 160* 92       LFTS:  Recent Labs     06/07/21 0448 06/06/21  1552   TBILI 0.8 1.1   ALT 15 18   AP 90 87   TP 6.2* 6.3   ALB 2.5* 2.6*       Microbiology:     All Micro Results     Procedure Component Value Units Date/Time    CULTURE, URINE [[id number]] Collected: 06/07/21 2138    Order Status: Completed Specimen: Urine from Clean catch Updated: 06/09/21 0849     Special Requests: NO SPECIAL REQUESTS        Culture result: NO GROWTH 1 DAY       CULTURE, BLOOD [[id number]]  (Abnormal) Collected: 06/06/21 1929    Order Status: Completed Specimen: Blood Updated: 06/09/21 0816     Special Requests: --        RIGHT  ARM       GRAM STAIN GRAM POS COCCI IN CHAINS               AEROBIC AND ANAEROBIC BOTTLES                  CRITICAL RESULT NOT CALLED DUE TO PREVIOUS NOTIFICATION OF CRITICAL RESULT WITHIN THE LAST 24 HOURS. Culture result: ENTEROCOCCUS SPECIES               For Susceptibility Refer to Culture  [id number]      CULTURE, BLOOD [[id number]]  (Abnormal)  (Susceptibility) Collected: 06/06/21 1929    Order Status: Completed Specimen: Blood Updated: 06/09/21 0816     Special Requests: --        LEFT  HAND       GRAM STAIN GRAM POS COCCI IN CHAINS               AEROBIC AND ANAEROBIC BOTTLES                  RESULTS VERIFIED, PHONED TO AND READ BACK BY Irene Han RN ON 6/7/21 @1046, TA           Culture result:       ENTEROCOCCUS FAECALIS GROUP D                  Refer to Blood Culture ID Panel Accession  [id number]      CULTURE, BLOOD [[id number]] Collected: 06/08/21 0951    Order Status: Completed Specimen: Blood Updated: 06/09/21 0722     Special Requests: --        RIGHT  HAND       Culture result: NO GROWTH AFTER 20 HOURS       CULTURE, BLOOD [[id number]] Collected: 06/08/21 0957    Order Status: Completed Specimen: Blood Updated: 06/09/21 0722     Special Requests: --        LEFT  HAND       Culture result: NO GROWTH AFTER 20 HOURS       COVID-19 RAPID TEST [[id number]] Collected: 06/09/21 0352    Order Status: Completed Specimen: Nasopharyngeal Updated: 06/09/21 0423     Specimen source NASAL        COVID-19 rapid test Not detected        Comment:      The specimen is NEGATIVE for SARS-CoV-2, the novel coronavirus associated with COVID-19. A negative result does not rule out COVID-19. This test has been authorized by the FDA under an Emergency Use Authorization (EUA) for use by authorized laboratories.         Fact sheet for Healthcare Providers: ConventionUpdate.co.nz  Fact sheet for Patients: kstattoo.com       Methodology: Isothermal Nucleic Acid Amplification         CULTURE, URINE [[id number]] Collected: 06/07/21 2138    Order Status: Canceled Specimen: Urine from Clean catch     BLOOD CULTURE ID PANEL [[id number]]  (Abnormal) Collected: 06/06/21 1929    Order Status: Completed Specimen: Blood Updated: 06/07/21 1048     Acc. no. from Micro Order [id number]     Enterococcus Detected        Comment: RESULTS VERIFIED, PHONED TO AND READ BACK BY  Art Sims RN ON 6/7/21 @1046, TA          van A/B (Vancomycin Resistance Gene) NOT DETECTED        INTERPRETATION       Gram positive cocci, identified by realtime PCR as SUSPECTED Vancomycin Sensitive Enterococcus species. Comment: Recommend IV vancomycin therapy until full susceptibility information available. THIS TEST DOES NOT REPLACE SENSITIVITY TESTING.              Imaging:   Reviewed    Signed By: ISAAC Hale     June 9, 2021

## 2021-06-09 NOTE — PROGRESS NOTES
[facility]ist Progress Note     Name:  Goyo Clark  Age:71 y.o. Sex:female   :  1950    MRN:  [id number]     Admit Date:  2021    Reason for Admission:  Acute pyelonephritis [N10]    Hospital Course/Interval history:     Ms. Justina Ramey is a 65 year old female brought to ER as was experiencing weakness, fatigue, sleeping a lot.  Patient was initially seen by primary care physician, CT head was done and reported normal to me by ER physician. Keyana Barnes also experiencing falls lately.  History of abdominal discomfort, back pain going on for past few days. Denies any burning urination but reports she gets UTIs frequently.  Labs show elevated white count.  Urine dipstick was done shows positive for UTI, urinalysis not completed.  Urine microscopy shows evidence of more than 100 white cells.  Patient was initiated on antibiotics.  Denies any neck pain, headache, visual disturbance. Denies any chest pain, shortness of breath, cough, sputum production.  Complaint of nausea.     : Patient seen and examined with son, Karen Serra, at bedside.  Renal fx and WBC improved this morning.  Later, blood cultures returned with GPC; will start vancomycin.  Patient complains of nausea, no vomiting.  Will try Reglan with her lunch and dinner; monitor.  F/u a.m. labs. : No AEO. Ms. Justina Ramey is sitting in the chair in NAD w/ daughter, Mani Mcknightdler, at bedside. Blood cultures are presumed enterococcal species. As patient has a history of aortic valve replacement and spinal hardware we will consult ID today for further recommendations. Repeat blood cultures and TTE were ordered this morning    Subjective (21):    No AEO. Awaiting TTE. Appreciate ID input. Gentamycin was started yesterday. Following cultures. Review of Systems: 14 point review of systems is otherwise negative with the exception of the elements mentioned above.   Assessment & Plan     Acute pyelonephritis  Leukocytosis  Bacteremia  Initiated on antibiotics, patient will be admitted to hospital, complete urinalysis is pending, urine microscopy shows evidence of more than 100 white cells in the urine. Follow urine cultures. Rowan Xavier obtain CT scan of abdomen without contrast for further evaluate any evidence of obstructing pathology as patient has a history of kidney stones. [id number] [facility] cultures w/ GPC              - Start vancomycin              - Follow final and sensitivities              - Cont cefepime for now              - WBC 21.5 --> 12.9  Jun/08: Blood cultures w/ presumptive enterococcus              - Vanc Day 2              - Draw new set of blood cultures now              - Cefepime Day 3              - Consult Infectious Disease for further recs  Jun/09: One initial culture (+) E. Faecalis   - Repeat cultures NGTD   - Gent Day 2; Vanc Day 3   - Cefepime was discontinued   - WBC improved to 8.3     Nephrolithiasis, non-obstructing  Jun/07: Noted on CT; no hydronephrosis, no masses.     CKD stage III: Creatinine slightly elevated, initiated on IV fluids, will monitor creatinine. Jun/07: sCr 1.55 --> 1.38              - Cont IVF  Jun/09:  At baseline    Iron deficiency anemia  Jun/09: Iron 18   - Start FeSO4     Nausea  Jun/07: Try metoclopramide with lunch/dinner today; monitor response              - Has had PRN ondansetron  Jun/08: Patient stated decreased nausea and better appetite yesterday with metoclopramide              - Give again today     Chronic back pain  Hx surgical fusion w/ hardware  DDD  Continue on home medications.     Hx aortic valve replacement  Jun/08: TTE to eval for vegetations  Jun/09: Awaiting TTE     Hypertension: Continue on home medications, hold nephrotoxic medications.     GERD: Continue on home dose of sucralfate, PPI     Body Mass Index 41.40      Diet:  DIET ADULT  DIET ADULT ORAL NUTRITION SUPPLEMENT  DVT PPx: heparin  Code status: Full Code  Disposition/Expected LOS: > 2 midnights    Objective:     Patient Vitals for the past 24 hrs:   Temp Pulse Resp BP SpO2   06/09/21 1113 98.5 °F (36.9 °C) 85 19 122/65 91 %   06/09/21 0732 97.3 °F (36.3 °C) 96 19 123/72 91 %   06/09/21 0329 98.2 °F (36.8 °C) 80 20 119/70 92 %   06/08/21 2323 98.3 °F (36.8 °C) 72 20 135/70 92 %   06/08/21 1928 98.5 °F (36.9 °C) 87 22 129/81 90 %   06/08/21 1507 98.3 °F (36.8 °C) 95 22 106/75 92 %     Oxygen Therapy  O2 Sat (%): 91 % (06/09/21 1113)  Pulse via Oximetry: 74 beats per minute (06/06/21 1840)  O2 Device: None (Room air) (06/08/21 0808)    Body mass index is 41.39 kg/m².     Physical Exam:   General:  No acute distress,    Lungs:  Fine crackles at bases, speaking in full sentences, no use of accessory muscles  CV:  RRR with normal S1 and S2   Abdomen:  Soft, nondistended  Extremities:  No cyanosis clubbing or edema   Neuro:  Nonfocal, A&O x3   Psych:  Calm    Data Review:  I have reviewed all labs, meds, and studies from the last 24 hours:    Labs:    Recent Results (from the past 24 hour(s))   GENTAMICIN, PEAK    Collection Time: 06/08/21  7:06 PM   Result Value Ref Range    Gentamicin, peak 5.7 4.0 - 8.0 ug/ml   SARS-COV-2    Collection Time: 06/09/21  3:52 AM   Result Value Ref Range    SARS-CoV-2 Please find results under separate order     COVID-19 RAPID TEST    Collection Time: 06/09/21  3:52 AM   Result Value Ref Range    Specimen source NASAL      COVID-19 rapid test Not detected NOTD     IRON    Collection Time: 06/09/21  5:42 AM   Result Value Ref Range    Iron 18 (L) 35 - 150 ug/dL   FERRITIN    Collection Time: 06/09/21  5:42 AM   Result Value Ref Range    Ferritin 286 8 - 388 NG/ML   TRANSFERRIN SATURATION    Collection Time: 06/09/21  5:42 AM   Result Value Ref Range    Iron 18 (L) 35 - 150 ug/dL    TIBC 297 250 - 450 ug/dL    Transferrin Saturation 6 (L) >20 %   CBC W/O DIFF    Collection Time: 06/09/21  5:42 AM   Result Value Ref Range    WBC 8.3 4.3 - 11.1 K/uL    RBC 4.11 4.05 - 5.2 M/uL    HGB 9.8 (L) 11.7 - 15.4 g/dL    HCT 32.2 (L) 35.8 - 46.3 %    MCV 78.3 (L) 79.6 - 97.8 FL    MCH 23.8 (L) 26.1 - 32.9 PG    MCHC 30.4 (L) 31.4 - 35.0 g/dL    RDW 14.9 (H) 11.9 - 14.6 %    PLATELET 576 (L) 256 - 450 K/uL    MPV 9.8 9.4 - 12.3 FL    ABSOLUTE NRBC 0.00 0.0 - 0.2 K/uL   METABOLIC PANEL, BASIC    Collection Time: 06/09/21  5:42 AM   Result Value Ref Range    Sodium 135 (L) 136 - 145 mmol/L    Potassium 4.4 3.5 - 5.1 mmol/L    Chloride 107 98 - 107 mmol/L    CO2 21 21 - 32 mmol/L    Anion gap 7 7 - 16 mmol/L    Glucose 101 (H) 65 - 100 mg/dL    BUN 31 (H) 8 - 23 MG/DL    Creatinine 1.17 (H) 0.6 - 1.0 MG/DL    GFR est AA 58 (L) >60 ml/min/1.73m2    GFR est non-AA 48 (L) >60 ml/min/1.73m2    Calcium 8.2 (L) 8.3 - 10.4 MG/DL   GENTAMICIN, TROUGH    Collection Time: 06/09/21  5:42 AM   Result Value Ref Range    Gentamicin, trough 2.1 (HH) 1.0 - 2.0 ug/ml   SARS-COV-2    Collection Time: 06/09/21 10:36 AM   Result Value Ref Range    SARS-CoV-2 Please find results under separate order         All Micro Results     Procedure Component Value Units Date/Time    SARS-COV-2, PCR [[id number]] Collected: 06/09/21 1036    Order Status: Completed Updated: 06/09/21 1113    CULTURE, URINE [[id number]] Collected: 06/07/21 2138    Order Status: Completed Specimen: Urine from Clean catch Updated: 06/09/21 0849     Special Requests: NO SPECIAL REQUESTS        Culture result: NO GROWTH 1 DAY       CULTURE, BLOOD [[id number]]  (Abnormal) Collected: 06/06/21 1929    Order Status: Completed Specimen: Blood Updated: 06/09/21 0816     Special Requests: --        RIGHT  ARM       GRAM STAIN GRAM POS COCCI IN CHAINS               AEROBIC AND ANAEROBIC BOTTLES                  CRITICAL RESULT NOT CALLED DUE TO PREVIOUS NOTIFICATION OF CRITICAL RESULT WITHIN THE LAST 24 HOURS.            Culture result: ENTEROCOCCUS SPECIES               For Susceptibility Refer to Culture  [id number]      CULTURE, BLOOD [[id number]]  (Abnormal)  (Susceptibility) Collected: 06/06/21 1929    Order Status: Completed Specimen: Blood Updated: 06/09/21 0816     Special Requests: --        LEFT  HAND       GRAM STAIN GRAM POS COCCI IN CHAINS               AEROBIC AND ANAEROBIC BOTTLES                  RESULTS VERIFIED, PHONED TO AND READ BACK BY Benito Delatorre RN ON 6/7/21 @1046, TA           Culture result:       ENTEROCOCCUS FAECALIS GROUP D                  Refer to Blood Culture ID Panel Accession  [id number]      CULTURE, BLOOD [[id number]] Collected: 06/08/21 0951    Order Status: Completed Specimen: Blood Updated: 06/09/21 0722     Special Requests: --        RIGHT  HAND       Culture result: NO GROWTH AFTER 20 HOURS       CULTURE, BLOOD [[id number]] Collected: 06/08/21 0957    Order Status: Completed Specimen: Blood Updated: 06/09/21 0722     Special Requests: --        LEFT  HAND       Culture result: NO GROWTH AFTER 20 HOURS       COVID-19 RAPID TEST [[id number]] Collected: 06/09/21 0352    Order Status: Completed Specimen: Nasopharyngeal Updated: 06/09/21 0423     Specimen source NASAL        COVID-19 rapid test Not detected        Comment:      The specimen is NEGATIVE for SARS-CoV-2, the novel coronavirus associated with COVID-19. A negative result does not rule out COVID-19. This test has been authorized by the FDA under an Emergency Use Authorization (EUA) for use by authorized laboratories.         Fact sheet for Healthcare Providers: ConventionUpdate.co.nz  Fact sheet for Patients: ConventionUpdate.co.nz       Methodology: Isothermal Nucleic Acid Amplification         CULTURE, URINE [[id number]] Collected: 06/07/21 2138    Order Status: Canceled Specimen: Urine from Clean catch     BLOOD CULTURE ID PANEL [[id number]]  (Abnormal) Collected: 06/06/21 1929    Order Status: Completed Specimen: Blood Updated: 06/07/21 1048     Acc. no. from Micro Order [id number]     Enterococcus Detected        Comment: RESULTS VERIFIED, PHONED TO AND READ BACK BY  Adri Sepulveda RN ON 6/7/21 @1046, TA          van A/B (Vancomycin Resistance Gene) NOT DETECTED        INTERPRETATION       Gram positive cocci, identified by realtime PCR as SUSPECTED Vancomycin Sensitive Enterococcus species. Comment: Recommend IV vancomycin therapy until full susceptibility information available. THIS TEST DOES NOT REPLACE SENSITIVITY TESTING. EKG Results     None          Other Studies:  XR CHEST SNGL V    Result Date: 6/9/2021  Portable chest x-ray CLINICAL INDICATION: Wheezing FINDINGS: Single AP view of the chest compared to a similar exam dated 6/28/2016 shows airspace opacity at the left lung base with a probable left pleural effusion. The right lung is clear. 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 Airspace opacity at the left lung base with probable left pleural effusion. Atelectasis or pneumonia should be considered.       Current Meds:   Current Facility-Administered Medications   Medication Dose Route Frequency    0.9% sodium chloride infusion  75 mL/hr IntraVENous CONTINUOUS    Vancomycin Trough Reminder   Other ONCE    gentamicin in Saline (Iso-osm) (GARAMYCIN) 80 mg/100 mL IVPB premix 80 mg  80 mg IntraVENous Q12H    tuberculin injection 5 Units  5 Units IntraDERMal ONCE    vancomycin (VANCOCIN) 1750 mg in  ml infusion  1,750 mg IntraVENous Q18H    HYDROcodone-acetaminophen (NORCO)  mg tablet 1 Tablet  1 Tablet Oral Q8H PRN    traMADoL (ULTRAM) tablet 50 mg  50 mg Oral Q6H PRN    sodium chloride (NS) flush 5-10 mL  5-10 mL IntraVENous Q8H    sodium chloride (NS) flush 5-10 mL  5-10 mL IntraVENous PRN    sodium chloride (NS) flush 5-40 mL  5-40 mL IntraVENous Q8H    sodium chloride (NS) flush 5-40 mL  5-40 mL IntraVENous PRN    heparin (porcine) injection 5,000 Units  5,000 Units SubCUTAneous Q8H    ondansetron (ZOFRAN) injection 4 mg  4 mg IntraVENous Q6H PRN    amLODIPine (NORVASC) tablet 10 mg  10 mg Oral DAILY    aspirin delayed-release tablet 81 mg  81 mg Oral DAILY    calcium-vitamin D (OS-SHANDRA +D3) 250 mg-125 unit per tablet 1 Tablet  1 Tablet Oral DAILY    carvediloL (COREG) tablet 25 mg  25 mg Oral BID WITH MEALS    senna-docusate (PERICOLACE) 8.6-50 mg per tablet 2 Tablet  2 Tablet Oral QHS    pantoprazole (PROTONIX) tablet 40 mg  40 mg Oral ACB    sucralfate (CARAFATE) tablet 1 g  1 g Oral AC&HS    polyethylene glycol (MIRALAX) packet 17 g  17 g Oral DAILY       Problem List:  Hospital Problems as of 6/9/2021 Date Reviewed: 5/13/2021        Codes Class Noted - Resolved POA    * (Principal) Acute pyelonephritis ICD-10-CM: N10  ICD-9-CM: 590.10  6/6/2021 - Present Unknown        CKD (chronic kidney disease) stage 3, GFR 30-59 ml/min (HCC) (Chronic) ICD-10-CM: N18.30  ICD-9-CM: 585.3  6/10/2016 - Present Yes        Morbid obesity ([facility] [address] (Chronic) ICD-10-CM: E66.01  ICD-9-CM: 278.01  2/20/2015 - Present Yes               Part of this note was written by using a voice dictation software and the note has been proof read but may still contain some grammatical/other typographical errors.     Signed By: Melissa Mcpherson NP   [facility]    June 9, 2021  5:15 PM

## 2021-06-09 NOTE — PROGRESS NOTES
Asked to start a PIV. Using US assistance, placed a 20g 1 3/4\" PIV in left forearm on first attempt.   Gisela Padgett RN, VAT

## 2021-06-09 NOTE — PROGRESS NOTES
CM placed order for Covid PCR 6/8/2021 at 1221. Order was not completed and discontinued and ordered as Rapid and not PCR. PCR is needed for patient to discharge to this specific facility. CM notified primary nurse of need.

## 2021-06-10 LAB
ANION GAP SERPL CALC-SCNC: 7 MMOL/L (ref 7–16)
ATRIAL RATE: 87 BPM
BACTERIA SPEC CULT: NORMAL
BASOPHILS # BLD: 0 K/UL (ref 0–0.2)
BASOPHILS NFR BLD: 0 % (ref 0–2)
BUN SERPL-MCNC: 29 MG/DL (ref 8–23)
CALCIUM SERPL-MCNC: 8.4 MG/DL (ref 8.3–10.4)
CALCULATED R AXIS, ECG10: 7 DEGREES
CALCULATED T AXIS, ECG11: 155 DEGREES
CHLORIDE SERPL-SCNC: 108 MMOL/L (ref 98–107)
CO2 SERPL-SCNC: 23 MMOL/L (ref 21–32)
CREAT SERPL-MCNC: 1.07 MG/DL (ref 0.6–1)
DIAGNOSIS, 93000: NORMAL
DIFFERENTIAL METHOD BLD: ABNORMAL
EOSINOPHIL # BLD: 0 K/UL (ref 0–0.8)
EOSINOPHIL NFR BLD: 1 % (ref 0.5–7.8)
ERYTHROCYTE [DISTWIDTH] IN BLOOD BY AUTOMATED COUNT: 14.8 % (ref 11.9–14.6)
GENTAMICIN TROUGH SERPL-MCNC: 2 UG/ML (ref 1–2)
GLUCOSE SERPL-MCNC: 103 MG/DL (ref 65–100)
HCT VFR BLD AUTO: 32.8 % (ref 35.8–46.3)
HGB BLD-MCNC: 9.7 G/DL (ref 11.7–15.4)
IMM GRANULOCYTES # BLD AUTO: 0 K/UL (ref 0–0.5)
IMM GRANULOCYTES NFR BLD AUTO: 0 % (ref 0–5)
LYMPHOCYTES # BLD: 0.8 K/UL (ref 0.5–4.6)
LYMPHOCYTES NFR BLD: 12 % (ref 13–44)
MAGNESIUM SERPL-MCNC: 2.1 MG/DL (ref 1.8–2.4)
MCH RBC QN AUTO: 23 PG (ref 26.1–32.9)
MCHC RBC AUTO-ENTMCNC: 29.6 G/DL (ref 31.4–35)
MCV RBC AUTO: 77.7 FL (ref 79.6–97.8)
MM INDURATION POC: 0 MM (ref 0–5)
MONOCYTES # BLD: 0.7 K/UL (ref 0.1–1.3)
MONOCYTES NFR BLD: 12 % (ref 4–12)
NEUTS SEG # BLD: 4.7 K/UL (ref 1.7–8.2)
NEUTS SEG NFR BLD: 75 % (ref 43–78)
NRBC # BLD: 0 K/UL (ref 0–0.2)
PLATELET # BLD AUTO: 107 K/UL (ref 150–450)
PMV BLD AUTO: 10.3 FL (ref 9.4–12.3)
POTASSIUM SERPL-SCNC: 4.4 MMOL/L (ref 3.5–5.1)
PPD POC: NEGATIVE NEGATIVE
Q-T INTERVAL, ECG07: 378 MS
QRS DURATION, ECG06: 90 MS
QTC CALCULATION (BEZET), ECG08: 441 MS
RBC # BLD AUTO: 4.22 M/UL (ref 4.05–5.2)
SARS-COV-2, COV2: NOT DETECTED
SERVICE CMNT-IMP: NORMAL
SODIUM SERPL-SCNC: 138 MMOL/L (ref 136–145)
SPECIMEN SOURCE, FCOV2M: NORMAL
TROPONIN-HIGH SENSITIVITY: 376.9 PG/ML (ref 0–14)
TROPONIN-HIGH SENSITIVITY: 405.8 PG/ML (ref 0–14)
VENTRICULAR RATE, ECG03: 82 BPM
WBC # BLD AUTO: 6.2 K/UL (ref 4.3–11.1)

## 2021-06-10 PROCEDURE — 77030018786 HC NDL GD F/USND BARD -B: Performed by: INTERNAL MEDICINE

## 2021-06-10 PROCEDURE — 74011000250 HC RX REV CODE- 250: Performed by: INTERNAL MEDICINE

## 2021-06-10 PROCEDURE — B2111ZZ FLUOROSCOPY OF MULTIPLE CORONARY ARTERIES USING LOW OSMOLAR CONTRAST: ICD-10-PCS | Performed by: INTERNAL MEDICINE

## 2021-06-10 PROCEDURE — 76210000022 HC REC RM PH II 1.5 TO 2 HR: Performed by: INTERNAL MEDICINE

## 2021-06-10 PROCEDURE — 2709999900 HC NON-CHARGEABLE SUPPLY

## 2021-06-10 PROCEDURE — C1893 INTRO/SHEATH, FIXED,NON-PEEL: HCPCS | Performed by: INTERNAL MEDICINE

## 2021-06-10 PROCEDURE — 74011250636 HC RX REV CODE- 250/636: Performed by: HOSPITALIST

## 2021-06-10 PROCEDURE — 74011250636 HC RX REV CODE- 250/636: Performed by: INTERNAL MEDICINE

## 2021-06-10 PROCEDURE — 93454 CORONARY ARTERY ANGIO S&I: CPT | Performed by: INTERNAL MEDICINE

## 2021-06-10 PROCEDURE — 74011000258 HC RX REV CODE- 258: Performed by: NURSE PRACTITIONER

## 2021-06-10 PROCEDURE — 80048 BASIC METABOLIC PNL TOTAL CA: CPT

## 2021-06-10 PROCEDURE — 74011000636 HC RX REV CODE- 636: Performed by: INTERNAL MEDICINE

## 2021-06-10 PROCEDURE — 65270000029 HC RM PRIVATE

## 2021-06-10 PROCEDURE — 74011250636 HC RX REV CODE- 250/636: Performed by: NURSE PRACTITIONER

## 2021-06-10 PROCEDURE — 83735 ASSAY OF MAGNESIUM: CPT

## 2021-06-10 PROCEDURE — 99153 MOD SED SAME PHYS/QHP EA: CPT | Performed by: INTERNAL MEDICINE

## 2021-06-10 PROCEDURE — 84484 ASSAY OF TROPONIN QUANT: CPT

## 2021-06-10 PROCEDURE — 77030029997 HC DEV COM RDL R BND TELE -B: Performed by: INTERNAL MEDICINE

## 2021-06-10 PROCEDURE — C1769 GUIDE WIRE: HCPCS | Performed by: INTERNAL MEDICINE

## 2021-06-10 PROCEDURE — 77030016699 HC CATH ANGI DX INFN1 CARD -A: Performed by: INTERNAL MEDICINE

## 2021-06-10 PROCEDURE — 80170 ASSAY OF GENTAMICIN: CPT

## 2021-06-10 PROCEDURE — 99152 MOD SED SAME PHYS/QHP 5/>YRS: CPT | Performed by: INTERNAL MEDICINE

## 2021-06-10 PROCEDURE — 74011250637 HC RX REV CODE- 250/637: Performed by: NURSE PRACTITIONER

## 2021-06-10 PROCEDURE — 85025 COMPLETE CBC W/AUTO DIFF WBC: CPT

## 2021-06-10 PROCEDURE — 4A023N7 MEASUREMENT OF CARDIAC SAMPLING AND PRESSURE, LEFT HEART, PERCUTANEOUS APPROACH: ICD-10-PCS | Performed by: INTERNAL MEDICINE

## 2021-06-10 PROCEDURE — 36415 COLL VENOUS BLD VENIPUNCTURE: CPT

## 2021-06-10 PROCEDURE — 74011250637 HC RX REV CODE- 250/637: Performed by: HOSPITALIST

## 2021-06-10 RX ORDER — MIDAZOLAM HYDROCHLORIDE 1 MG/ML
INJECTION, SOLUTION INTRAMUSCULAR; INTRAVENOUS AS NEEDED
Status: DISCONTINUED | OUTPATIENT
Start: 2021-06-10 | End: 2021-06-10 | Stop reason: HOSPADM

## 2021-06-10 RX ORDER — GENTAMICIN SULFATE 80 MG/100ML
80 INJECTION, SOLUTION INTRAVENOUS EVERY 24 HOURS
Status: DISCONTINUED | OUTPATIENT
Start: 2021-06-11 | End: 2021-06-14

## 2021-06-10 RX ORDER — ONDANSETRON 2 MG/ML
INJECTION INTRAMUSCULAR; INTRAVENOUS AS NEEDED
Status: DISCONTINUED | OUTPATIENT
Start: 2021-06-10 | End: 2021-06-10 | Stop reason: HOSPADM

## 2021-06-10 RX ORDER — LIDOCAINE HYDROCHLORIDE 10 MG/ML
INJECTION INFILTRATION; PERINEURAL AS NEEDED
Status: DISCONTINUED | OUTPATIENT
Start: 2021-06-10 | End: 2021-06-10 | Stop reason: HOSPADM

## 2021-06-10 RX ORDER — HEPARIN SODIUM 200 [USP'U]/100ML
INJECTION, SOLUTION INTRAVENOUS
Status: DISCONTINUED | OUTPATIENT
Start: 2021-06-10 | End: 2021-06-10 | Stop reason: HOSPADM

## 2021-06-10 RX ADMIN — Medication 10 ML: at 05:27

## 2021-06-10 RX ADMIN — GENTAMICIN SULFATE 80 MG: 80 INJECTION, SOLUTION INTRAVENOUS at 02:17

## 2021-06-10 RX ADMIN — VANCOMYCIN HYDROCHLORIDE 1750 MG: 10 INJECTION, POWDER, LYOPHILIZED, FOR SOLUTION INTRAVENOUS at 04:29

## 2021-06-10 RX ADMIN — HEPARIN SODIUM 5000 UNITS: 5000 INJECTION INTRAVENOUS; SUBCUTANEOUS at 22:10

## 2021-06-10 RX ADMIN — SUCRALFATE 1 G: 1 TABLET ORAL at 22:10

## 2021-06-10 RX ADMIN — Medication 10 ML: at 15:20

## 2021-06-10 RX ADMIN — ASPIRIN 81 MG: 81 TABLET ORAL at 08:29

## 2021-06-10 RX ADMIN — HYDROCODONE BITARTRATE AND ACETAMINOPHEN 1 TABLET: 10; 325 TABLET ORAL at 22:52

## 2021-06-10 RX ADMIN — HYDROCODONE BITARTRATE AND ACETAMINOPHEN 1 TABLET: 10; 325 TABLET ORAL at 08:30

## 2021-06-10 RX ADMIN — SENNOSIDES AND DOCUSATE SODIUM 2 TABLET: 8.6; 5 TABLET ORAL at 22:10

## 2021-06-10 RX ADMIN — CALCIUM CARBONATE-CHOLECALCIFEROL TAB 250 MG-125 UNIT 1 TABLET: 250-125 TAB at 08:30

## 2021-06-10 RX ADMIN — AMPICILLIN SODIUM 2 G: 2 INJECTION, POWDER, FOR SOLUTION INTRAMUSCULAR; INTRAVENOUS at 22:11

## 2021-06-10 RX ADMIN — Medication 10 ML: at 22:11

## 2021-06-10 RX ADMIN — SUCRALFATE 1 G: 1 TABLET ORAL at 08:30

## 2021-06-10 RX ADMIN — HEPARIN SODIUM 5000 UNITS: 5000 INJECTION INTRAVENOUS; SUBCUTANEOUS at 05:26

## 2021-06-10 RX ADMIN — FERROUS SULFATE TAB 325 MG (65 MG ELEMENTAL FE) 325 MG: 325 (65 FE) TAB at 08:29

## 2021-06-10 RX ADMIN — CARVEDILOL 25 MG: 25 TABLET, FILM COATED ORAL at 08:30

## 2021-06-10 RX ADMIN — PANTOPRAZOLE SODIUM 40 MG: 40 TABLET, DELAYED RELEASE ORAL at 05:27

## 2021-06-10 RX ADMIN — AMLODIPINE BESYLATE 10 MG: 10 TABLET ORAL at 08:29

## 2021-06-10 NOTE — PROGRESS NOTES
Pt complaining of chest pain. States it feels sore and dull. Vital signs stable. Paged on call MD with new orders received.

## 2021-06-10 NOTE — PROGRESS NOTES
Report received from [address] [place] [address]. Procedural findings communicated. Intra procedural  medication administration reviewed. Progression of care discussed.      Patient received into [facility] [place] [facility] 6 post sheath removal.     Access site without bleeding or swelling yes    Dressing dry and intact yes    Patient instructed to limit movement to right upper extremity    Routine post procedural vital signs and site assessment initiated yes

## 2021-06-10 NOTE — PROGRESS NOTES
TRANSFER - OUT REPORT:    Verbal report given to [facility] on Mary Madrigla  being transferred to [facility](unit) for routine post - op       Report consisted of patients Situation, Background, Assessment and   Recommendations(SBAR). Information from the following report(s) SBAR, Procedure Summary, Intake/Output and MAR was reviewed with the receiving nurse. Lines:   Peripheral IV 06/09/21 Left; Inner Forearm (Active)   Site Assessment Clean, dry, & intact; Clean;Dry 06/10/21 1815   Phlebitis Assessment 0 06/10/21 1815   Infiltration Assessment 0 06/10/21 1815   Dressing Status Clean, dry, & intact; Clean;Dry 06/10/21 1815   Dressing Type Tape;Transparent 06/10/21 1815   Hub Color/Line Status Patent; Infusing;Pink 06/10/21 1815   Alcohol Cap Used No 06/10/21 0829        Opportunity for questions and clarification was provided.                out

## 2021-06-10 NOTE — H&P
[facility] Cardiology Initial Cardiac Evaluation      Date of  Admission: 6/6/2021  4:19 PM     Primary Care Physician: Qing Lai MD  Primary Cardiologist: Dr Hugh Madera  Referring Physician: Dr Nidia Guillaume Physician: Dr Lana Contreras    CC/Reason for evaluation: Chest pain, elevated trop     HPI:  Victoria Cade is a 66 y.o. female with PMH of severe aortic stenosis s/p bioprosthetic VR in 2016, nonobstructive CAD,paroxysmal atrial fibrillation not on [address], carotid artery stenosis, HTN,  DM, GERD, PUD, spinal stenosis, and morbid obesity who presented to [facility] ED on 6/6 with complaint of worsening fatigue/lethargy, and decreased appetite. Patient still drowsy therefore history is gathered from medical chart and son at bedside. Patient was visiting [facility] and had reported fall on 5/21. States since that time she has been feeling unwell. Had similar symptoms as she has with prior UTIs. Son and daughter brought patient to ED for further evaluation. Upon evaluation in ED, labs showed WBC 21.5, H/H 11.4/35, Ptl 173, Na 139, K 3.2, BUN/Cr 29/1.23, and glucose 125. Pt admitted for acute pyelonephritis to hospitalist service and started on antibiotics. Blood cultures were drawn and came back positive. ECHO performed but AVR and mitral valve difficult to visualize. During admission patient complained of chest pain therefore cardiology was consulted. Patient admits to having intermittent chest pain for several weeks. Currently chest pain free.      Past Medical History:   Diagnosis Date    Adverse effect of anesthesia     panic attack when mask placed on face    Anemia     2016/2017 - gi bleed---  2017 blood transfusion prior to AVR    Arthritis     CAD (coronary artery disease) 2016 1/2019 Minor nonobstructive coronary artery disease/ cath report    Chronic kidney disease     kidney stones    Chronic pain     r/t arthritis/ back pain Spinal stenosis of lumbar region     Diabetes ([facility] [address]     \"prediabetic\" A1C 6.4 8/28/19- no meds    Follow-up visit for aortic valve replacement with bioprosthetic valve 7/25/2016    GERD (gastroesophageal reflux disease)     controlled w/med- well controlled with meds    History of gastric ulcer 6/16/2016    Hypertension     controlled w/med    Kidney stones     Morbid obesity ([address]     Murmur, cardiac     s/p AVR    PONV (postoperative nausea and vomiting)     Pt can not be flat in bed -- severe PONV worsens when laying flat    Psychiatric disorder     claustraphobia (severe)    PUD (peptic ulcer disease) 06/2016    dx 2016- resolved per patient/ GI bleed requiring blood transfusion    S/P aortic valve replacement with bioprosthetic valve 4/28/2017    Spinal stenosis of lumbar region with neurogenic claudication 10/16/2018    Valvular heart disease 2016    AVR      Past Surgical History:   Procedure Laterality Date    COLONOSCOPY N/A 6/15/2016    COLONOSCOPY/ BMI 41  ROOM 2235 performed by Christine Queen MD at [facility] ENDOSCOPY    HX AORTIC VALVE REPLACEMENT  6/9/2016    Dr. Mckenna Pyo    HX BILATERAL SALPINGO-OOPHORECTOMY      HX CERVICAL FUSION      posterior fusion    HX GASTRIC BYPASS      HX HEART CATHETERIZATION  05/26/2016    Severe AS with minimal nonobstructive CAD.  HX HEART CATHETERIZATION  01/31/2019    Hyperdynamic LV EF. No significant gradient across bioprosthetic aortic valve,mild nonobstructive CAD,and MAC.     HX HEART VALVE SURGERY      HX HYSTERECTOMY      HX KNEE REPLACEMENT Bilateral     HX LAP CHOLECYSTECTOMY      HX SHOULDER ARTHROSCOPY Right      times 2 \"shaved bone\"     HX UROLOGICAL  Multiple dates    Mulitiple open Nephrolithotomies    HX UROLOGICAL  08/2019    CYSTOSCOPY BILATERAL URETEROSCOPY/LASER LITHO/STENTS    FL ABDOMEN SURGERY PROC UNLISTED      reversal of gastric bypass reversal       Allergies   Allergen Reactions    Latex Rash    Metformin Diarrhea    Sulfa (Sulfonamide Antibiotics) Anaphylaxis    Nsaids (Non-Steroidal Anti-Inflammatory Drug) Other (comments)     ulcers    Macrobid [Nitrofurantoin Monohyd/M-Cryst] Other (comments)     Causes Gout    Other Plant, Animal, Environmental Itching     Pt itched after wearing a plastic blood pressure cuff. Pt reports BP will be higher in right arm     Oxycodone Other (comments)     Hallucination, anxiety with oxycontin-- denies rx to hydrocodone    Tape [Adhesive] Rash     Paper tape ok      Social History     Socioeconomic History    Marital status:      Spouse name: Not on file    Number of children: Not on file    Years of education: Not on file    Highest education level: Not on file   Occupational History    Not on file   Tobacco Use    Smoking status: Never Smoker    Smokeless tobacco: Never Used   Substance and Sexual Activity    Alcohol use: No    Drug use: No    Sexual activity: Not on file   Other Topics Concern    Not on file   Social History Narrative    Not on file     Social Determinants of Health     Financial Resource Strain:     Difficulty of Paying Living Expenses:    Food Insecurity:     Worried About Running Out of Food in the Last Year:     Ran Out of Food in the Last Year:    Transportation Needs:     Lack of Transportation (Medical):      Lack of Transportation (Non-Medical):    Physical Activity:     Days of Exercise per Week:     Minutes of Exercise per Session:    Stress:     Feeling of Stress :    Social Connections:     Frequency of Communication with Friends and Family:     Frequency of Social Gatherings with Friends and Family:     Attends Caodaism Services:     Active Member of Clubs or Organizations:     Attends Club or Organization Meetings:     Marital Status:    Intimate Partner Violence:     Fear of Current or Ex-Partner:     Emotionally Abused:     Physically Abused:     Sexually Abused:      Family History   Problem Relation Age of Onset    Hypertension Father     Lung Disease Father     Lung Cancer Father     Lung Disease Mother     Lung Cancer Mother     Diabetes Maternal Grandmother     Diabetes Paternal Grandmother     Breast Cancer Neg Hx         Current Facility-Administered Medications   Medication Dose Route Frequency    Gentamicin Trough Reminder   Other ONCE    0.9% sodium chloride infusion  50 mL/hr IntraVENous CONTINUOUS    gentamicin in Saline (Iso-osm) (GARAMYCIN) 80 mg/100 mL IVPB premix 80 mg  80 mg IntraVENous Q12H    ferrous sulfate tablet 325 mg  1 Tablet Oral BID WITH MEALS    vancomycin (VANCOCIN) 1750 mg in  ml infusion  1,750 mg IntraVENous Q24H    alum-mag hydroxide-simeth (MYLANTA) oral suspension 30 mL  30 mL Oral Q4H PRN    simethicone (MYLICON) tablet 80 mg  80 mg Oral QID PRN    HYDROcodone-acetaminophen (NORCO)  mg tablet 1 Tablet  1 Tablet Oral Q8H PRN    traMADoL (ULTRAM) tablet 50 mg  50 mg Oral Q6H PRN    sodium chloride (NS) flush 5-10 mL  5-10 mL IntraVENous Q8H    sodium chloride (NS) flush 5-10 mL  5-10 mL IntraVENous PRN    sodium chloride (NS) flush 5-40 mL  5-40 mL IntraVENous Q8H    sodium chloride (NS) flush 5-40 mL  5-40 mL IntraVENous PRN    heparin (porcine) injection 5,000 Units  5,000 Units SubCUTAneous Q8H    ondansetron (ZOFRAN) injection 4 mg  4 mg IntraVENous Q6H PRN    amLODIPine (NORVASC) tablet 10 mg  10 mg Oral DAILY    aspirin delayed-release tablet 81 mg  81 mg Oral DAILY    calcium-vitamin D (OS-SHANDRA +D3) 250 mg-125 unit per tablet 1 Tablet  1 Tablet Oral DAILY    carvediloL (COREG) tablet 25 mg  25 mg Oral BID WITH MEALS    senna-docusate (PERICOLACE) 8.6-50 mg per tablet 2 Tablet  2 Tablet Oral QHS    pantoprazole (PROTONIX) tablet 40 mg  40 mg Oral ACB    sucralfate (CARAFATE) tablet 1 g  1 g Oral AC&HS    polyethylene glycol (MIRALAX) packet 17 g  17 g Oral DAILY       Review of Symptoms:    General: Positive for generalized weakness/lethargy.  No fever or chills  Skin: no rashes, lumps, or other skin changes  HEENT: no headache, dizziness, lightheadedness, vision changes, hearing changes, tinnitus, vertigo, sinus pressure/pain, bleeding gums, sore throat, or hoarseness  Neck: no swollen glands, goiter, pain or stiffness  Respiratory: No cough, sputum, hemoptysis, dyspnea, wheezing  Cardiovascular: + as per HPI  Gastrointestinal: no GERD, constipation, diarrhea, liver problems, or h/o GI bleed  Urinary: Positive for frequency and incontinence. No urgency , hematuria  Peripheral Vascular: no claudication, leg cramps, prior DVTs, swelling of calves, legs, or feet, color change, or swelling with redness or tenderness  Musculoskeletal: no muscle or joint pain/stiffness, joint swelling, erythema of joints, or back pain  Psychiatric: no depression or excessive stress  Neurological: no sensory or motor loss, seizures, syncope, tremors, numbness, no dementia  Hematologic: no anemia, easy bruising or bleeding  Endocrine: Positive for diabetes.  No thyroid problems, heat or cold intolerance, excessive sweating, polyuria, polydipsia       Subjective:     Physical Exam:    Vitals:    06/09/21 2151 06/09/21 2341 06/10/21 0254 06/10/21 0748   BP: 115/60 134/73 124/79 (!) 168/79   Pulse: 83 75 64 72   Resp: 19 17 17 17   Temp:  97.4 °F (36.3 °C) 97.5 °F (36.4 °C) 97.6 °F (36.4 °C)   SpO2:  97% 95% 95%   Weight:       Height:           General: Drowsy, No Acute Distress  HEENT: pupils equal and round, no abnormalities noted  Neck: supple, no JVD, no carotid bruits  Heart: IRR with murmurs   Lungs: Clear throughout auscultation bilaterally without adventitious sounds  Abd: soft, nontender, nondistended, with good bowel sounds  Ext: warm, no edema, calves supple/nontender, pulses 2+ bilaterally  Skin: warm and dry  Psychiatric: Normal mood and affect  Neurologic: Alert and oriented X 3    Cardiographics    Telemetry: Atrial fibrillation   ECG: atrial fibrillation, rate controlled  Echocardiogram: 6/9/2021  · LV: Estimated LVEF is 60 - 65%. Normal cavity size and systolic function (ejection fraction normal). Mild concentric hypertrophy. Wall motion: normal.  · AV: Prosthetic aortic valve. Aortic valve mean gradient is 21 mmHg. There is a bioprosthetic aortic valve. · Contrast used: DEFINITY. · MV: Mitral valve thickening. Mitral valve leaflet calcification. Moderate mitral annular calcification. Mild mitral valve regurgitation is present. · TV: Mild tricuspid valve regurgitation is present. Right Ventricular Arterial Pressure (RVSP) is 35 mmHg. Pulmonary hypertension found to be mild. · PV: Mild pulmonic valve regurgitation is present. · AVR and mitral valve difficult to visualize. Consider HOWIE if clinically indicated.     Labs:   Recent Results (from the past 24 hour(s))   SARS-COV-2    Collection Time: 06/09/21 10:36 AM   Result Value Ref Range    SARS-CoV-2 Please find results under separate order     SARS-COV-2, PCR    Collection Time: 06/09/21 10:36 AM    Specimen: Nasopharyngeal   Result Value Ref Range    Specimen source Nasopharyngeal      SARS-CoV-2 Not detected NOTD     PLEASE READ & DOCUMENT PPD TEST IN 24 HRS    Collection Time: 06/09/21  1:45 PM   Result Value Ref Range    PPD Negative Negative    mm Induration 0 0 - 5 mm   ECHO ADULT COMPLETE    Collection Time: 06/09/21  3:10 PM   Result Value Ref Range    Left Atrium Minor Axis 2.99 cm    Left Atrium Major Axis 6.48 cm    LA Area 2C 26.00 cm2    LA Area 4C 25.80 cm2    Aortic Valve Systolic Mean Gradient 64.44 mmHg    AoV VTI 55.40 cm    Aortic Valve Systolic Peak Velocity 084.68 cm/s    AoV PG 39.00 mmHg    IVSd 1.10 (A) 0.60 - 0.90 cm    LVIDd 3.80 (A) 3.90 - 5.30 cm    LVIDs 2.40 cm    LVOT VTI 22.80 cm    Pulmonic Regurgitant End Max Velocity 112.00 cm/s    LVOT Peak Gradient 5.00 mmHg    LVPWd 1.20 (A) 0.60 - 0.90 cm    MV Mean Gradient 3.00 mmHg    Mitral Valve Annulus Velocity Time Integral 42.40 cm    Mitral Valve Max Velocity 171.00 cm/s    MV Peak Gradient 12.00 mmHg    RVIDd 2.90 cm    Pulmonic Regurgitant End Max Velocity 285.00 cm/s    Triscuspid Valve Regurgitation Peak Gradient 32.00 mmHg    LV Mass .8 67.0 - 162.0 g    LV Mass AL Index 66.3 43.0 - 95.0 g/m2   VANCOMYCIN, TROUGH    Collection Time: 06/09/21  8:18 PM   Result Value Ref Range    Vancomycin,trough 22.6 (HH) 5 - 20 ug/mL   EKG, 12 LEAD, INITIAL    Collection Time: 06/09/21 10:45 PM   Result Value Ref Range    Ventricular Rate 82 BPM    Atrial Rate 87 BPM    QRS Duration 90 ms    Q-T Interval 378 ms    QTC Calculation (Bezet) 441 ms    Calculated R Axis 7 degrees    Calculated T Axis 155 degrees    Diagnosis       Atrial fibrillation  ST & T wave abnormality, consider lateral ischemia  Abnormal ECG    Confirmed by [facility]  MD ()VIANEY ([id number]) on 6/10/2021 7:15:56 AM     CBC WITH AUTOMATED DIFF    Collection Time: 06/10/21  4:40 AM   Result Value Ref Range    WBC 6.2 4.3 - 11.1 K/uL    RBC 4.22 4.05 - 5.2 M/uL    HGB 9.7 (L) 11.7 - 15.4 g/dL    HCT 32.8 (L) 35.8 - 46.3 %    MCV 77.7 (L) 79.6 - 97.8 FL    MCH 23.0 (L) 26.1 - 32.9 PG    MCHC 29.6 (L) 31.4 - 35.0 g/dL    RDW 14.8 (H) 11.9 - 14.6 %    PLATELET 071 (L) 821 - 450 K/uL    MPV 10.3 9.4 - 12.3 FL    ABSOLUTE NRBC 0.00 0.0 - 0.2 K/uL    DF AUTOMATED      NEUTROPHILS 75 43 - 78 %    LYMPHOCYTES 12 (L) 13 - 44 %    MONOCYTES 12 4.0 - 12.0 %    EOSINOPHILS 1 0.5 - 7.8 %    BASOPHILS 0 0.0 - 2.0 %    IMMATURE GRANULOCYTES 0 0.0 - 5.0 %    ABS. NEUTROPHILS 4.7 1.7 - 8.2 K/UL    ABS. LYMPHOCYTES 0.8 0.5 - 4.6 K/UL    ABS. MONOCYTES 0.7 0.1 - 1.3 K/UL    ABS. EOSINOPHILS 0.0 0.0 - 0.8 K/UL    ABS. BASOPHILS 0.0 0.0 - 0.2 K/UL    ABS. IMM.  GRANS. 0.0 0.0 - 0.5 K/UL   METABOLIC PANEL, BASIC    Collection Time: 06/10/21  4:40 AM   Result Value Ref Range    Sodium 138 136 - 145 mmol/L    Potassium 4.4 3.5 - 5.1 mmol/L    Chloride 108 (H) 98 - 107 mmol/L    CO2 23 21 - 32 mmol/L    Anion gap 7 7 - 16 mmol/L    Glucose 103 (H) 65 - 100 mg/dL    BUN 29 (H) 8 - 23 MG/DL    Creatinine 1.07 (H) 0.6 - 1.0 MG/DL    GFR est AA >60 >60 ml/min/1.73m2    GFR est non-AA 54 (L) >60 ml/min/1.73m2    Calcium 8.4 8.3 - 10.4 MG/DL   MAGNESIUM    Collection Time: 06/10/21  4:40 AM   Result Value Ref Range    Magnesium 2.1 1.8 - 2.4 mg/dL   TROPONIN-HIGH SENSITIVITY    Collection Time: 06/10/21  4:40 AM   Result Value Ref Range    Troponin-High Sensitivity 405.8 (HH) 0 - 14 pg/mL   TROPONIN-HIGH SENSITIVITY    Collection Time: 06/10/21  8:36 AM   Result Value Ref Range    Troponin-High Sensitivity 376.9 (HH) 0 - 14 pg/mL     Pt has been seen and examined by Dr. Kimberly aPz. He agrees with the following assessment and plan. Assessment/Plan:      Principal Problem:    Acute pyelonephritis (6/6/2021)  - on antibiotics   - CT abd/pelvix shows nonobstructing stones in both kidneys  - per primary      Bacteremia   -  BC (+) E. Faecalis  - TTE- AVR and mitral valve difficult to visualize. Consider HOWIE if clinically indicated. Recurrent chest pain  - pt with recurrent intermittent chest pain for several weeks with elevated troponin, would benefit from cardiac cath  - NPO for [facility] this afternoon   - on ASA, BB      Paroxysmal atrial fibrillation  - heart rate controlled  - cont BB  - patient high risk for falls and has history of PUD/GI bleed      HTN  - stable  - on Norvasc, BB      CKD (chronic kidney disease) stage 3, GFR 30-59 ml/min ([facility]) (6/10/2016)  - stable   - pre primary     Thank you for requesting cardiac evaluation and allowing us to participate in the care of this patient. We will continue to follow along with you.       Robby Ordonez PA-C  Supervising Physician: Dr Kimberly Paz no

## 2021-06-10 NOTE — PROGRESS NOTES
Pharmacokinetic Consult to Pharmacist    Carolariel Saravia is a 66 y.o. 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 Height: 5' 5\" (165.1 cm)  Weight: 112.5 kg (248 lb)  Lab Results   Component Value Date/Time    BUN 29 (H) 06/10/2021 04:40 AM    Creatinine 1.07 (H) 06/10/2021 04:40 AM    WBC 6.2 06/10/2021 04:40 AM    Lactic acid 1.3 06/06/2021 05:39 PM      Estimated Creatinine Clearance: 59.4 mL/min (A) (based on SCr of 1.07 mg/dL (H)). Lab Results   Component Value Date/Time    Gentamicin, trough 2.0 06/10/2021 01:05 PM       Day 3 of gentamicin therapy Goal trough is <1, goal peak is 3 to 5. Gentamicin trough high, will decrease dosing interval to 80 mg every 24 hours. Will continue to follow patient and order levels when clinically indicated. Thank you,  Sherri Mari, Pharm. D.   PGY1 Pharmacy Resident  [phone]

## 2021-06-10 NOTE — PROGRESS NOTES
Cxr completed. Informed Alexei, NP about results. No orders received but to continue monitoring patient.

## 2021-06-10 NOTE — PROGRESS NOTES
Patient was up in the recliner but refused to try and walk some with me. Very flat affect and little to no eye contact. Seemed upset about a test she would need to get today. Plan is still rehab.     Yuliya Gibson PTA

## 2021-06-10 NOTE — PROGRESS NOTES
TRANSFER - OUT REPORT:    [facility]  Dr. Lidia KENNEDY (snuff) hemoband \"  \"    Diagnostic cath    Versed 6mg  Heparin 5,000 units (cocktail)    On oxygen      Verbal report given to Verito DIMAS(name) on Maegan Quijano  being transferred to CPRU(unit) for routine progression of care       Report consisted of patients Situation, Background, Assessment and   Recommendations(SBAR). Information from the following report(s) SBAR and Procedure Summary was reviewed with the receiving nurse. Lines:   Peripheral IV 06/09/21 Left; Inner Forearm (Active)   Site Assessment Clean, dry, & intact 06/10/21 1430   Phlebitis Assessment 0 06/10/21 1430   Infiltration Assessment 0 06/10/21 1430   Dressing Status Clean, dry, & intact 06/10/21 1430   Dressing Type Transparent;Tape 06/10/21 1430   Hub Color/Line Status Patent 06/10/21 1430   Alcohol Cap Used No 06/10/21 0829        Opportunity for questions and clarification was provided.       Patient transported with:   O2 @ 5 liters  Registered Nurse  [facility]

## 2021-06-10 NOTE — PROGRESS NOTES
END OF SHIFT NOTE:    INTAKE/OUTPUT  06/09 0701 - 06/10 0700  In: 3105 [P.O.:360; I.V.:2517]  Out: 1100 [Urine:1100]  Voiding: YES  Catheter: NO  Drain:              Flatus: Patient does have flatus present. Stool:  0 occurrences. Characteristics:  Stool Assessment  Stool Appearance: Loose (per patient report)    Emesis: 0 occurrences. Characteristics:        VITAL SIGNS  Patient Vitals for the past 12 hrs:   Temp Pulse Resp BP SpO2   06/10/21 1815 -- -- -- 136/76 97 %   06/10/21 1800 -- -- -- -- 95 %   06/10/21 1757 -- -- -- (!) 159/81 99 %   06/10/21 1745 -- -- -- -- 100 %   06/10/21 1742 -- -- -- (!) 146/77 99 %   06/10/21 1730 -- -- -- -- 97 %   06/10/21 1726 -- -- -- (!) 177/91 96 %   06/10/21 1721 -- -- -- (!) 167/66 95 %   06/10/21 1715 -- -- -- (!) 144/109 96 %   06/10/21 1700 -- -- -- -- 95 %   06/10/21 1657 -- -- -- 112/62 95 %   06/10/21 1647 -- 75 18 126/63 93 %   06/10/21 1645 -- -- -- -- 90 %   06/10/21 1633 -- 73 22 (!) 143/67 92 %   06/10/21 1532 98.4 °F (36.9 °C) 60 18 (!) 148/67 96 %   06/10/21 1119 97.2 °F (36.2 °C) 78 19 107/73 95 %   06/10/21 0748 97.6 °F (36.4 °C) 72 17 (!) 168/79 95 %       Pain Assessment  Pain Intensity 1: 0 (06/10/21 1647)  Pain Location 1: Back  Pain Intervention(s) 1: Medication (see MAR)  Patient Stated Pain Goal: 0    Ambulating  Yes    Shift report given to oncoming nurse at the bedside.     [facility] RN

## 2021-06-10 NOTE — PROGRESS NOTES
Pharmacokinetic Consult to Pharmacist    Penelope Arroyo is a 66 y.o. female being treated with vancomycin. Height: 5' 5\" (165.1 cm)  Weight: 112.5 kg (248 lb)  Lab Results   Component Value Date/Time    BUN 31 (H) 06/09/2021 05:42 AM    Creatinine 1.17 (H) 06/09/2021 05:42 AM    WBC 8.3 06/09/2021 05:42 AM    Lactic acid 1.3 06/06/2021 05:39 PM      Estimated Creatinine Clearance: 54.3 mL/min (A) (based on SCr of 1.17 mg/dL (H)). Lab Results   Component Value Date/Time    Vancomycin,trough 22.6 (HH) 06/09/2021 08:18 PM       Day 2 of vancomycin. Goal trough is 15-20. Vanc trough is supra-therapeutic. Changed to Vanc 1750 mg IV q24h  Will continue to follow patient and order levels when clinically indicated. Thank you,  Marcin Zhou, PharmD.

## 2021-06-10 NOTE — PROGRESS NOTES
[facility]ist Progress Note     Name:  Haroldo Duran  Age:71 y.o. Sex:female   :  1950    MRN:  [id number]     Admit Date:  2021    Reason for Admission:  Acute pyelonephritis [N10]    Hospital Course/Interval history:     Ms. Drea Kelsey is a 65 year old female brought to ER as was experiencing weakness, fatigue, sleeping a lot.  Patient was initially seen by primary care physician, CT head was done and reported normal to me by ER physician. Vane Resendiz also experiencing falls lately.  History of abdominal discomfort, back pain going on for past few days. Denies any burning urination but reports she gets UTIs frequently.  Labs show elevated white count.  Urine dipstick was done shows positive for UTI, urinalysis not completed.  Urine microscopy shows evidence of more than 100 white cells.  Patient was initiated on antibiotics.  Denies any neck pain, headache, visual disturbance. Denies any chest pain, shortness of breath, cough, sputum production.  Complaint of nausea.     : Patient seen and examined with son, Steffi Espinosa, at bedside.  Renal fx and WBC improved this morning.  Later, blood cultures returned with GPC; will start vancomycin.  Patient complains of nausea, no vomiting.  Will try Reglan with her lunch and dinner; monitor.  F/u a.m. labs.     : No AEO.  Ms. Drea Kelsey is sitting in the chair in NAD w/ daughter, Nitin Deshpande bedside.  Blood cultures are presumed enterococcal species.  As patient has a history of aortic valve replacement and spinal hardware we will consult ID today for further recommendations.  Repeat blood cultures and TTE were ordered this morning    : No AEO. Awaiting TTE. Appreciate ID input. Gentamycin was started yesterday. Following cultures. Subjective (06/10/21):    Overnight patient had some chest pain that this morning she describes to me as being \"indigestion\".   ECG and troponin were ordered; troponin noted to be elevated and will be repeated this morning;   Cardiology was consulted overnight and will see the patient this morning. She is having no chest pain during exam this morning. She stated that she has an Rx for nitro SL at home but that she \"wouldn't have taken one last night with that indigestion\". Ms. Jesus Tabor feels she is breathing better than yesterday. She complains of a lack of sleep. Review of Systems: 14 point review of systems is otherwise negative with the exception of the elements mentioned above. Assessment & Plan     Acute pyelonephritis  Enterococcal bacteremia  Initiated on antibiotics, patient will be admitted to hospital, complete urinalysis is pending, urine microscopy shows evidence of more than 100 white cells in the urine. Follow urine cultures. Breanna Naqvi obtain CT scan of abdomen without contrast for further evaluate any evidence of obstructing pathology as patient has a history of kidney stones. [id number] [facility] cultures w/ GPC              - Start vancomycin              - Follow final and sensitivities              - Cont cefepime for now              - WBC 21.5 --> 12.9  Jun/08: Blood cultures w/ presumptive enterococcus              - Vanc Day 2              - Draw new set of blood cultures now              - Cefepime Day 3              - Consult Infectious Disease for further recs  Jun/09: One initial culture (+) E.  Faecalis              - Repeat cultures NGTD              - Gent Day 2; Vanc Day 3              - Cefepime was discontinued              - WBC improved to 8.3  Golden/10: Repeat cultures no growth x 2 days   - Gent Day 3; Vanc Day 4   - WBC 6.2    Angina  Elevated troponin  Atrial fibrillation, paroxysmal, rate controlled  Golden/10: CP overnight described as \"indigestion\"; resolved during morning exam   - Troponin 406 --> 377   - Cardiology consulted   - TTE yesterday w/ 60-65% LVEF; no WMA    Nephrolithiasis, non-obstructing  Jun/07: Noted on CT; no hydronephrosis, no masses.     CKD stage III: Creatinine slightly elevated, initiated on IV fluids, will monitor creatinine. Jun/07: sCr 1.55 --> 1.38              - Cont IVF  Jun/09: At baseline     Iron deficiency anemia  Jun/09: Iron 18              - Start FeSO4     Nausea  Jun/07: Try metoclopramide with lunch/dinner today; monitor response              - Has had PRN ondansetron  Jun/08: Patient stated decreased nausea and better appetite yesterday with metoclopramide              - Give again today     Chronic back pain  Hx surgical fusion w/ hardware  DDD  Continue on home medications.     Hx aortic valve replacement  Jun/08: TTE to eval for vegetations  Jun/09: Awaiting TTE  Golden/10: May need HOWIE d/t poor visualization of AVR and MV     Hypertension: Continue on home medications, hold nephrotoxic medications.     GERD: Continue on home dose of sucralfate, PPI     Body Mass Index 41.40    Diet:  DIET ADULT ORAL NUTRITION SUPPLEMENT  DIET NPO  DVT PPx: heparin  Code status: Full Code  Disposition/Expected LOS: > 2 midnights    Objective:     Patient Vitals for the past 24 hrs:   Temp Pulse Resp BP SpO2   06/10/21 0748 97.6 °F (36.4 °C) 72 17 (!) 168/79 95 %   06/10/21 0254 97.5 °F (36.4 °C) 64 17 124/79 95 %   06/09/21 2341 97.4 °F (36.3 °C) 75 17 134/73 97 %   06/09/21 2151 -- 83 19 115/60 --   06/09/21 1933 98.2 °F (36.8 °C) 88 17 121/75 93 %   06/09/21 1551 98 °F (36.7 °C) 79 19 (!) 142/83 90 %   06/09/21 1113 98.5 °F (36.9 °C) 85 19 122/65 91 %     Oxygen Therapy  O2 Sat (%): 95 % (06/10/21 0748)  Pulse via Oximetry: 74 beats per minute (06/06/21 1840)  O2 Device: None (Room air) (06/09/21 0732)    Body mass index is 41.27 kg/m².     Physical Exam:   General:  No acute distress  Lungs:  Fine crackles at bases, speaking in full sentences, no use of accessory muscles   CV:  Irr irregular   Abdomen:  Soft, nondistended, normoactive bowel sounds   Extremities:  No cyanosis, no clubbing, atraumatic   Neuro:   Nonfocal, A&O x3   Psych:  Normal affect     Data Review:  I have reviewed all labs, meds, and studies from the last 24 hours:    Labs:    Recent Results (from the past 24 hour(s))   SARS-COV-2    Collection Time: 06/09/21 10:36 AM   Result Value Ref Range    SARS-CoV-2 Please find results under separate order     SARS-COV-2, PCR    Collection Time: 06/09/21 10:36 AM    Specimen: Nasopharyngeal   Result Value Ref Range    Specimen source Nasopharyngeal      SARS-CoV-2 Not detected NOTD     PLEASE READ & DOCUMENT PPD TEST IN 24 HRS    Collection Time: 06/09/21  1:45 PM   Result Value Ref Range    PPD Negative Negative    mm Induration 0 0 - 5 mm   ECHO ADULT COMPLETE    Collection Time: 06/09/21  3:10 PM   Result Value Ref Range    Left Atrium Minor Axis 2.99 cm    Left Atrium Major Axis 6.48 cm    LA Area 2C 26.00 cm2    LA Area 4C 25.80 cm2    Aortic Valve Systolic Mean Gradient 65.60 mmHg    AoV VTI 55.40 cm    Aortic Valve Systolic Peak Velocity 001.28 cm/s    AoV PG 39.00 mmHg    IVSd 1.10 (A) 0.60 - 0.90 cm    LVIDd 3.80 (A) 3.90 - 5.30 cm    LVIDs 2.40 cm    LVOT VTI 22.80 cm    Pulmonic Regurgitant End Max Velocity 112.00 cm/s    LVOT Peak Gradient 5.00 mmHg    LVPWd 1.20 (A) 0.60 - 0.90 cm    MV Mean Gradient 3.00 mmHg    Mitral Valve Annulus Velocity Time Integral 42.40 cm    Mitral Valve Max Velocity 171.00 cm/s    MV Peak Gradient 12.00 mmHg    RVIDd 2.90 cm    Pulmonic Regurgitant End Max Velocity 285.00 cm/s    Triscuspid Valve Regurgitation Peak Gradient 32.00 mmHg    LV Mass .8 67.0 - 162.0 g    LV Mass AL Index 66.3 43.0 - 95.0 g/m2   Donya Door    Collection Time: 06/09/21  8:18 PM   Result Value Ref Range    Vancomycin,trough 22.6 (HH) 5 - 20 ug/mL   EKG, 12 LEAD, INITIAL    Collection Time: 06/09/21 10:45 PM   Result Value Ref Range    Ventricular Rate 82 BPM    Atrial Rate 87 BPM    QRS Duration 90 ms    Q-T Interval 378 ms    QTC Calculation (Bezet) 441 ms    Calculated R Axis 7 degrees    Calculated T Axis 155 degrees    Diagnosis       Atrial fibrillation  ST & T wave abnormality, consider lateral ischemia  Abnormal ECG    Confirmed by Livan Mcmanus MD (), VIANEY TIRADO ([id number]) on 6/10/2021 7:15:56 AM     CBC WITH AUTOMATED DIFF    Collection Time: 06/10/21  4:40 AM   Result Value Ref Range    WBC 6.2 4.3 - 11.1 K/uL    RBC 4.22 4.05 - 5.2 M/uL    HGB 9.7 (L) 11.7 - 15.4 g/dL    HCT 32.8 (L) 35.8 - 46.3 %    MCV 77.7 (L) 79.6 - 97.8 FL    MCH 23.0 (L) 26.1 - 32.9 PG    MCHC 29.6 (L) 31.4 - 35.0 g/dL    RDW 14.8 (H) 11.9 - 14.6 %    PLATELET 886 (L) 503 - 450 K/uL    MPV 10.3 9.4 - 12.3 FL    ABSOLUTE NRBC 0.00 0.0 - 0.2 K/uL    DF AUTOMATED      NEUTROPHILS 75 43 - 78 %    LYMPHOCYTES 12 (L) 13 - 44 %    MONOCYTES 12 4.0 - 12.0 %    EOSINOPHILS 1 0.5 - 7.8 %    BASOPHILS 0 0.0 - 2.0 %    IMMATURE GRANULOCYTES 0 0.0 - 5.0 %    ABS. NEUTROPHILS 4.7 1.7 - 8.2 K/UL    ABS. LYMPHOCYTES 0.8 0.5 - 4.6 K/UL    ABS. MONOCYTES 0.7 0.1 - 1.3 K/UL    ABS. EOSINOPHILS 0.0 0.0 - 0.8 K/UL    ABS. BASOPHILS 0.0 0.0 - 0.2 K/UL    ABS. IMM.  GRANS. 0.0 0.0 - 0.5 K/UL   METABOLIC PANEL, BASIC    Collection Time: 06/10/21  4:40 AM   Result Value Ref Range    Sodium 138 136 - 145 mmol/L    Potassium 4.4 3.5 - 5.1 mmol/L    Chloride 108 (H) 98 - 107 mmol/L    CO2 23 21 - 32 mmol/L    Anion gap 7 7 - 16 mmol/L    Glucose 103 (H) 65 - 100 mg/dL    BUN 29 (H) 8 - 23 MG/DL    Creatinine 1.07 (H) 0.6 - 1.0 MG/DL    GFR est AA >60 >60 ml/min/1.73m2    GFR est non-AA 54 (L) >60 ml/min/1.73m2    Calcium 8.4 8.3 - 10.4 MG/DL   MAGNESIUM    Collection Time: 06/10/21  4:40 AM   Result Value Ref Range    Magnesium 2.1 1.8 - 2.4 mg/dL   TROPONIN-HIGH SENSITIVITY    Collection Time: 06/10/21  4:40 AM   Result Value Ref Range    Troponin-High Sensitivity 405.8 (HH) 0 - 14 pg/mL   TROPONIN-HIGH SENSITIVITY    Collection Time: 06/10/21  8:36 AM   Result Value Ref Range    Troponin-High Sensitivity 376.9 (HH) 0 - 14 pg/mL       All Micro Results     Procedure Component Value Units Date/Time CULTURE, URINE [[id number]] Collected: 06/07/21 2138    Order Status: Completed Specimen: Urine from Clean catch Updated: 06/10/21 0847     Special Requests: NO SPECIAL REQUESTS        Culture result: NO GROWTH 2 DAYS       CULTURE, BLOOD [[id number]] Collected: 06/08/21 0951    Order Status: Completed Specimen: Blood Updated: 06/10/21 0750     Special Requests: --        RIGHT  HAND       Culture result: NO GROWTH 2 DAYS       CULTURE, BLOOD [[id number]] Collected: 06/08/21 0957    Order Status: Completed Specimen: Blood Updated: 06/10/21 0750     Special Requests: --        LEFT  HAND       Culture result: NO GROWTH 2 DAYS       SARS-COV-2, PCR [[id number]] Collected: 06/09/21 1036    Order Status: Completed Specimen: Nasopharyngeal Updated: 06/10/21 0622     Specimen source Nasopharyngeal        Comment: Corrected on 06/09 AT 1113: This is a correction to the medical record of the test results previously reported as NASAL        SARS-CoV-2 Not detected        Comment:      The specimen is NEGATIVE for SARS-CoV-2, the novel coronavirus associated with COVID-19. This test has been authorized by the FDA under an Emergency Use Authorization (EUA) for use by authorized laboratories. Fact sheet for Healthcare Providers: ConventionUpdate.co.nz       Fact sheet for Patients: ConventionUpdate.co.nz       Methodology: RT-PCR         CULTURE, BLOOD [[id number]]  (Abnormal) Collected: 06/06/21 1929    Order Status: Completed Specimen: Blood Updated: 06/09/21 0816     Special Requests: --        RIGHT  ARM       GRAM STAIN GRAM POS COCCI IN CHAINS               AEROBIC AND ANAEROBIC BOTTLES                  CRITICAL RESULT NOT CALLED DUE TO PREVIOUS NOTIFICATION OF CRITICAL RESULT WITHIN THE LAST 24 HOURS.            Culture result: ENTEROCOCCUS SPECIES               For Susceptibility Refer to Culture  [id number]      CULTURE, BLOOD [[id number]]  (Abnormal)  (Susceptibility) Collected: 06/06/21 1929    Order Status: Completed Specimen: Blood Updated: 06/09/21 0816     Special Requests: --        LEFT  HAND       GRAM STAIN GRAM POS COCCI IN CHAINS               AEROBIC AND ANAEROBIC BOTTLES                  RESULTS VERIFIED, PHONED TO AND READ BACK BY Alireza Lucero RN ON 6/7/21 @1046, TA           Culture result:       ENTEROCOCCUS FAECALIS GROUP D                  Refer to Blood Culture ID Panel Accession  [id number]      COVID-19 RAPID TEST [[id number]] Collected: 06/09/21 0352    Order Status: Completed Specimen: Nasopharyngeal Updated: 06/09/21 0423     Specimen source NASAL        COVID-19 rapid test Not detected        Comment:      The specimen is NEGATIVE for SARS-CoV-2, the novel coronavirus associated with COVID-19. A negative result does not rule out COVID-19. This test has been authorized by the FDA under an Emergency Use Authorization (EUA) for use by authorized laboratories. Fact sheet for Healthcare Providers: Se[employer]lessDocs.co.nz  Fact sheet for Patients: Se[employer]s.co.nz       Methodology: Isothermal Nucleic Acid Amplification         CULTURE, URINE [[id number]] Collected: 06/07/21 2138    Order Status: Canceled Specimen: Urine from Clean catch     BLOOD CULTURE ID PANEL [[id number]]  (Abnormal) Collected: 06/06/21 1929    Order Status: Completed Specimen: Blood Updated: 06/07/21 1048     Acc. no. from Micro Order [id number]     Enterococcus Detected        Comment: RESULTS VERIFIED, PHONED TO AND READ BACK BY  Alireza Lucero RN ON 6/7/21 @1046, TA          van A/B (Vancomycin Resistance Gene) NOT DETECTED        INTERPRETATION       Gram positive cocci, identified by realtime PCR as SUSPECTED Vancomycin Sensitive Enterococcus species. Comment: Recommend IV vancomycin therapy until full susceptibility information available. THIS TEST DOES NOT REPLACE SENSITIVITY TESTING.              EKG Results Procedure 720 Value Units Date/Time    EKG, 12 LEAD, INITIAL [[id number]] Collected: 06/09/21 2245    Order Status: Completed Updated: 06/10/21 0716     Ventricular Rate 82 BPM      Atrial Rate 87 BPM      QRS Duration 90 ms      Q-T Interval 378 ms      QTC Calculation (Bezet) 441 ms      Calculated R Axis 7 degrees      Calculated T Axis 155 degrees      Diagnosis --     Atrial fibrillation  ST & T wave abnormality, consider lateral ischemia  Abnormal ECG    Confirmed by Bruna Martinez MD (), VIANEY TIRADO ([id number]) on 6/10/2021 7:15:56 AM      EKG, 12 LEAD, INITIAL [[id number]]     Order Status: Canceled           Other Studies:  ECHO ADULT COMPLETE    Result Date: 6/9/2021  · LV: Estimated LVEF is 60 - 65%. Normal cavity size and systolic function (ejection fraction normal). Mild concentric hypertrophy. Wall motion: normal. · AV: Prosthetic aortic valve. Aortic valve mean gradient is 21 mmHg. There is a bioprosthetic aortic valve. · Contrast used: DEFINITY. · MV: Mitral valve thickening. Mitral valve leaflet calcification. Moderate mitral annular calcification. Mild mitral valve regurgitation is present. · TV: Mild tricuspid valve regurgitation is present. Right Ventricular Arterial Pressure (RVSP) is 35 mmHg. Pulmonary hypertension found to be mild. · PV: Mild pulmonic valve regurgitation is present. · AVR and mitral valve difficult to visualize. Consider HOWIE if clinically indicated.         Current Meds:   Current Facility-Administered Medications   Medication Dose Route Frequency    Gentamicin Trough Reminder   Other ONCE    0.9% sodium chloride infusion  50 mL/hr IntraVENous CONTINUOUS    gentamicin in Saline (Iso-osm) (GARAMYCIN) 80 mg/100 mL IVPB premix 80 mg  80 mg IntraVENous Q12H    ferrous sulfate tablet 325 mg  1 Tablet Oral BID WITH MEALS    vancomycin (VANCOCIN) 1750 mg in  ml infusion  1,750 mg IntraVENous Q24H    alum-mag hydroxide-simeth (MYLANTA) oral suspension 30 mL  30 mL Oral Q4H PRN    simethicone (MYLICON) tablet 80 mg  80 mg Oral QID PRN    HYDROcodone-acetaminophen (NORCO)  mg tablet 1 Tablet  1 Tablet Oral Q8H PRN    traMADoL (ULTRAM) tablet 50 mg  50 mg Oral Q6H PRN    sodium chloride (NS) flush 5-10 mL  5-10 mL IntraVENous Q8H    sodium chloride (NS) flush 5-10 mL  5-10 mL IntraVENous PRN    sodium chloride (NS) flush 5-40 mL  5-40 mL IntraVENous Q8H    sodium chloride (NS) flush 5-40 mL  5-40 mL IntraVENous PRN    heparin (porcine) injection 5,000 Units  5,000 Units SubCUTAneous Q8H    ondansetron (ZOFRAN) injection 4 mg  4 mg IntraVENous Q6H PRN    amLODIPine (NORVASC) tablet 10 mg  10 mg Oral DAILY    aspirin delayed-release tablet 81 mg  81 mg Oral DAILY    calcium-vitamin D (OS-SHANDRA +D3) 250 mg-125 unit per tablet 1 Tablet  1 Tablet Oral DAILY    carvediloL (COREG) tablet 25 mg  25 mg Oral BID WITH MEALS    senna-docusate (PERICOLACE) 8.6-50 mg per tablet 2 Tablet  2 Tablet Oral QHS    pantoprazole (PROTONIX) tablet 40 mg  40 mg Oral ACB    sucralfate (CARAFATE) tablet 1 g  1 g Oral AC&HS    polyethylene glycol (MIRALAX) packet 17 g  17 g Oral DAILY       Problem List:  Hospital Problems as of 6/10/2021 Date Reviewed: 5/13/2021        Codes Class Noted - Resolved POA    * (Principal) Acute pyelonephritis ICD-10-CM: N10  ICD-9-CM: 590.10  6/6/2021 - Present Unknown        CKD (chronic kidney disease) stage 3, GFR 30-59 ml/min (HCC) (Chronic) ICD-10-CM: N18.30  ICD-9-CM: 585.3  6/10/2016 - Present Yes        Morbid obesity (HCC) (Chronic) ICD-10-CM: E66.01  ICD-9-CM: 278.01  2/20/2015 - Present Yes               Part of this note was written by using a voice dictation software and the note has been proof read but may still contain some grammatical/other typographical errors.     Signed By: Raymond Mir NP   [facility]    Oliva 10, 2021  5:15 PM

## 2021-06-10 NOTE — PROGRESS NOTES
TR band removed from right wrist with 4x4 gauze dsg applied to site with no bleeding or swelling noted.

## 2021-06-10 NOTE — PROGRESS NOTES
Infectious Disease Progress Note    Today's Date: 6/10/2021   Admit Date: 6/6/2021    Impression:   · Enterococcal bacteremia (6/6); repeat blood cultures 6/8 pending NG, TTE: AVR and mitral valve difficult to visualize. · Frequent diarrhea- reports 2-3/day  · Left flank pain with UA c/w with possible UTI: urine cx 6/7 NGTD  · Recent fall/debility  · Bilateral hip pain with recent trochanteric injection 5/13/21  · Hx of aortic valve stenosis s/p aortic valve replacement in 2016  · S/p fusion of C6-7 and C7-T1 (3/6/18)  · S/p L1-L4 laminectomy (10/30/18)  · Hx of bilateral knee replacements in 2016 and 2017     Plan:   · Stop Vancomycin today. Start Ampicillin 2 g IV q 8 hr. 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 · *Would recommend obtaining HOWIE given difficulty visualizing AVR and mitral valve. Appreciate Cardiology assistance. Anti-infectives:   · CTX 6/6  · Cefepime 6/6-6/8  · Vanc 6/6-  · Gentamicin 6/8-    Subjective: Interval History:  Patient seen resting in recliner. She reports feeling worn out. She has plans for [address] today with Cards. Allergies   Allergen Reactions    Latex Rash    Metformin Diarrhea    Sulfa (Sulfonamide Antibiotics) Anaphylaxis    Nsaids (Non-Steroidal Anti-Inflammatory Drug) Other (comments)     ulcers    Macrobid [Nitrofurantoin Monohyd/M-Cryst] Other (comments)     Causes Gout    Other Plant, Animal, Environmental Itching     Pt itched after wearing a plastic blood pressure cuff. Pt reports BP will be higher in right arm     Oxycodone Other (comments)     Hallucination, anxiety with oxycontin-- denies rx to hydrocodone    Tape [Adhesive] Rash     Paper tape ok        Review of Systems:  A comprehensive review of systems was negative except for that written in the History of Present Illness.     Objective:     Visit Vitals  /73   Pulse 78   Temp 97.2 °F (36.2 °C)   Resp 19   Ht 5' 5\" (1.651 m)   Wt 112.5 kg (248 lb)   SpO2 95%   BMI 41.27 kg/m²     Temp (24hrs), Av.7 °F (36.5 °C), Min:97.2 °F (36.2 °C), Max:98.2 °F (36.8 °C)     Patient was seen and examined on 6/10/21 and physical exam remains unchanged since yesterday, unless noted below:    Lines:  Peripheral IV:       Physical Exam:    General:  Alert, cooperative, well noursished, well developed, appears stated age   Eyes:  Sclera anicteric. Pupils equally round and reactive to light. Mouth/Throat: Mucous membranes normal, oral pharynx clear   Neck: Supple   Lungs:   Clear to auscultation bilaterally, shallow breathing; O2 via NC   CV:  Regular rate and rhythm,distant heart sounds   Abdomen:   Soft, non-tender. bowel sounds normal. non-distended; Left CVA tenderness   Extremities: No cyanosis or edema   Skin: Skin color, texture, turgor normal. no acute rash or lesions   Lymph nodes: Cervical and supraclavicular normal   Musculoskeletal: No swelling or deformity   Lines/Devices:  Intact, no erythema, drainage or tenderness   Psych: Alert and oriented, normal mood affect given the setting       Data Review:     CBC:  Recent Labs     06/10/21  0440 06/09/21  0542 21  0951   WBC 6.2 8.3 12.4*   GRANS 75  --   --    MONOS 12  --   --    EOS 1  --   --    ANEU 4.7  --   --    ABL 0.8  --   --    HGB 9.7* 9.8* 10.8*   HCT 32.8* 32.2* 34.9*   * 116* 127*       BMP:  Recent Labs     06/10/21  0440 06/09/21  0542 21  0951   CREA 1.07* 1.17* 1.15*   BUN 29* 31* 26*    135* 134*   K 4.4 4.4 4.0   * 107 105   CO2    AGAP 7 7 8   * 101* 160*       LFTS:  No results for input(s): TBILI, ALT, AP, TP, ALB in the last 72 hours.     No lab exists for component: SGOT    Microbiology:     All Micro Results     Procedure Component Value Units Date/Time    CULTURE, URINE [[id number]] Collected: 21 2138    Order Status: Completed Specimen: Urine from Clean catch Updated: 06/10/21 0857     Special Requests: NO SPECIAL REQUESTS Culture result: NO GROWTH 2 DAYS       CULTURE, BLOOD [[id number]] Collected: 06/08/21 0951    Order Status: Completed Specimen: Blood Updated: 06/10/21 0750     Special Requests: --        RIGHT  HAND       Culture result: NO GROWTH 2 DAYS       CULTURE, BLOOD [[id number]] Collected: 06/08/21 0957    Order Status: Completed Specimen: Blood Updated: 06/10/21 0750     Special Requests: --        LEFT  HAND       Culture result: NO GROWTH 2 DAYS       SARS-COV-2, PCR [[id number]] Collected: 06/09/21 1036    Order Status: Completed Specimen: Nasopharyngeal Updated: 06/10/21 0622     Specimen source Nasopharyngeal        Comment: Corrected on 06/09 AT 1113: This is a correction to the medical record of the test results previously reported as NASAL        SARS-CoV-2 Not detected        Comment:      The specimen is NEGATIVE for SARS-CoV-2, the novel coronavirus associated with COVID-19. This test has been authorized by the FDA under an Emergency Use Authorization (EUA) for use by authorized laboratories. Fact sheet for Healthcare Providers: ConventionUpdate.co.nz       Fact sheet for Patients: [employer].co.nz       Methodology: RT-PCR         CULTURE, BLOOD [[id number]]  (Abnormal) Collected: 06/06/21 1929    Order Status: Completed Specimen: Blood Updated: 06/09/21 0816     Special Requests: --        RIGHT  ARM       GRAM STAIN GRAM POS COCCI IN CHAINS               AEROBIC AND ANAEROBIC BOTTLES                  CRITICAL RESULT NOT CALLED DUE TO PREVIOUS NOTIFICATION OF CRITICAL RESULT WITHIN THE LAST 24 HOURS.            Culture result: ENTEROCOCCUS SPECIES               For Susceptibility Refer to Culture  [id number]      CULTURE, BLOOD [[id number]]  (Abnormal)  (Susceptibility) Collected: 06/06/21 1929    Order Status: Completed Specimen: Blood Updated: 06/09/21 0816     Special Requests: --        LEFT  HAND       GRAM STAIN GRAM POS COCCI IN CHAINS AEROBIC AND ANAEROBIC BOTTLES                  RESULTS VERIFIED, PHONED TO AND READ BACK BY Alex Lal RN ON 6/7/21 @1046, TA           Culture result:       ENTEROCOCCUS FAECALIS GROUP D                  Refer to Blood Culture ID Panel Accession  [id number]      COVID-19 RAPID TEST [[id number]] Collected: 06/09/21 0352    Order Status: Completed Specimen: Nasopharyngeal Updated: 06/09/21 0423     Specimen source NASAL        COVID-19 rapid test Not detected        Comment:      The specimen is NEGATIVE for SARS-CoV-2, the novel coronavirus associated with COVID-19. A negative result does not rule out COVID-19. This test has been authorized by the FDA under an Emergency Use Authorization (EUA) for use by authorized laboratories. Fact sheet for Healthcare Providers: [employer]date.co.nz  Fact sheet for Patients: [facility] [employer] CorporationUpdate.co.nz       Methodology: Isothermal Nucleic Acid Amplification         CULTURE, URINE [[id number]] Collected: 06/07/21 2138    Order Status: Canceled Specimen: Urine from Clean catch     BLOOD CULTURE ID PANEL [[id number]]  (Abnormal) Collected: 06/06/21 1929    Order Status: Completed Specimen: Blood Updated: 06/07/21 1048     Acc. no. from Micro Order [id number]     Enterococcus Detected        Comment: RESULTS VERIFIED, PHONED TO AND READ BACK BY  Alex Lal RN ON 6/7/21 @1046, TA          van A/B (Vancomycin Resistance Gene) NOT DETECTED        INTERPRETATION       Gram positive cocci, identified by realtime PCR as SUSPECTED Vancomycin Sensitive Enterococcus species. Comment: Recommend IV vancomycin therapy until full susceptibility information available. THIS TEST DOES NOT REPLACE SENSITIVITY TESTING.              Imaging:   Reviewed    Signed By: ISAAC Schilling     Oliva 10, 2021

## 2021-06-10 NOTE — PROGRESS NOTES
END OF SHIFT NOTE:    INTAKE/OUTPUT  06/08 0701 - 06/09 0700  In: 2489 [P.O.:480; I.V.:2009]  Out: 500 [Urine:500]  Voiding: YES  Catheter: NO  Drain:              Flatus: Patient does have flatus present. Stool:  0 occurrences. Characteristics:  Stool Assessment  Stool Appearance: Loose (per patient report)    Emesis: 0 occurrences. Characteristics:        VITAL SIGNS  Patient Vitals for the past 12 hrs:   Temp Pulse Resp BP SpO2   06/09/21 1551 98 °F (36.7 °C) 79 19 (!) 142/83 90 %   06/09/21 1113 98.5 °F (36.9 °C) 85 19 122/65 91 %       Pain Assessment  Pain Intensity 1: 7 (06/09/21 1906)  Pain Location 1: Back  Pain Intervention(s) 1: Medication (see MAR)  Patient Stated Pain Goal: 0    Ambulating  Yes    Shift report given to oncoming nurse at the bedside.     Skye Gongora RN

## 2021-06-10 NOTE — PROGRESS NOTES
TRANSFER - IN REPORT:    Verbal report received from WING Santizo(name) on Mary Madrigal  being received from Cath Lab(unit) for routine progression of care      Report consisted of patients Situation, Background, Assessment and   Recommendations(SBAR). Information from the following report(s) SBAR, Kardex, Procedure Summary, Intake/Output and MAR was reviewed with the receiving nurse. Opportunity for questions and clarification was provided. Assessment completed upon patients arrival to unit and care assumed.

## 2021-06-11 ENCOUNTER — APPOINTMENT (OUTPATIENT)
Dept: CARDIAC CATH/INVASIVE PROCEDURES | Age: 71
DRG: 287 | End: 2021-06-11
Attending: INTERNAL MEDICINE
Payer: MEDICARE

## 2021-06-11 ENCOUNTER — APPOINTMENT (OUTPATIENT)
Dept: NON INVASIVE DIAGNOSTICS | Age: 71
DRG: 287 | End: 2021-06-11
Attending: INTERNAL MEDICINE
Payer: MEDICARE

## 2021-06-11 LAB
ANION GAP SERPL CALC-SCNC: 6 MMOL/L (ref 7–16)
BUN SERPL-MCNC: 26 MG/DL (ref 8–23)
CALCIUM SERPL-MCNC: 8.6 MG/DL (ref 8.3–10.4)
CHLORIDE SERPL-SCNC: 111 MMOL/L (ref 98–107)
CO2 SERPL-SCNC: 23 MMOL/L (ref 21–32)
CREAT SERPL-MCNC: 0.91 MG/DL (ref 0.6–1)
ERYTHROCYTE [DISTWIDTH] IN BLOOD BY AUTOMATED COUNT: 14.7 % (ref 11.9–14.6)
GLUCOSE SERPL-MCNC: 91 MG/DL (ref 65–100)
HCT VFR BLD AUTO: 31.9 % (ref 35.8–46.3)
HGB BLD-MCNC: 9.6 G/DL (ref 11.7–15.4)
MAGNESIUM SERPL-MCNC: 2.2 MG/DL (ref 1.8–2.4)
MCH RBC QN AUTO: 23.2 PG (ref 26.1–32.9)
MCHC RBC AUTO-ENTMCNC: 30.1 G/DL (ref 31.4–35)
MCV RBC AUTO: 77.1 FL (ref 79.6–97.8)
MM INDURATION POC: 0 MM (ref 0–5)
NRBC # BLD: 0 K/UL (ref 0–0.2)
PLATELET # BLD AUTO: 137 K/UL (ref 150–450)
PMV BLD AUTO: 10.2 FL (ref 9.4–12.3)
POTASSIUM SERPL-SCNC: 4.5 MMOL/L (ref 3.5–5.1)
PPD POC: NEGATIVE NEGATIVE
RBC # BLD AUTO: 4.14 M/UL (ref 4.05–5.2)
SODIUM SERPL-SCNC: 140 MMOL/L (ref 136–145)
WBC # BLD AUTO: 6 K/UL (ref 4.3–11.1)

## 2021-06-11 PROCEDURE — 97530 THERAPEUTIC ACTIVITIES: CPT

## 2021-06-11 PROCEDURE — 65270000029 HC RM PRIVATE

## 2021-06-11 PROCEDURE — 74011250636 HC RX REV CODE- 250/636: Performed by: NURSE PRACTITIONER

## 2021-06-11 PROCEDURE — 93312 ECHO TRANSESOPHAGEAL: CPT

## 2021-06-11 PROCEDURE — 74011250636 HC RX REV CODE- 250/636: Performed by: HOSPITALIST

## 2021-06-11 PROCEDURE — 74011250636 HC RX REV CODE- 250/636: Performed by: INTERNAL MEDICINE

## 2021-06-11 PROCEDURE — 74011250636 HC RX REV CODE- 250/636: Performed by: FAMILY MEDICINE

## 2021-06-11 PROCEDURE — 2709999900 HC NON-CHARGEABLE SUPPLY

## 2021-06-11 PROCEDURE — B24BZZ4 ULTRASONOGRAPHY OF HEART WITH AORTA, TRANSESOPHAGEAL: ICD-10-PCS | Performed by: INTERNAL MEDICINE

## 2021-06-11 PROCEDURE — 80048 BASIC METABOLIC PNL TOTAL CA: CPT

## 2021-06-11 PROCEDURE — 99152 MOD SED SAME PHYS/QHP 5/>YRS: CPT | Performed by: INTERNAL MEDICINE

## 2021-06-11 PROCEDURE — 36415 COLL VENOUS BLD VENIPUNCTURE: CPT

## 2021-06-11 PROCEDURE — 74011250637 HC RX REV CODE- 250/637: Performed by: HOSPITALIST

## 2021-06-11 PROCEDURE — 74011000250 HC RX REV CODE- 250: Performed by: INTERNAL MEDICINE

## 2021-06-11 PROCEDURE — 93320 DOPPLER ECHO COMPLETE: CPT | Performed by: INTERNAL MEDICINE

## 2021-06-11 PROCEDURE — 93325 DOPPLER ECHO COLOR FLOW MAPG: CPT | Performed by: INTERNAL MEDICINE

## 2021-06-11 PROCEDURE — 74011250637 HC RX REV CODE- 250/637: Performed by: NURSE PRACTITIONER

## 2021-06-11 PROCEDURE — 93312 ECHO TRANSESOPHAGEAL: CPT | Performed by: INTERNAL MEDICINE

## 2021-06-11 PROCEDURE — 83735 ASSAY OF MAGNESIUM: CPT

## 2021-06-11 PROCEDURE — 74011000258 HC RX REV CODE- 258: Performed by: NURSE PRACTITIONER

## 2021-06-11 PROCEDURE — 99152 MOD SED SAME PHYS/QHP 5/>YRS: CPT

## 2021-06-11 PROCEDURE — 85027 COMPLETE CBC AUTOMATED: CPT

## 2021-06-11 PROCEDURE — 77030038269 HC DRN EXT URIN PURWCK BARD -A

## 2021-06-11 RX ORDER — FENTANYL CITRATE 50 UG/ML
25-50 INJECTION, SOLUTION INTRAMUSCULAR; INTRAVENOUS
Status: DISCONTINUED | OUTPATIENT
Start: 2021-06-11 | End: 2021-06-14 | Stop reason: HOSPADM

## 2021-06-11 RX ORDER — FUROSEMIDE 10 MG/ML
20 INJECTION INTRAMUSCULAR; INTRAVENOUS DAILY
Status: DISCONTINUED | OUTPATIENT
Start: 2021-06-11 | End: 2021-06-12

## 2021-06-11 RX ORDER — LIDOCAINE HYDROCHLORIDE 20 MG/ML
15 SOLUTION OROPHARYNGEAL ONCE
Status: COMPLETED | OUTPATIENT
Start: 2021-06-11 | End: 2021-06-11

## 2021-06-11 RX ORDER — MIDAZOLAM HYDROCHLORIDE 1 MG/ML
1-2 INJECTION, SOLUTION INTRAMUSCULAR; INTRAVENOUS
Status: DISCONTINUED | OUTPATIENT
Start: 2021-06-11 | End: 2021-06-14 | Stop reason: HOSPADM

## 2021-06-11 RX ADMIN — Medication 10 ML: at 05:24

## 2021-06-11 RX ADMIN — CARVEDILOL 25 MG: 25 TABLET, FILM COATED ORAL at 18:57

## 2021-06-11 RX ADMIN — HEPARIN SODIUM 5000 UNITS: 5000 INJECTION INTRAVENOUS; SUBCUTANEOUS at 05:23

## 2021-06-11 RX ADMIN — CARVEDILOL 25 MG: 25 TABLET, FILM COATED ORAL at 09:12

## 2021-06-11 RX ADMIN — LIDOCAINE HYDROCHLORIDE 15 ML: 20 SOLUTION ORAL; TOPICAL at 12:29

## 2021-06-11 RX ADMIN — PANTOPRAZOLE SODIUM 40 MG: 40 TABLET, DELAYED RELEASE ORAL at 05:23

## 2021-06-11 RX ADMIN — SUCRALFATE 1 G: 1 TABLET ORAL at 18:57

## 2021-06-11 RX ADMIN — AMPICILLIN SODIUM 2 G: 2 INJECTION, POWDER, FOR SOLUTION INTRAMUSCULAR; INTRAVENOUS at 05:23

## 2021-06-11 RX ADMIN — HYDROCODONE BITARTRATE AND ACETAMINOPHEN 1 TABLET: 10; 325 TABLET ORAL at 20:25

## 2021-06-11 RX ADMIN — AMLODIPINE BESYLATE 10 MG: 10 TABLET ORAL at 09:12

## 2021-06-11 RX ADMIN — HEPARIN SODIUM 5000 UNITS: 5000 INJECTION INTRAVENOUS; SUBCUTANEOUS at 15:34

## 2021-06-11 RX ADMIN — CALCIUM CARBONATE-CHOLECALCIFEROL TAB 250 MG-125 UNIT 1 TABLET: 250-125 TAB at 09:13

## 2021-06-11 RX ADMIN — Medication 10 ML: at 05:23

## 2021-06-11 RX ADMIN — FERROUS SULFATE TAB 325 MG (65 MG ELEMENTAL FE) 325 MG: 325 (65 FE) TAB at 18:57

## 2021-06-11 RX ADMIN — MIDAZOLAM 2 MG: 1 INJECTION INTRAMUSCULAR; INTRAVENOUS at 12:53

## 2021-06-11 RX ADMIN — Medication 10 ML: at 15:33

## 2021-06-11 RX ADMIN — AMPICILLIN SODIUM 2 G: 2 INJECTION, POWDER, FOR SOLUTION INTRAMUSCULAR; INTRAVENOUS at 22:20

## 2021-06-11 RX ADMIN — MIDAZOLAM 1 MG: 1 INJECTION INTRAMUSCULAR; INTRAVENOUS at 12:54

## 2021-06-11 RX ADMIN — TRAMADOL HYDROCHLORIDE 50 MG: 50 TABLET, FILM COATED ORAL at 05:22

## 2021-06-11 RX ADMIN — AMPICILLIN SODIUM 2 G: 2 INJECTION, POWDER, FOR SOLUTION INTRAMUSCULAR; INTRAVENOUS at 15:32

## 2021-06-11 RX ADMIN — Medication 10 ML: at 22:18

## 2021-06-11 RX ADMIN — ASPIRIN 81 MG: 81 TABLET ORAL at 09:12

## 2021-06-11 RX ADMIN — GENTAMICIN SULFATE 80 MG: 80 INJECTION, SOLUTION INTRAVENOUS at 02:39

## 2021-06-11 RX ADMIN — SUCRALFATE 1 G: 1 TABLET ORAL at 22:19

## 2021-06-11 RX ADMIN — SENNOSIDES AND DOCUSATE SODIUM 2 TABLET: 8.6; 5 TABLET ORAL at 22:20

## 2021-06-11 RX ADMIN — FERROUS SULFATE TAB 325 MG (65 MG ELEMENTAL FE) 325 MG: 325 (65 FE) TAB at 09:12

## 2021-06-11 RX ADMIN — HEPARIN SODIUM 5000 UNITS: 5000 INJECTION INTRAVENOUS; SUBCUTANEOUS at 22:19

## 2021-06-11 RX ADMIN — FUROSEMIDE 20 MG: 10 INJECTION, SOLUTION INTRAMUSCULAR; INTRAVENOUS at 18:58

## 2021-06-11 RX ADMIN — SUCRALFATE 1 G: 1 TABLET ORAL at 09:11

## 2021-06-11 NOTE — PROGRESS NOTES
[facility] Hospitalist Progress Note     Name:  Halima Thomas  Age:71 y.o. Sex:female   :  1950    MRN:  [id number]     Admit Date:  2021    Reason for Admission:  Acute pyelonephritis [N10]    Hospital Course/Interval history:     66year old CF PMH FM, HTN, PUD, valvular heart disease with artificial heart valve, chronic pain admitted  for acute pyelonephritis. She was found to have E faecalis bacteremia. Subjective (21):    Seen sitting up in chair, son at bedside. Pleasant, wants H2O but NPO for HOWIE today. Review of Systems: 14 point review of systems is otherwise negative with the exception of the elements mentioned above. Assessment & Plan     Acute pyelonephritis  Enterococcal bacteremia  : Blood cultures w/ GPC              - Start vancomycin              - Follow final and sensitivities              - Cont cefepime for now              - WBC 21.5 --> 12.9  : Blood cultures w/ presumptive enterococcus              - Vanc Day 2              - Draw new set of blood cultures now              - Cefepime Day 3              - Consult Infectious Disease for further recs  : One initial culture (+) E. Faecalis              - Repeat cultures NGTD              - Gent Day 2; Vanc Day 3              - Cefepime was discontinued              - WBC improved to 8.3  Golden/10: Repeat cultures no growth x 2 days              - Gent Day 3; Vanc Day 4              - WBC 6.2    HOWIE planned for today. BC repeat NGTD            Gent Day 4; Vanc Day 5             ID continues to follow    Angina  Elevated troponin  Atrial fibrillation, paroxysmal, rate controlled  Golden/10: CP overnight described as \"indigestion\"; resolved during morning exam              - Troponin 406 --> 377              - Cardiology consulted              - TTE  w 60-65% LVEF; no WMA   LHC done 6/10 showed mild CAD; no sever stenosis noted.  Cardiology following along       Nephrolithiasis, non-obstructing  Jun/07: Noted on CT; no hydronephrosis, no masses.     CKD stage III: Creatinine slightly elevated, initiated on IV fluids, will monitor creatinine.   Jun/07: sCr 1.55 --> 1.38              - Cont IVF  Jun/09: At baseline     Iron deficiency anemia  Jun/09: Iron 18              - Start FeSO4     Nausea  Jun/07: Try metoclopramide with lunch/dinner today; monitor response              - Has had PRN ondansetron  Jun/08: Patient stated decreased nausea and better appetite yesterday with metoclopramide              - Give again today  6/11 denies N/V today     Chronic back pain  Hx surgical fusion w/ hardware  DDD  Continue on home medications.     Hx aortic valve replacement  Jun/08: TTE to eval for vegetations  Jun/09: LXX05-71% LVEF; no WMA   Golden/10: May need HOWIE d/t poor visualization of AVR and MV  6/11 HOWIE planned for today.     Hypertension: Continue on home medications, hold nephrotoxic medications.     GERD: Continue on home dose of sucralfate, PPI     Body Mass Index 41.40     Diet:  DIET ADULT ORAL NUTRITION SUPPLEMENT  DIET NPO  DVT PPx: heparin  Code status: Full Code  Disposition/Expected LOS: > 2 midnights         Objective:     Patient Vitals for the past 24 hrs:   Temp Pulse Resp BP SpO2   06/11/21 1215 -- 93 20 (!) 159/113 92 %   06/11/21 1105 97.5 °F (36.4 °C) 82 20 134/70 91 %   06/11/21 0700 97.5 °F (36.4 °C) 81 20 136/78 91 %   06/11/21 0401 97.6 °F (36.4 °C) 82 18 (!) 145/80 93 %   06/10/21 2319 98 °F (36.7 °C) 86 18 (!) 154/83 91 %   06/10/21 1944 98 °F (36.7 °C) 87 17 (!) 157/79 90 %   06/10/21 1815 -- 69 16 136/76 97 %   06/10/21 1800 -- -- -- -- 95 %   06/10/21 1757 -- -- -- (!) 159/81 99 %   06/10/21 1745 -- -- -- -- 100 %   06/10/21 1742 -- -- -- (!) 146/77 99 %   06/10/21 1730 -- -- -- -- 97 %   06/10/21 1726 -- -- -- (!) 177/91 96 %   06/10/21 1721 -- -- -- (!) 167/66 95 %   06/10/21 1715 -- -- -- (!) 144/109 96 %   06/10/21 1700 -- -- -- -- 95 %   06/10/21 1657 -- -- -- 112/62 95 %   06/10/21 1647 -- 75 18 126/63 93 %   06/10/21 1645 -- -- -- -- 90 %   06/10/21 1633 -- 73 22 (!) 143/67 92 %   06/10/21 1532 98.4 °F (36.9 °C) 60 18 (!) 148/67 96 %     Oxygen Therapy  O2 Sat (%): 92 % (06/11/21 1215)  Pulse via Oximetry: 69 beats per minute (06/10/21 1815)  O2 Device: Nasal cannula (06/11/21 1215)  O2 Flow Rate (L/min): 5 l/min (06/11/21 1215)    Body mass index is 41.27 kg/m².     Physical Exam:   General:          No acute distress  Lungs:             Fine crackles at bases, speaking in full sentences, no use of accessory muscles   CV:                  Irr irregular   Abdomen:        Soft, nondistended, normoactive bowel sounds   Extremities:     No cyanosis, no clubbing, atraumatic   Neuro:                         Nonfocal, A&O x3   Psych:             Normal affect   Data Review:  I have reviewed all labs, meds, and studies from the last 24 hours:    Labs:    Recent Results (from the past 24 hour(s))   GENTAMICIN, TROUGH    Collection Time: 06/10/21  1:05 PM   Result Value Ref Range    Gentamicin, trough 2.0 1.0 - 2.0 ug/ml   PLEASE READ & DOCUMENT PPD TEST IN 48 HRS    Collection Time: 06/10/21  1:30 PM   Result Value Ref Range    PPD Negative Negative    mm Induration 0 0 - 5 mm   CBC W/O DIFF    Collection Time: 06/11/21  4:38 AM   Result Value Ref Range    WBC 6.0 4.3 - 11.1 K/uL    RBC 4.14 4.05 - 5.2 M/uL    HGB 9.6 (L) 11.7 - 15.4 g/dL    HCT 31.9 (L) 35.8 - 46.3 %    MCV 77.1 (L) 79.6 - 97.8 FL    MCH 23.2 (L) 26.1 - 32.9 PG    MCHC 30.1 (L) 31.4 - 35.0 g/dL    RDW 14.7 (H) 11.9 - 14.6 %    PLATELET 537 (L) 739 - 450 K/uL    MPV 10.2 9.4 - 12.3 FL    ABSOLUTE NRBC 0.00 0.0 - 0.2 K/uL   METABOLIC PANEL, BASIC    Collection Time: 06/11/21  4:38 AM   Result Value Ref Range    Sodium 140 136 - 145 mmol/L    Potassium 4.5 3.5 - 5.1 mmol/L    Chloride 111 (H) 98 - 107 mmol/L    CO2 23 21 - 32 mmol/L    Anion gap 6 (L) 7 - 16 mmol/L    Glucose 91 65 - 100 mg/dL    BUN 26 (H) 8 - 23 MG/DL    Creatinine 0.91 0.6 - 1.0 MG/DL    GFR est AA >60 >60 ml/min/1.73m2    GFR est non-AA >60 >60 ml/min/1.73m2    Calcium 8.6 8.3 - 10.4 MG/DL   MAGNESIUM    Collection Time: 06/11/21  4:38 AM   Result Value Ref Range    Magnesium 2.2 1.8 - 2.4 mg/dL       All Micro Results     Procedure Component Value Units Date/Time    CULTURE, BLOOD [[id number]] Collected: 06/08/21 0957    Order Status: Completed Specimen: Blood Updated: 06/11/21 0700     Special Requests: --        LEFT  HAND       Culture result: NO GROWTH 3 DAYS       CULTURE, BLOOD [[id number]] Collected: 06/08/21 0951    Order Status: Completed Specimen: Blood Updated: 06/11/21 0700     Special Requests: --        RIGHT  HAND       Culture result: NO GROWTH 3 DAYS       CULTURE, URINE [[id number]] Collected: 06/07/21 2138    Order Status: Completed Specimen: Urine from Clean catch Updated: 06/10/21 0847     Special Requests: NO SPECIAL REQUESTS        Culture result: NO GROWTH 2 DAYS       SARS-COV-2, PCR [[id number]] Collected: 06/09/21 1036    Order Status: Completed Specimen: Nasopharyngeal Updated: 06/10/21 0622     Specimen source Nasopharyngeal        Comment: Corrected on 06/09 AT 1113: This is a correction to the medical record of the test results previously reported as NASAL        SARS-CoV-2 Not detected        Comment:      The specimen is NEGATIVE for SARS-CoV-2, the novel coronavirus associated with COVID-19. This test has been authorized by the FDA under an Emergency Use Authorization (EUA) for use by authorized laboratories.         Fact sheet for Healthcare Providers: ConventionUpdate.co.nz       Fact sheet for Patients: [employer]Update.co.nz       Methodology: RT-PCR         CULTURE, BLOOD [[id number]]  (Abnormal) Collected: 06/06/21 1929    Order Status: Completed Specimen: Blood Updated: 06/09/21 0816     Special Requests: --        RIGHT  ARM       GRAM STAIN GRAM POS COCCI IN CHAINS AEROBIC AND ANAEROBIC BOTTLES                  CRITICAL RESULT NOT CALLED DUE TO PREVIOUS NOTIFICATION OF CRITICAL RESULT WITHIN THE LAST 24 HOURS. Culture result: ENTEROCOCCUS SPECIES               For Susceptibility Refer to Culture  [id number]      CULTURE, BLOOD [[id number]]  (Abnormal)  (Susceptibility) Collected: 06/06/21 1929    Order Status: Completed Specimen: Blood Updated: 06/09/21 0816     Special Requests: --        LEFT  HAND       GRAM STAIN GRAM POS COCCI IN CHAINS               AEROBIC AND ANAEROBIC BOTTLES                  RESULTS VERIFIED, PHONED TO AND READ BACK BY Bridget Armando RN ON 6/7/21 @1046, TA           Culture result:       ENTEROCOCCUS FAECALIS GROUP D                  Refer to Blood Culture ID Panel Accession  [id number]      COVID-19 RAPID TEST [[id number]] Collected: 06/09/21 0352    Order Status: Completed Specimen: Nasopharyngeal Updated: 06/09/21 0423     Specimen source NASAL        COVID-19 rapid test Not detected        Comment:      The specimen is NEGATIVE for SARS-CoV-2, the novel coronavirus associated with COVID-19. A negative result does not rule out COVID-19. This test has been authorized by the FDA under an Emergency Use Authorization (EUA) for use by authorized laboratories.         Fact sheet for Healthcare Providers: ConventionUpdate.co.nz  Fact sheet for Patients: [employer]Update.co.nz       Methodology: Isothermal Nucleic Acid Amplification         CULTURE, URINE [[id number]] Collected: 06/07/21 2138    Order Status: Canceled Specimen: Urine from Clean catch     BLOOD CULTURE ID PANEL [[id number]]  (Abnormal) Collected: 06/06/21 1929    Order Status: Completed Specimen: Blood Updated: 06/07/21 1048     Acc. no. from Micro Order [id number]     Enterococcus Detected        Comment: RESULTS VERIFIED, PHONED TO AND READ BACK BY  Bridget Armando RN ON 6/7/21 @1046, TA          van A/B (Vancomycin Resistance Gene) NOT DETECTED        INTERPRETATION       Gram positive cocci, identified by realtime PCR as SUSPECTED Vancomycin Sensitive Enterococcus species. Comment: Recommend IV vancomycin therapy until full susceptibility information available. THIS TEST DOES NOT REPLACE SENSITIVITY TESTING. EKG Results     Procedure 720 Value Units Date/Time    EKG, 12 LEAD, INITIAL [[id number]] Collected: 06/09/21 2245    Order Status: Completed Updated: 06/10/21 0716     Ventricular Rate 82 BPM      Atrial Rate 87 BPM      QRS Duration 90 ms      Q-T Interval 378 ms      QTC Calculation (Bezet) 441 ms      Calculated R Axis 7 degrees      Calculated T Axis 155 degrees      Diagnosis --     Atrial fibrillation  ST & T wave abnormality, consider lateral ischemia  Abnormal ECG    Confirmed by [facility]  MD ()VIANEY ([id number]) on 6/10/2021 7:15:56 AM      EKG, 12 LEAD, INITIAL [[id number]]     Order Status: Canceled           Other Studies:  CARDIAC PROCEDURE    Result Date: 6/10/2021  There is mild coronary artery disease. There is no severe stenosis to account for patient's recent chest pain and elevation in troponin.       Current Meds:   Current Facility-Administered Medications   Medication Dose Route Frequency    midazolam (VERSED) injection 1-2 mg  1-2 mg IntraVENous Multiple    fentaNYL citrate (PF) injection 25-50 mcg  25-50 mcg IntraVENous Multiple    gentamicin in Saline (Iso-osm) (GARAMYCIN) 80 mg/100 mL IVPB premix 80 mg  80 mg IntraVENous Q24H    ampicillin (OMNIPEN) 2 g in 0.9% sodium chloride (MBP/ADV) 100 mL MBP  2 g IntraVENous Q8H    ferrous sulfate tablet 325 mg  1 Tablet Oral BID WITH MEALS    alum-mag hydroxide-simeth (MYLANTA) oral suspension 30 mL  30 mL Oral Q4H PRN    simethicone (MYLICON) tablet 80 mg  80 mg Oral QID PRN    HYDROcodone-acetaminophen (NORCO)  mg tablet 1 Tablet  1 Tablet Oral Q8H PRN    traMADoL (ULTRAM) tablet 50 mg  50 mg Oral Q6H PRN    sodium chloride (NS) flush 5-10 mL  5-10 mL IntraVENous Q8H    sodium chloride (NS) flush 5-10 mL  5-10 mL IntraVENous PRN    sodium chloride (NS) flush 5-40 mL  5-40 mL IntraVENous Q8H    sodium chloride (NS) flush 5-40 mL  5-40 mL IntraVENous PRN    heparin (porcine) injection 5,000 Units  5,000 Units SubCUTAneous Q8H    ondansetron (ZOFRAN) injection 4 mg  4 mg IntraVENous Q6H PRN    amLODIPine (NORVASC) tablet 10 mg  10 mg Oral DAILY    aspirin delayed-release tablet 81 mg  81 mg Oral DAILY    calcium-vitamin D (OS-SHANDRA +D3) 250 mg-125 unit per tablet 1 Tablet  1 Tablet Oral DAILY    carvediloL (COREG) tablet 25 mg  25 mg Oral BID WITH MEALS    senna-docusate (PERICOLACE) 8.6-50 mg per tablet 2 Tablet  2 Tablet Oral QHS    pantoprazole (PROTONIX) tablet 40 mg  40 mg Oral ACB    sucralfate (CARAFATE) tablet 1 g  1 g Oral AC&HS    polyethylene glycol (MIRALAX) packet 17 g  17 g Oral DAILY       Problem List:  Hospital Problems as of 6/11/2021 Date Reviewed: 5/13/2021        Codes Class Noted - Resolved POA    * (Principal) Acute pyelonephritis ICD-10-CM: N10  ICD-9-CM: 590.10  6/6/2021 - Present Unknown        CKD (chronic kidney disease) stage 3, GFR 30-59 ml/min (HCC) (Chronic) ICD-10-CM: N18.30  ICD-9-CM: 585.3  6/10/2016 - Present Yes        Morbid obesity (HCC) (Chronic) ICD-10-CM: E66.01  ICD-9-CM: 278.01  2/20/2015 - Present Yes               Signed By: Samuel Alvarado NP   [employer] [facility]    June 11, 2021  5:15 PM

## 2021-06-11 NOTE — PROCEDURES
Brief Cardiac Procedure Note    Patient: Jesus Madrigal MRN: [id number]  SSN: [id number]    YOB: 1950  Age: 66 y.o. Sex: female      Date of Procedure: 6/11/2021     Pre-procedure Diagnosis: Infection    Post-procedure Diagnosis: mitral endocarditis    Reason for Procedure: Other: same    Procedure: Transesophageal Echocardiogram    Brief Description of Procedure:     Performed By: Anju Brownlee MD     Assistants:     Anesthesia: Moderate Sedation    Estimated Blood Loss: Less than 10 mL      Specimens: None    Implants: None    Findings:   + moderate size vegetation left atrial side anterior MV leaflet          Complications: None    Recommendations: As per IM and ID.     Signed By: Anju Brownlee MD     June 11, 2021

## 2021-06-11 NOTE — PROGRESS NOTES
ACUTE PHYSICAL THERAPY GOALS:  (Developed with and agreed upon by patient and/or caregiver. )  LTG:  (1.)Ms. Jazmine Harris will move from supine to sit and sit to supine , scoot up and down and roll side to side in flat bed without siderails with  INDEPENDENT within 7 day(s). (2.)Ms. Jazmine Harris will perform all functional transfers with  MODIFIED INDEPENDENCE using the least restrictive device within 7 day(s). (3.)Ms. Jazmine Harris will ambulate with  MODIFIED INDEPENDENCE for 250+ feet with normal vital sign response with the least restrictive device within 7 day(s). PHYSICAL THERAPY: Daily Note and AM Treatment Day # 2    Laura Hooper is a 66 y.o. female   PRIMARY DIAGNOSIS: Acute pyelonephritis  Acute pyelonephritis [N10]  Procedure(s) (LRB):  LEFT HEART CATH / CORONARY ANGIOGRAPHY (N/A)  1 Day Post-Op    ASSESSMENT:     REHAB RECOMMENDATIONS: CURRENT LEVEL OF FUNCTION:  (Most Recently Demonstrated)   Recommendation to date pending progress:  Setting:   Short-term Rehab  Equipment:    To Be Determined Bed Mobility:   Maximal Assistance  Sit to Stand:   Minimal Assistance  Transfers:   Minimal Assistance  Gait/Mobility:   Minimal Assistance     ASSESSMENT:  Ms. Jazmine Harris was supine at arrival, son present asleep in recliner. Pt agrees to participate. she needed maxA to log roll and get to EOB. Pal to stand for 5min to adjust lines and gowns. She ambulated 70ft in rollator posture holding breath needing cues to exhale. She required 2 standing rest breaks. returned to room and sat in chair. Sat level remained above 92%. Slow progress, making moving towards goals. SUBJECTIVE:   Ms. Jazmine Harris states, \"I can pee in bed. \"    SOCIAL HISTORY/ LIVING ENVIRONMENT: Ms. Jazmine Harris lives with her son who works.   She ambulates with a rollator independently in home and community, drives, etc.  Home Environment: Private residence  # Steps to Enter: 3  One/Two Story Residence: One story  Living Alone: No  Support Systems: Family member(s)  OBJECTIVE:     PAIN: VITAL SIGNS: LINES/DRAINS:   Pre Treatment: Pain Screen  Pain Scale 1: Numeric (0 - 10)  Pain Intensity 1: 7  Pain Location 1: Back  Post Treatment: 7   IV, Purewick and 2L  O2 Device: None (Room air)     MOBILITY: I Mod I S SBA CGA Min Mod Max Total  NT x2 Comments:   Bed Mobility    Rolling [] [] [] [] [] [] [] [x] [] [] []    Supine to Sit [] [] [] [] [] [] [] [x] [] [] []    Scooting [] [] [] [] [] [x] [] [] [] [] []    Sit to Supine [] [] [] [] [] [] [] [] [] [x] []    Transfers    Sit to Stand [] [] [] [] [] [x] [] [] [] [] []    Bed to Chair [] [] [] [] [] [x] [] [] [] [] []    Stand to Sit [] [] [] [] [] [x] [] [] [] [] []    I=Independent, Mod I=Modified Independent, S=Supervision, SBA=Standby Assistance, CGA=Contact Guard Assistance,   Min=Minimal Assistance, Mod=Moderate Assistance, Max=Maximal Assistance, Total=Total Assistance, NT=Not Tested    BALANCE: Good Fair+ Fair Fair- Poor NT Comments   Sitting Static [] [] [x] [] [] []    Sitting Dynamic [] [] [x] [] [] []              Standing Static [] [] [x] [] [] []    Standing Dynamic [] [] [x] [] [] []      GAIT: I Mod I S SBA CGA Min Mod Max Total  NT x2 Comments:   Level of Assistance [] [] [] [] [] [x] [] [] [] [] []    Distance 70    DME Rolling Walker    Gait Quality Flexed, holds breath    Weightbearing  Status N/A     I=Independent, Mod I=Modified Independent, S=Supervision, SBA=Standby Assistance, CGA=Contact Guard Assistance,   Min=Minimal Assistance, Mod=Moderate Assistance, Max=Maximal Assistance, Total=Total Assistance, NT=Not Tested    PLAN:   FREQUENCY/DURATION: PT Plan of Care: 3 times/week for duration of hospital stay or until stated goals are met, whichever comes first.  TREATMENT:     TREATMENT:   ($$ Therapeutic Activity: 23-37 mins    )  Therapeutic Activity (23 Minutes):  Therapeutic activity included Rolling, Supine to Sit, Sit to Supine, Scooting, Lateral Scooting, Transfer Training, Ambulation on level ground, Sitting balance  and Standing balance to improve functional Mobility, Strength and Activity tolerance.     TREATMENT GRID:  N/A    AFTER TREATMENT POSITION/PRECAUTIONS:  Chair, Needs within reach, RN notified and Visitors at bedside    INTERDISCIPLINARY COLLABORATION:  RN/PCT and PT/PTA    TOTAL TREATMENT DURATION:  PT Patient Time In/Time Out  Time In: 1035  Time Out: 103 CAROLE PrestonT

## 2021-06-11 NOTE — PROGRESS NOTES
Patient to discharge to [facility] when medically stable. This facility does not accept admissions over the weekend so if she is ready over the weekend it will be Monday before she can discharge. CM will continue to follow patient during hospitalization for discharge planning and needs.

## 2021-06-11 NOTE — PROGRESS NOTES
Infectious Disease Progress Note    Today's Date: 2021   Admit Date: 2021    Impression:   · E faecalis bacteremia (); repeat blood cultures  negative, TTE: AVR and mitral valve difficult to visualize; presumed urinary source  · Frequent diarrhea- reports 2-3/day  · Recent fall/debility  · Bilateral hip pain with recent trochanteric injection 21  · Hx of aortic valve stenosis s/p aortic valve replacement in   · S/p fusion of C6-7 and C7-T1 (3/6/18)  · S/p L1-L4 laminectomy (10/30/18)  · Hx of bilateral knee replacements in  and      Plan:   · Continue ampicillin plus synergistic gentamicin, duration to be determined  · Plan noted for HOWIE today    Anti-infectives:   · CTX   · Cefepime -  · Vanc -6/10  · Gentamicin -  · Ampicillin 6/10 -     Subjective:   No new complaints. Afebrile. She denies pain or dyspnea. Allergies   Allergen Reactions    Latex Rash    Metformin Diarrhea    Sulfa (Sulfonamide Antibiotics) Anaphylaxis    Nsaids (Non-Steroidal Anti-Inflammatory Drug) Other (comments)     ulcers    Macrobid [Nitrofurantoin Monohyd/M-Cryst] Other (comments)     Causes Gout    Other Plant, Animal, Environmental Itching     Pt itched after wearing a plastic blood pressure cuff. Pt reports BP will be higher in right arm     Oxycodone Other (comments)     Hallucination, anxiety with oxycontin-- denies rx to hydrocodone    Tape [Adhesive] Rash     Paper tape ok        Review of Systems:  A comprehensive review of systems was negative except for that written in the History of Present Illness.     Objective:     Visit Vitals  /78 (BP 1 Location: Left upper arm, BP Patient Position: At rest)   Pulse 81   Temp 97.5 °F (36.4 °C)   Resp 20   Ht 5' 5\" (1.651 m)   Wt 112.5 kg (248 lb)   SpO2 91%   BMI 41.27 kg/m²     Temp (24hrs), Av.8 °F (36.6 °C), Min:97.2 °F (36.2 °C), Max:98.4 °F (36.9 °C)     Patient was seen and examined on 6/10/21 and physical exam remains unchanged since yesterday, unless noted below:    Lines:  Peripheral IV:       Physical Exam:    General:  Alert, cooperative, well nourished, well developed, appears stated age   Eyes:  Sclera anicteric. Pupils equally round and reactive to light. Mouth/Throat: Mucous membranes normal, oral pharynx clear   Neck: Supple   Lungs:   Clear to auscultation bilaterally, using accessory muscles to breathe. CV:  Regular rate and rhythm, distant heart sounds   Abdomen:   Soft, non-tender. bowel sounds normal. non-distended; Left CVA tenderness   Extremities: No cyanosis or edema   Skin: Skin color, texture, turgor normal. no acute rash or lesions   Lymph nodes: Cervical and supraclavicular normal   Musculoskeletal: No swelling or deformity   Lines/Devices:  Intact, no erythema, drainage or tenderness   Psych: Alert and oriented, normal mood affect given the setting       Data Review:     CBC:  Recent Labs     06/11/21  0438 06/10/21  0440 06/09/21  0542   WBC 6.0 6.2 8.3   GRANS  --  75  --    MONOS  --  12  --    EOS  --  1  --    ANEU  --  4.7  --    ABL  --  0.8  --    HGB 9.6* 9.7* 9.8*   HCT 31.9* 32.8* 32.2*   * 107* 116*       BMP:  Recent Labs     06/11/21  0438 06/10/21  0440 06/09/21  0542   CREA 0.91 1.07* 1.17*   BUN 26* 29* 31*    138 135*   K 4.5 4.4 4.4   * 108* 107   CO2 23 23 21   AGAP 6* 7 7   GLU 91 103* 101*       LFTS:  No results for input(s): TBILI, ALT, AP, TP, ALB in the last 72 hours.     No lab exists for component: SGOT    Microbiology:     All Micro Results     Procedure Component Value Units Date/Time    CULTURE, BLOOD [[id number]] Collected: 06/08/21 0957    Order Status: Completed Specimen: Blood Updated: 06/11/21 0700     Special Requests: --        LEFT  HAND       Culture result: NO GROWTH 3 DAYS       CULTURE, BLOOD [[id number]] Collected: 06/08/21 0951    Order Status: Completed Specimen: Blood Updated: 06/11/21 0700     Special Requests: -- RIGHT  HAND       Culture result: NO GROWTH 3 DAYS       CULTURE, URINE [[id number]] Collected: 06/07/21 2138    Order Status: Completed Specimen: Urine from Clean catch Updated: 06/10/21 0847     Special Requests: NO SPECIAL REQUESTS        Culture result: NO GROWTH 2 DAYS       SARS-COV-2, PCR [[id number]] Collected: 06/09/21 1036    Order Status: Completed Specimen: Nasopharyngeal Updated: 06/10/21 0622     Specimen source Nasopharyngeal        Comment: Corrected on 06/09 AT 1113: This is a correction to the medical record of the test results previously reported as NASAL        SARS-CoV-2 Not detected        Comment:      The specimen is NEGATIVE for SARS-CoV-2, the novel coronavirus associated with COVID-19. This test has been authorized by the FDA under an Emergency Use Authorization (EUA) for use by authorized laboratories. Fact sheet for Healthcare Providers: [employer]co.nz       Fact sheet for Patients: [employer]co.nz       Methodology: RT-PCR         CULTURE, BLOOD [[id number]]  (Abnormal) Collected: 06/06/21 1929    Order Status: Completed Specimen: Blood Updated: 06/09/21 0816     Special Requests: --        RIGHT  ARM       GRAM STAIN GRAM POS COCCI IN CHAINS               AEROBIC AND ANAEROBIC BOTTLES                  CRITICAL RESULT NOT CALLED DUE TO PREVIOUS NOTIFICATION OF CRITICAL RESULT WITHIN THE LAST 24 HOURS.            Culture result: ENTEROCOCCUS SPECIES               For Susceptibility Refer to Culture  [id number]      CULTURE, BLOOD [[id number]]  (Abnormal)  (Susceptibility) Collected: 06/06/21 1929    Order Status: Completed Specimen: Blood Updated: 06/09/21 0816     Special Requests: --        LEFT  HAND       GRAM STAIN GRAM POS COCCI IN CHAINS               AEROBIC AND ANAEROBIC BOTTLES                  RESULTS VERIFIED, PHONED TO AND READ BACK BY Bridget Armando RN ON 6/7/21 @1046, TA           Culture result: ENTEROCOCCUS FAECALIS GROUP D                  Refer to Blood Culture ID Panel Accession  [id number]      COVID-19 RAPID TEST [[id number]] Collected: 06/09/21 0352    Order Status: Completed Specimen: Nasopharyngeal Updated: 06/09/21 0423     Specimen source NASAL        COVID-19 rapid test Not detected        Comment:      The specimen is NEGATIVE for SARS-CoV-2, the novel coronavirus associated with COVID-19. A negative result does not rule out COVID-19. This test has been authorized by the FDA under an Emergency Use Authorization (EUA) for use by authorized laboratories. Fact sheet for Healthcare Providers: [employer]  Fact sheet for Patients: [employer]       Methodology: Isothermal Nucleic Acid Amplification         CULTURE, URINE [[id number]] Collected: 06/07/21 2138    Order Status: Canceled Specimen: Urine from Clean catch     BLOOD CULTURE ID PANEL [[id number]]  (Abnormal) Collected: 06/06/21 1929    Order Status: Completed Specimen: Blood Updated: 06/07/21 1048     Acc. no. from Micro Order [id number]     Enterococcus Detected        Comment: RESULTS VERIFIED, PHONED TO AND READ BACK BY  Magda Husain RN ON 6/7/21 @1046, TA          van A/B (Vancomycin Resistance Gene) NOT DETECTED        INTERPRETATION       Gram positive cocci, identified by realtime PCR as SUSPECTED Vancomycin Sensitive Enterococcus species. Comment: Recommend IV vancomycin therapy until full susceptibility information available. THIS TEST DOES NOT REPLACE SENSITIVITY TESTING. Imaging:   CXR (6/9/2021)  IMPRESSION  Airspace opacity at the left lung base with probable left pleural effusion. Atelectasis or pneumonia should be considered.     Signed By: Navarro Alfredo MD     June 11, 2021

## 2021-06-11 NOTE — PROGRESS NOTES
TRANSFER - OUT REPORT:    Verbal report given to [facility] RN(name) on [employer]  being transferred to [facility](unit) for routine progression of care       Report consisted of patients Situation, Background, Assessment and   Recommendations(SBAR). Information from the following report(s) Procedure Summary was reviewed with the receiving nurse. Lines:   Peripheral IV 06/09/21 Left; Inner Forearm (Active)   Site Assessment Clean, dry, & intact 06/10/21 2045   Phlebitis Assessment 0 06/10/21 2045   Infiltration Assessment 0 06/10/21 2045   Dressing Status Clean, dry, & intact 06/10/21 2045   Dressing Type Transparent;Tape 06/10/21 2045   Hub Color/Line Status Patent 06/10/21 2045   Alcohol Cap Used No 06/10/21 0829        Opportunity for questions and clarification was provided.       Patient transported with:   [employer]

## 2021-06-11 NOTE — PROGRESS NOTES
TRANSFER - IN REPORT:    Verbal report received from Dhara Self RN on [employer]  being received from [facility] for ordered procedure      Report consisted of patients Situation, Background, Assessment and   Recommendations(SBAR). Information from the following report(s) SBAR and Procedure Summary was reviewed with the receiving nurse. Opportunity for questions and clarification was provided. Assessment will be completed upon patients arrival to unit and care assumed.

## 2021-06-12 LAB
ANION GAP SERPL CALC-SCNC: 6 MMOL/L (ref 7–16)
BASOPHILS # BLD: 0 K/UL (ref 0–0.2)
BASOPHILS NFR BLD: 1 % (ref 0–2)
BUN SERPL-MCNC: 22 MG/DL (ref 8–23)
CALCIUM SERPL-MCNC: 8.7 MG/DL (ref 8.3–10.4)
CHLORIDE SERPL-SCNC: 110 MMOL/L (ref 98–107)
CO2 SERPL-SCNC: 25 MMOL/L (ref 21–32)
CREAT SERPL-MCNC: 0.86 MG/DL (ref 0.6–1)
DIFFERENTIAL METHOD BLD: ABNORMAL
EOSINOPHIL # BLD: 0 K/UL (ref 0–0.8)
EOSINOPHIL NFR BLD: 1 % (ref 0.5–7.8)
ERYTHROCYTE [DISTWIDTH] IN BLOOD BY AUTOMATED COUNT: 14.9 % (ref 11.9–14.6)
GLUCOSE SERPL-MCNC: 91 MG/DL (ref 65–100)
HCT VFR BLD AUTO: 32.1 % (ref 35.8–46.3)
HGB BLD-MCNC: 9.6 G/DL (ref 11.7–15.4)
IMM GRANULOCYTES # BLD AUTO: 0 K/UL (ref 0–0.5)
IMM GRANULOCYTES NFR BLD AUTO: 1 % (ref 0–5)
LYMPHOCYTES # BLD: 0.7 K/UL (ref 0.5–4.6)
LYMPHOCYTES NFR BLD: 12 % (ref 13–44)
MCH RBC QN AUTO: 23.4 PG (ref 26.1–32.9)
MCHC RBC AUTO-ENTMCNC: 29.9 G/DL (ref 31.4–35)
MCV RBC AUTO: 78.1 FL (ref 79.6–97.8)
MONOCYTES # BLD: 0.7 K/UL (ref 0.1–1.3)
MONOCYTES NFR BLD: 12 % (ref 4–12)
NEUTS SEG # BLD: 4.3 K/UL (ref 1.7–8.2)
NEUTS SEG NFR BLD: 74 % (ref 43–78)
NRBC # BLD: 0 K/UL (ref 0–0.2)
PLATELET # BLD AUTO: 138 K/UL (ref 150–450)
PMV BLD AUTO: 9.9 FL (ref 9.4–12.3)
POTASSIUM SERPL-SCNC: 4.6 MMOL/L (ref 3.5–5.1)
RBC # BLD AUTO: 4.11 M/UL (ref 4.05–5.2)
SODIUM SERPL-SCNC: 141 MMOL/L (ref 136–145)
WBC # BLD AUTO: 5.7 K/UL (ref 4.3–11.1)

## 2021-06-12 PROCEDURE — 74011250636 HC RX REV CODE- 250/636: Performed by: FAMILY MEDICINE

## 2021-06-12 PROCEDURE — 65270000029 HC RM PRIVATE

## 2021-06-12 PROCEDURE — 74011250636 HC RX REV CODE- 250/636: Performed by: INTERNAL MEDICINE

## 2021-06-12 PROCEDURE — 74011250637 HC RX REV CODE- 250/637: Performed by: NURSE PRACTITIONER

## 2021-06-12 PROCEDURE — 74011000258 HC RX REV CODE- 258: Performed by: INTERNAL MEDICINE

## 2021-06-12 PROCEDURE — 74011000258 HC RX REV CODE- 258: Performed by: NURSE PRACTITIONER

## 2021-06-12 PROCEDURE — 74011250636 HC RX REV CODE- 250/636: Performed by: HOSPITALIST

## 2021-06-12 PROCEDURE — 74011250637 HC RX REV CODE- 250/637: Performed by: HOSPITALIST

## 2021-06-12 PROCEDURE — 85025 COMPLETE CBC W/AUTO DIFF WBC: CPT

## 2021-06-12 PROCEDURE — 77030038269 HC DRN EXT URIN PURWCK BARD -A

## 2021-06-12 PROCEDURE — 36415 COLL VENOUS BLD VENIPUNCTURE: CPT

## 2021-06-12 PROCEDURE — 74011250636 HC RX REV CODE- 250/636: Performed by: NURSE PRACTITIONER

## 2021-06-12 PROCEDURE — 2709999900 HC NON-CHARGEABLE SUPPLY

## 2021-06-12 PROCEDURE — 80048 BASIC METABOLIC PNL TOTAL CA: CPT

## 2021-06-12 PROCEDURE — 99232 SBSQ HOSP IP/OBS MODERATE 35: CPT | Performed by: INTERNAL MEDICINE

## 2021-06-12 RX ORDER — FUROSEMIDE 10 MG/ML
40 INJECTION INTRAMUSCULAR; INTRAVENOUS DAILY
Status: DISCONTINUED | OUTPATIENT
Start: 2021-06-13 | End: 2021-06-16 | Stop reason: HOSPADM

## 2021-06-12 RX ADMIN — CALCIUM CARBONATE-CHOLECALCIFEROL TAB 250 MG-125 UNIT 1 TABLET: 250-125 TAB at 09:41

## 2021-06-12 RX ADMIN — ASPIRIN 81 MG: 81 TABLET ORAL at 09:40

## 2021-06-12 RX ADMIN — POLYETHYLENE GLYCOL 3350 17 G: 17 POWDER, FOR SOLUTION ORAL at 09:40

## 2021-06-12 RX ADMIN — FERROUS SULFATE TAB 325 MG (65 MG ELEMENTAL FE) 325 MG: 325 (65 FE) TAB at 18:00

## 2021-06-12 RX ADMIN — Medication 10 ML: at 14:55

## 2021-06-12 RX ADMIN — FUROSEMIDE 20 MG: 10 INJECTION, SOLUTION INTRAMUSCULAR; INTRAVENOUS at 09:41

## 2021-06-12 RX ADMIN — HYDROCODONE BITARTRATE AND ACETAMINOPHEN 1 TABLET: 10; 325 TABLET ORAL at 15:34

## 2021-06-12 RX ADMIN — HEPARIN SODIUM 5000 UNITS: 5000 INJECTION INTRAVENOUS; SUBCUTANEOUS at 14:44

## 2021-06-12 RX ADMIN — Medication 10 ML: at 21:50

## 2021-06-12 RX ADMIN — SUCRALFATE 1 G: 1 TABLET ORAL at 18:00

## 2021-06-12 RX ADMIN — AMPICILLIN SODIUM 2 G: 2 INJECTION, POWDER, FOR SOLUTION INTRAMUSCULAR; INTRAVENOUS at 05:18

## 2021-06-12 RX ADMIN — AMPICILLIN SODIUM 2 G: 2 INJECTION, POWDER, FOR SOLUTION INTRAMUSCULAR; INTRAVENOUS at 23:22

## 2021-06-12 RX ADMIN — GENTAMICIN SULFATE 80 MG: 80 INJECTION, SOLUTION INTRAVENOUS at 02:00

## 2021-06-12 RX ADMIN — CARVEDILOL 25 MG: 25 TABLET, FILM COATED ORAL at 09:42

## 2021-06-12 RX ADMIN — CARVEDILOL 25 MG: 25 TABLET, FILM COATED ORAL at 17:59

## 2021-06-12 RX ADMIN — AMLODIPINE BESYLATE 10 MG: 10 TABLET ORAL at 09:40

## 2021-06-12 RX ADMIN — HEPARIN SODIUM 5000 UNITS: 5000 INJECTION INTRAVENOUS; SUBCUTANEOUS at 21:51

## 2021-06-12 RX ADMIN — HEPARIN SODIUM 5000 UNITS: 5000 INJECTION INTRAVENOUS; SUBCUTANEOUS at 05:17

## 2021-06-12 RX ADMIN — SUCRALFATE 1 G: 1 TABLET ORAL at 21:51

## 2021-06-12 RX ADMIN — AMPICILLIN SODIUM 2 G: 2 INJECTION, POWDER, FOR SOLUTION INTRAMUSCULAR; INTRAVENOUS at 12:34

## 2021-06-12 RX ADMIN — FERROUS SULFATE TAB 325 MG (65 MG ELEMENTAL FE) 325 MG: 325 (65 FE) TAB at 09:41

## 2021-06-12 RX ADMIN — SUCRALFATE 1 G: 1 TABLET ORAL at 09:40

## 2021-06-12 RX ADMIN — PANTOPRAZOLE SODIUM 40 MG: 40 TABLET, DELAYED RELEASE ORAL at 05:17

## 2021-06-12 RX ADMIN — AMPICILLIN SODIUM 2 G: 2 INJECTION, POWDER, FOR SOLUTION INTRAMUSCULAR; INTRAVENOUS at 18:00

## 2021-06-12 RX ADMIN — SENNOSIDES AND DOCUSATE SODIUM 2 TABLET: 8.6; 5 TABLET ORAL at 21:51

## 2021-06-12 NOTE — PROGRESS NOTES
END OF SHIFT NOTE:    INTAKE/OUTPUT  06/11 0701 - 06/12 0700  In: 2103 [P.O.:240; I.V.:1863]  Out: 1800 [Urine:1800]  Voiding: NO  Catheter: YES  Drain:   External Urinary Catheter 06/12/21 (Active)   Site Assessment Clean, dry, & intact 06/12/21 0305   Repositioned No 06/12/21 0305   Perineal Care No 06/12/21 0305   Wick Changed No 06/12/21 0305   Suction Canister/Tubing Changed No 06/12/21 0305   Urine Output (mL) 400 ml 06/12/21 0253               Flatus: Patient does have flatus present. Stool:  0 occurrences. Characteristics:  Stool Assessment  Stool Appearance: Loose (per patient report)    Emesis: 0 occurrences. Characteristics:        VITAL SIGNS  Patient Vitals for the past 12 hrs:   Temp Pulse Resp BP SpO2   06/12/21 0253 98.1 °F (36.7 °C) 71 19 134/79 95 %   06/11/21 2306 98.2 °F (36.8 °C) 85 19 139/76 94 %   06/11/21 1932 98 °F (36.7 °C) 79 19 (!) 142/65 93 %       Pain Assessment  Pain Intensity 1: 0 (06/11/21 2100)  Pain Location 1: Back  Pain Intervention(s) 1: Medication (see MAR)  Patient Stated Pain Goal: 0    Ambulating  No    Shift report given to oncoming nurse at the bedside.     Arnel Sanchez RN

## 2021-06-12 NOTE — PROGRESS NOTES
[facility] CARDIOLOGY PROGRESS NOTE           6/12/2021 11:34 AM    Admit Date: 6/6/2021      Subjective:       Review of Systems   Constitutional: Negative for fever. HENT: Negative for hearing loss. Respiratory: Negative for cough. Cardiovascular: Negative for chest pain. Genitourinary: Negative for dysuria. Musculoskeletal: Negative for myalgias. Skin: Negative for rash. Neurological: Negative for dizziness. Objective:      Vitals:    06/11/21 2306 06/12/21 0253 06/12/21 0620 06/12/21 0730   BP: 139/76 134/79  (!) 184/83   Pulse: 85 71  75   Resp: 19 19  18   Temp: 98.2 °F (36.8 °C) 98.1 °F (36.7 °C)  97.5 °F (36.4 °C)   SpO2: 94% 95%  95%   Weight:   248 lb 8 oz (112.7 kg)    Height:             Physical Exam  Constitutional:       Appearance: She is well-developed. HENT:      Head: Normocephalic. Eyes:      Conjunctiva/sclera: Conjunctivae normal.      Pupils: Pupils are equal, round, and reactive to light. Neck:      Vascular: No JVD. Cardiovascular:      Heart sounds: No murmur heard. Comments: stenotomy site clean dry intact. Pulmonary:      Effort: Pulmonary effort is normal. No respiratory distress. Breath sounds: No wheezing. Abdominal:      General: There is no distension. Palpations: Abdomen is soft. Musculoskeletal:      Cervical back: Normal range of motion. Skin:     General: Skin is warm and dry. Neurological:      Mental Status: She is alert and oriented to person, place, and time.          Data Review:   Recent Labs     06/12/21  0551 06/11/21  0438 06/10/21  0440    140 138   K 4.6 4.5 4.4   MG  --  2.2 2.1   BUN 22 26* 29*   CREA 0.86 0.91 1.07*   GLU 91 91 103*   WBC 5.7 6.0 6.2   HGB 9.6* 9.6* 9.7*   HCT 32.1* 31.9* 32.8*   * 137* 107*         Intake/Output Summary (Last 24 hours) at 6/12/2021 1134  Last data filed at 6/12/2021 1033  Gross per 24 hour   Intake 2453 ml   Output 2150 ml   Net 303 ml     Current Facility-Administered Medications   Medication Dose Route Frequency    [START ON 6/13/2021] Gentamicin Trough Level Reminder   Other ONCE    ampicillin (OMNIPEN) 2 g in 0.9% sodium chloride (MBP/ADV) 100 mL MBP  2 g IntraVENous Q6H    midazolam (VERSED) injection 1-2 mg  1-2 mg IntraVENous Multiple    fentaNYL citrate (PF) injection 25-50 mcg  25-50 mcg IntraVENous Multiple    furosemide (LASIX) injection 20 mg  20 mg IntraVENous DAILY    gentamicin in Saline (Iso-osm) (GARAMYCIN) 80 mg/100 mL IVPB premix 80 mg  80 mg IntraVENous Q24H    ferrous sulfate tablet 325 mg  1 Tablet Oral BID WITH MEALS    alum-mag hydroxide-simeth (MYLANTA) oral suspension 30 mL  30 mL Oral Q4H PRN    simethicone (MYLICON) tablet 80 mg  80 mg Oral QID PRN    HYDROcodone-acetaminophen (NORCO)  mg tablet 1 Tablet  1 Tablet Oral Q8H PRN    traMADoL (ULTRAM) tablet 50 mg  50 mg Oral Q6H PRN    sodium chloride (NS) flush 5-10 mL  5-10 mL IntraVENous Q8H    sodium chloride (NS) flush 5-10 mL  5-10 mL IntraVENous PRN    sodium chloride (NS) flush 5-40 mL  5-40 mL IntraVENous Q8H    sodium chloride (NS) flush 5-40 mL  5-40 mL IntraVENous PRN    heparin (porcine) injection 5,000 Units  5,000 Units SubCUTAneous Q8H    ondansetron (ZOFRAN) injection 4 mg  4 mg IntraVENous Q6H PRN    amLODIPine (NORVASC) tablet 10 mg  10 mg Oral DAILY    aspirin delayed-release tablet 81 mg  81 mg Oral DAILY    calcium-vitamin D (OS-SHANDRA +D3) 250 mg-125 unit per tablet 1 Tablet  1 Tablet Oral DAILY    carvediloL (COREG) tablet 25 mg  25 mg Oral BID WITH MEALS    senna-docusate (PERICOLACE) 8.6-50 mg per tablet 2 Tablet  2 Tablet Oral QHS    pantoprazole (PROTONIX) tablet 40 mg  40 mg Oral ACB    sucralfate (CARAFATE) tablet 1 g  1 g Oral AC&HS    polyethylene glycol (MIRALAX) packet 17 g  17 g Oral DAILY           Assessment/Plan:     66 y.o. 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 Patient with transesophageal echocardiogram performed 6/11/2021 with moderate mitral vegetation mild MR    Endocarditis  · Currently followed by infectious disease subsequent blood cultures negative appears to be Enterococcus that is sensitive to ampicillin. · No perivalvular extension moderate sized vegetation no severe MR  · Serial imaging reasonable at appropriate interval following treatment    Atrial fibrillation  · Was not on anticoagulation as an outpatient. · No AC now due to acute left sided endocarditis. · Patient has a longstanding history longstanding history of atrial fibrillation   · Now rate controlled     Pleural effusion along with  · Small pleural effusion noted on IV Lasix plan to increase dose to 40 mg IV daily assessment of volume status    Hypertension  · Blood pressures were better controlled overnight now elevated blood pressure this a.m.   · Continue carvedilol 25 mg p.o. twice daily  · Amlodipine 10 mg daily      Marquita Boyd MD  6/12/2021 11:34 AM

## 2021-06-12 NOTE — PROGRESS NOTES
[facility]ist Progress Note     Name:  Magda Morris  Age:71 y.o. Sex:female   :  1950    MRN:  [id number]     Admit Date:  2021    Reason for Admission:  Acute pyelonephritis [N10]    Hospital Course/Interval history:     66year old CF PMH FM, HTN, PUD, valvular heart disease with artificial heart valve, chronic pain admitted  for acute pyelonephritis. She was found to have E faecalis bacteremia. **Discharge plan:  [address], anticipate Monday. Subjective (21): Sin bed this am, daughter at bedside. HOWIE yesterday shows MV vegetation. No CP. Review of Systems: 14 point review of systems is otherwise negative with the exception of the elements mentioned above. Assessment & Plan     Acute pyelonephritis  Enterococcal bacteremia:  : Blood cultures w/ GPC              - Start vancomycin              - Follow final and sensitivities              - Cont cefepime for now              - WBC 21.5 --> 12.9  : Blood cultures w/ presumptive enterococcus              - Vanc Day 2              - Draw new set of blood cultures now              - Cefepime Day 3              - Consult Infectious Disease for further recs  : One initial culture (+) E. Faecalis              - Repeat cultures NGTD              - Gent Day 2; Vanc Day 3              - Cefepime was discontinued              - WBC improved to 8.3    HOWIE planned for today. BC repeat NGTD            Gent Day 4; Vanc Day 5             ID continues to follow   HOWIE with MV vegetation, continues on Gent Day 5; Vanc Day 6, BC NGTD    Angina  Elevated troponin  Atrial fibrillation, paroxysmal, rate controlled  Goldne/10: CP overnight described as \"indigestion\"; resolved during morning exam              - Troponin 406 --> 377              - Cardiology consulted              - TTE  w/ 60-65% LVEF; no WMA   LHC done 6/10 showed mild CAD; no sever stenosis noted.  Cardiology following along   HOWIE with MV vegetation, remains on [place]. ID Following.       Nephrolithiasis, non-obstructing  Jun/07: Noted on CT; no hydronephrosis, no masses.     CKD stage III: Creatinine slightly elevated, initiated on IV fluids, will monitor creatinine. Jun/07: sCr 1.55 --> 1.38              - Cont IVF  Jun/09: At baseline     Iron deficiency anemia  Jun/09: Iron 18              - Start FeSO4     Nausea  resolved     Chronic back pain  Hx surgical fusion w/ hardware  DDD  Continue on home medications.     Hx aortic valve replacement  Jun/08: TTE to eval for vegetations  Jun/09: TPL30-06% LVEF; no WMA   Golden/10: May need HOWIE d/t poor visualization of AVR and MV  6/11 HOWIE planned for today. 6/12 HOWIE with MV vegetation, remains on Gent and Vanc. ID Following.     Hypertension: Continue on home medications, hold nephrotoxic medications.     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     Diet:  DIET ADULT ORAL NUTRITION SUPPLEMENT  DIET NPO  DVT PPx: heparin  Code status: Full Code  Disposition/Expected LOS: > 2 midnights         Objective:     Patient Vitals for the past 24 hrs:   Temp Pulse Resp BP SpO2   06/12/21 1110 98.8 °F (37.1 °C) 96 18 (!) 162/86 95 %   06/12/21 0730 97.5 °F (36.4 °C) 75 18 (!) 184/83 95 %   06/12/21 0253 98.1 °F (36.7 °C) 71 19 134/79 95 %   06/11/21 2306 98.2 °F (36.8 °C) 85 19 139/76 94 %   06/11/21 1932 98 °F (36.7 °C) 79 19 (!) 142/65 93 %   06/11/21 1453 97.6 °F (36.4 °C) 89 19 (!) 165/74 95 %   06/11/21 1400 -- 94 -- (!) 168/131 95 %   06/11/21 1345 -- 98 -- (!) 172/90 98 %     Oxygen Therapy  O2 Sat (%): 95 % (06/12/21 1110)  Pulse via Oximetry: 69 beats per minute (06/10/21 1815)  O2 Device: Nasal cannula (06/11/21 2100)  O2 Flow Rate (L/min): 4 l/min (06/11/21 2100)    Body mass index is 41.35 kg/m².     Physical Exam:   General:          No acute distress  Lungs:             Fine crackles at bases, speaking in full sentences, no use of accessory muscles   CV:                  Irr irregular   Abdomen:        Soft, nondistended, normoactive bowel sounds   Extremities:     No cyanosis, no clubbing, atraumatic   Neuro:                         Nonfocal, A&O x3   Psych:             Normal affect   Data Review:  I have reviewed all labs, meds, and studies from the last 24 hours:    Labs:    Recent Results (from the past 24 hour(s))   CBC WITH AUTOMATED DIFF    Collection Time: 06/12/21  5:51 AM   Result Value Ref Range    WBC 5.7 4.3 - 11.1 K/uL    RBC 4.11 4.05 - 5.2 M/uL    HGB 9.6 (L) 11.7 - 15.4 g/dL    HCT 32.1 (L) 35.8 - 46.3 %    MCV 78.1 (L) 79.6 - 97.8 FL    MCH 23.4 (L) 26.1 - 32.9 PG    MCHC 29.9 (L) 31.4 - 35.0 g/dL    RDW 14.9 (H) 11.9 - 14.6 %    PLATELET 463 (L) 349 - 450 K/uL    MPV 9.9 9.4 - 12.3 FL    ABSOLUTE NRBC 0.00 0.0 - 0.2 K/uL    DF AUTOMATED      NEUTROPHILS 74 43 - 78 %    LYMPHOCYTES 12 (L) 13 - 44 %    MONOCYTES 12 4.0 - 12.0 %    EOSINOPHILS 1 0.5 - 7.8 %    BASOPHILS 1 0.0 - 2.0 %    IMMATURE GRANULOCYTES 1 0.0 - 5.0 %    ABS. NEUTROPHILS 4.3 1.7 - 8.2 K/UL    ABS. LYMPHOCYTES 0.7 0.5 - 4.6 K/UL    ABS. MONOCYTES 0.7 0.1 - 1.3 K/UL    ABS. EOSINOPHILS 0.0 0.0 - 0.8 K/UL    ABS. BASOPHILS 0.0 0.0 - 0.2 K/UL    ABS. IMM.  GRANS. 0.0 0.0 - 0.5 K/UL   METABOLIC PANEL, BASIC    Collection Time: 06/12/21  5:51 AM   Result Value Ref Range    Sodium 141 136 - 145 mmol/L    Potassium 4.6 3.5 - 5.1 mmol/L    Chloride 110 (H) 98 - 107 mmol/L    CO2 25 21 - 32 mmol/L    Anion gap 6 (L) 7 - 16 mmol/L    Glucose 91 65 - 100 mg/dL    BUN 22 8 - 23 MG/DL    Creatinine 0.86 0.6 - 1.0 MG/DL    GFR est AA >60 >60 ml/min/1.73m2    GFR est non-AA >60 >60 ml/min/1.73m2    Calcium 8.7 8.3 - 10.4 MG/DL       All Micro Results     Procedure Component Value Units Date/Time    CULTURE, BLOOD [[id number]] Collected: 06/08/21 0951    Order Status: Completed Specimen: Blood Updated: 06/12/21 1126     Special Requests: --        RIGHT  HAND       Culture result: NO GROWTH 4 DAYS       CULTURE, BLOOD [[id number]] Collected: 06/08/21 0957    Order Status: Completed Specimen: Blood Updated: 06/12/21 1126     Special Requests: --        LEFT  HAND       Culture result: NO GROWTH 4 DAYS       CULTURE, URINE [[id number]] Collected: 06/07/21 2138    Order Status: Completed Specimen: Urine from Clean catch Updated: 06/10/21 0847     Special Requests: NO SPECIAL REQUESTS        Culture result: NO GROWTH 2 DAYS       SARS-COV-2, PCR [[id number]] Collected: 06/09/21 1036    Order Status: Completed Specimen: Nasopharyngeal Updated: 06/10/21 0622     Specimen source Nasopharyngeal        Comment: Corrected on 06/09 AT 1113: This is a correction to the medical record of the test results previously reported as NASAL        SARS-CoV-2 Not detected        Comment:      The specimen is NEGATIVE for SARS-CoV-2, the novel coronavirus associated with COVID-19. This test has been authorized by the FDA under an Emergency Use Authorization (EUA) for use by authorized laboratories. Fact sheet for Healthcare Providers: ConventionUpdate.co.nz       Fact sheet for Patients: ConventionUpdate.co.nz       Methodology: RT-PCR         CULTURE, BLOOD [[id number]]  (Abnormal) Collected: 06/06/21 1929    Order Status: Completed Specimen: Blood Updated: 06/09/21 0816     Special Requests: --        RIGHT  ARM       GRAM STAIN GRAM POS COCCI IN CHAINS               AEROBIC AND ANAEROBIC BOTTLES                  CRITICAL RESULT NOT CALLED DUE TO PREVIOUS NOTIFICATION OF CRITICAL RESULT WITHIN THE LAST 24 HOURS.            Culture result: ENTEROCOCCUS SPECIES               For Susceptibility Refer to Culture  [id number]      CULTURE, BLOOD [[id number]]  (Abnormal)  (Susceptibility) Collected: 06/06/21 1929    Order Status: Completed Specimen: Blood Updated: 06/09/21 8900     Special Requests: --        LEFT  HAND       GRAM STAIN GRAM POS COCCI IN CHAINS               AEROBIC AND ANAEROBIC BOTTLES                  RESULTS VERIFIED, PHONED TO AND READ BACK BY Denae Stinson RN ON 6/7/21 @1046, TA           Culture result:       ENTEROCOCCUS FAECALIS GROUP D                  Refer to Blood Culture ID Panel Accession  [id number]      COVID-19 RAPID TEST [[id number]] Collected: 06/09/21 0352    Order Status: Completed Specimen: Nasopharyngeal Updated: 06/09/21 0423     Specimen source NASAL        COVID-19 rapid test Not detected        Comment:      The specimen is NEGATIVE for SARS-CoV-2, the novel coronavirus associated with COVID-19. A negative result does not rule out COVID-19. This test has been authorized by the FDA under an Emergency Use Authorization (EUA) for use by authorized laboratories. Fact sheet for Healthcare Providers: ConventionUpdate.co.nz  Fact sheet for Patients: [employer].co.nz       Methodology: Isothermal Nucleic Acid Amplification         CULTURE, URINE [[id number]] Collected: 06/07/21 2138    Order Status: Canceled Specimen: Urine from Clean catch     BLOOD CULTURE ID PANEL [[id number]]  (Abnormal) Collected: 06/06/21 1929    Order Status: Completed Specimen: Blood Updated: 06/07/21 1048     Acc. no. from Micro Order [id number]     Enterococcus Detected        Comment: RESULTS VERIFIED, PHONED TO AND READ BACK BY  Denae Stinson RN ON 6/7/21 @1046, TA          van A/B (Vancomycin Resistance Gene) NOT DETECTED        INTERPRETATION       Gram positive cocci, identified by realtime PCR as SUSPECTED Vancomycin Sensitive Enterococcus species. Comment: Recommend IV vancomycin therapy until full susceptibility information available. THIS TEST DOES NOT REPLACE SENSITIVITY TESTING.              EKG Results     Procedure 720 Value Units Date/Time    EKG, 12 LEAD, INITIAL [[id number]] Collected: 06/09/21 2245    Order Status: Completed Updated: 06/10/21 0716     Ventricular Rate 82 BPM      Atrial Rate 87 BPM      QRS Duration 90 ms      Q-T Interval 378 ms      QTC Calculation (Bezet) 441 ms      Calculated R Axis 7 degrees      Calculated T Axis 155 degrees      Diagnosis --     Atrial fibrillation  ST & T wave abnormality, consider lateral ischemia  Abnormal ECG    Confirmed by Beto Nunez MD (), VIANEY TIRADO ([id number]) on 6/10/2021 7:15:56 AM      EKG, 12 LEAD, INITIAL [[id number]]     Order Status: Canceled           Other Studies:  No results found.     Current Meds:   Current Facility-Administered Medications   Medication Dose Route Frequency    [START ON 6/13/2021] Gentamicin Trough Level Reminder   Other ONCE    ampicillin (OMNIPEN) 2 g in 0.9% sodium chloride (MBP/ADV) 100 mL MBP  2 g IntraVENous Q6H    [START ON 6/13/2021] furosemide (LASIX) injection 40 mg  40 mg IntraVENous DAILY    midazolam (VERSED) injection 1-2 mg  1-2 mg IntraVENous Multiple    fentaNYL citrate (PF) injection 25-50 mcg  25-50 mcg IntraVENous Multiple    gentamicin in Saline (Iso-osm) (GARAMYCIN) 80 mg/100 mL IVPB premix 80 mg  80 mg IntraVENous Q24H    ferrous sulfate tablet 325 mg  1 Tablet Oral BID WITH MEALS    alum-mag hydroxide-simeth (MYLANTA) oral suspension 30 mL  30 mL Oral Q4H PRN    simethicone (MYLICON) tablet 80 mg  80 mg Oral QID PRN    HYDROcodone-acetaminophen (NORCO)  mg tablet 1 Tablet  1 Tablet Oral Q8H PRN    traMADoL (ULTRAM) tablet 50 mg  50 mg Oral Q6H PRN    sodium chloride (NS) flush 5-10 mL  5-10 mL IntraVENous Q8H    sodium chloride (NS) flush 5-10 mL  5-10 mL IntraVENous PRN    sodium chloride (NS) flush 5-40 mL  5-40 mL IntraVENous Q8H    sodium chloride (NS) flush 5-40 mL  5-40 mL IntraVENous PRN    heparin (porcine) injection 5,000 Units  5,000 Units SubCUTAneous Q8H    ondansetron (ZOFRAN) injection 4 mg  4 mg IntraVENous Q6H PRN    amLODIPine (NORVASC) tablet 10 mg  10 mg Oral DAILY    aspirin delayed-release tablet 81 mg  81 mg Oral DAILY    calcium-vitamin D (OS-SHANDRA +D3) 250 mg-125 unit per tablet 1 Tablet  1 Tablet Oral DAILY    carvediloL (COREG) tablet 25 mg  25 mg Oral BID WITH MEALS    senna-docusate (PERICOLACE) 8.6-50 mg per tablet 2 Tablet  2 Tablet Oral QHS    pantoprazole (PROTONIX) tablet 40 mg  40 mg Oral ACB    sucralfate (CARAFATE) tablet 1 g  1 g Oral AC&HS    polyethylene glycol (MIRALAX) packet 17 g  17 g Oral DAILY       Problem List:  Hospital Problems as of 6/12/2021 Date Reviewed: 5/13/2021        Codes Class Noted - Resolved POA    * (Principal) Acute pyelonephritis ICD-10-CM: N10  ICD-9-CM: 590.10  6/6/2021 - Present Unknown        CKD (chronic kidney disease) stage 3, GFR 30-59 ml/min (HCC) (Chronic) ICD-10-CM: N18.30  ICD-9-CM: 585.3  6/10/2016 - Present Yes        Morbid obesity ([address] (Chronic) ICD-10-CM: E66.01  ICD-9-CM: 278.01  2/20/2015 - Present Yes               Signed By: Wili Nicholas NP   [facility]    June 12, 2021  5:15 PM

## 2021-06-12 NOTE — PROGRESS NOTES
INITIAL SPIRITUAL ASSESSMENT     NOTED:    Progressing in her care      WILL ASSESS HOW WE CAN BEST SERVE THIS FAMILY

## 2021-06-13 ENCOUNTER — APPOINTMENT (OUTPATIENT)
Dept: GENERAL RADIOLOGY | Age: 71
DRG: 287 | End: 2021-06-13
Attending: INTERNAL MEDICINE
Payer: MEDICARE

## 2021-06-13 LAB
ANION GAP SERPL CALC-SCNC: 7 MMOL/L (ref 7–16)
BACTERIA SPEC CULT: NORMAL
BACTERIA SPEC CULT: NORMAL
BASOPHILS # BLD: 0 K/UL (ref 0–0.2)
BASOPHILS NFR BLD: 1 % (ref 0–2)
BUN SERPL-MCNC: 22 MG/DL (ref 8–23)
CALCIUM SERPL-MCNC: 8.3 MG/DL (ref 8.3–10.4)
CHLORIDE SERPL-SCNC: 108 MMOL/L (ref 98–107)
CO2 SERPL-SCNC: 26 MMOL/L (ref 21–32)
CREAT SERPL-MCNC: 0.92 MG/DL (ref 0.6–1)
DIFFERENTIAL METHOD BLD: ABNORMAL
EOSINOPHIL # BLD: 0.1 K/UL (ref 0–0.8)
EOSINOPHIL NFR BLD: 1 % (ref 0.5–7.8)
ERYTHROCYTE [DISTWIDTH] IN BLOOD BY AUTOMATED COUNT: 14.8 % (ref 11.9–14.6)
GENTAMICIN TROUGH SERPL-MCNC: 0.8 UG/ML (ref 1–2)
GLUCOSE SERPL-MCNC: 118 MG/DL (ref 65–100)
HCT VFR BLD AUTO: 34 % (ref 35.8–46.3)
HGB BLD-MCNC: 10.5 G/DL (ref 11.7–15.4)
IMM GRANULOCYTES # BLD AUTO: 0 K/UL (ref 0–0.5)
IMM GRANULOCYTES NFR BLD AUTO: 0 % (ref 0–5)
LYMPHOCYTES # BLD: 0.8 K/UL (ref 0.5–4.6)
LYMPHOCYTES NFR BLD: 15 % (ref 13–44)
MCH RBC QN AUTO: 23.5 PG (ref 26.1–32.9)
MCHC RBC AUTO-ENTMCNC: 30.9 G/DL (ref 31.4–35)
MCV RBC AUTO: 76.2 FL (ref 79.6–97.8)
MONOCYTES # BLD: 0.7 K/UL (ref 0.1–1.3)
MONOCYTES NFR BLD: 13 % (ref 4–12)
NEUTS SEG # BLD: 3.9 K/UL (ref 1.7–8.2)
NEUTS SEG NFR BLD: 70 % (ref 43–78)
NRBC # BLD: 0 K/UL (ref 0–0.2)
PLATELET # BLD AUTO: 157 K/UL (ref 150–450)
PMV BLD AUTO: 9.6 FL (ref 9.4–12.3)
POTASSIUM SERPL-SCNC: 4.3 MMOL/L (ref 3.5–5.1)
RBC # BLD AUTO: 4.46 M/UL (ref 4.05–5.2)
SERVICE CMNT-IMP: NORMAL
SERVICE CMNT-IMP: NORMAL
SODIUM SERPL-SCNC: 141 MMOL/L (ref 136–145)
WBC # BLD AUTO: 5.6 K/UL (ref 4.3–11.1)

## 2021-06-13 PROCEDURE — 99232 SBSQ HOSP IP/OBS MODERATE 35: CPT | Performed by: INTERNAL MEDICINE

## 2021-06-13 PROCEDURE — 74011250636 HC RX REV CODE- 250/636: Performed by: INTERNAL MEDICINE

## 2021-06-13 PROCEDURE — 85025 COMPLETE CBC W/AUTO DIFF WBC: CPT

## 2021-06-13 PROCEDURE — 77010033678 HC OXYGEN DAILY

## 2021-06-13 PROCEDURE — C1751 CATH, INF, PER/CENT/MIDLINE: HCPCS

## 2021-06-13 PROCEDURE — 80048 BASIC METABOLIC PNL TOTAL CA: CPT

## 2021-06-13 PROCEDURE — 74011250637 HC RX REV CODE- 250/637: Performed by: INTERNAL MEDICINE

## 2021-06-13 PROCEDURE — 36573 INSJ PICC RS&I 5 YR+: CPT | Performed by: NURSE PRACTITIONER

## 2021-06-13 PROCEDURE — 77030041974 HC CATH SYS PERIPH TELE -B

## 2021-06-13 PROCEDURE — 80170 ASSAY OF GENTAMICIN: CPT

## 2021-06-13 PROCEDURE — 36415 COLL VENOUS BLD VENIPUNCTURE: CPT

## 2021-06-13 PROCEDURE — 76937 US GUIDE VASCULAR ACCESS: CPT

## 2021-06-13 PROCEDURE — 74011250636 HC RX REV CODE- 250/636: Performed by: HOSPITALIST

## 2021-06-13 PROCEDURE — 74011250637 HC RX REV CODE- 250/637: Performed by: NURSE PRACTITIONER

## 2021-06-13 PROCEDURE — 77030020847 HC STATLOK BARD -A

## 2021-06-13 PROCEDURE — 74011000258 HC RX REV CODE- 258: Performed by: INTERNAL MEDICINE

## 2021-06-13 PROCEDURE — 71045 X-RAY EXAM CHEST 1 VIEW: CPT

## 2021-06-13 PROCEDURE — 65270000029 HC RM PRIVATE

## 2021-06-13 PROCEDURE — 74011250637 HC RX REV CODE- 250/637: Performed by: HOSPITALIST

## 2021-06-13 RX ORDER — SODIUM CHLORIDE 0.9 % (FLUSH) 0.9 %
10 SYRINGE (ML) INJECTION EVERY 8 HOURS
Status: DISCONTINUED | OUTPATIENT
Start: 2021-06-13 | End: 2021-06-16 | Stop reason: HOSPADM

## 2021-06-13 RX ORDER — LOSARTAN POTASSIUM 50 MG/1
100 TABLET ORAL DAILY
Status: DISCONTINUED | OUTPATIENT
Start: 2021-06-13 | End: 2021-06-16 | Stop reason: HOSPADM

## 2021-06-13 RX ORDER — SODIUM CHLORIDE 0.9 % (FLUSH) 0.9 %
10 SYRINGE (ML) INJECTION AS NEEDED
Status: DISCONTINUED | OUTPATIENT
Start: 2021-06-13 | End: 2021-06-16 | Stop reason: HOSPADM

## 2021-06-13 RX ADMIN — AMPICILLIN SODIUM 2 G: 2 INJECTION, POWDER, FOR SOLUTION INTRAMUSCULAR; INTRAVENOUS at 11:34

## 2021-06-13 RX ADMIN — CARVEDILOL 25 MG: 25 TABLET, FILM COATED ORAL at 09:05

## 2021-06-13 RX ADMIN — CALCIUM CARBONATE-CHOLECALCIFEROL TAB 250 MG-125 UNIT 1 TABLET: 250-125 TAB at 09:05

## 2021-06-13 RX ADMIN — Medication 10 ML: at 17:00

## 2021-06-13 RX ADMIN — HEPARIN SODIUM 5000 UNITS: 5000 INJECTION INTRAVENOUS; SUBCUTANEOUS at 16:38

## 2021-06-13 RX ADMIN — Medication 10 ML: at 16:39

## 2021-06-13 RX ADMIN — SUCRALFATE 1 G: 1 TABLET ORAL at 09:06

## 2021-06-13 RX ADMIN — HYDROCODONE BITARTRATE AND ACETAMINOPHEN 1 TABLET: 10; 325 TABLET ORAL at 20:07

## 2021-06-13 RX ADMIN — FERROUS SULFATE TAB 325 MG (65 MG ELEMENTAL FE) 325 MG: 325 (65 FE) TAB at 09:05

## 2021-06-13 RX ADMIN — LOSARTAN POTASSIUM 100 MG: 50 TABLET, FILM COATED ORAL at 09:05

## 2021-06-13 RX ADMIN — HEPARIN SODIUM 5000 UNITS: 5000 INJECTION INTRAVENOUS; SUBCUTANEOUS at 05:30

## 2021-06-13 RX ADMIN — AMPICILLIN SODIUM 2 G: 2 INJECTION, POWDER, FOR SOLUTION INTRAMUSCULAR; INTRAVENOUS at 05:30

## 2021-06-13 RX ADMIN — AMLODIPINE BESYLATE 10 MG: 10 TABLET ORAL at 09:43

## 2021-06-13 RX ADMIN — HEPARIN SODIUM 5000 UNITS: 5000 INJECTION INTRAVENOUS; SUBCUTANEOUS at 21:34

## 2021-06-13 RX ADMIN — POLYETHYLENE GLYCOL 3350 17 G: 17 POWDER, FOR SOLUTION ORAL at 09:06

## 2021-06-13 RX ADMIN — Medication 10 ML: at 21:33

## 2021-06-13 RX ADMIN — SUCRALFATE 1 G: 1 TABLET ORAL at 11:34

## 2021-06-13 RX ADMIN — ASPIRIN 81 MG: 81 TABLET ORAL at 09:05

## 2021-06-13 RX ADMIN — CARVEDILOL 25 MG: 25 TABLET, FILM COATED ORAL at 16:38

## 2021-06-13 RX ADMIN — TRAMADOL HYDROCHLORIDE 50 MG: 50 TABLET, FILM COATED ORAL at 16:53

## 2021-06-13 RX ADMIN — AMPICILLIN SODIUM 2 G: 2 INJECTION, POWDER, FOR SOLUTION INTRAMUSCULAR; INTRAVENOUS at 23:04

## 2021-06-13 RX ADMIN — HYDROCODONE BITARTRATE AND ACETAMINOPHEN 1 TABLET: 10; 325 TABLET ORAL at 11:34

## 2021-06-13 RX ADMIN — SENNOSIDES AND DOCUSATE SODIUM 2 TABLET: 8.6; 5 TABLET ORAL at 21:34

## 2021-06-13 RX ADMIN — PANTOPRAZOLE SODIUM 40 MG: 40 TABLET, DELAYED RELEASE ORAL at 05:30

## 2021-06-13 RX ADMIN — GENTAMICIN SULFATE 80 MG: 80 INJECTION, SOLUTION INTRAVENOUS at 01:30

## 2021-06-13 RX ADMIN — FERROUS SULFATE TAB 325 MG (65 MG ELEMENTAL FE) 325 MG: 325 (65 FE) TAB at 16:38

## 2021-06-13 RX ADMIN — SUCRALFATE 1 G: 1 TABLET ORAL at 21:34

## 2021-06-13 RX ADMIN — Medication 10 ML: at 05:29

## 2021-06-13 RX ADMIN — SUCRALFATE 1 G: 1 TABLET ORAL at 16:37

## 2021-06-13 RX ADMIN — AMPICILLIN SODIUM 2 G: 2 INJECTION, POWDER, FOR SOLUTION INTRAMUSCULAR; INTRAVENOUS at 18:32

## 2021-06-13 NOTE — PROGRESS NOTES
Pharmacokinetic Consult to Pharmacist    Jordan Merary is a 66 y.o. 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 Height: 5' 5\" (165.1 cm)  Weight: 112 kg (247 lb)  Lab Results   Component Value Date/Time    BUN 22 06/13/2021 12:43 AM    Creatinine 0.92 06/13/2021 12:43 AM    WBC 5.6 06/13/2021 12:43 AM    Lactic acid 1.3 06/06/2021 05:39 PM      Estimated Creatinine Clearance: 68.9 mL/min (based on SCr of 0.92 mg/dL). Lab Results   Component Value Date/Time    Gentamicin, trough 0.8 (L) 06/13/2021 12:43 AM       Day 6 of gentamicin therapy Goal trough is <1, goal peak is 3 to 5. Gentamicin trough is in range, will continue the current dose of 80 mg every 24 hours for now. Will continue to follow patient and order levels when clinically indicated.     Thank you,  Rita Banks, PharmD, [id number]  Clinical Pharmacy Specialist  [phone]

## 2021-06-13 NOTE — PROGRESS NOTES
[facility] Hospitalist Progress Note     Name:  Gary Ayala  Age:71 y.o. Sex:female   :  1950    MRN:  [id number]     Admit Date:  2021    Reason for Admission:  Acute pyelonephritis [N10]    Hospital Course/Interval history:     66year old CF PMH FM, HTN, PUD, valvular heart disease with artificial heart valve, chronic pain admitted  for acute pyelonephritis. She was found to have E faecalis bacteremia and MV vegetation. **Discharge plan:  [facility], anticipate Monday. Subjective (21): Sitting up in chair, no distress, denies CP. Son at bedside. Review of Systems: 14 point review of systems is otherwise negative with the exception of the elements mentioned above. Assessment & Plan     Acute pyelonephritis  Enterococcal bacteremia:  : Blood cultures w/ GPC              - Start vancomycin              - Follow final and sensitivities              - Cont cefepime for now              - WBC 21.5 --> 12.9  : Blood cultures w/ presumptive enterococcus              - Vanc Day 2              - Draw new set of blood cultures now              - Cefepime Day 3              - Consult Infectious Disease for further recs  : One initial culture (+) E. Faecalis              - Repeat cultures NGTD              - Gent Day 2; Vanc Day 3              - Cefepime was discontinued              - WBC improved to 8.3    HOWIE planned for today. BC repeat NGTD            Gent Day 4; Vanc Day 5             ID continues to follow   HOWIE with MV vegetation, continues on Gent Day 5; Vanc Day 6, BC NGTD   Gent Day 6; Vanc Day 7, BC NGTD    Angina  Elevated troponin  Atrial fibrillation, paroxysmal, rate controlled  Golden/10: CP overnight described as \"indigestion\"; resolved during morning exam              - Troponin 406 --> 377              - Cardiology consulted              - TTE -65% LVEF; no WMA   LHC done 6/10 showed mild CAD; no sever stenosis noted. Cardiology following along  6/12 HOWIE with MV vegetation, remains on Gent and Vanc. ID Following.       Nephrolithiasis, non-obstructing  Jun/07: Noted on CT; no hydronephrosis, no masses.     CKD stage III: Creatinine slightly elevated, initiated on IV fluids, will monitor creatinine. Jun/07: sCr 1.55 --> 1.38              - Cont IVF  Jun/09: At baseline     Iron deficiency anemia  Jun/09: Iron 18              - Start FeSO4     Nausea  resolved     Chronic back pain  Hx surgical fusion w/ hardware  DDD  Continue on home medications.     Hx aortic valve replacement  Jun/08: TTE to eval for vegetations  Jun/09: ATD06-27% LVEF; no WMA   Golden/10: May need HOWIE d/t poor visualization of AVR and MV  6/11 HOWIE planned for today. 6/12 HOWIE with MV vegetation, remains on Gent and Vanc. ID Following.     Hypertension: Continue on home medications, hold nephrotoxic medications.     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   6/13 per cardiology, plan for repeat echo 2 weeks outpatient.   Gent Day 6; Vanc Day 7, BC NGTD      Diet:  DIET ADULT ORAL NUTRITION SUPPLEMENT  DIET NPO  DVT PPx: heparin  Code status: Full Code  Disposition/Expected LOS: > 2 midnights         Objective:     Patient Vitals for the past 24 hrs:   Temp Pulse Resp BP SpO2   06/13/21 1120 98.3 °F (36.8 °C) 86 18 123/69 98 %   06/13/21 0735 98.5 °F (36.9 °C) 71 18 (!) 169/75 95 %   06/13/21 0257 98.3 °F (36.8 °C) 90 18 (!) 151/69 97 %   06/12/21 2311 98.1 °F (36.7 °C) 76 18 (!) 157/84 95 %   06/12/21 2000 99 °F (37.2 °C) 64 18 (!) 142/75 96 %   06/12/21 1520 99.7 °F (37.6 °C) 95 18 (!) 149/75 95 %     Oxygen Therapy  O2 Sat (%): 98 % (06/13/21 1120)  Pulse via Oximetry: 69 beats per minute (06/10/21 1815)  O2 Device: Nasal cannula (06/13/21 0757)  O2 Flow Rate (L/min): 4 l/min (06/13/21 0757)    Body mass index is 41.1 kg/m². Physical Exam:   General:          No acute distress  Lungs:             Fine crackles at bases, speaking in full sentences, no use of accessory muscles   CV:                  Irr irregular   Abdomen:        Soft, nondistended, normoactive bowel sounds   Extremities:     No cyanosis, no clubbing, atraumatic   Neuro:                         Nonfocal, A&O x3   Psych:             Normal affect   Data Review:  I have reviewed all labs, meds, and studies from the last 24 hours:    Labs:    Recent Results (from the past 24 hour(s))   CBC WITH AUTOMATED DIFF    Collection Time: 06/13/21 12:43 AM   Result Value Ref Range    WBC 5.6 4.3 - 11.1 K/uL    RBC 4.46 4.05 - 5.2 M/uL    HGB 10.5 (L) 11.7 - 15.4 g/dL    HCT 34.0 (L) 35.8 - 46.3 %    MCV 76.2 (L) 79.6 - 97.8 FL    MCH 23.5 (L) 26.1 - 32.9 PG    MCHC 30.9 (L) 31.4 - 35.0 g/dL    RDW 14.8 (H) 11.9 - 14.6 %    PLATELET 057 026 - 897 K/uL    MPV 9.6 9.4 - 12.3 FL    ABSOLUTE NRBC 0.00 0.0 - 0.2 K/uL    DF AUTOMATED      NEUTROPHILS 70 43 - 78 %    LYMPHOCYTES 15 13 - 44 %    MONOCYTES 13 (H) 4.0 - 12.0 %    EOSINOPHILS 1 0.5 - 7.8 %    BASOPHILS 1 0.0 - 2.0 %    IMMATURE GRANULOCYTES 0 0.0 - 5.0 %    ABS. NEUTROPHILS 3.9 1.7 - 8.2 K/UL    ABS. LYMPHOCYTES 0.8 0.5 - 4.6 K/UL    ABS. MONOCYTES 0.7 0.1 - 1.3 K/UL    ABS. EOSINOPHILS 0.1 0.0 - 0.8 K/UL    ABS. BASOPHILS 0.0 0.0 - 0.2 K/UL    ABS. IMM.  GRANS. 0.0 0.0 - 0.5 K/UL   METABOLIC PANEL, BASIC    Collection Time: 06/13/21 12:43 AM   Result Value Ref Range    Sodium 141 136 - 145 mmol/L    Potassium 4.3 3.5 - 5.1 mmol/L    Chloride 108 (H) 98 - 107 mmol/L    CO2 26 21 - 32 mmol/L    Anion gap 7 7 - 16 mmol/L    Glucose 118 (H) 65 - 100 mg/dL    BUN 22 8 - 23 MG/DL    Creatinine 0.92 0.6 - 1.0 MG/DL    GFR est AA >60 >60 ml/min/1.73m2    GFR est non-AA >60 >60 ml/min/1.73m2    Calcium 8.3 8.3 - 10.4 MG/DL   GENTAMICIN, TROUGH    Collection Time: 06/13/21 12:43 AM   Result Value Ref Range    Gentamicin, trough 0.8 (L) 1.0 - 2.0 ug/ml       All Micro Results     Procedure Component Value Units Date/Time    CULTURE, BLOOD [[id number]] Collected: 06/08/21 0951    Order Status: Completed Specimen: Blood Updated: 06/13/21 1032     Special Requests: --        RIGHT  HAND       Culture result: NO GROWTH 5 DAYS       CULTURE, BLOOD [[id number]] Collected: 06/08/21 0957    Order Status: Completed Specimen: Blood Updated: 06/13/21 1032     Special Requests: --        LEFT  HAND       Culture result: NO GROWTH 5 DAYS       CULTURE, URINE [[id number]] Collected: 06/07/21 2138    Order Status: Completed Specimen: Urine from Clean catch Updated: 06/10/21 0847     Special Requests: NO SPECIAL REQUESTS        Culture result: NO GROWTH 2 DAYS       SARS-COV-2, PCR [[id number]] Collected: 06/09/21 1036    Order Status: Completed Specimen: Nasopharyngeal Updated: 06/10/21 0622     Specimen source Nasopharyngeal        Comment: Corrected on 06/09 AT 1113: This is a correction to the medical record of the test results previously reported as NASAL        SARS-CoV-2 Not detected        Comment:      The specimen is NEGATIVE for SARS-CoV-2, the novel coronavirus associated with COVID-19. This test has been authorized by the FDA under an Emergency Use Authorization (EUA) for use by authorized laboratories. Fact sheet for Healthcare Providers: ConventionUpdate.co.nz       Fact sheet for Patients: ConventionUpdate.co.nz       Methodology: RT-PCR         CULTURE, BLOOD [[id number]]  (Abnormal) Collected: 06/06/21 1929    Order Status: Completed Specimen: Blood Updated: 06/09/21 0816     Special Requests: --        RIGHT  ARM       GRAM STAIN GRAM POS COCCI IN CHAINS               AEROBIC AND ANAEROBIC BOTTLES                  CRITICAL RESULT NOT CALLED DUE TO PREVIOUS NOTIFICATION OF CRITICAL RESULT WITHIN THE LAST 24 HOURS.            Culture result: ENTEROCOCCUS SPECIES               For Susceptibility Refer to Culture  [id number]      CULTURE, BLOOD [[id number]]  (Abnormal)  (Susceptibility) Collected: 06/06/21 1929    Order Status: Completed Specimen: Blood Updated: 06/09/21 0816     Special Requests: --        LEFT  HAND       GRAM STAIN GRAM POS COCCI IN CHAINS               AEROBIC AND ANAEROBIC BOTTLES                  RESULTS VERIFIED, PHONED TO AND READ BACK BY Irene Han RN ON 6/7/21 @1046, TA           Culture result:       ENTEROCOCCUS FAECALIS GROUP D                  Refer to Blood Culture ID Panel Accession  [id number]      COVID-19 RAPID TEST [[id number]] Collected: 06/09/21 0352    Order Status: Completed Specimen: Nasopharyngeal Updated: 06/09/21 0423     Specimen source NASAL        COVID-19 rapid test Not detected        Comment:      The specimen is NEGATIVE for SARS-CoV-2, the novel coronavirus associated with COVID-19. A negative result does not rule out COVID-19. This test has been authorized by the FDA under an Emergency Use Authorization (EUA) for use by authorized laboratories. Fact sheet for Healthcare Providers: ConventionEub[employer] Private Limiteddate.[email or web address].nz  Fact sheet for Patients: ConventionUpdate.co.nz       Methodology: Isothermal Nucleic Acid Amplification         CULTURE, URINE [[id number]] Collected: 06/07/21 2138    Order Status: Canceled Specimen: Urine from Clean catch     BLOOD CULTURE ID PANEL [[id number]]  (Abnormal) Collected: 06/06/21 1929    Order Status: Completed Specimen: Blood Updated: 06/07/21 1048     Acc. no. from Micro Order [id number]     Enterococcus Detected        Comment: RESULTS VERIFIED, PHONED TO AND READ BACK BY  Irene Han RN ON 6/7/21 @1046, TA          van A/B (Vancomycin Resistance Gene) NOT DETECTED        INTERPRETATION       Gram positive cocci, identified by realtime PCR as SUSPECTED Vancomycin Sensitive Enterococcus species.            Comment: Recommend IV vancomycin therapy until full susceptibility information available. THIS TEST DOES NOT REPLACE SENSITIVITY TESTING. EKG Results     Procedure 720 Value Units Date/Time    EKG, 12 LEAD, INITIAL [[id number]] Collected: 06/09/21 2245    Order Status: Completed Updated: 06/10/21 0716     Ventricular Rate 82 BPM      Atrial Rate 87 BPM      QRS Duration 90 ms      Q-T Interval 378 ms      QTC Calculation (Bezet) 441 ms      Calculated R Axis 7 degrees      Calculated T Axis 155 degrees      Diagnosis --     Atrial fibrillation  ST & T wave abnormality, consider lateral ischemia  Abnormal ECG    Confirmed by [facility]  MD ()VIANEY ([id number]) on 6/10/2021 7:15:56 AM      EKG, 12 LEAD, INITIAL [[id number]]     Order Status: Canceled           Other Studies:  No results found.     Current Meds:   Current Facility-Administered Medications   Medication Dose Route Frequency    losartan (COZAAR) tablet 100 mg  100 mg Oral DAILY    ampicillin (OMNIPEN) 2 g in 0.9% sodium chloride (MBP/ADV) 100 mL MBP  2 g IntraVENous Q6H    [Held by provider] furosemide (LASIX) injection 40 mg  40 mg IntraVENous DAILY    midazolam (VERSED) injection 1-2 mg  1-2 mg IntraVENous Multiple    fentaNYL citrate (PF) injection 25-50 mcg  25-50 mcg IntraVENous Multiple    gentamicin in Saline (Iso-osm) (GARAMYCIN) 80 mg/100 mL IVPB premix 80 mg  80 mg IntraVENous Q24H    ferrous sulfate tablet 325 mg  1 Tablet Oral BID WITH MEALS    alum-mag hydroxide-simeth (MYLANTA) oral suspension 30 mL  30 mL Oral Q4H PRN    simethicone (MYLICON) tablet 80 mg  80 mg Oral QID PRN    HYDROcodone-acetaminophen (NORCO)  mg tablet 1 Tablet  1 Tablet Oral Q8H PRN    traMADoL (ULTRAM) tablet 50 mg  50 mg Oral Q6H PRN    sodium chloride (NS) flush 5-10 mL  5-10 mL IntraVENous Q8H    sodium chloride (NS) flush 5-10 mL  5-10 mL IntraVENous PRN    sodium chloride (NS) flush 5-40 mL  5-40 mL IntraVENous Q8H    sodium chloride (NS) flush 5-40 mL  5-40 mL IntraVENous PRN    heparin (porcine) injection 5,000 Units  5,000 Units SubCUTAneous Q8H    ondansetron (ZOFRAN) injection 4 mg  4 mg IntraVENous Q6H PRN    amLODIPine (NORVASC) tablet 10 mg  10 mg Oral DAILY    aspirin delayed-release tablet 81 mg  81 mg Oral DAILY    calcium-vitamin D (OS-SHANDRA +D3) 250 mg-125 unit per tablet 1 Tablet  1 Tablet Oral DAILY    carvediloL (COREG) tablet 25 mg  25 mg Oral BID WITH MEALS    senna-docusate (PERICOLACE) 8.6-50 mg per tablet 2 Tablet  2 Tablet Oral QHS    pantoprazole (PROTONIX) tablet 40 mg  40 mg Oral ACB    sucralfate (CARAFATE) tablet 1 g  1 g Oral AC&HS    polyethylene glycol (MIRALAX) packet 17 g  17 g Oral DAILY       Problem List:  Hospital Problems as of 6/13/2021 Date Reviewed: 5/13/2021        Codes Class Noted - Resolved POA    * (Principal) Acute pyelonephritis ICD-10-CM: N10  ICD-9-CM: 590.10  6/6/2021 - Present Unknown        CKD (chronic kidney disease) stage 3, GFR 30-59 ml/min (HCC) (Chronic) ICD-10-CM: N18.30  ICD-9-CM: 585.3  6/10/2016 - Present Yes        Morbid obesity (HCC) (Chronic) ICD-10-CM: E66.01  ICD-9-CM: 278.01  2/20/2015 - Present Yes               Signed By: Manisha Angeles NP   [employer] [facility]    June 13, 2021  5:15 PM

## 2021-06-13 NOTE — PROGRESS NOTES
[facility] CARDIOLOGY PROGRESS NOTE           6/13/2021 11:34 AM    Admit Date: 6/6/2021      Subjective:       Review of Systems   Constitutional: Negative for fever. HENT: Negative for hearing loss. Respiratory: Negative for cough. Cardiovascular: Negative for chest pain. Genitourinary: Negative for dysuria. Musculoskeletal: Negative for myalgias. Skin: Negative for rash. Neurological: Negative for dizziness. Objective:      Vitals:    06/12/21 2311 06/13/21 0257 06/13/21 0539 06/13/21 0735   BP: (!) 157/84 (!) 151/69  (!) 169/75   Pulse: 76 90  71   Resp: 18 18  18   Temp: 98.1 °F (36.7 °C) 98.3 °F (36.8 °C)  98.5 °F (36.9 °C)   SpO2: 95% 97%  95%   Weight:   247 lb (112 kg)    Height:             Physical Exam  Constitutional:       Appearance: She is well-developed. HENT:      Head: Normocephalic. Eyes:      Conjunctiva/sclera: Conjunctivae normal.      Pupils: Pupils are equal, round, and reactive to light. Neck:      Vascular: No JVD. Cardiovascular:      Heart sounds: No murmur heard. Comments: stenotomy site clean dry intact. Pulmonary:      Effort: Pulmonary effort is normal. No respiratory distress. Breath sounds: No wheezing. Abdominal:      General: There is no distension. Palpations: Abdomen is soft. Musculoskeletal:      Cervical back: Normal range of motion. Skin:     General: Skin is warm and dry. Neurological:      Mental Status: She is alert and oriented to person, place, and time.          Data Review:   Recent Labs     06/13/21  0043 06/12/21  0551 06/11/21  0438    141 140   K 4.3 4.6 4.5   MG  --   --  2.2   BUN 22 22 26*   CREA 0.92 0.86 0.91   * 91 91   WBC 5.6 5.7 6.0   HGB 10.5* 9.6* 9.6*   HCT 34.0* 32.1* 31.9*    138* 137*         Intake/Output Summary (Last 24 hours) at 6/13/2021 0833  Last data filed at 6/13/2021 0735  Gross per 24 hour   Intake 1120 ml   Output 2750 ml   Net -1630 ml     Current Facility-Administered Medications   Medication Dose Route Frequency    losartan (COZAAR) tablet 100 mg  100 mg Oral DAILY    ampicillin (OMNIPEN) 2 g in 0.9% sodium chloride (MBP/ADV) 100 mL MBP  2 g IntraVENous Q6H    [Held by provider] furosemide (LASIX) injection 40 mg  40 mg IntraVENous DAILY    midazolam (VERSED) injection 1-2 mg  1-2 mg IntraVENous Multiple    fentaNYL citrate (PF) injection 25-50 mcg  25-50 mcg IntraVENous Multiple    gentamicin in Saline (Iso-osm) (GARAMYCIN) 80 mg/100 mL IVPB premix 80 mg  80 mg IntraVENous Q24H    ferrous sulfate tablet 325 mg  1 Tablet Oral BID WITH MEALS    alum-mag hydroxide-simeth (MYLANTA) oral suspension 30 mL  30 mL Oral Q4H PRN    simethicone (MYLICON) tablet 80 mg  80 mg Oral QID PRN    HYDROcodone-acetaminophen (NORCO)  mg tablet 1 Tablet  1 Tablet Oral Q8H PRN    traMADoL (ULTRAM) tablet 50 mg  50 mg Oral Q6H PRN    sodium chloride (NS) flush 5-10 mL  5-10 mL IntraVENous Q8H    sodium chloride (NS) flush 5-10 mL  5-10 mL IntraVENous PRN    sodium chloride (NS) flush 5-40 mL  5-40 mL IntraVENous Q8H    sodium chloride (NS) flush 5-40 mL  5-40 mL IntraVENous PRN    heparin (porcine) injection 5,000 Units  5,000 Units SubCUTAneous Q8H    ondansetron (ZOFRAN) injection 4 mg  4 mg IntraVENous Q6H PRN    amLODIPine (NORVASC) tablet 10 mg  10 mg Oral DAILY    aspirin delayed-release tablet 81 mg  81 mg Oral DAILY    calcium-vitamin D (OS-SHANDRA +D3) 250 mg-125 unit per tablet 1 Tablet  1 Tablet Oral DAILY    carvediloL (COREG) tablet 25 mg  25 mg Oral BID WITH MEALS    senna-docusate (PERICOLACE) 8.6-50 mg per tablet 2 Tablet  2 Tablet Oral QHS    pantoprazole (PROTONIX) tablet 40 mg  40 mg Oral ACB    sucralfate (CARAFATE) tablet 1 g  1 g Oral AC&HS    polyethylene glycol (MIRALAX) packet 17 g  17 g Oral DAILY           Assessment/Plan:     66 y.o. 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 Patient with transesophageal echocardiogram performed 6/11/2021 with moderate mitral vegetation mild MR    Endocarditis  · Currently followed by infectious disease subsequent blood cultures negative appears to be Enterococcus that is sensitive to ampicillin. · No perivalvular extension moderate sized vegetation no severe MR  · Serial imaging reasonable at appropriate interval following treatment echo 2 weeks. Atrial fibrillation  · Was not on anticoagulation as an outpatient. · No AC now due to acute left sided endocarditis. · Patient has a longstanding history longstanding history of atrial fibrillation   · Now rate controlled     DD  · Improved on lasix holding now     Hypertension  · Blood pressures were better controlled overnight now elevated blood pressure this a.m.   · Continue carvedilol 25 mg p.o. twice daily  · Amlodipine 10 mg daily  · Add outpatient dose of losartan      Tyson Braga MD  6/13/2021 11:34 AM

## 2021-06-13 NOTE — PROGRESS NOTES
PICC Placement Note    PRE-PROCEDURE VERIFICATION  Correct Procedure: yes. Time out completed with assistant Alphonso Chilel rn and all persons present in agreement with time out. Correct Site:  yes  Temperature: Temp: 97.9 °F (36.6 °C), Temperature Source: Temp Source: Oral  Recent Labs     06/13/21  0043   BUN 22   CREA 0.92      WBC 5.6     Allergies: Latex; Metformin; Sulfa (sulfonamide antibiotics); Nsaids (non-steroidal anti-inflammatory drug); Macrobid [nitrofurantoin monohyd/m-cryst]; Other plant, animal, environmental; Oxycodone; and Tape [adhesive]  Education materials for [address] given to patient or family. PROCEDURE DETAIL  A single lumen PICC line was started for antibiotic therapy. The following documentation is in addition to the PICC properties in the lines/airways flowsheet :  Lot #: [id number]  xylocaine used: yes  Mid-Arm Circumference: 38 (cm)  Internal Catheter Length: 48 (cm)  Internal Catheter Total Length: 48 (cm)  Vein Selection for PICC:left basilic  Central Line Bundle followed yes  Complication Related to Insertion: right basilic vein accessed by Uche Horowitz rn but guidewire would not thread so then I accessed the right basilic guidewire threaded without difficulty but then catheter would not thread catheter continued to flip after several flushes catheter finally dropped into svc. Both the insertion guidewire and ECG guidewire were removed intact all ports have positive blood return and were flush well with normal saline. The location of the tip of the PICC is verified using x-ray technology. The tip is in the SVC per x-ray reading. See image below.                Line is okay to use: yes

## 2021-06-13 NOTE — PROGRESS NOTES
END OF SHIFT NOTE:    INTAKE/OUTPUT  06/12 0701 - 06/13 0700  In: 1120 [P.O.:1120]  Out: 2850 [Urine:2850]  Voiding: NO  Catheter: YES  Drain:   External Urinary Catheter 06/12/21 (Active)   Site Assessment Clean, dry, & intact 06/13/21 0315   Repositioned No 06/13/21 0315   Perineal Care No 06/13/21 0315   Wick Changed No 06/13/21 0315   Suction Canister/Tubing Changed No 06/13/21 0315   Urine Output (mL) 250 ml 06/13/21 0257               Flatus: Patient does have flatus present. Stool:  0 occurrences. Characteristics:      Emesis: 0 occurrences. Characteristics:        VITAL SIGNS  Patient Vitals for the past 12 hrs:   Temp Pulse Resp BP SpO2   06/13/21 0257 98.3 °F (36.8 °C) 90 18 (!) 151/69 97 %   06/12/21 2311 98.1 °F (36.7 °C) 76 18 (!) 157/84 95 %   06/12/21 2000 99 °F (37.2 °C) 64 18 (!) 142/75 96 %       Pain Assessment  Pain Intensity 1: 0 (06/12/21 1935)  Pain Location 1: Back  Pain Intervention(s) 1: Medication (see MAR)  Patient Stated Pain Goal: 0    Ambulating  Yes    Shift report given to oncoming nurse at the bedside.     Paddy Saravia RN

## 2021-06-13 NOTE — PROGRESS NOTES
Patient's IV infiltrated. Patient and patient's son explained that patient is a hard stick and the last IV was obtained using ultrasound. PICC team and ICU [facility] notified.

## 2021-06-14 LAB
ANION GAP SERPL CALC-SCNC: 3 MMOL/L (ref 7–16)
BASOPHILS # BLD: 0 K/UL (ref 0–0.2)
BASOPHILS NFR BLD: 1 % (ref 0–2)
BUN SERPL-MCNC: 18 MG/DL (ref 8–23)
CALCIUM SERPL-MCNC: 8.9 MG/DL (ref 8.3–10.4)
CHLORIDE SERPL-SCNC: 107 MMOL/L (ref 98–107)
CO2 SERPL-SCNC: 32 MMOL/L (ref 21–32)
COVID-19 RAPID TEST, COVR: NOT DETECTED
CREAT SERPL-MCNC: 0.8 MG/DL (ref 0.6–1)
DIFFERENTIAL METHOD BLD: ABNORMAL
EOSINOPHIL # BLD: 0.1 K/UL (ref 0–0.8)
EOSINOPHIL NFR BLD: 1 % (ref 0.5–7.8)
ERYTHROCYTE [DISTWIDTH] IN BLOOD BY AUTOMATED COUNT: 14.6 % (ref 11.9–14.6)
GLUCOSE SERPL-MCNC: 114 MG/DL (ref 65–100)
HCT VFR BLD AUTO: 34.3 % (ref 35.8–46.3)
HGB BLD-MCNC: 10.1 G/DL (ref 11.7–15.4)
IMM GRANULOCYTES # BLD AUTO: 0 K/UL (ref 0–0.5)
IMM GRANULOCYTES NFR BLD AUTO: 1 % (ref 0–5)
LYMPHOCYTES # BLD: 0.7 K/UL (ref 0.5–4.6)
LYMPHOCYTES NFR BLD: 14 % (ref 13–44)
MCH RBC QN AUTO: 23.2 PG (ref 26.1–32.9)
MCHC RBC AUTO-ENTMCNC: 29.4 G/DL (ref 31.4–35)
MCV RBC AUTO: 78.9 FL (ref 79.6–97.8)
MONOCYTES # BLD: 0.7 K/UL (ref 0.1–1.3)
MONOCYTES NFR BLD: 13 % (ref 4–12)
NEUTS SEG # BLD: 3.8 K/UL (ref 1.7–8.2)
NEUTS SEG NFR BLD: 71 % (ref 43–78)
NRBC # BLD: 0 K/UL (ref 0–0.2)
PLATELET # BLD AUTO: 182 K/UL (ref 150–450)
PMV BLD AUTO: 9.7 FL (ref 9.4–12.3)
POTASSIUM SERPL-SCNC: 4.5 MMOL/L (ref 3.5–5.1)
RBC # BLD AUTO: 4.35 M/UL (ref 4.05–5.2)
SODIUM SERPL-SCNC: 142 MMOL/L (ref 136–145)
SOURCE, COVRS: NORMAL
WBC # BLD AUTO: 5.3 K/UL (ref 4.3–11.1)

## 2021-06-14 PROCEDURE — 80048 BASIC METABOLIC PNL TOTAL CA: CPT

## 2021-06-14 PROCEDURE — 2709999900 HC NON-CHARGEABLE SUPPLY

## 2021-06-14 PROCEDURE — 36415 COLL VENOUS BLD VENIPUNCTURE: CPT

## 2021-06-14 PROCEDURE — 74011250637 HC RX REV CODE- 250/637: Performed by: HOSPITALIST

## 2021-06-14 PROCEDURE — 65270000029 HC RM PRIVATE

## 2021-06-14 PROCEDURE — 85025 COMPLETE CBC W/AUTO DIFF WBC: CPT

## 2021-06-14 PROCEDURE — 97530 THERAPEUTIC ACTIVITIES: CPT

## 2021-06-14 PROCEDURE — 87635 SARS-COV-2 COVID-19 AMP PRB: CPT

## 2021-06-14 PROCEDURE — 74011000258 HC RX REV CODE- 258: Performed by: INTERNAL MEDICINE

## 2021-06-14 PROCEDURE — 74011250636 HC RX REV CODE- 250/636: Performed by: INTERNAL MEDICINE

## 2021-06-14 PROCEDURE — 74011250637 HC RX REV CODE- 250/637: Performed by: INTERNAL MEDICINE

## 2021-06-14 PROCEDURE — 99232 SBSQ HOSP IP/OBS MODERATE 35: CPT | Performed by: INTERNAL MEDICINE

## 2021-06-14 PROCEDURE — 77030038269 HC DRN EXT URIN PURWCK BARD -A

## 2021-06-14 PROCEDURE — 74011250637 HC RX REV CODE- 250/637: Performed by: NURSE PRACTITIONER

## 2021-06-14 PROCEDURE — 74011250636 HC RX REV CODE- 250/636: Performed by: HOSPITALIST

## 2021-06-14 RX ADMIN — AMPICILLIN SODIUM 2 G: 2 INJECTION, POWDER, FOR SOLUTION INTRAMUSCULAR; INTRAVENOUS at 05:46

## 2021-06-14 RX ADMIN — ASPIRIN 81 MG: 81 TABLET ORAL at 09:37

## 2021-06-14 RX ADMIN — Medication 10 ML: at 21:40

## 2021-06-14 RX ADMIN — SENNOSIDES AND DOCUSATE SODIUM 2 TABLET: 8.6; 5 TABLET ORAL at 21:37

## 2021-06-14 RX ADMIN — CARVEDILOL 25 MG: 25 TABLET, FILM COATED ORAL at 09:38

## 2021-06-14 RX ADMIN — AMPICILLIN SODIUM 2 G: 2 INJECTION, POWDER, FOR SOLUTION INTRAMUSCULAR; INTRAVENOUS at 16:05

## 2021-06-14 RX ADMIN — Medication 10 ML: at 05:45

## 2021-06-14 RX ADMIN — CALCIUM CARBONATE-CHOLECALCIFEROL TAB 250 MG-125 UNIT 1 TABLET: 250-125 TAB at 09:39

## 2021-06-14 RX ADMIN — Medication 10 ML: at 15:18

## 2021-06-14 RX ADMIN — PANTOPRAZOLE SODIUM 40 MG: 40 TABLET, DELAYED RELEASE ORAL at 05:45

## 2021-06-14 RX ADMIN — Medication 10 ML: at 16:05

## 2021-06-14 RX ADMIN — HEPARIN SODIUM 5000 UNITS: 5000 INJECTION INTRAVENOUS; SUBCUTANEOUS at 21:40

## 2021-06-14 RX ADMIN — CARVEDILOL 25 MG: 25 TABLET, FILM COATED ORAL at 16:05

## 2021-06-14 RX ADMIN — AMPICILLIN SODIUM 2 G: 2 INJECTION, POWDER, FOR SOLUTION INTRAMUSCULAR; INTRAVENOUS at 23:59

## 2021-06-14 RX ADMIN — HEPARIN SODIUM 5000 UNITS: 5000 INJECTION INTRAVENOUS; SUBCUTANEOUS at 15:16

## 2021-06-14 RX ADMIN — LOSARTAN POTASSIUM 100 MG: 50 TABLET, FILM COATED ORAL at 09:41

## 2021-06-14 RX ADMIN — SUCRALFATE 1 G: 1 TABLET ORAL at 12:23

## 2021-06-14 RX ADMIN — FERROUS SULFATE TAB 325 MG (65 MG ELEMENTAL FE) 325 MG: 325 (65 FE) TAB at 09:40

## 2021-06-14 RX ADMIN — SUCRALFATE 1 G: 1 TABLET ORAL at 09:41

## 2021-06-14 RX ADMIN — Medication 10 ML: at 05:44

## 2021-06-14 RX ADMIN — AMLODIPINE BESYLATE 10 MG: 10 TABLET ORAL at 09:39

## 2021-06-14 RX ADMIN — AMPICILLIN SODIUM 2 G: 2 INJECTION, POWDER, FOR SOLUTION INTRAMUSCULAR; INTRAVENOUS at 21:01

## 2021-06-14 RX ADMIN — HYDROCODONE BITARTRATE AND ACETAMINOPHEN 1 TABLET: 10; 325 TABLET ORAL at 21:37

## 2021-06-14 RX ADMIN — CEFTRIAXONE 2 G: 2 INJECTION, POWDER, FOR SOLUTION INTRAMUSCULAR; INTRAVENOUS at 15:16

## 2021-06-14 RX ADMIN — TRAMADOL HYDROCHLORIDE 50 MG: 50 TABLET, FILM COATED ORAL at 16:21

## 2021-06-14 RX ADMIN — HEPARIN SODIUM 5000 UNITS: 5000 INJECTION INTRAVENOUS; SUBCUTANEOUS at 05:45

## 2021-06-14 RX ADMIN — FERROUS SULFATE TAB 325 MG (65 MG ELEMENTAL FE) 325 MG: 325 (65 FE) TAB at 16:05

## 2021-06-14 RX ADMIN — AMPICILLIN SODIUM 2 G: 2 INJECTION, POWDER, FOR SOLUTION INTRAMUSCULAR; INTRAVENOUS at 12:23

## 2021-06-14 RX ADMIN — HYDROCODONE BITARTRATE AND ACETAMINOPHEN 1 TABLET: 10; 325 TABLET ORAL at 12:18

## 2021-06-14 RX ADMIN — GENTAMICIN SULFATE 80 MG: 80 INJECTION, SOLUTION INTRAVENOUS at 01:32

## 2021-06-14 RX ADMIN — SUCRALFATE 1 G: 1 TABLET ORAL at 16:05

## 2021-06-14 RX ADMIN — SUCRALFATE 1 G: 1 TABLET ORAL at 21:37

## 2021-06-14 NOTE — PROGRESS NOTES
Comprehensive Nutrition Assessment    Type and Reason for Visit: Reassess  Best Practice Alert for Malnutrition Screening Tool: Recently Lost Weight Without Trying: Yes, If Yes, How Much Weight Loss: 2-13 lbs, Eating Poorly Due to Decreased Appetite: Yes    Nutrition Recommendations/Plan:   · Meals and Snacks:  · Continue current diet. · Nutrition Supplement Therapy:  · Medical food supplement therapy:  Continue Glucerna Shake three times per day - flavor preference noted (this provides 220 kcal and 10 grams protein per bottle)     Malnutrition Assessment:  Malnutrition Status: At risk for malnutrition (specify) (poor PO and wt loss PTA, current poor PO)    Nutrition Assessment:   Nutrition History: History per pt and family. They report usually 2 meals per day. First meal usually around lunch time and second around dinner with some snacking in the evening. Pt reports decline in PO over the last ~3 weeks and just picking right before admission. Nutrition Background: Patient with PMH significant for CAD, CKD, DM, AVR, GERD, HTN, obesity, PUD. She presented with weakness, fatigue, falls, and abdominal and back pain. She was admitted with acute pyelonephritis. Daily Update:  Pt seen in company of female visitor at bedside. Pt states that she had 100% of a cup of soup for lunch today but no breakfast. Pt reports that she has never been a breakfast eater but also states that she has no appetite. Pt reports poor intake of lunch and dinner yesterday. She states that glucerna shakes are too sweet but she is able to tolerate the strawberry flavor the best.    Nutrition Related Findings:   No muscle wasting/fat loss per observation.        Current Nutrition Therapies:  ADULT ORAL NUTRITION SUPPLEMENT Dinner, Lunch, Breakfast; Diabetic Supplement  ADULT DIET Regular; Low Fat/Low Chol/High Fiber/ANJALI    Current Intake:   Average Meal Intake: Unable to assess (variable PO intake of 0-100%) Average Supplement Intake:  (0% per pt)      Anthropometric Measures:  Height: 5' 5\" (165.1 cm)  Current Body Wt: 111.8 kg (246 lb 7.6 oz) (6/14), Weight source: Standing scale  BMI: 41, Obese class 3 (BMI 40.0 or greater)  Admission Body Weight: 242 lb 15.2 oz (last office weight 6/1)  Ideal Body Weight (lbs) (Calculated): 125 lbs (57 kg), 197.2 %  Usual Body Wt: 113.4 kg (250 lb) (per pt and review of EMR), Percent weight change: -2.8          Edema: No data recorded   Estimated Daily Nutrient Needs:  Energy (kcal/day): 1705-3626 (Kcal/kg (25-30), Weight Used: Ideal (56.8 kg))  Protein (g/day): 57-63 (1-1.1 g/kg) Weight Used: (Ideal)  Fluid (ml/day):   (1 ml/kcal)    Nutrition Diagnosis:   · Inadequate oral intake related to  (loss of appetite, nausea) as evidenced by  (patient reported barrier to PO, intake as above)    Nutrition Interventions:   Food and/or Nutrient Delivery: Continue current diet, Continue oral nutrition supplement     Coordination of Nutrition Care: Continue to monitor while inpatient  Plan of Care discussed with WING Turner    Goals:   Previous Goal Met: No progress toward goal(s)  Active Goal: Meet at least 75% nutrition needs by next RD follow-up    Nutrition Monitoring and Evaluation:      Food/Nutrient Intake Outcomes: Food and nutrient intake, Supplement intake       Discharge Planning:     Too soon to determine    Balbir Singleton Mateusz [address], [address], [facility] [address] [place] [address] Ave.

## 2021-06-14 NOTE — PROGRESS NOTES
ACUTE PHYSICAL THERAPY GOALS:  (Developed with and agreed upon by patient and/or caregiver. )  LTG:  (1.)Ms. Dilip Horowitz will move from supine to sit and sit to supine , scoot up and down and roll side to side in flat bed without siderails with  INDEPENDENT within 7 day(s). (2.)Ms. Dilip Horowitz will perform all functional transfers with  MODIFIED INDEPENDENCE using the least restrictive device within 7 day(s). (3.)Ms. Dilip Horowitz will ambulate with  MODIFIED INDEPENDENCE for 250+ feet with normal vital sign response with the least restrictive device within 7 day(s). PHYSICAL THERAPY: Daily Note and PM Treatment Day # 3    Maria Guadalupe Weinberg is a 66 y.o. female   PRIMARY DIAGNOSIS: Acute pyelonephritis  Acute pyelonephritis [N10]  Procedure(s) (LRB):  LEFT HEART CATH / CORONARY ANGIOGRAPHY (N/A)  4 Days Post-Op    ASSESSMENT:     REHAB RECOMMENDATIONS: CURRENT LEVEL OF FUNCTION:  (Most Recently Demonstrated)   Recommendation to date pending progress:  Setting:   Short-term Rehab  Equipment:    To Be Determined Bed Mobility:   Not tested  Sit to Stand:  [employer] Assistance  Transfers:   Contact Guard Assistance  Gait/Mobility:   Contact Guard Assistance     ASSESSMENT:  Ms. Dilip Horowitz was sitting in recliner and agreeable to PT. She performed several STS transfers with CGA-Kiara both with and without RW. Pt also participated in standing activities with support of RW as well as ambulated in room with CGA/RW. Post ambulation SpO2 recorded at 89% but adolfo with rest and breathing techniques to 93%. Slight progress in mobility today. SUBJECTIVE:   Ms. Dilip Horowitz states, \"I'm a big animal lover. \"    SOCIAL HISTORY/ LIVING ENVIRONMENT: Ms. Dilip Horowitz lives with her son who works.   She ambulates with a rollator independently in home and community, drives, etc.  Home Environment: Private residence  # Steps to Enter: 3  One/Two Story Residence: One story  Living Alone: No  Support Systems: Family member(s)  OBJECTIVE:     PAIN: VITAL SIGNS: LINES/DRAINS:   Pre Treatment: Pain Screen  Pain Scale 1: Numeric (0 - 10)  Pain Intensity 1: 0  Post Treatment: 0   IV and Purewick  O2 Device: None (Room air)     MOBILITY: I Mod I S SBA CGA Min Mod Max Total  NT x2 Comments:   Bed Mobility    Rolling [] [] [] [] [] [] [] [] [] [] []    Supine to Sit [] [] [] [] [] [] [] [] [] [] []    Scooting [] [] [] [] [] [] [] [] [] [] []    Sit to Supine [] [] [] [] [] [] [] [] [] [] []    Transfers    Sit to Stand [] [] [] [] [x] [] [] [] [] [] []    Bed to Chair [] [] [] [] [] [] [] [] [] [] []    Stand to Sit [] [] [] [] [x] [] [] [] [] [] []    I=Independent, Mod I=Modified Independent, S=Supervision, SBA=Standby Assistance, CGA=Contact Guard Assistance,   Min=Minimal Assistance, Mod=Moderate Assistance, Max=Maximal Assistance, Total=Total Assistance, NT=Not Tested    BALANCE: Good Fair+ Fair Fair- Poor NT Comments   Sitting Static [x] [] [] [] [] []    Sitting Dynamic [] [x] [] [] [] []              Standing Static [] [] [x] [] [] []    Standing Dynamic [] [] [x] [] [] []      GAIT: I Mod I S SBA CGA Min Mod Max Total  NT x2 Comments:   Level of Assistance [] [] [] [] [x] [] [] [] [] [] []    Distance 40 ft    DME Rolling Walker    Gait Quality Decreased B LE step length    Weightbearing  Status N/A     I=Independent, Mod I=Modified Independent, S=Supervision, SBA=Standby Assistance, CGA=Contact Guard Assistance,   Min=Minimal Assistance, Mod=Moderate Assistance, Max=Maximal Assistance, Total=Total Assistance, NT=Not Tested    PLAN:   FREQUENCY/DURATION: PT Plan of Care: 3 times/week for duration of hospital stay or until stated goals are met, whichever comes first.  TREATMENT:     TREATMENT:   ($$ Therapeutic Activity: 23-37 mins    )  Therapeutic Activity (23 Minutes):  Therapeutic activity included Transfer Training, Ambulation on level ground, Sitting balance  and Standing balance to improve functional Mobility, Strength and Activity tolerance.     TREATMENT GRID:  N/A    AFTER TREATMENT POSITION/PRECAUTIONS:  Chair, Needs within reach and Visitors at bedside    INTERDISCIPLINARY COLLABORATION:  RN/PCT and PT/PTA    TOTAL TREATMENT DURATION:  PT Patient Time In/Time Out  Time In: 1347  Time Out: [address], PT, DPT

## 2021-06-14 NOTE — PROGRESS NOTES
Chart screened by  for discharge planning. Patient to discharge to [facility] when medically stable. ID will need to determine EOT for antibiotics. CM placed order for rapid Covid as required by the [facility] [place] facility. CM will continue to follow patient during hospitalization for discharge planning and needs. Please consult  if any new issues arise.

## 2021-06-14 NOTE — PROGRESS NOTES
[facility] Hospitalist Progress Note     Name:  Caryn Garrett  Age:71 y.o. Sex:female   :  1950    MRN:  [id number]     Admit Date:  2021    Reason for Admission:  Acute pyelonephritis [N10]    Hospital Course/Interval history:       66year old CF PMH FM, HTN, PUD, valvular heart disease with artificial heart valve, chronic pain admitted  for acute pyelonephritis. She was found to have E faecalis bacteremia and MV vegetation. Subjective (21):      Final abx plan pending. Pt currently on ampicillin and gentamicin. Has been accepted to [facility] pending abx plan. PICC placed yesterday. Is on O2, unclear why. Spoke to nursing to wean O2 today. Denies CP, SOB, n/v/d, abd pain. ADDENDUM:  Pt and family express concern to ID for loss of bowel or bladder and LE weakness prior to admission. Spinal imagining ordered per ID, pt has spinal hardware. Abx changed to Ampicillin and Rocephin per ID. Review of Systems: 14 point review of systems is otherwise negative with the exception of the elements mentioned above. Assessment & Plan     Acute pyelonephritis  Enterococcal bacteremia:  : Blood cultures w/ GPC              - Start vancomycin              - Follow final and sensitivities              - Cont cefepime for now              - WBC 21.5 --> 12.9  : Blood cultures w/ presumptive enterococcus              - Vanc Day 2              - Draw new set of blood cultures now              - Cefepime Day 3              - Consult Infectious Disease for further recs  : One initial culture (+) E. Faecalis              - Repeat cultures NGTD              - Gent Day 2; Vanc Day 3              - Cefepime was discontinued              - WBC improved to 8.3    HOWIE planned for today.  BC repeat NGTD            Gent Day 4; Vanc Day 5             ID continues to follow   HOWIE with MV vegetation, continues on Gent Day 5; Vanc Day 6, BC NGTD   Gent Day 6; Vanc Day 7, BC NGTD  6/14 on ampicillin and Gent. ID following, pending final abx plan.      Angina  Elevated troponin  Atrial fibrillation, paroxysmal, rate controlled  Golden/10: CP overnight described as \"indigestion\"; resolved during morning exam              - Troponin 406 --> 377              - Cardiology consulted              - TTE 6/9 w/ 60-65% LVEF; no WMA  6/11 LHC done 6/10 showed mild CAD; no sever stenosis noted. Cardiology following along  6/12 HOWIE with MV vegetation, remains on Gent and Vanc. ID Following.        Nephrolithiasis, non-obstructing  Jun/07: Noted on CT; no hydronephrosis, no masses.     CKD stage III: Creatinine slightly elevated, initiated on IV fluids, will monitor creatinine. Jun/07: sCr 1.55 --> 1.38              - Cont IVF  Jun/09: At baseline     Iron deficiency anemia  Jun/09: Iron 18              - Start FeSO4     Nausea  resolved     Chronic back pain  Hx surgical fusion w/ hardware  DDD  Continue on home medications.     Hx aortic valve replacement  Jun/08: TTE to eval for vegetations  Jun/09: HIC13-06% LVEF; no WMA   Golden/10: May need HOWIE d/t poor visualization of AVR and MV  6/11 OHWIE planned for today. 6/12 HOWIE with MV vegetation, remains on Gent and Vanc. ID Following.     Hypertension: Continue on home medications, hold nephrotoxic medications.     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 6/13 per cardiology, plan for repeat echo 2 weeks outpatient.   Gent Day 6; Vanc Day 7, BC NGTD  6/14 on Gent and Ampicillin.  Pending final abx plan per ID.         Diet:  DIET ADULT ORAL NUTRITION SUPPLEMENT  DIET NPO  DVT PPx: heparin  Code status: Full Code  Disposition/Expected LOS: > 2 midnights              Objective:     Patient Vitals for the past 24 hrs:   Temp Pulse Resp BP SpO2   06/14/21 0700 98.6 °F (37 °C) 78 18 (!) 157/96 96 %   06/14/21 0455 97.7 °F (36.5 °C) 61 18 (!) 157/85 97 %   06/13/21 2311 98.3 °F (36.8 °C) (!) 54 18 100/70 97 %   06/13/21 1932 98 °F (36.7 °C) 73 18 119/75 94 %   06/13/21 1420 97.9 °F (36.6 °C) 86 18 115/72 98 %   06/13/21 1120 98.3 °F (36.8 °C) 86 18 123/69 98 %     Oxygen Therapy  O2 Sat (%): 96 % (06/14/21 0700)  Pulse via Oximetry: 69 beats per minute (06/10/21 1815)  O2 Device: Nasal cannula (06/13/21 1915)  O2 Flow Rate (L/min): 4 l/min (06/13/21 1915)    Body mass index is 41.02 kg/m². Physical Exam:   General:          No acute distress  Lungs:             CTA bilaterally.  speaking in full sentences, no use of accessory muscles   CV:                 S1S2 present without murmurs rubs gallops. RRR. No LE edema  Abdomen:        Soft, nondistended, normoactive bowel sounds   Extremities:     No cyanosis, no clubbing, atraumatic   Neuro:                         Nonfocal, A&O x3   Psych:             Normal affect     Data Review:  I have reviewed all labs, meds, and studies from the last 24 hours:    Labs:    Recent Results (from the past 24 hour(s))   CBC WITH AUTOMATED DIFF    Collection Time: 06/14/21  6:05 AM   Result Value Ref Range    WBC 5.3 4.3 - 11.1 K/uL    RBC 4.35 4.05 - 5.2 M/uL    HGB 10.1 (L) 11.7 - 15.4 g/dL    HCT 34.3 (L) 35.8 - 46.3 %    MCV 78.9 (L) 79.6 - 97.8 FL    MCH 23.2 (L) 26.1 - 32.9 PG    MCHC 29.4 (L) 31.4 - 35.0 g/dL    RDW 14.6 11.9 - 14.6 %    PLATELET 831 804 - 176 K/uL    MPV 9.7 9.4 - 12.3 FL    ABSOLUTE NRBC 0.00 0.0 - 0.2 K/uL    DF AUTOMATED      NEUTROPHILS 71 43 - 78 %    LYMPHOCYTES 14 13 - 44 %    MONOCYTES 13 (H) 4.0 - 12.0 %    EOSINOPHILS 1 0.5 - 7.8 %    BASOPHILS 1 0.0 - 2.0 %    IMMATURE GRANULOCYTES 1 0.0 - 5.0 %    ABS. NEUTROPHILS 3.8 1.7 - 8.2 K/UL    ABS. LYMPHOCYTES 0.7 0.5 - 4.6 K/UL    ABS. MONOCYTES 0.7 0.1 - 1.3 K/UL    ABS. EOSINOPHILS 0.1 0.0 - 0.8 K/UL    ABS. BASOPHILS 0.0 0.0 - 0.2 K/UL    ABS. IMM.  GRANS. 0.0 0.0 - 0.5 K/UL   METABOLIC PANEL, BASIC    Collection Time: 06/14/21  6:05 AM   Result Value Ref Range    Sodium 142 136 - 145 mmol/L    Potassium 4.5 3.5 - 5.1 mmol/L    Chloride 107 98 - 107 mmol/L    CO2 32 21 - 32 mmol/L    Anion gap 3 (L) 7 - 16 mmol/L    Glucose 114 (H) 65 - 100 mg/dL    BUN 18 8 - 23 MG/DL    Creatinine 0.80 0.6 - 1.0 MG/DL    GFR est AA >60 >60 ml/min/1.73m2    GFR est non-AA >60 >60 ml/min/1.73m2    Calcium 8.9 8.3 - 10.4 MG/DL       All Micro Results     Procedure Component Value Units Date/Time    CULTURE, BLOOD [[id number]] Collected: 06/08/21 0951    Order Status: Completed Specimen: Blood Updated: 06/13/21 1032     Special Requests: --        RIGHT  HAND       Culture result: NO GROWTH 5 DAYS       CULTURE, BLOOD [[id number]] Collected: 06/08/21 0957    Order Status: Completed Specimen: Blood Updated: 06/13/21 1032     Special Requests: --        LEFT  HAND       Culture result: NO GROWTH 5 DAYS       CULTURE, URINE [[id number]] Collected: 06/07/21 2138    Order Status: Completed Specimen: Urine from Clean catch Updated: 06/10/21 0847     Special Requests: NO SPECIAL REQUESTS        Culture result: NO GROWTH 2 DAYS       SARS-COV-2, PCR [[id number]] Collected: 06/09/21 1036    Order Status: Completed Specimen: Nasopharyngeal Updated: 06/10/21 0622     Specimen source Nasopharyngeal        Comment: Corrected on 06/09 AT 1113: This is a correction to the medical record of the test results previously reported as NASAL        SARS-CoV-2 Not detected        Comment:      The specimen is NEGATIVE for SARS-CoV-2, the novel coronavirus associated with COVID-19. This test has been authorized by the FDA under an Emergency Use Authorization (EUA) for use by authorized laboratories.         Fact sheet for Healthcare Providers: ConventionUpdate.co.nz       Fact sheet for Patients: ConventionUpdate.co.nz       Methodology: RT-PCR         CULTURE, BLOOD [[id number]]  (Abnormal) Collected: 06/06/21 1929    Order Status: Completed Specimen: Blood Updated: 06/09/21 0816     Special Requests: --        RIGHT  ARM       GRAM STAIN GRAM POS COCCI IN CHAINS               AEROBIC AND ANAEROBIC BOTTLES                  CRITICAL RESULT NOT CALLED DUE TO PREVIOUS NOTIFICATION OF CRITICAL RESULT WITHIN THE LAST 24 HOURS. Culture result: ENTEROCOCCUS SPECIES               For Susceptibility Refer to Culture  [id number]      CULTURE, BLOOD [[id number]]  (Abnormal)  (Susceptibility) Collected: 06/06/21 1929    Order Status: Completed Specimen: Blood Updated: 06/09/21 0816     Special Requests: --        LEFT  HAND       GRAM STAIN GRAM POS COCCI IN CHAINS               AEROBIC AND ANAEROBIC BOTTLES                  RESULTS VERIFIED, PHONED TO AND READ BACK BY Denae Stinson RN ON 6/7/21 @1046, TA           Culture result:       ENTEROCOCCUS FAECALIS GROUP D                  Refer to Blood Culture ID Panel Accession  [id number]      COVID-19 RAPID TEST [[id number]] Collected: 06/09/21 0352    Order Status: Completed Specimen: Nasopharyngeal Updated: 06/09/21 0423     Specimen source NASAL        COVID-19 rapid test Not detected        Comment:      The specimen is NEGATIVE for SARS-CoV-2, the novel coronavirus associated with COVID-19. A negative result does not rule out COVID-19. This test has been authorized by the FDA under an Emergency Use Authorization (EUA) for use by authorized laboratories.         Fact sheet for Healthcare Providers: ConventionUpdate.co.nz  Fact sheet for Patients: ConventionUpdate.co.nz       Methodology: Isothermal Nucleic Acid Amplification         CULTURE, URINE [[id number]] Collected: 06/07/21 2138    Order Status: Canceled Specimen: Urine from Clean catch     BLOOD CULTURE ID PANEL [[id number]]  (Abnormal) Collected: 06/06/21 1929    Order Status: Completed Specimen: Blood Updated: 06/07/21 1048     Acc. no. from Micro Order [id number]     Enterococcus Detected Comment: RESULTS VERIFIED, PHONED TO AND READ BACK BY  Emanuel Gan RN ON 6/7/21 @1046, TA          van A/B (Vancomycin Resistance Gene) NOT DETECTED        INTERPRETATION       Gram positive cocci, identified by realtime PCR as SUSPECTED Vancomycin Sensitive Enterococcus species. Comment: Recommend IV vancomycin therapy until full susceptibility information available. THIS TEST DOES NOT REPLACE SENSITIVITY TESTING. EKG Results     Procedure 720 Value Units Date/Time    EKG, 12 LEAD, INITIAL [[id number]] Collected: 06/09/21 2245    Order Status: Completed Updated: 06/10/21 0716     Ventricular Rate 82 BPM      Atrial Rate 87 BPM      QRS Duration 90 ms      Q-T Interval 378 ms      QTC Calculation (Bezet) 441 ms      Calculated R Axis 7 degrees      Calculated T Axis 155 degrees      Diagnosis --     Atrial fibrillation  ST & T wave abnormality, consider lateral ischemia  Abnormal ECG    Confirmed by [facility]  MD ()VIANEY ([id number]) on 6/10/2021 7:15:56 AM      EKG, 12 LEAD, INITIAL [[id number]]     Order Status: Canceled           Other Studies:  XR CHEST SNGL V    Result Date: 6/13/2021  History: PICC line placement Exam: portable chest Comparison: 6/9/2021 Findings: There is a PICC line which terminates at the cavoatrial junction. There is a left pleural effusion with left basilar airspace consolidation. The right lung is clear. No change in the appearance of the mediastinal contour or osseous structures. IMPRESSIONs: PICC line as described.        Current Meds:   Current Facility-Administered Medications   Medication Dose Route Frequency    losartan (COZAAR) tablet 100 mg  100 mg Oral DAILY    sodium chloride (NS) flush 10 mL  10 mL InterCATHeter Q8H    sodium chloride (NS) flush 10 mL  10 mL InterCATHeter PRN    ampicillin (OMNIPEN) 2 g in 0.9% sodium chloride (MBP/ADV) 100 mL MBP  2 g IntraVENous Q6H    [Held by provider] furosemide (LASIX) injection 40 mg  40 mg IntraVENous DAILY    gentamicin in Saline (Iso-osm) (GARAMYCIN) 80 mg/100 mL IVPB premix 80 mg  80 mg IntraVENous Q24H    ferrous sulfate tablet 325 mg  1 Tablet Oral BID WITH MEALS    alum-mag hydroxide-simeth (MYLANTA) oral suspension 30 mL  30 mL Oral Q4H PRN    simethicone (MYLICON) tablet 80 mg  80 mg Oral QID PRN    HYDROcodone-acetaminophen (NORCO)  mg tablet 1 Tablet  1 Tablet Oral Q8H PRN    traMADoL (ULTRAM) tablet 50 mg  50 mg Oral Q6H PRN    sodium chloride (NS) flush 5-10 mL  5-10 mL IntraVENous Q8H    sodium chloride (NS) flush 5-10 mL  5-10 mL IntraVENous PRN    sodium chloride (NS) flush 5-40 mL  5-40 mL IntraVENous Q8H    sodium chloride (NS) flush 5-40 mL  5-40 mL IntraVENous PRN    heparin (porcine) injection 5,000 Units  5,000 Units SubCUTAneous Q8H    ondansetron (ZOFRAN) injection 4 mg  4 mg IntraVENous Q6H PRN    amLODIPine (NORVASC) tablet 10 mg  10 mg Oral DAILY    aspirin delayed-release tablet 81 mg  81 mg Oral DAILY    calcium-vitamin D (OS-SHANDRA +D3) 250 mg-125 unit per tablet 1 Tablet  1 Tablet Oral DAILY    carvediloL (COREG) tablet 25 mg  25 mg Oral BID WITH MEALS    senna-docusate (PERICOLACE) 8.6-50 mg per tablet 2 Tablet  2 Tablet Oral QHS    pantoprazole (PROTONIX) tablet 40 mg  40 mg Oral ACB    sucralfate (CARAFATE) tablet 1 g  1 g Oral AC&HS    polyethylene glycol (MIRALAX) packet 17 g  17 g Oral DAILY       Problem List:  Hospital Problems as of 6/14/2021 Date Reviewed: 5/13/2021        Codes Class Noted - Resolved POA    * (Principal) Acute pyelonephritis ICD-10-CM: N10  ICD-9-CM: 590.10  6/6/2021 - Present Unknown        CKD (chronic kidney disease) stage 3, GFR 30-59 ml/min (HCC) (Chronic) ICD-10-CM: N18.30  ICD-9-CM: 585.3  6/10/2016 - Present Yes        Morbid obesity (HCC) (Chronic) ICD-10-CM: E66.01  ICD-9-CM: 278.01  2/20/2015 - Present Yes               Notes, labs, VS, diagnostic testing reviewed  Case discussed with pt, care team,  [place]      Signed By: Sridhar Gottlieb NP   [facility]    June 14, 2021  5:15 PM

## 2021-06-14 NOTE — PROGRESS NOTES
END OF SHIFT NOTE:    INTAKE/OUTPUT  06/13 0701 - 06/14 0700  In: 8409 [P.O.:1010; I.V.:625]  Out: 2000 [Urine:2000]  Voiding: NO  Catheter: YES purwick   External Urinary Catheter 06/12/21 (Active)   Site Assessment Clean, dry, & intact 06/14/21 0200   Repositioned No 06/14/21 0200   Perineal Care No 06/14/21 0200   Wick Changed No 06/14/21 0200   Suction Canister/Tubing Changed No 06/14/21 0506   Urine Output (mL) 350 ml 06/14/21 0506               Flatus: Patient does have flatus present. Stool:  0 occurrences. Characteristics:      Emesis: 0 occurrences. Characteristics:        VITAL SIGNS  Patient Vitals for the past 12 hrs:   Temp Pulse Resp BP SpO2   06/14/21 0455 97.7 °F (36.5 °C) 61 18 (!) 157/85 97 %   06/13/21 2311 98.3 °F (36.8 °C) (!) 54 18 100/70 97 %       Pain Assessment  Pain Intensity 1: 8 (06/13/21 2009)  Pain Location 1: Back  Pain Intervention(s) 1: Medication (see MAR)  Patient Stated Pain Goal: 0    Ambulating  Yes    Shift report given to oncoming nurse at the bedside.     Trever Freitas RN

## 2021-06-14 NOTE — PROGRESS NOTES
am  6/14/2021 6:25 AM    Admit Date: 6/6/2021    Admit Diagnosis: Acute pyelonephritis [N10]      Subjective:    Patient : Patient in the hospital with acute incidence of pyelonephritis with Enterococcus facialis she is being followed by infectious disease she did have a HOWIE on 6/11/2021 with mild mitral regurgitation and presence of vegetation. She is apparently being discharged to [facility] today    Objective:      Visit Vitals  BP (!) 157/85 (BP 1 Location: Right upper arm, BP Patient Position: Sitting)   Pulse 61   Temp 97.7 °F (36.5 °C)   Resp 18   Ht 5' 5\" (1.651 m)   Wt 247 lb (112 kg)   SpO2 97%   BMI 41.10 kg/m²       ROS:  General ROS: negative for - chills  Hematological and Lymphatic ROS: negative for - blood clots or jaundice  Respiratory ROS: no cough, shortness of breath, or wheezing  Cardiovascular ROS: no chest pain or dyspnea on exertion  Gastrointestinal ROS: no abdominal pain, change in bowel habits, or black or bloody stools  Neurological ROS: no TIA or stroke symptoms    Physical Exam:      Physical Examination: General appearance - Appearance: alert, well appearing, and in no distress.    Neck/lymph - supple, no significant adenopathy  Chest/CV - clear to auscultation, no wheezes, rales or rhonchi, symmetric air entry  Heart - normal rate, regular rhythm, normal S1, S2, no murmurs, rubs, clicks or gallops  Abdomen/GI - soft, nontender, nondistended, no masses or organomegaly   Musculoskeletal - no joint tenderness, deformity or swelling  Extremities - peripheral pulses normal, no pedal edema, no clubbing or cyanosis  Skin - normal coloration and turgor, no rashes, no suspicious skin lesions noted    Current Facility-Administered Medications   Medication Dose Route Frequency    losartan (COZAAR) tablet 100 mg  100 mg Oral DAILY    sodium chloride (NS) flush 10 mL  10 mL InterCATHeter Q8H    sodium chloride (NS) flush 10 mL  10 mL InterCATHeter PRN    ampicillin (OMNIPEN) 2 g in 0.9% sodium chloride (MBP/ADV) 100 mL MBP  2 g IntraVENous Q6H    [Held by provider] furosemide (LASIX) injection 40 mg  40 mg IntraVENous DAILY    midazolam (VERSED) injection 1-2 mg  1-2 mg IntraVENous Multiple    fentaNYL citrate (PF) injection 25-50 mcg  25-50 mcg IntraVENous Multiple    gentamicin in Saline (Iso-osm) (GARAMYCIN) 80 mg/100 mL IVPB premix 80 mg  80 mg IntraVENous Q24H    ferrous sulfate tablet 325 mg  1 Tablet Oral BID WITH MEALS    alum-mag hydroxide-simeth (MYLANTA) oral suspension 30 mL  30 mL Oral Q4H PRN    simethicone (MYLICON) tablet 80 mg  80 mg Oral QID PRN    HYDROcodone-acetaminophen (NORCO)  mg tablet 1 Tablet  1 Tablet Oral Q8H PRN    traMADoL (ULTRAM) tablet 50 mg  50 mg Oral Q6H PRN    sodium chloride (NS) flush 5-10 mL  5-10 mL IntraVENous Q8H    sodium chloride (NS) flush 5-10 mL  5-10 mL IntraVENous PRN    sodium chloride (NS) flush 5-40 mL  5-40 mL IntraVENous Q8H    sodium chloride (NS) flush 5-40 mL  5-40 mL IntraVENous PRN    heparin (porcine) injection 5,000 Units  5,000 Units SubCUTAneous Q8H    ondansetron (ZOFRAN) injection 4 mg  4 mg IntraVENous Q6H PRN    amLODIPine (NORVASC) tablet 10 mg  10 mg Oral DAILY    aspirin delayed-release tablet 81 mg  81 mg Oral DAILY    calcium-vitamin D (OS-SHANDRA +D3) 250 mg-125 unit per tablet 1 Tablet  1 Tablet Oral DAILY    carvediloL (COREG) tablet 25 mg  25 mg Oral BID WITH MEALS    senna-docusate (PERICOLACE) 8.6-50 mg per tablet 2 Tablet  2 Tablet Oral QHS    pantoprazole (PROTONIX) tablet 40 mg  40 mg Oral ACB    sucralfate (CARAFATE) tablet 1 g  1 g Oral AC&HS    polyethylene glycol (MIRALAX) packet 17 g  17 g Oral DAILY       Data Review: data included in this note has been independently reviewed by the author   CMP: No results found for: NA, K, CL, CO2, AGAP, GLU, BUN, CREA, GFRAA, GFRNA, CA, MG, PHOS, ALB, TBIL, TP, ALB, GLOB, AGRAT, ALT  CBC: No results found for: WBC, HGB, HGBEXT, HCT, HCTEXT, PLT, PLTEXT, HGBEXT, HCTEXT, PLTEXT     TELEMETRY: nsr    Assessment/Plan:     Principal Problem:   66 y.o. 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 Patient with transesophageal echocardiogram performed 6/11/2021 with moderate mitral vegetation mild MR     Endocarditis  · Currently followed by infectious disease subsequent blood cultures negative appears to be Enterococcus that is sensitive to ampicillin. · No perivalvular extension moderate sized vegetation no severe MR  · Serial imaging reasonable at appropriate interval following treatment echo 2 weeks.      Atrial fibrillation  · Was not on anticoagulation as an outpatient. · No AC now due to acute left sided endocarditis. · Patient has a longstanding history longstanding history of atrial fibrillation   · Now rate controlled      DD  · Improved on lasix holding now      Hypertension  · Blood pressures were better controlled overnight now elevated blood pressure this a.m.   · Continue carvedilol 25 mg p.o. twice daily  · Amlodipine 10 mg daily  Add outpatient dose of losartan         Bryan Castro MD

## 2021-06-14 NOTE — PROGRESS NOTES
Problem: Urinary Tract Infection - Adult  Goal: *Absence of infection signs and symptoms  Outcome: Progressing Towards Goal     Problem: Patient Education: Go to Patient Education Activity  Goal: Patient/Family Education  Outcome: Progressing Towards Goal     Problem: General Medical Care Plan  Goal: *Vital signs within specified parameters  Outcome: Progressing Towards Goal  Goal: *Labs within defined limits  Outcome: Progressing Towards Goal  Goal: *Absence of infection signs and symptoms  Outcome: Progressing Towards Goal  Goal: *Optimal pain control at patient's stated goal  Outcome: Progressing Towards Goal  Goal: *Skin integrity maintained  Outcome: Progressing Towards Goal  Goal: *Fluid volume balance  Outcome: Progressing Towards Goal  Goal: *Optimize nutritional status  Outcome: Progressing Towards Goal  Goal: *Anxiety reduced or absent  Outcome: Progressing Towards Goal  Goal: *Progressive mobility and function (eg: ADL's)  Outcome: Progressing Towards Goal     Problem: Patient Education: Go to Patient Education Activity  Goal: Patient/Family Education  Outcome: Progressing Towards Goal     Problem: Falls - Risk of  Goal: *Absence of Falls  Description: Document Samson Fall Risk and appropriate interventions in the flowsheet.   Outcome: Progressing Towards Goal  Note: Fall Risk Interventions:  Mobility Interventions: Communicate number of staff needed for ambulation/transfer, Patient to call before getting OOB    Mentation Interventions: Adequate sleep, hydration, pain control, More frequent rounding    Medication Interventions: Patient to call before getting OOB, Teach patient to arise slowly    Elimination Interventions: Call light in reach    History of Falls Interventions: Door open when patient unattended         Problem: Patient Education: Go to Patient Education Activity  Goal: Patient/Family Education  Outcome: Progressing Towards Goal     Problem: Patient Education: Go to Patient Education Activity  Goal: Patient/Family Education  Outcome: Progressing Towards Goal     Problem: Pressure Injury - Risk of  Goal: *Prevention of pressure injury  Description: Document Noam Scale and appropriate interventions in the flowsheet. Outcome: Progressing Towards Goal  Note: Pressure Injury Interventions:  Sensory Interventions: Assess changes in LOC, Keep linens dry and wrinkle-free, Minimize linen layers    Moisture Interventions: Absorbent underpads, Internal/External fecal devices, Minimize layers    Activity Interventions: Increase time out of bed, PT/OT evaluation    Mobility Interventions: PT/OT evaluation    Nutrition Interventions: Document food/fluid/supplement intake    Friction and Shear Interventions: HOB 30 degrees or less, Minimize layers                Problem: Patient Education: Go to Patient Education Activity  Goal: Patient/Family Education  Outcome: Progressing Towards Goal     Pt in no distress, no C/O pain/N/V/diarrhea, tolerating meds/diet, POC/safety reviewed, pt and granddaughter understand plan, possible D/C to [facility] today. Student nurse, instructor, and RN at bedside assisting pt today.

## 2021-06-15 LAB
ANION GAP SERPL CALC-SCNC: 4 MMOL/L (ref 7–16)
BASOPHILS # BLD: 0 K/UL (ref 0–0.2)
BASOPHILS NFR BLD: 0 % (ref 0–2)
BUN SERPL-MCNC: 16 MG/DL (ref 8–23)
CALCIUM SERPL-MCNC: 8.4 MG/DL (ref 8.3–10.4)
CHLORIDE SERPL-SCNC: 104 MMOL/L (ref 98–107)
CO2 SERPL-SCNC: 32 MMOL/L (ref 21–32)
CREAT SERPL-MCNC: 0.88 MG/DL (ref 0.6–1)
DIFFERENTIAL METHOD BLD: ABNORMAL
EOSINOPHIL # BLD: 0 K/UL (ref 0–0.8)
EOSINOPHIL NFR BLD: 1 % (ref 0.5–7.8)
ERYTHROCYTE [DISTWIDTH] IN BLOOD BY AUTOMATED COUNT: 14.8 % (ref 11.9–14.6)
GLUCOSE SERPL-MCNC: 112 MG/DL (ref 65–100)
HCT VFR BLD AUTO: 33 % (ref 35.8–46.3)
HGB BLD-MCNC: 10 G/DL (ref 11.7–15.4)
IMM GRANULOCYTES # BLD AUTO: 0 K/UL (ref 0–0.5)
IMM GRANULOCYTES NFR BLD AUTO: 1 % (ref 0–5)
LYMPHOCYTES # BLD: 0.8 K/UL (ref 0.5–4.6)
LYMPHOCYTES NFR BLD: 13 % (ref 13–44)
MCH RBC QN AUTO: 23.3 PG (ref 26.1–32.9)
MCHC RBC AUTO-ENTMCNC: 30.3 G/DL (ref 31.4–35)
MCV RBC AUTO: 76.9 FL (ref 79.6–97.8)
MONOCYTES # BLD: 0.7 K/UL (ref 0.1–1.3)
MONOCYTES NFR BLD: 11 % (ref 4–12)
NEUTS SEG # BLD: 4.5 K/UL (ref 1.7–8.2)
NEUTS SEG NFR BLD: 75 % (ref 43–78)
NRBC # BLD: 0 K/UL (ref 0–0.2)
PLATELET # BLD AUTO: 203 K/UL (ref 150–450)
PMV BLD AUTO: 9.4 FL (ref 9.4–12.3)
POTASSIUM SERPL-SCNC: 4.3 MMOL/L (ref 3.5–5.1)
RBC # BLD AUTO: 4.29 M/UL (ref 4.05–5.2)
SODIUM SERPL-SCNC: 140 MMOL/L (ref 136–145)
WBC # BLD AUTO: 6 K/UL (ref 4.3–11.1)

## 2021-06-15 PROCEDURE — 2709999900 HC NON-CHARGEABLE SUPPLY

## 2021-06-15 PROCEDURE — 74011000258 HC RX REV CODE- 258: Performed by: INTERNAL MEDICINE

## 2021-06-15 PROCEDURE — 65270000029 HC RM PRIVATE

## 2021-06-15 PROCEDURE — 74011250637 HC RX REV CODE- 250/637: Performed by: NURSE PRACTITIONER

## 2021-06-15 PROCEDURE — 74011250637 HC RX REV CODE- 250/637: Performed by: INTERNAL MEDICINE

## 2021-06-15 PROCEDURE — 99232 SBSQ HOSP IP/OBS MODERATE 35: CPT | Performed by: INTERNAL MEDICINE

## 2021-06-15 PROCEDURE — 74011250636 HC RX REV CODE- 250/636: Performed by: INTERNAL MEDICINE

## 2021-06-15 PROCEDURE — 74011250637 HC RX REV CODE- 250/637: Performed by: HOSPITALIST

## 2021-06-15 PROCEDURE — 85025 COMPLETE CBC W/AUTO DIFF WBC: CPT

## 2021-06-15 PROCEDURE — 74011250636 HC RX REV CODE- 250/636: Performed by: HOSPITALIST

## 2021-06-15 PROCEDURE — 80048 BASIC METABOLIC PNL TOTAL CA: CPT

## 2021-06-15 PROCEDURE — 77030038269 HC DRN EXT URIN PURWCK BARD -A

## 2021-06-15 RX ORDER — SAME BUTANEDISULFONATE/BETAINE 400-600 MG
500 POWDER IN PACKET (EA) ORAL 2 TIMES DAILY
Status: DISCONTINUED | OUTPATIENT
Start: 2021-06-15 | End: 2021-06-16 | Stop reason: HOSPADM

## 2021-06-15 RX ADMIN — Medication 10 ML: at 16:07

## 2021-06-15 RX ADMIN — SUCRALFATE 1 G: 1 TABLET ORAL at 08:38

## 2021-06-15 RX ADMIN — Medication 10 ML: at 22:58

## 2021-06-15 RX ADMIN — CARVEDILOL 25 MG: 25 TABLET, FILM COATED ORAL at 16:06

## 2021-06-15 RX ADMIN — CEFTRIAXONE 2 G: 2 INJECTION, POWDER, FOR SOLUTION INTRAMUSCULAR; INTRAVENOUS at 02:23

## 2021-06-15 RX ADMIN — RDII 250 MG CAPSULE 500 MG: at 17:21

## 2021-06-15 RX ADMIN — AMPICILLIN SODIUM 2 G: 2 INJECTION, POWDER, FOR SOLUTION INTRAMUSCULAR; INTRAVENOUS at 04:12

## 2021-06-15 RX ADMIN — HYDROCODONE BITARTRATE AND ACETAMINOPHEN 1 TABLET: 10; 325 TABLET ORAL at 18:30

## 2021-06-15 RX ADMIN — HEPARIN SODIUM 5000 UNITS: 5000 INJECTION INTRAVENOUS; SUBCUTANEOUS at 06:02

## 2021-06-15 RX ADMIN — LOSARTAN POTASSIUM 100 MG: 50 TABLET, FILM COATED ORAL at 08:38

## 2021-06-15 RX ADMIN — AMLODIPINE BESYLATE 10 MG: 10 TABLET ORAL at 08:38

## 2021-06-15 RX ADMIN — AMPICILLIN SODIUM 2 G: 2 INJECTION, POWDER, FOR SOLUTION INTRAMUSCULAR; INTRAVENOUS at 08:38

## 2021-06-15 RX ADMIN — CALCIUM CARBONATE-CHOLECALCIFEROL TAB 250 MG-125 UNIT 1 TABLET: 250-125 TAB at 08:38

## 2021-06-15 RX ADMIN — SUCRALFATE 1 G: 1 TABLET ORAL at 16:06

## 2021-06-15 RX ADMIN — FERROUS SULFATE TAB 325 MG (65 MG ELEMENTAL FE) 325 MG: 325 (65 FE) TAB at 16:06

## 2021-06-15 RX ADMIN — ASPIRIN 81 MG: 81 TABLET ORAL at 08:38

## 2021-06-15 RX ADMIN — FERROUS SULFATE TAB 325 MG (65 MG ELEMENTAL FE) 325 MG: 325 (65 FE) TAB at 08:38

## 2021-06-15 RX ADMIN — CEFTRIAXONE 2 G: 2 INJECTION, POWDER, FOR SOLUTION INTRAMUSCULAR; INTRAVENOUS at 14:06

## 2021-06-15 RX ADMIN — Medication 10 ML: at 06:03

## 2021-06-15 RX ADMIN — AMPICILLIN SODIUM 2 G: 2 INJECTION, POWDER, FOR SOLUTION INTRAMUSCULAR; INTRAVENOUS at 20:38

## 2021-06-15 RX ADMIN — HEPARIN SODIUM 5000 UNITS: 5000 INJECTION INTRAVENOUS; SUBCUTANEOUS at 22:57

## 2021-06-15 RX ADMIN — AMPICILLIN SODIUM 2 G: 2 INJECTION, POWDER, FOR SOLUTION INTRAMUSCULAR; INTRAVENOUS at 16:06

## 2021-06-15 RX ADMIN — CARVEDILOL 25 MG: 25 TABLET, FILM COATED ORAL at 08:38

## 2021-06-15 RX ADMIN — AMPICILLIN SODIUM 2 G: 2 INJECTION, POWDER, FOR SOLUTION INTRAMUSCULAR; INTRAVENOUS at 12:04

## 2021-06-15 RX ADMIN — SUCRALFATE 1 G: 1 TABLET ORAL at 22:57

## 2021-06-15 RX ADMIN — HEPARIN SODIUM 5000 UNITS: 5000 INJECTION INTRAVENOUS; SUBCUTANEOUS at 14:05

## 2021-06-15 RX ADMIN — Medication 10 ML: at 06:27

## 2021-06-15 RX ADMIN — PANTOPRAZOLE SODIUM 40 MG: 40 TABLET, DELAYED RELEASE ORAL at 06:02

## 2021-06-15 RX ADMIN — SUCRALFATE 1 G: 1 TABLET ORAL at 12:04

## 2021-06-15 NOTE — PROGRESS NOTES
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 Planned EOT will be 7/20/21. · Monitoring labs: CBC w/ diff, CMP weekly. · Plans noted for [place] transfer soon. · I discussed MRI with patient and she gets severe vertigo when lying flat. She has hardware in her neck so artifact is a concern. For now, we will cancel MRI. She may need oral suppression at the end of treatment based on possible spine infection and AVR still in place. Unclear if her recent decline is more cardiac in nature - ? HFpEF. · Due to diarrhea, will discontinue senna/miralax and start Florastor. Anti-infectives:   · Ceftriaxone 6/6  · Cefepime 6/6-6/8  · Vanc 6/6-6/10  · Gentamicin 6/8-  · Ampicillin 6/10 -     Subjective: Interval History: Seen with sister at bedside; pt transferred to chair today with assistance; no bowel/bladder incontinence, but having lots of diarrhea. Allergies   Allergen Reactions    Latex Rash    Metformin Diarrhea    Sulfa (Sulfonamide Antibiotics) Anaphylaxis    Nsaids (Non-Steroidal Anti-Inflammatory Drug) Other (comments)     ulcers    Macrobid [Nitrofurantoin Monohyd/M-Cryst] Other (comments)     Causes Gout    Other Plant, Animal, Environmental Itching     Pt itched after wearing a plastic blood pressure cuff.   Pt reports BP will be higher in right arm     Oxycodone Other (comments)     Hallucination, anxiety with oxycontin-- denies rx to hydrocodone    Tape [Adhesive] Rash     Paper tape ok        Review of Systems:  Pertinent items are noted in the History of Present Illness. Objective:     Visit Vitals  BP (!) 154/71   Pulse 84   Temp 98.3 °F (36.8 °C)   Resp 17   Ht 5' 5\" (1.651 m)   Wt 114.4 kg (252 lb 1.6 oz)   SpO2 96%   BMI 41.95 kg/m²     Temp (24hrs), Av.5 °F (36.9 °C), Min:97.3 °F (36.3 °C), Max:99.5 °F (37.5 °C)         General:  Alert, no acute distress, appears stated age, obese, sitting in chair  Head:    Normocephalic, atraumatic  Eyes:   Anicteric sclerae, no drainage, not injected, EOMI  Mouth:  Moist mucosa  Neck:   Supple  Lungs:   Clear without increased work of breathing or audible wheezes  CV:   Regular rate and rhythm; soft murmur noted  Abdomen:  Soft, non tender, not distended, active bowel sounds  Extremities:  No cyanosis; trace edema  Musculoskeletal: No deformity  Skin:   No acute rash  Psych:   Alert, oriented and appropriate without evidence of thought disorder  Lines:    benign      Data Review:     CBC:  Recent Labs     06/15/21  0621  0605 21  0043   WBC 6.0 5.3 5.6   GRANS 75 71 70   MONOS 11 13* 13*   EOS 1 1 1   ANEU 4.5 3.8 3.9   ABL 0.8 0.7 0.8   HGB 10.0* 10.1* 10.5*   HCT 33.0* 34.3* 34.0*    182 157       BMP:  Recent Labs     06/15/21  0623 21  0605 21  0043   CREA 0.88 0.80 0.92   BUN 16 18 22    142 141   K 4.3 4.5 4.3    107 108*   CO2 32 32 26   AGAP 4* 3* 7   * 114* 118*       LFTS:  No results for input(s): TBILI, ALT, AP, TP, ALB in the last 72 hours.     No lab exists for component: SGOT    Microbiology:     All Micro Results     Procedure Component Value Units Date/Time    COVID-19 RAPID TEST [[id number]] Collected: 21 1222    Order Status: Completed Specimen: Nasopharyngeal Updated: 21 1311     Specimen source NASAL        COVID-19 rapid test Not detected Comment:      The specimen is NEGATIVE for SARS-CoV-2, the novel coronavirus associated with COVID-19. A negative result does not rule out COVID-19. This test has been authorized by the FDA under an Emergency Use Authorization (EUA) for use by authorized laboratories. Fact sheet for Healthcare Providers: Vayusa[employer]nz  Fact sheet for Patients: [employer]usa[employer]       Methodology: Isothermal Nucleic Acid Amplification         CULTURE, BLOOD [[id number]] Collected: 06/08/21 0951    Order Status: Completed Specimen: Blood Updated: 06/13/21 1032     Special Requests: --        RIGHT  HAND       Culture result: NO GROWTH 5 DAYS       CULTURE, BLOOD [[id number]] Collected: 06/08/21 0957    Order Status: Completed Specimen: Blood Updated: 06/13/21 1032     Special Requests: --        LEFT  HAND       Culture result: NO GROWTH 5 DAYS       CULTURE, URINE [[id number]] Collected: 06/07/21 2138    Order Status: Completed Specimen: Urine from Clean catch Updated: 06/10/21 0847     Special Requests: NO SPECIAL REQUESTS        Culture result: NO GROWTH 2 DAYS       SARS-COV-2, PCR [[id number]] Collected: 06/09/21 1036    Order Status: Completed Specimen: Nasopharyngeal Updated: 06/10/21 0622     Specimen source Nasopharyngeal        Comment: Corrected on 06/09 AT 1113: This is a correction to the medical record of the test results previously reported as NASAL        SARS-CoV-2 Not detected        Comment:      The specimen is NEGATIVE for SARS-CoV-2, the novel coronavirus associated with COVID-19. This test has been authorized by the FDA under an Emergency Use Authorization (EUA) for use by authorized laboratories.         Fact sheet for Healthcare Providers: Jag.ag.co.nz       Fact sheet for Patients: [employer]usa[employer]nz       Methodology: RT-PCR         CULTURE, BLOOD [[id number]]  (Abnormal) Collected: 06/06/21 1929    Order Status: Completed Specimen: Blood Updated: 06/09/21 0816     Special Requests: --        RIGHT  ARM       GRAM STAIN GRAM POS COCCI IN CHAINS               AEROBIC AND ANAEROBIC BOTTLES                  CRITICAL RESULT NOT CALLED DUE TO PREVIOUS NOTIFICATION OF CRITICAL RESULT WITHIN THE LAST 24 HOURS. Culture result: ENTEROCOCCUS SPECIES               For Susceptibility Refer to Culture  [id number]      CULTURE, BLOOD [[id number]]  (Abnormal)  (Susceptibility) Collected: 06/06/21 1929    Order Status: Completed Specimen: Blood Updated: 06/09/21 0816     Special Requests: --        LEFT  HAND       GRAM STAIN GRAM POS COCCI IN CHAINS               AEROBIC AND ANAEROBIC BOTTLES                  RESULTS VERIFIED, PHONED TO AND READ BACK BY Edwin Lugo RN ON 6/7/21 @1046, TA           Culture result:       ENTEROCOCCUS FAECALIS GROUP D                  Refer to Blood Culture ID Panel Accession  [id number]      COVID-19 RAPID TEST [[id number]] Collected: 06/09/21 0352    Order Status: Completed Specimen: Nasopharyngeal Updated: 06/09/21 0423     Specimen source NASAL        COVID-19 rapid test Not detected        Comment:      The specimen is NEGATIVE for SARS-CoV-2, the novel coronavirus associated with COVID-19. A negative result does not rule out COVID-19. This test has been authorized by the FDA under an Emergency Use Authorization (EUA) for use by authorized laboratories.         Fact sheet for Healthcare Providers: ConventionUpdate.co.nz  Fact sheet for Patients: ConventionUpdate.co.nz       Methodology: Isothermal Nucleic Acid Amplification         CULTURE, URINE [[id number]] Collected: 06/07/21 2138    Order Status: Canceled Specimen: Urine from Clean catch     BLOOD CULTURE ID PANEL [[id number]]  (Abnormal) Collected: 06/06/21 1929    Order Status: Completed Specimen: Blood Updated: 06/07/21 1048     Acc. no. from Micro Order [id number] Enterococcus Detected        Comment: RESULTS VERIFIED, PHONED TO AND READ BACK BY  Елена Villa RN ON 6/7/21 @1046, TA          van A/B (Vancomycin Resistance Gene) NOT DETECTED        INTERPRETATION       Gram positive cocci, identified by realtime PCR as SUSPECTED Vancomycin Sensitive Enterococcus species. Comment: Recommend IV vancomycin therapy until full susceptibility information available. THIS TEST DOES NOT REPLACE SENSITIVITY TESTING.              Imaging:   MRI pending    Signed By: Yun Foster MD     Oliva 15, 2021

## 2021-06-15 NOTE — PROGRESS NOTES
Hospitalist Progress Note    Subjective:   Daily Progress Note: 6/15/2021 10:12 AM    Patient presented to ER 6/6 with complaints of fatigue, lethargy, anorexia and nausea since fall on 5/21. She also reports head heaviness with neck pain. Seen x 2 by PHP without definitive findings. Found with WBC of 21.5, normal lactic. Creatinine 1.55 with baseline of 0.9.  UTI. Begun on rocephin. Also reports diarrhea 2-3 x per day. 6/7:  BLood cultures with GPC, vancomycin added. COntinued nausea. Added reglan. 6/8:  ID consulted with concern for bacteremia in AVR. Repeat BC   6/9:  + E Faecalis   6/10:  Cardiology in for chest pain and elevated troponin. Second BC with no growth. Vanc stopped, ampicillin IV begun  6/11:  HOWIE with mitral vegetation. Cardiac cath with mild CAD.    6/12:   Plans for [facility] on discharge. 6/14:  ID changing ampicillin to q 4 hours, discontinue gent, start ceftriaxone q 12 hours. Intense back pain, incontinence of urine and stool with hardware, pending MRI spine. Also unable to walk. [facility] can not accomodate abx schedule. Desaturation with activity, on oxygen. 6/15:  Up in chair, remains frail and weak. Pain at baseline. Pending approval for [facility] for ampicillin and ceftriaxone 7/20/21. May need oral suppression. ID cancelling MRI. Persistent diarrhea, starting florastor. Discontinued senna and miralax. Assessment/Plan:   LEFT FLANK PAIN WITH ACUTE PYELONEPHRITIS:     Rocephin on admission   Non obstructing stones    6/8:  ID in, continue vancomycin, D/C rocephin, add gent    BACTEREMIA WITH AVR:  Enterococcus    6/7:  Vancomyin added to Rocephin    6/8:  ID in gentamicin added, rocephin discontinued    6/10:  Unable to visualize valves on echo, Cardiology in, considering HOWIE     Vancomycin D/C, ampicillin IV begun.   Repeat cultures done 6/8 w                NGTD   6/14:  Ampicillin changed to q 4 hours, gent D/C, begin ceftriaxone q 12 hr     ENDOCARDITIS: Cardiology on board    6/11:  HOWIE with vegetation left atrial side of anterior MV leaflet. Repeat echo in 2-4 weeks, scheduled per Cards.   Follow up with Dr Demario Ivory   in 2 weeks       NAUSEA AND ANOREXIA:  Since 5/21 6/7:  Reglan added   6/11:  No nausea     MOSHE ON CKD III:   6/9:  Improved to baseline     HISTORY OF CAD WITH CHRONIC ATRIAL FIB:  NOT ON OAC:  Cards on board    Rate controlled     CAROTID ARTERY STENOSIS:      OBESITY:      HISTORY OF GIB:      HYPERTENSION:  Carvediol and amlodipine    DM II:  Continue ssi     HISTORY OF AORTIC VALVE REPLACEMENT:      SPINAL STENOSIS:      HISTORY OF C SPINE FUSION L1-4 LAMINECTOMY:      IRON DEFICIENCY ANEMIA:     6/9:  Begun on iron   Current Facility-Administered Medications   Medication Dose Route Frequency    cefTRIAXone (ROCEPHIN) 2 g in 0.9% sodium chloride (MBP/ADV) 50 mL MBP  2 g IntraVENous Q12H    ampicillin (OMNIPEN) 2 g in 0.9% sodium chloride (MBP/ADV) 100 mL MBP  2 g IntraVENous Q4H    losartan (COZAAR) tablet 100 mg  100 mg Oral DAILY    sodium chloride (NS) flush 10 mL  10 mL InterCATHeter Q8H    sodium chloride (NS) flush 10 mL  10 mL InterCATHeter PRN    [Held by provider] furosemide (LASIX) injection 40 mg  40 mg IntraVENous DAILY    ferrous sulfate tablet 325 mg  1 Tablet Oral BID WITH MEALS    alum-mag hydroxide-simeth (MYLANTA) oral suspension 30 mL  30 mL Oral Q4H PRN    simethicone (MYLICON) tablet 80 mg  80 mg Oral QID PRN    HYDROcodone-acetaminophen (NORCO)  mg tablet 1 Tablet  1 Tablet Oral Q8H PRN    traMADoL (ULTRAM) tablet 50 mg  50 mg Oral Q6H PRN    sodium chloride (NS) flush 5-10 mL  5-10 mL IntraVENous Q8H    sodium chloride (NS) flush 5-10 mL  5-10 mL IntraVENous PRN    sodium chloride (NS) flush 5-40 mL  5-40 mL IntraVENous Q8H    sodium chloride (NS) flush 5-40 mL  5-40 mL IntraVENous PRN    heparin (porcine) injection 5,000 Units  5,000 Units SubCUTAneous Q8H    ondansetron (ZOFRAN) injection 4 mg  4 mg IntraVENous Q6H PRN    amLODIPine (NORVASC) tablet 10 mg  10 mg Oral DAILY    aspirin delayed-release tablet 81 mg  81 mg Oral DAILY    calcium-vitamin D (OS-SHANDRA +D3) 250 mg-125 unit per tablet 1 Tablet  1 Tablet Oral DAILY    carvediloL (COREG) tablet 25 mg  25 mg Oral BID WITH MEALS    senna-docusate (PERICOLACE) 8.6-50 mg per tablet 2 Tablet  2 Tablet Oral QHS    pantoprazole (PROTONIX) tablet 40 mg  40 mg Oral ACB    sucralfate (CARAFATE) tablet 1 g  1 g Oral AC&HS    polyethylene glycol (MIRALAX) packet 17 g  17 g Oral DAILY        Review of Systems  A comprehensive review of systems was negative except for that written in the HPI. Objective:     Visit Vitals  BP (!) 154/71   Pulse 84   Temp 98.3 °F (36.8 °C)   Resp 17   Ht 5' 5\" (1.651 m)   Wt 114.4 kg (252 lb 1.6 oz)   SpO2 96%   BMI 41.95 kg/m²    O2 Flow Rate (L/min): 4 l/min O2 Device: None (Room air)    Temp (24hrs), Av.5 °F (36.9 °C), Min:97.3 °F (36.3 °C), Max:99.5 °F (37.5 °C)    06/15 07 - 06/15 1900  In: 240 [P.O.:240]  Out: -    1901 - 06/15 0700  In: 2907 [P.O.:660; I.V.:884]  Out: 1900 [Urine:1900]    General appearance: Up in chair. Pain at baseline. Family at bedside. Oriented and alert. Morbidly obese. Head: Normocephalic, without obvious abnormality, atraumatic  Throat: Lips, mucosa, and tongue normal. Teeth and gums normal  Neck: supple, symmetrical, trachea midline, and no JVD  Lungs: clear to auscultation bilaterally  Heart: Irregular rate and rhythm, S1, S2 normal, + murmur  Abdomen: soft, non-tender. Bowel sounds normal. No masses,  no organomegaly  Extremities:  All extremities normal, atraumatic, no cyanosis or edema  Skin: Skin color, texture, turgor normal. No rashes or lesions  Neurologic: Grossly normal    Additional comments: Notes,orders, test results, vitals reviewed    Data Review  Recent Results (from the past 24 hour(s))   COVID-19 RAPID TEST    Collection Time: 21 12:22 PM   Result Value Ref Range    Specimen source NASAL      COVID-19 rapid test Not detected NOTD     CBC WITH AUTOMATED DIFF    Collection Time: 06/15/21  6:23 AM   Result Value Ref Range    WBC 6.0 4.3 - 11.1 K/uL    RBC 4.29 4.05 - 5.2 M/uL    HGB 10.0 (L) 11.7 - 15.4 g/dL    HCT 33.0 (L) 35.8 - 46.3 %    MCV 76.9 (L) 79.6 - 97.8 FL    MCH 23.3 (L) 26.1 - 32.9 PG    MCHC 30.3 (L) 31.4 - 35.0 g/dL    RDW 14.8 (H) 11.9 - 14.6 %    PLATELET 740 210 - 376 K/uL    MPV 9.4 9.4 - 12.3 FL    ABSOLUTE NRBC 0.00 0.0 - 0.2 K/uL    DF AUTOMATED      NEUTROPHILS 75 43 - 78 %    LYMPHOCYTES 13 13 - 44 %    MONOCYTES 11 4.0 - 12.0 %    EOSINOPHILS 1 0.5 - 7.8 %    BASOPHILS 0 0.0 - 2.0 %    IMMATURE GRANULOCYTES 1 0.0 - 5.0 %    ABS. NEUTROPHILS 4.5 1.7 - 8.2 K/UL    ABS. LYMPHOCYTES 0.8 0.5 - 4.6 K/UL    ABS. MONOCYTES 0.7 0.1 - 1.3 K/UL    ABS. EOSINOPHILS 0.0 0.0 - 0.8 K/UL    ABS. BASOPHILS 0.0 0.0 - 0.2 K/UL    ABS. IMM.  GRANS. 0.0 0.0 - 0.5 K/UL   METABOLIC PANEL, BASIC    Collection Time: 06/15/21  6:23 AM   Result Value Ref Range    Sodium 140 136 - 145 mmol/L    Potassium 4.3 3.5 - 5.1 mmol/L    Chloride 104 98 - 107 mmol/L    CO2 32 21 - 32 mmol/L    Anion gap 4 (L) 7 - 16 mmol/L    Glucose 112 (H) 65 - 100 mg/dL    BUN 16 8 - 23 MG/DL    Creatinine 0.88 0.6 - 1.0 MG/DL    GFR est AA >60 >60 ml/min/1.73m2    GFR est non-AA >60 >60 ml/min/1.73m2    Calcium 8.4 8.3 - 10.4 MG/DL      Care Plan discussed with: Patient/Family and Nurse    Signed By: Aleah Duarte NP     Oliva 15, 2021

## 2021-06-15 NOTE — PROGRESS NOTES
END OF SHIFT NOTE:    INTAKE/OUTPUT  06/14 0701 - 06/15 0700  In: 382 [P.O.:660; I.V.:259]  Out: 1050 [Urine:1050]  Voiding: YES  Catheter: NO  Drain:   External Urinary Catheter 06/12/21 (Active)   Site Assessment Clean, dry, & intact 06/15/21 1406   Repositioned Yes 06/15/21 1406   Perineal Care Yes 06/15/21 1406   Wick Changed Yes 06/15/21 0936   Suction Canister/Tubing Changed No 06/15/21 0936   Urine Output (mL) 300 ml 06/15/21 1757               Flatus: Patient does have flatus present. Stool:  2 occurrences. Characteristics:  Stool Assessment  Stool Appearance: Loose (per patient report)    Emesis: 0 occurrences. Characteristics:        VITAL SIGNS  Patient Vitals for the past 12 hrs:   Temp Pulse Resp BP SpO2   06/15/21 1507 98.7 °F (37.1 °C) 81 18 (!) 152/91 91 %   06/15/21 1206 97.7 °F (36.5 °C) 90 18 (!) 135/91 94 %   06/15/21 0704 -- -- -- (!) 154/71 --   06/15/21 0655 98.3 °F (36.8 °C) 84 17 (!) 172/79 96 %       Pain Assessment  Pain Intensity 1: 0 (06/15/21 1406)  Pain Location 1: Back  Pain Intervention(s) 1: Medication (see MAR)  Patient Stated Pain Goal: 0    Ambulating  Yes    Shift report given to oncoming nurse at the bedside.     Altagraica Palmer, RN

## 2021-06-15 NOTE — PROGRESS NOTES
[facility] CARDIOLOGY PROGRESS NOTE           6/15/2021 7:32 AM    Admit Date: 6/6/2021      Subjective:   Patient remains frail and weak. Recent admission with systemic bacteremia. This is been managed by infectious disease. HOWIE demonstrates normal left ventricular systolic function mild mitral regurgitation with small vegetation present on anterior leaflet. Patient has normal appearing bioprosthetic aortic valve. ROS:  Cardiovascular:  As noted above    Objective:      Vitals:    06/14/21 2315 06/15/21 0354 06/15/21 0655 06/15/21 0704   BP: 125/76 (!) 146/70 (!) 172/79 (!) 154/71   Pulse: 66 84 84    Resp: 18 16 17    Temp: 98 °F (36.7 °C) 97.3 °F (36.3 °C) 98.3 °F (36.8 °C)    SpO2: 91% 92% 96%    Weight:  252 lb 1.6 oz (114.4 kg)     Height:           Physical Exam:  General-No Acute Distress  Neck- supple, no JVD  CV- regular rate and rhythm no MRG  Lung- clear bilaterally  Abd- soft, nontender, nondistended  Ext- no edema bilaterally. Skin- warm and dry    Data Review:   Recent Labs     06/15/21  0623 06/14/21  0605    142   K 4.3 4.5   BUN 16 18   CREA 0.88 0.80   * 114*   WBC 6.0 5.3   HGB 10.0* 10.1*   HCT 33.0* 34.3*    182       Assessment/Plan:     Principal Problem:    Acute pyelonephritis (6/6/2021)  Managed per primary team    Active Problems: Morbid obesity ([address] (2/20/2015)  Unchanged      CKD (chronic kidney disease) stage 3, GFR 30-59 ml/min ([facility]) (6/10/2016)  Stable    Systemic bacteremia and mitral valve endocarditis -patient with normal left ventricular systolic function. Cardiac catheterization with minimal coronary artery disease. Patient has small anterior leaflet vegetation of the mitral valve with mild mitral vegetation. Current plan is to continue IV antibiotics as outpatient with serial imaging. Currently she is a poor candidate for redo sternotomy and mitral valve surgery.     Atrial fibrillation -patient with relatively new onset atrial fibrillation. She has had almost no atrial fibrillation since AVR years ago. EKG in office May/2021 demonstrated sinus rhythm. The reason she is not anticoagulated though she has had no recent recurrence of atrial fibrillation. Currently rate controlled. Given patient's poor functional status will hold on anticoagulation and defer to primary cardiologist.    Status post aortic valve replacement -given recent bacteremia and indwelling bioprosthetic valve, patient is certainly high risk for persistent/recurrent infections. Patient should follow up with Dr. Kelley Atkisnon within 2 weeks with echocardiogram after discharge.           Rodger Arana MD  6/15/2021 7:32 AM

## 2021-06-15 NOTE — PROGRESS NOTES
RANULFO spoke with liaison at [facility] regarding IV antibiotics. RANULFO was informed they are not able to accommodate Q4hr antibiotics and they are unable to accommodate continuous antibiotic infusion. RANULFO was also informed depending on the antibiotic [facility] may no longer be able to admit patient. RANULFO spoke with Emmy Matt with [facility] to see if they will be able to accept patient with the current antibiotics. Insurance authorization has been started for patient to go to [place].

## 2021-06-16 ENCOUNTER — HOSPITAL ENCOUNTER (OUTPATIENT)
Age: 71
Discharge: NURSING, REHAB OR LONG-TERM CARE | End: 2021-07-21
Attending: INTERNAL MEDICINE | Admitting: INTERNAL MEDICINE

## 2021-06-16 ENCOUNTER — APPOINTMENT (OUTPATIENT)
Dept: GENERAL RADIOLOGY | Age: 71
End: 2021-06-16
Attending: INTERNAL MEDICINE

## 2021-06-16 ENCOUNTER — APPOINTMENT (OUTPATIENT)
Dept: ULTRASOUND IMAGING | Age: 71
End: 2021-06-16
Attending: INTERNAL MEDICINE

## 2021-06-16 LAB
ANION GAP SERPL CALC-SCNC: 7 MMOL/L (ref 7–16)
BASOPHILS # BLD: 0 K/UL (ref 0–0.2)
BASOPHILS NFR BLD: 0 % (ref 0–2)
BUN SERPL-MCNC: 13 MG/DL (ref 8–23)
CALCIUM SERPL-MCNC: 8.5 MG/DL (ref 8.3–10.4)
CHLORIDE SERPL-SCNC: 102 MMOL/L (ref 98–107)
CO2 SERPL-SCNC: 31 MMOL/L (ref 21–32)
CREAT SERPL-MCNC: 0.9 MG/DL (ref 0.6–1)
DIFFERENTIAL METHOD BLD: ABNORMAL
EOSINOPHIL # BLD: 0 K/UL (ref 0–0.8)
EOSINOPHIL NFR BLD: 0 % (ref 0.5–7.8)
ERYTHROCYTE [DISTWIDTH] IN BLOOD BY AUTOMATED COUNT: 14.8 % (ref 11.9–14.6)
GLUCOSE SERPL-MCNC: 103 MG/DL (ref 65–100)
HCT VFR BLD AUTO: 34.1 % (ref 35.8–46.3)
HGB BLD-MCNC: 10.2 G/DL (ref 11.7–15.4)
IMM GRANULOCYTES # BLD AUTO: 0 K/UL (ref 0–0.5)
IMM GRANULOCYTES NFR BLD AUTO: 1 % (ref 0–5)
LYMPHOCYTES # BLD: 0.6 K/UL (ref 0.5–4.6)
LYMPHOCYTES NFR BLD: 9 % (ref 13–44)
MCH RBC QN AUTO: 22.9 PG (ref 26.1–32.9)
MCHC RBC AUTO-ENTMCNC: 29.9 G/DL (ref 31.4–35)
MCV RBC AUTO: 76.6 FL (ref 79.6–97.8)
MONOCYTES # BLD: 0.8 K/UL (ref 0.1–1.3)
MONOCYTES NFR BLD: 11 % (ref 4–12)
NEUTS SEG # BLD: 5.3 K/UL (ref 1.7–8.2)
NEUTS SEG NFR BLD: 79 % (ref 43–78)
NRBC # BLD: 0 K/UL (ref 0–0.2)
PLATELET # BLD AUTO: 210 K/UL (ref 150–450)
PMV BLD AUTO: 9.5 FL (ref 9.4–12.3)
POTASSIUM SERPL-SCNC: 3.9 MMOL/L (ref 3.5–5.1)
RBC # BLD AUTO: 4.45 M/UL (ref 4.05–5.2)
SODIUM SERPL-SCNC: 140 MMOL/L (ref 136–145)
WBC # BLD AUTO: 6.7 K/UL (ref 4.3–11.1)

## 2021-06-16 PROCEDURE — 74011000258 HC RX REV CODE- 258: Performed by: INTERNAL MEDICINE

## 2021-06-16 PROCEDURE — 74011250636 HC RX REV CODE- 250/636: Performed by: INTERNAL MEDICINE

## 2021-06-16 PROCEDURE — 2709999900 HC NON-CHARGEABLE SUPPLY

## 2021-06-16 PROCEDURE — 74011250636 HC RX REV CODE- 250/636: Performed by: HOSPITALIST

## 2021-06-16 PROCEDURE — 85025 COMPLETE CBC W/AUTO DIFF WBC: CPT

## 2021-06-16 PROCEDURE — 74011250637 HC RX REV CODE- 250/637: Performed by: INTERNAL MEDICINE

## 2021-06-16 PROCEDURE — 74011250637 HC RX REV CODE- 250/637: Performed by: HOSPITALIST

## 2021-06-16 PROCEDURE — 71045 X-RAY EXAM CHEST 1 VIEW: CPT

## 2021-06-16 PROCEDURE — 77030038269 HC DRN EXT URIN PURWCK BARD -A

## 2021-06-16 PROCEDURE — 93970 EXTREMITY STUDY: CPT

## 2021-06-16 PROCEDURE — 80048 BASIC METABOLIC PNL TOTAL CA: CPT

## 2021-06-16 PROCEDURE — 74011250637 HC RX REV CODE- 250/637: Performed by: NURSE PRACTITIONER

## 2021-06-16 PROCEDURE — 97530 THERAPEUTIC ACTIVITIES: CPT

## 2021-06-16 RX ORDER — TRAMADOL HYDROCHLORIDE 50 MG/1
50 TABLET ORAL
Qty: 30 TABLET | Refills: 0 | Status: SHIPPED | OUTPATIENT
Start: 2021-06-16 | End: 2021-06-19

## 2021-06-16 RX ORDER — SAME BUTANEDISULFONATE/BETAINE 400-600 MG
500 POWDER IN PACKET (EA) ORAL 2 TIMES DAILY
Qty: 136 CAPSULE | Refills: 0 | Status: SHIPPED
Start: 2021-06-16 | End: 2021-07-20

## 2021-06-16 RX ORDER — ONDANSETRON 2 MG/ML
4 INJECTION INTRAMUSCULAR; INTRAVENOUS
Qty: 60 ML | Refills: 0 | Status: SHIPPED | OUTPATIENT
Start: 2021-06-16 | End: 2021-08-30

## 2021-06-16 RX ORDER — HEPARIN SODIUM 5000 [USP'U]/ML
5000 INJECTION, SOLUTION INTRAVENOUS; SUBCUTANEOUS EVERY 8 HOURS
Qty: 30 SYRINGE | Refills: 1 | Status: SHIPPED
Start: 2021-06-16 | End: 2021-08-09 | Stop reason: ALTCHOICE

## 2021-06-16 RX ADMIN — ASPIRIN 81 MG: 81 TABLET ORAL at 09:00

## 2021-06-16 RX ADMIN — AMLODIPINE BESYLATE 10 MG: 10 TABLET ORAL at 09:00

## 2021-06-16 RX ADMIN — Medication 10 ML: at 12:16

## 2021-06-16 RX ADMIN — PANTOPRAZOLE SODIUM 40 MG: 40 TABLET, DELAYED RELEASE ORAL at 05:40

## 2021-06-16 RX ADMIN — AMPICILLIN SODIUM 2 G: 2 INJECTION, POWDER, FOR SOLUTION INTRAMUSCULAR; INTRAVENOUS at 08:59

## 2021-06-16 RX ADMIN — HYDROCODONE BITARTRATE AND ACETAMINOPHEN 1 TABLET: 10; 325 TABLET ORAL at 01:47

## 2021-06-16 RX ADMIN — AMPICILLIN SODIUM 2 G: 2 INJECTION, POWDER, FOR SOLUTION INTRAMUSCULAR; INTRAVENOUS at 12:15

## 2021-06-16 RX ADMIN — HEPARIN SODIUM 5000 UNITS: 5000 INJECTION INTRAVENOUS; SUBCUTANEOUS at 05:39

## 2021-06-16 RX ADMIN — CEFTRIAXONE 2 G: 2 INJECTION, POWDER, FOR SOLUTION INTRAMUSCULAR; INTRAVENOUS at 01:46

## 2021-06-16 RX ADMIN — RDII 250 MG CAPSULE 500 MG: at 09:00

## 2021-06-16 RX ADMIN — HEPARIN SODIUM 5000 UNITS: 5000 INJECTION INTRAVENOUS; SUBCUTANEOUS at 14:29

## 2021-06-16 RX ADMIN — Medication 10 ML: at 14:31

## 2021-06-16 RX ADMIN — CEFTRIAXONE 2 G: 2 INJECTION, POWDER, FOR SOLUTION INTRAMUSCULAR; INTRAVENOUS at 14:29

## 2021-06-16 RX ADMIN — SUCRALFATE 1 G: 1 TABLET ORAL at 09:05

## 2021-06-16 RX ADMIN — Medication 10 ML: at 14:30

## 2021-06-16 RX ADMIN — Medication 10 ML: at 06:00

## 2021-06-16 RX ADMIN — Medication 10 ML: at 05:39

## 2021-06-16 RX ADMIN — CALCIUM CARBONATE-CHOLECALCIFEROL TAB 250 MG-125 UNIT 1 TABLET: 250-125 TAB at 09:00

## 2021-06-16 RX ADMIN — LOSARTAN POTASSIUM 100 MG: 50 TABLET, FILM COATED ORAL at 09:00

## 2021-06-16 RX ADMIN — AMPICILLIN SODIUM 2 G: 2 INJECTION, POWDER, FOR SOLUTION INTRAMUSCULAR; INTRAVENOUS at 04:42

## 2021-06-16 RX ADMIN — SUCRALFATE 1 G: 1 TABLET ORAL at 12:15

## 2021-06-16 RX ADMIN — FERROUS SULFATE TAB 325 MG (65 MG ELEMENTAL FE) 325 MG: 325 (65 FE) TAB at 09:00

## 2021-06-16 RX ADMIN — CARVEDILOL 25 MG: 25 TABLET, FILM COATED ORAL at 09:00

## 2021-06-16 RX ADMIN — AMPICILLIN SODIUM 2 G: 2 INJECTION, POWDER, FOR SOLUTION INTRAMUSCULAR; INTRAVENOUS at 00:12

## 2021-06-16 NOTE — PROGRESS NOTES
Chart screened by  for discharge planning. Patient to discharge to [facility] once insurance has approved. CM will continue to follow patient during hospitalization for discharge planning and needs. Please consult  if any new issues arise.

## 2021-06-16 NOTE — PROGRESS NOTES
Infectious Disease Progress Note    Today's Date: 6/16/2021   Admit Date: 6/6/2021    Impression:   · E faecalis MV endocarditis (6/6); repeat blood cultures 6/8 negative,HOWIE with MV vegetation; source thought to be urine, in setting of prosthetic AV  · Non-obstructing kidney stones  · Diarrhea  · Recent fall/debility  · Bilateral hip pain with recent trochanteric injection 5/13/21  · Hx of aortic valve stenosis s/p aortic valve replacement in 2016  · S/p fusion of C6-7 and C7-T1 (3/6/18), hardware in place  · S/p L1-L4 laminectomy (10/30/18)  · Hx of bilateral knee replacements in 2016 and 2017     Plan:   · Continue ampicillin and ceftriaxone. EOT 7/20/21. · Plan is for [facility], pending insurance approval.  · Monitor for changes in function; hold on MRI for now. Anti-infectives:   · Ceftriaxone 6/6  · Cefepime 6/6-6/8  · Vanc 6/6-6/10  · Gentamicin 6/8-  · Ampicillin 6/10 -     Subjective: Interval History: Afebrile. WBCs 6.7k, creatinine 0.9. She reports significant fatigue. Denies nausea, vomiting, and reports improvement in diarrhea - no overnight stools. Sitting in chair. Allergies   Allergen Reactions    Latex Rash    Metformin Diarrhea    Sulfa (Sulfonamide Antibiotics) Anaphylaxis    Nsaids (Non-Steroidal Anti-Inflammatory Drug) Other (comments)     ulcers    Macrobid [Nitrofurantoin Monohyd/M-Cryst] Other (comments)     Causes Gout    Other Plant, Animal, Environmental Itching     Pt itched after wearing a plastic blood pressure cuff. Pt reports BP will be higher in right arm     Oxycodone Other (comments)     Hallucination, anxiety with oxycontin-- denies rx to hydrocodone    Tape [Adhesive] Rash     Paper tape ok        Review of Systems:  Pertinent items are noted in the History of Present Illness.     Objective:     Visit Vitals  BP (!) 141/72   Pulse 65   Temp 98.2 °F (36.8 °C)   Resp 18   Ht 5' 5\" (1.651 m)   Wt 112.9 kg (248 lb 12.8 oz)   SpO2 91%   BMI 41.40 kg/m²     Temp (24hrs), Av.4 °F (36.9 °C), Min:97.7 °F (36.5 °C), Max:99 °F (37.2 °C)     General: Alert, no acute distress, appears stated age, morbidly obese, appears tired, sitting in chair  Head:    Normocephalic, atraumatic  Eyes:   Anicteric sclerae, no drainage, not injected, EOMI  Mouth:  Moist mucosa  Neck:   Supple, symmetrical, trachea midline, no JVD  Lungs:   Clear, shallow breathing without increased work of breathing or audible wheezes  CV:   Regular rate and rhythm, 2/6 murmur  Abdomen:  Soft, non tender, not distended, active bowel sounds  Extremities:  No cyanosis, trace edema  Musculoskeletal: Moves all extremities, generally weak, no deformity  Skin:   No acute rash, pale  Psych:  Alert, oriented and appropriate without evidence of thought disorder  Lines:    benign      Data Review:     CBC:  Recent Labs     06/16/21  0441 06/15/21  0623 21  0605   WBC 6.7 6.0 5.3   GRANS 79* 75 71   MONOS 11 11 13*   EOS 0* 1 1   ANEU 5.3 4.5 3.8   ABL 0.6 0.8 0.7   HGB 10.2* 10.0* 10.1*   HCT 34.1* 33.0* 34.3*    203 182       BMP:  Recent Labs     06/16/21  0441 06/15/21  0623 21  0605   CREA 0.90 0.88 0.80   BUN 13 16 18    140 142   K 3.9 4.3 4.5    104 107   CO2 31 32 32   AGAP 7 4* 3*   * 112* 114*       LFTS:  No results for input(s): TBILI, ALT, AP, TP, ALB in the last 72 hours. No lab exists for component: SGOT    Microbiology:     All Micro Results     Procedure Component Value Units Date/Time    COVID-19 RAPID TEST [[id number]] Collected: 21 1222    Order Status: Completed Specimen: Nasopharyngeal Updated: 21 1311     Specimen source NASAL        COVID-19 rapid test Not detected        Comment:      The specimen is NEGATIVE for SARS-CoV-2, the novel coronavirus associated with COVID-19. A negative result does not rule out COVID-19. This test has been authorized by the FDA under an Emergency Use Authorization (EUA) for use by authorized laboratories. Fact sheet for Healthcare Providers: ConventionUpdate.co.nz  Fact sheet for Patients: ConventionAerpio Therapeuticsdate.co.nz       Methodology: Isothermal Nucleic Acid Amplification         CULTURE, BLOOD [[id number]] Collected: 06/08/21 0951    Order Status: Completed Specimen: Blood Updated: 06/13/21 1032     Special Requests: --        RIGHT  HAND       Culture result: NO GROWTH 5 DAYS       CULTURE, BLOOD [[id number]] Collected: 06/08/21 0957    Order Status: Completed Specimen: Blood Updated: 06/13/21 1032     Special Requests: --        LEFT  HAND       Culture result: NO GROWTH 5 DAYS       CULTURE, URINE [[id number]] Collected: 06/07/21 2138    Order Status: Completed Specimen: Urine from Clean catch Updated: 06/10/21 0847     Special Requests: NO SPECIAL REQUESTS        Culture result: NO GROWTH 2 DAYS       SARS-COV-2, PCR [[id number]] Collected: 06/09/21 1036    Order Status: Completed Specimen: Nasopharyngeal Updated: 06/10/21 0622     Specimen source Nasopharyngeal        Comment: Corrected on 06/09 AT 1113: This is a correction to the medical record of the test results previously reported as NASAL        SARS-CoV-2 Not detected        Comment:      The specimen is NEGATIVE for SARS-CoV-2, the novel coronavirus associated with COVID-19. This test has been authorized by the FDA under an Emergency Use Authorization (EUA) for use by authorized laboratories.         Fact sheet for Healthcare Providers: ConventionAerpio Therapeuticsdate.co.nz       Fact sheet for Patients: Convention[employer] The[employer]date.co.nz       Methodology: RT-PCR         CULTURE, BLOOD [[id number]]  (Abnormal) Collected: 06/06/21 1929    Order Status: Completed Specimen: Blood Updated: 06/09/21 0816     Special Requests: --        RIGHT  ARM       GRAM STAIN GRAM POS COCCI IN CHAINS               AEROBIC AND ANAEROBIC BOTTLES                  CRITICAL RESULT NOT CALLED DUE TO PREVIOUS NOTIFICATION OF CRITICAL RESULT WITHIN THE LAST 24 HOURS. Culture result: ENTEROCOCCUS SPECIES               For Susceptibility Refer to Culture  [id number]      CULTURE, BLOOD [[id number]]  (Abnormal)  (Susceptibility) Collected: 06/06/21 1929    Order Status: Completed Specimen: Blood Updated: 06/09/21 0816     Special Requests: --        LEFT  HAND       GRAM STAIN GRAM POS COCCI IN CHAINS               AEROBIC AND ANAEROBIC BOTTLES                  RESULTS VERIFIED, PHONED TO AND READ BACK BY Bridget Armando RN ON 6/7/21 @1046, TA           Culture result:       ENTEROCOCCUS FAECALIS GROUP D                  Refer to Blood Culture ID Panel Accession  [id number]      COVID-19 RAPID TEST [[id number]] Collected: 06/09/21 0352    Order Status: Completed Specimen: Nasopharyngeal Updated: 06/09/21 0423     Specimen source NASAL        COVID-19 rapid test Not detected        Comment:      The specimen is NEGATIVE for SARS-CoV-2, the novel coronavirus associated with COVID-19. A negative result does not rule out COVID-19. This test has been authorized by the FDA under an Emergency Use Authorization (EUA) for use by authorized laboratories.         Fact sheet for Healthcare Providers: ConventionUpdate.co.nz  Fact sheet for Patients: ConventionUpdate.co.nz       Methodology: Isothermal Nucleic Acid Amplification         CULTURE, URINE [[id number]] Collected: 06/07/21 2138    Order Status: Canceled Specimen: Urine from Clean catch     BLOOD CULTURE ID PANEL [[id number]]  (Abnormal) Collected: 06/06/21 1929    Order Status: Completed Specimen: Blood Updated: 06/07/21 1048     Acc. no. from Micro Order [id number]     Enterococcus Detected        Comment: RESULTS VERIFIED, PHONED TO AND READ BACK BY  Bridget Armando RN ON 6/7/21 @1046, TA          van A/B (Vancomycin Resistance Gene) NOT DETECTED        INTERPRETATION       Gram positive cocci, identified by realtime PCR as SUSPECTED Vancomycin Sensitive Enterococcus species. Comment: Recommend IV vancomycin therapy until full susceptibility information available. THIS TEST DOES NOT REPLACE SENSITIVITY TESTING.              Imaging:   None new    Signed By: Inna Nickerson NP     June 16, 2021

## 2021-06-16 NOTE — PROGRESS NOTES
TRANSFER - OUT REPORT:    Verbal report given to WING Ng on Jamie John  being transferred to [facility]  for routine progression of care       Report consisted of patients Situation, Background, Assessment and   Recommendations(SBAR). Information from the following report(s) SBAR, Kardex, Intake/Output, MAR and Recent Results was reviewed with the receiving nurse. Lines:   PICC Single Lumen 61/43/34 Basilic;Left (Active)   Central Line Being Utilized Yes 06/16/21 1050   Criteria for Appropriate Use Long term IV/antibiotic administration 06/16/21 1050   Site Assessment Clean, dry, & intact 06/16/21 1050   Phlebitis Assessment 0 06/16/21 1050   Infiltration Assessment 0 06/16/21 1050   Arm Circumference (cm) 38 cm 06/13/21 1636   Date of Last Dressing Change 06/13/21 06/16/21 1050   Dressing Status Clean, dry, & intact 06/16/21 1050   External Catheter Length (cm) 0 centimeters 06/13/21 1636   Dressing Type Disk with Chlorhexadine gluconate (CHG); Stabilization/securement device;Transparent 06/16/21 1050   Hub Color/Line Status Red;Flushed;Patent 06/16/21 1050   Action Taken Other (comment) 06/16/21 1050   Positive Blood Return (Site #1) Yes 06/16/21 1050   Alcohol Cap Used No 06/16/21 1050        Opportunity for questions and clarification was provided.       Patient transported with:   [facility]

## 2021-06-16 NOTE — PROGRESS NOTES
ACUTE PHYSICAL THERAPY GOALS:  (Developed with and agreed upon by patient and/or caregiver. )  LTG:  (1.)Ms. Doreen Camarena will move from supine to sit and sit to supine , scoot up and down and roll side to side in flat bed without siderails with  INDEPENDENT within 7 day(s). (2.)Ms. Doreen Camarena will perform all functional transfers with  MODIFIED INDEPENDENCE using the least restrictive device within 7 day(s). (3.)Ms. Doreen Camarena will ambulate with  MODIFIED INDEPENDENCE for 250+ feet with normal vital sign response with the least restrictive device within 7 day(s). PHYSICAL THERAPY: Daily Note and PM Treatment Day # 4    Rylee eMdrano is a 66 y.o. female   PRIMARY DIAGNOSIS: Acute pyelonephritis  Acute pyelonephritis [N10]  Procedure(s) (LRB):  LEFT HEART CATH / CORONARY ANGIOGRAPHY (N/A)  6 Days Post-Op    ASSESSMENT:     REHAB RECOMMENDATIONS: CURRENT LEVEL OF FUNCTION:  (Most Recently Demonstrated)   Recommendation to date pending progress:  Setting:   Short-term Rehab  Equipment:    To Be Determined Bed Mobility:   Not tested  Sit to Stand:   Standby Assistance  Transfers:  [employer] Guard Assistance  Gait/Mobility:   Contact Guard Assistance     ASSESSMENT:  Ms. Doreen Camarena was sitting in recliner and agreeable to PT. She performed several STS transfers with SBA and RW today. Pt also participated in standing activities with support of RW as well as ambulated in room with CGA/RW. Pt returned to recliner and took short rest break. Pt participated in seated exercises. Pt increased exercises reps today. SUBJECTIVE:   Ms. Doreen Camarena states, \"This is my sister. \"    SOCIAL HISTORY/ LIVING ENVIRONMENT: Ms. Doreen Camarena lives with her son who works.   She ambulates with a rollator independently in home and community, drives, etc.  Home Environment: Private residence  # Steps to Enter: 3  One/Two Story Residence: One story  Living Alone: No  Support Systems: Family member(s)  OBJECTIVE:     PAIN: VITAL SIGNS: LINES/DRAINS: Pre Treatment: Pain Screen  Pain Scale 1: Numeric (0 - 10)  Pain Intensity 1: 0  Post Treatment: 0   IV and Purewick  O2 Device: None (Room air)     MOBILITY: I Mod I S SBA CGA Min Mod Max Total  NT x2 Comments:   Bed Mobility    Rolling [] [] [] [] [] [] [] [] [] [] []    Supine to Sit [] [] [] [] [] [] [] [] [] [] []    Scooting [] [] [] [] [] [] [] [] [] [] []    Sit to Supine [] [] [] [] [] [] [] [] [] [] []    Transfers    Sit to Stand [] [] [] [x] [] [] [] [] [] [] []    Bed to Chair [] [] [] [] [] [] [] [] [] [] []    Stand to Sit [] [] [] [] [x] [] [] [] [] [] []    I=Independent, Mod I=Modified Independent, S=Supervision, SBA=Standby Assistance, CGA=Contact Guard Assistance,   Min=Minimal Assistance, Mod=Moderate Assistance, Max=Maximal Assistance, Total=Total Assistance, NT=Not Tested    BALANCE: Good Fair+ Fair Fair- Poor NT Comments   Sitting Static [x] [] [] [] [] []    Sitting Dynamic [] [x] [] [] [] []              Standing Static [] [] [x] [] [] []    Standing Dynamic [] [] [x] [] [] []      GAIT: I Mod I S SBA CGA Min Mod Max Total  NT x2 Comments:   Level of Assistance [] [] [] [] [x] [] [] [] [] [] []    Distance 40 ft    DME Rolling Walker    Gait Quality Decreased B LE step length    Weightbearing  Status N/A     I=Independent, Mod I=Modified Independent, S=Supervision, SBA=Standby Assistance, CGA=Contact Guard Assistance,   Min=Minimal Assistance, Mod=Moderate Assistance, Max=Maximal Assistance, Total=Total Assistance, NT=Not Tested    PLAN:   FREQUENCY/DURATION: PT Plan of Care: 3 times/week for duration of hospital stay or until stated goals are met, whichever comes first.  TREATMENT:     TREATMENT:   ($$ Therapeutic Activity: 23-37 mins    )  Therapeutic Activity (23 Minutes): Therapeutic activity included Transfer Training, Ambulation on level ground, Sitting balance , Standing balance and seated exercises to improve functional Mobility, Strength and Activity tolerance.     TREATMENT GRID:   Date:  6/16/21 Date:   Date:     Activity/Exercise Parameters Parameters Parameters   APs 1 x 20 B     LAQ 1 x 20 B     Seated marching 1 x 20 B     Hip ABD/ADD 1 x 15 B     Shoulder shrugs 1 x 10 B                       AFTER TREATMENT POSITION/PRECAUTIONS:  Chair, Needs within reach and Visitors at bedside    INTERDISCIPLINARY COLLABORATION:  RN/PCT and PT/PTA    TOTAL TREATMENT DURATION:  PT Patient Time In/Time Out  Time In: 1348  Time Out: [address], PT, DPT

## 2021-06-16 NOTE — PROGRESS NOTES
Pt is observed sitting in her recliner quietly. A/O x 4. No acute distress noted. Pt looks drowsy but easily awakens with verbal stimuli. Pt reported back pain she rated 6/10. She has had pain med right before shift change. Will reevaluate need for more pain med. Call light within reach and pt is encouraged to call for assistance.

## 2021-06-18 ENCOUNTER — APPOINTMENT (OUTPATIENT)
Dept: CT IMAGING | Age: 71
End: 2021-06-18
Attending: INTERNAL MEDICINE

## 2021-06-18 VITALS
TEMPERATURE: 98 F | RESPIRATION RATE: 18 BRPM | HEART RATE: 72 BPM | SYSTOLIC BLOOD PRESSURE: 136 MMHG | HEIGHT: 65 IN | WEIGHT: 248.8 LBS | OXYGEN SATURATION: 95 % | BODY MASS INDEX: 41.45 KG/M2 | DIASTOLIC BLOOD PRESSURE: 72 MMHG

## 2021-06-18 LAB
ANION GAP SERPL CALC-SCNC: 5 MMOL/L (ref 7–16)
BUN SERPL-MCNC: 14 MG/DL (ref 8–23)
CALCIUM SERPL-MCNC: 8.3 MG/DL (ref 8.3–10.4)
CHLORIDE SERPL-SCNC: 99 MMOL/L (ref 98–107)
CO2 SERPL-SCNC: 33 MMOL/L (ref 21–32)
CREAT SERPL-MCNC: 1.01 MG/DL (ref 0.6–1)
GLUCOSE SERPL-MCNC: 113 MG/DL (ref 65–100)
POTASSIUM SERPL-SCNC: 3.5 MMOL/L (ref 3.5–5.1)
SODIUM SERPL-SCNC: 137 MMOL/L (ref 136–145)

## 2021-06-18 PROCEDURE — 36415 COLL VENOUS BLD VENIPUNCTURE: CPT

## 2021-06-18 PROCEDURE — 80048 BASIC METABOLIC PNL TOTAL CA: CPT

## 2021-06-18 PROCEDURE — 70450 CT HEAD/BRAIN W/O DYE: CPT

## 2021-06-21 LAB
ALBUMIN SERPL-MCNC: 2.8 G/DL (ref 3.2–4.6)
ALBUMIN/GLOB SERPL: 0.8 {RATIO} (ref 1.2–3.5)
ALP SERPL-CCNC: 75 U/L (ref 50–136)
ALT SERPL-CCNC: 14 U/L (ref 12–65)
ANION GAP SERPL CALC-SCNC: 7 MMOL/L (ref 7–16)
AST SERPL-CCNC: 18 U/L (ref 15–37)
BASOPHILS # BLD: 0.1 K/UL (ref 0–0.2)
BASOPHILS NFR BLD: 1 % (ref 0–2)
BILIRUB SERPL-MCNC: 0.3 MG/DL (ref 0.2–1.1)
BUN SERPL-MCNC: 12 MG/DL (ref 8–23)
CALCIUM SERPL-MCNC: 8.8 MG/DL (ref 8.3–10.4)
CHLORIDE SERPL-SCNC: 98 MMOL/L (ref 98–107)
CO2 SERPL-SCNC: 30 MMOL/L (ref 21–32)
CREAT SERPL-MCNC: 1.04 MG/DL (ref 0.6–1)
DIFFERENTIAL METHOD BLD: ABNORMAL
EOSINOPHIL # BLD: 0.1 K/UL (ref 0–0.8)
EOSINOPHIL NFR BLD: 1 % (ref 0.5–7.8)
ERYTHROCYTE [DISTWIDTH] IN BLOOD BY AUTOMATED COUNT: 14.9 % (ref 11.9–14.6)
EST. AVERAGE GLUCOSE BLD GHB EST-MCNC: 134 MG/DL
GLOBULIN SER CALC-MCNC: 3.7 G/DL (ref 2.3–3.5)
GLUCOSE SERPL-MCNC: 106 MG/DL (ref 65–100)
HBA1C MFR BLD: 6.3 % (ref 4.2–6.3)
HCT VFR BLD AUTO: 34.1 % (ref 35.8–46.3)
HGB BLD-MCNC: 10.1 G/DL (ref 11.7–15.4)
IMM GRANULOCYTES # BLD AUTO: 0 K/UL (ref 0–0.5)
IMM GRANULOCYTES NFR BLD AUTO: 0 % (ref 0–5)
LYMPHOCYTES # BLD: 1.1 K/UL (ref 0.5–4.6)
LYMPHOCYTES NFR BLD: 17 % (ref 13–44)
MAGNESIUM SERPL-MCNC: 2 MG/DL (ref 1.8–2.4)
MCH RBC QN AUTO: 23.2 PG (ref 26.1–32.9)
MCHC RBC AUTO-ENTMCNC: 29.6 G/DL (ref 31.4–35)
MCV RBC AUTO: 78.2 FL (ref 79.6–97.8)
MONOCYTES # BLD: 0.8 K/UL (ref 0.1–1.3)
MONOCYTES NFR BLD: 11 % (ref 4–12)
NEUTS SEG # BLD: 4.8 K/UL (ref 1.7–8.2)
NEUTS SEG NFR BLD: 70 % (ref 43–78)
NRBC # BLD: 0 K/UL (ref 0–0.2)
PHOSPHATE SERPL-MCNC: 3.4 MG/DL (ref 2.3–3.7)
PLATELET # BLD AUTO: 269 K/UL (ref 150–450)
PMV BLD AUTO: 9.5 FL (ref 9.4–12.3)
POTASSIUM SERPL-SCNC: 4.1 MMOL/L (ref 3.5–5.1)
PROT SERPL-MCNC: 6.5 G/DL (ref 6.3–8.2)
RBC # BLD AUTO: 4.36 M/UL (ref 4.05–5.2)
SODIUM SERPL-SCNC: 135 MMOL/L (ref 136–145)
WBC # BLD AUTO: 6.8 K/UL (ref 4.3–11.1)

## 2021-06-21 PROCEDURE — 84100 ASSAY OF PHOSPHORUS: CPT

## 2021-06-21 PROCEDURE — 85025 COMPLETE CBC W/AUTO DIFF WBC: CPT

## 2021-06-21 PROCEDURE — 83036 HEMOGLOBIN GLYCOSYLATED A1C: CPT

## 2021-06-21 PROCEDURE — 36415 COLL VENOUS BLD VENIPUNCTURE: CPT

## 2021-06-21 PROCEDURE — 83735 ASSAY OF MAGNESIUM: CPT

## 2021-06-21 PROCEDURE — 80053 COMPREHEN METABOLIC PANEL: CPT

## 2021-06-28 LAB
ALBUMIN SERPL-MCNC: 3 G/DL (ref 3.2–4.6)
ALBUMIN/GLOB SERPL: 0.9 {RATIO} (ref 1.2–3.5)
ALP SERPL-CCNC: 82 U/L (ref 50–136)
ALT SERPL-CCNC: 11 U/L (ref 12–65)
ANION GAP SERPL CALC-SCNC: 8 MMOL/L (ref 7–16)
AST SERPL-CCNC: 13 U/L (ref 15–37)
BASOPHILS # BLD: 0 K/UL (ref 0–0.2)
BASOPHILS NFR BLD: 1 % (ref 0–2)
BILIRUB SERPL-MCNC: 0.3 MG/DL (ref 0.2–1.1)
BUN SERPL-MCNC: 16 MG/DL (ref 8–23)
CALCIUM SERPL-MCNC: 9.1 MG/DL (ref 8.3–10.4)
CHLORIDE SERPL-SCNC: 102 MMOL/L (ref 98–107)
CO2 SERPL-SCNC: 27 MMOL/L (ref 21–32)
CREAT SERPL-MCNC: 1.54 MG/DL (ref 0.6–1)
DIFFERENTIAL METHOD BLD: ABNORMAL
EOSINOPHIL # BLD: 0.1 K/UL (ref 0–0.8)
EOSINOPHIL NFR BLD: 2 % (ref 0.5–7.8)
ERYTHROCYTE [DISTWIDTH] IN BLOOD BY AUTOMATED COUNT: 16.5 % (ref 11.9–14.6)
GLOBULIN SER CALC-MCNC: 3.4 G/DL (ref 2.3–3.5)
GLUCOSE SERPL-MCNC: 108 MG/DL (ref 65–100)
HCT VFR BLD AUTO: 35.4 % (ref 35.8–46.3)
HGB BLD-MCNC: 10.5 G/DL (ref 11.7–15.4)
IMM GRANULOCYTES # BLD AUTO: 0 K/UL (ref 0–0.5)
IMM GRANULOCYTES NFR BLD AUTO: 1 % (ref 0–5)
LYMPHOCYTES # BLD: 1.2 K/UL (ref 0.5–4.6)
LYMPHOCYTES NFR BLD: 19 % (ref 13–44)
MCH RBC QN AUTO: 23.3 PG (ref 26.1–32.9)
MCHC RBC AUTO-ENTMCNC: 29.7 G/DL (ref 31.4–35)
MCV RBC AUTO: 78.5 FL (ref 79.6–97.8)
MONOCYTES # BLD: 0.8 K/UL (ref 0.1–1.3)
MONOCYTES NFR BLD: 12 % (ref 4–12)
NEUTS SEG # BLD: 4.1 K/UL (ref 1.7–8.2)
NEUTS SEG NFR BLD: 66 % (ref 43–78)
NRBC # BLD: 0 K/UL (ref 0–0.2)
PLATELET # BLD AUTO: 206 K/UL (ref 150–450)
PMV BLD AUTO: 9.8 FL (ref 9.4–12.3)
POTASSIUM SERPL-SCNC: 4.2 MMOL/L (ref 3.5–5.1)
PROT SERPL-MCNC: 6.4 G/DL (ref 6.3–8.2)
RBC # BLD AUTO: 4.51 M/UL (ref 4.05–5.2)
SODIUM SERPL-SCNC: 137 MMOL/L (ref 136–145)
WBC # BLD AUTO: 6.3 K/UL (ref 4.3–11.1)

## 2021-06-28 PROCEDURE — 85025 COMPLETE CBC W/AUTO DIFF WBC: CPT

## 2021-06-28 PROCEDURE — 36415 COLL VENOUS BLD VENIPUNCTURE: CPT

## 2021-06-28 PROCEDURE — 80053 COMPREHEN METABOLIC PANEL: CPT

## 2021-06-29 ENCOUNTER — ANESTHESIA EVENT (OUTPATIENT)
Dept: ENDOSCOPY | Age: 71
End: 2021-06-29

## 2021-06-29 LAB
ANION GAP SERPL CALC-SCNC: 6 MMOL/L (ref 7–16)
BNP SERPL-MCNC: 2675 PG/ML (ref 5–125)
BUN SERPL-MCNC: 18 MG/DL (ref 8–23)
CALCIUM SERPL-MCNC: 9.1 MG/DL (ref 8.3–10.4)
CHLORIDE SERPL-SCNC: 104 MMOL/L (ref 98–107)
CO2 SERPL-SCNC: 28 MMOL/L (ref 21–32)
CREAT SERPL-MCNC: 1.43 MG/DL (ref 0.6–1)
GLUCOSE SERPL-MCNC: 114 MG/DL (ref 65–100)
POTASSIUM SERPL-SCNC: 4.3 MMOL/L (ref 3.5–5.1)
SODIUM SERPL-SCNC: 138 MMOL/L (ref 136–145)

## 2021-06-29 PROCEDURE — 80048 BASIC METABOLIC PNL TOTAL CA: CPT

## 2021-06-29 PROCEDURE — 36415 COLL VENOUS BLD VENIPUNCTURE: CPT

## 2021-06-29 PROCEDURE — 83880 ASSAY OF NATRIURETIC PEPTIDE: CPT

## 2021-06-30 ENCOUNTER — ANESTHESIA (OUTPATIENT)
Dept: ENDOSCOPY | Age: 71
End: 2021-06-30

## 2021-06-30 ENCOUNTER — HOSPITAL ENCOUNTER (OUTPATIENT)
Age: 71
Setting detail: OUTPATIENT SURGERY
Discharge: OTHER INSTITUTION | End: 2021-06-30
Attending: INTERNAL MEDICINE | Admitting: INTERNAL MEDICINE

## 2021-06-30 VITALS
DIASTOLIC BLOOD PRESSURE: 65 MMHG | OXYGEN SATURATION: 94 % | SYSTOLIC BLOOD PRESSURE: 154 MMHG | HEART RATE: 71 BPM | RESPIRATION RATE: 15 BRPM | BODY MASS INDEX: 39.15 KG/M2 | WEIGHT: 235 LBS | TEMPERATURE: 97 F | HEIGHT: 65 IN

## 2021-06-30 LAB
GLUCOSE BLD STRIP.AUTO-MCNC: 111 MG/DL (ref 65–100)
SERVICE CMNT-IMP: ABNORMAL

## 2021-06-30 PROCEDURE — 82962 GLUCOSE BLOOD TEST: CPT

## 2021-06-30 PROCEDURE — 77030021593 HC FCPS BIOP ENDOSC BSC -A: Performed by: INTERNAL MEDICINE

## 2021-06-30 PROCEDURE — 88305 TISSUE EXAM BY PATHOLOGIST: CPT

## 2021-06-30 PROCEDURE — 2709999900 HC NON-CHARGEABLE SUPPLY: Performed by: INTERNAL MEDICINE

## 2021-06-30 PROCEDURE — 76040000025: Performed by: INTERNAL MEDICINE

## 2021-06-30 PROCEDURE — 74011250636 HC RX REV CODE- 250/636: Performed by: ANESTHESIOLOGY

## 2021-06-30 PROCEDURE — 74011250636 HC RX REV CODE- 250/636: Performed by: NURSE ANESTHETIST, CERTIFIED REGISTERED

## 2021-06-30 PROCEDURE — 74011000250 HC RX REV CODE- 250: Performed by: NURSE ANESTHETIST, CERTIFIED REGISTERED

## 2021-06-30 PROCEDURE — 76060000031 HC ANESTHESIA FIRST 0.5 HR: Performed by: INTERNAL MEDICINE

## 2021-06-30 PROCEDURE — 88312 SPECIAL STAINS GROUP 1: CPT

## 2021-06-30 RX ORDER — PROPOFOL 10 MG/ML
INJECTION, EMULSION INTRAVENOUS AS NEEDED
Status: DISCONTINUED | OUTPATIENT
Start: 2021-06-30 | End: 2021-06-30 | Stop reason: HOSPADM

## 2021-06-30 RX ORDER — ETOMIDATE 2 MG/ML
INJECTION INTRAVENOUS AS NEEDED
Status: DISCONTINUED | OUTPATIENT
Start: 2021-06-30 | End: 2021-06-30 | Stop reason: HOSPADM

## 2021-06-30 RX ORDER — ESMOLOL HYDROCHLORIDE 10 MG/ML
INJECTION INTRAVENOUS AS NEEDED
Status: DISCONTINUED | OUTPATIENT
Start: 2021-06-30 | End: 2021-06-30 | Stop reason: HOSPADM

## 2021-06-30 RX ORDER — LIDOCAINE HYDROCHLORIDE 20 MG/ML
INJECTION, SOLUTION EPIDURAL; INFILTRATION; INTRACAUDAL; PERINEURAL AS NEEDED
Status: DISCONTINUED | OUTPATIENT
Start: 2021-06-30 | End: 2021-06-30 | Stop reason: HOSPADM

## 2021-06-30 RX ORDER — SODIUM CHLORIDE, SODIUM LACTATE, POTASSIUM CHLORIDE, CALCIUM CHLORIDE 600; 310; 30; 20 MG/100ML; MG/100ML; MG/100ML; MG/100ML
100 INJECTION, SOLUTION INTRAVENOUS CONTINUOUS
Status: DISCONTINUED | OUTPATIENT
Start: 2021-06-30 | End: 2021-06-30 | Stop reason: HOSPADM

## 2021-06-30 RX ADMIN — PROPOFOL 30 MG: 10 INJECTION, EMULSION INTRAVENOUS at 10:07

## 2021-06-30 RX ADMIN — SODIUM CHLORIDE, SODIUM LACTATE, POTASSIUM CHLORIDE, AND CALCIUM CHLORIDE 100 ML/HR: 600; 310; 30; 20 INJECTION, SOLUTION INTRAVENOUS at 09:51

## 2021-06-30 RX ADMIN — ETOMIDATE 3 MCG: 2 INJECTION, SOLUTION INTRAVENOUS at 10:07

## 2021-06-30 RX ADMIN — LIDOCAINE HYDROCHLORIDE 100 MG: 20 INJECTION, SOLUTION EPIDURAL; INFILTRATION; INTRACAUDAL; PERINEURAL at 10:07

## 2021-06-30 RX ADMIN — ESMOLOL HYDROCHLORIDE 20 MG: 10 INJECTION, SOLUTION INTRAVENOUS at 10:10

## 2021-06-30 RX ADMIN — PHENYLEPHRINE HYDROCHLORIDE 50 MCG: 10 INJECTION INTRAVENOUS at 10:07

## 2021-06-30 NOTE — ROUTINE PROCESS
Patient arrived in ENDO pre-op for EGD with Dr. Milagro Ospina. No personal belongings with patient. VSS on room air.

## 2021-06-30 NOTE — H&P
History and Physical for Outpatient Procedure             Date: 6/30/2021     History of Present Illness:  Patient has presents for EGD due to complaint of nausea, poor appetite. Past Medical History:   Diagnosis Date    Adverse effect of anesthesia     panic attack when mask placed on face    Anemia     2016/2017 - gi bleed---  2017 blood transfusion prior to AVR    Arthritis     CAD (coronary artery disease) 2016 1/2019 Minor nonobstructive coronary artery disease/ cath report    Chronic kidney disease     kidney stones    Chronic pain     r/t arthritis/ back pain Spinal stenosis of lumbar region     Diabetes ([address]     \"prediabetic\" A1C 6.4 8/28/19- no meds    Follow-up visit for aortic valve replacement with bioprosthetic valve 7/25/2016    GERD (gastroesophageal reflux disease)     controlled w/med- well controlled with meds    History of gastric ulcer 6/16/2016    Hypertension     controlled w/med    Kidney stones     Morbid obesity ([address]     Murmur, cardiac     s/p AVR    PONV (postoperative nausea and vomiting)     Pt can not be flat in bed -- severe PONV worsens when laying flat    Psychiatric disorder     claustraphobia (severe)    PUD (peptic ulcer disease) 06/2016    dx 2016- resolved per patient/ GI bleed requiring blood transfusion    S/P aortic valve replacement with bioprosthetic valve 4/28/2017    Spinal stenosis of lumbar region with neurogenic claudication 10/16/2018    Valvular heart disease 2016    AVR     Past Surgical History:   Procedure Laterality Date    COLONOSCOPY N/A 6/15/2016    COLONOSCOPY/ BMI 41  ROOM 2235 performed by Arlin Dominique [facility] END[facility]COPY    HX AORTIC VALVE REPLACEMENT  6/9/2016    Dr. Junior Rae    HX BILATERAL SALPINGO-OOPHORECTOMY      HX CERVICAL FUSION      posterior fusion    HX GASTRIC BYPASS      HX HEART CATHETERIZATION  05/26/2016    Severe AS with minimal nonobstructive CAD.     HX HEART CATHETERIZATION  01/31/2019 Hyperdynamic LV EF. No significant gradient across bioprosthetic aortic valve,mild nonobstructive CAD,and MAC.  HX HEART VALVE SURGERY      HX HYSTERECTOMY      HX KNEE REPLACEMENT Bilateral     HX LAP CHOLECYSTECTOMY      HX SHOULDER ARTHROSCOPY Right      times 2 \"shaved bone\"     HX UROLOGICAL  Multiple dates    Mulitiple open Nephrolithotomies    HX UROLOGICAL  08/2019    CYSTOSCOPY BILATERAL URETEROSCOPY/LASER LITHO/STENTS    [place] ABDOMEN SURGERY PROC UNLISTED      reversal of gastric bypass reversal     In and  Family History   Problem Relation Age of Onset    Hypertension Father     Lung Disease Father     Lung Cancer Father     Lung Disease Mother     Lung Cancer Mother     Diabetes Maternal Grandmother     Diabetes Paternal Grandmother     Breast Cancer Neg Hx      Social History     Tobacco Use    Smoking status: Never Smoker    Smokeless tobacco: Never Used   Substance Use Topics    Alcohol use: No        Allergies   Allergen Reactions    Latex Rash    Metformin Diarrhea    Sulfa (Sulfonamide Antibiotics) Anaphylaxis    Nsaids (Non-Steroidal Anti-Inflammatory Drug) Other (comments)     ulcers    Macrobid [Nitrofurantoin Monohyd/M-Cryst] Other (comments)     Causes Gout    Other Plant, Animal, Environmental Itching     Pt itched after wearing a plastic blood pressure cuff. Pt reports BP will be higher in right arm     Oxycodone Other (comments)     Hallucination, anxiety with oxycontin-- denies rx to hydrocodone    Tape [Adhesive] Rash     Paper tape ok     Current Facility-Administered Medications   Medication Dose Route Frequency    lactated Ringers infusion  100 mL/hr IntraVENous CONTINUOUS        Review of Systems:  A detailed 10 organ review of systems is obtained with pertinent positives as listed in the History of Present Illness. All others are negative. Objective:     Physical Exam:  There were no vitals taken for this visit. General:  Alert and oriented.   Heart: Regular rate and rhythm  Lungs:  Clear to auscultation bilaterally  Abdomen: Soft, nontender, nondistended    Impression/Plan:     Proceed with EGD as planned. I have discussed with the patient the technique, benefits, alternatives, and risks of these procedures, including medication reaction, immediate or delayed bleeding, or perforation of the gastrointestinal tract.      Signed By: Krista Fritz MD     June 30, 2021

## 2021-06-30 NOTE — PROCEDURES
Esophagogastroduodenoscopy    DATE of PROCEDURE: 6/30/2021    MEDICATIONS ADMINISTERED: MAC    INSTRUMENT: GIF    PROCEDURE:  After obtaining informed consent, the patient was placed in the left lateral position and sedated. The endoscope was advanced under direct vision without difficulty. The esophagus, stomach (including retroflexed views) and duodenum were evaluated. The patient was taken to the recovery area in stable condition. FINDINGS:    OROPHARYNX: Cords and pyriform recesses normal.    ESOPHAGUS: The esophagus is normal. The proximal, mid and distal portions are normal. The Z-Line is unremarkable. STOMACH: Evidence of prior gastric surgery, bariatric in nature. No obstruction or ulcer noted. Otherwise unremarkable. Random biopsies were taken of the stomach as well. DUODENUM: The bulb and second portions are normal.     Estimated blood loss: 0-minimal     PLAN:  1. Follow up in AM  2. Consider GES  3.  Follow up pathology report      Jessica Josue MD  [facility] [place]

## 2021-06-30 NOTE — ROUTINE PROCESS
TRANSFER - OUT REPORT:    Verbal report given to [address] on Mayuri Ortiz  being transferred to [phone] for routine post - op       Report consisted of patients Situation, Background, Assessment and   Recommendations(SBAR). Information from the following report(s) SBAR was reviewed with the receiving nurse. Lines:   PICC Single Lumen 89/66/82 Basilic;Left (Active)   Central Line Being Utilized Yes 06/16/21 1410   Criteria for Appropriate Use Long term IV/antibiotic administration 06/16/21 1410   Site Assessment Clean, dry, & intact 06/16/21 1410   Phlebitis Assessment 0 06/16/21 1410   Infiltration Assessment 0 06/16/21 1410   Arm Circumference (cm) 38 cm 06/13/21 1636   Date of Last Dressing Change 06/13/21 06/16/21 1410   Dressing Status Clean, dry, & intact 06/16/21 1410   External Catheter Length (cm) 0 centimeters 06/13/21 1636   Dressing Type Disk with Chlorhexadine gluconate (CHG); Stabilization/securement device;Transparent 06/16/21 1410   Hub Color/Line Status Purple;Flushed 06/16/21 1410   Action Taken Other (comment) 06/16/21 1050   Positive Blood Return (Site #1) Yes 06/16/21 1410   Alcohol Cap Used No 06/16/21 1050        Opportunity for questions and clarification was provided.       Patient transported with: hospital transport

## 2021-06-30 NOTE — ANESTHESIA PREPROCEDURE EVALUATION
Anesthetic History     PONV     Comments: Patient can't lie flat without dry heaving and nausea     Review of Systems / Medical History  Patient summary reviewed and pertinent labs reviewed    Pulmonary  Within defined limits                 Neuro/Psych         Psychiatric history     Cardiovascular    Hypertension: well controlled  Valvular problems/murmurs (s/p AVR in 2017. Has porcine vavle.)      Dysrhythmias (H/O a fib after valve surgery)   Hyperlipidemia    Exercise tolerance: <4 METS  Comments: LHC 1/2019 - no significant gradient across aortic valve, mild non-obstructive CAD, hyperdynamic LV function     Echo 1/2019 - hyperdynamic LV function, no RWMA, normal RV function, moderate thickening of MV but no MS, mild MR,     2021 mean AVR gradient 20. cocnern for mitral endocarditis, but no significant MR/MS   GI/Hepatic/Renal     GERD: well controlled           Endo/Other    Diabetes (Borderline DM)    Obesity and arthritis     Other Findings   Comments: concern for endocarditis, mPHTN seen on most recent echo's with preserved LVEF. Severe nausea, appropriately NPO. Physical Exam    Airway  Mallampati: II  TM Distance: 4 - 6 cm  Neck ROM: normal range of motion   Mouth opening: Normal     Cardiovascular    Rhythm: regular      Murmur: Grade 1, Aortic area     Dental    Dentition: Full upper dentures and Lower dentition intact     Pulmonary  Breath sounds clear to auscultation               Abdominal         Other Findings            Anesthetic Plan    ASA: 3  Anesthesia type: total IV anesthesia  ETT        Induction: Intravenous  Anesthetic plan and risks discussed with: Patient      Patient is unable to lie flat without significant nausea and dry heaving.  Plan for GETA

## 2021-06-30 NOTE — ANESTHESIA POSTPROCEDURE EVALUATION
Procedure(s):  ESOPHAGOGASTRODUODENOSCOPY (EGD)/ BMI 41 ROOM [facility]  ESOPHAGOGASTRODUODENAL (EGD) BIOPSY. total IV anesthesia    Anesthesia Post Evaluation      Multimodal analgesia: multimodal analgesia used between 6 hours prior to anesthesia start to PACU discharge  Patient location during evaluation: bedside  Patient participation: complete - patient participated  Level of consciousness: awake and responsive to light touch  Pain management: adequate  Airway patency: patent  Anesthetic complications: no  Cardiovascular status: acceptable, hemodynamically stable, blood pressure returned to baseline and stable  Respiratory status: acceptable, unassisted, spontaneous ventilation and nonlabored ventilation  Hydration status: acceptable        INITIAL Post-op Vital signs:   Vitals Value Taken Time   /67 06/30/21 1016   Temp 36.1 °C (97 °F) 06/30/21 1016   Pulse 105 06/30/21 1028   Resp 15 06/30/21 1016   SpO2 100 % 06/30/21 1028   Vitals shown include unvalidated device data.

## 2021-07-01 LAB
ANION GAP SERPL CALC-SCNC: 8 MMOL/L (ref 7–16)
BUN SERPL-MCNC: 20 MG/DL (ref 8–23)
CALCIUM SERPL-MCNC: 8.7 MG/DL (ref 8.3–10.4)
CHLORIDE SERPL-SCNC: 108 MMOL/L (ref 98–107)
CO2 SERPL-SCNC: 23 MMOL/L (ref 21–32)
CREAT SERPL-MCNC: 1.34 MG/DL (ref 0.6–1)
GLUCOSE SERPL-MCNC: 127 MG/DL (ref 65–100)
POTASSIUM SERPL-SCNC: 4.3 MMOL/L (ref 3.5–5.1)
SODIUM SERPL-SCNC: 139 MMOL/L (ref 136–145)

## 2021-07-01 PROCEDURE — 36415 COLL VENOUS BLD VENIPUNCTURE: CPT

## 2021-07-01 PROCEDURE — 80048 BASIC METABOLIC PNL TOTAL CA: CPT

## 2021-07-05 LAB
ALBUMIN SERPL-MCNC: 3 G/DL (ref 3.2–4.6)
ALBUMIN/GLOB SERPL: 0.9 {RATIO} (ref 1.2–3.5)
ALP SERPL-CCNC: 87 U/L (ref 50–136)
ALT SERPL-CCNC: 17 U/L (ref 12–65)
ANION GAP SERPL CALC-SCNC: 9 MMOL/L (ref 7–16)
AST SERPL-CCNC: 10 U/L (ref 15–37)
BASOPHILS # BLD: 0 K/UL (ref 0–0.2)
BASOPHILS NFR BLD: 0 % (ref 0–2)
BILIRUB SERPL-MCNC: 0.3 MG/DL (ref 0.2–1.1)
BUN SERPL-MCNC: 20 MG/DL (ref 8–23)
CALCIUM SERPL-MCNC: 8.8 MG/DL (ref 8.3–10.4)
CHLORIDE SERPL-SCNC: 103 MMOL/L (ref 98–107)
CO2 SERPL-SCNC: 26 MMOL/L (ref 21–32)
CREAT SERPL-MCNC: 1.3 MG/DL (ref 0.6–1)
DIFFERENTIAL METHOD BLD: ABNORMAL
EOSINOPHIL # BLD: 0.4 K/UL (ref 0–0.8)
EOSINOPHIL NFR BLD: 5 % (ref 0.5–7.8)
ERYTHROCYTE [DISTWIDTH] IN BLOOD BY AUTOMATED COUNT: 17.4 % (ref 11.9–14.6)
GLOBULIN SER CALC-MCNC: 3.5 G/DL (ref 2.3–3.5)
GLUCOSE SERPL-MCNC: 102 MG/DL (ref 65–100)
HCT VFR BLD AUTO: 35.8 % (ref 35.8–46.3)
HGB BLD-MCNC: 10.6 G/DL (ref 11.7–15.4)
IMM GRANULOCYTES # BLD AUTO: 0 K/UL (ref 0–0.5)
IMM GRANULOCYTES NFR BLD AUTO: 1 % (ref 0–5)
LYMPHOCYTES # BLD: 1 K/UL (ref 0.5–4.6)
LYMPHOCYTES NFR BLD: 14 % (ref 13–44)
MCH RBC QN AUTO: 23.7 PG (ref 26.1–32.9)
MCHC RBC AUTO-ENTMCNC: 29.6 G/DL (ref 31.4–35)
MCV RBC AUTO: 79.9 FL (ref 79.6–97.8)
MONOCYTES # BLD: 0.9 K/UL (ref 0.1–1.3)
MONOCYTES NFR BLD: 12 % (ref 4–12)
NEUTS SEG # BLD: 5 K/UL (ref 1.7–8.2)
NEUTS SEG NFR BLD: 68 % (ref 43–78)
NRBC # BLD: 0 K/UL (ref 0–0.2)
PLATELET # BLD AUTO: 171 K/UL (ref 150–450)
PMV BLD AUTO: 9.8 FL (ref 9.4–12.3)
POTASSIUM SERPL-SCNC: 3.8 MMOL/L (ref 3.5–5.1)
PROT SERPL-MCNC: 6.5 G/DL (ref 6.3–8.2)
RBC # BLD AUTO: 4.48 M/UL (ref 4.05–5.2)
SODIUM SERPL-SCNC: 138 MMOL/L (ref 136–145)
WBC # BLD AUTO: 7.3 K/UL (ref 4.3–11.1)

## 2021-07-05 PROCEDURE — 36415 COLL VENOUS BLD VENIPUNCTURE: CPT

## 2021-07-05 PROCEDURE — 80053 COMPREHEN METABOLIC PANEL: CPT

## 2021-07-05 PROCEDURE — 85025 COMPLETE CBC W/AUTO DIFF WBC: CPT

## 2021-07-09 LAB
ANION GAP SERPL CALC-SCNC: 5 MMOL/L (ref 7–16)
BUN SERPL-MCNC: 22 MG/DL (ref 8–23)
CALCIUM SERPL-MCNC: 8.8 MG/DL (ref 8.3–10.4)
CHLORIDE SERPL-SCNC: 105 MMOL/L (ref 98–107)
CO2 SERPL-SCNC: 29 MMOL/L (ref 21–32)
CREAT SERPL-MCNC: 1.21 MG/DL (ref 0.6–1)
GLUCOSE SERPL-MCNC: 115 MG/DL (ref 65–100)
POTASSIUM SERPL-SCNC: 4.1 MMOL/L (ref 3.5–5.1)
SODIUM SERPL-SCNC: 139 MMOL/L (ref 136–145)

## 2021-07-09 PROCEDURE — 80048 BASIC METABOLIC PNL TOTAL CA: CPT

## 2021-07-09 PROCEDURE — 36415 COLL VENOUS BLD VENIPUNCTURE: CPT

## 2021-07-12 LAB
ALBUMIN SERPL-MCNC: 2.9 G/DL (ref 3.2–4.6)
ALBUMIN/GLOB SERPL: 0.9 {RATIO} (ref 1.2–3.5)
ALP SERPL-CCNC: 76 U/L (ref 50–136)
ALT SERPL-CCNC: 13 U/L (ref 12–65)
ANION GAP SERPL CALC-SCNC: 8 MMOL/L (ref 7–16)
AST SERPL-CCNC: 12 U/L (ref 15–37)
BASOPHILS # BLD: 0 K/UL (ref 0–0.2)
BASOPHILS NFR BLD: 0 % (ref 0–2)
BILIRUB SERPL-MCNC: 0.2 MG/DL (ref 0.2–1.1)
BUN SERPL-MCNC: 27 MG/DL (ref 8–23)
CALCIUM SERPL-MCNC: 8.7 MG/DL (ref 8.3–10.4)
CHLORIDE SERPL-SCNC: 106 MMOL/L (ref 98–107)
CO2 SERPL-SCNC: 26 MMOL/L (ref 21–32)
CREAT SERPL-MCNC: 1.32 MG/DL (ref 0.6–1)
DIFFERENTIAL METHOD BLD: ABNORMAL
EOSINOPHIL # BLD: 0.4 K/UL (ref 0–0.8)
EOSINOPHIL NFR BLD: 5 % (ref 0.5–7.8)
ERYTHROCYTE [DISTWIDTH] IN BLOOD BY AUTOMATED COUNT: 17.8 % (ref 11.9–14.6)
GLOBULIN SER CALC-MCNC: 3.4 G/DL (ref 2.3–3.5)
GLUCOSE SERPL-MCNC: 108 MG/DL (ref 65–100)
HCT VFR BLD AUTO: 32.6 % (ref 35.8–46.3)
HGB BLD-MCNC: 9.9 G/DL (ref 11.7–15.4)
IMM GRANULOCYTES # BLD AUTO: 0 K/UL (ref 0–0.5)
IMM GRANULOCYTES NFR BLD AUTO: 0 % (ref 0–5)
LYMPHOCYTES # BLD: 1.2 K/UL (ref 0.5–4.6)
LYMPHOCYTES NFR BLD: 16 % (ref 13–44)
MCH RBC QN AUTO: 24.1 PG (ref 26.1–32.9)
MCHC RBC AUTO-ENTMCNC: 30.4 G/DL (ref 31.4–35)
MCV RBC AUTO: 79.5 FL (ref 79.6–97.8)
MONOCYTES # BLD: 0.8 K/UL (ref 0.1–1.3)
MONOCYTES NFR BLD: 10 % (ref 4–12)
NEUTS SEG # BLD: 4.9 K/UL (ref 1.7–8.2)
NEUTS SEG NFR BLD: 68 % (ref 43–78)
NRBC # BLD: 0 K/UL (ref 0–0.2)
PLATELET # BLD AUTO: 170 K/UL (ref 150–450)
PMV BLD AUTO: 10.1 FL (ref 9.4–12.3)
POTASSIUM SERPL-SCNC: 4.5 MMOL/L (ref 3.5–5.1)
PROT SERPL-MCNC: 6.3 G/DL (ref 6.3–8.2)
RBC # BLD AUTO: 4.1 M/UL (ref 4.05–5.2)
SODIUM SERPL-SCNC: 140 MMOL/L (ref 136–145)
WBC # BLD AUTO: 7.2 K/UL (ref 4.3–11.1)

## 2021-07-12 PROCEDURE — 85025 COMPLETE CBC W/AUTO DIFF WBC: CPT

## 2021-07-12 PROCEDURE — 80053 COMPREHEN METABOLIC PANEL: CPT

## 2021-07-12 PROCEDURE — 36415 COLL VENOUS BLD VENIPUNCTURE: CPT

## 2021-07-19 LAB
ALBUMIN SERPL-MCNC: 3 G/DL (ref 3.2–4.6)
ALBUMIN/GLOB SERPL: 0.8 {RATIO} (ref 1.2–3.5)
ALP SERPL-CCNC: 76 U/L (ref 50–136)
ALT SERPL-CCNC: 11 U/L (ref 12–65)
ANION GAP SERPL CALC-SCNC: 9 MMOL/L (ref 7–16)
AST SERPL-CCNC: 10 U/L (ref 15–37)
BASOPHILS # BLD: 0 K/UL (ref 0–0.2)
BASOPHILS NFR BLD: 1 % (ref 0–2)
BILIRUB SERPL-MCNC: 0.2 MG/DL (ref 0.2–1.1)
BUN SERPL-MCNC: 23 MG/DL (ref 8–23)
CALCIUM SERPL-MCNC: 8.9 MG/DL (ref 8.3–10.4)
CHLORIDE SERPL-SCNC: 106 MMOL/L (ref 98–107)
CO2 SERPL-SCNC: 26 MMOL/L (ref 21–32)
CREAT SERPL-MCNC: 1.16 MG/DL (ref 0.6–1)
DIFFERENTIAL METHOD BLD: ABNORMAL
EOSINOPHIL # BLD: 0.3 K/UL (ref 0–0.8)
EOSINOPHIL NFR BLD: 5 % (ref 0.5–7.8)
ERYTHROCYTE [DISTWIDTH] IN BLOOD BY AUTOMATED COUNT: 18.4 % (ref 11.9–14.6)
GLOBULIN SER CALC-MCNC: 3.6 G/DL (ref 2.3–3.5)
GLUCOSE SERPL-MCNC: 125 MG/DL (ref 65–100)
HCT VFR BLD AUTO: 33.7 % (ref 35.8–46.3)
HGB BLD-MCNC: 10.2 G/DL (ref 11.7–15.4)
IMM GRANULOCYTES # BLD AUTO: 0 K/UL (ref 0–0.5)
IMM GRANULOCYTES NFR BLD AUTO: 1 % (ref 0–5)
LYMPHOCYTES # BLD: 1 K/UL (ref 0.5–4.6)
LYMPHOCYTES NFR BLD: 17 % (ref 13–44)
MCH RBC QN AUTO: 24.2 PG (ref 26.1–32.9)
MCHC RBC AUTO-ENTMCNC: 30.3 G/DL (ref 31.4–35)
MCV RBC AUTO: 79.9 FL (ref 79.6–97.8)
MONOCYTES # BLD: 0.7 K/UL (ref 0.1–1.3)
MONOCYTES NFR BLD: 11 % (ref 4–12)
NEUTS SEG # BLD: 4.1 K/UL (ref 1.7–8.2)
NEUTS SEG NFR BLD: 67 % (ref 43–78)
NRBC # BLD: 0 K/UL (ref 0–0.2)
PLATELET # BLD AUTO: 205 K/UL (ref 150–450)
PMV BLD AUTO: 9.9 FL (ref 9.4–12.3)
POTASSIUM SERPL-SCNC: 4.1 MMOL/L (ref 3.5–5.1)
PROT SERPL-MCNC: 6.6 G/DL (ref 6.3–8.2)
RBC # BLD AUTO: 4.22 M/UL (ref 4.05–5.2)
SODIUM SERPL-SCNC: 141 MMOL/L (ref 136–145)
WBC # BLD AUTO: 6.1 K/UL (ref 4.3–11.1)

## 2021-07-19 PROCEDURE — 85025 COMPLETE CBC W/AUTO DIFF WBC: CPT

## 2021-07-19 PROCEDURE — 36415 COLL VENOUS BLD VENIPUNCTURE: CPT

## 2021-07-19 PROCEDURE — 80053 COMPREHEN METABOLIC PANEL: CPT

## 2021-07-20 LAB
COVID-19 RAPID TEST, COVR: NOT DETECTED
SARS-COV-2, COV2: NORMAL
SOURCE, COVRS: NORMAL

## 2021-07-20 PROCEDURE — 87635 SARS-COV-2 COVID-19 AMP PRB: CPT

## 2021-07-20 PROCEDURE — U0005 INFEC AGEN DETEC AMPLI PROBE: HCPCS

## 2021-07-21 LAB
SARS-COV-2, COV2: NOT DETECTED
SPECIMEN SOURCE, FCOV2M: NORMAL

## 2021-08-09 PROBLEM — I48.19 PERSISTENT ATRIAL FIBRILLATION (HCC): Status: ACTIVE | Noted: 2021-08-09

## 2021-08-09 PROBLEM — I33.0 ACUTE BACTERIAL ENDOCARDITIS: Status: ACTIVE | Noted: 2021-08-09

## 2021-10-07 PROBLEM — R06.00 DYSPNEA: Status: ACTIVE | Noted: 2021-10-07

## 2021-11-12 ENCOUNTER — HOSPITAL ENCOUNTER (OUTPATIENT)
Dept: LAB | Age: 71
Discharge: HOME | End: 2021-11-12
Payer: MEDICARE

## 2021-11-12 DIAGNOSIS — I10 ESSENTIAL HYPERTENSION, BENIGN: Chronic | ICD-10-CM

## 2021-11-12 DIAGNOSIS — R06.00 DYSPNEA, UNSPECIFIED TYPE: ICD-10-CM

## 2021-11-12 DIAGNOSIS — I33.0 ACUTE BACTERIAL ENDOCARDITIS: ICD-10-CM

## 2021-11-12 LAB
ANION GAP SERPL CALC-SCNC: 6 MMOL/L (ref 7–16)
BASOPHILS # BLD: 0 K/UL (ref 0–0.2)
BASOPHILS NFR BLD: 0 % (ref 0–2)
BNP SERPL-MCNC: 4671 PG/ML (ref 5–125)
BUN SERPL-MCNC: 19 MG/DL (ref 8–23)
CALCIUM SERPL-MCNC: 9.1 MG/DL (ref 8.3–10.4)
CHLORIDE SERPL-SCNC: 110 MMOL/L (ref 98–107)
CO2 SERPL-SCNC: 25 MMOL/L (ref 21–32)
CREAT SERPL-MCNC: 1.29 MG/DL (ref 0.6–1)
DIFFERENTIAL METHOD BLD: ABNORMAL
EOSINOPHIL # BLD: 0.1 K/UL (ref 0–0.8)
EOSINOPHIL NFR BLD: 1 % (ref 0.5–7.8)
ERYTHROCYTE [DISTWIDTH] IN BLOOD BY AUTOMATED COUNT: 15.2 % (ref 11.9–14.6)
GLUCOSE SERPL-MCNC: 113 MG/DL (ref 65–100)
HCT VFR BLD AUTO: 34 % (ref 35.8–46.3)
HGB BLD-MCNC: 9.6 G/DL (ref 11.7–15.4)
IMM GRANULOCYTES # BLD AUTO: 0 K/UL (ref 0–0.5)
IMM GRANULOCYTES NFR BLD AUTO: 0 % (ref 0–5)
LYMPHOCYTES # BLD: 1 K/UL (ref 0.5–4.6)
LYMPHOCYTES NFR BLD: 11 % (ref 13–44)
MCH RBC QN AUTO: 23.5 PG (ref 26.1–32.9)
MCHC RBC AUTO-ENTMCNC: 28.2 G/DL (ref 31.4–35)
MCV RBC AUTO: 83.1 FL (ref 79.6–97.8)
MONOCYTES # BLD: 0.7 K/UL (ref 0.1–1.3)
MONOCYTES NFR BLD: 7 % (ref 4–12)
NEUTS SEG # BLD: 7.2 K/UL (ref 1.7–8.2)
NEUTS SEG NFR BLD: 80 % (ref 43–78)
NRBC # BLD: 0 K/UL (ref 0–0.2)
PLATELET # BLD AUTO: 297 K/UL (ref 150–450)
PMV BLD AUTO: 9.8 FL (ref 9.4–12.3)
POTASSIUM SERPL-SCNC: 4.2 MMOL/L (ref 3.5–5.1)
RBC # BLD AUTO: 4.09 M/UL (ref 4.05–5.2)
SODIUM SERPL-SCNC: 141 MMOL/L (ref 136–145)
WBC # BLD AUTO: 9 K/UL (ref 4.3–11.1)

## 2021-11-12 PROCEDURE — 83880 ASSAY OF NATRIURETIC PEPTIDE: CPT

## 2021-11-12 PROCEDURE — 36415 COLL VENOUS BLD VENIPUNCTURE: CPT

## 2021-11-12 PROCEDURE — 80048 BASIC METABOLIC PNL TOTAL CA: CPT

## 2021-11-12 PROCEDURE — 85025 COMPLETE CBC W/AUTO DIFF WBC: CPT

## 2022-02-04 PROBLEM — Z79.01 LONG TERM CURRENT USE OF ANTICOAGULANT: Status: ACTIVE | Noted: 2022-02-04

## 2022-02-07 ENCOUNTER — HOSPITAL ENCOUNTER (OUTPATIENT)
Dept: LAB | Age: 72
Discharge: HOME | End: 2022-02-07
Payer: MEDICARE

## 2022-02-07 DIAGNOSIS — I33.0 ACUTE BACTERIAL ENDOCARDITIS: ICD-10-CM

## 2022-02-07 DIAGNOSIS — I48.0 PAROXYSMAL ATRIAL FIBRILLATION (HCC): ICD-10-CM

## 2022-02-07 LAB
ANION GAP SERPL CALC-SCNC: 6 MMOL/L (ref 7–16)
BASOPHILS # BLD: 0 K/UL (ref 0–0.2)
BASOPHILS NFR BLD: 0 % (ref 0–2)
BUN SERPL-MCNC: 24 MG/DL (ref 8–23)
CALCIUM SERPL-MCNC: 9.4 MG/DL (ref 8.3–10.4)
CHLORIDE SERPL-SCNC: 107 MMOL/L (ref 98–107)
CO2 SERPL-SCNC: 26 MMOL/L (ref 21–32)
CREAT SERPL-MCNC: 1.2 MG/DL (ref 0.6–1)
DIFFERENTIAL METHOD BLD: ABNORMAL
EOSINOPHIL # BLD: 0.1 K/UL (ref 0–0.8)
EOSINOPHIL NFR BLD: 1 % (ref 0.5–7.8)
ERYTHROCYTE [DISTWIDTH] IN BLOOD BY AUTOMATED COUNT: 18.1 % (ref 11.9–14.6)
GLUCOSE SERPL-MCNC: 129 MG/DL (ref 65–100)
HCT VFR BLD AUTO: 36.4 % (ref 35.8–46.3)
HGB BLD-MCNC: 10.9 G/DL (ref 11.7–15.4)
IMM GRANULOCYTES # BLD AUTO: 0 K/UL (ref 0–0.5)
IMM GRANULOCYTES NFR BLD AUTO: 0 % (ref 0–5)
INR PPP: 2.1
LYMPHOCYTES # BLD: 0.8 K/UL (ref 0.5–4.6)
LYMPHOCYTES NFR BLD: 10 % (ref 13–44)
MCH RBC QN AUTO: 23 PG (ref 26.1–32.9)
MCHC RBC AUTO-ENTMCNC: 29.9 G/DL (ref 31.4–35)
MCV RBC AUTO: 77 FL (ref 79.6–97.8)
MONOCYTES # BLD: 0.7 K/UL (ref 0.1–1.3)
MONOCYTES NFR BLD: 8 % (ref 4–12)
NEUTS SEG # BLD: 7 K/UL (ref 1.7–8.2)
NEUTS SEG NFR BLD: 81 % (ref 43–78)
NRBC # BLD: 0 K/UL (ref 0–0.2)
PLATELET # BLD AUTO: 298 K/UL (ref 150–450)
PMV BLD AUTO: 9.6 FL (ref 9.4–12.3)
POTASSIUM SERPL-SCNC: 3.8 MMOL/L (ref 3.5–5.1)
PROTHROMBIN TIME: 24 SEC (ref 12.6–14.5)
RBC # BLD AUTO: 4.73 M/UL (ref 4.05–5.2)
SODIUM SERPL-SCNC: 139 MMOL/L (ref 136–145)
WBC # BLD AUTO: 8.7 K/UL (ref 4.3–11.1)

## 2022-02-07 PROCEDURE — 80048 BASIC METABOLIC PNL TOTAL CA: CPT

## 2022-02-07 PROCEDURE — 85610 PROTHROMBIN TIME: CPT

## 2022-02-07 PROCEDURE — 85025 COMPLETE CBC W/AUTO DIFF WBC: CPT

## 2022-02-07 PROCEDURE — 36415 COLL VENOUS BLD VENIPUNCTURE: CPT

## 2022-02-10 ENCOUNTER — HOSPITAL ENCOUNTER (OUTPATIENT)
Dept: CARDIAC CATH/INVASIVE PROCEDURES | Age: 72
Discharge: HOME | End: 2022-02-10
Attending: INTERNAL MEDICINE | Admitting: INTERNAL MEDICINE
Payer: MEDICARE

## 2022-02-10 VITALS
BODY MASS INDEX: 39.32 KG/M2 | DIASTOLIC BLOOD PRESSURE: 64 MMHG | WEIGHT: 236 LBS | HEART RATE: 74 BPM | OXYGEN SATURATION: 95 % | HEIGHT: 65 IN | TEMPERATURE: 98.1 F | SYSTOLIC BLOOD PRESSURE: 131 MMHG

## 2022-02-10 DIAGNOSIS — I48.91 ATRIAL FIBRILLATION, UNSPECIFIED TYPE (HCC): ICD-10-CM

## 2022-02-10 LAB
ATRIAL RATE: 100 BPM
CALCULATED R AXIS, ECG10: -44 DEGREES
CALCULATED T AXIS, ECG11: 100 DEGREES
DIAGNOSIS, 93000: NORMAL
Q-T INTERVAL, ECG07: 348 MS
QRS DURATION, ECG06: 80 MS
QTC CALCULATION (BEZET), ECG08: 448 MS
VENTRICULAR RATE, ECG03: 100 BPM

## 2022-02-10 PROCEDURE — 74011000250 HC RX REV CODE- 250: Performed by: INTERNAL MEDICINE

## 2022-02-10 PROCEDURE — 96374 THER/PROPH/DIAG INJ IV PUSH: CPT

## 2022-02-10 PROCEDURE — 93005 ELECTROCARDIOGRAM TRACING: CPT | Performed by: INTERNAL MEDICINE

## 2022-02-10 PROCEDURE — 93010 ELECTROCARDIOGRAM REPORT: CPT | Performed by: INTERNAL MEDICINE

## 2022-02-10 PROCEDURE — 93312 ECHO TRANSESOPHAGEAL: CPT

## 2022-02-10 PROCEDURE — 99152 MOD SED SAME PHYS/QHP 5/>YRS: CPT

## 2022-02-10 PROCEDURE — 93325 DOPPLER ECHO COLOR FLOW MAPG: CPT

## 2022-02-10 PROCEDURE — 93320 DOPPLER ECHO COMPLETE: CPT

## 2022-02-10 PROCEDURE — 74011250636 HC RX REV CODE- 250/636: Performed by: INTERNAL MEDICINE

## 2022-02-10 RX ORDER — FENTANYL CITRATE 50 UG/ML
25-200 INJECTION, SOLUTION INTRAMUSCULAR; INTRAVENOUS AS NEEDED
Status: DISCONTINUED | OUTPATIENT
Start: 2022-02-10 | End: 2022-02-10 | Stop reason: HOSPADM

## 2022-02-10 RX ORDER — LIDOCAINE HYDROCHLORIDE 20 MG/ML
15 SOLUTION OROPHARYNGEAL AS NEEDED
Status: DISCONTINUED | OUTPATIENT
Start: 2022-02-10 | End: 2022-02-10 | Stop reason: HOSPADM

## 2022-02-10 RX ORDER — SODIUM CHLORIDE 9 MG/ML
75 INJECTION, SOLUTION INTRAVENOUS CONTINUOUS
Status: DISCONTINUED | OUTPATIENT
Start: 2022-02-10 | End: 2022-02-10 | Stop reason: HOSPADM

## 2022-02-10 RX ORDER — MIDAZOLAM HYDROCHLORIDE 1 MG/ML
1-10 INJECTION, SOLUTION INTRAMUSCULAR; INTRAVENOUS AS NEEDED
Status: DISCONTINUED | OUTPATIENT
Start: 2022-02-10 | End: 2022-02-10 | Stop reason: HOSPADM

## 2022-02-10 RX ADMIN — MIDAZOLAM 2 MG: 1 INJECTION INTRAMUSCULAR; INTRAVENOUS at 14:05

## 2022-02-10 RX ADMIN — PERFLUTREN 1 ML: 6.52 INJECTION, SUSPENSION INTRAVENOUS at 14:12

## 2022-02-10 RX ADMIN — MIDAZOLAM 1 MG: 1 INJECTION INTRAMUSCULAR; INTRAVENOUS at 14:15

## 2022-02-10 RX ADMIN — MIDAZOLAM 1 MG: 1 INJECTION INTRAMUSCULAR; INTRAVENOUS at 14:06

## 2022-02-10 RX ADMIN — FENTANYL CITRATE 25 MCG: 50 INJECTION INTRAMUSCULAR; INTRAVENOUS at 14:15

## 2022-02-10 RX ADMIN — LIDOCAINE HYDROCHLORIDE 15 ML: 20 SOLUTION ORAL; TOPICAL at 13:55

## 2022-02-10 RX ADMIN — FENTANYL CITRATE 50 MCG: 50 INJECTION INTRAMUSCULAR; INTRAVENOUS at 14:03

## 2022-02-10 RX ADMIN — MIDAZOLAM 2 MG: 1 INJECTION INTRAMUSCULAR; INTRAVENOUS at 14:03

## 2022-02-10 NOTE — PROGRESS NOTES
Pt arrived, ambulated to room with no visible problems, planned HOWIE/CVN for Dr Jero Pérez. Consent signed, Procedure discussed with pt all questions answered voiced understanding. Medications and history discussed with pt.     Pt prepped per ordersThe patient has a fraility score of 4-VULNERABLE, based on may need some assistance

## 2022-02-10 NOTE — DISCHARGE INSTRUCTIONS
Patient Education        Electrical Cardioversion: What to Expect at Home  Your Recovery     Electrical cardioversion is a treatment for an abnormal heartbeat, such as atrial fibrillation, supraventricular tachycardia, or ventricular tachycardia (VT). Your doctor used a brief electrical shock to reset your heart's rhythm. After the procedure, you may have redness, like a sunburn, where the patches were. The medicines you got to make you sleepy may make you feel drowsy for the rest of the day. Your doctor may have you take medicines to help the heart beat normally and to prevent blood clots. This care sheet gives you a general idea about how long it will take for you to recover. But each person recovers at a different pace. Follow the steps below to feel better as quickly as possible. How can you care for yourself at home? Medicines    · Be safe with medicines. Take your medicines exactly as prescribed. Call your doctor if you think you are having a problem with your medicine. You may take one or more of the following medicines:  ? Rate-control medicines to slow the heart rate. These include beta-blockers, calcium channel blockers, and digoxin. ? Rhythm control medicines that help the heart keep a normal rhythm. ? Blood thinners, also called anticoagulants, which help prevent blood clots. You will get more details on the specific medicines your doctor prescribes. Be sure you know how to take your medicines safely.     · Do not take any vitamins, over-the-counter medicines, or herbal products without talking to your doctor first.   Exercise    · Start light exercise if your doctor says that it's okay. Even a small amount will help you get stronger, have more energy, and manage your stress. Walking is an easy way to get exercise. Start out by walking a little more than you did in the hospital. Bit by bit, increase the amount you walk.     · When you exercise, watch for signs that your heart is working too hard. You are pushing too hard if you cannot talk while you are exercising. If you become short of breath or dizzy or have chest pain, sit down and rest right away.     · Check your pulse regularly. Place two fingers on the artery at the palm side of your wrist in line with your thumb. If your heartbeat seems uneven or fast, talk to your doctor. Other instructions    · Ask your doctor when you can drive again.     · Do not smoke. If you need help quitting, talk to your doctor about stop-smoking programs and medicines. These can increase your chances of quitting for good.     · Limit alcohol. Follow-up care is a key part of your treatment and safety. Be sure to make and go to all appointments, and call your doctor if you are having problems. It's also a good idea to know your test results and keep a list of the medicines you take. When should you call for help? Call 911 anytime you think you may need emergency care. For example, call if:    · You passed out (lost consciousness).     · You have chest pain or pressure. This may occur with:  ? Sweating. ? Shortness of breath. ? Nausea or vomiting. ? Pain that spreads from the chest to the neck, jaw, or one or both shoulders or arms. ? A fast or uneven pulse. After calling 911, the  may tell you to chew 1 adult-strength or 2 to 4 low-dose aspirin. Wait for an ambulance. Do not try to drive yourself.     · You have symptoms of a stroke. These may include:  ? Sudden numbness, tingling, weakness, or loss of movement in your face, arm, or leg, especially on only one side of your body. ? Sudden vision changes. ? Sudden trouble speaking. ? Sudden confusion or trouble understanding simple statements. ? Sudden problems with walking or balance. ? A sudden, severe headache that is different from past headaches.    Call your doctor now or seek immediate medical care if:    · You feel dizzy or lightheaded, or you feel like you may faint.     · You have a fast or irregular heartbeat. Watch closely for any changes in your health, and be sure to contact your doctor if you have any problems. Where can you learn more? Go to http://www.gray.com/  Enter A617 in the search box to learn more about \"Electrical Cardioversion: What to Expect at Home. \"  Current as of: April 29, 2021               Content Version: 13.0  © 2006-2021 [employer]. Care instructions adapted under license by [employer] (which disclaims liability or warranty for this information). If you have questions about a medical condition or this instruction, always ask your healthcare professional. [place] [address] any warranty or liability for your use of this information. AFTER YOU TRANSESOPHAGEAL ECHOCARDIOGRAM    Be sure someone else drives you home. You may feel drowsy for several hours. Do not eat or drink for at least two hours after your procedure. Your throat will be numb and there is a risk you might have difficulty swallowing for a while. Be careful when you do eat or drink for the first time especially with hot fluids since you could easily burn your throat. Call your doctor if:    · You are bleeding from your throat or mouth. · You have trouble breathing all of a sudden. · You have chest pain or any pain that spreads to your neck, jaw, or arms. · You have questions or concerns. · You have a fever greater than 101°F.    Doctor: [facility] at [phone]    Special Instructions:    No driving for 24 hours. Nothing to eat/drink until 3:30. Start with small sips of something cool, if tolerated you may progress to solid foods. If you start to cough/choke, stop eating/drinking, wait 30 minutes and then try again.

## 2022-02-10 NOTE — PROCEDURES
: Benjamin Shah. Alexandru Feng MD    REFERRING: Karmen Solis MD    Pre-Procedure Diagnosis  1. Atrial fibrillation, persistent      Procedure Performed  1. Transesophageal Echocardiogram  2. Direct Current Cardioversion  3. Physician directed moderate sedation    ASA: III    Mallampati: III    Anesthesia: * No anesthesia type entered *     Specimens: * No specimens in log *     Complications: None    Procedural Description: The patient was brought to the operating suite in a fasting, nonsedated state. The risks, benefits and alternatives of the procedure were reviewed with the patient, and final questions answered. Informed consent was confirmed. A procedural timeout was called and completed per institutional policy. Once appropriate monitors were applied, procedural moderate sedation was provided in divided dose of Fentanyl and Versed by Helen Espinoza RN, under direct physician supervision. A total of 100 mcg of Fentanyl and 6 mg of Versed was given between 2:25 and 2:40 PM. Once an appropriate level of sedation was achieved, a transesophageal echocardiogram probe was inserted into the esophagus with ease. A comprehensive MATEUSZ study was completed and the full report available in the chart. There was no evidence of spontaneous echo contrast or thrombus in the left atrial appendage. There was moderate to severe biatrial enlargement. The patient was converted to sinus rhythm with pads across the anterior and posterior chest wall with a synchronized 360 J shock. The patient awoke from the procedure without overt complications. IMPRESSION: No CLARISSE appendage thrombus, Normal sinus rhythm. PLAN:   -Routine cardiac care per Dr. Shantal Renee. Benjamin Shah.  Alexandru Feng MD, [facility] [address]  Clinical Cardiac Electrophysiology  [facility]    2/10/2022  2:49 PM

## 2022-03-18 PROBLEM — M48.062 LUMBAR STENOSIS WITH NEUROGENIC CLAUDICATION: Status: ACTIVE | Noted: 2018-10-30

## 2022-03-18 PROBLEM — Z79.01 LONG TERM CURRENT USE OF ANTICOAGULANT: Status: ACTIVE | Noted: 2022-02-04

## 2022-03-18 PROBLEM — N39.0 RECURRENT UTI: Status: ACTIVE | Noted: 2018-05-23

## 2022-03-18 PROBLEM — F33.9 RECURRENT DEPRESSION (HCC): Status: ACTIVE | Noted: 2019-01-16

## 2022-03-18 PROBLEM — Z95.3 S/P AORTIC VALVE REPLACEMENT WITH BIOPROSTHETIC VALVE: Status: ACTIVE | Noted: 2017-04-28

## 2022-03-18 PROBLEM — N10 ACUTE PYELONEPHRITIS: Status: ACTIVE | Noted: 2021-06-06

## 2022-03-18 PROBLEM — J18.9 CAP (COMMUNITY ACQUIRED PNEUMONIA): Status: ACTIVE | Noted: 2020-11-02

## 2022-03-18 PROBLEM — R06.00 DYSPNEA: Status: ACTIVE | Noted: 2021-10-07

## 2022-03-18 PROBLEM — M47.812 CERVICAL SPONDYLOSIS WITHOUT MYELOPATHY: Status: ACTIVE | Noted: 2017-12-08

## 2022-03-18 PROBLEM — R93.1 ABNORMAL NUCLEAR CARDIAC IMAGING TEST: Status: ACTIVE | Noted: 2019-01-18

## 2022-03-19 PROBLEM — N20.0 RENAL STONE: Status: ACTIVE | Noted: 2018-05-23

## 2022-03-19 PROBLEM — F51.01 PRIMARY INSOMNIA: Status: ACTIVE | Noted: 2018-04-18

## 2022-03-19 PROBLEM — Z98.890 HISTORY OF CERVICAL SPINAL SURGERY: Status: ACTIVE | Noted: 2018-10-16

## 2022-03-19 PROBLEM — J18.9 RIGHT LOWER LOBE PNEUMONIA: Status: ACTIVE | Noted: 2020-10-31

## 2022-03-19 PROBLEM — I48.19 PERSISTENT ATRIAL FIBRILLATION (HCC): Status: ACTIVE | Noted: 2021-08-09

## 2022-03-19 PROBLEM — M48.062 SPINAL STENOSIS OF LUMBAR REGION WITH NEUROGENIC CLAUDICATION: Status: ACTIVE | Noted: 2018-10-16

## 2022-03-19 PROBLEM — M54.2 NECK PAIN: Status: ACTIVE | Noted: 2017-07-20

## 2022-03-19 PROBLEM — M54.12 CERVICAL RADICULOPATHY: Status: ACTIVE | Noted: 2017-07-20

## 2022-03-19 PROBLEM — M1A.09X0 CHRONIC GOUT OF MULTIPLE SITES: Status: ACTIVE | Noted: 2017-02-15

## 2022-03-19 PROBLEM — I33.0 ACUTE BACTERIAL ENDOCARDITIS: Status: ACTIVE | Noted: 2021-08-09

## 2022-03-20 PROBLEM — M17.12 PRIMARY OSTEOARTHRITIS OF LEFT KNEE: Status: ACTIVE | Noted: 2017-02-15

## 2022-03-20 PROBLEM — M50.30 DDD (DEGENERATIVE DISC DISEASE), CERVICAL: Status: ACTIVE | Noted: 2017-07-20

## 2022-04-21 NOTE — PROGRESS NOTES
Care Management Interventions  PCP Verified by CM: Yes (Dr. Violette Vallecillo)  Mode of Transport at Discharge: Other (see comment) (son - Vance Byrd)  Transition of Care Consult (CM Consult): Discharge Planning  Discharge Durable Medical Equipment: No  Physical Therapy Consult: Yes  Occupational Therapy Consult: No  Speech Therapy Consult: No  Current Support Network: Own Home, Other (son lives with patient)  Confirm Follow Up Transport: Family  The Patient and/or Patient Representative was Provided with a Choice of Provider and Agrees with the Discharge Plan?: Yes  Name of the Patient Representative Who was Provided with a Choice of Provider and Agrees with the Discharge Plan: self  Freedom of Choice List was Provided with Basic Dialogue that Supports the Patient's Individualized Plan of Care/Goals, Treatment Preferences and Shares the Quality Data Associated with the Providers?: Yes   Resource Information Provided?: No  Discharge Location  Discharge Placement: [facility] ([facility])    Patient to discharge to [facility] today. All milestones for discharge have been met. RN aware of patient transport. show

## 2022-06-01 ENCOUNTER — TELEPHONE (OUTPATIENT)
Dept: CARDIOLOGY CLINIC | Age: 72
End: 2022-06-01

## 2022-06-01 NOTE — TELEPHONE ENCOUNTER
Patient called stating she saw her PCP 5/31 and was told her heart was out of rhythm. Please call and advise.

## 2022-06-01 NOTE — TELEPHONE ENCOUNTER
----- Message from Lorie Pérez MD sent at 6/1/2022 10:46 AM EDT -----  She should get an ECG to confirm AF. From there she can get a cardioversion if she desires. She will likely require a rhythm drug if this were to continue to occur.     ADRI

## 2022-06-01 NOTE — TELEPHONE ENCOUNTER
Pt made aware of Dr. Whit Solano response. States she does not have a ride until Friday. Scheduled at 10 am, same time as INR visit. Verb understanding.

## 2022-06-01 NOTE — TELEPHONE ENCOUNTER
Pt admits to SOB, dizziness with current afib. Does not know current HR. States HR at PCP office 96. Last INR 5/10 2. 1. has not missed any warfarin doses in past 30 days. Please advise further recommendations. PCP note from 5/31:  Current Assessment & Plan   Had cardioversion into normal sinus rhythm about February 2022 but was clinically in atrial fib today, EKG confirmed. Heart rate was 98. Nonspecific ST and T wave changes noted. She is anticoagulated with Coumadin. She is scheduled to see cardiology I believe sometime toward the end of June. I think she can wait until then. She does not appear to be in distress relative to the rhythm.

## 2022-06-03 ENCOUNTER — TELEPHONE (OUTPATIENT)
Dept: CARDIOLOGY CLINIC | Age: 72
End: 2022-06-03

## 2022-06-03 ENCOUNTER — NURSE ONLY (OUTPATIENT)
Dept: CARDIOLOGY CLINIC | Age: 72
End: 2022-06-03
Payer: MEDICARE

## 2022-06-03 ENCOUNTER — ANTI-COAG VISIT (OUTPATIENT)
Dept: CARDIOLOGY CLINIC | Age: 72
End: 2022-06-03
Payer: MEDICARE

## 2022-06-03 DIAGNOSIS — Z79.01 LONG TERM CURRENT USE OF ANTICOAGULANT: Primary | ICD-10-CM

## 2022-06-03 DIAGNOSIS — I48.19 PERSISTENT ATRIAL FIBRILLATION (HCC): ICD-10-CM

## 2022-06-03 DIAGNOSIS — I48.19 PERSISTENT ATRIAL FIBRILLATION (HCC): Primary | ICD-10-CM

## 2022-06-03 DIAGNOSIS — Z95.3 S/P AORTIC VALVE REPLACEMENT WITH BIOPROSTHETIC VALVE: ICD-10-CM

## 2022-06-03 DIAGNOSIS — I48.91 A-FIB (HCC): Primary | ICD-10-CM

## 2022-06-03 LAB
POC INR: 2.6
PROTHROMBIN TIME, POC: NORMAL
VALID INTERNAL CONTROL, POC: NORMAL

## 2022-06-03 PROCEDURE — 85610 PROTHROMBIN TIME: CPT | Performed by: INTERNAL MEDICINE

## 2022-06-03 PROCEDURE — 93793 ANTICOAG MGMT PT WARFARIN: CPT | Performed by: INTERNAL MEDICINE

## 2022-06-03 PROCEDURE — 93000 ELECTROCARDIOGRAM COMPLETE: CPT | Performed by: INTERNAL MEDICINE

## 2022-06-03 NOTE — PROGRESS NOTES
Anticoagulation Summary  As of 6/3/2022    INR goal:  2.0-3.0   TTR:  --   INR used for dosin.6 (6/3/2022)   Warfarin maintenance plan:  0 mg every Sun, Thu; 2.5 mg (5 mg x 0.5) all other days   Weekly warfarin total:  12.5 mg   Plan last modified:  Antonina Velarde MA (6/3/2022)   Next INR check:  2022   Target end date: Indefinite    Indications    Long term current use of anticoagulant [Z79.01]  S/P aortic valve replacement with bioprosthetic valve [Z95.3]  Persistent atrial fibrillation (HCC) [I48.19]             Anticoagulation Episode Summary     INR check location:  Anticoagulation Clinic    Preferred lab:      Send INR reminders to:  [id number] [place], [place] [address]y. [address]    Comments:  [facility]      Anticoagulation Care Providers     Provider Role Specialty Phone number    Laurie Zapata MD Responsible Cardiology [phone]          Tablet strength and weekly dosing schedule confirmed today.

## 2022-06-03 NOTE — TELEPHONE ENCOUNTER
----- Message from Lorie Pérez MD sent at 6/3/2022  2:05 PM EDT -----  She's in afib but heart rate is controlled. If she can tolerate it, she can see me the week I get back from vacation. If she feels poorly and requires a cardioversion, she can be signed up for a noninvasive cardiologist to perform next week and I'll see her in follow up. JJS  ----- Message -----  From: Drew Bowen MA  Sent: 6/3/2022  10:43 AM EDT  To: Lorie Pérez MD    PT CAME IN FOR EKG TO CONFIRM AF PER TRIAGE NOTE FROM 6/1/22. AF WAS CONFIRMED BY DR. Severiano Bolster. PLEASE REVIEW EKG & GIVE INSTRUCTIONS. CARDIOVERSION VS MEDS WAS MENTIONED IN TRIAGE NOTE. PT IN OFFICE. PLEASE ADVISE.

## 2022-06-03 NOTE — TELEPHONE ENCOUNTER
Spoke with patient, wants to proceed with Cardioversion next week and follow up with Dr. Clarice Thomas after that. Told patient the hospital will call to schedule day and time for procedure. Follow up scheduled June 24th at 8:00AM in [facility]. Pt voiced understanding.

## 2022-06-03 NOTE — PROGRESS NOTES
Pt came in to have EKG per Dr. Steffany Crawford to confirm if in Afib. Dr. Rupal Washburn reviewed EKG confirms pt is in Afib. Sent a note to Dr. Steffany Crawford to see if needing to have cardioversion or to start a med. Advised pt would call with results was given a verbal understanding.

## 2022-06-03 NOTE — PATIENT INSTRUCTIONS
Reminder: Please contact the Coumadin Clinic at [phone] when you have medication changes. Examples, new medications, antibiotics, discontinued medications, new supplements, missed doses of warfarin or if you took extra doses of warfarin. This also includes OTC medications. Notifying us helps reduce the possibility of high and low INR's. In addition, if warfarin needs to be held for any procedures, please have surgeon or physician's office contact us before holding anticoagulant. Thanks, [facility] Coumadin [facility].

## 2022-06-06 NOTE — PROGRESS NOTES
Patient pre-assessment complete for [facility] scheduled for CVN, arrival time 0900. Patient verified using . Patient instructed to bring a list of all home medications on the day of procedure. NPO status reinforced. Patient instructed to take warfarin the morning of the procedure. Patient instructed to HOLD lasix. Instructed they can take all other medications excluding vitamins & supplements. Patient verbalizes understanding of all instructions & denies any questions at this time.

## 2022-06-07 ENCOUNTER — HOSPITAL ENCOUNTER (OUTPATIENT)
Dept: CARDIAC CATH/INVASIVE PROCEDURES | Age: 72
Discharge: HOME | End: 2022-06-09
Payer: MEDICARE

## 2022-06-07 VITALS
OXYGEN SATURATION: 98 % | SYSTOLIC BLOOD PRESSURE: 144 MMHG | HEART RATE: 77 BPM | RESPIRATION RATE: 12 BRPM | DIASTOLIC BLOOD PRESSURE: 60 MMHG

## 2022-06-07 DIAGNOSIS — I48.91 A-FIB (HCC): ICD-10-CM

## 2022-06-07 LAB
EKG ATRIAL RATE: 66 BPM
EKG DIAGNOSIS: NORMAL
EKG P AXIS: 36 DEGREES
EKG P-R INTERVAL: 190 MS
EKG Q-T INTERVAL: 416 MS
EKG QRS DURATION: 82 MS
EKG QTC CALCULATION (BAZETT): 436 MS
EKG R AXIS: -18 DEGREES
EKG T AXIS: 87 DEGREES
EKG VENTRICULAR RATE: 66 BPM

## 2022-06-07 PROCEDURE — 93005 ELECTROCARDIOGRAM TRACING: CPT | Performed by: INTERNAL MEDICINE

## 2022-06-07 RX ORDER — SODIUM CHLORIDE 9 MG/ML
INJECTION, SOLUTION INTRAVENOUS CONTINUOUS
Status: DISCONTINUED | OUTPATIENT
Start: 2022-06-07 | End: 2022-06-10 | Stop reason: HOSPADM

## 2022-06-07 NOTE — PROGRESS NOTES
Patient received to [address] [place] [address] room # 15  Ambulatory from [facility]. Patient scheduled for cardioversion today with Dr Francisco Jones. Procedure reviewed & questions answered, voiced good understanding consent obtained & placed on chart. All medications and medical history reviewed. Will prep patient per orders. Patient & family updated on plan of care. The patient has a fraility score of 4-VULNERABLE, based on need for wheelchair from [facility].

## 2022-06-13 ENCOUNTER — CLINICAL DOCUMENTATION (OUTPATIENT)
Dept: CARDIOLOGY CLINIC | Age: 72
End: 2022-06-13

## 2022-06-24 ENCOUNTER — OFFICE VISIT (OUTPATIENT)
Dept: CARDIOLOGY CLINIC | Age: 72
End: 2022-06-24
Payer: MEDICARE

## 2022-06-24 ENCOUNTER — ANTI-COAG VISIT (OUTPATIENT)
Dept: CARDIOLOGY CLINIC | Age: 72
End: 2022-06-24
Payer: MEDICARE

## 2022-06-24 VITALS
DIASTOLIC BLOOD PRESSURE: 50 MMHG | WEIGHT: 235 LBS | BODY MASS INDEX: 39.15 KG/M2 | HEART RATE: 64 BPM | SYSTOLIC BLOOD PRESSURE: 130 MMHG | HEIGHT: 65 IN

## 2022-06-24 DIAGNOSIS — Z79.01 LONG TERM CURRENT USE OF ANTICOAGULANT: Primary | ICD-10-CM

## 2022-06-24 DIAGNOSIS — I48.19 PERSISTENT ATRIAL FIBRILLATION (HCC): Primary | ICD-10-CM

## 2022-06-24 DIAGNOSIS — Z95.3 S/P AORTIC VALVE REPLACEMENT WITH BIOPROSTHETIC VALVE: ICD-10-CM

## 2022-06-24 DIAGNOSIS — I48.19 PERSISTENT ATRIAL FIBRILLATION (HCC): ICD-10-CM

## 2022-06-24 DIAGNOSIS — Z09 HOSPITAL DISCHARGE FOLLOW-UP: ICD-10-CM

## 2022-06-24 LAB
POC INR: 2.5
PROTHROMBIN TIME, POC: NORMAL
VALID INTERNAL CONTROL, POC: YES

## 2022-06-24 PROCEDURE — G8399 PT W/DXA RESULTS DOCUMENT: HCPCS | Performed by: INTERNAL MEDICINE

## 2022-06-24 PROCEDURE — 1123F ACP DISCUSS/DSCN MKR DOCD: CPT | Performed by: INTERNAL MEDICINE

## 2022-06-24 PROCEDURE — G8417 CALC BMI ABV UP PARAM F/U: HCPCS | Performed by: INTERNAL MEDICINE

## 2022-06-24 PROCEDURE — 85610 PROTHROMBIN TIME: CPT | Performed by: INTERNAL MEDICINE

## 2022-06-24 PROCEDURE — 1090F PRES/ABSN URINE INCON ASSESS: CPT | Performed by: INTERNAL MEDICINE

## 2022-06-24 PROCEDURE — 1036F TOBACCO NON-USER: CPT | Performed by: INTERNAL MEDICINE

## 2022-06-24 PROCEDURE — 93000 ELECTROCARDIOGRAM COMPLETE: CPT | Performed by: INTERNAL MEDICINE

## 2022-06-24 PROCEDURE — 3017F COLORECTAL CA SCREEN DOC REV: CPT | Performed by: INTERNAL MEDICINE

## 2022-06-24 PROCEDURE — 99214 OFFICE O/P EST MOD 30 MIN: CPT | Performed by: INTERNAL MEDICINE

## 2022-06-24 PROCEDURE — 1111F DSCHRG MED/CURRENT MED MERGE: CPT | Performed by: INTERNAL MEDICINE

## 2022-06-24 PROCEDURE — G8427 DOCREV CUR MEDS BY ELIG CLIN: HCPCS | Performed by: INTERNAL MEDICINE

## 2022-06-24 NOTE — PATIENT INSTRUCTIONS
Reminder: Please contact the Coumadin Clinic at [phone] when you have medication changes. Examples, new medications, antibiotics, discontinued medications, new supplements, missed doses of warfarin or if you took extra doses of warfarin. This also includes OTC medications. Notifying us helps reduce the possibility of high and low INR's. In addition, if warfarin needs to be held for any procedures, please have surgeon or physician's office contact us before holding anticoagulant. Thanks, [facility] Coumadin Clinic.

## 2022-06-24 NOTE — PROGRESS NOTES
[facility] CARDIOLOGY  [address] [facility], [address]  PHONE: [phone]        22      NAME:  Luz Carrasco  : 1950  MRN: [id number]     Referring Cardiologist: Tito Whitten MD    Reason for Consultation: Persistent atrial fibrillation      ASSESSMENT and PLAN:  Diagnoses and all orders for this visit:      1. Encounter to establish care   2. Paroxysmal atrial fibrillation (HCC)   3. MV endocarditis    4. CKD   5. AV s/p AVR    6. Neck pain. 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 She is chronically dyspneic. She remains in rhythm. -AF - s/p DCCV 2022. In rhythm today.    -Continue warfarin and BB.    -EP follow up in 1 year or PRN. No follow-up provider specified. Thank you for allowing me to participate in the electrophysiologic care of Ms. Luz Carrasco. Please contact me if any questions or concerns were to arise. Mary Mancia. Perla SOARES, MS  Clinical Cardiac Electrophysiology  [facility]  22  7:53 AM    ===================================================================  Chief Complant:    Chief Complaint   Patient presents with    Irregular Heart Beat    Follow-Up from [facility]        Consultation is requested by No ref. provider found for evaluation of Irregular Heart Beat and Follow-Up from Hospital    History:  Luz Carrasco is a most pleasant 67 y.o. female with a past medical and cardiac history significant for persistent atrial fibrillation and MV endocarditis. She presents as an EP evaluation  from Dr. Ish Oseguera. Her history is of high complexity including MV endocarditis, s/p AVR in . She had a long hospitalization in  from endocarditis presumably from a urinary source (enterococcus). She appears to have recovered but remains weak with  chronic dyspnea in the setting of persistent AF.       She comes in for follow up. She underwent a successful cardioversion in 2/2022 and remains in NSR. She does have neck pain and will be seeing [facility] [place]. She was going to get another cardioversion and then she fell and went back into rhythm. The patient otherwise denies chest pain, presyncope, syncope or lateralizing symptoms. Cardiac PMH: (Old records have been reviewed and summarized below)      EKG:  (EKG has been independently visualized by me with interpretation below): Atrial fibrillation, normal axis, no ischemia. ECHO: 11/2021  ·   Image quality for this study was technically difficult. ·   AV: Aortic valve mean gradient is 9 mmHg. ·   LV: Calculated LVEF is 50%. Normal cavity size and systolic fun  ·   ction (ejection fraction normal). Wall motion: unable to assess due to limited image quality. Inconclusive left ventricular diastolic function. ·   LA: Dilated left atrium. ·   MV: Mitral valve leaflet calcification. Mild mitral valve regurgitation is present. ·   PV: Mild pulmonic valve regurgitation is present. Previous Heart Catheterization: 6/2021    Left Main     The vessel was visualized by angiography. The vessel is angiographically normal.     Left Anterior Descending     There is mild disease. Mid LAD lesion, 40% stenosed. Lateral First Diagonal Branch     Lat 1st Diag lesion, 30% stenosed. Left Circumflex     Mid Cx lesion, 30% stenosed. Right Coronary Artery     There is mild disease. Mid RCA lesion, 30% stenosed. Stress Test: n/a       DEVICE INTERROGATION: n/a     Past Medical History, Past Surgical History, Family history, Social History, and Medications were all reviewed with the patient today and updated as necessary.      Current Outpatient Medications   Medication Sig Dispense Refill    allopurinol (ZYLOPRIM) 300 MG tablet Take 150 mg by mouth daily      amLODIPine (NORVASC) 10 MG tablet Take 10 mg by mouth daily      aspirin 81 MG EC tablet Take 81 mg by mouth daily  calcium carbonate-vitamin D 600-200 MG-UNIT TABS Take 1 tablet by mouth daily      carvedilol (COREG) 25 MG tablet Take 25 mg by mouth 2 times daily (with meals)      esomeprazole (NEXIUM) 40 MG delayed release capsule Take 40 mg by mouth daily      ferrous sulfate (IRON 325) 325 (65 Fe) MG tablet Take 325 mg by mouth daily (with breakfast)      furosemide (LASIX) 20 MG tablet TAKE 1 TABLET BY MOUTH EVERY DAY      HYDROcodone-acetaminophen (NORCO)  MG per tablet Take 1 tablet by mouth every 8 hours as needed.  losartan (COZAAR) 100 MG tablet Take 100 mg by mouth daily      melatonin 3 MG TABS tablet Take by mouth      nitroGLYCERIN (NITROSTAT) 0.4 MG SL tablet Place 0.4 mg under the tongue      potassium chloride (KLOR-CON) 10 MEQ extended release tablet TAKE 1 TABLET BY MOUTH DAILY      warfarin (COUMADIN) 5 MG tablet Take 5 mg by mouth daily       No current facility-administered medications for this visit.      Allergies   Allergen Reactions    Latex Rash    Metformin Diarrhea    Sulfa Antibiotics Anaphylaxis    Duloxetine Other (See Comments)    Nitrofurantoin Other (See Comments)     Causes Gout    Oxycodone Other (See Comments)     Hallucination, anxiety with oxycontin-- denies rx to hydrocodone       Past Medical History:   Diagnosis Date    Adverse effect of anesthesia     panic attack when mask placed on face    Anemia     2016/2017 - gi bleed---  2017 blood transfusion prior to AVR    Arthritis     CAD (coronary artery disease) 2016 1/2019 Minor nonobstructive coronary artery disease/ cath report    Chronic kidney disease     kidney stones    Chronic pain     r/t arthritis/ back pain Spinal stenosis of lumbar region     Diabetes ([facility] [address]     \"prediabetic\" A1C 6.4 8/28/19- no meds    Follow-up visit for aortic valve replacement with bioprosthetic valve 7/25/2016    GERD (gastroesophageal reflux disease)     controlled w/med- well controlled with meds    History of gastric ulcer 6/16/2016    Hypertension     controlled w/med    Kidney stones     Morbid obesity ([address]     Murmur, cardiac     s/p AVR    PONV (postoperative nausea and vomiting)     Pt can not be flat in bed -- severe PONV worsens when laying flat    Psychiatric disorder     claustraphobia (severe)    PUD (peptic ulcer disease) 06/2016    dx 2016- resolved per patient/ GI bleed requiring blood transfusion    S/P aortic valve replacement with bioprosthetic valve 4/28/2017    Spinal stenosis of lumbar region with neurogenic claudication 10/16/2018    Valvular heart disease 2016    AVR     Past Surgical History:   Procedure Laterality Date    AORTIC VALVE REPLACEMENT  6/9/2016    Dr. Demetris Prieto  05/26/2016    Severe AS with minimal nonobstructive CAD.  CARDIAC CATHETERIZATION  01/31/2019    Hyperdynamic LV EF. No significant gradient across bioprosthetic aortic valve,mild nonobstructive CAD,and MAC.     CARDIAC VALVE SURGERY      CERVICAL FUSION      posterior fusion    CHOLECYSTECTOMY, LAPAROSCOPIC      COLONOSCOPY N/A 6/15/2016    COLONOSCOPY/ BMI 41  ROOM 2235 performed by Quyen Scott MD at [facility] END[facility]COPY    GASTRIC BYPASS SURGERY      HYSTERECTOMY (CERVIX STATUS UNKNOWN)      OK ABDOMEN SURGERY PROC UNLISTED      reversal of gastric bypass reversal    SALPINGO-OOPHORECTOMY      SHOULDER ARTHROSCOPY Right      times 2 \"shaved bone\"     TOTAL KNEE ARTHROPLASTY Bilateral     UROLOGICAL SURGERY  Multiple dates    Mulitiple open Nephrolithotomies    UROLOGICAL SURGERY  08/2019    CYSTOSCOPY BILATERAL URETEROSCOPY/LASER LITHO/STENTS     Family History   Problem Relation Age of Onset    Breast Cancer Neg Hx     Diabetes Maternal Grandmother     Diabetes Paternal Grandmother     Hypertension Father     Lung Disease Father     Lung Cancer Father     Lung Disease Mother     Lung Cancer Mother      Social History     Tobacco Use    Smoking status: Never Smoker    Smokeless tobacco: Never Used   Substance Use Topics    Alcohol use: No       ROS:  A comprehensive review of systems was performed with the pertinent positives and negatives as noted in the HPI in addition to:  Review of Systems      PHYSICAL EXAM:   BP (!) 130/50   Wt 235 lb (106.6 kg)   BMI 39.11 kg/m²      Wt Readings from Last 3 Encounters:   06/24/22 235 lb (106.6 kg)   03/31/22 233 lb (105.7 kg)   03/02/22 237 lb (107.5 kg)     BP Readings from Last 3 Encounters:   06/24/22 (!) 130/50   06/07/22 (!) 144/60   03/31/22 130/62     Gen: Well appearing, well developed, no acute distress  Eyes: Pupils equal, round. Extraocular movements are intact  ENT: Oropharynx clear, no oral lesions, normal dentition  CV: S1S2, regular rate and rhythm, no murmurs, rubs or gallops, normal JVD, no carotid bruits, normal distal pulses, no JULIEN  Pulm: Clear to auscultation bilaterally, no accessory muscle uses, no wheezes or rales  GI: Soft, NT, ND, +BS  Neuro: Alert and oriented, nonfocal  Psych: Appropriate affect  Skin: Normal color and skin turgor  MSK: Normal muscle bulk and tone    Medical problems and test results were reviewed with the patient today. No results found for any visits on 06/24/22.

## 2022-06-24 NOTE — PROGRESS NOTES
Anticoagulation Summary  As of 2022    INR goal:  2.0-3.0   TTR:  100.0 % (1.7 wk)   INR used for dosin.5 (2022)   Warfarin maintenance plan:  0 mg every Sun, Thu; 2.5 mg (5 mg x 0.5) all other days   Weekly warfarin total:  12.5 mg   Plan last modified:  Elmo Solomon MA (6/3/2022)   Next INR check:  2022   Target end date: Indefinite    Indications    Long term current use of anticoagulant [Z79.01]  S/P aortic valve replacement with bioprosthetic valve [Z95.3]  Persistent atrial fibrillation (HCC) [I48.19]             Anticoagulation Episode Summary     INR check location:  Anticoagulation Clinic    Preferred lab:      Send INR reminders to:  [address] [facility] [address]    Comments:  [facility]      Anticoagulation Care Providers     Provider Role Specialty Phone number    Dwight Serrano MD Responsible Cardiology [phone]      Tablet strength and weekly dosing schedule confirmed today.

## 2022-07-11 ENCOUNTER — TELEPHONE (OUTPATIENT)
Dept: CARDIOLOGY CLINIC | Age: 72
End: 2022-07-11

## 2022-07-11 NOTE — TELEPHONE ENCOUNTER
Pt called the RX line. She states she has been trying to get a machine to check her PT/INR for Warfarin but they need something from our office.      Please call pt at [phone]

## 2022-07-12 NOTE — TELEPHONE ENCOUNTER
This was supposed to have been taken care of by Manish Almeida. I spoke with the patient and ensured her I would get the updated order done.

## 2022-07-22 ENCOUNTER — ANTI-COAG VISIT (OUTPATIENT)
Dept: CARDIOLOGY CLINIC | Age: 72
End: 2022-07-22

## 2022-07-22 DIAGNOSIS — Z79.01 LONG TERM CURRENT USE OF ANTICOAGULANT: Primary | ICD-10-CM

## 2022-07-22 DIAGNOSIS — Z95.3 S/P AORTIC VALVE REPLACEMENT WITH BIOPROSTHETIC VALVE: ICD-10-CM

## 2022-07-22 DIAGNOSIS — I48.19 PERSISTENT ATRIAL FIBRILLATION (HCC): ICD-10-CM

## 2022-07-22 LAB — INR BLD: 1.9

## 2022-08-16 LAB — INR BLD: 2

## 2022-08-17 ENCOUNTER — ANTI-COAG VISIT (OUTPATIENT)
Dept: CARDIOLOGY CLINIC | Age: 72
End: 2022-08-17

## 2022-08-17 DIAGNOSIS — I48.19 PERSISTENT ATRIAL FIBRILLATION (HCC): ICD-10-CM

## 2022-08-17 DIAGNOSIS — Z95.3 S/P AORTIC VALVE REPLACEMENT WITH BIOPROSTHETIC VALVE: ICD-10-CM

## 2022-08-17 DIAGNOSIS — Z79.01 LONG TERM CURRENT USE OF ANTICOAGULANT: Primary | ICD-10-CM

## 2022-08-18 NOTE — PATIENT INSTRUCTIONS
Reminder: Please contact the Coumadin Clinic at [phone] when you have medication changes. Examples, new medications, antibiotics, discontinued medications, new supplements, missed doses of warfarin or if you took extra doses of warfarin. This also includes OTC medications. Notifying us helps reduce the possibility of high and low INR's. In addition, if warfarin needs to be held for any procedures, please have surgeon or physician's office contact us before holding anticoagulant. Thanks, [facility].

## 2022-09-06 ENCOUNTER — ANTI-COAG VISIT (OUTPATIENT)
Dept: CARDIOLOGY CLINIC | Age: 72
End: 2022-09-06

## 2022-09-06 DIAGNOSIS — I48.19 PERSISTENT ATRIAL FIBRILLATION (HCC): ICD-10-CM

## 2022-09-06 DIAGNOSIS — Z79.01 LONG TERM CURRENT USE OF ANTICOAGULANT: Primary | ICD-10-CM

## 2022-09-06 DIAGNOSIS — Z95.3 S/P AORTIC VALVE REPLACEMENT WITH BIOPROSTHETIC VALVE: ICD-10-CM

## 2022-09-06 LAB — INR BLD: 1.7

## 2022-09-06 NOTE — PROGRESS NOTES
Called pt and she stated that she has not missed any tablets and no other changes. Increased her one time only.

## 2022-09-09 ENCOUNTER — OFFICE VISIT (OUTPATIENT)
Dept: CARDIOLOGY CLINIC | Age: 72
End: 2022-09-09
Payer: MEDICARE

## 2022-09-09 VITALS
BODY MASS INDEX: 39.49 KG/M2 | WEIGHT: 237 LBS | SYSTOLIC BLOOD PRESSURE: 138 MMHG | HEART RATE: 72 BPM | DIASTOLIC BLOOD PRESSURE: 60 MMHG | HEIGHT: 65 IN

## 2022-09-09 DIAGNOSIS — Z95.3 S/P AORTIC VALVE REPLACEMENT WITH BIOPROSTHETIC VALVE: ICD-10-CM

## 2022-09-09 DIAGNOSIS — I10 ESSENTIAL HYPERTENSION, BENIGN: Primary | ICD-10-CM

## 2022-09-09 DIAGNOSIS — I48.19 PERSISTENT ATRIAL FIBRILLATION (HCC): ICD-10-CM

## 2022-09-09 PROCEDURE — 3017F COLORECTAL CA SCREEN DOC REV: CPT | Performed by: INTERNAL MEDICINE

## 2022-09-09 PROCEDURE — 1090F PRES/ABSN URINE INCON ASSESS: CPT | Performed by: INTERNAL MEDICINE

## 2022-09-09 PROCEDURE — 99214 OFFICE O/P EST MOD 30 MIN: CPT | Performed by: INTERNAL MEDICINE

## 2022-09-09 PROCEDURE — G8417 CALC BMI ABV UP PARAM F/U: HCPCS | Performed by: INTERNAL MEDICINE

## 2022-09-09 PROCEDURE — 1036F TOBACCO NON-USER: CPT | Performed by: INTERNAL MEDICINE

## 2022-09-09 PROCEDURE — 1123F ACP DISCUSS/DSCN MKR DOCD: CPT | Performed by: INTERNAL MEDICINE

## 2022-09-09 PROCEDURE — G8427 DOCREV CUR MEDS BY ELIG CLIN: HCPCS | Performed by: INTERNAL MEDICINE

## 2022-09-09 PROCEDURE — G8399 PT W/DXA RESULTS DOCUMENT: HCPCS | Performed by: INTERNAL MEDICINE

## 2022-09-09 RX ORDER — POTASSIUM CHLORIDE 750 MG/1
10 TABLET, FILM COATED, EXTENDED RELEASE ORAL DAILY
Qty: 90 TABLET | Refills: 3 | Status: SHIPPED | OUTPATIENT
Start: 2022-09-09

## 2022-09-09 ASSESSMENT — ENCOUNTER SYMPTOMS
HEMATEMESIS: 0
ORTHOPNEA: 0
SPUTUM PRODUCTION: 0
BLURRED VISION: 0
DIARRHEA: 0
WHEEZING: 0
HOARSE VOICE: 0
HEMATOCHEZIA: 0
BACK PAIN: 1
BOWEL INCONTINENCE: 0
ABDOMINAL PAIN: 0
SHORTNESS OF BREATH: 0
COLOR CHANGE: 0

## 2022-09-09 NOTE — PROGRESS NOTES
[facility] CARDIOLOGY  [address], [address] [facility], [address] [place] [address]  PHONE: [phone]        22        NAME:  Amarilys Brower  : 1950  MRN: [id number]       SUBJECTIVE:   Amarilys Brower is a 67 y.o. female seen for a follow up visit regarding the following: The patient has a hx of a bioprosthetic AVR,AF,MV SBE,and primary hypertension. She returns for scheduled follow up. Bibiana Leiva reports doing well except for back pain associated with spinal stenosis. She is seeing Dr Jaime Park for her spinal stenosis. Chief Complaint   Patient presents with    Hypertension    Coronary Artery Disease       HPI:    Hypertension  This is a chronic problem. The problem is unchanged. The problem is controlled. Associated symptoms include peripheral edema. Pertinent negatives include no anxiety, blurred vision, chest pain, headaches, malaise/fatigue, neck pain, orthopnea, palpitations, PND, shortness of breath or sweats. Past Medical History, Past Surgical History, Family history, Social History, and Medications were all reviewed with the patient today and updated as necessary.          Current Outpatient Medications:     potassium chloride (KLOR-CON) 10 MEQ extended release tablet, Take 1 tablet by mouth daily TAKE 1 TABLET BY MOUTH DAILY, Disp: 90 tablet, Rfl: 3    allopurinol (ZYLOPRIM) 300 MG tablet, Take 150 mg by mouth daily, Disp: , Rfl:     amLODIPine (NORVASC) 10 MG tablet, Take 10 mg by mouth daily, Disp: , Rfl:     aspirin 81 MG EC tablet, Take 81 mg by mouth daily, Disp: , Rfl:     calcium carbonate-vitamin D 600-200 MG-UNIT TABS, Take 1 tablet by mouth daily, Disp: , Rfl:     carvedilol (COREG) 25 MG tablet, Take 25 mg by mouth 2 times daily (with meals), Disp: , Rfl:     esomeprazole (NEXIUM) 40 MG delayed release capsule, Take 40 mg by mouth daily, Disp: , Rfl:     ferrous sulfate (IRON 325) 325 (65 Fe) MG tablet, Take 325 mg by mouth daily (with breakfast), Disp: , Rfl:     furosemide (LASIX) 20 MG tablet, TAKE 1 TABLET BY MOUTH EVERY DAY, Disp: , Rfl:     HYDROcodone-acetaminophen (NORCO)  MG per tablet, Take 1 tablet by mouth every 8 hours as needed. , Disp: , Rfl:     losartan (COZAAR) 100 MG tablet, Take 100 mg by mouth daily, Disp: , Rfl:     melatonin 3 MG TABS tablet, Take by mouth, Disp: , Rfl:     nitroGLYCERIN (NITROSTAT) 0.4 MG SL tablet, Place 0.4 mg under the tongue, Disp: , Rfl:     warfarin (COUMADIN) 5 MG tablet, Take 5 mg by mouth daily, Disp: , Rfl:   Allergies   Allergen Reactions    Latex Rash    Metformin Diarrhea    Sulfa Antibiotics Anaphylaxis    Duloxetine Other (See Comments)    Nitrofurantoin Other (See Comments)     Causes Gout    Oxycodone Other (See Comments)     Hallucination, anxiety with oxycontin-- denies rx to hydrocodone     Past Medical History:   Diagnosis Date    Adverse effect of anesthesia     panic attack when mask placed on face    Anemia     2016/2017 - gi bleed---  2017 blood transfusion prior to AVR    Arthritis     CAD (coronary artery disease) 2016 1/2019 Minor nonobstructive coronary artery disease/ cath report    Chronic kidney disease     kidney stones    Chronic pain     r/t arthritis/ back pain Spinal stenosis of lumbar region     Diabetes ([address]     \"prediabetic\" A1C 6.4 8/28/19- no meds    Follow-up visit for aortic valve replacement with bioprosthetic valve 7/25/2016    GERD (gastroesophageal reflux disease)     controlled w/med- well controlled with meds    History of gastric ulcer 6/16/2016    Hypertension     controlled w/med    Kidney stones     Morbid obesity ([address]     Murmur, cardiac     s/p AVR    PONV (postoperative nausea and vomiting)     Pt can not be flat in bed -- severe PONV worsens when laying flat    Psychiatric disorder     claustraphobia (severe)    PUD (peptic ulcer disease) 06/2016    dx 2016- resolved per patient/ GI bleed requiring blood transfusion    S/P aortic valve replacement with bioprosthetic valve 4/28/2017 Spinal stenosis of lumbar region with neurogenic claudication 10/16/2018    Valvular heart disease 2016    AVR     Past Surgical History:   Procedure Laterality Date    AORTIC VALVE REPLACEMENT  6/9/2016    Dr. Yolette Robison  05/26/2016    Severe AS with minimal nonobstructive CAD. CARDIAC CATHETERIZATION  01/31/2019    Hyperdynamic LV EF. No significant gradient across bioprosthetic aortic valve,mild nonobstructive CAD,and MAC. CARDIAC VALVE SURGERY      CERVICAL FUSION      posterior fusion    CHOLECYSTECTOMY, LAPAROSCOPIC      COLONOSCOPY N/A 6/15/2016    COLONOSCOPY/ BMI 41  ROOM 2235 performed by Jaymie Melton MD at [facility] ENDOSCOPY    GASTRIC BYPASS SURGERY      HYSTERECTOMY (CERVIX STATUS UNKNOWN)      MN ABDOMEN SURGERY PROC UNLISTED      reversal of gastric bypass reversal    SALPINGO-OOPHORECTOMY      SHOULDER ARTHROSCOPY Right      times 2 \"shaved bone\"     TOTAL KNEE ARTHROPLASTY Bilateral     UROLOGICAL SURGERY  Multiple dates    Mulitiple open Nephrolithotomies    UROLOGICAL SURGERY  08/2019    CYSTOSCOPY BILATERAL URETEROSCOPY/LASER LITHO/STENTS     Family History   Problem Relation Age of Onset    Breast Cancer Neg Hx     Diabetes Maternal Grandmother     Diabetes Paternal Grandmother     Hypertension Father     Lung Disease Father     Lung Cancer Father     Lung Disease Mother     Lung Cancer Mother       Social History     Tobacco Use    Smoking status: Never    Smokeless tobacco: Never   Substance Use Topics    Alcohol use: No       ROS:    Review of Systems   Constitutional: Negative for chills, decreased appetite, diaphoresis, fever and malaise/fatigue. HENT:  Negative for congestion, hearing loss, hoarse voice and nosebleeds. Eyes:  Negative for blurred vision. Cardiovascular:  Positive for leg swelling.  Negative for chest pain, claudication, cyanosis, dyspnea on exertion, irregular heartbeat, near-syncope, orthopnea, palpitations, paroxysmal nocturnal dyspnea and syncope. Respiratory:  Negative for shortness of breath, sputum production and wheezing. Endocrine: Negative for polydipsia, polyphagia and polyuria. Skin:  Negative for color change. Musculoskeletal:  Positive for back pain. Negative for neck pain. Gastrointestinal:  Negative for abdominal pain, bowel incontinence, diarrhea, hematemesis and hematochezia. Genitourinary:  Negative for dysuria, frequency and hematuria. Neurological:  Negative for focal weakness, headaches, light-headedness, loss of balance, numbness, sensory change and weakness. Psychiatric/Behavioral:  Negative for altered mental status and memory loss. PHYSICAL EXAM:   /60   Pulse 72   Ht 5' 5\" (1.651 m)   Wt 237 lb (107.5 kg)   BMI 39.44 kg/m²      Physical Exam  Constitutional:       Appearance: Normal appearance. She is obese. HENT:      Head: Normocephalic and atraumatic. Nose: Nose normal.   Eyes:      Extraocular Movements: Extraocular movements intact. Pupils: Pupils are equal, round, and reactive to light. Neck:      Vascular: No carotid bruit. Cardiovascular:      Rate and Rhythm: Regular rhythm. Pulses: Normal pulses. Heart sounds: Murmur (2/6 GLENN) heard. Pulmonary:      Effort: Pulmonary effort is normal.      Breath sounds: Normal breath sounds. Abdominal:      General: Abdomen is flat. Bowel sounds are normal.      Palpations: Abdomen is soft. Musculoskeletal:         General: Swelling (2+ bilateral LE edema) present. Normal range of motion. Cervical back: Normal range of motion and neck supple. Skin:     General: Skin is warm and dry. Neurological:      General: No focal deficit present. Mental Status: She is alert and oriented to person, place, and time. Psychiatric:         Mood and Affect: Mood normal.       Medical problems and test results were reviewed with the patient today.      Recent Results (from the past 672 hour(s))   Protime-INR Collection Time: 08/16/22 12:00 AM   Result Value Ref Range    INR 2.00    Protime-INR    Collection Time: 09/05/22 12:00 AM   Result Value Ref Range    INR 1.70      No results found for: CHOL, CHOLPOCT, CHOLX, CHLST, CHOLV, HDL, HDLPOC, HDLC, LDL, LDLC, VLDLC, VLDL, TGLX, TRIGL  No results found for any visits on 09/09/22. ASSESSMENT and Idalia Mcnally was seen today for hypertension and coronary artery disease. Diagnoses and all orders for this visit:    Essential hypertension, benign:Stable. Continue Norvasc,Coreg,Losartan,and Lasix. Persistent atrial fibrillation (HCC):Clinically,she is in a sinus rhythm. Continue Coumadin and Coreg. S/P aortic valve replacement with bioprosthetic valve:Stable. Consider TTE in 2023 with hx of MV SBE and AVR and persistent murmur. Other orders  -     potassium chloride (KLOR-CON) 10 MEQ extended release tablet; Take 1 tablet by mouth daily TAKE 1 TABLET BY MOUTH DAILY        Disposition:    Return in about 6 months (around 3/9/2023).                 Flavio Daley MD  9/9/2022  10:57 AM

## 2022-09-21 LAB — INR BLD: 2.1

## 2022-09-22 ENCOUNTER — ANTI-COAG VISIT (OUTPATIENT)
Dept: CARDIOLOGY CLINIC | Age: 72
End: 2022-09-22

## 2022-09-22 DIAGNOSIS — Z95.3 S/P AORTIC VALVE REPLACEMENT WITH BIOPROSTHETIC VALVE: ICD-10-CM

## 2022-09-22 DIAGNOSIS — I48.19 PERSISTENT ATRIAL FIBRILLATION (HCC): ICD-10-CM

## 2022-09-22 DIAGNOSIS — Z79.01 LONG TERM CURRENT USE OF ANTICOAGULANT: Primary | ICD-10-CM

## 2022-09-22 NOTE — PATIENT INSTRUCTIONS
Reminder: Please contact the Coumadin Clinic at [phone] when you have medication changes. Examples, new medications, antibiotics, discontinued medications, new supplements, missed doses of warfarin or if you took extra doses of warfarin. This also includes OTC medications. Notifying us helps reduce the possibility of high and low INR's. In addition, if warfarin needs to be held for any procedures, please have surgeon or physician's office contact us before holding anticoagulant. Thanks, [facility] Coumadin Clinic.

## 2022-10-12 LAB — INR BLD: 1.9

## 2022-10-13 ENCOUNTER — ANTI-COAG VISIT (OUTPATIENT)
Dept: CARDIOLOGY CLINIC | Age: 72
End: 2022-10-13

## 2022-10-13 DIAGNOSIS — I48.19 PERSISTENT ATRIAL FIBRILLATION (HCC): ICD-10-CM

## 2022-10-13 DIAGNOSIS — Z79.01 LONG TERM CURRENT USE OF ANTICOAGULANT: Primary | ICD-10-CM

## 2022-10-13 DIAGNOSIS — Z95.3 S/P AORTIC VALVE REPLACEMENT WITH BIOPROSTHETIC VALVE: ICD-10-CM

## 2022-10-24 ENCOUNTER — TELEPHONE (OUTPATIENT)
Dept: CARDIOLOGY CLINIC | Age: 72
End: 2022-10-24

## 2022-10-24 NOTE — TELEPHONE ENCOUNTER
Cardiac Clearance        Physician or Practice Requesting: Pain Management  : Angie Caruso Phone Number: [phone]  Fax Number: [phone]  Date of Surgery/Procedure: TBD  Type of Surgery or Procedure: Injection  Type of Anesthesia: sedation  Type of Clearance Requested: Medication Hold Only  Medication to Hold: Coumadin  Days to Hold: 5 days prior

## 2022-10-25 NOTE — TELEPHONE ENCOUNTER
See physician's response below. Faxed to [phone], Attn: Roni Thompson.      \"Ok to hold warfarin five days before spinal injection\"  Annamaria Alejandre MD

## 2022-10-30 LAB — INR BLD: 1.6

## 2022-10-31 ENCOUNTER — ANTI-COAG VISIT (OUTPATIENT)
Dept: CARDIOLOGY CLINIC | Age: 72
End: 2022-10-31

## 2022-10-31 DIAGNOSIS — I48.19 PERSISTENT ATRIAL FIBRILLATION (HCC): ICD-10-CM

## 2022-10-31 DIAGNOSIS — Z79.01 LONG TERM CURRENT USE OF ANTICOAGULANT: Primary | ICD-10-CM

## 2022-10-31 DIAGNOSIS — Z95.3 S/P AORTIC VALVE REPLACEMENT WITH BIOPROSTHETIC VALVE: ICD-10-CM

## 2022-10-31 NOTE — PROGRESS NOTES
Tablet strength and weekly dosing schedule confirmed today. Warfarin will be held pre-op for seven days. Started holding on 10/29/2022. Patient says the surgeon told her to hold warfarin an additional two days post op.

## 2022-11-23 LAB — INR BLD: 1.3

## 2022-11-28 ENCOUNTER — ANTI-COAG VISIT (OUTPATIENT)
Dept: CARDIOLOGY CLINIC | Age: 72
End: 2022-11-28

## 2022-11-28 DIAGNOSIS — Z95.3 S/P AORTIC VALVE REPLACEMENT WITH BIOPROSTHETIC VALVE: ICD-10-CM

## 2022-11-28 DIAGNOSIS — I48.19 PERSISTENT ATRIAL FIBRILLATION (HCC): ICD-10-CM

## 2022-11-28 DIAGNOSIS — Z79.01 LONG TERM CURRENT USE OF ANTICOAGULANT: Primary | ICD-10-CM

## 2022-11-28 NOTE — PROGRESS NOTES
Home INR monitor result reported via fax, subtherapeutic INR of 1.3 on 11/23/2022. Message left asking the patient to please give us a call back to review the result.

## 2022-12-02 LAB — INR BLD: 2.1

## 2022-12-05 ENCOUNTER — ANTI-COAG VISIT (OUTPATIENT)
Dept: CARDIOLOGY CLINIC | Age: 72
End: 2022-12-05

## 2022-12-05 DIAGNOSIS — Z79.01 LONG TERM CURRENT USE OF ANTICOAGULANT: Primary | ICD-10-CM

## 2022-12-05 DIAGNOSIS — I48.19 PERSISTENT ATRIAL FIBRILLATION (HCC): ICD-10-CM

## 2022-12-05 DIAGNOSIS — Z95.3 S/P AORTIC VALVE REPLACEMENT WITH BIOPROSTHETIC VALVE: ICD-10-CM

## 2022-12-05 NOTE — PROGRESS NOTES
Home INR monitor result reported via fax, therapeutic INR, no dose adjustments made. Continue current maintenance plan (see Anticoag Dosing Calendar). INR to be rechecked in two weeks.

## 2022-12-26 LAB — INR BLD: 1.8

## 2022-12-27 ENCOUNTER — ANTI-COAG VISIT (OUTPATIENT)
Dept: CARDIOLOGY CLINIC | Age: 72
End: 2022-12-27

## 2022-12-27 DIAGNOSIS — Z79.01 LONG TERM CURRENT USE OF ANTICOAGULANT: Primary | ICD-10-CM

## 2022-12-27 DIAGNOSIS — I48.19 PERSISTENT ATRIAL FIBRILLATION (HCC): ICD-10-CM

## 2022-12-27 DIAGNOSIS — Z95.3 S/P AORTIC VALVE REPLACEMENT WITH BIOPROSTHETIC VALVE: ICD-10-CM

## 2022-12-27 NOTE — PROGRESS NOTES
Tablet strength and weekly dosing schedule confirmed today. Patient knows of no reason for subtherapeutic INR result. Patient says she decided on her own to increase her dosing to 2.5 mg daily. Dosing calender updated to reflect her changing her dose. I increased Tuesday's to 5 mg, along with her 2.5 all other days and recheck in one week.

## 2023-01-03 LAB — INR BLD: 2.8

## 2023-01-04 ENCOUNTER — ANTI-COAG VISIT (OUTPATIENT)
Dept: CARDIOLOGY CLINIC | Age: 73
End: 2023-01-04

## 2023-01-04 DIAGNOSIS — I48.19 PERSISTENT ATRIAL FIBRILLATION (HCC): ICD-10-CM

## 2023-01-04 DIAGNOSIS — Z79.01 LONG TERM CURRENT USE OF ANTICOAGULANT: Primary | ICD-10-CM

## 2023-01-04 DIAGNOSIS — Z95.3 S/P AORTIC VALVE REPLACEMENT WITH BIOPROSTHETIC VALVE: ICD-10-CM

## 2023-01-04 NOTE — PROGRESS NOTES
Home INR monitor result reported via fax, therapeutic INR, no dose adjustments made. I spoke with the patient to review/confirm her dosing. See (Anticoag Dosing Calendar). Recheck INR in two week(s).

## 2023-01-21 LAB — INR BLD: 4.2

## 2023-01-23 ENCOUNTER — ANTI-COAG VISIT (OUTPATIENT)
Dept: CARDIOLOGY CLINIC | Age: 73
End: 2023-01-23
Payer: MEDICARE

## 2023-01-23 DIAGNOSIS — I48.19 PERSISTENT ATRIAL FIBRILLATION (HCC): ICD-10-CM

## 2023-01-23 DIAGNOSIS — Z95.3 S/P AORTIC VALVE REPLACEMENT WITH BIOPROSTHETIC VALVE: ICD-10-CM

## 2023-01-23 DIAGNOSIS — Z79.01 LONG TERM CURRENT USE OF ANTICOAGULANT: Primary | ICD-10-CM

## 2023-01-23 PROCEDURE — 93793 ANTICOAG MGMT PT WARFARIN: CPT | Performed by: INTERNAL MEDICINE

## 2023-01-23 PROCEDURE — 85610 PROTHROMBIN TIME: CPT | Performed by: INTERNAL MEDICINE

## 2023-01-23 NOTE — PROGRESS NOTES
Home INR monitor result reported via fax, non therapeutic  adjustments made. See (Anticoag Dosing Calendar). Recheck INR in 2 week(s).

## 2023-02-07 LAB — INR BLD: 3.7

## 2023-02-08 ENCOUNTER — ANTI-COAG VISIT (OUTPATIENT)
Dept: CARDIOLOGY CLINIC | Age: 73
End: 2023-02-08

## 2023-02-08 DIAGNOSIS — I48.19 PERSISTENT ATRIAL FIBRILLATION (HCC): ICD-10-CM

## 2023-02-08 DIAGNOSIS — Z95.3 S/P AORTIC VALVE REPLACEMENT WITH BIOPROSTHETIC VALVE: ICD-10-CM

## 2023-02-08 DIAGNOSIS — Z79.01 LONG TERM CURRENT USE OF ANTICOAGULANT: Primary | ICD-10-CM

## 2023-02-08 NOTE — PROGRESS NOTES
Tablet strength and weekly dosing schedule confirmed today. Completing antibiotics (one more day) for diverticulitis. Per Sheila Kimble, hold today; recheck in two weeks.

## 2023-02-24 ENCOUNTER — ANTI-COAG VISIT (OUTPATIENT)
Dept: CARDIOLOGY CLINIC | Age: 73
End: 2023-02-24

## 2023-02-24 DIAGNOSIS — I48.19 PERSISTENT ATRIAL FIBRILLATION (HCC): ICD-10-CM

## 2023-02-24 DIAGNOSIS — Z95.3 S/P AORTIC VALVE REPLACEMENT WITH BIOPROSTHETIC VALVE: ICD-10-CM

## 2023-02-24 DIAGNOSIS — Z79.01 LONG TERM CURRENT USE OF ANTICOAGULANT: Primary | ICD-10-CM

## 2023-02-24 LAB — INR BLD: 4.2

## 2023-02-24 NOTE — PROGRESS NOTES
Spoke with pt and she is waiting on a blood transfusion. She will let us know when this is scheduled.

## 2023-02-26 LAB — INR BLD: 2.1

## 2023-02-27 ENCOUNTER — ANTI-COAG VISIT (OUTPATIENT)
Dept: CARDIOLOGY CLINIC | Age: 73
End: 2023-02-27
Payer: MEDICARE

## 2023-02-27 DIAGNOSIS — I48.19 PERSISTENT ATRIAL FIBRILLATION (HCC): ICD-10-CM

## 2023-02-27 DIAGNOSIS — Z79.01 LONG TERM CURRENT USE OF ANTICOAGULANT: Primary | ICD-10-CM

## 2023-02-27 DIAGNOSIS — Z95.3 S/P AORTIC VALVE REPLACEMENT WITH BIOPROSTHETIC VALVE: ICD-10-CM

## 2023-02-27 PROCEDURE — 85610 PROTHROMBIN TIME: CPT | Performed by: INTERNAL MEDICINE

## 2023-02-27 PROCEDURE — 93793 ANTICOAG MGMT PT WARFARIN: CPT | Performed by: INTERNAL MEDICINE

## 2023-02-27 NOTE — PROGRESS NOTES
Home INR monitor result reported via fax, therapeutic INR, no dose adjustments made. See (Anticoag Dosing Calendar). Recheck INR in two week(s).

## 2023-03-16 ENCOUNTER — ANTI-COAG VISIT (OUTPATIENT)
Dept: CARDIOLOGY CLINIC | Age: 73
End: 2023-03-16

## 2023-03-16 DIAGNOSIS — Z79.01 LONG TERM CURRENT USE OF ANTICOAGULANT: Primary | ICD-10-CM

## 2023-03-16 DIAGNOSIS — Z95.3 S/P AORTIC VALVE REPLACEMENT WITH BIOPROSTHETIC VALVE: ICD-10-CM

## 2023-03-16 DIAGNOSIS — I48.19 PERSISTENT ATRIAL FIBRILLATION (HCC): ICD-10-CM

## 2023-03-16 NOTE — PROGRESS NOTES
I spoke to pt. She states she got an appt with GI this Monday 3/20/23. She will keep us updated as to what they say & when she will be able to restart her Warfarin.

## 2023-03-16 NOTE — PROGRESS NOTES
Received voicemail message from pt stating she had 2 Iron Infusions last week & was told she had blood in her stool. Her PCP wants her to Stop ASA & Warfarin until she see's GI. She states she has a call in to them & is waiting to hear back about an appointment.     Information verified by note from [facility] (see telephone encounter dated 3/16/23)

## 2023-04-05 ENCOUNTER — ANTI-COAG VISIT (OUTPATIENT)
Dept: CARDIOLOGY CLINIC | Age: 73
End: 2023-04-05

## 2023-04-05 DIAGNOSIS — Z79.01 LONG TERM CURRENT USE OF ANTICOAGULANT: Primary | ICD-10-CM

## 2023-04-05 DIAGNOSIS — Z95.3 S/P AORTIC VALVE REPLACEMENT WITH BIOPROSTHETIC VALVE: ICD-10-CM

## 2023-04-05 DIAGNOSIS — I48.19 PERSISTENT ATRIAL FIBRILLATION (HCC): ICD-10-CM

## 2023-04-05 LAB — INR BLD: 1

## 2023-04-05 NOTE — PROGRESS NOTES
Pt has still not restarted her coumadin as of yet. She has an upper and lower GI this month. She will let us know when she goes back on her coumadin.

## 2023-04-28 ENCOUNTER — TELEPHONE (OUTPATIENT)
Dept: CARDIOLOGY CLINIC | Age: 73
End: 2023-04-28

## 2023-04-28 DIAGNOSIS — I38 VALVULAR HEART DISEASE: Primary | ICD-10-CM

## 2023-04-30 LAB — INR BLD: 1

## 2023-05-01 ENCOUNTER — ANTI-COAG VISIT (OUTPATIENT)
Dept: CARDIOLOGY CLINIC | Age: 73
End: 2023-05-01
Payer: MEDICARE

## 2023-05-01 DIAGNOSIS — I48.19 PERSISTENT ATRIAL FIBRILLATION (HCC): ICD-10-CM

## 2023-05-01 DIAGNOSIS — Z95.3 S/P AORTIC VALVE REPLACEMENT WITH BIOPROSTHETIC VALVE: ICD-10-CM

## 2023-05-01 DIAGNOSIS — Z79.01 LONG TERM CURRENT USE OF ANTICOAGULANT: Primary | ICD-10-CM

## 2023-05-01 PROCEDURE — 93793 ANTICOAG MGMT PT WARFARIN: CPT | Performed by: INTERNAL MEDICINE

## 2023-05-01 NOTE — PROGRESS NOTES
Tablet strength and weekly dosing schedule confirmed today. Patient has been off of her warfarin for about a month. She restarted anticoagulant yesterday at her usual dose, INR to be rechecked in a few days.

## 2023-05-08 ENCOUNTER — TELEPHONE (OUTPATIENT)
Age: 73
End: 2023-05-08

## 2023-05-09 ENCOUNTER — ANTI-COAG VISIT (OUTPATIENT)
Age: 73
End: 2023-05-09
Payer: MEDICARE

## 2023-05-09 DIAGNOSIS — I48.19 PERSISTENT ATRIAL FIBRILLATION (HCC): ICD-10-CM

## 2023-05-09 DIAGNOSIS — Z95.3 S/P AORTIC VALVE REPLACEMENT WITH BIOPROSTHETIC VALVE: ICD-10-CM

## 2023-05-09 DIAGNOSIS — Z79.01 LONG TERM CURRENT USE OF ANTICOAGULANT: Primary | ICD-10-CM

## 2023-05-09 LAB — INR BLD: 1.6

## 2023-05-09 PROCEDURE — 93793 ANTICOAG MGMT PT WARFARIN: CPT | Performed by: INTERNAL MEDICINE

## 2023-05-09 NOTE — PROGRESS NOTES
Home INR monitor result reported via fax, non therapeutic INR,  dose adjustments made. See (Anticoag Dosing Calendar). Recheck INR in 2 week(s). Called pt and left vm stating that I need her to please call me about her low inr.

## 2023-05-30 ENCOUNTER — ANTI-COAG VISIT (OUTPATIENT)
Age: 73
End: 2023-05-30
Payer: MEDICARE

## 2023-05-30 DIAGNOSIS — Z95.3 S/P AORTIC VALVE REPLACEMENT WITH BIOPROSTHETIC VALVE: ICD-10-CM

## 2023-05-30 DIAGNOSIS — I48.19 PERSISTENT ATRIAL FIBRILLATION (HCC): ICD-10-CM

## 2023-05-30 DIAGNOSIS — Z79.01 LONG TERM CURRENT USE OF ANTICOAGULANT: Primary | ICD-10-CM

## 2023-05-30 LAB — INR BLD: 2.5

## 2023-05-30 PROCEDURE — 93793 ANTICOAG MGMT PT WARFARIN: CPT | Performed by: INTERNAL MEDICINE

## 2023-05-30 NOTE — PROGRESS NOTES
Home INR monitor result reported via fax, therapeutic INR, no dose adjustments made. See (Anticoag Dosing Calendar). Recheck INR in 1 week(s).

## 2023-06-19 ENCOUNTER — TELEPHONE (OUTPATIENT)
Age: 73
End: 2023-06-19

## 2023-06-19 NOTE — TELEPHONE ENCOUNTER
Pt had melena, SOB, fatigue, light-headedness and went to [facility]. Hgb 6.5; transfused I unit PRBCs  GI bleed scan negative on admission. Underwent EGD, which did not show any evidence of old or new blood on exam.    Changed from warfarin to eliquis 5mg bid. Feeling light headed, SOB again. Hgb redrawn today  Pt concerned she may need another echo to evaluate heart valve. Triage will discuss with Dr. Benigno Foster and call pt back with his response.

## 2023-06-19 NOTE — TELEPHONE ENCOUNTER
Triage informed Dr. Ambrosio Benavides. Per Dr. Ambrosio Benavides, recent echo 5/2023 was ok. Pt may experience SOB until hgb is greater than 10g/dL. Triage informed pt of Dr. Chris Banerjee response. She verbalizes understanding and agrees to plan.

## 2023-06-19 NOTE — TELEPHONE ENCOUNTER
Was in [address] last week with low HgB Had to have blood transfusion. Still is losing blood but they dont know where from. Very SOB now. Wants to see Dr Hansen Home today if possible.  Please call

## 2023-07-10 ENCOUNTER — TELEPHONE (OUTPATIENT)
Age: 73
End: 2023-07-10

## 2023-07-24 ENCOUNTER — HOSPITAL ENCOUNTER (OUTPATIENT)
Dept: GENERAL RADIOLOGY | Age: 73
Discharge: HOME | End: 2023-07-27
Payer: MEDICARE

## 2023-07-24 DIAGNOSIS — Z18.9 RETAINED FOREIGN BODY: ICD-10-CM

## 2023-07-24 PROCEDURE — 74018 RADEX ABDOMEN 1 VIEW: CPT

## 2023-08-15 ENCOUNTER — HOSPITAL ENCOUNTER (OUTPATIENT)
Dept: LAB | Age: 73
Discharge: HOME | End: 2023-08-18
Payer: MEDICARE

## 2023-08-15 ENCOUNTER — OFFICE VISIT (OUTPATIENT)
Dept: ONCOLOGY | Age: 73
End: 2023-08-15
Payer: MEDICARE

## 2023-08-15 VITALS
SYSTOLIC BLOOD PRESSURE: 151 MMHG | HEART RATE: 72 BPM | OXYGEN SATURATION: 95 % | HEIGHT: 63 IN | DIASTOLIC BLOOD PRESSURE: 63 MMHG | RESPIRATION RATE: 14 BRPM | WEIGHT: 234.3 LBS | BODY MASS INDEX: 41.52 KG/M2 | TEMPERATURE: 97.9 F

## 2023-08-15 DIAGNOSIS — E55.9 VITAMIN D DEFICIENCY: ICD-10-CM

## 2023-08-15 DIAGNOSIS — D50.8 OTHER IRON DEFICIENCY ANEMIA: Primary | ICD-10-CM

## 2023-08-15 DIAGNOSIS — D50.8 OTHER IRON DEFICIENCY ANEMIA: ICD-10-CM

## 2023-08-15 LAB
25(OH)D3 SERPL-MCNC: 21.1 NG/ML (ref 30–100)
ALBUMIN SERPL-MCNC: 3.5 G/DL (ref 3.2–4.6)
ALBUMIN/GLOB SERPL: 1 (ref 0.4–1.6)
ALP SERPL-CCNC: 115 U/L (ref 50–136)
ALT SERPL-CCNC: 23 U/L (ref 12–65)
ANION GAP SERPL CALC-SCNC: 7 MMOL/L (ref 2–11)
AST SERPL-CCNC: 19 U/L (ref 15–37)
BASOPHILS # BLD: 0 K/UL (ref 0–0.2)
BASOPHILS NFR BLD: 0 % (ref 0–2)
BILIRUB SERPL-MCNC: 0.3 MG/DL (ref 0.2–1.1)
BUN SERPL-MCNC: 32 MG/DL (ref 8–23)
CALCIUM SERPL-MCNC: 8.9 MG/DL (ref 8.3–10.4)
CHLORIDE SERPL-SCNC: 106 MMOL/L (ref 101–110)
CO2 SERPL-SCNC: 24 MMOL/L (ref 21–32)
CREAT SERPL-MCNC: 1.5 MG/DL (ref 0.6–1)
DIFFERENTIAL METHOD BLD: ABNORMAL
EOSINOPHIL # BLD: 0.2 K/UL (ref 0–0.8)
EOSINOPHIL NFR BLD: 2 % (ref 0.5–7.8)
ERYTHROCYTE [DISTWIDTH] IN BLOOD BY AUTOMATED COUNT: 19.7 % (ref 11.9–14.6)
FERRITIN SERPL-MCNC: 100 NG/ML (ref 8–388)
GLOBULIN SER CALC-MCNC: 3.4 G/DL (ref 2.8–4.5)
GLUCOSE SERPL-MCNC: 135 MG/DL (ref 65–100)
HCT VFR BLD AUTO: 35.5 % (ref 35.8–46.3)
HGB BLD-MCNC: 10.4 G/DL (ref 11.7–15.4)
IMM GRANULOCYTES # BLD AUTO: 0 K/UL (ref 0–0.5)
IMM GRANULOCYTES NFR BLD AUTO: 0 % (ref 0–5)
LYMPHOCYTES # BLD: 0.8 K/UL (ref 0.5–4.6)
LYMPHOCYTES NFR BLD: 9 % (ref 13–44)
MCH RBC QN AUTO: 23.7 PG (ref 26.1–32.9)
MCHC RBC AUTO-ENTMCNC: 29.3 G/DL (ref 31.4–35)
MCV RBC AUTO: 80.9 FL (ref 82–102)
MONOCYTES # BLD: 0.9 K/UL (ref 0.1–1.3)
MONOCYTES NFR BLD: 9 % (ref 4–12)
NEUTS SEG # BLD: 7.3 K/UL (ref 1.7–8.2)
NEUTS SEG NFR BLD: 80 % (ref 43–78)
NRBC # BLD: 0 K/UL (ref 0–0.2)
PLATELET # BLD AUTO: 224 K/UL (ref 150–450)
PMV BLD AUTO: 10 FL (ref 9.4–12.3)
POTASSIUM SERPL-SCNC: 3.6 MMOL/L (ref 3.5–5.1)
PROT SERPL-MCNC: 6.9 G/DL (ref 6.3–8.2)
RBC # BLD AUTO: 4.39 M/UL (ref 4.05–5.2)
SODIUM SERPL-SCNC: 137 MMOL/L (ref 133–143)
VIT B12 SERPL-MCNC: 3554 PG/ML (ref 193–986)
WBC # BLD AUTO: 9.2 K/UL (ref 4.3–11.1)

## 2023-08-15 PROCEDURE — 86334 IMMUNOFIX E-PHORESIS SERUM: CPT

## 2023-08-15 PROCEDURE — 36415 COLL VENOUS BLD VENIPUNCTURE: CPT

## 2023-08-15 PROCEDURE — 82607 VITAMIN B-12: CPT

## 2023-08-15 PROCEDURE — 82306 VITAMIN D 25 HYDROXY: CPT

## 2023-08-15 PROCEDURE — 3017F COLORECTAL CA SCREEN DOC REV: CPT | Performed by: INTERNAL MEDICINE

## 2023-08-15 PROCEDURE — 80053 COMPREHEN METABOLIC PANEL: CPT

## 2023-08-15 PROCEDURE — 1123F ACP DISCUSS/DSCN MKR DOCD: CPT | Performed by: INTERNAL MEDICINE

## 2023-08-15 PROCEDURE — 82784 ASSAY IGA/IGD/IGG/IGM EACH: CPT

## 2023-08-15 PROCEDURE — 1090F PRES/ABSN URINE INCON ASSESS: CPT | Performed by: INTERNAL MEDICINE

## 2023-08-15 PROCEDURE — 1036F TOBACCO NON-USER: CPT | Performed by: INTERNAL MEDICINE

## 2023-08-15 PROCEDURE — 82728 ASSAY OF FERRITIN: CPT

## 2023-08-15 PROCEDURE — G8427 DOCREV CUR MEDS BY ELIG CLIN: HCPCS | Performed by: INTERNAL MEDICINE

## 2023-08-15 PROCEDURE — 3078F DIAST BP <80 MM HG: CPT | Performed by: INTERNAL MEDICINE

## 2023-08-15 PROCEDURE — G8417 CALC BMI ABV UP PARAM F/U: HCPCS | Performed by: INTERNAL MEDICINE

## 2023-08-15 PROCEDURE — 99205 OFFICE O/P NEW HI 60 MIN: CPT | Performed by: INTERNAL MEDICINE

## 2023-08-15 PROCEDURE — G8399 PT W/DXA RESULTS DOCUMENT: HCPCS | Performed by: INTERNAL MEDICINE

## 2023-08-15 PROCEDURE — 84165 PROTEIN E-PHORESIS SERUM: CPT

## 2023-08-15 PROCEDURE — 3074F SYST BP LT 130 MM HG: CPT | Performed by: INTERNAL MEDICINE

## 2023-08-15 PROCEDURE — 85025 COMPLETE CBC W/AUTO DIFF WBC: CPT

## 2023-08-15 ASSESSMENT — PATIENT HEALTH QUESTIONNAIRE - PHQ9
SUM OF ALL RESPONSES TO PHQ QUESTIONS 1-9: 0
SUM OF ALL RESPONSES TO PHQ9 QUESTIONS 1 & 2: 0
SUM OF ALL RESPONSES TO PHQ QUESTIONS 1-9: 0
2. FEELING DOWN, DEPRESSED OR HOPELESS: 0
1. LITTLE INTEREST OR PLEASURE IN DOING THINGS: 0
SUM OF ALL RESPONSES TO PHQ QUESTIONS 1-9: 0
SUM OF ALL RESPONSES TO PHQ QUESTIONS 1-9: 0

## 2023-08-15 NOTE — PATIENT INSTRUCTIONS
Patient Instructions from Today's Visit    Reason for Visit:  New Patient    Diagnosis Information:  [email or web address]    Plan: You will have some blood work done today  We will check for any underlying bone marrow issues and micronutrient deficiencies    Follow Up:  Continue to follow up with your PCP    Recent Lab Results:  Lab work done today downstairs    Treatment Summary has been discussed and given to patient: n/a        -------------------------------------------------------------------------------------------------------------------  Please call our office at [phone] if you have any  of the following symptoms:   Fever of 100.5 or greater  Chills  Shortness of breath  Swelling or pain in one leg    After office hours an answering service is available and will contact a provider for emergencies or if you are experiencing any of the above symptoms. Patient has My Chart. My Chart log in information explained on the after visit summary printout at the [address] desk.     [place], [place]

## 2023-08-15 NOTE — PROGRESS NOTES
[address] [facility], [address] [place] [address] Drive  Phone: [phone]        8/15/2023  Amanda Poon  1950  [id number]      Amanda Poon is a 75year old  female who has been sent to my clinic by Dr. Elida Hawk for management of Iron Deficiency Anemia; her EGD and Colonoscopy revealed a hiatal hernia, internal hemorrhoids, external hemorrhoids, diverticulosis and benign colonic polyps. ALLERGIES:    Sulfa drugs. FAMILY HISTORY:    No hematologic disorders. SOCIAL HISTORY:     She is  and lives with her son. She used to work as an . She denies ever using any tobacco products. PAST MEDICAL HISTORY:    Atrial Fibrillation, Osteoarthritis, Coronary Artery Disease, renal insufficiency, Diverticulitis, GERD, Hypertension, Peptic Ulcer Disease, Hyperlipidemia, s/p AVR, Vitamin D deficiency and Iron Deficiency Anemia. ROS:  The patient complained of fatigue; all other systems negative. PHYSICAL EXAM:   The patient was alert, awake and oriented, no acute distress was noted. Oral examination revealed dentures, no mucosal lesions were seen. Lymph node examination did not reveal any adenopathy. Neck examination revealed a supple neck, no thyromegaly or masses were noted. Chest examination revealed normal vesicular breath sounds. Heart examination revealed S-1 and S-2 with a 2/6 systolic murmur. Abdominal examination revealed a non-tender abdomen, bowel sounds were positive, no organomegaly could be appreciated. Examination of the extremities did not reveal any tenderness or erythema, 1+ bilateral pedal edema was noted. Examination of the skin did not reveal any lesions. KPS:   70. LABORATORY INVESTIGATIONS:  CBC showed a WBC count of 9.2, ANC was 7.3, Hemoglobin was 10.4 and Platelets were 397; her Ferritin level was 100. Medical problems and test results were reviewed with the patient today.       ASSESSMENT:

## 2023-08-18 LAB
ALBUMIN SERPL ELPH-MCNC: 3.5 G/DL (ref 2.9–4.4)
ALBUMIN/GLOB SERPL: 1.3 (ref 0.7–1.7)
ALPHA1 GLOB SERPL ELPH-MCNC: 0.2 G/DL (ref 0–0.4)
ALPHA2 GLOB SERPL ELPH-MCNC: 0.8 G/DL (ref 0.4–1)
B-GLOBULIN SERPL ELPH-MCNC: 1 G/DL (ref 0.7–1.3)
GAMMA GLOB SERPL ELPH-MCNC: 0.7 G/DL (ref 0.4–1.8)
GLOBULIN SER-MCNC: 2.7 G/DL (ref 2.2–3.9)
IGA SERPL-MCNC: 31 MG/DL (ref 64–422)
IGG SERPL-MCNC: 698 MG/DL (ref 586–1602)
IGM SERPL-MCNC: 30 MG/DL (ref 26–217)
INTERPRETATION SERPL IEP-IMP: ABNORMAL
M PROTEIN SERPL ELPH-MCNC: ABNORMAL G/DL
PROT SERPL-MCNC: 6.2 G/DL (ref 6–8.5)

## 2023-09-07 ENCOUNTER — OFFICE VISIT (OUTPATIENT)
Age: 73
End: 2023-09-07

## 2023-09-07 VITALS
BODY MASS INDEX: 41.16 KG/M2 | HEIGHT: 63 IN | HEART RATE: 69 BPM | WEIGHT: 232.3 LBS | DIASTOLIC BLOOD PRESSURE: 60 MMHG | SYSTOLIC BLOOD PRESSURE: 138 MMHG

## 2023-09-07 DIAGNOSIS — I10 ESSENTIAL HYPERTENSION, BENIGN: Primary | ICD-10-CM

## 2023-09-07 DIAGNOSIS — I48.0 PAROXYSMAL ATRIAL FIBRILLATION (HCC): ICD-10-CM

## 2023-09-07 DIAGNOSIS — D64.9 CHRONIC ANEMIA: ICD-10-CM

## 2023-09-07 DIAGNOSIS — Z95.3 S/P AORTIC VALVE REPLACEMENT WITH BIOPROSTHETIC VALVE: ICD-10-CM

## 2023-09-07 RX ORDER — AMLODIPINE BESYLATE 10 MG/1
10 TABLET ORAL DAILY
Qty: 90 TABLET | Refills: 3 | Status: SHIPPED | OUTPATIENT
Start: 2023-09-07

## 2023-09-07 RX ORDER — CARVEDILOL 25 MG/1
25 TABLET ORAL 2 TIMES DAILY WITH MEALS
Qty: 180 TABLET | Refills: 3 | Status: SHIPPED | OUTPATIENT
Start: 2023-09-07

## 2023-09-07 RX ORDER — LOSARTAN POTASSIUM 100 MG/1
100 TABLET ORAL DAILY
Qty: 90 TABLET | Refills: 3 | Status: SHIPPED | OUTPATIENT
Start: 2023-09-07

## 2023-09-07 ASSESSMENT — ENCOUNTER SYMPTOMS
HEMATEMESIS: 0
SPUTUM PRODUCTION: 0
SWOLLEN GLANDS: 0
BLURRED VISION: 0
SORE THROAT: 0
HOARSE VOICE: 0
DIARRHEA: 0
VISUAL CHANGE: 0
ORTHOPNEA: 0
SHORTNESS OF BREATH: 0
VOMITING: 0
NAUSEA: 0
CHANGE IN BOWEL HABIT: 0
HEMATOCHEZIA: 0
COLOR CHANGE: 0
BOWEL INCONTINENCE: 0
ABDOMINAL PAIN: 0
WHEEZING: 0
COUGH: 0

## 2023-09-07 ASSESSMENT — PATIENT HEALTH QUESTIONNAIRE - PHQ9
SUM OF ALL RESPONSES TO PHQ9 QUESTIONS 1 & 2: 0
1. LITTLE INTEREST OR PLEASURE IN DOING THINGS: 0
SUM OF ALL RESPONSES TO PHQ QUESTIONS 1-9: 0
SUM OF ALL RESPONSES TO PHQ QUESTIONS 1-9: 0
2. FEELING DOWN, DEPRESSED OR HOPELESS: 0
SUM OF ALL RESPONSES TO PHQ QUESTIONS 1-9: 0
SUM OF ALL RESPONSES TO PHQ QUESTIONS 1-9: 0

## 2023-09-07 NOTE — PROGRESS NOTES
and Rhythm: Regular rhythm. Pulses: Normal pulses. Heart sounds: Murmur (1/6 GLENN) heard. Pulmonary:      Effort: Pulmonary effort is normal.      Breath sounds: Normal breath sounds. Abdominal:      General: Abdomen is flat. Bowel sounds are normal.      Palpations: Abdomen is soft. Musculoskeletal:         General: Normal range of motion. Cervical back: Normal range of motion and neck supple. Skin:     General: Skin is warm and dry. Neurological:      General: No focal deficit present. Mental Status: She is alert and oriented to person, place, and time. Psychiatric:         Mood and Affect: Mood normal.       Medical problems and test results were reviewed with the patient today.      Recent Results (from the past 672 hour(s))   CBC with Auto Differential    Collection Time: 08/15/23 10:47 AM   Result Value Ref Range    WBC 9.2 4.3 - 11.1 K/uL    RBC 4.39 4.05 - 5.2 M/uL    Hemoglobin 10.4 (L) 11.7 - 15.4 g/dL    Hematocrit 35.5 (L) 35.8 - 46.3 %    MCV 80.9 (L) 82.0 - 102.0 FL    MCH 23.7 (L) 26.1 - 32.9 PG    MCHC 29.3 (L) 31.4 - 35.0 g/dL    RDW 19.7 (H) 11.9 - 14.6 %    Platelets 764 301 - 639 K/uL    MPV 10.0 9.4 - 12.3 FL    nRBC 0.00 0.0 - 0.2 K/uL    Neutrophils % 80 (H) 43 - 78 %    Lymphocytes % 9 (L) 13 - 44 %    Monocytes % 9 4.0 - 12.0 %    Eosinophils % 2 0.5 - 7.8 %    Basophils % 0 0.0 - 2.0 %    Immature Granulocytes 0 0.0 - 5.0 %    Neutrophils Absolute 7.3 1.7 - 8.2 K/UL    Lymphocytes Absolute 0.8 0.5 - 4.6 K/UL    Monocytes Absolute 0.9 0.1 - 1.3 K/UL    Eosinophils Absolute 0.2 0.0 - 0.8 K/UL    Basophils Absolute 0.0 0.0 - 0.2 K/UL    Absolute Immature Granulocyte 0.0 0.0 - 0.5 K/UL    Differential Type AUTOMATED     Comprehensive Metabolic Panel    Collection Time: 08/15/23 10:47 AM   Result Value Ref Range    Sodium 137 133 - 143 mmol/L    Potassium 3.6 3.5 - 5.1 mmol/L    Chloride 106 101 - 110 mmol/L    CO2 24 21 - 32 mmol/L    Anion Gap 7 2 - 11 mmol/L

## 2024-01-02 ENCOUNTER — OFFICE VISIT (OUTPATIENT)
Dept: ORTHOPEDIC SURGERY | Age: 74
End: 2024-01-02
Payer: MEDICARE

## 2024-01-02 DIAGNOSIS — M25.512 LEFT SHOULDER PAIN, UNSPECIFIED CHRONICITY: Primary | ICD-10-CM

## 2024-01-02 PROCEDURE — 1036F TOBACCO NON-USER: CPT | Performed by: ORTHOPAEDIC SURGERY

## 2024-01-02 PROCEDURE — 99204 OFFICE O/P NEW MOD 45 MIN: CPT | Performed by: ORTHOPAEDIC SURGERY

## 2024-01-02 PROCEDURE — G8399 PT W/DXA RESULTS DOCUMENT: HCPCS | Performed by: ORTHOPAEDIC SURGERY

## 2024-01-02 PROCEDURE — G8428 CUR MEDS NOT DOCUMENT: HCPCS | Performed by: ORTHOPAEDIC SURGERY

## 2024-01-02 PROCEDURE — 1090F PRES/ABSN URINE INCON ASSESS: CPT | Performed by: ORTHOPAEDIC SURGERY

## 2024-01-02 PROCEDURE — 3017F COLORECTAL CA SCREEN DOC REV: CPT | Performed by: ORTHOPAEDIC SURGERY

## 2024-01-02 PROCEDURE — G8484 FLU IMMUNIZE NO ADMIN: HCPCS | Performed by: ORTHOPAEDIC SURGERY

## 2024-01-02 PROCEDURE — 1123F ACP DISCUSS/DSCN MKR DOCD: CPT | Performed by: ORTHOPAEDIC SURGERY

## 2024-01-02 PROCEDURE — G8417 CALC BMI ABV UP PARAM F/U: HCPCS | Performed by: ORTHOPAEDIC SURGERY

## 2024-01-02 RX ORDER — PREDNISONE 5 MG/1
TABLET ORAL
Qty: 1 EACH | Refills: 2 | Status: SHIPPED | OUTPATIENT
Start: 2024-01-02
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
## 2024-01-22 ENCOUNTER — OFFICE VISIT (OUTPATIENT)
Dept: ORTHOPEDIC SURGERY | Age: 74
End: 2024-01-22
Payer: MEDICARE

## 2024-01-22 DIAGNOSIS — M25.512 LEFT SHOULDER PAIN, UNSPECIFIED CHRONICITY: Primary | ICD-10-CM

## 2024-01-22 PROCEDURE — G8399 PT W/DXA RESULTS DOCUMENT: HCPCS | Performed by: PHYSICIAN ASSISTANT

## 2024-01-22 PROCEDURE — 20610 DRAIN/INJ JOINT/BURSA W/O US: CPT | Performed by: PHYSICIAN ASSISTANT

## 2024-01-22 PROCEDURE — G8484 FLU IMMUNIZE NO ADMIN: HCPCS | Performed by: PHYSICIAN ASSISTANT

## 2024-01-22 PROCEDURE — 99213 OFFICE O/P EST LOW 20 MIN: CPT | Performed by: PHYSICIAN ASSISTANT

## 2024-01-22 PROCEDURE — 1123F ACP DISCUSS/DSCN MKR DOCD: CPT | Performed by: PHYSICIAN ASSISTANT

## 2024-01-22 PROCEDURE — 1090F PRES/ABSN URINE INCON ASSESS: CPT | Performed by: PHYSICIAN ASSISTANT

## 2024-01-22 PROCEDURE — 3017F COLORECTAL CA SCREEN DOC REV: CPT | Performed by: PHYSICIAN ASSISTANT

## 2024-01-22 PROCEDURE — G8417 CALC BMI ABV UP PARAM F/U: HCPCS | Performed by: PHYSICIAN ASSISTANT

## 2024-01-22 PROCEDURE — 1036F TOBACCO NON-USER: CPT | Performed by: PHYSICIAN ASSISTANT

## 2024-01-22 PROCEDURE — G8428 CUR MEDS NOT DOCUMENT: HCPCS | Performed by: PHYSICIAN ASSISTANT

## 2024-01-22 RX ORDER — TRIAMCINOLONE ACETONIDE 40 MG/ML
40 INJECTION, SUSPENSION INTRA-ARTICULAR; INTRAMUSCULAR ONCE
Status: COMPLETED | OUTPATIENT
Start: 2024-01-22 | End: 2024-01-22

## 2024-01-22 RX ADMIN — TRIAMCINOLONE ACETONIDE 40 MG: 40 INJECTION, SUSPENSION INTRA-ARTICULAR; INTRAMUSCULAR at 15:35
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
## 2024-02-19 ENCOUNTER — TELEPHONE (OUTPATIENT)
Dept: ORTHOPEDIC SURGERY | Age: 74
End: 2024-02-19

## 2024-02-19 NOTE — TELEPHONE ENCOUNTER
Jacqueline is calling regarding this patient's care. She is wondering if she needs to continue PT until she follows up with us and possibly has an MRI. Please call to discuss what the recommendation would be as far as PT.

## 2024-02-20 ENCOUNTER — TELEPHONE (OUTPATIENT)
Dept: ORTHOPEDIC SURGERY | Age: 74
End: 2024-02-20

## 2024-02-20 NOTE — TELEPHONE ENCOUNTER
Spoke with Karley at [facility] regarding patients progress.. she states that the patients shoulder feels worse. I spoke with ALEXX who suggested this is a neck issue and needs to go to spine. When I spoke with Ms Quinn she stated her PCP ordered an MRI of her left hsoulder and she would like to proceed with this imaging first before she addresses her neck issue

## 2024-03-20 ENCOUNTER — OFFICE VISIT (OUTPATIENT)
Age: 74
End: 2024-03-20
Payer: MEDICARE

## 2024-03-20 VITALS
DIASTOLIC BLOOD PRESSURE: 72 MMHG | HEIGHT: 63 IN | BODY MASS INDEX: 41.15 KG/M2 | SYSTOLIC BLOOD PRESSURE: 150 MMHG | HEART RATE: 72 BPM

## 2024-03-20 DIAGNOSIS — Z95.3 S/P AORTIC VALVE REPLACEMENT WITH BIOPROSTHETIC VALVE: ICD-10-CM

## 2024-03-20 DIAGNOSIS — I10 ESSENTIAL HYPERTENSION, BENIGN: Primary | ICD-10-CM

## 2024-03-20 DIAGNOSIS — Z01.810 PREOP CARDIOVASCULAR EXAM: ICD-10-CM

## 2024-03-20 DIAGNOSIS — I27.20 PULMONARY HYPERTENSION (HCC): ICD-10-CM

## 2024-03-20 DIAGNOSIS — I48.0 PAROXYSMAL ATRIAL FIBRILLATION (HCC): ICD-10-CM

## 2024-03-20 PROCEDURE — G8484 FLU IMMUNIZE NO ADMIN: HCPCS | Performed by: INTERNAL MEDICINE

## 2024-03-20 PROCEDURE — 3077F SYST BP >= 140 MM HG: CPT | Performed by: INTERNAL MEDICINE

## 2024-03-20 PROCEDURE — G8399 PT W/DXA RESULTS DOCUMENT: HCPCS | Performed by: INTERNAL MEDICINE

## 2024-03-20 PROCEDURE — G8417 CALC BMI ABV UP PARAM F/U: HCPCS | Performed by: INTERNAL MEDICINE

## 2024-03-20 PROCEDURE — 1090F PRES/ABSN URINE INCON ASSESS: CPT | Performed by: INTERNAL MEDICINE

## 2024-03-20 PROCEDURE — 1123F ACP DISCUSS/DSCN MKR DOCD: CPT | Performed by: INTERNAL MEDICINE

## 2024-03-20 PROCEDURE — 1036F TOBACCO NON-USER: CPT | Performed by: INTERNAL MEDICINE

## 2024-03-20 PROCEDURE — 3017F COLORECTAL CA SCREEN DOC REV: CPT | Performed by: INTERNAL MEDICINE

## 2024-03-20 PROCEDURE — 99214 OFFICE O/P EST MOD 30 MIN: CPT | Performed by: INTERNAL MEDICINE

## 2024-03-20 PROCEDURE — G8427 DOCREV CUR MEDS BY ELIG CLIN: HCPCS | Performed by: INTERNAL MEDICINE

## 2024-03-20 PROCEDURE — 3078F DIAST BP <80 MM HG: CPT | Performed by: INTERNAL MEDICINE

## 2024-03-20 RX ORDER — NITROGLYCERIN 0.4 MG/1
0.4 TABLET SUBLINGUAL EVERY 5 MIN PRN
Qty: 25 TABLET | Refills: 1 | Status: SHIPPED | OUTPATIENT
Start: 2024-03-20

## 2024-03-20 ASSESSMENT — ENCOUNTER SYMPTOMS
CHEST TIGHTNESS: 0
BLURRED VISION: 0
HEMATEMESIS: 0
BOWEL INCONTINENCE: 0
WHEEZING: 0
ABDOMINAL PAIN: 0
ORTHOPNEA: 0
DIARRHEA: 0
HEMATOCHEZIA: 0
COLOR CHANGE: 0
HOARSE VOICE: 0
SPUTUM PRODUCTION: 0
SHORTNESS OF BREATH: 0
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
## 2024-03-21 ENCOUNTER — OFFICE VISIT (OUTPATIENT)
Dept: ORTHOPEDIC SURGERY | Age: 74
End: 2024-03-21
Payer: MEDICARE

## 2024-03-21 DIAGNOSIS — S46.012A TRAUMATIC COMPLETE TEAR OF LEFT ROTATOR CUFF, INITIAL ENCOUNTER: Primary | ICD-10-CM

## 2024-03-21 PROCEDURE — G8484 FLU IMMUNIZE NO ADMIN: HCPCS | Performed by: ORTHOPAEDIC SURGERY

## 2024-03-21 PROCEDURE — 99214 OFFICE O/P EST MOD 30 MIN: CPT | Performed by: ORTHOPAEDIC SURGERY

## 2024-03-21 PROCEDURE — G8428 CUR MEDS NOT DOCUMENT: HCPCS | Performed by: ORTHOPAEDIC SURGERY

## 2024-03-21 PROCEDURE — 1090F PRES/ABSN URINE INCON ASSESS: CPT | Performed by: ORTHOPAEDIC SURGERY

## 2024-03-21 PROCEDURE — G8417 CALC BMI ABV UP PARAM F/U: HCPCS | Performed by: ORTHOPAEDIC SURGERY

## 2024-03-21 PROCEDURE — 1036F TOBACCO NON-USER: CPT | Performed by: ORTHOPAEDIC SURGERY

## 2024-03-21 PROCEDURE — G8399 PT W/DXA RESULTS DOCUMENT: HCPCS | Performed by: ORTHOPAEDIC SURGERY

## 2024-03-21 PROCEDURE — 1123F ACP DISCUSS/DSCN MKR DOCD: CPT | Performed by: ORTHOPAEDIC SURGERY

## 2024-03-21 PROCEDURE — 3017F COLORECTAL CA SCREEN DOC REV: CPT | Performed by: ORTHOPAEDIC SURGERY
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
## 2024-03-29 ENCOUNTER — TELEPHONE (OUTPATIENT)
Dept: ORTHOPEDIC SURGERY | Age: 74
End: 2024-03-29

## 2024-03-29 NOTE — TELEPHONE ENCOUNTER
I left a voicemail for MsJorge Syeda.  We have been trying to have the MRI images pushed over from the [employer] system.  All of the images are not coming over for Dr. De Paz to view.  I was asking her to reach out to [facility] medical records and have the MRI burned onto a disc which could be brought to our office for Dr. De Paz to review.

## 2024-04-02 ENCOUNTER — TELEPHONE (OUTPATIENT)
Dept: ORTHOPEDIC SURGERY | Age: 74
End: 2024-04-02

## 2024-04-02 NOTE — TELEPHONE ENCOUNTER
Patient is returning the call regarding the MRI. She states you need to call [facility] Radiology to request the desk.

## 2024-04-02 NOTE — TELEPHONE ENCOUNTER
She is asking to speak to Ree about her shoulder MRI that she was told to  and bring to her appt. Please give her a call.

## 2024-04-02 NOTE — TELEPHONE ENCOUNTER
Disc is being mailed to office.  I discussed with patient, she is aware Dr. De Paz is out of town this week.  We will follow up her first of the week.

## 2024-04-04 NOTE — PERIOP NOTES
Betadine lavage:  17.5cc of betadine lot # [id number], exp. Date 09/18,  in 500cc of . 9NS Lot # [id number] exp.  Date 03/01/2019
Chart opened for pre-op viewing to ensure pt. is clear for surgery.
Kenroy Samaniego's contact # [phone].
TRANSFER - IN REPORT:    Verbal report received from Lyle on [employer]  being received from ortho for routine progression of care      Report consisted of patients Situation, Background, Assessment and   Recommendations(SBAR). Information from the following report(s) SBAR was reviewed with the receiving nurse. Opportunity for questions and clarification was provided. Assessment completed upon patients arrival to unit and care assumed.
TRANSFER - OUT REPORT:    Verbal report given to Lianna Azar RN on Solange Speaker  being transferred to [facility] for routine progression of care       Report consisted of patients Situation, Background, Assessment and   Recommendations(SBAR). Information from the following report(s) Kardex, MAR and Recent Results was reviewed with the receiving nurse. Lines:   Peripheral IV 03/21/17 Left Wrist (Active)   Site Assessment Clean, dry, & intact 3/21/2017  6:20 AM   Phlebitis Assessment 0 3/21/2017  6:20 AM   Infiltration Assessment 0 3/21/2017  6:20 AM   Dressing Status Clean, dry, & intact 3/21/2017  6:20 AM   Dressing Type Tape;Transparent 3/21/2017  6:20 AM   Hub Color/Line Status Pink; Infusing;Patent 3/21/2017  6:20 AM        Opportunity for questions and clarification was provided.       Patient transported with:   [employer]
TRANSFER - OUT REPORT:    Verbal report given to WING Ferrara on [employer]  being transferred to [facility] for routine progression of care       Report consisted of patients Situation, Background, Assessment and   Recommendations(SBAR). Information from the following report(s) SBAR, Kardex, OR Summary, Intake/Output and MAR was reviewed with the receiving nurse. Lines:   Peripheral IV 03/21/17 Left Wrist (Active)   Site Assessment Clean, dry, & intact 3/21/2017  9:52 AM   Phlebitis Assessment 0 3/21/2017  9:52 AM   Infiltration Assessment 0 3/21/2017  9:52 AM   Dressing Status Clean, dry, & intact 3/21/2017  9:52 AM   Dressing Type Tape;Transparent 3/21/2017  9:52 AM   Hub Color/Line Status Infusing;Patent 3/21/2017  9:52 AM        Opportunity for questions and clarification was provided.       Patient transported with:   O2 @ 2 liters
Patient requests all Lab, Cardiology, and Radiology Results on their Discharge Instructions

## 2024-04-11 ENCOUNTER — TELEPHONE (OUTPATIENT)
Dept: ORTHOPEDIC SURGERY | Age: 74
End: 2024-04-11

## 2024-04-11 NOTE — TELEPHONE ENCOUNTER
She missed a call yesterday about her MRI. She is wondering if someone can try her again. She said she thinks she was in the shower when you called.

## 2024-04-11 NOTE — TELEPHONE ENCOUNTER
Lvm with Ms Andressheree that [facility] is out of the office the rest of the day, but will give her a call tomorrow 04/12 to go voer MRI

## 2024-04-18 ENCOUNTER — TELEPHONE (OUTPATIENT)
Dept: ORTHOPEDIC SURGERY | Age: 74
End: 2024-04-18

## 2024-04-18 NOTE — TELEPHONE ENCOUNTER
She wants to ask Dr. De Paz some questions about her shoulder. She has an appt with [facility] at the end of May but still wants to speak to Dr. De Paz.

## 2024-04-22 ENCOUNTER — TELEPHONE (OUTPATIENT)
Dept: ORTHOPEDIC SURGERY | Age: 74
End: 2024-04-22

## 2024-04-22 NOTE — TELEPHONE ENCOUNTER
Called pt to schedule pt with an earlier appointment time. Pt expressed understanding of new appointment time.

## 2024-04-29 RX ORDER — PREGABALIN 75 MG/1
CAPSULE ORAL
COMMUNITY

## 2024-04-29 RX ORDER — LORAZEPAM 1 MG/1
TABLET ORAL
COMMUNITY
Start: 2024-02-27

## 2024-04-29 RX ORDER — TRIAMCINOLONE ACETONIDE 1 MG/G
CREAM TOPICAL 2 TIMES DAILY
COMMUNITY
Start: 2024-01-24

## 2024-05-01 ENCOUNTER — OFFICE VISIT (OUTPATIENT)
Dept: ORTHOPEDIC SURGERY | Age: 74
End: 2024-05-01
Payer: MEDICARE

## 2024-05-01 DIAGNOSIS — M25.512 LEFT SHOULDER PAIN, UNSPECIFIED CHRONICITY: Primary | ICD-10-CM

## 2024-05-01 DIAGNOSIS — S46.012A TRAUMATIC COMPLETE TEAR OF LEFT ROTATOR CUFF, INITIAL ENCOUNTER: ICD-10-CM

## 2024-05-01 DIAGNOSIS — M12.812 LEFT ROTATOR CUFF TEAR ARTHROPATHY: ICD-10-CM

## 2024-05-01 DIAGNOSIS — M75.102 LEFT ROTATOR CUFF TEAR ARTHROPATHY: ICD-10-CM

## 2024-05-01 PROCEDURE — 1036F TOBACCO NON-USER: CPT | Performed by: ORTHOPAEDIC SURGERY

## 2024-05-01 PROCEDURE — 99215 OFFICE O/P EST HI 40 MIN: CPT | Performed by: ORTHOPAEDIC SURGERY

## 2024-05-01 PROCEDURE — G8399 PT W/DXA RESULTS DOCUMENT: HCPCS | Performed by: ORTHOPAEDIC SURGERY

## 2024-05-01 PROCEDURE — G8427 DOCREV CUR MEDS BY ELIG CLIN: HCPCS | Performed by: ORTHOPAEDIC SURGERY

## 2024-05-01 PROCEDURE — G8417 CALC BMI ABV UP PARAM F/U: HCPCS | Performed by: ORTHOPAEDIC SURGERY

## 2024-05-01 PROCEDURE — 3017F COLORECTAL CA SCREEN DOC REV: CPT | Performed by: ORTHOPAEDIC SURGERY

## 2024-05-01 PROCEDURE — 1090F PRES/ABSN URINE INCON ASSESS: CPT | Performed by: ORTHOPAEDIC SURGERY

## 2024-05-01 PROCEDURE — 1123F ACP DISCUSS/DSCN MKR DOCD: CPT | Performed by: ORTHOPAEDIC SURGERY

## 2024-05-01 RX ORDER — TRAMADOL HYDROCHLORIDE 50 MG/1
50-100 TABLET ORAL EVERY 12 HOURS PRN
Qty: 30 TABLET | Refills: 0 | Status: SHIPPED | OUTPATIENT
Start: 2024-05-01 | End: 2024-05-11

## 2024-05-01 RX ORDER — ALPRAZOLAM 0.5 MG/1
0.5 TABLET ORAL ONCE AS NEEDED
Qty: 2 TABLET | Refills: 0 | Status: SHIPPED | OUTPATIENT
Start: 2024-05-01 | End: 2024-05-02

## 2024-05-01 NOTE — PATIENT INSTRUCTIONS
shoulder provide stability and support.    All of these structures allow the shoulder to rotate through a greater range of motion than any other joint in the body.    Description  In shoulder replacement surgery, the damaged parts of the shoulder are removed and replaced with artificial components, called a prosthesis.    The treatment options are either replacement of just the head of the humerus bone (ball), or replacement of both the ball and the socket (glenoid).      Cause  Several conditions can cause shoulder pain and disability, and lead patients to consider shoulder joint replacement surgery.    Osteoarthritis (Degenerative Joint Disease)  Osteoarthritis is an age-related wear-and-tear type of arthritis. It usually occurs in people 50 years of age and older, but may occur in younger people, too. The cartilage that cushions the bones of the shoulder softens and wears away. The bones then rub against one another. Over time, the shoulder joint slowly becomes stiff and painful.    Unfortunately, there is no way to prevent the development of osteoarthritis. It is a common reason people have shoulder replacement surgery.      Rheumatoid Arthritis  This is a disease in which the synovial membrane that surrounds the joint becomes inflamed and thickened. This chronic inflammation can damage the cartilage and eventually cause cartilage loss, pain, and stiffness. Rheumatoid arthritis is the most common form of a group of disorders termed inflammatory arthritis.    Post-traumatic Arthritis  This can follow a serious shoulder injury. Fractures of the bones that make up the shoulder or tears of the shoulder tendons or ligaments may damage the articular cartilage over time. This causes shoulder pain and limits shoulder function.    Rotator Cuff Tear Arthropathy  A patient with a very large, long-standing rotator cuff tear may develop cuff tear arthropathy. In this condition, the changes in the shoulder joint due to the

## 2024-05-01 NOTE — PROGRESS NOTES
Lung Cancer Mother      Social History     Socioeconomic History    Marital status:      Spouse name: Not on file    Number of children: Not on file    Years of education: Not on file    Highest education level: Not on file   Occupational History    Not on file   Tobacco Use    Smoking status: Never    Smokeless tobacco: Never   Substance and Sexual Activity    Alcohol use: No    Drug use: No    Sexual activity: Not on file   Other Topics Concern    Not on file   Social History Narrative    Not on file     Social Determinants of Health     Financial Resource Strain: Not on file   Food Insecurity: Not on file   Transportation Needs: Not on file   Physical Activity: Not on file   Stress: Not on file   Social Connections: Not on file   Intimate Partner Violence: Not on file   Housing Stability: Not on file               No data to display                Review of Systems  CKD, HLD, diverticulosis, HLD, A fib   Pertinent positives and negatives are addressed with the patient, particularly those related to musculoskeletal concerns.  Non-orthopaedic concerns were referred back to the primary care physician.    PE:    GEN: General appearance is that of a healthy patient, alert and oriented, in no distress. WDWN.  HEENT:  Normocephalic, atraumatic.  Extraocular muscles are intact.  Neck:  Supple.   Heart:  Regular pulse exam on all extremities.  Good color and warmth noted.  Lungs:  Normal non-labored breathing with no obvious shortness of breath.  Abdomen:  WNL's.  Skin:  No signs of rash or bruising.    Pysch: Answers questions appropriately, AO X 3.  MSK:  Cervical spine: No tenderness.  Decreased active motion. Negative Spurling's maneuver.     SHOULDER   Left (Involved) Right   Skin Intact Intact   Radial Pulses 2+ Symmetrical 2+ Symmetrical   Deformity None Normal   Myotomes Normal Normal   Dermatomes  Normal Normal    ROM Actively 85 at maximal flexion active abduction is around 80 as well.  All is guarded

## 2024-05-02 DIAGNOSIS — M75.102 LEFT ROTATOR CUFF TEAR ARTHROPATHY: ICD-10-CM

## 2024-05-02 DIAGNOSIS — S46.012A TRAUMATIC COMPLETE TEAR OF LEFT ROTATOR CUFF, INITIAL ENCOUNTER: ICD-10-CM

## 2024-05-02 DIAGNOSIS — M12.812 LEFT ROTATOR CUFF TEAR ARTHROPATHY: ICD-10-CM

## 2024-05-02 DIAGNOSIS — M25.512 LEFT SHOULDER PAIN, UNSPECIFIED CHRONICITY: Primary | ICD-10-CM

## 2024-05-17 ENCOUNTER — TELEPHONE (OUTPATIENT)
Dept: ORTHOPEDIC SURGERY | Age: 74
End: 2024-05-17

## 2024-05-17 NOTE — TELEPHONE ENCOUNTER
Called pt and told her that Dr. Hernandez will go over the results of her CT at her next appointment. Pt was given time and location of next appointment. Pt expressed understanding.

## 2024-05-29 ENCOUNTER — OFFICE VISIT (OUTPATIENT)
Dept: ORTHOPEDIC SURGERY | Age: 74
End: 2024-05-29
Payer: MEDICARE

## 2024-05-29 DIAGNOSIS — M75.102 LEFT ROTATOR CUFF TEAR ARTHROPATHY: Primary | ICD-10-CM

## 2024-05-29 DIAGNOSIS — M25.512 LEFT SHOULDER PAIN, UNSPECIFIED CHRONICITY: ICD-10-CM

## 2024-05-29 DIAGNOSIS — S46.012A TRAUMATIC COMPLETE TEAR OF LEFT ROTATOR CUFF, INITIAL ENCOUNTER: ICD-10-CM

## 2024-05-29 DIAGNOSIS — M12.812 LEFT ROTATOR CUFF TEAR ARTHROPATHY: Primary | ICD-10-CM

## 2024-05-29 PROCEDURE — G8399 PT W/DXA RESULTS DOCUMENT: HCPCS | Performed by: ORTHOPAEDIC SURGERY

## 2024-05-29 PROCEDURE — 1123F ACP DISCUSS/DSCN MKR DOCD: CPT | Performed by: ORTHOPAEDIC SURGERY

## 2024-05-29 PROCEDURE — 1090F PRES/ABSN URINE INCON ASSESS: CPT | Performed by: ORTHOPAEDIC SURGERY

## 2024-05-29 PROCEDURE — 3017F COLORECTAL CA SCREEN DOC REV: CPT | Performed by: ORTHOPAEDIC SURGERY

## 2024-05-29 PROCEDURE — G8417 CALC BMI ABV UP PARAM F/U: HCPCS | Performed by: ORTHOPAEDIC SURGERY

## 2024-05-29 PROCEDURE — 99214 OFFICE O/P EST MOD 30 MIN: CPT | Performed by: ORTHOPAEDIC SURGERY

## 2024-05-29 PROCEDURE — 1036F TOBACCO NON-USER: CPT | Performed by: ORTHOPAEDIC SURGERY

## 2024-05-29 PROCEDURE — G8427 DOCREV CUR MEDS BY ELIG CLIN: HCPCS | Performed by: ORTHOPAEDIC SURGERY

## 2024-05-29 NOTE — PROGRESS NOTES
supraspinatus with 3 cm of retraction. Full-thickness tear of the superior two thirds of subscapularis. Mild to moderate supraspinatus/infraspinatus and mild subscapularis muscle atrophy.    2.  Complete, retracted tear of the long head biceps tendon.  3. Grade 2-3 glenohumeral chondromalacia.  4. Small glenohumeral effusion with mild synovitis.     Signed by: 2/28/2024 3:32 PM: Bobby Polk MD  Narrative    EXAM:  MRI shoulder, left  COMPARISON:  12/15/2023 radiograph  INDICATION:  Left shoulder pain, chronic  TECHNICAL:  Axial PDFS, Coronal T2, Coronal PDFS, Sagittal T2, and Oblique Sagittal T2FS sequences performed.    FINDINGS:  Osseous Structures:  No evidence of acute fracture.  Mild acromioclavicular degenerative changes.  Mild subacromial/subdeltoid bursitis.  Coracoacromial and coracoclavicular ligaments intact.  Glenohumeral joint aligns normally.  Small glenohumeral effusion with mild synovitis.  Rotator Cuff:  Full-thickness, likely fullwidth tear of supraspinatus at the footprint with 3 cm of retraction. Full-thickness tear of the superior two thirds of subscapularis with mild retraction. Mild to moderate supraspinatus and infraspinatus and mild subscapularis muscle atrophy.  Complete retracted tear of the long head of biceps tendon.  Labrum:  Degenerative fraying of the superior labrum.  Cartilage:  Prominent marginal osteophyte formation along the inferior aspect of the humeral head. Surface fraying of the glenoid cartilage. Mild cartilage loss along the inferior aspect of the humeral head.  Soft Tissues:  No inflammatory changes suggestive of adhesive capsulitis in the interval or axillary recess.  Neurovascular:  Neurovascular bundle is unremarkable.     MRIs independently reviewed today.  Patient does have some full-thickness tearing and retraction of the supraspinatus with mild atrophy of both supraspinatus and infraspinatus.  Partial tearing of  High-grade the subscapularis is also noted.

## 2024-06-13 ENCOUNTER — TELEPHONE (OUTPATIENT)
Age: 74
End: 2024-06-13

## 2024-06-13 NOTE — TELEPHONE ENCOUNTER
Patient having Left reverse total Shoulder Arthroplasty  on TBD with Dr. Hernandez under General/ Regional Block. Requesting risk assessment and Eliquis hold  ( please advise the hold) Fax: [phone] ATTN: Chaitanya Peace

## 2024-06-17 ENCOUNTER — TELEPHONE (OUTPATIENT)
Dept: ORTHOPEDIC SURGERY | Age: 74
End: 2024-06-17
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
## 2024-06-24 NOTE — TELEPHONE ENCOUNTER
Per Dr. Centeno, \":CV status is stable.She at moderate risk for possible orthopedic left shoulder surgery due to multiple co morbidities. Stopped OAC due to anemia\"

## 2024-06-26 ENCOUNTER — TELEPHONE (OUTPATIENT)
Dept: ORTHOPEDIC SURGERY | Age: 74
End: 2024-06-26

## 2024-06-26 NOTE — TELEPHONE ENCOUNTER
Pt was contacted and informed that I did receive the clearance. Surgery was scheduled for 10/11/24.

## 2024-06-27 DIAGNOSIS — M25.512 LEFT SHOULDER PAIN, UNSPECIFIED CHRONICITY: ICD-10-CM

## 2024-06-27 DIAGNOSIS — M12.812 LEFT ROTATOR CUFF TEAR ARTHROPATHY: Primary | ICD-10-CM

## 2024-06-27 DIAGNOSIS — M75.102 LEFT ROTATOR CUFF TEAR ARTHROPATHY: Primary | ICD-10-CM

## 2024-06-27 DIAGNOSIS — S46.012A TRAUMATIC COMPLETE TEAR OF LEFT ROTATOR CUFF, INITIAL ENCOUNTER: ICD-10-CM

## 2024-07-10 DIAGNOSIS — I10 ESSENTIAL HYPERTENSION, BENIGN: ICD-10-CM

## 2024-07-10 NOTE — TELEPHONE ENCOUNTER
Pharmacy on file does not match the phone number in the phone call. LVM for pt to clarify.  Brigitte

## 2024-07-10 NOTE — TELEPHONE ENCOUNTER
MEDICATION REFILL REQUEST      Name of Medication:  Amlodipine  Dose:  10 mg  Frequency:  QD  Quantity:  90  Days' supply:  90 with 3 refills      Pharmacy Name/Location:  [id number]

## 2024-07-11 ENCOUNTER — TELEPHONE (OUTPATIENT)
Age: 74
End: 2024-07-11

## 2024-07-15 RX ORDER — AMLODIPINE BESYLATE 10 MG/1
10 TABLET ORAL DAILY
Qty: 90 TABLET | Refills: 3 | Status: SHIPPED | OUTPATIENT
Start: 2024-07-15

## 2024-09-11 DIAGNOSIS — M75.102 LEFT ROTATOR CUFF TEAR ARTHROPATHY: Primary | ICD-10-CM

## 2024-09-11 DIAGNOSIS — M25.512 LEFT SHOULDER PAIN, UNSPECIFIED CHRONICITY: ICD-10-CM

## 2024-09-11 DIAGNOSIS — M12.812 LEFT ROTATOR CUFF TEAR ARTHROPATHY: Primary | ICD-10-CM

## 2024-09-11 DIAGNOSIS — S46.012A TRAUMATIC COMPLETE TEAR OF LEFT ROTATOR CUFF, INITIAL ENCOUNTER: ICD-10-CM

## 2024-09-23 ENCOUNTER — HOSPITAL ENCOUNTER (OUTPATIENT)
Dept: SURGERY | Age: 74
Discharge: HOME | End: 2024-09-26
Payer: MEDICARE

## 2024-09-23 ENCOUNTER — OFFICE VISIT (OUTPATIENT)
Dept: ORTHOPEDIC SURGERY | Age: 74
End: 2024-09-23
Payer: MEDICARE

## 2024-09-23 VITALS
TEMPERATURE: 97.7 F | RESPIRATION RATE: 18 BRPM | WEIGHT: 237.1 LBS | BODY MASS INDEX: 39.5 KG/M2 | SYSTOLIC BLOOD PRESSURE: 141 MMHG | HEART RATE: 79 BPM | HEIGHT: 65 IN | DIASTOLIC BLOOD PRESSURE: 82 MMHG

## 2024-09-23 DIAGNOSIS — S46.012A TRAUMATIC COMPLETE TEAR OF LEFT ROTATOR CUFF, INITIAL ENCOUNTER: ICD-10-CM

## 2024-09-23 DIAGNOSIS — M12.812 LEFT ROTATOR CUFF TEAR ARTHROPATHY: Primary | ICD-10-CM

## 2024-09-23 DIAGNOSIS — M75.102 LEFT ROTATOR CUFF TEAR ARTHROPATHY: Primary | ICD-10-CM

## 2024-09-23 DIAGNOSIS — M25.512 LEFT SHOULDER PAIN, UNSPECIFIED CHRONICITY: ICD-10-CM

## 2024-09-23 LAB
ANION GAP SERPL CALC-SCNC: 12 MMOL/L (ref 9–18)
BUN SERPL-MCNC: 27 MG/DL (ref 8–23)
CALCIUM SERPL-MCNC: 9.1 MG/DL (ref 8.8–10.2)
CHLORIDE SERPL-SCNC: 103 MMOL/L (ref 98–107)
CO2 SERPL-SCNC: 25 MMOL/L (ref 20–28)
CREAT SERPL-MCNC: 1.09 MG/DL (ref 0.6–1.1)
ERYTHROCYTE [DISTWIDTH] IN BLOOD BY AUTOMATED COUNT: 16.4 % (ref 11.9–14.6)
GLUCOSE SERPL-MCNC: 110 MG/DL (ref 70–99)
HCT VFR BLD AUTO: 38.9 % (ref 35.8–46.3)
HGB BLD-MCNC: 11.8 G/DL (ref 11.7–15.4)
MCH RBC QN AUTO: 23.3 PG (ref 26.1–32.9)
MCHC RBC AUTO-ENTMCNC: 30.3 G/DL (ref 31.4–35)
MCV RBC AUTO: 76.7 FL (ref 82–102)
NRBC # BLD: 0 K/UL (ref 0–0.2)
PLATELET # BLD AUTO: 249 K/UL (ref 150–450)
PMV BLD AUTO: 10 FL (ref 9.4–12.3)
POTASSIUM SERPL-SCNC: 4.2 MMOL/L (ref 3.5–5.1)
RBC # BLD AUTO: 5.07 M/UL (ref 4.05–5.2)
SODIUM SERPL-SCNC: 140 MMOL/L (ref 136–145)
WBC # BLD AUTO: 7.7 K/UL (ref 4.3–11.1)

## 2024-09-23 PROCEDURE — 80048 BASIC METABOLIC PNL TOTAL CA: CPT

## 2024-09-23 PROCEDURE — 85027 COMPLETE CBC AUTOMATED: CPT

## 2024-09-23 PROCEDURE — L3670 SO ACRO/CLAV CAN WEB PRE OTS: HCPCS | Performed by: ORTHOPAEDIC SURGERY

## 2024-09-23 PROCEDURE — 87641 MR-STAPH DNA AMP PROBE: CPT

## 2024-09-23 RX ORDER — SPIRONOLACTONE 25 MG/1
25 TABLET ORAL DAILY
COMMUNITY

## 2024-09-24 LAB
MRSA DNA SPEC QL NAA+PROBE: DETECTED
S AUREUS CPE NOSE QL NAA+PROBE: DETECTED

## 2024-10-04 NOTE — PERIOP NOTE
Preop department called to notify patient of arrival time for scheduled procedure. Instructions given to   - Arrive at [facility] Entrance [address].  - Remain NPO after midnight, unless otherwise indicated, including gum, mints, and ice chips.   - Have a responsible adult to drive patient to the hospital, stay during surgery, and patient will need supervision 24 hours after anesthesia.   - Use antibacterial soap in shower the night before surgery and on the morning of surgery.       Was patient contacted: YES  Voicemail left:   Numbers contacted: [phone]   Arrival time: 0900

## 2024-10-04 NOTE — H&P
discussed. The patient  wishes to proceed with the procedure at this time.        Temo Hernandez MD  10/07/24

## 2024-10-06 ENCOUNTER — ANESTHESIA EVENT (OUTPATIENT)
Dept: SURGERY | Age: 74
End: 2024-10-06
Payer: MEDICARE

## 2024-10-06 DIAGNOSIS — M25.512 LEFT SHOULDER PAIN, UNSPECIFIED CHRONICITY: Primary | ICD-10-CM

## 2024-10-06 RX ORDER — AMOXICILLIN 250 MG
1 CAPSULE ORAL DAILY
Qty: 21 TABLET | Refills: 0 | Status: SHIPPED | OUTPATIENT
Start: 2024-10-06

## 2024-10-06 RX ORDER — ONDANSETRON 8 MG/1
4 TABLET, ORALLY DISINTEGRATING ORAL EVERY 6 HOURS
Qty: 16 TABLET | Refills: 0 | Status: SHIPPED | OUTPATIENT
Start: 2024-10-06

## 2024-10-06 RX ORDER — HYDROMORPHONE HYDROCHLORIDE 2 MG/1
2-4 TABLET ORAL EVERY 6 HOURS
Qty: 24 TABLET | Refills: 0 | Status: SHIPPED | OUTPATIENT
Start: 2024-10-06 | End: 2024-10-09

## 2024-10-06 RX ORDER — ASPIRIN 325 MG
325 TABLET ORAL 2 TIMES DAILY
Qty: 14 TABLET | Refills: 0 | Status: SHIPPED | OUTPATIENT
Start: 2024-10-06 | End: 2024-10-13

## 2024-10-06 RX ORDER — MELOXICAM 7.5 MG/1
7.5 TABLET ORAL 2 TIMES DAILY
Qty: 28 TABLET | Refills: 0 | Status: SHIPPED | OUTPATIENT
Start: 2024-10-06 | End: 2024-10-20

## 2024-10-07 ENCOUNTER — HOSPITAL ENCOUNTER (OUTPATIENT)
Age: 74
Discharge: HOME | End: 2024-10-08
Attending: ORTHOPAEDIC SURGERY | Admitting: ORTHOPAEDIC SURGERY
Payer: MEDICARE

## 2024-10-07 ENCOUNTER — ANESTHESIA (OUTPATIENT)
Dept: SURGERY | Age: 74
End: 2024-10-07
Payer: MEDICARE

## 2024-10-07 ENCOUNTER — APPOINTMENT (OUTPATIENT)
Dept: GENERAL RADIOLOGY | Age: 74
End: 2024-10-07
Attending: ORTHOPAEDIC SURGERY
Payer: MEDICARE

## 2024-10-07 DIAGNOSIS — M19.012 ARTHRITIS OF LEFT SHOULDER REGION: Primary | ICD-10-CM

## 2024-10-07 LAB
ABO + RH BLD: NORMAL
BLOOD GROUP ANTIBODIES SERPL: NORMAL
GLUCOSE BLD STRIP.AUTO-MCNC: 128 MG/DL (ref 65–100)
SERVICE CMNT-IMP: ABNORMAL
SPECIMEN EXP DATE BLD: NORMAL

## 2024-10-07 PROCEDURE — 2580000003 HC RX 258: Performed by: ANESTHESIOLOGY

## 2024-10-07 PROCEDURE — 2580000003 HC RX 258: Performed by: ORTHOPAEDIC SURGERY

## 2024-10-07 PROCEDURE — 7100000000 HC PACU RECOVERY - FIRST 15 MIN: Performed by: ORTHOPAEDIC SURGERY

## 2024-10-07 PROCEDURE — 2709999900 HC NON-CHARGEABLE SUPPLY: Performed by: ORTHOPAEDIC SURGERY

## 2024-10-07 PROCEDURE — 86850 RBC ANTIBODY SCREEN: CPT

## 2024-10-07 PROCEDURE — 3700000000 HC ANESTHESIA ATTENDED CARE: Performed by: ORTHOPAEDIC SURGERY

## 2024-10-07 PROCEDURE — 64415 NJX AA&/STRD BRCH PLXS IMG: CPT | Performed by: ANESTHESIOLOGY

## 2024-10-07 PROCEDURE — 6360000002 HC RX W HCPCS: Performed by: PHYSICIAN ASSISTANT

## 2024-10-07 PROCEDURE — 82962 GLUCOSE BLOOD TEST: CPT

## 2024-10-07 PROCEDURE — 2500000003 HC RX 250 WO HCPCS: Performed by: ANESTHESIOLOGY

## 2024-10-07 PROCEDURE — 6370000000 HC RX 637 (ALT 250 FOR IP): Performed by: ANESTHESIOLOGY

## 2024-10-07 PROCEDURE — 6370000000 HC RX 637 (ALT 250 FOR IP): Performed by: PHYSICIAN ASSISTANT

## 2024-10-07 PROCEDURE — 86901 BLOOD TYPING SEROLOGIC RH(D): CPT

## 2024-10-07 PROCEDURE — 6360000002 HC RX W HCPCS: Performed by: ANESTHESIOLOGY

## 2024-10-07 PROCEDURE — 6360000002 HC RX W HCPCS: Performed by: ORTHOPAEDIC SURGERY

## 2024-10-07 PROCEDURE — 2500000003 HC RX 250 WO HCPCS: Performed by: NURSE ANESTHETIST, CERTIFIED REGISTERED

## 2024-10-07 PROCEDURE — 3600000005 HC SURGERY LEVEL 5 BASE: Performed by: ORTHOPAEDIC SURGERY

## 2024-10-07 PROCEDURE — A4217 STERILE WATER/SALINE, 500 ML: HCPCS | Performed by: ORTHOPAEDIC SURGERY

## 2024-10-07 PROCEDURE — 2720000010 HC SURG SUPPLY STERILE: Performed by: ORTHOPAEDIC SURGERY

## 2024-10-07 PROCEDURE — C1776 JOINT DEVICE (IMPLANTABLE): HCPCS | Performed by: ORTHOPAEDIC SURGERY

## 2024-10-07 PROCEDURE — 6360000002 HC RX W HCPCS: Performed by: NURSE ANESTHETIST, CERTIFIED REGISTERED

## 2024-10-07 PROCEDURE — 2580000003 HC RX 258: Performed by: PHYSICIAN ASSISTANT

## 2024-10-07 PROCEDURE — 6370000000 HC RX 637 (ALT 250 FOR IP): Performed by: ORTHOPAEDIC SURGERY

## 2024-10-07 PROCEDURE — 2500000003 HC RX 250 WO HCPCS: Performed by: ORTHOPAEDIC SURGERY

## 2024-10-07 PROCEDURE — C1713 ANCHOR/SCREW BN/BN,TIS/BN: HCPCS | Performed by: ORTHOPAEDIC SURGERY

## 2024-10-07 PROCEDURE — 86900 BLOOD TYPING SEROLOGIC ABO: CPT

## 2024-10-07 PROCEDURE — 7100000001 HC PACU RECOVERY - ADDTL 15 MIN: Performed by: ORTHOPAEDIC SURGERY

## 2024-10-07 PROCEDURE — 73020 X-RAY EXAM OF SHOULDER: CPT

## 2024-10-07 PROCEDURE — 20985 CPTR-ASST DIR MS PX: CPT | Performed by: ORTHOPAEDIC SURGERY

## 2024-10-07 PROCEDURE — 3700000001 HC ADD 15 MINUTES (ANESTHESIA): Performed by: ORTHOPAEDIC SURGERY

## 2024-10-07 PROCEDURE — 2580000003 HC RX 258: Performed by: NURSE ANESTHETIST, CERTIFIED REGISTERED

## 2024-10-07 PROCEDURE — 23472 RECONSTRUCT SHOULDER JOINT: CPT | Performed by: ORTHOPAEDIC SURGERY

## 2024-10-07 PROCEDURE — 3600000015 HC SURGERY LEVEL 5 ADDTL 15MIN: Performed by: ORTHOPAEDIC SURGERY

## 2024-10-07 RX ORDER — SODIUM CHLORIDE 9 MG/ML
INJECTION, SOLUTION INTRAVENOUS PRN
Status: DISCONTINUED | OUTPATIENT
Start: 2024-10-07 | End: 2024-10-07 | Stop reason: HOSPADM

## 2024-10-07 RX ORDER — HYDROMORPHONE HYDROCHLORIDE 2 MG/ML
0.5 INJECTION, SOLUTION INTRAMUSCULAR; INTRAVENOUS; SUBCUTANEOUS EVERY 10 MIN PRN
Status: DISCONTINUED | OUTPATIENT
Start: 2024-10-07 | End: 2024-10-07

## 2024-10-07 RX ORDER — ACETAMINOPHEN 500 MG
1000 TABLET ORAL ONCE
Status: DISCONTINUED | OUTPATIENT
Start: 2024-10-07 | End: 2024-10-07 | Stop reason: HOSPADM

## 2024-10-07 RX ORDER — BUPIVACAINE HYDROCHLORIDE AND EPINEPHRINE 5; 5 MG/ML; UG/ML
INJECTION, SOLUTION EPIDURAL; INTRACAUDAL; PERINEURAL
Status: COMPLETED | OUTPATIENT
Start: 2024-10-07 | End: 2024-10-07

## 2024-10-07 RX ORDER — SODIUM CHLORIDE, SODIUM LACTATE, POTASSIUM CHLORIDE, CALCIUM CHLORIDE 600; 310; 30; 20 MG/100ML; MG/100ML; MG/100ML; MG/100ML
INJECTION, SOLUTION INTRAVENOUS CONTINUOUS
Status: DISCONTINUED | OUTPATIENT
Start: 2024-10-07 | End: 2024-10-07 | Stop reason: HOSPADM

## 2024-10-07 RX ORDER — PANTOPRAZOLE SODIUM 40 MG/1
40 TABLET, DELAYED RELEASE ORAL
Status: DISCONTINUED | OUTPATIENT
Start: 2024-10-08 | End: 2024-10-08 | Stop reason: HOSPADM

## 2024-10-07 RX ORDER — SODIUM CHLORIDE 0.9 % (FLUSH) 0.9 %
5-40 SYRINGE (ML) INJECTION EVERY 12 HOURS SCHEDULED
Status: DISCONTINUED | OUTPATIENT
Start: 2024-10-07 | End: 2024-10-07 | Stop reason: HOSPADM

## 2024-10-07 RX ORDER — DIPHENHYDRAMINE HYDROCHLORIDE 50 MG/ML
12.5 INJECTION INTRAMUSCULAR; INTRAVENOUS
Status: DISCONTINUED | OUTPATIENT
Start: 2024-10-07 | End: 2024-10-07 | Stop reason: HOSPADM

## 2024-10-07 RX ORDER — SUCCINYLCHOLINE/SOD CL,ISO/PF 200MG/10ML
SYRINGE (ML) INTRAVENOUS
Status: DISCONTINUED | OUTPATIENT
Start: 2024-10-07 | End: 2024-10-07 | Stop reason: SDUPTHER

## 2024-10-07 RX ORDER — TRANEXAMIC ACID 100 MG/ML
INJECTION, SOLUTION INTRAVENOUS PRN
Status: DISCONTINUED | OUTPATIENT
Start: 2024-10-07 | End: 2024-10-07 | Stop reason: ALTCHOICE

## 2024-10-07 RX ORDER — HYDROMORPHONE HYDROCHLORIDE 2 MG/ML
0.5 INJECTION, SOLUTION INTRAMUSCULAR; INTRAVENOUS; SUBCUTANEOUS
Status: DISCONTINUED | OUTPATIENT
Start: 2024-10-07 | End: 2024-10-07

## 2024-10-07 RX ORDER — NALOXONE HYDROCHLORIDE 0.4 MG/ML
INJECTION, SOLUTION INTRAMUSCULAR; INTRAVENOUS; SUBCUTANEOUS PRN
Status: DISCONTINUED | OUTPATIENT
Start: 2024-10-07 | End: 2024-10-07 | Stop reason: HOSPADM

## 2024-10-07 RX ORDER — NITROGLYCERIN 0.4 MG/1
0.4 TABLET SUBLINGUAL EVERY 5 MIN PRN
Status: DISCONTINUED | OUTPATIENT
Start: 2024-10-07 | End: 2024-10-08 | Stop reason: HOSPADM

## 2024-10-07 RX ORDER — ROCURONIUM BROMIDE 10 MG/ML
INJECTION, SOLUTION INTRAVENOUS
Status: DISCONTINUED | OUTPATIENT
Start: 2024-10-07 | End: 2024-10-07 | Stop reason: SDUPTHER

## 2024-10-07 RX ORDER — FUROSEMIDE 40 MG
40 TABLET ORAL DAILY
Status: DISCONTINUED | OUTPATIENT
Start: 2024-10-07 | End: 2024-10-08 | Stop reason: HOSPADM

## 2024-10-07 RX ORDER — ROPIVACAINE HYDROCHLORIDE 5 MG/ML
INJECTION, SOLUTION EPIDURAL; INFILTRATION; PERINEURAL PRN
Status: DISCONTINUED | OUTPATIENT
Start: 2024-10-07 | End: 2024-10-07 | Stop reason: ALTCHOICE

## 2024-10-07 RX ORDER — EPHEDRINE SULFATE 5 MG/ML
INJECTION INTRAVENOUS
Status: DISCONTINUED | OUTPATIENT
Start: 2024-10-07 | End: 2024-10-07 | Stop reason: SDUPTHER

## 2024-10-07 RX ORDER — HYDROCODONE BITARTRATE AND ACETAMINOPHEN 5; 325 MG/1; MG/1
1 TABLET ORAL EVERY 6 HOURS PRN
Status: DISCONTINUED | OUTPATIENT
Start: 2024-10-07 | End: 2024-10-08 | Stop reason: HOSPADM

## 2024-10-07 RX ORDER — SODIUM CHLORIDE 9 MG/ML
INJECTION, SOLUTION INTRAVENOUS CONTINUOUS
Status: DISCONTINUED | OUTPATIENT
Start: 2024-10-07 | End: 2024-10-08 | Stop reason: HOSPADM

## 2024-10-07 RX ORDER — ALLOPURINOL 100 MG/1
150 TABLET ORAL DAILY
Status: DISCONTINUED | OUTPATIENT
Start: 2024-10-08 | End: 2024-10-08 | Stop reason: HOSPADM

## 2024-10-07 RX ORDER — SODIUM CHLORIDE 0.9 % (FLUSH) 0.9 %
5-40 SYRINGE (ML) INJECTION PRN
Status: DISCONTINUED | OUTPATIENT
Start: 2024-10-07 | End: 2024-10-07 | Stop reason: HOSPADM

## 2024-10-07 RX ORDER — MELOXICAM 7.5 MG/1
7.5 TABLET ORAL 2 TIMES DAILY
Status: DISCONTINUED | OUTPATIENT
Start: 2024-10-07 | End: 2024-10-07

## 2024-10-07 RX ORDER — CARVEDILOL 12.5 MG/1
25 TABLET ORAL 2 TIMES DAILY WITH MEALS
Status: DISCONTINUED | OUTPATIENT
Start: 2024-10-07 | End: 2024-10-08 | Stop reason: HOSPADM

## 2024-10-07 RX ORDER — SODIUM CHLORIDE 9 MG/ML
INJECTION, SOLUTION INTRAVENOUS CONTINUOUS
Status: DISCONTINUED | OUTPATIENT
Start: 2024-10-07 | End: 2024-10-07 | Stop reason: HOSPADM

## 2024-10-07 RX ORDER — GLYCOPYRROLATE 0.2 MG/ML
INJECTION INTRAMUSCULAR; INTRAVENOUS
Status: DISCONTINUED | OUTPATIENT
Start: 2024-10-07 | End: 2024-10-07 | Stop reason: SDUPTHER

## 2024-10-07 RX ORDER — ALBUTEROL SULFATE 0.83 MG/ML
2.5 SOLUTION RESPIRATORY (INHALATION) EVERY 6 HOURS PRN
Status: DISCONTINUED | OUTPATIENT
Start: 2024-10-07 | End: 2024-10-08 | Stop reason: HOSPADM

## 2024-10-07 RX ORDER — IBUPROFEN 600 MG/1
1 TABLET ORAL PRN
Status: DISCONTINUED | OUTPATIENT
Start: 2024-10-07 | End: 2024-10-07 | Stop reason: HOSPADM

## 2024-10-07 RX ORDER — ASPIRIN 325 MG
325 TABLET, DELAYED RELEASE (ENTERIC COATED) ORAL 2 TIMES DAILY
Status: DISCONTINUED | OUTPATIENT
Start: 2024-10-07 | End: 2024-10-08 | Stop reason: HOSPADM

## 2024-10-07 RX ORDER — FENTANYL CITRATE 50 UG/ML
100 INJECTION, SOLUTION INTRAMUSCULAR; INTRAVENOUS
Status: COMPLETED | OUTPATIENT
Start: 2024-10-07 | End: 2024-10-07

## 2024-10-07 RX ORDER — EPINEPHRINE 1 MG/ML(1)
AMPUL (ML) INJECTION PRN
Status: DISCONTINUED | OUTPATIENT
Start: 2024-10-07 | End: 2024-10-07 | Stop reason: ALTCHOICE

## 2024-10-07 RX ORDER — HYDROMORPHONE HYDROCHLORIDE 2 MG/1
2 TABLET ORAL EVERY 4 HOURS PRN
Status: DISCONTINUED | OUTPATIENT
Start: 2024-10-07 | End: 2024-10-07

## 2024-10-07 RX ORDER — HYDROMORPHONE HYDROCHLORIDE 4 MG/1
4 TABLET ORAL EVERY 4 HOURS PRN
Status: DISCONTINUED | OUTPATIENT
Start: 2024-10-07 | End: 2024-10-07

## 2024-10-07 RX ORDER — PROPOFOL 10 MG/ML
INJECTION, EMULSION INTRAVENOUS
Status: DISCONTINUED | OUTPATIENT
Start: 2024-10-07 | End: 2024-10-07 | Stop reason: SDUPTHER

## 2024-10-07 RX ORDER — DIPHENHYDRAMINE HCL 25 MG
25 CAPSULE ORAL EVERY 6 HOURS PRN
Status: DISCONTINUED | OUTPATIENT
Start: 2024-10-07 | End: 2024-10-08 | Stop reason: HOSPADM

## 2024-10-07 RX ORDER — SPIRONOLACTONE 25 MG/1
25 TABLET ORAL DAILY
Status: DISCONTINUED | OUTPATIENT
Start: 2024-10-07 | End: 2024-10-08 | Stop reason: HOSPADM

## 2024-10-07 RX ORDER — AMLODIPINE BESYLATE 10 MG/1
10 TABLET ORAL DAILY
Status: DISCONTINUED | OUTPATIENT
Start: 2024-10-07 | End: 2024-10-08 | Stop reason: HOSPADM

## 2024-10-07 RX ORDER — LOSARTAN POTASSIUM 50 MG/1
100 TABLET ORAL DAILY
Status: DISCONTINUED | OUTPATIENT
Start: 2024-10-08 | End: 2024-10-08 | Stop reason: HOSPADM

## 2024-10-07 RX ORDER — LANOLIN ALCOHOL/MO/W.PET/CERES
3 CREAM (GRAM) TOPICAL NIGHTLY PRN
Status: DISCONTINUED | OUTPATIENT
Start: 2024-10-07 | End: 2024-10-08 | Stop reason: HOSPADM

## 2024-10-07 RX ORDER — SODIUM CHLORIDE 0.9 % (FLUSH) 0.9 %
5-40 SYRINGE (ML) INJECTION PRN
Status: DISCONTINUED | OUTPATIENT
Start: 2024-10-07 | End: 2024-10-08 | Stop reason: HOSPADM

## 2024-10-07 RX ORDER — PROCHLORPERAZINE EDISYLATE 5 MG/ML
5 INJECTION INTRAMUSCULAR; INTRAVENOUS
Status: DISCONTINUED | OUTPATIENT
Start: 2024-10-07 | End: 2024-10-07 | Stop reason: HOSPADM

## 2024-10-07 RX ORDER — HYDROCODONE BITARTRATE AND ACETAMINOPHEN 5; 325 MG/1; MG/1
1 TABLET ORAL ONCE
Status: COMPLETED | OUTPATIENT
Start: 2024-10-07 | End: 2024-10-07

## 2024-10-07 RX ORDER — LIDOCAINE HYDROCHLORIDE 20 MG/ML
INJECTION, SOLUTION EPIDURAL; INFILTRATION; INTRACAUDAL; PERINEURAL
Status: DISCONTINUED | OUTPATIENT
Start: 2024-10-07 | End: 2024-10-07 | Stop reason: SDUPTHER

## 2024-10-07 RX ORDER — DEXAMETHASONE SODIUM PHOSPHATE 4 MG/ML
INJECTION, SOLUTION INTRA-ARTICULAR; INTRALESIONAL; INTRAMUSCULAR; INTRAVENOUS; SOFT TISSUE
Status: COMPLETED | OUTPATIENT
Start: 2024-10-07 | End: 2024-10-07

## 2024-10-07 RX ORDER — DEXAMETHASONE SODIUM PHOSPHATE 4 MG/ML
INJECTION, SOLUTION INTRA-ARTICULAR; INTRALESIONAL; INTRAMUSCULAR; INTRAVENOUS; SOFT TISSUE
Status: DISCONTINUED | OUTPATIENT
Start: 2024-10-07 | End: 2024-10-07 | Stop reason: SDUPTHER

## 2024-10-07 RX ORDER — DEXTROSE MONOHYDRATE 100 MG/ML
INJECTION, SOLUTION INTRAVENOUS CONTINUOUS PRN
Status: DISCONTINUED | OUTPATIENT
Start: 2024-10-07 | End: 2024-10-07 | Stop reason: HOSPADM

## 2024-10-07 RX ORDER — ONDANSETRON 2 MG/ML
INJECTION INTRAMUSCULAR; INTRAVENOUS
Status: DISCONTINUED | OUTPATIENT
Start: 2024-10-07 | End: 2024-10-07 | Stop reason: SDUPTHER

## 2024-10-07 RX ORDER — MIDAZOLAM HYDROCHLORIDE 2 MG/2ML
2 INJECTION, SOLUTION INTRAMUSCULAR; INTRAVENOUS
Status: COMPLETED | OUTPATIENT
Start: 2024-10-07 | End: 2024-10-07

## 2024-10-07 RX ORDER — ONDANSETRON 2 MG/ML
4 INJECTION INTRAMUSCULAR; INTRAVENOUS EVERY 6 HOURS PRN
Status: DISCONTINUED | OUTPATIENT
Start: 2024-10-07 | End: 2024-10-08 | Stop reason: HOSPADM

## 2024-10-07 RX ORDER — TRANEXAMIC ACID 100 MG/ML
INJECTION, SOLUTION INTRAVENOUS
Status: DISCONTINUED | OUTPATIENT
Start: 2024-10-07 | End: 2024-10-07 | Stop reason: SDUPTHER

## 2024-10-07 RX ORDER — ONDANSETRON 4 MG/1
4 TABLET, ORALLY DISINTEGRATING ORAL EVERY 8 HOURS PRN
Status: DISCONTINUED | OUTPATIENT
Start: 2024-10-07 | End: 2024-10-08 | Stop reason: HOSPADM

## 2024-10-07 RX ORDER — HYDROMORPHONE HYDROCHLORIDE 2 MG/ML
1 INJECTION, SOLUTION INTRAMUSCULAR; INTRAVENOUS; SUBCUTANEOUS
Status: DISCONTINUED | OUTPATIENT
Start: 2024-10-07 | End: 2024-10-07

## 2024-10-07 RX ORDER — SODIUM CHLORIDE 0.9 % (FLUSH) 0.9 %
5-40 SYRINGE (ML) INJECTION EVERY 12 HOURS SCHEDULED
Status: DISCONTINUED | OUTPATIENT
Start: 2024-10-07 | End: 2024-10-08 | Stop reason: HOSPADM

## 2024-10-07 RX ORDER — LIDOCAINE HYDROCHLORIDE 10 MG/ML
1 INJECTION, SOLUTION INFILTRATION; PERINEURAL
Status: DISCONTINUED | OUTPATIENT
Start: 2024-10-07 | End: 2024-10-07 | Stop reason: HOSPADM

## 2024-10-07 RX ORDER — POTASSIUM CHLORIDE 750 MG/1
10 TABLET, EXTENDED RELEASE ORAL DAILY
Status: DISCONTINUED | OUTPATIENT
Start: 2024-10-07 | End: 2024-10-08 | Stop reason: HOSPADM

## 2024-10-07 RX ORDER — SODIUM CHLORIDE 9 MG/ML
INJECTION, SOLUTION INTRAVENOUS PRN
Status: DISCONTINUED | OUTPATIENT
Start: 2024-10-07 | End: 2024-10-08 | Stop reason: HOSPADM

## 2024-10-07 RX ORDER — DIPHENHYDRAMINE HYDROCHLORIDE 50 MG/ML
25 INJECTION INTRAMUSCULAR; INTRAVENOUS EVERY 6 HOURS PRN
Status: DISCONTINUED | OUTPATIENT
Start: 2024-10-07 | End: 2024-10-07 | Stop reason: SDUPTHER

## 2024-10-07 RX ADMIN — HYDROCODONE BITARTRATE AND ACETAMINOPHEN 1 TABLET: 5; 325 TABLET ORAL at 15:28

## 2024-10-07 RX ADMIN — DEXAMETHASONE SODIUM PHOSPHATE 4 MG: 4 INJECTION INTRA-ARTICULAR; INTRALESIONAL; INTRAMUSCULAR; INTRAVENOUS; SOFT TISSUE at 10:15

## 2024-10-07 RX ADMIN — HYDROCODONE BITARTRATE AND ACETAMINOPHEN 1 TABLET: 5; 325 TABLET ORAL at 21:42

## 2024-10-07 RX ADMIN — ROCURONIUM BROMIDE 20 MG: 10 INJECTION, SOLUTION INTRAVENOUS at 11:18

## 2024-10-07 RX ADMIN — SODIUM CHLORIDE, PRESERVATIVE FREE 10 ML: 5 INJECTION INTRAVENOUS at 09:54

## 2024-10-07 RX ADMIN — Medication 2000 MG: at 10:53

## 2024-10-07 RX ADMIN — PHENYLEPHRINE HYDROCHLORIDE 150 MCG: 0.1 INJECTION, SOLUTION INTRAVENOUS at 11:24

## 2024-10-07 RX ADMIN — MIDAZOLAM 2 MG: 1 INJECTION INTRAMUSCULAR; INTRAVENOUS at 10:18

## 2024-10-07 RX ADMIN — VANCOMYCIN HYDROCHLORIDE 1500 MG: 10 INJECTION, POWDER, LYOPHILIZED, FOR SOLUTION INTRAVENOUS at 22:16

## 2024-10-07 RX ADMIN — SPIRONOLACTONE 25 MG: 25 TABLET ORAL at 21:42

## 2024-10-07 RX ADMIN — SUGAMMADEX 200 MG: 100 INJECTION, SOLUTION INTRAVENOUS at 13:28

## 2024-10-07 RX ADMIN — DEXAMETHASONE SODIUM PHOSPHATE 4 MG: 4 INJECTION, SOLUTION INTRA-ARTICULAR; INTRALESIONAL; INTRAMUSCULAR; INTRAVENOUS; SOFT TISSUE at 12:17

## 2024-10-07 RX ADMIN — TRANEXAMIC ACID 1000 MG: 100 INJECTION, SOLUTION INTRAVENOUS at 11:16

## 2024-10-07 RX ADMIN — GLYCOPYRROLATE 0.2 MG: 0.2 INJECTION INTRAMUSCULAR; INTRAVENOUS at 11:19

## 2024-10-07 RX ADMIN — AMLODIPINE BESYLATE 10 MG: 10 TABLET ORAL at 21:42

## 2024-10-07 RX ADMIN — PROPOFOL 20 MG: 10 INJECTION, EMULSION INTRAVENOUS at 12:19

## 2024-10-07 RX ADMIN — CARVEDILOL 25 MG: 12.5 TABLET, FILM COATED ORAL at 21:42

## 2024-10-07 RX ADMIN — SODIUM CHLORIDE, SODIUM LACTATE, POTASSIUM CHLORIDE, AND CALCIUM CHLORIDE: 600; 310; 30; 20 INJECTION, SOLUTION INTRAVENOUS at 10:48

## 2024-10-07 RX ADMIN — SODIUM CHLORIDE: 9 INJECTION, SOLUTION INTRAVENOUS at 22:13

## 2024-10-07 RX ADMIN — FUROSEMIDE 40 MG: 40 TABLET ORAL at 21:42

## 2024-10-07 RX ADMIN — VANCOMYCIN HYDROCHLORIDE 1500 MG: 10 INJECTION, POWDER, LYOPHILIZED, FOR SOLUTION INTRAVENOUS at 10:18

## 2024-10-07 RX ADMIN — PHENYLEPHRINE HYDROCHLORIDE 20 MCG/MIN: 10 INJECTION INTRAVENOUS at 11:36

## 2024-10-07 RX ADMIN — ONDANSETRON 4 MG: 2 INJECTION INTRAMUSCULAR; INTRAVENOUS at 13:00

## 2024-10-07 RX ADMIN — FENTANYL CITRATE 100 MCG: 50 INJECTION INTRAMUSCULAR; INTRAVENOUS at 10:18

## 2024-10-07 RX ADMIN — EPHEDRINE SULFATE 10 MG: 5 INJECTION INTRAVENOUS at 11:05

## 2024-10-07 RX ADMIN — PHENYLEPHRINE HYDROCHLORIDE 100 MCG: 0.1 INJECTION, SOLUTION INTRAVENOUS at 11:13

## 2024-10-07 RX ADMIN — Medication 3 AMPULE: at 10:31

## 2024-10-07 RX ADMIN — Medication 200 MG: at 11:00

## 2024-10-07 RX ADMIN — PROPOFOL 200 MG: 10 INJECTION, EMULSION INTRAVENOUS at 11:00

## 2024-10-07 RX ADMIN — ROCURONIUM BROMIDE 10 MG: 10 INJECTION, SOLUTION INTRAVENOUS at 11:55

## 2024-10-07 RX ADMIN — EPHEDRINE SULFATE 5 MG: 5 INJECTION INTRAVENOUS at 11:13

## 2024-10-07 RX ADMIN — ROCURONIUM BROMIDE 10 MG: 10 INJECTION, SOLUTION INTRAVENOUS at 12:40

## 2024-10-07 RX ADMIN — BUPIVACAINE HYDROCHLORIDE AND EPINEPHRINE BITARTRATE 30 ML: 5; .005 INJECTION, SOLUTION EPIDURAL; INTRACAUDAL; PERINEURAL at 10:15

## 2024-10-07 RX ADMIN — ROCURONIUM BROMIDE 5 MG: 10 INJECTION, SOLUTION INTRAVENOUS at 12:47

## 2024-10-07 RX ADMIN — CEFAZOLIN 2000 MG: 10 INJECTION, POWDER, FOR SOLUTION INTRAVENOUS at 21:43

## 2024-10-07 RX ADMIN — SODIUM CHLORIDE, PRESERVATIVE FREE 10 ML: 5 INJECTION INTRAVENOUS at 21:45

## 2024-10-07 RX ADMIN — POTASSIUM CHLORIDE 10 MEQ: 750 TABLET, EXTENDED RELEASE ORAL at 21:42

## 2024-10-07 RX ADMIN — LIDOCAINE HYDROCHLORIDE 40 MG: 20 INJECTION, SOLUTION EPIDURAL; INFILTRATION; INTRACAUDAL; PERINEURAL at 11:00

## 2024-10-07 ASSESSMENT — PAIN SCALES - GENERAL
PAINLEVEL_OUTOF10: 6
PAINLEVEL_OUTOF10: 4
PAINLEVEL_OUTOF10: 6

## 2024-10-07 ASSESSMENT — PAIN - FUNCTIONAL ASSESSMENT
PAIN_FUNCTIONAL_ASSESSMENT: 0-10
PAIN_FUNCTIONAL_ASSESSMENT: PREVENTS OR INTERFERES SOME ACTIVE ACTIVITIES AND ADLS

## 2024-10-07 ASSESSMENT — PAIN DESCRIPTION - DESCRIPTORS
DESCRIPTORS: ACHING
DESCRIPTORS: ACHING;SORE

## 2024-10-07 ASSESSMENT — PAIN DESCRIPTION - ORIENTATION
ORIENTATION: LEFT
ORIENTATION: LEFT

## 2024-10-07 ASSESSMENT — PAIN DESCRIPTION - LOCATION
LOCATION: BACK;ARM
LOCATION: ARM

## 2024-10-07 ASSESSMENT — PAIN DESCRIPTION - ONSET: ONSET: GRADUAL

## 2024-10-07 ASSESSMENT — PAIN DESCRIPTION - PAIN TYPE: TYPE: SURGICAL PAIN

## 2024-10-07 ASSESSMENT — PAIN DESCRIPTION - FREQUENCY: FREQUENCY: INTERMITTENT

## 2024-10-07 NOTE — OP NOTE
and vessels, DVT, PE, MI, need for further operations and other anesthesia related risks was performed with the patient. After this review and the review of the likely outcome and potential complications of the procedure, preoperative verbal and written consents were obtained.  The operative procedure and postoperative course were discussed with the patient in detail and the extremity was marked by the patient and myself.     Procedure:   The patient was given an anesthetic, placed semi sitting, the head was held in a neutral position, the legs were flexed and pillows were placed under the legs.  Txa 1 gram IV was given by anesthesia. An EUA was performed and noted above. They were then padded and the operative shoulder was prepped with ChloraPrep and draped in the usual fashion.    Prior to the beginning of the procedure, a time-out was performed for correct surgical site identification as was marked during the pre-operative meeting. This was confirmed using the written consent and history/physical. Time-out for antibiotic dosing, timing and selection was also performed.   The operative arm was connected to an arm aguilar. A longitudinal incision was made from the clavicle over the coracoid and down in the deltopectoral interval. Dissection was carried down through the deltopectoral interval with bovie cauterization as needed. The cephalic vein was identified and freed up and retracted medially. Dissection was carried down to the clavipectoral fascia and conjoined tendon. The subacromial space was then opened and a retractor was then placed under the acromion and under the deltoid. The conjoined tendon was freed up from the subscapularis anteriorly. A finger was placed on the axillary nerve and this position was confirmed. At this point a tonsil was placed into the rotator interval and the area of the subscap was identified superiorly. The pectoralis tendon was then partially incised about 1 cm from its superior

## 2024-10-07 NOTE — PERIOP NOTE
[id number]-Cf has sat on side of bed for 45\", O2 weaned off, maintaining O2 sat 94-95%. Pt assigned transport, readied in bed. HW flushed with 3cc NC and reclamped, good blood return.

## 2024-10-07 NOTE — ANESTHESIA PROCEDURE NOTES
Peripheral Block    Patient location during procedure: pre-op  Reason for block: post-op pain management and at surgeon's request  Start time: 10/7/2024 10:15 AM  End time: 10/7/2024 10:18 AM  Staffing  Performed: anesthesiologist   Anesthesiologist: Rick Ervin MD  Performed by: Rick Ervin MD  Authorized by: Rick Ervin MD    Preanesthetic Checklist  Completed: patient identified, IV checked, site marked, risks and benefits discussed, surgical/procedural consents, equipment checked, pre-op evaluation, timeout performed, anesthesia consent given, oxygen available and monitors applied/VS acknowledged  Peripheral Block   Patient position: sitting  Prep: ChloraPrep  Provider prep: sterile gloves and mask  Patient monitoring: cardiac monitor, continuous pulse ox, frequent blood pressure checks, IV access, oxygen and responsive to questions  Block type: Brachial plexus  Interscalene  Laterality: left  Injection technique: single-shot  Guidance: ultrasound guided    Needle   Needle type: insulated echogenic nerve stimulator needle   Needle localization: ultrasound guidance  Assessment   Injection assessment: negative aspiration for heme, no paresthesia on injection, local visualized surrounding nerve on ultrasound and no intravascular symptoms  Paresthesia pain: none  Slow fractionated injection: yes  Hemodynamics: stable  Outcomes: uncomplicated and patient tolerated procedure well    Additional Notes  Ultrasound image taken and stored in chart   Medications Administered  BUPivacaine 0.5%-EPINEPHrine injection [id number] - Perineural   30 mL - 10/7/2024 10:15:00 AM  dexAMETHasone (DECADRON) injection 4 mg/mL - Perineural   4 mg - 10/7/2024 10:15:00 AM

## 2024-10-07 NOTE — ANESTHESIA PRE PROCEDURE
Department of Anesthesiology  Preprocedure Note       Name:  Elizabeth Landa   Age:  74 y.o.  :  1950                                          MRN:  [id number]         Date:  10/7/2024      Surgeon: Surgeon(s):  ERIN Hernandez MD    Procedure: Procedure(s):  LEFT SHOULDER TOTAL ARTHROPLASTY REVERSE -REGIONAL BLOCK -23 HOUR OBSERVATION    Medications prior to admission:   Prior to Admission medications    Medication Sig Start Date End Date Taking? Authorizing Provider   naloxegol (MOVANTIK) 25 MG TABS tablet Take 1 tablet by mouth every morning 10/6/24  Yes Shailesh Katz PA   spironolactone (ALDACTONE) 25 MG tablet Take 1 tablet by mouth daily   Yes Provider, MD Jose   amLODIPine (NORVASC) 10 MG tablet Take 1 tablet by mouth daily 7/15/24  Yes Scottie Centeno MD   carvedilol (COREG) 25 MG tablet Take 1 tablet by mouth 2 times daily (with meals) 23  Yes Scottie Centeno MD   losartan (COZAAR) 100 MG tablet Take 1 tablet by mouth daily 23  Yes Scottie Centeno MD   potassium chloride (KLOR-CON) 10 MEQ extended release tablet Take 1 tablet by mouth daily TAKE 1 TABLET BY MOUTH DAILY 22  Yes Scottie Centeno MD   allopurinol (ZYLOPRIM) 300 MG tablet Take 0.5 tablets by mouth daily 10/6/21  Yes Automatic Reconciliation, Ar   aspirin 81 MG EC tablet Take 1 tablet by mouth daily   Yes Automatic Reconciliation, Ar   calcium carbonate-vitamin D 600-200 MG-UNIT TABS Take 1 tablet by mouth daily   Yes Automatic Reconciliation, Ar   esomeprazole (NEXIUM) 40 MG delayed release capsule Take 1 capsule by mouth daily 10/11/20  Yes Automatic Reconciliation, Ar   furosemide (LASIX) 20 MG tablet 2 tablets TAKE 1 TABLET BY MOUTH EVERY DAY 21  Yes Automatic Reconciliation, Ar   melatonin 3 MG TABS tablet Take by mouth   Yes Automatic Reconciliation, Ar   aspirin 325 MG tablet Take 1 tablet by mouth in the morning and at bedtime for 7 days To start after surgery  Patient not taking: Reported on 10/7/2024

## 2024-10-07 NOTE — PERIOP NOTE
TRANSFER - OUT REPORT:    Verbal report given to Eva CHIANG on Elizabeth Landa  being transferred to [facility] for routine post-op       Report consisted of patient’s Situation, Background, Assessment and   Recommendations(SBAR).     Information from the following report(s) Nurse Handoff Report, Adult Overview, Surgery Report, Intake/Output, MAR, Recent Results, Cardiac Rhythm Sinus Rhythm, and Neuro Assessment was reviewed with the receiving nurse.    Lines:   Peripheral IV 10/07/24 Posterior;Right Hand (Active)   Site Assessment Clean, dry & intact 10/07/24 1355   Line Status Infusing 10/07/24 1355   Phlebitis Assessment No symptoms 10/07/24 1355   Infiltration Assessment 0 10/07/24 1355   Dressing Status Clean, dry & intact 10/07/24 1355   Dressing Type Transparent 10/07/24 1355        Opportunity for questions and clarification was provided.      Patient transported with:   O2 @ 2 liters    VTE prophylaxis orders have been written for Elizabeth Ladna.    Patient and family given floor number and nurses name.  Family updated re: pt status after security code verified.

## 2024-10-07 NOTE — ANESTHESIA POSTPROCEDURE EVALUATION
Department of Anesthesiology  Postprocedure Note    Patient: Elizabeth Landa  MRN: [id number]  YOB: 1950  Date of evaluation: 10/7/2024    Procedure Summary       Date: 10/07/24 Room / Location: [facility] OP OR 02 / [facility]    Anesthesia Start: 1048 Anesthesia Stop: 1400    Procedure: LEFT SHOULDER TOTAL ARTHROPLASTY REVERSE (Left: Shoulder) Diagnosis:       Left rotator cuff tear arthropathy      Traumatic complete tear of left rotator cuff, initial encounter      Left shoulder pain, unspecified chronicity      (Left rotator cuff tear arthropathy [M75.102, M12.812])      (Traumatic complete tear of left rotator cuff, initial encounter [S46.012A])      (Left shoulder pain, unspecified chronicity [M25.512])    Surgeons: ERIN Hernandez MD Responsible Provider: Rick Ervin MD    Anesthesia Type: general ASA Status: 3            Anesthesia Type: No value filed.    Tacos Phase I: Tacos Score: 7    Tacos Phase II:      Anesthesia Post Evaluation    Patient location during evaluation: PACU  Patient participation: complete - patient participated  Level of consciousness: awake  Pain score: 0  Airway patency: patent  Nausea & Vomiting: no nausea and no vomiting  Cardiovascular status: blood pressure returned to baseline and hemodynamically stable  Respiratory status: acceptable, spontaneous ventilation and nonlabored ventilation  Hydration status: euvolemic  Multimodal analgesia pain management approach  Pain management: adequate    No notable events documented.

## 2024-10-07 NOTE — PERIOP NOTE
Spoke with Dr Hernandez regarding pt's Dilaudid order for floor and her allergy of \"hallucinations\" with it, and that she was given Norco in PACU without side effects. Order received to DC all Dilaudid orders, and order Norco 5mg by mouth every 6 hours prn, worked with Canelo in Pharmacy to Dc Dilaudid and add Norco to floor orders .

## 2024-10-08 VITALS
RESPIRATION RATE: 15 BRPM | SYSTOLIC BLOOD PRESSURE: 137 MMHG | HEART RATE: 65 BPM | TEMPERATURE: 98.1 F | HEIGHT: 65 IN | WEIGHT: 229 LBS | DIASTOLIC BLOOD PRESSURE: 60 MMHG | OXYGEN SATURATION: 93 % | BODY MASS INDEX: 38.15 KG/M2

## 2024-10-08 LAB
HCT VFR BLD AUTO: 38 % (ref 35.8–46.3)
HGB BLD-MCNC: 11.5 G/DL (ref 11.7–15.4)

## 2024-10-08 PROCEDURE — 97165 OT EVAL LOW COMPLEX 30 MIN: CPT

## 2024-10-08 PROCEDURE — 2580000003 HC RX 258: Performed by: PHYSICIAN ASSISTANT

## 2024-10-08 PROCEDURE — 97535 SELF CARE MNGMENT TRAINING: CPT

## 2024-10-08 PROCEDURE — 97110 THERAPEUTIC EXERCISES: CPT

## 2024-10-08 PROCEDURE — 36415 COLL VENOUS BLD VENIPUNCTURE: CPT

## 2024-10-08 PROCEDURE — 85014 HEMATOCRIT: CPT

## 2024-10-08 PROCEDURE — 85018 HEMOGLOBIN: CPT

## 2024-10-08 PROCEDURE — 6370000000 HC RX 637 (ALT 250 FOR IP): Performed by: ORTHOPAEDIC SURGERY

## 2024-10-08 PROCEDURE — 97530 THERAPEUTIC ACTIVITIES: CPT

## 2024-10-08 PROCEDURE — 6360000002 HC RX W HCPCS: Performed by: PHYSICIAN ASSISTANT

## 2024-10-08 PROCEDURE — 97161 PT EVAL LOW COMPLEX 20 MIN: CPT

## 2024-10-08 RX ORDER — ACETAMINOPHEN AND CODEINE PHOSPHATE 300; 15 MG/1; MG/1
.5-1 TABLET ORAL EVERY 4 HOURS PRN
Qty: 28 TABLET | Refills: 0 | Status: SHIPPED | OUTPATIENT
Start: 2024-10-08 | End: 2024-10-13

## 2024-10-08 RX ADMIN — HYDROCODONE BITARTRATE AND ACETAMINOPHEN 1 TABLET: 5; 325 TABLET ORAL at 03:41

## 2024-10-08 RX ADMIN — CEFAZOLIN 2000 MG: 10 INJECTION, POWDER, FOR SOLUTION INTRAVENOUS at 03:41

## 2024-10-08 RX ADMIN — HYDROCODONE BITARTRATE AND ACETAMINOPHEN 1 TABLET: 5; 325 TABLET ORAL at 09:55

## 2024-10-08 RX ADMIN — Medication 3 MG: at 01:31

## 2024-10-08 RX ADMIN — ALLOPURINOL 150 MG: 100 TABLET ORAL at 08:21

## 2024-10-08 RX ADMIN — FUROSEMIDE 40 MG: 40 TABLET ORAL at 08:21

## 2024-10-08 RX ADMIN — CARVEDILOL 25 MG: 12.5 TABLET, FILM COATED ORAL at 08:21

## 2024-10-08 RX ADMIN — NALOXEGOL OXALATE 25 MG: 25 TABLET, FILM COATED ORAL at 08:21

## 2024-10-08 RX ADMIN — PANTOPRAZOLE SODIUM 40 MG: 40 TABLET, DELAYED RELEASE ORAL at 05:12

## 2024-10-08 RX ADMIN — POTASSIUM CHLORIDE 10 MEQ: 750 TABLET, EXTENDED RELEASE ORAL at 08:20

## 2024-10-08 RX ADMIN — LOSARTAN POTASSIUM 100 MG: 50 TABLET, FILM COATED ORAL at 08:22

## 2024-10-08 RX ADMIN — AMLODIPINE BESYLATE 10 MG: 10 TABLET ORAL at 08:22

## 2024-10-08 RX ADMIN — SODIUM CHLORIDE, PRESERVATIVE FREE 10 ML: 5 INJECTION INTRAVENOUS at 08:31

## 2024-10-08 ASSESSMENT — PAIN SCALES - GENERAL
PAINLEVEL_OUTOF10: 9
PAINLEVEL_OUTOF10: 10
PAINLEVEL_OUTOF10: 2
PAINLEVEL_OUTOF10: 6

## 2024-10-08 ASSESSMENT — PAIN DESCRIPTION - PAIN TYPE
TYPE: SURGICAL PAIN
TYPE: SURGICAL PAIN

## 2024-10-08 ASSESSMENT — PAIN DESCRIPTION - LOCATION
LOCATION: SHOULDER
LOCATION: ARM
LOCATION: ARM

## 2024-10-08 ASSESSMENT — PAIN DESCRIPTION - ORIENTATION
ORIENTATION: LEFT

## 2024-10-08 ASSESSMENT — PAIN DESCRIPTION - ONSET
ONSET: ON-GOING
ONSET: GRADUAL

## 2024-10-08 ASSESSMENT — PAIN - FUNCTIONAL ASSESSMENT
PAIN_FUNCTIONAL_ASSESSMENT: ACTIVITIES ARE NOT PREVENTED
PAIN_FUNCTIONAL_ASSESSMENT: PREVENTS OR INTERFERES SOME ACTIVE ACTIVITIES AND ADLS

## 2024-10-08 ASSESSMENT — PAIN DESCRIPTION - FREQUENCY: FREQUENCY: INTERMITTENT

## 2024-10-08 ASSESSMENT — PAIN DESCRIPTION - DESCRIPTORS
DESCRIPTORS: ACHING
DESCRIPTORS: ACHING;THROBBING

## 2024-10-08 NOTE — PROGRESS NOTES
2024         Post Op day: 1 Day Post-Op     Admit Date: 10/7/2024  Admit Diagnosis: Left rotator cuff tear arthropathy [M75.102, M12.812]  Traumatic complete tear of left rotator cuff, initial encounter [S46.012A]  Left shoulder pain, unspecified chronicity [M25.512]  Arthritis of left shoulder region [M19.012]  No surgery found  No surgery found    Subjective: Doing well, No complaints, No SOB, No Chest Pain, No Nausea or Vomitting.  Notes pain in the shoulder.  Allergy to Dilaudid was added to chart so patient has been taking Norco. She is interested in Tylenol#3. States she wants to go home    Weight Bearing Status: in sling operative extremity   BMP:  No results for input(s): \"BUN\", \"NA\", \"K\", \"CL\", \"CO2\", \"GLU\" in the last 72 hours.    Invalid input(s): \"CREA\", \"AGAP\"  Patient Vitals for the past 8 hrs:   BP Temp Temp src Pulse Resp SpO2   10/08/24 0413 (!) 148/50 97.5 °F (36.4 °C) Oral 68 17 94 %   10/07/24 2345 (!) 152/65 98.6 °F (37 °C) Oral 86 18 96 %     Temp (24hrs), Av.1 °F (36.7 °C), Min:97.5 °F (36.4 °C), Max:98.6 °F (37 °C)    CBC:  Recent Labs     10/08/24  0440   HGB 11.5*   HCT 38.0       Microbiology:     Recent Results (from the past 72 hour(s))   TYPE AND SCREEN    Collection Time: 10/07/24  9:56 AM   Result Value Ref Range    Crossmatch expiration date 10/10/2024,2208     ABO/Rh A POSITIVE     Antibody Screen NEG    POCT Glucose    Collection Time: 10/07/24  9:59 AM   Result Value Ref Range    POC Glucose 128 (H) 65 - 100 mg/dL    Performed by: Tim    Hemoglobin and Hematocrit    Collection Time: 10/08/24  4:40 AM   Result Value Ref Range    Hemoglobin 11.5 (L) 11.7 - 15.4 g/dL    Hematocrit 38.0 35.8 - 46.3 %       Objective: Vital Signs are Stable, No Acute Distress, Alert and Oriented.  Sitting in chair.   Dressing is Dry,  Neurovascular exam is normal except for slight decreased sensation to the proximal lateral shoulder.    Assessment:  Patient Active Problem List 
ACUTE PHYSICAL THERAPY GOALS:   (Developed with and agreed upon by patient and/or caregiver.)    (1.) Elizabeth Landa  will move from supine to sit and sit to supine  with INDEPENDENCE within 7 treatment day(s).    (2.) Elizabeth Landa will transfer from bed to chair and chair to bed with MODIFIED INDEPENDENCE using the least restrictive device within 7 treatment day(s).    (3.) Elizabeth Landa will ambulate with MODIFIED INDEPENDENCE for 150 feet with the least restrictive device within 7 treatment day(s).   (4.) Elizabeth Landa will perform standing static and dynamic balance activities x 10 minutes with SUPERVISION to improve safety within 7 treatment day(s).  (5.) Elizabeth Landa will perform therapeutic exercises x 20 min for HEP with INDEPENDENCE to improve strength, endurance, and functional mobility within 7 treatment day(s).       PHYSICAL THERAPY Initial Assessment, Daily Note, and AM  (Link to Caseload Tracking: PT Visit Days : 1  Acknowledge Orders  Time In/Out  PT Charge Capture  Rehab Caseload Tracker    Elizabeth Landa is a 74 y.o. female   PRIMARY DIAGNOSIS: Traumatic complete tear of left rotator cuff  Left rotator cuff tear arthropathy [M75.102, M12.812]  Traumatic complete tear of left rotator cuff, initial encounter [S46.012A]  Left shoulder pain, unspecified chronicity [M25.512]  Arthritis of left shoulder region [M19.012]  Procedure(s) (LRB):  LEFT SHOULDER TOTAL ARTHROPLASTY REVERSE (Left)  1 Day Post-Op  Reason for Referral: Pain in Left Shoulder (M25.512)  Difficulty in walking, Not elsewhere classified (R26.2)  Outpatient in a bed: Payor: MEDICARE / Plan: MEDICARE PART A AND B / Product Type: *No Product type* /     ASSESSMENT:     REHAB RECOMMENDATIONS:   Recommendation to date pending progress:  Setting:  Home Health Therapy    Equipment:    Cane  Pt provided with one     ASSESSMENT:  Ms. Landa  is a 74 year old F who presents s/p above procedure POD 1. At baseline, pt is independent with 
Patient discharged to home with family. Educated on medication changes and follow-up appointments. IV removed; all questions answered.   
(provided)  Patient has shower chair and grab bars in shower at home     ASSESSMENT:  Ms. Landa is a 75 y/o female who presents s/p L reverse TSA with tenodesis.    Today, pt is received sitting up in chair with LUE immobilizer/sling in place, agreeable to participate in the session. Per patient report, she lives with her adult son in a single-level home with ramped entrance. She reports her daughter will also be assisting her as needed during recovery. She is typically independent with all ADLs and uses a rolling walker for community mobility at baseline. No hx recent falls. She remains an active  at baseline. Prior to mobility, OT provided skilled instruction on post-op restrictions including immobilizer/sling application and wear schedule, positioning and handling techniques, one-handed dressing techniques, and home exercises per post-op protocol (handout provided), pt verbalized and demonstrated understanding. Pt performed sit<>stand transfers and functional mobility for ADLs using SPC with CGA and cues for cane management, hand placement, upright posture, and sequencing. Pt positioned comfortably sitting up in chair at end of session with alarm on and all needs within reach. RN notified of pt's performance.    Pt presents as functioning below her baseline with overall deficits in ADL performance, functional transfers, household mobility for ADLs, strength, balance, and activity tolerance. Pt will benefit from skilled OT services at this time in order to address functional deficits and OT goals stated above. Will continue to monitor and advance as indicated per post-op protocol.      [facility] AM-PAC™ “6 Clicks” Daily Activity Inpatient Short Form:    AM-PAC Daily Activity - Inpatient   How much help is needed for putting on and taking off regular lower body clothing?: A Little  How much help is needed for bathing (which includes washing, rinsing, drying)?: A Little  How much help is needed for

## 2024-10-08 NOTE — DISCHARGE SUMMARY
DC Summary:    Patient ID:  Elizabeth Landa  [id number]   74 y.o.  1950    Admit date: 10/7/2024    Discharge Date: 10/8/2024      Admitting Physician: ERIN Hernandez MD     Discharge Physician: YI Smart    Admission Diagnoses: Left rotator cuff tear arthropathy [M75.102, M12.812]  Traumatic complete tear of left rotator cuff, initial encounter [S46.012A]  Left shoulder pain, unspecified chronicity [M25.512]  Arthritis of left shoulder region [M19.012]    Last Procedure: Procedure(s):  LEFT SHOULDER TOTAL ARTHROPLASTY REVERSE    Discharge Diagnoses: Principal Problem:    Traumatic complete tear of left rotator cuff  Active Problems:    Left rotator cuff tear arthropathy    Left shoulder pain    Arthritis of left shoulder region  Resolved Problems:    * No resolved hospital problems. *       Consults: none    Significant Diagnostic Studies: labs: hgb 11.8     Hospital Course:   Normal hospital course for this procedure.    Disposition: Home    Patient Instructions:   Current Discharge Medication List        START taking these medications    Details   acetaminophen-Codeine (TYLENOL #2) 300-15 MG per tablet Take 0.5-1 tablets by mouth every 4 hours as needed for Pain for up to 5 days. Max Daily Amount: 6 tablets  Qty: 28 tablet, Refills: 0    Comments: Supervising physician: Temo Hernandez   YESICA: [id number].  If out of medication please call prescriber directly at [phone]  Associated Diagnoses: Arthritis of left shoulder region           CONTINUE these medications which have NOT CHANGED    Details   senna-docusate (PERICOLACE) 8.6-50 MG per tablet Take 1 tablet by mouth daily Take for constipation prophylaxis  Qty: 21 tablet, Refills: 0      spironolactone (ALDACTONE) 25 MG tablet Take 1 tablet by mouth daily      amLODIPine (NORVASC) 10 MG tablet Take 1 tablet by mouth daily  Qty: 90 tablet, Refills: 3    Associated Diagnoses: Essential hypertension, benign      carvedilol (COREG) 25 MG tablet Take 1

## 2024-10-14 RX ORDER — CEFADROXIL 500 MG/1
CAPSULE ORAL
COMMUNITY
Start: 2024-08-15

## 2024-10-16 ENCOUNTER — OFFICE VISIT (OUTPATIENT)
Dept: ORTHOPEDIC SURGERY | Age: 74
End: 2024-10-16

## 2024-10-16 DIAGNOSIS — Z09 SURGERY FOLLOW-UP: ICD-10-CM

## 2024-10-16 DIAGNOSIS — Z96.612 S/P REVERSE TOTAL SHOULDER ARTHROPLASTY, LEFT: Primary | ICD-10-CM

## 2024-10-16 PROCEDURE — 99024 POSTOP FOLLOW-UP VISIT: CPT | Performed by: ORTHOPAEDIC SURGERY

## 2024-10-16 PROCEDURE — M5043 MISC SHOULDER PULLEY: HCPCS | Performed by: ORTHOPAEDIC SURGERY

## 2024-10-16 NOTE — PROGRESS NOTES
Patient was given and instructed on a Shoulder Pulley for the left shoulder. Patient read and signed documenting they understand and agree to [facility]'s current DME return policy.

## 2024-10-16 NOTE — PROGRESS NOTES
Name: Elizabeth Landa  YOB: 1950  Gender: female  MRN: [id number]    CC: Status post left total shoulder arthroplasty, reverse date of surgery 10-7-2024  , Patient is here to follow-up one week status post TSA.  Left Shoulder Total Arthroplasty Reverse - Left  10/7/2024    HPI: The patient is doing well and as expected. The patient has been following protocol and comes into the office today with use of the sling.    Review of Systems  Noncontributory    PE surgical shoulder: Operative shoulder exam:  The incisions are clean, dry and intact. There is no sign of infection. The axillary sensation is intact. The deltoid muscle has good firing. The shoulder is supple. They are neurovascularly intact. Range of Motion was not tested today.    X-ray:  AP and Axillary of the left shoulder views show intact prosthesis and the components in place.  No fractures are evident.    AP:     ICD-10-CM    1. S/P reverse total shoulder arthroplasty, left  Z96.612       2. Surgery follow-up  Z09         The patient is doing well one week status post TSA.   They were given a pulley system with exercises to work on ROM at home.  They will begin formal therapy after next visit       No follow-ups on file. They need to return to the clinic in 4 weeks time for re-evaluation and repeat xrays.     YI Smart  10/16/24

## 2024-11-11 ENCOUNTER — OFFICE VISIT (OUTPATIENT)
Dept: ORTHOPEDIC SURGERY | Age: 74
End: 2024-11-11

## 2024-11-11 DIAGNOSIS — Z96.612 S/P REVERSE TOTAL SHOULDER ARTHROPLASTY, LEFT: Primary | ICD-10-CM

## 2024-11-11 PROCEDURE — 99024 POSTOP FOLLOW-UP VISIT: CPT | Performed by: PHYSICIAN ASSISTANT

## 2024-11-11 NOTE — PROGRESS NOTES
Name: Elizabeth Landa  YOB: 1950  Gender: female  MRN: [id number]    CC:   Chief Complaint   Patient presents with    Follow-up     Status post left total shoulder arthroplasty, reverse date of surgery 10-7-2024   The patient comes in today 4 weeks status post TSA.  Left Shoulder Total Arthroplasty Reverse - Left  10/7/2024    HPI: The patient is doing well today. Their pain has decreased. They are attending physical therapy and are following the protocol. They feel as if they are progressing as expected. They deny use of narcotic pain medications.  She states she has not been doing her pulleys because she \"wasn't sure about them.\"     Review of Systems  Noncontributory    PE surgical shoulder : Their wounds are clean, dry, and intact and there is no sign of infection. They are neurovascularly intact. Their deltoid is firing well.   Their Passive Shoulder Range of Motion is:  Forward elevation: 90  Abduction: 90  External Rotation: 0    X-ray: Grashey and axillary lateral views of the operative left shoulder were obtained and reviewed today in the office. There has been no change in the hardware's alignment and the glenohumeral position is appropriate on all the films.    AP:     ICD-10-CM    1. S/P reverse total shoulder arthroplasty, left  Z96.612 XR SHOULDER LEFT (MIN 2 VIEWS)        The patient is doing well status post the above mentioned procedure.   They need to continue with Physical Therapy per the protocol.   They will follow-up with us in clinic in 4-6 weeks for repeat evaluation.  Discussed importance of therapy.  She does not drive and would like to get up for home health.     Return in about 6 weeks (around 12/23/2024) for [facility].     YI Smart  11/11/24

## 2024-11-12 ENCOUNTER — TELEPHONE (OUTPATIENT)
Dept: ORTHOPEDIC SURGERY | Age: 74
End: 2024-11-12

## 2024-11-12 NOTE — TELEPHONE ENCOUNTER
She is requesting verbal orders to continue PT and are there any contraindications or precautions.

## 2024-12-09 ENCOUNTER — TELEPHONE (OUTPATIENT)
Dept: ORTHOPEDIC SURGERY | Age: 74
End: 2024-12-09

## 2024-12-09 NOTE — TELEPHONE ENCOUNTER
She has been doing some [place] things and her shoulder is in tremendous pain. She is asking for a return call.

## 2024-12-09 NOTE — TELEPHONE ENCOUNTER
Spoke with pt and she \"did to much\" using the shoulder. Pt reports no falls. Pt is going to wear sling to help rest shoulder, ice and continue to take Tylenol for pain management. If pain doesn't get better tomorrow with resting shoulder pt will call back. Pt expressed understanding.

## 2024-12-13 ENCOUNTER — TELEPHONE (OUTPATIENT)
Dept: ORTHOPEDIC SURGERY | Age: 74
End: 2024-12-13

## 2024-12-13 NOTE — TELEPHONE ENCOUNTER
She is asking for a call from Shailesh. She had shoulder surgery in Oct and she says she is not dong well. Please give her a call.

## 2024-12-16 ENCOUNTER — OFFICE VISIT (OUTPATIENT)
Dept: ORTHOPEDIC SURGERY | Age: 74
End: 2024-12-16

## 2024-12-16 DIAGNOSIS — Z09 SURGERY FOLLOW-UP: ICD-10-CM

## 2024-12-16 DIAGNOSIS — Z96.612 S/P REVERSE TOTAL SHOULDER ARTHROPLASTY, LEFT: Primary | ICD-10-CM

## 2024-12-16 NOTE — PROGRESS NOTES
Name: Elizabeth Landa  YOB: 1950  Gender: female  MRN: [id number]    CC:   Chief Complaint   Patient presents with    Follow-up     S/p left TSA reverse DOS 10/7/24   The patient comes in today 9 weeks status post TSA.  Left Shoulder Total Arthroplasty Reverse - Left  10/7/2024    HPI: The patient is doing well today. Their pain has decreased. They are attending physical therapy and are following the protocol.  The patient presents for early follow-up after she was doing a lot of activities putting some [place] stuff out.  The patient notes some anterior and superior shoulder pain.  Notes more difficulty lifting the arm than she was having.  She has been wearing the sling for about 5 days due to increased pain.  Denies numbness, tingling, popping clicking and grinding.  She was doing home health therapy but has not done any therapy in 2 or 3 weeks.  They deny use of narcotic pain medications.    Review of Systems  Noncontributory    PE surgical shoulder : Their wounds are clean, dry, and intact and there is no sign of infection. They are neurovascularly intact. Their deltoid is firing well.   Their Passive Shoulder Range of Motion is:  Limited secondary to pain  Forward elevation: 100  Abduction: 95  External Rotation: 10  Tender to palpate acromion    X-ray: Grashey and axillary lateral views of the operativeleft shoulder were obtained and reviewed today in the office. There has been no change in the hardware's alignment and the glenohumeral position is appropriate on all the films.  Questionable acromial process fracture, no displacement    AP:     ICD-10-CM    1. S/P reverse total shoulder arthroplasty, left  Z96.612 XR SHOULDER LEFT (MIN 2 VIEWS)     Ambulatory referral to Physical Therapy      2. Surgery follow-up  Z09 XR SHOULDER LEFT (MIN 2 VIEWS)     Ambulatory referral to Physical Therapy        The patient is doing well status post the above mentioned procedure.   I like the patient to

## 2024-12-30 ENCOUNTER — OFFICE VISIT (OUTPATIENT)
Dept: ORTHOPEDIC SURGERY | Age: 74
End: 2024-12-30
Payer: MEDICARE

## 2024-12-30 DIAGNOSIS — Z96.612 S/P REVERSE TOTAL SHOULDER ARTHROPLASTY, LEFT: Primary | ICD-10-CM

## 2024-12-30 DIAGNOSIS — Z09 SURGERY FOLLOW-UP: ICD-10-CM

## 2024-12-30 PROCEDURE — G8427 DOCREV CUR MEDS BY ELIG CLIN: HCPCS | Performed by: PHYSICIAN ASSISTANT

## 2024-12-30 PROCEDURE — 1123F ACP DISCUSS/DSCN MKR DOCD: CPT | Performed by: PHYSICIAN ASSISTANT

## 2024-12-30 PROCEDURE — 1036F TOBACCO NON-USER: CPT | Performed by: PHYSICIAN ASSISTANT

## 2024-12-30 PROCEDURE — G8484 FLU IMMUNIZE NO ADMIN: HCPCS | Performed by: PHYSICIAN ASSISTANT

## 2024-12-30 PROCEDURE — 99213 OFFICE O/P EST LOW 20 MIN: CPT | Performed by: PHYSICIAN ASSISTANT

## 2024-12-30 PROCEDURE — G8399 PT W/DXA RESULTS DOCUMENT: HCPCS | Performed by: PHYSICIAN ASSISTANT

## 2024-12-30 PROCEDURE — 1090F PRES/ABSN URINE INCON ASSESS: CPT | Performed by: PHYSICIAN ASSISTANT

## 2024-12-30 PROCEDURE — 3017F COLORECTAL CA SCREEN DOC REV: CPT | Performed by: PHYSICIAN ASSISTANT

## 2024-12-30 PROCEDURE — G8417 CALC BMI ABV UP PARAM F/U: HCPCS | Performed by: PHYSICIAN ASSISTANT

## 2024-12-31 NOTE — PROGRESS NOTES
Emotionally Abused: Not asked     Physically Abused: Not asked     Sexually Abused: Not asked   Housing Stability: Low Risk  (10/7/2024)    Housing Stability Vital Sign     Unable to Pay for Housing in the Last Year: No     Number of Times Moved in the Last Year: 0     Homeless in the Last Year: No               No data to display                Review of Systems  Noncontributory     PE surgical shoulder:  General: Alert and Oriented x3 and appears to be of stated age.  Head and Face: Normocephalic, atraumatic.  Neck: Supple, normal range of motion.  Lungs: Normal Respiratory rate. No dyspnea.  Skin: No rash or erythema. Skin is cool to the touch. Surgical incisions appear to be healing well and there is no sign of infection.  Psychiatric: Normal mood and affect. Answers questions appropriately.    Musculoskeletal: The patient's Range of Motion in their operative shoulder today was measured at:  Forward Elevation of 100.  Abduction of 90.  External Rotation of 5.  TTP acromion  The swelling is minimal. They are neurovascularly intact.      Grashey and outlet radiographs of the left shoulder were obtained and reviewed in office today.  They show nondisplaced acromion fracture.  No changes in comparison to previous visit.  No evidence of hardware loosening or failure.     A/P:     ICD-10-CM    1. S/P reverse total shoulder arthroplasty, left  Z96.612 XR SHOULDER LEFT (MIN 2 VIEWS)      2. Surgery follow-up  Z09 XR SHOULDER LEFT (MIN 2 VIEWS)        We discussed used of sling for two weeks with FU and repeat radiographs.  We reviewed importance of compliance. Discussed pendulums/passive ROM if she can tolerate this.   If they have any questions or concerns the patient knows that they can call our office at any time.  This is an abnormal visit held within a post operative period due to new injury/fracture    Return in about 2 weeks (around 1/13/2025) for Dr Hernandez.     YI Smart  12/31/24

## 2024-12-31 NOTE — PROGRESS NOTES
Problem: Self Care Deficits Care Plan (Adult)  Goal: *Acute Goals and Plan of Care (Insert Text)  GOALS:   DISCHARGE GOALS (in preparation for going home/rehab): 3 days  1. Ms. Luana Gaspar will perform one lower body dressing activity with minimal assistance required to demonstrate improved functional mobility and safety. 2. Ms. Luana Gaspar will perform one lower body bathing activity with minimal assistance required to demonstrate improved functional mobility and safety. 3. Ms. Luana Gaspar will perform toileting/toilet transfer with contact guard assistance to demonstrate improved functional mobility and safety. 4. Ms. Luana Gaspar will perform shower transfer with contact guard assistance to demonstrate improved functional mobility and safety. JOINT CAMP OCCUPATIONAL THERAPY TKA: Initial Assessment 3/21/2017  INPATIENT: Hospital Day: 1  [place] / [place] / Product Type: Medicare /      NAME/AGE/GENDER: Corry Hinton is a 75 y.o. female   PRIMARY DIAGNOSIS:  Primary osteoarthritis of left knee [M17.12]              Procedure(s) and Anesthesia Type:     * LEFT KNEE ARTHROPLASTY TOTAL / LETTY - Spinal (Left)  ICD-10: Treatment Diagnosis:        · Pain in Left Knee (M25.562)  · Stiffness of Left Knee, Not elsewhere classified (Z70.759)       ASSESSMENT:      Ms. Luana Gaspar is s/p left TKA and presents with decreased weight bearing on left LE and decreased independence with functional mobility and activities of daily living. Patient would benefit from skilled Occupational Therapy to maximize independence and safety with self-care task and functional mobility. Pt would also benefit from education on adaptive equipment and safety precautions in preparation for going home or for recommendations for post-hospital rehab program.  Patient plans for further rehab at rehab facility as she lives with her son who is not home often. OT reviewed therapy schedule and plan of care with patient.   Patient was able to transfer and preform self care skills as charted below. Patient instructed to call for assistance when needing to get up from the bed and all needs in reach. Patient verbalized understanding of call light. This section established at most recent assessment   PROBLEM LIST (Impairments causing functional limitations):  1. Decreased Strength  2. Decreased ADL/Functional Activities  3. Decreased Transfer Abilities  4. Increased Pain  5. Increased Fatigue  6. Decreased Flexibility/Joint Mobility  7. Decreased Knowledge of Precautions    INTERVENTIONS PLANNED: (Benefits and precautions of occupational therapy have been discussed with the patient.)  1. Activities of daily living training  2. Adaptive equipment training  3. Balance training  4. Clothing management  5. Donning&doffing training  6. Theraputic activity      TREATMENT PLAN: Frequency/Duration: Follow patient 1-2 times to address above goals. Rehabilitation Potential For Stated Goals: GOOD      RECOMMENDED REHABILITATION/EQUIPMENT: (at time of discharge pending progress): Continue Skilled Therapy and Rehab. OCCUPATIONAL PROFILE AND HISTORY:   History of Present Injury/Illness (Reason for Referral): Pt presents this date s/p (left) TKA. Past Medical History/Comorbidities:   Ms. Robert Damon  has a past medical history of Adverse effect of anesthesia; Arthritis; Chronic kidney disease; Chronic pain; Follow-up visit for aortic valve replacement with bioprosthetic valve (7/25/2016); GERD (gastroesophageal reflux disease); Hypertension; Kidney stones; Morbid obesity ([address] Murmur, cardiac; Nausea & vomiting; Psychiatric disorder; PUD (peptic ulcer disease) (06/2016); and Valvular heart disease (2016). She also has no past medical history of Aneurysm ([address] Asthma; Autoimmune disease ([address] Cancer ([address] Chronic obstructive pulmonary disease ([address] Coagulation disorder ([address] Diabetes ([address]  Difficult intubation; Liver disease; Malignant hyperthermia due to anesthesia; Pseudocholinesterase deficiency; Rheumatic fever; Seizures ([facility] Sleep apnea; Stroke [facility]); Thromboembolus ([facility] or Thyroid disease. Ms. Bon Almeida  has a past surgical history that includes urological (Multiple dates); shoulder arthroscopy (Right); lap cholecystectomy; gastric bypass; gi; cervical fusion; hysterectomy; bilateral salpingo-oophorectomy; heart catheterization; aortic valve replacement (6/9/2016); colonoscopy (N/A, 6/15/2016); knee replacement (Right, 10/10/2016); and heart valve surgery. Social History/Living Environment:   Home Environment: Private residence  # Steps to Enter: 0  One/Two Story Residence: One story  Living Alone: No  Support Systems: Family member(s)  Patient Expects to be Discharged to[de-identified] Rehabilitation facility  Current DME Used/Available at Home: Shower chair, Walker, Commode, bedside  Prior Level of Function/Work/Activity:  Independent       Number of Personal Factors/Comorbidities that affect the Plan of Care: Brief history (0):  LOW COMPLEXITY   ASSESSMENT OF OCCUPATIONAL PERFORMANCE[de-identified]   Most Recent Physical Functioning:   Balance  Sitting: Intact  Standing: Pull to stand; With support        Patient Vitals for the past 6 hrs:       BP BP Patient Position SpO2 O2 Flow Rate (L/min) Pulse   03/21/17 0902 144/64 At rest 98 % 2 l/min 68   03/21/17 0907 144/65 - 99 % 2 l/min 67   03/21/17 0910 141/64 - 98 % - 65   03/21/17 0912 157/72 - 99 % 2 l/min 65   03/21/17 0917 163/73 - 99 % 2 l/min 64   03/21/17 0932 179/75 - 99 % 2 l/min 60   03/21/17 0947 181/78 - 98 % 2 l/min 62   03/21/17 1000 171/74 - 98 % 2 l/min 61   03/21/17 1008 - - 96 % - -   03/21/17 1037 164/70 At rest 96 % - 65   03/21/17 1045 - - 94 % 0 l/min -        Gross Assessment: Yes  Gross Assessment  AROM: Within functional limits (right LE)  Strength: Generally decreased, functional (right LE)              Coordination  Fine Motor Skills-Upper: Left Intact; Right Intact  Gross Motor Skills-Upper: Left Intact; Right Intact           Mental Status  Neurologic State: Alert  Orientation Level: Oriented X4  Cognition: Appropriate decision making  Perception: Appears intact  Perseveration: No perseveration noted  Safety/Judgement: Awareness of environment                    Basic ADLs (From Assessment) Complex ADLs (From Assessment)   Basic ADL  Feeding: Independent  Oral Facial Hygiene/Grooming: Supervision  Bathing: Moderate assistance  Upper Body Dressing: Supervision  Lower Body Dressing: Moderate assistance  Toileting: Moderate assistance     Grooming/Bathing/Dressing Activities of Daily Living     Cognitive Retraining  Safety/Judgement: Awareness of environment                 Functional Transfers  Toilet Transfer : Minimum assistance  Shower Transfer: Minimum assistance     Bed/Mat Mobility  Supine to Sit: Additional time;Contact guard assistance  Sit to Supine: Additional time;Contact guard assistance  Sit to Stand: Additional time;Minimum assistance           Physical Skills Involved:  1. Range of Motion  2. Balance  3. Mobility Cognitive Skills Affected (resulting in the inability to perform in a timely and safe manner): 1. none Psychosocial Skills Affected:  1. Environmental Adaptations   Number of elements that affect the Plan of Care: 3-5:  MODERATE COMPLEXITY   CLINICAL DECISION MAKIN [place] [id number] AM-PAC 6 Clicks   Basic Mobility Inpatient Short Form  How much help from another person does the patient currently need. .. Total A Lot A Little None   1. Putting on and taking off regular lower body clothing?   [ ] 1   [X] 2   [ ] 3   [ ] 4   2. Bathing (including washing, rinsing, drying)? [ ] 1   [X] 2   [ ] 3   [ ] 4   3. Toileting, which includes using toilet, bedpan or urinal?   [ ] 1   [ ] 2   [X] 3   [ ] 4   4. Putting on and taking off regular upper body clothing?   [ ] 1   [ ] 2   [ ] 3   [X] 4   5. Taking care of personal grooming such as brushing teeth?    [ ] 1   [ ] 2   [ ] 3   [X] 4   6. Eating meals? [ ] 1   [ ] 2   [ ] 3   [X] 4   © 2007, Trustees of [address] [place] [address] [address] [address], under license to [employer]. All rights reserved   Score:  Initial: 19 Most Recent: X (Date: -- )     Interpretation of Tool:  Represents activities that are increasingly more difficult (i.e. Bed mobility, Transfers, Gait). Score 24 23 22-20 19-15 14-10 9-7 6       Modifier CH CI CJ CK CL CM CN         · Self Care:               - CURRENT STATUS:    CK - 40%-59% impaired, limited or restricted               - GOAL STATUS:           CJ - 20%-39% impaired, limited or restricted               - D/C STATUS:                       ---------------To be determined---------------  [place] / [place] / Product Type: Medicare /       Medical Necessity:     · Patient is expected to demonstrate progress in range of motion, balance and functional technique to increase independence with self care. Reason for Services/Other Comments:  · Patient  would benefit from skilled Occupational Therapy to maximize independence and safety with self-care task and functional mobility. .   Use of outcome tool(s) and clinical judgement create a POC that gives a: LOW COMPLEXITY                 TREATMENT:   (In addition to Assessment/Re-Assessment sessions the following treatments were rendered)      Pre-treatment Symptoms/Complaints:  No complaint of pain  Pain: Initial:   Pain Intensity 1: 0 (no complaint of pain ) 0 Post Session:  0      Assessment/Reassessment only, no treatment provided today     Treatment/Session Assessment:         Response to Treatment:  Pt did well and motivated.      Education:  [ ] Home Exercises  [X] Fall Precautions  [ ] Hip Precautions [ ] Going Home Video  [X] Knee/Hip Prosthesis Review  [X] Walker Management/Safety [X] Adaptive Equipment as Needed         Interdisciplinary Collaboration:   · Physical Therapist  · Occupational Therapist  · Registered Nurse     After treatment position/precautions:   · Supine in bed  · Bed/Chair-wheels locked  · Call light within reach  · RN notified     Compliance with Program/Exercises: Will assess as treatment progresses. Recommendations/Intent for next treatment session:  Treatment next visit will focus on increasing Ms. Esparza's independence with bed mobility, transfers, self care training and patient education.        Total Treatment Duration:  OT Patient Time In/Time Out  Time In: 1240  Time Out: [address] [place] [address]

## 2025-01-15 ENCOUNTER — OFFICE VISIT (OUTPATIENT)
Dept: ORTHOPEDIC SURGERY | Age: 75
End: 2025-01-15

## 2025-01-15 DIAGNOSIS — Z96.612 S/P REVERSE TOTAL SHOULDER ARTHROPLASTY, LEFT: Primary | ICD-10-CM

## 2025-01-15 DIAGNOSIS — Z09 SURGERY FOLLOW-UP: ICD-10-CM

## 2025-01-15 PROCEDURE — 99024 POSTOP FOLLOW-UP VISIT: CPT | Performed by: ORTHOPAEDIC SURGERY

## 2025-01-15 NOTE — PROGRESS NOTES
Name: Elizabeth Landa  YOB: 1950  Gender: female  MRN: [id number]    CC:   Chief Complaint   Patient presents with    Follow-up     S/P left reverse TSA 10/7/24   , The patient follows up 14 weeks status post left TSA.  Left Shoulder Total Arthroplasty Reverse - Left  10/7/2024    HPI: The patient presents for FU of the left shoulder being seen for stress fracture.  She has been followed by my PA. They've discussed the importance of compliance.  She is doing okay.  Has not started formal therapy.  Pain is not as significant.  Has been modifying activities.    Recall she is here for recheck after having increased symptoms s/p left reverse TSA on 10/7/24.  She had increased symptoms approximately three weeks ago after putting up [place] decorations.  We discussed acromion fracture at her last visit and discussed returning to the sling and postponing more PT. Denies numbness and tingling.  Denies repeat event but does admit to some noncompliance.     Allergies   Allergen Reactions    Latex Rash    Metformin Diarrhea    Sulfa Antibiotics Anaphylaxis    Duloxetine Other (See Comments)    Hydromorphone Hallucinations    Nitrofurantoin Other (See Comments)     Causes Gout    Oxycodone Other (See Comments)     Hallucination, anxiety with oxycontin-- denies rx to hydrocodone     Past Medical History:   Diagnosis Date    A-fib (HCC)     Adverse effect of anesthesia     panic attack when mask placed on face    Anemia     2016/2017 - gi bleed---  2017 blood transfusion prior to AVR    Arthritis     CAD (coronary artery disease) 2016 1/2019 Minor nonobstructive coronary artery disease/ cath report    Chronic kidney disease     kidney stones    Chronic pain     r/t arthritis/ back pain Spinal stenosis of lumbar region     Diabetes (HCC)     \"prediabetic\" A1C 6.4 8/28/19- no meds    Diverticulitis     Follow-up visit for aortic valve replacement with bioprosthetic valve 7/25/2016    GERD (gastroesophageal

## 2025-03-23 NOTE — PROGRESS NOTES
Unable to Pay for Housing in the Last Year: No     Number of Times Moved in the Last Year: 0     Homeless in the Last Year: No               No data to display            ,h    Review of Systems  Noncontributory     PE surgical shoulder:  General: Alert and Oriented x3 and appears to be of stated age.  Head and Face: Normocephalic, atraumatic.  Neck: Supple, normal range of motion.  Lungs: Normal Respiratory rate. No dyspnea.  Skin: No rash or erythema. Skin is cool to the touch. Surgical incisions appear to be healing well and there is no sign of infection.  Psychiatric: Normal mood and affect. Answers questions appropriately.    Musculoskeletal: The patient's Range of Motion today was measured at:  Forward Elevation of 165.  Abduction of 135.  External Rotation of 40.  The patient's strength today is:  External Rotation: 5  Abduction: 4+.  Belly Press Test: 5  The swelling is minimal. They are neurovascularly intact.    X-ray: Grashey and axillary lateral views of the operative left shoulder were obtained and reviewed today in the office. There has been no change in the hardware's alignment and the glenohumeral position is appropriate on all the films.    A/P:     ICD-10-CM    1. S/P reverse total shoulder arthroplasty, left  Z96.612 XR SHOULDER LEFT (MIN 2 VIEWS)      2. Surgery follow-up  Z09 XR SHOULDER LEFT (MIN 2 VIEWS)        The patient is doing well status post the above mentioned procedure.   They need to continue with Physical Therapy if they have not transitioned to a HEP.   If they have any questions or concerns the patient knows that they can call our office at any time.    No follow-ups on file.     YI Smart  03/28/25

## 2025-03-25 ENCOUNTER — OFFICE VISIT (OUTPATIENT)
Dept: ORTHOPEDIC SURGERY | Age: 75
End: 2025-03-25

## 2025-03-25 DIAGNOSIS — Z09 SURGERY FOLLOW-UP: ICD-10-CM

## 2025-03-25 DIAGNOSIS — Z96.612 S/P REVERSE TOTAL SHOULDER ARTHROPLASTY, LEFT: Primary | ICD-10-CM

## 2025-05-13 ENCOUNTER — CLINICAL SUPPORT (OUTPATIENT)
Age: 75
End: 2025-05-13
Payer: MEDICARE

## 2025-05-13 ENCOUNTER — TELEPHONE (OUTPATIENT)
Age: 75
End: 2025-05-13

## 2025-05-13 DIAGNOSIS — I48.91 UNSPECIFIED ATRIAL FIBRILLATION (HCC): ICD-10-CM

## 2025-05-13 DIAGNOSIS — I48.0 PAROXYSMAL ATRIAL FIBRILLATION (HCC): Primary | ICD-10-CM

## 2025-05-13 LAB — TSH, 3RD GENERATION: 2.33 UIU/ML (ref 0.27–4.2)

## 2025-05-13 PROCEDURE — 93000 ELECTROCARDIOGRAM COMPLETE: CPT | Performed by: INTERNAL MEDICINE

## 2025-05-13 NOTE — TELEPHONE ENCOUNTER
Patient was told that she was in AFIB when she went to get Cataract surgery. Patient has history of AFIB. She is on  mg QD. Will address with MD.

## 2025-05-13 NOTE — TELEPHONE ENCOUNTER
Patient notified of MD response. Patient will come in at 2 pm today for EKG and 7 day Holter placement. TSH after that. Patient is aware someone will call her to schedule ECHO.   Orders Placed This Encounter   Procedures    TSH     Standing Status:   Future     Expected Date:   5/13/2025     Expiration Date:   5/13/2026    Extended cardiac holter monitor (3 days-14 day)     Standing Status:   Future     Expected Date:   5/13/2025     Expiration Date:   5/13/2026     Days to wear monitor?:   7

## 2025-05-13 NOTE — TELEPHONE ENCOUNTER
Could not cataract surgery today because she is in Afib and they told her she needs to see cardiology today. Please call

## 2025-05-13 NOTE — PROGRESS NOTES
Dr. Funez looked at the patient's EKG.  He stated the patient has Afib as an established diagnosis.  Dr. Funez stated for patient to wait for Dr. Centeno to return to come up with a plan for the patient.

## 2025-05-16 ENCOUNTER — TELEPHONE (OUTPATIENT)
Age: 75
End: 2025-05-16

## 2025-05-16 NOTE — TELEPHONE ENCOUNTER
Patient called stating she has the following request :    Had Echo and wore Holter  Would like to discuss those results when available    Please call and advise.

## 2025-05-28 ENCOUNTER — OFFICE VISIT (OUTPATIENT)
Age: 75
End: 2025-05-28
Payer: MEDICARE

## 2025-05-28 VITALS
HEART RATE: 60 BPM | SYSTOLIC BLOOD PRESSURE: 140 MMHG | HEIGHT: 65 IN | WEIGHT: 229.4 LBS | DIASTOLIC BLOOD PRESSURE: 88 MMHG | BODY MASS INDEX: 38.22 KG/M2

## 2025-05-28 DIAGNOSIS — Z95.3 S/P AORTIC VALVE REPLACEMENT WITH BIOPROSTHETIC VALVE: ICD-10-CM

## 2025-05-28 DIAGNOSIS — I48.19 PERSISTENT ATRIAL FIBRILLATION (HCC): ICD-10-CM

## 2025-05-28 DIAGNOSIS — I10 ESSENTIAL HYPERTENSION, BENIGN: ICD-10-CM

## 2025-05-28 DIAGNOSIS — I50.42 CHRONIC COMBINED SYSTOLIC AND DIASTOLIC CONGESTIVE HEART FAILURE (HCC): Primary | ICD-10-CM

## 2025-05-28 PROCEDURE — 1090F PRES/ABSN URINE INCON ASSESS: CPT | Performed by: INTERNAL MEDICINE

## 2025-05-28 PROCEDURE — 1159F MED LIST DOCD IN RCRD: CPT | Performed by: INTERNAL MEDICINE

## 2025-05-28 PROCEDURE — G8417 CALC BMI ABV UP PARAM F/U: HCPCS | Performed by: INTERNAL MEDICINE

## 2025-05-28 PROCEDURE — 1126F AMNT PAIN NOTED NONE PRSNT: CPT | Performed by: INTERNAL MEDICINE

## 2025-05-28 PROCEDURE — 3077F SYST BP >= 140 MM HG: CPT | Performed by: INTERNAL MEDICINE

## 2025-05-28 PROCEDURE — G8399 PT W/DXA RESULTS DOCUMENT: HCPCS | Performed by: INTERNAL MEDICINE

## 2025-05-28 PROCEDURE — G8427 DOCREV CUR MEDS BY ELIG CLIN: HCPCS | Performed by: INTERNAL MEDICINE

## 2025-05-28 PROCEDURE — 99214 OFFICE O/P EST MOD 30 MIN: CPT | Performed by: INTERNAL MEDICINE

## 2025-05-28 PROCEDURE — 3079F DIAST BP 80-89 MM HG: CPT | Performed by: INTERNAL MEDICINE

## 2025-05-28 PROCEDURE — 1160F RVW MEDS BY RX/DR IN RCRD: CPT | Performed by: INTERNAL MEDICINE

## 2025-05-28 PROCEDURE — 1123F ACP DISCUSS/DSCN MKR DOCD: CPT | Performed by: INTERNAL MEDICINE

## 2025-05-28 PROCEDURE — 1036F TOBACCO NON-USER: CPT | Performed by: INTERNAL MEDICINE

## 2025-05-28 RX ORDER — DOXYCYCLINE 100 MG/1
100 CAPSULE ORAL 2 TIMES DAILY
COMMUNITY
Start: 2025-05-19 | End: 2025-05-29

## 2025-05-28 ASSESSMENT — ENCOUNTER SYMPTOMS
ABDOMINAL PAIN: 0
SPUTUM PRODUCTION: 0
BOWEL INCONTINENCE: 0
WHEEZING: 0
HEMATOCHEZIA: 0
ORTHOPNEA: 0
SHORTNESS OF BREATH: 1
COLOR CHANGE: 0
HOARSE VOICE: 0
BLURRED VISION: 0
HEMATEMESIS: 0
DIARRHEA: 0

## 2025-05-28 NOTE — PROGRESS NOTES
[facility] CARDIOLOGY  [address] [place] [address], [facility] [address]  [place] [address]  PHONE: [phone]        25        NAME:  Elizabeth Landa  : 1950  MRN: [id number]       SUBJECTIVE:   Elizabeth Landa is a 75 y.o. female seen for a follow up visit regarding the following: The patient has a history of aortic valve replacement atrial fibrillation mitral valve endocarditis and primary hypertension.  In addition, she has a history of symptomatic anemia.  Recently, she was going to have her second cataract surgery and anesthesiologist noted that the patient was in atrial fibrillation.  Follow-up echo reported normal left ventricular ejection fraction mean aortic valve gradient across the bioprosthetic valve of 19 mmHg with mild paravalvular regurgitation.  Extended Holter monitor results are presently pending.  The patient admits to easy fatigue and dyspnea on exertion.  In addition, she states she is recovering from left lower leg cellulitis.    Chief Complaint   Patient presents with    Hypertension    Hyperlipidemia    Atrial Fibrillation       HPI:    Atrial Fibrillation  Presents for follow-up visit. Symptoms include shortness of breath. Symptoms are negative for an AICD problem, bradycardia, chest pain, dizziness, hemodynamic instability, hypertension, hypotension, pacemaker problem, palpitations, syncope, tachycardia and weakness. The symptoms have been worsening.       Past Medical History, Past Surgical History, Family history, Social History, and Medications were all reviewed with the patient today and updated as necessary.         Current Outpatient Medications:     doxycycline hyclate (VIBRAMYCIN) 100 MG capsule, Take 1 capsule by mouth 2 times daily, Disp: , Rfl:     cefadroxil (DURICEF) 500 MG capsule, , Disp: , Rfl:     aspirin 325 MG tablet, Take 1 tablet by mouth in the morning and at bedtime for 7 days To start after surgery, Disp: 14 tablet, Rfl: 0    naloxegol (MOVANTIK) 25 MG TABS tablet,

## 2025-06-04 ENCOUNTER — TELEPHONE (OUTPATIENT)
Age: 75
End: 2025-06-04

## 2025-06-04 ENCOUNTER — INITIAL CONSULT (OUTPATIENT)
Age: 75
End: 2025-06-04
Payer: MEDICARE

## 2025-06-04 VITALS
WEIGHT: 230 LBS | DIASTOLIC BLOOD PRESSURE: 78 MMHG | HEIGHT: 65 IN | BODY MASS INDEX: 38.32 KG/M2 | SYSTOLIC BLOOD PRESSURE: 138 MMHG | HEART RATE: 70 BPM

## 2025-06-04 DIAGNOSIS — I48.19 PERSISTENT ATRIAL FIBRILLATION (HCC): Primary | ICD-10-CM

## 2025-06-04 DIAGNOSIS — I10 ESSENTIAL HYPERTENSION, BENIGN: ICD-10-CM

## 2025-06-04 PROBLEM — I48.91 AF (ATRIAL FIBRILLATION) (HCC): Status: ACTIVE | Noted: 2025-06-04

## 2025-06-04 PROCEDURE — 1123F ACP DISCUSS/DSCN MKR DOCD: CPT | Performed by: INTERNAL MEDICINE

## 2025-06-04 PROCEDURE — G8399 PT W/DXA RESULTS DOCUMENT: HCPCS | Performed by: INTERNAL MEDICINE

## 2025-06-04 PROCEDURE — G8417 CALC BMI ABV UP PARAM F/U: HCPCS | Performed by: INTERNAL MEDICINE

## 2025-06-04 PROCEDURE — 1036F TOBACCO NON-USER: CPT | Performed by: INTERNAL MEDICINE

## 2025-06-04 PROCEDURE — 93000 ELECTROCARDIOGRAM COMPLETE: CPT | Performed by: INTERNAL MEDICINE

## 2025-06-04 PROCEDURE — G8428 CUR MEDS NOT DOCUMENT: HCPCS | Performed by: INTERNAL MEDICINE

## 2025-06-04 PROCEDURE — 1126F AMNT PAIN NOTED NONE PRSNT: CPT | Performed by: INTERNAL MEDICINE

## 2025-06-04 PROCEDURE — 3075F SYST BP GE 130 - 139MM HG: CPT | Performed by: INTERNAL MEDICINE

## 2025-06-04 PROCEDURE — 1090F PRES/ABSN URINE INCON ASSESS: CPT | Performed by: INTERNAL MEDICINE

## 2025-06-04 PROCEDURE — 99214 OFFICE O/P EST MOD 30 MIN: CPT | Performed by: INTERNAL MEDICINE

## 2025-06-04 PROCEDURE — 3078F DIAST BP <80 MM HG: CPT | Performed by: INTERNAL MEDICINE

## 2025-06-04 RX ORDER — LATANOPROST 50 UG/ML
SOLUTION/ DROPS OPHTHALMIC
COMMUNITY
Start: 2025-05-08

## 2025-06-04 RX ORDER — OLMESARTAN MEDOXOMIL 40 MG/1
40 TABLET ORAL DAILY
COMMUNITY

## 2025-06-04 NOTE — TELEPHONE ENCOUNTER
Orders placed per verbal from Dr. Quiles.     Provided and reviewed pre-procedure instructions. Understanding verbalized.     No questions at this time.

## 2025-06-04 NOTE — PROGRESS NOTES
[facility] CARDIOLOGY, [address], [address]  [place] [address]  PHONE: [phone]  Elizabeth Landa  1950    Chief Complant:    Chief Complaint   Patient presents with    Consultation    Atrial Fibrillation      Consultation is requested by [unfilled] for evaluation of Consultation and Atrial Fibrillation    Reason for Consultation: afib    History:  Elizabeth Landa is a very pleasant 75 y.o. female with a past medical and cardiac history significant for HTN, AVR, mitral valve endocarditis, anemia and presents for EP consultation for afib. She has been feeling more SOB, tired, low energy over the past few weeks. She has been in afib for several weeks, irregular HR, fatigue. She has a history of severe anemia and is concerned about OAC use.     Cardiac PMH: (Old records have been reviewed and summarized below)  TTE (5/13/25): EF 60-65%, bioprosthetic AVR  Monitor (5/13/25) personally viewed and interpreted: 100% afib, HR ranging from , av HR 83    Reviewed office note Dr. Centeno 5/28/25    Past Medical History, Past Surgical History, Family history, Social History, and Medications were all reviewed with the patient today and updated as necessary.     Current Outpatient Medications   Medication Sig Dispense Refill    olmesartan (BENICAR) 40 MG tablet Take 1 tablet by mouth daily      latanoprost (XALATAN) 0.005 % ophthalmic solution       apixaban (ELIQUIS) 5 MG TABS tablet Take 1 tablet by mouth 2 times daily 60 tablet 5    aspirin 325 MG tablet Take 1 tablet by mouth in the morning and at bedtime for 7 days To start after surgery 14 tablet 0    senna-docusate (PERICOLACE) 8.6-50 MG per tablet Take 1 tablet by mouth daily Take for constipation prophylaxis 21 tablet 0    ondansetron (ZOFRAN-ODT) 8 MG TBDP disintegrating tablet Take 0.5 tablets by mouth every 6 hours Take 20 minutes prior to pain medication for nausea prevention 16 tablet 0    amLODIPine (NORVASC) 10 MG tablet Take 1 tablet

## 2025-06-05 NOTE — TELEPHONE ENCOUNTER
Notified pt of monitor result with recommendation to continue meds & plan to have ablation & fu appt. Understanding voiced.

## 2025-06-16 ENCOUNTER — TELEPHONE (OUTPATIENT)
Age: 75
End: 2025-06-16

## 2025-06-16 ENCOUNTER — APPOINTMENT (OUTPATIENT)
Dept: GENERAL RADIOLOGY | Age: 75
End: 2025-06-16
Payer: MEDICARE

## 2025-06-16 ENCOUNTER — HOSPITAL ENCOUNTER (EMERGENCY)
Age: 75
Discharge: HOME | End: 2025-06-16
Attending: EMERGENCY MEDICINE
Payer: MEDICARE

## 2025-06-16 VITALS
TEMPERATURE: 97.5 F | DIASTOLIC BLOOD PRESSURE: 91 MMHG | OXYGEN SATURATION: 95 % | RESPIRATION RATE: 18 BRPM | SYSTOLIC BLOOD PRESSURE: 180 MMHG | BODY MASS INDEX: 38.49 KG/M2 | WEIGHT: 231 LBS | HEART RATE: 69 BPM | HEIGHT: 65 IN

## 2025-06-16 DIAGNOSIS — I50.9 ACUTE ON CHRONIC CONGESTIVE HEART FAILURE, UNSPECIFIED HEART FAILURE TYPE (HCC): Primary | ICD-10-CM

## 2025-06-16 DIAGNOSIS — I48.91 ATRIAL FIBRILLATION, UNSPECIFIED TYPE (HCC): ICD-10-CM

## 2025-06-16 LAB
ALBUMIN SERPL-MCNC: 2.8 G/DL (ref 3.2–4.6)
ALBUMIN/GLOB SERPL: 0.7 (ref 1–1.9)
ALP SERPL-CCNC: 129 U/L (ref 35–104)
ALT SERPL-CCNC: 11 U/L (ref 8–45)
ANION GAP SERPL CALC-SCNC: 14 MMOL/L (ref 7–16)
AST SERPL-CCNC: 50 U/L (ref 15–37)
BASOPHILS # BLD: 0.02 K/UL (ref 0–0.2)
BASOPHILS NFR BLD: 0.3 % (ref 0–2)
BILIRUB SERPL-MCNC: 0.3 MG/DL (ref 0–1.2)
BUN SERPL-MCNC: 23 MG/DL (ref 8–23)
CALCIUM SERPL-MCNC: 9.3 MG/DL (ref 8.8–10.2)
CHLORIDE SERPL-SCNC: 103 MMOL/L (ref 98–107)
CO2 SERPL-SCNC: 25 MMOL/L (ref 20–29)
CREAT SERPL-MCNC: 1.22 MG/DL (ref 0.6–1.1)
DIFFERENTIAL METHOD BLD: ABNORMAL
EOSINOPHIL # BLD: 0.23 K/UL (ref 0–0.8)
EOSINOPHIL NFR BLD: 3 % (ref 0.5–7.8)
ERYTHROCYTE [DISTWIDTH] IN BLOOD BY AUTOMATED COUNT: 17.3 % (ref 11.9–14.6)
GLOBULIN SER CALC-MCNC: 4.1 G/DL (ref 2.3–3.5)
GLUCOSE SERPL-MCNC: 111 MG/DL (ref 70–99)
HCT VFR BLD AUTO: 36.7 % (ref 35.8–46.3)
HGB BLD-MCNC: 10.7 G/DL (ref 11.7–15.4)
IMM GRANULOCYTES # BLD AUTO: 0.02 K/UL (ref 0–0.5)
IMM GRANULOCYTES NFR BLD AUTO: 0.3 % (ref 0–5)
INR PPP: 0.9
LYMPHOCYTES # BLD: 0.87 K/UL (ref 0.5–4.6)
LYMPHOCYTES NFR BLD: 11.2 % (ref 13–44)
MAGNESIUM SERPL-MCNC: 2.1 MG/DL (ref 1.8–2.4)
MCH RBC QN AUTO: 23.3 PG (ref 26.1–32.9)
MCHC RBC AUTO-ENTMCNC: 29.2 G/DL (ref 31.4–35)
MCV RBC AUTO: 80 FL (ref 82–102)
MONOCYTES # BLD: 0.7 K/UL (ref 0.1–1.3)
MONOCYTES NFR BLD: 9 % (ref 4–12)
NEUTS SEG # BLD: 5.92 K/UL (ref 1.7–8.2)
NEUTS SEG NFR BLD: 76.2 % (ref 43–78)
NRBC # BLD: 0 K/UL (ref 0–0.2)
NT PRO BNP: 3053 PG/ML (ref 0–450)
PLATELET # BLD AUTO: 243 K/UL (ref 150–450)
PMV BLD AUTO: 9.6 FL (ref 9.4–12.3)
POTASSIUM SERPL-SCNC: ABNORMAL MMOL/L (ref 3.5–5.1)
PROT SERPL-MCNC: 6.9 G/DL (ref 6.3–8.2)
PROTHROMBIN TIME: 13.1 SEC (ref 11.3–14.9)
RBC # BLD AUTO: 4.59 M/UL (ref 4.05–5.2)
SODIUM SERPL-SCNC: 141 MMOL/L (ref 136–145)
TROPONIN T SERPL HS-MCNC: 20.9 NG/L (ref 0–14)
WBC # BLD AUTO: 7.8 K/UL (ref 4.3–11.1)

## 2025-06-16 PROCEDURE — 83880 ASSAY OF NATRIURETIC PEPTIDE: CPT

## 2025-06-16 PROCEDURE — 6360000002 HC RX W HCPCS: Performed by: EMERGENCY MEDICINE

## 2025-06-16 PROCEDURE — 71046 X-RAY EXAM CHEST 2 VIEWS: CPT

## 2025-06-16 PROCEDURE — 99285 EMERGENCY DEPT VISIT HI MDM: CPT

## 2025-06-16 PROCEDURE — 83735 ASSAY OF MAGNESIUM: CPT

## 2025-06-16 PROCEDURE — 85025 COMPLETE CBC W/AUTO DIFF WBC: CPT

## 2025-06-16 PROCEDURE — 80053 COMPREHEN METABOLIC PANEL: CPT

## 2025-06-16 PROCEDURE — 93005 ELECTROCARDIOGRAM TRACING: CPT | Performed by: EMERGENCY MEDICINE

## 2025-06-16 PROCEDURE — 84484 ASSAY OF TROPONIN QUANT: CPT

## 2025-06-16 PROCEDURE — 96374 THER/PROPH/DIAG INJ IV PUSH: CPT

## 2025-06-16 PROCEDURE — 85610 PROTHROMBIN TIME: CPT

## 2025-06-16 RX ORDER — FUROSEMIDE 10 MG/ML
40 INJECTION INTRAMUSCULAR; INTRAVENOUS ONCE
Status: COMPLETED | OUTPATIENT
Start: 2025-06-16 | End: 2025-06-16

## 2025-06-16 RX ADMIN — FUROSEMIDE 40 MG: 10 INJECTION, SOLUTION INTRAMUSCULAR; INTRAVENOUS at 21:21

## 2025-06-16 ASSESSMENT — LIFESTYLE VARIABLES
HOW MANY STANDARD DRINKS CONTAINING ALCOHOL DO YOU HAVE ON A TYPICAL DAY: PATIENT DOES NOT DRINK
HOW OFTEN DO YOU HAVE A DRINK CONTAINING ALCOHOL: NEVER

## 2025-06-16 ASSESSMENT — ENCOUNTER SYMPTOMS
EYE PAIN: 0
EYE REDNESS: 0
BACK PAIN: 0
VOMITING: 0
CHEST TIGHTNESS: 1
VOICE CHANGE: 0
COLOR CHANGE: 0
DIARRHEA: 0
ABDOMINAL PAIN: 0
SHORTNESS OF BREATH: 1
SPUTUM PRODUCTION: 0

## 2025-06-16 NOTE — ED PROVIDER NOTES
Emergency Department Provider Note       [facility] EMERGENCY DEPT   PCP: Balaji Wood MD   Age: 75 y.o.   Sex: female     DISPOSITION Decision To Discharge 06/16/2025 10:10:11 PM    ICD-10-CM    1. Acute on chronic congestive heart failure, unspecified heart failure type (HCC)  I50.9       2. Atrial fibrillation, unspecified type (HCC)  I48.91           Medical Decision Making     Differential diagnosis is broad.  Patient is not hypoxic here.  Appears to be in A-fib rate controlled.  Given her reported symptoms could be related to anemia.  Volume overload is also in the differential.    8:46 PM EDT  CBC still pending.  She is in rate controlled A-fib.  Chest x-ray is consistent with significant volume overload with bilateral pleural effusions    Will give dose of Lasix here    10:01 PM EDT  Hemoglobin here is 10.7.  White count stable.  Rectal exam is negative for bleeding    Has had some diuresis with Lasix here.  Will check ambulatory sats    10:11 PM EDT  Ambulatory sats here were actually stable.  Patient feels better.  Long discussion with patient and family at bedside.  She feels better and wants to be discharged home.  Will increase her Lasix dose for the next couple days.  Have her follow-up with her cardiology team         1 or more chronic illnesses with a severe exacerbation or progression.  1 chronic illness with exacerbation.  Prescription drug management performed.  Chronic medical problems impacting care include CHF and atrial fibrillation.  Shared medical decision making was utilized in creating the patients health plan today.  I independently ordered and reviewed each unique test.    I reviewed external records: ED visit note from a different ED.   I reviewed external records: provider visit note from PCP.  I reviewed external records: provider visit note from outside specialist.  I reviewed external records: previous EKG including cardiologist interpretation.    I reviewed external records:

## 2025-06-16 NOTE — TELEPHONE ENCOUNTER
Patient seen by Dr Quiles on 5/28/25- was placed on Eliquis- started having black stools 2 days after taking the medication. Patient reports she is very SOB and weak. Feels like she might need transfusion. Patient reports she stopped taking Eliquis 2 day ago. Please advise.

## 2025-06-16 NOTE — ED TRIAGE NOTES
Pt to triage in WC. Pt states that she has Afib, stopped taking Eliquis, and has developed SOB. Pt was sent by PCP today for further evaluation.

## 2025-06-16 NOTE — TELEPHONE ENCOUNTER
Pt states she can not take Eliquis. She is very weak, short of breath, and is having black stools. Please call.

## 2025-06-16 NOTE — TELEPHONE ENCOUNTER
Patient notified of MD response. Patient to go to ER for evaluations. Patient voices understanding.

## 2025-06-17 LAB
EKG DIAGNOSIS: NORMAL
EKG Q-T INTERVAL: 376 MS
EKG QRS DURATION: 86 MS
EKG QTC CALCULATION (BAZETT): 439 MS
EKG R AXIS: 41 DEGREES
EKG T AXIS: 22 DEGREES
EKG VENTRICULAR RATE: 82 BPM

## 2025-06-17 PROCEDURE — 93010 ELECTROCARDIOGRAM REPORT: CPT | Performed by: INTERNAL MEDICINE

## 2025-06-17 NOTE — DISCHARGE INSTRUCTIONS
We are increasing your Lasix  For the first 3 days I want you to take 40 mg twice daily for a total of 80  For the next 3 days I want you to take 40 once daily  After that you may resume your normal 20 mg daily dose  Follow-up with cardiology  Monitor your blood pressure at home  Return to the ER for any new, worsening or life-threatening symptoms

## 2025-06-25 RX ORDER — MUPIROCIN 2 %
OINTMENT (GRAM) TOPICAL
COMMUNITY
Start: 2025-01-27 | End: 2026-01-27

## 2025-06-25 RX ORDER — NYSTATIN 100000 [USP'U]/G
POWDER TOPICAL
COMMUNITY
Start: 2025-02-13 | End: 2026-02-13

## 2025-06-25 NOTE — PROGRESS NOTES
Name: Elizabeth Landa  YOB: 1950  Gender: female  MRN: [id number]    CC:   Chief Complaint   Patient presents with    Follow-up     S/p L Shoulder RTSA DOS 10/7/24   , The patient follows up 8 months status post TSA.  Left Shoulder Total Arthroplasty Reverse - Left  10/7/2024    History of Present Illness  The patient came in today because of shoulder pain. They have been dealing with persistent pain in both shoulders, which they think is due to using them more. They haven't had any recent falls or injuries.    Last week, they were hospitalized because of atrial fibrillation, fluid in their lungs, and shortness of breath. They are scheduled to have a Watchman device implanted and an ablation procedure. They are not diabetic. They were prescribed Eliquis, a blood thinner, but they can't take it because it causes them to bleed and results in black stools. They are worried about having a stroke or heart attack because they can't take Eliquis. They are on a cancellation list for the procedure. They also report feeling very tired and having trouble breathing.        Allergies   Allergen Reactions    Latex Rash    Metformin Diarrhea    Sulfa Antibiotics Anaphylaxis    Duloxetine Other (See Comments)    Hydromorphone Hallucinations    Nitrofurantoin Other (See Comments)     Causes Gout    Oxycodone Other (See Comments)     Hallucination, anxiety with oxycontin-- denies rx to hydrocodone     Past Medical History:   Diagnosis Date    A-fib (HCC)     Adverse effect of anesthesia     panic attack when mask placed on face    Anemia     2016/2017 - gi bleed---  2017 blood transfusion prior to AVR    Arthritis     CAD (coronary artery disease) 2016 1/2019 Minor nonobstructive coronary artery disease/ cath report    Chronic kidney disease     kidney stones    Chronic pain     r/t arthritis/ back pain Spinal stenosis of lumbar region     Diabetes (HCC)     \"prediabetic\" A1C 6.4 8/28/19- no meds    Diverticulitis

## 2025-06-26 ENCOUNTER — OFFICE VISIT (OUTPATIENT)
Dept: ORTHOPEDIC SURGERY | Age: 75
End: 2025-06-26
Payer: MEDICARE

## 2025-06-26 DIAGNOSIS — M19.012 OSTEOARTHRITIS OF LEFT AC (ACROMIOCLAVICULAR) JOINT: ICD-10-CM

## 2025-06-26 DIAGNOSIS — Z09 SURGERY FOLLOW-UP: ICD-10-CM

## 2025-06-26 DIAGNOSIS — Z96.612 S/P REVERSE TOTAL SHOULDER ARTHROPLASTY, LEFT: Primary | ICD-10-CM

## 2025-06-26 PROCEDURE — 99213 OFFICE O/P EST LOW 20 MIN: CPT | Performed by: ORTHOPAEDIC SURGERY

## 2025-06-26 PROCEDURE — 20605 DRAIN/INJ JOINT/BURSA W/O US: CPT | Performed by: ORTHOPAEDIC SURGERY

## 2025-06-26 PROCEDURE — G8399 PT W/DXA RESULTS DOCUMENT: HCPCS | Performed by: ORTHOPAEDIC SURGERY

## 2025-06-26 PROCEDURE — 1036F TOBACCO NON-USER: CPT | Performed by: ORTHOPAEDIC SURGERY

## 2025-06-26 PROCEDURE — 1090F PRES/ABSN URINE INCON ASSESS: CPT | Performed by: ORTHOPAEDIC SURGERY

## 2025-06-26 PROCEDURE — 1123F ACP DISCUSS/DSCN MKR DOCD: CPT | Performed by: ORTHOPAEDIC SURGERY

## 2025-06-26 PROCEDURE — G8428 CUR MEDS NOT DOCUMENT: HCPCS | Performed by: ORTHOPAEDIC SURGERY

## 2025-06-26 PROCEDURE — G8417 CALC BMI ABV UP PARAM F/U: HCPCS | Performed by: ORTHOPAEDIC SURGERY

## 2025-06-26 RX ORDER — METHYLPREDNISOLONE ACETATE 40 MG/ML
40 INJECTION, SUSPENSION INTRA-ARTICULAR; INTRALESIONAL; INTRAMUSCULAR; SOFT TISSUE ONCE
Status: COMPLETED | OUTPATIENT
Start: 2025-06-26 | End: 2025-06-26

## 2025-06-26 RX ADMIN — METHYLPREDNISOLONE ACETATE 40 MG: 40 INJECTION, SUSPENSION INTRA-ARTICULAR; INTRALESIONAL; INTRAMUSCULAR; SOFT TISSUE at 15:51

## 2025-06-30 ENCOUNTER — OFFICE VISIT (OUTPATIENT)
Age: 75
End: 2025-06-30
Payer: MEDICARE

## 2025-06-30 VITALS
WEIGHT: 224 LBS | DIASTOLIC BLOOD PRESSURE: 72 MMHG | BODY MASS INDEX: 37.32 KG/M2 | HEIGHT: 65 IN | SYSTOLIC BLOOD PRESSURE: 138 MMHG | HEART RATE: 83 BPM

## 2025-06-30 DIAGNOSIS — I48.19 PERSISTENT ATRIAL FIBRILLATION (HCC): Primary | ICD-10-CM

## 2025-06-30 DIAGNOSIS — I50.32 CHRONIC DIASTOLIC CONGESTIVE HEART FAILURE (HCC): ICD-10-CM

## 2025-06-30 DIAGNOSIS — I10 ESSENTIAL HYPERTENSION, BENIGN: ICD-10-CM

## 2025-06-30 DIAGNOSIS — Z95.3 S/P AORTIC VALVE REPLACEMENT WITH BIOPROSTHETIC VALVE: ICD-10-CM

## 2025-06-30 PROCEDURE — 1090F PRES/ABSN URINE INCON ASSESS: CPT | Performed by: INTERNAL MEDICINE

## 2025-06-30 PROCEDURE — 3078F DIAST BP <80 MM HG: CPT | Performed by: INTERNAL MEDICINE

## 2025-06-30 PROCEDURE — G8417 CALC BMI ABV UP PARAM F/U: HCPCS | Performed by: INTERNAL MEDICINE

## 2025-06-30 PROCEDURE — 1036F TOBACCO NON-USER: CPT | Performed by: INTERNAL MEDICINE

## 2025-06-30 PROCEDURE — 1159F MED LIST DOCD IN RCRD: CPT | Performed by: INTERNAL MEDICINE

## 2025-06-30 PROCEDURE — 1160F RVW MEDS BY RX/DR IN RCRD: CPT | Performed by: INTERNAL MEDICINE

## 2025-06-30 PROCEDURE — G8427 DOCREV CUR MEDS BY ELIG CLIN: HCPCS | Performed by: INTERNAL MEDICINE

## 2025-06-30 PROCEDURE — G8399 PT W/DXA RESULTS DOCUMENT: HCPCS | Performed by: INTERNAL MEDICINE

## 2025-06-30 PROCEDURE — 99214 OFFICE O/P EST MOD 30 MIN: CPT | Performed by: INTERNAL MEDICINE

## 2025-06-30 PROCEDURE — 1126F AMNT PAIN NOTED NONE PRSNT: CPT | Performed by: INTERNAL MEDICINE

## 2025-06-30 PROCEDURE — 3075F SYST BP GE 130 - 139MM HG: CPT | Performed by: INTERNAL MEDICINE

## 2025-06-30 PROCEDURE — 93000 ELECTROCARDIOGRAM COMPLETE: CPT | Performed by: INTERNAL MEDICINE

## 2025-06-30 PROCEDURE — 1123F ACP DISCUSS/DSCN MKR DOCD: CPT | Performed by: INTERNAL MEDICINE

## 2025-06-30 ASSESSMENT — ENCOUNTER SYMPTOMS
DIARRHEA: 0
COUGH: 1
SPUTUM PRODUCTION: 0
HEMATOCHEZIA: 0
WHEEZING: 0
ORTHOPNEA: 0
COLOR CHANGE: 0
HEMATEMESIS: 0
BLURRED VISION: 0
BOWEL INCONTINENCE: 0
HOARSE VOICE: 0
SHORTNESS OF BREATH: 1
ABDOMINAL PAIN: 0

## 2025-07-21 RX ORDER — FUROSEMIDE 40 MG/1
40 TABLET ORAL DAILY
Qty: 90 TABLET | Refills: 3 | Status: SHIPPED | OUTPATIENT
Start: 2025-07-21

## 2025-07-21 RX ORDER — ASPIRIN 81 MG/1
81 TABLET ORAL DAILY
Status: ON HOLD | COMMUNITY
End: 2025-08-01 | Stop reason: HOSPADM

## 2025-07-21 NOTE — TELEPHONE ENCOUNTER
MEDICATION REFILL REQUEST      Name of Medication:  Furosemide  Dose:  20 mg  Frequency:  QD  Quantity:  90  Days' supply:  90 with 3 refills      Pharmacy Name/Location:  [id number]

## 2025-07-24 DIAGNOSIS — I48.19 PERSISTENT ATRIAL FIBRILLATION (HCC): ICD-10-CM

## 2025-07-24 LAB
ANION GAP SERPL CALC-SCNC: 14 MMOL/L (ref 7–16)
BASOPHILS # BLD: 0.04 K/UL (ref 0–0.2)
BASOPHILS NFR BLD: 0.5 % (ref 0–2)
BUN SERPL-MCNC: 36 MG/DL (ref 8–23)
CALCIUM SERPL-MCNC: 8.9 MG/DL (ref 8.8–10.2)
CHLORIDE SERPL-SCNC: 103 MMOL/L (ref 98–107)
CO2 SERPL-SCNC: 23 MMOL/L (ref 20–29)
CREAT SERPL-MCNC: 1.53 MG/DL (ref 0.6–1.1)
DIFFERENTIAL METHOD BLD: ABNORMAL
EOSINOPHIL # BLD: 0.18 K/UL (ref 0–0.8)
EOSINOPHIL NFR BLD: 2.1 % (ref 0.5–7.8)
ERYTHROCYTE [DISTWIDTH] IN BLOOD BY AUTOMATED COUNT: 18.6 % (ref 11.9–14.6)
GLUCOSE SERPL-MCNC: 111 MG/DL (ref 70–99)
HCT VFR BLD AUTO: 34.7 % (ref 35.8–46.3)
HGB BLD-MCNC: 10.3 G/DL (ref 11.7–15.4)
IMM GRANULOCYTES # BLD AUTO: 0.04 K/UL (ref 0–0.5)
IMM GRANULOCYTES NFR BLD AUTO: 0.5 % (ref 0–5)
LYMPHOCYTES # BLD: 1.09 K/UL (ref 0.5–4.6)
LYMPHOCYTES NFR BLD: 12.5 % (ref 13–44)
MAGNESIUM SERPL-MCNC: 2.3 MG/DL (ref 1.8–2.4)
MCH RBC QN AUTO: 23.2 PG (ref 26.1–32.9)
MCHC RBC AUTO-ENTMCNC: 29.7 G/DL (ref 31.4–35)
MCV RBC AUTO: 78.2 FL (ref 82–102)
MONOCYTES # BLD: 0.8 K/UL (ref 0.1–1.3)
MONOCYTES NFR BLD: 9.2 % (ref 4–12)
NEUTS SEG # BLD: 6.57 K/UL (ref 1.7–8.2)
NEUTS SEG NFR BLD: 75.2 % (ref 43–78)
NRBC # BLD: 0 K/UL (ref 0–0.2)
PLATELET # BLD AUTO: 232 K/UL (ref 150–450)
PMV BLD AUTO: 9.9 FL (ref 9.4–12.3)
POTASSIUM SERPL-SCNC: 4.3 MMOL/L (ref 3.5–5.1)
RBC # BLD AUTO: 4.44 M/UL (ref 4.05–5.2)
SODIUM SERPL-SCNC: 140 MMOL/L (ref 136–145)
WBC # BLD AUTO: 8.7 K/UL (ref 4.3–11.1)

## 2025-07-31 ENCOUNTER — ANESTHESIA EVENT (OUTPATIENT)
Dept: CARDIAC CATH/INVASIVE PROCEDURES | Age: 75
End: 2025-07-31
Payer: MEDICARE

## 2025-07-31 RX ORDER — ACETAMINOPHEN 500 MG
1000 TABLET ORAL ONCE
Status: CANCELLED | OUTPATIENT
Start: 2025-07-31 | End: 2025-07-31

## 2025-07-31 RX ORDER — SODIUM CHLORIDE 0.9 % (FLUSH) 0.9 %
5-40 SYRINGE (ML) INJECTION PRN
Status: CANCELLED | OUTPATIENT
Start: 2025-07-31

## 2025-07-31 RX ORDER — SODIUM CHLORIDE, SODIUM LACTATE, POTASSIUM CHLORIDE, CALCIUM CHLORIDE 600; 310; 30; 20 MG/100ML; MG/100ML; MG/100ML; MG/100ML
INJECTION, SOLUTION INTRAVENOUS CONTINUOUS
Status: CANCELLED | OUTPATIENT
Start: 2025-07-31

## 2025-07-31 RX ORDER — SODIUM CHLORIDE 9 MG/ML
INJECTION, SOLUTION INTRAVENOUS PRN
Status: CANCELLED | OUTPATIENT
Start: 2025-07-31

## 2025-07-31 RX ORDER — LIDOCAINE HYDROCHLORIDE 10 MG/ML
1 INJECTION, SOLUTION INFILTRATION; PERINEURAL
Status: CANCELLED | OUTPATIENT
Start: 2025-07-31

## 2025-07-31 RX ORDER — SODIUM CHLORIDE 0.9 % (FLUSH) 0.9 %
5-40 SYRINGE (ML) INJECTION EVERY 12 HOURS SCHEDULED
Status: CANCELLED | OUTPATIENT
Start: 2025-07-31

## 2025-08-01 ENCOUNTER — APPOINTMENT (OUTPATIENT)
Dept: CARDIAC CATH/INVASIVE PROCEDURES | Age: 75
DRG: 317 | End: 2025-08-01
Attending: INTERNAL MEDICINE
Payer: MEDICARE

## 2025-08-01 ENCOUNTER — ANESTHESIA (OUTPATIENT)
Dept: CARDIAC CATH/INVASIVE PROCEDURES | Age: 75
End: 2025-08-01
Payer: MEDICARE

## 2025-08-01 ENCOUNTER — HOSPITAL ENCOUNTER (INPATIENT)
Age: 75
LOS: 1 days | Discharge: HOME | DRG: 317 | End: 2025-08-01
Attending: INTERNAL MEDICINE | Admitting: INTERNAL MEDICINE
Payer: MEDICARE

## 2025-08-01 VITALS
RESPIRATION RATE: 21 BRPM | SYSTOLIC BLOOD PRESSURE: 106 MMHG | OXYGEN SATURATION: 96 % | BODY MASS INDEX: 35.49 KG/M2 | TEMPERATURE: 97.3 F | HEART RATE: 77 BPM | DIASTOLIC BLOOD PRESSURE: 72 MMHG | HEIGHT: 65 IN | WEIGHT: 213 LBS

## 2025-08-01 DIAGNOSIS — I48.19 PERSISTENT ATRIAL FIBRILLATION (HCC): ICD-10-CM

## 2025-08-01 DIAGNOSIS — I48.21 PERMANENT ATRIAL FIBRILLATION (HCC): Primary | ICD-10-CM

## 2025-08-01 DIAGNOSIS — I48.91 AF (ATRIAL FIBRILLATION) (HCC): ICD-10-CM

## 2025-08-01 LAB
ABO + RH BLD: NORMAL
ACT BLD: 233 SECS (ref 70–128)
ACT BLD: 262 SECS (ref 70–128)
ALBUMIN SERPL-MCNC: 3 G/DL (ref 3.2–4.6)
ANION GAP SERPL CALC-SCNC: 14 MMOL/L (ref 7–16)
BLOOD GROUP ANTIBODIES SERPL: NORMAL
BUN SERPL-MCNC: 30 MG/DL (ref 8–23)
CALCIUM SERPL-MCNC: 8.8 MG/DL (ref 8.8–10.2)
CHLORIDE SERPL-SCNC: 105 MMOL/L (ref 98–107)
CO2 SERPL-SCNC: 21 MMOL/L (ref 20–29)
CREAT SERPL-MCNC: 1.45 MG/DL (ref 0.6–1.1)
ECHO BSA: 2.1 M2
ECHO BSA: 2.1 M2
EKG DIAGNOSIS: NORMAL
EKG Q-T INTERVAL: 434 MS
EKG QRS DURATION: 88 MS
EKG QTC CALCULATION (BAZETT): 458 MS
EKG R AXIS: 24 DEGREES
EKG T AXIS: 241 DEGREES
EKG VENTRICULAR RATE: 67 BPM
ERYTHROCYTE [DISTWIDTH] IN BLOOD BY AUTOMATED COUNT: 20.2 % (ref 11.9–14.6)
GLUCOSE SERPL-MCNC: 129 MG/DL (ref 70–99)
HCT VFR BLD AUTO: 30 % (ref 35.8–46.3)
HGB BLD-MCNC: 9 G/DL (ref 11.7–15.4)
INR PPP: 1
MAGNESIUM SERPL-MCNC: 2 MG/DL (ref 1.8–2.4)
MCH RBC QN AUTO: 23.8 PG (ref 26.1–32.9)
MCHC RBC AUTO-ENTMCNC: 30 G/DL (ref 31.4–35)
MCV RBC AUTO: 79.4 FL (ref 82–102)
NRBC # BLD: 0 K/UL (ref 0–0.2)
PLATELET # BLD AUTO: 232 K/UL (ref 150–450)
PMV BLD AUTO: 9.6 FL (ref 9.4–12.3)
POTASSIUM SERPL-SCNC: 3.8 MMOL/L (ref 3.5–5.1)
PROTHROMBIN TIME: 14 SEC (ref 11.3–14.9)
RBC # BLD AUTO: 3.78 M/UL (ref 4.05–5.2)
SODIUM SERPL-SCNC: 140 MMOL/L (ref 136–145)
SPECIMEN EXP DATE BLD: NORMAL
WBC # BLD AUTO: 8.2 K/UL (ref 4.3–11.1)

## 2025-08-01 PROCEDURE — 02L73DK OCCLUSION OF LEFT ATRIAL APPENDAGE WITH INTRALUMINAL DEVICE, PERCUTANEOUS APPROACH: ICD-10-PCS | Performed by: INTERNAL MEDICINE

## 2025-08-01 PROCEDURE — 6360000004 HC RX CONTRAST MEDICATION: Performed by: INTERNAL MEDICINE

## 2025-08-01 PROCEDURE — 93655 ICAR CATH ABLTJ DSCRT ARRHYT: CPT | Performed by: INTERNAL MEDICINE

## 2025-08-01 PROCEDURE — 3700000001 HC ADD 15 MINUTES (ANESTHESIA): Performed by: INTERNAL MEDICINE

## 2025-08-01 PROCEDURE — 93656 COMPRE EP EVAL ABLTJ ATR FIB: CPT | Performed by: INTERNAL MEDICINE

## 2025-08-01 PROCEDURE — 82040 ASSAY OF SERUM ALBUMIN: CPT

## 2025-08-01 PROCEDURE — 2500000003 HC RX 250 WO HCPCS: Performed by: NURSE ANESTHETIST, CERTIFIED REGISTERED

## 2025-08-01 PROCEDURE — 86901 BLOOD TYPING SEROLOGIC RH(D): CPT

## 2025-08-01 PROCEDURE — 6370000000 HC RX 637 (ALT 250 FOR IP): Performed by: INTERNAL MEDICINE

## 2025-08-01 PROCEDURE — 80048 BASIC METABOLIC PNL TOTAL CA: CPT

## 2025-08-01 PROCEDURE — C1759 CATH, INTRA ECHOCARDIOGRAPHY: HCPCS | Performed by: INTERNAL MEDICINE

## 2025-08-01 PROCEDURE — 4A0234Z MEASUREMENT OF CARDIAC ELECTRICAL ACTIVITY, PERCUTANEOUS APPROACH: ICD-10-PCS | Performed by: INTERNAL MEDICINE

## 2025-08-01 PROCEDURE — 2709999900 HC NON-CHARGEABLE SUPPLY: Performed by: INTERNAL MEDICINE

## 2025-08-01 PROCEDURE — 93622 COMP EP EVAL L VENTR PAC&REC: CPT | Performed by: INTERNAL MEDICINE

## 2025-08-01 PROCEDURE — C1769 GUIDE WIRE: HCPCS | Performed by: INTERNAL MEDICINE

## 2025-08-01 PROCEDURE — 7100000001 HC PACU RECOVERY - ADDTL 15 MIN: Performed by: INTERNAL MEDICINE

## 2025-08-01 PROCEDURE — 93005 ELECTROCARDIOGRAM TRACING: CPT | Performed by: INTERNAL MEDICINE

## 2025-08-01 PROCEDURE — 6360000002 HC RX W HCPCS: Performed by: INTERNAL MEDICINE

## 2025-08-01 PROCEDURE — 86900 BLOOD TYPING SEROLOGIC ABO: CPT

## 2025-08-01 PROCEDURE — C1893 INTRO/SHEATH, FIXED,NON-PEEL: HCPCS | Performed by: INTERNAL MEDICINE

## 2025-08-01 PROCEDURE — 93010 ELECTROCARDIOGRAM REPORT: CPT | Performed by: INTERNAL MEDICINE

## 2025-08-01 PROCEDURE — 33340 PERQ CLSR TCAT L ATR APNDGE: CPT | Performed by: INTERNAL MEDICINE

## 2025-08-01 PROCEDURE — 85347 COAGULATION TIME ACTIVATED: CPT

## 2025-08-01 PROCEDURE — 6360000002 HC RX W HCPCS: Performed by: NURSE ANESTHETIST, CERTIFIED REGISTERED

## 2025-08-01 PROCEDURE — 83735 ASSAY OF MAGNESIUM: CPT

## 2025-08-01 PROCEDURE — 93312 ECHO TRANSESOPHAGEAL: CPT | Performed by: INTERNAL MEDICINE

## 2025-08-01 PROCEDURE — 2580000003 HC RX 258: Performed by: NURSE ANESTHETIST, CERTIFIED REGISTERED

## 2025-08-01 PROCEDURE — 85027 COMPLETE CBC AUTOMATED: CPT

## 2025-08-01 PROCEDURE — C1730 CATH, EP, 19 OR FEW ELECT: HCPCS | Performed by: INTERNAL MEDICINE

## 2025-08-01 PROCEDURE — 2580000003 HC RX 258: Performed by: INTERNAL MEDICINE

## 2025-08-01 PROCEDURE — 5A2204Z RESTORATION OF CARDIAC RHYTHM, SINGLE: ICD-10-PCS | Performed by: INTERNAL MEDICINE

## 2025-08-01 PROCEDURE — 93657 TX L/R ATRIAL FIB ADDL: CPT | Performed by: INTERNAL MEDICINE

## 2025-08-01 PROCEDURE — B24BZZ4 ULTRASONOGRAPHY OF HEART WITH AORTA, TRANSESOPHAGEAL: ICD-10-PCS | Performed by: INTERNAL MEDICINE

## 2025-08-01 PROCEDURE — 02583ZZ DESTRUCTION OF CONDUCTION MECHANISM, PERCUTANEOUS APPROACH: ICD-10-PCS | Performed by: INTERNAL MEDICINE

## 2025-08-01 PROCEDURE — 93623 PRGRMD STIMJ&PACG IV RX NFS: CPT | Performed by: INTERNAL MEDICINE

## 2025-08-01 PROCEDURE — 85610 PROTHROMBIN TIME: CPT

## 2025-08-01 PROCEDURE — 86850 RBC ANTIBODY SCREEN: CPT

## 2025-08-01 PROCEDURE — 6360000002 HC RX W HCPCS: Performed by: ANESTHESIOLOGY

## 2025-08-01 PROCEDURE — 3700000000 HC ANESTHESIA ATTENDED CARE: Performed by: INTERNAL MEDICINE

## 2025-08-01 PROCEDURE — 93662 INTRACARDIAC ECG (ICE): CPT | Performed by: INTERNAL MEDICINE

## 2025-08-01 PROCEDURE — 2720000010 HC SURG SUPPLY STERILE: Performed by: INTERNAL MEDICINE

## 2025-08-01 PROCEDURE — 93325 DOPPLER ECHO COLOR FLOW MAPG: CPT | Performed by: INTERNAL MEDICINE

## 2025-08-01 PROCEDURE — C1894 INTRO/SHEATH, NON-LASER: HCPCS | Performed by: INTERNAL MEDICINE

## 2025-08-01 PROCEDURE — 02K83ZZ MAP CONDUCTION MECHANISM, PERCUTANEOUS APPROACH: ICD-10-PCS | Performed by: INTERNAL MEDICINE

## 2025-08-01 PROCEDURE — 7100000000 HC PACU RECOVERY - FIRST 15 MIN: Performed by: INTERNAL MEDICINE

## 2025-08-01 PROCEDURE — 1200000000 HC SEMI PRIVATE

## 2025-08-01 PROCEDURE — 93312 ECHO TRANSESOPHAGEAL: CPT

## 2025-08-01 PROCEDURE — C1760 CLOSURE DEV, VASC: HCPCS | Performed by: INTERNAL MEDICINE

## 2025-08-01 PROCEDURE — C1889 IMPLANT/INSERT DEVICE, NOC: HCPCS | Performed by: INTERNAL MEDICINE

## 2025-08-01 PROCEDURE — 93355 ECHO TRANSESOPHAGEAL (TEE): CPT | Performed by: INTERNAL MEDICINE

## 2025-08-01 PROCEDURE — C1732 CATH, EP, DIAG/ABL, 3D/VECT: HCPCS | Performed by: INTERNAL MEDICINE

## 2025-08-01 PROCEDURE — 4A023FZ MEASUREMENT OF CARDIAC RHYTHM, PERCUTANEOUS APPROACH: ICD-10-PCS | Performed by: INTERNAL MEDICINE

## 2025-08-01 DEVICE — IMPLANTABLE DEVICE: Type: IMPLANTABLE DEVICE | Status: FUNCTIONAL

## 2025-08-01 RX ORDER — SODIUM CHLORIDE 9 MG/ML
INJECTION, SOLUTION INTRAVENOUS CONTINUOUS
Status: DISCONTINUED | OUTPATIENT
Start: 2025-08-01 | End: 2025-08-04 | Stop reason: HOSPADM

## 2025-08-01 RX ORDER — SODIUM CHLORIDE 0.9 % (FLUSH) 0.9 %
5-40 SYRINGE (ML) INJECTION EVERY 12 HOURS SCHEDULED
Status: DISCONTINUED | OUTPATIENT
Start: 2025-08-01 | End: 2025-08-04 | Stop reason: HOSPADM

## 2025-08-01 RX ORDER — EPHEDRINE SULFATE 5 MG/ML
INJECTION INTRAVENOUS
Status: DISCONTINUED | OUTPATIENT
Start: 2025-08-01 | End: 2025-08-01 | Stop reason: SDUPTHER

## 2025-08-01 RX ORDER — IBUPROFEN 600 MG/1
1 TABLET ORAL PRN
Status: DISCONTINUED | OUTPATIENT
Start: 2025-08-01 | End: 2025-08-04 | Stop reason: HOSPADM

## 2025-08-01 RX ORDER — ADENOSINE 3 MG/ML
INJECTION, SOLUTION INTRAVENOUS PRN
Status: DISCONTINUED | OUTPATIENT
Start: 2025-08-01 | End: 2025-08-01 | Stop reason: HOSPADM

## 2025-08-01 RX ORDER — LIDOCAINE HYDROCHLORIDE AND EPINEPHRINE 10; 10 MG/ML; UG/ML
INJECTION, SOLUTION INFILTRATION; PERINEURAL PRN
Status: DISCONTINUED | OUTPATIENT
Start: 2025-08-01 | End: 2025-08-01 | Stop reason: HOSPADM

## 2025-08-01 RX ORDER — LIDOCAINE HYDROCHLORIDE 20 MG/ML
INJECTION, SOLUTION EPIDURAL; INFILTRATION; INTRACAUDAL; PERINEURAL
Status: DISCONTINUED | OUTPATIENT
Start: 2025-08-01 | End: 2025-08-01 | Stop reason: SDUPTHER

## 2025-08-01 RX ORDER — MECLIZINE HYDROCHLORIDE 25 MG/1
25 TABLET ORAL ONCE
Status: COMPLETED | OUTPATIENT
Start: 2025-08-01 | End: 2025-08-01

## 2025-08-01 RX ORDER — SODIUM CHLORIDE 0.9 % (FLUSH) 0.9 %
5-40 SYRINGE (ML) INJECTION PRN
Status: DISCONTINUED | OUTPATIENT
Start: 2025-08-01 | End: 2025-08-04 | Stop reason: HOSPADM

## 2025-08-01 RX ORDER — HYDRALAZINE HYDROCHLORIDE 20 MG/ML
10 INJECTION INTRAMUSCULAR; INTRAVENOUS
Status: DISCONTINUED | OUTPATIENT
Start: 2025-08-01 | End: 2025-08-04 | Stop reason: HOSPADM

## 2025-08-01 RX ORDER — IOPAMIDOL 755 MG/ML
INJECTION, SOLUTION INTRAVASCULAR PRN
Status: DISCONTINUED | OUTPATIENT
Start: 2025-08-01 | End: 2025-08-01 | Stop reason: HOSPADM

## 2025-08-01 RX ORDER — SUCCINYLCHOLINE CHLORIDE 20 MG/ML
INJECTION INTRAMUSCULAR; INTRAVENOUS
Status: DISCONTINUED | OUTPATIENT
Start: 2025-08-01 | End: 2025-08-01 | Stop reason: SDUPTHER

## 2025-08-01 RX ORDER — TRAMADOL HYDROCHLORIDE 50 MG/1
50 TABLET ORAL
Status: DISCONTINUED | OUTPATIENT
Start: 2025-08-01 | End: 2025-08-04 | Stop reason: HOSPADM

## 2025-08-01 RX ORDER — IPRATROPIUM BROMIDE AND ALBUTEROL SULFATE 2.5; .5 MG/3ML; MG/3ML
1 SOLUTION RESPIRATORY (INHALATION)
Status: DISCONTINUED | OUTPATIENT
Start: 2025-08-01 | End: 2025-08-04 | Stop reason: HOSPADM

## 2025-08-01 RX ORDER — PHENYLEPHRINE HYDROCHLORIDE 10 MG/ML
INJECTION INTRAVENOUS
Status: DISCONTINUED | OUTPATIENT
Start: 2025-08-01 | End: 2025-08-01 | Stop reason: SDUPTHER

## 2025-08-01 RX ORDER — SODIUM CHLORIDE, SODIUM LACTATE, POTASSIUM CHLORIDE, CALCIUM CHLORIDE 600; 310; 30; 20 MG/100ML; MG/100ML; MG/100ML; MG/100ML
INJECTION, SOLUTION INTRAVENOUS
Status: DISCONTINUED | OUTPATIENT
Start: 2025-08-01 | End: 2025-08-01 | Stop reason: SDUPTHER

## 2025-08-01 RX ORDER — ONDANSETRON 2 MG/ML
INJECTION INTRAMUSCULAR; INTRAVENOUS
Status: DISCONTINUED | OUTPATIENT
Start: 2025-08-01 | End: 2025-08-01 | Stop reason: SDUPTHER

## 2025-08-01 RX ORDER — ROCURONIUM BROMIDE 10 MG/ML
INJECTION, SOLUTION INTRAVENOUS
Status: DISCONTINUED | OUTPATIENT
Start: 2025-08-01 | End: 2025-08-01 | Stop reason: SDUPTHER

## 2025-08-01 RX ORDER — DEXTROSE MONOHYDRATE 100 MG/ML
INJECTION, SOLUTION INTRAVENOUS CONTINUOUS PRN
Status: DISCONTINUED | OUTPATIENT
Start: 2025-08-01 | End: 2025-08-04 | Stop reason: HOSPADM

## 2025-08-01 RX ORDER — SODIUM CHLORIDE 9 MG/ML
INJECTION, SOLUTION INTRAVENOUS PRN
Status: DISCONTINUED | OUTPATIENT
Start: 2025-08-01 | End: 2025-08-04 | Stop reason: HOSPADM

## 2025-08-01 RX ORDER — PROPOFOL 10 MG/ML
INJECTION, EMULSION INTRAVENOUS
Status: DISCONTINUED | OUTPATIENT
Start: 2025-08-01 | End: 2025-08-01 | Stop reason: SDUPTHER

## 2025-08-01 RX ORDER — ACETAMINOPHEN 325 MG/1
650 TABLET ORAL
Status: DISCONTINUED | OUTPATIENT
Start: 2025-08-01 | End: 2025-08-04 | Stop reason: HOSPADM

## 2025-08-01 RX ORDER — DIPHENHYDRAMINE HYDROCHLORIDE 50 MG/ML
12.5 INJECTION, SOLUTION INTRAMUSCULAR; INTRAVENOUS
Status: DISCONTINUED | OUTPATIENT
Start: 2025-08-01 | End: 2025-08-04 | Stop reason: HOSPADM

## 2025-08-01 RX ORDER — HEPARIN SODIUM 200 [USP'U]/100ML
INJECTION, SOLUTION INTRAVENOUS CONTINUOUS PRN
Status: COMPLETED | OUTPATIENT
Start: 2025-08-01 | End: 2025-08-01

## 2025-08-01 RX ORDER — HALOPERIDOL 5 MG/ML
1 INJECTION INTRAMUSCULAR
Status: COMPLETED | OUTPATIENT
Start: 2025-08-01 | End: 2025-08-01

## 2025-08-01 RX ORDER — MIDAZOLAM HYDROCHLORIDE 1 MG/ML
INJECTION, SOLUTION INTRAMUSCULAR; INTRAVENOUS
Status: DISCONTINUED | OUTPATIENT
Start: 2025-08-01 | End: 2025-08-01 | Stop reason: SDUPTHER

## 2025-08-01 RX ORDER — HEPARIN SODIUM 10000 [USP'U]/100ML
INJECTION, SOLUTION INTRAVENOUS
Status: DISCONTINUED | OUTPATIENT
Start: 2025-08-01 | End: 2025-08-01 | Stop reason: SDUPTHER

## 2025-08-01 RX ORDER — METOCLOPRAMIDE HYDROCHLORIDE 5 MG/ML
10 INJECTION INTRAMUSCULAR; INTRAVENOUS
Status: DISCONTINUED | OUTPATIENT
Start: 2025-08-01 | End: 2025-08-04 | Stop reason: HOSPADM

## 2025-08-01 RX ORDER — HEPARIN SODIUM 1000 [USP'U]/ML
INJECTION, SOLUTION INTRAVENOUS; SUBCUTANEOUS
Status: DISCONTINUED | OUTPATIENT
Start: 2025-08-01 | End: 2025-08-01 | Stop reason: SDUPTHER

## 2025-08-01 RX ORDER — LABETALOL HYDROCHLORIDE 5 MG/ML
10 INJECTION, SOLUTION INTRAVENOUS
Status: DISCONTINUED | OUTPATIENT
Start: 2025-08-01 | End: 2025-08-04 | Stop reason: HOSPADM

## 2025-08-01 RX ORDER — DEXAMETHASONE SODIUM PHOSPHATE 4 MG/ML
INJECTION, SOLUTION INTRA-ARTICULAR; INTRALESIONAL; INTRAMUSCULAR; INTRAVENOUS; SOFT TISSUE
Status: DISCONTINUED | OUTPATIENT
Start: 2025-08-01 | End: 2025-08-01 | Stop reason: SDUPTHER

## 2025-08-01 RX ADMIN — ROCURONIUM BROMIDE 30 MG: 10 INJECTION, SOLUTION INTRAVENOUS at 10:03

## 2025-08-01 RX ADMIN — EPHEDRINE SULFATE 10 MG: 5 INJECTION INTRAVENOUS at 10:00

## 2025-08-01 RX ADMIN — HEPARIN SODIUM 3000 UNITS: 1000 INJECTION INTRAVENOUS; SUBCUTANEOUS at 10:11

## 2025-08-01 RX ADMIN — SODIUM CHLORIDE, SODIUM LACTATE, POTASSIUM CHLORIDE, AND CALCIUM CHLORIDE: 600; 310; 30; 20 INJECTION, SOLUTION INTRAVENOUS at 09:26

## 2025-08-01 RX ADMIN — LIDOCAINE HYDROCHLORIDE 100 MG: 20 INJECTION, SOLUTION EPIDURAL; INFILTRATION; INTRACAUDAL; PERINEURAL at 09:33

## 2025-08-01 RX ADMIN — PHENYLEPHRINE HYDROCHLORIDE 100 MCG: 10 INJECTION INTRAVENOUS at 09:58

## 2025-08-01 RX ADMIN — PHENYLEPHRINE HYDROCHLORIDE 100 MCG: 10 INJECTION INTRAVENOUS at 09:48

## 2025-08-01 RX ADMIN — SODIUM CHLORIDE: 0.9 INJECTION, SOLUTION INTRAVENOUS at 06:57

## 2025-08-01 RX ADMIN — HEPARIN SODIUM 16900 UNITS: 1000 INJECTION INTRAVENOUS; SUBCUTANEOUS at 09:54

## 2025-08-01 RX ADMIN — MECLIZINE HYDROCHLORIDE 25 MG: 25 TABLET ORAL at 12:23

## 2025-08-01 RX ADMIN — ONDANSETRON 4 MG: 2 INJECTION, SOLUTION INTRAMUSCULAR; INTRAVENOUS at 10:43

## 2025-08-01 RX ADMIN — PROPOFOL 160 MG: 10 INJECTION, EMULSION INTRAVENOUS at 09:33

## 2025-08-01 RX ADMIN — SUGAMMADEX 200 MG: 100 INJECTION, SOLUTION INTRAVENOUS at 11:00

## 2025-08-01 RX ADMIN — MIDAZOLAM 2 MG: 1 INJECTION INTRAMUSCULAR; INTRAVENOUS at 09:27

## 2025-08-01 RX ADMIN — Medication 2 G: at 09:45

## 2025-08-01 RX ADMIN — HALOPERIDOL LACTATE 1 MG: 5 INJECTION, SOLUTION INTRAMUSCULAR at 11:22

## 2025-08-01 RX ADMIN — ROCURONIUM BROMIDE 20 MG: 10 INJECTION, SOLUTION INTRAVENOUS at 09:42

## 2025-08-01 RX ADMIN — DEXAMETHASONE SODIUM PHOSPHATE 4 MG: 4 INJECTION INTRA-ARTICULAR; INTRALESIONAL; INTRAMUSCULAR; INTRAVENOUS; SOFT TISSUE at 10:43

## 2025-08-01 RX ADMIN — Medication 120 MG: at 09:33

## 2025-08-01 RX ADMIN — HEPARIN SODIUM 40 UNITS/HR: 10000 INJECTION, SOLUTION INTRAVENOUS at 09:54

## 2025-08-01 RX ADMIN — PHENYLEPHRINE HYDROCHLORIDE 100 MCG: 10 INJECTION INTRAVENOUS at 09:53

## 2025-08-01 ASSESSMENT — ENCOUNTER SYMPTOMS: SHORTNESS OF BREATH: 1

## 2025-08-01 NOTE — ANESTHESIA POSTPROCEDURE EVALUATION
Department of Anesthesiology  Postprocedure Note    Patient: Elizabeth Landa  MRN: [id number]  YOB: 1950  Date of evaluation: 8/1/2025    Procedure Summary       Date: 08/01/25 Room / Location: D EP 2 ALL EVENTS / [facility] CARDIAC CATH LAB    Anesthesia Start: 0921 Anesthesia Stop: 1117    Procedures:       Ablation A-fib w complete ep study      Watchman mar closure device Diagnosis:       Persistent atrial fibrillation (HCC)      (AF (atrial fibrillation) (HCC) [I48.91])    Providers: Alex Quiles MD Responsible Provider: Mario Gonzalez IV, MD    Anesthesia Type: general ASA Status: 3            Anesthesia Type: No value filed.    Tacos Phase I: Tacos Score: 10    Tacos Phase II:      Anesthesia Post Evaluation    Patient location during evaluation: bedside  Patient participation: complete - patient participated  Level of consciousness: awake and sleepy but conscious  Pain scale: Controlled.  Airway patency: patent  Nausea & Vomiting: no nausea and no vomiting  Cardiovascular status: blood pressure returned to baseline  Respiratory status: acceptable  Hydration status: euvolemic  Pain management: adequate    There were no known notable events for this encounter.

## 2025-08-01 NOTE — ANESTHESIA PRE PROCEDURE
Department of Anesthesiology  Preprocedure Note       Name:  Elizabeth Landa   Age:  75 y.o.  :  1950                                          MRN:  [id number]         Date:  2025      Surgeon: Surgeon(s):  Alex Quiles MD    Procedure: Procedure(s):  Ablation A-fib w complete ep study  Watchman mar closure device    Medications prior to admission:   Prior to Admission medications    Medication Sig Start Date End Date Taking? Authorizing Provider   furosemide (LASIX) 40 MG tablet Take 1 tablet by mouth daily TAKE 1 TABLET BY MOUTH EVERY DAY  Patient taking differently: Take 1 tablet by mouth 2 times daily TAKE 1 TABLET BY MOUTH EVERY DAY 25  Yes Scottie Centeno MD   aspirin 81 MG EC tablet Take 1 tablet by mouth daily   Yes ProviderJose MD   empagliflozin (JARDIANCE) 10 MG tablet Take 1 tablet by mouth daily 25  Yes Scottie Centeno MD   olmesartan (BENICAR) 40 MG tablet Take 1 tablet by mouth daily   Yes Provider, MD Jose   latanoprost (XALATAN) 0.005 % ophthalmic solution  25  Yes Provider, MD Jose   apixaban (ELIQUIS) 5 MG TABS tablet Take 1 tablet by mouth 2 times daily 25  Yes Alex Quiles MD   ondansetron (ZOFRAN-ODT) 8 MG TBDP disintegrating tablet Take 0.5 tablets by mouth every 6 hours Take 20 minutes prior to pain medication for nausea prevention 10/6/24  Yes Shailesh Katz PA   amLODIPine (NORVASC) 10 MG tablet Take 1 tablet by mouth daily 7/15/24  Yes Scottie Centeno MD   carvedilol (COREG) 25 MG tablet Take 1 tablet by mouth 2 times daily (with meals) 23  Yes Scottie Centeno MD   potassium chloride (KLOR-CON) 10 MEQ extended release tablet Take 1 tablet by mouth daily TAKE 1 TABLET BY MOUTH DAILY 22  Yes Scottie Centeno MD   allopurinol (ZYLOPRIM) 300 MG tablet Take 0.5 tablets by mouth daily 10/6/21  Yes Automatic Reconciliation, Ar   esomeprazole (NEXIUM) 40 MG delayed release capsule Take 1 capsule by mouth daily 10/11/20

## 2025-08-02 ENCOUNTER — TELEPHONE (OUTPATIENT)
Dept: CARDIAC CATH/INVASIVE PROCEDURES | Age: 75
End: 2025-08-02

## 2025-08-02 NOTE — TELEPHONE ENCOUNTER
Post Watchman SDD follow up call.     Elizabeth Landa is 24 hours post Watchman device placement and SDD on 8/1/2025.  She is doing well, only complaints are sore abdominal muscles from dry heaves post procedure.  Patient denies pain, swelling, and bleeding to bilateral groin puncture sites.  She denies CP and SOB. Patient resumed Eliquis as prescribed per Dr. Quiles. Patient is aware of upcoming appts. Ms. Landa has  coordinator contact ([phone]) information for questions or concerns.

## 2025-08-19 ENCOUNTER — TELEPHONE (OUTPATIENT)
Dept: CARDIAC CATH/INVASIVE PROCEDURES | Age: 75
End: 2025-08-19

## 2025-08-19 ENCOUNTER — TELEPHONE (OUTPATIENT)
Age: 75
End: 2025-08-19

## 2025-09-03 ENCOUNTER — OFFICE VISIT (OUTPATIENT)
Age: 75
End: 2025-09-03
Payer: MEDICARE

## 2025-09-03 VITALS
HEIGHT: 65 IN | BODY MASS INDEX: 36.15 KG/M2 | WEIGHT: 217 LBS | DIASTOLIC BLOOD PRESSURE: 66 MMHG | HEART RATE: 111 BPM | SYSTOLIC BLOOD PRESSURE: 118 MMHG

## 2025-09-03 DIAGNOSIS — I48.19 PERSISTENT ATRIAL FIBRILLATION (HCC): Primary | ICD-10-CM

## 2025-09-03 LAB
BASOPHILS # BLD: 0.04 K/UL (ref 0–0.2)
BASOPHILS NFR BLD: 0.4 % (ref 0–2)
DIFFERENTIAL METHOD BLD: ABNORMAL
EOSINOPHIL # BLD: 0.12 K/UL (ref 0–0.8)
EOSINOPHIL NFR BLD: 1.2 % (ref 0.5–7.8)
ERYTHROCYTE [DISTWIDTH] IN BLOOD BY AUTOMATED COUNT: 22.2 % (ref 11.9–14.6)
HCT VFR BLD AUTO: 37.8 % (ref 35.8–46.3)
HGB BLD-MCNC: 10.9 G/DL (ref 11.7–15.4)
IMM GRANULOCYTES # BLD AUTO: 0.04 K/UL (ref 0–0.5)
IMM GRANULOCYTES NFR BLD AUTO: 0.4 % (ref 0–5)
LYMPHOCYTES # BLD: 1.03 K/UL (ref 0.5–4.6)
LYMPHOCYTES NFR BLD: 10.2 % (ref 13–44)
MCH RBC QN AUTO: 23.8 PG (ref 26.1–32.9)
MCHC RBC AUTO-ENTMCNC: 28.8 G/DL (ref 31.4–35)
MCV RBC AUTO: 82.5 FL (ref 82–102)
MONOCYTES # BLD: 0.86 K/UL (ref 0.1–1.3)
MONOCYTES NFR BLD: 8.5 % (ref 4–12)
NEUTS SEG # BLD: 7.99 K/UL (ref 1.7–8.2)
NEUTS SEG NFR BLD: 79.3 % (ref 43–78)
NRBC # BLD: 0 K/UL (ref 0–0.2)
PLATELET # BLD AUTO: 289 K/UL (ref 150–450)
PMV BLD AUTO: 10.1 FL (ref 9.4–12.3)
RBC # BLD AUTO: 4.58 M/UL (ref 4.05–5.2)
WBC # BLD AUTO: 10.1 K/UL (ref 4.3–11.1)

## 2025-09-03 PROCEDURE — G8417 CALC BMI ABV UP PARAM F/U: HCPCS | Performed by: PHYSICIAN ASSISTANT

## 2025-09-03 PROCEDURE — G8427 DOCREV CUR MEDS BY ELIG CLIN: HCPCS | Performed by: PHYSICIAN ASSISTANT

## 2025-09-03 PROCEDURE — 1123F ACP DISCUSS/DSCN MKR DOCD: CPT | Performed by: PHYSICIAN ASSISTANT

## 2025-09-03 PROCEDURE — 3078F DIAST BP <80 MM HG: CPT | Performed by: PHYSICIAN ASSISTANT

## 2025-09-03 PROCEDURE — 93000 ELECTROCARDIOGRAM COMPLETE: CPT | Performed by: PHYSICIAN ASSISTANT

## 2025-09-03 PROCEDURE — 1160F RVW MEDS BY RX/DR IN RCRD: CPT | Performed by: PHYSICIAN ASSISTANT

## 2025-09-03 PROCEDURE — 1126F AMNT PAIN NOTED NONE PRSNT: CPT | Performed by: PHYSICIAN ASSISTANT

## 2025-09-03 PROCEDURE — 1036F TOBACCO NON-USER: CPT | Performed by: PHYSICIAN ASSISTANT

## 2025-09-03 PROCEDURE — 3074F SYST BP LT 130 MM HG: CPT | Performed by: PHYSICIAN ASSISTANT

## 2025-09-03 PROCEDURE — G8399 PT W/DXA RESULTS DOCUMENT: HCPCS | Performed by: PHYSICIAN ASSISTANT

## 2025-09-03 PROCEDURE — 99214 OFFICE O/P EST MOD 30 MIN: CPT | Performed by: PHYSICIAN ASSISTANT

## 2025-09-03 PROCEDURE — 1090F PRES/ABSN URINE INCON ASSESS: CPT | Performed by: PHYSICIAN ASSISTANT

## 2025-09-03 PROCEDURE — 1159F MED LIST DOCD IN RCRD: CPT | Performed by: PHYSICIAN ASSISTANT

## 2025-09-03 RX ORDER — CLOPIDOGREL BISULFATE 75 MG/1
TABLET ORAL
COMMUNITY
Start: 2025-08-19

## 2025-09-05 ENCOUNTER — HOSPITAL ENCOUNTER (OUTPATIENT)
Dept: CARDIAC CATH/INVASIVE PROCEDURES | Age: 75
Discharge: HOME | End: 2025-09-05
Attending: INTERNAL MEDICINE | Admitting: INTERNAL MEDICINE
Payer: MEDICARE

## 2025-09-05 VITALS
OXYGEN SATURATION: 95 % | TEMPERATURE: 98 F | WEIGHT: 217 LBS | DIASTOLIC BLOOD PRESSURE: 65 MMHG | SYSTOLIC BLOOD PRESSURE: 125 MMHG | HEIGHT: 65 IN | RESPIRATION RATE: 19 BRPM | HEART RATE: 61 BPM | BODY MASS INDEX: 36.15 KG/M2

## 2025-09-05 LAB
ANION GAP SERPL CALC-SCNC: 14 MMOL/L (ref 7–16)
BUN SERPL-MCNC: 37 MG/DL (ref 8–23)
CALCIUM SERPL-MCNC: 9.4 MG/DL (ref 8.8–10.2)
CHLORIDE SERPL-SCNC: 104 MMOL/L (ref 98–107)
CO2 SERPL-SCNC: 23 MMOL/L (ref 20–29)
CREAT SERPL-MCNC: 1.49 MG/DL (ref 0.6–1.1)
ECHO BSA: 2.12 M2
EKG ATRIAL RATE: 65 BPM
EKG DIAGNOSIS: NORMAL
EKG P AXIS: -5 DEGREES
EKG P-R INTERVAL: 208 MS
EKG Q-T INTERVAL: 430 MS
EKG QRS DURATION: 92 MS
EKG QTC CALCULATION (BAZETT): 447 MS
EKG R AXIS: 8 DEGREES
EKG T AXIS: 111 DEGREES
EKG VENTRICULAR RATE: 65 BPM
GLUCOSE SERPL-MCNC: 133 MG/DL (ref 70–99)
MAGNESIUM SERPL-MCNC: 1.9 MG/DL (ref 1.8–2.4)
POTASSIUM SERPL-SCNC: 3.7 MMOL/L (ref 3.5–5.1)
SODIUM SERPL-SCNC: 141 MMOL/L (ref 136–145)

## 2025-09-05 PROCEDURE — 80048 BASIC METABOLIC PNL TOTAL CA: CPT

## 2025-09-05 PROCEDURE — 6370000000 HC RX 637 (ALT 250 FOR IP): Performed by: INTERNAL MEDICINE

## 2025-09-05 PROCEDURE — 93312 ECHO TRANSESOPHAGEAL: CPT

## 2025-09-05 PROCEDURE — 93005 ELECTROCARDIOGRAM TRACING: CPT | Performed by: INTERNAL MEDICINE

## 2025-09-05 PROCEDURE — 83735 ASSAY OF MAGNESIUM: CPT

## 2025-09-05 PROCEDURE — 6360000002 HC RX W HCPCS: Performed by: INTERNAL MEDICINE

## 2025-09-05 PROCEDURE — 99152 MOD SED SAME PHYS/QHP 5/>YRS: CPT

## 2025-09-05 PROCEDURE — 93010 ELECTROCARDIOGRAM REPORT: CPT | Performed by: INTERNAL MEDICINE

## 2025-09-05 RX ORDER — LIDOCAINE HYDROCHLORIDE 20 MG/ML
SOLUTION OROPHARYNGEAL PRN
Status: COMPLETED | OUTPATIENT
Start: 2025-09-05 | End: 2025-09-05

## 2025-09-05 RX ORDER — MIDAZOLAM HYDROCHLORIDE 1 MG/ML
INJECTION, SOLUTION INTRAMUSCULAR; INTRAVENOUS PRN
Status: COMPLETED | OUTPATIENT
Start: 2025-09-05 | End: 2025-09-05

## 2025-09-05 RX ORDER — ASPIRIN 81 MG/1
81 TABLET ORAL DAILY
Qty: 90 TABLET | Refills: 1 | Status: SHIPPED | OUTPATIENT
Start: 2025-09-05

## 2025-09-05 RX ORDER — FENTANYL CITRATE 50 UG/ML
INJECTION, SOLUTION INTRAMUSCULAR; INTRAVENOUS PRN
Status: COMPLETED | OUTPATIENT
Start: 2025-09-05 | End: 2025-09-05

## 2025-09-05 RX ADMIN — MIDAZOLAM 1 MG: 1 INJECTION INTRAMUSCULAR; INTRAVENOUS at 09:17

## 2025-09-05 RX ADMIN — MIDAZOLAM 1 MG: 1 INJECTION INTRAMUSCULAR; INTRAVENOUS at 09:19

## 2025-09-05 RX ADMIN — LIDOCAINE HYDROCHLORIDE 15 ML: 20 SOLUTION ORAL at 09:06

## 2025-09-05 RX ADMIN — MIDAZOLAM 1 MG: 1 INJECTION INTRAMUSCULAR; INTRAVENOUS at 09:15

## 2025-09-05 RX ADMIN — MIDAZOLAM 2 MG: 1 INJECTION INTRAMUSCULAR; INTRAVENOUS at 09:12

## 2025-09-05 RX ADMIN — FENTANYL CITRATE 25 MCG: 50 INJECTION, SOLUTION INTRAMUSCULAR; INTRAVENOUS at 09:17

## 2025-09-05 RX ADMIN — FENTANYL CITRATE 50 MCG: 50 INJECTION, SOLUTION INTRAMUSCULAR; INTRAVENOUS at 09:12

## 2025-09-05 RX ADMIN — MIDAZOLAM 1 MG: 1 INJECTION INTRAMUSCULAR; INTRAVENOUS at 09:21

## 2025-09-05 ASSESSMENT — PAIN SCALES - GENERAL: PAINLEVEL_OUTOF10: 0

## (undated) DEVICE — WAX SURG 2.5GM HEMSTAT BNE BEESWAX PARAFFIN ISO PALMITATE

## (undated) DEVICE — WATER 50W MAX / AIR 8W MAX: Brand: FLEXIVA TRACTIP

## (undated) DEVICE — NEEDLE TRANSSEPTAL L71CM FIX CRV C1 INSUL W/ RF ENERGY NRG

## (undated) DEVICE — SHEATH INTRO 10FR L10CM DIL L2.5CM PERIPH W/ MINI S STL SPR

## (undated) DEVICE — BLOCK BITE AD 60FR W/ VELC STRP ADDRESSES MOST PT AND

## (undated) DEVICE — KENDALL SCD EXPRESS SLEEVES, KNEE LENGTH, MEDIUM: Brand: KENDALL SCD

## (undated) DEVICE — KENDALL RADIOLUCENT FOAM MONITORING ELECTRODE RECTANGULAR SHAPE: Brand: KENDALL

## (undated) DEVICE — SHEATH INTRO 50 DEG 0.038 INX180 CM 8.5 FRX63 CM HEARTSPAN

## (undated) DEVICE — CYSTO: Brand: MEDLINE INDUSTRIES, INC.

## (undated) DEVICE — URETERAL ACCESS SHEATH SET: Brand: NAVIGATOR HD

## (undated) DEVICE — NEEDLE HYPO 21GA L1.5IN INTRAMUSCULAR S STL LATCH BVL UP

## (undated) DEVICE — STERILE PRESSURE PROTECTOR PAD® FOR DE MAYO UNIVERSAL DISTRACTOR® (10/CASE): Brand: DE MAYO UNIVERSAL DISTRACTOR®

## (undated) DEVICE — GARMENT,MEDLINE,DVT,INT,CALF,MED, GEN2: Brand: MEDLINE

## (undated) DEVICE — BOWL MED M 16OZ PLAS CAP GRAD

## (undated) DEVICE — GUIDEWIRE VASC L180CM DIA0.035IN L7CM DIA3MM J TIP PTFE S

## (undated) DEVICE — FAN SPRAY KIT: Brand: PULSAVAC®

## (undated) DEVICE — CATHETER MAP F CRV 2-2-2-2-2 MM SPC PERSEID OCTARAY

## (undated) DEVICE — SOLUTION IRRIG 3000ML 0.9% SOD CHL FLX CONT 0797208] ICU MEDICAL INC]

## (undated) DEVICE — SYSTEM RETENTION PANNUS ADH PD HK LOOP STRP

## (undated) DEVICE — NG KIT, 21 GA, 10 PACK: Brand: SITE-RITE

## (undated) DEVICE — TUBING PMP FOR CARTO SYS SMARTABLATE

## (undated) DEVICE — FORCEPS BX L240CM JAW DIA2.8MM L CAP W/ NDL MIC MESH TOOTH

## (undated) DEVICE — INTENDED FOR TISSUE SEPARATION, AND OTHER PROCEDURES THAT REQUIRE A SHARP SURGICAL BLADE TO PUNCTURE OR CUT.: Brand: BARD-PARKER SAFETY BLADES SIZE 10, STERILE

## (undated) DEVICE — TRAY CATH 16F URIN MTR LTX -- CONVERT TO ITEM 363111

## (undated) DEVICE — Device

## (undated) DEVICE — Z DISCONTINUED USE 2423037 APPLICATOR MEDICATED 3 CC CHLORHEXIDINE GLUC 2% CHLORAPREP

## (undated) DEVICE — CATHETER REPROC ELCTRPHSLGY 10FR DIA 90CML DGNSTC ULTRSND F

## (undated) DEVICE — (D)STRIP SKN CLSR 0.5X4IN WHT --

## (undated) DEVICE — BAND RADIAL COMPR ARTERY 27CM -- LNG BX/10

## (undated) DEVICE — SUTURE FIBERWIRE SZ 2 W/ TAPERED NEEDLE BLUE L38IN NONABSORB BLU L26.5MM 1/2 CIRCLE AR7200

## (undated) DEVICE — SOLUTION IRRIG 1000ML H2O STRL BLT

## (undated) DEVICE — REM POLYHESIVE ADULT PATIENT RETURN ELECTRODE: Brand: VALLEYLAB

## (undated) DEVICE — PATCH REF EXT FOR CARTO 3 SYS (EA = 6 PACKS)

## (undated) DEVICE — SYSTEM SKIN CLSR 22CM DERMBND PRINEO

## (undated) DEVICE — SYSTEM CLOSURE 6-12 FR VEN VASC VASCADE MVP

## (undated) DEVICE — SYSTEM US 18GA NDL GUID KT PRB CVR LEN 96IN SITE RITE

## (undated) DEVICE — 2000CC GUARDIAN II: Brand: GUARDIAN

## (undated) DEVICE — SOLUTION IV 1000ML 0.9% SOD CHL

## (undated) DEVICE — SYR LR LCK 1ML GRAD NSAF 30ML --

## (undated) DEVICE — 5.0MM PRECISION ROUND

## (undated) DEVICE — DRAPE SHT 3 QTR PROXIMA 53X77 --

## (undated) DEVICE — 3M™ TEGADERM™ TRANSPARENT FILM DRESSING FRAME STYLE, 1628, 6 IN X 8 IN (15 CM X 20 CM), 10/CT 8CT/CASE: Brand: 3M™ TEGADERM™

## (undated) DEVICE — KIT EVAC 400CC DIA3/16IN H PAT 12.5IN 3 SPR RND SHP PVC DRN

## (undated) DEVICE — (D)PREP SKN CHLRAPRP APPL 26ML -- CONVERT TO ITEM 371833

## (undated) DEVICE — DEVICE VES SEAL OD 15 FR ID 10-12 FR PERC FEM VEN ACCS SITE

## (undated) DEVICE — TRAY PREP DRY W/ PREM GLV 2 APPL 6 SPNG 2 UNDPD 1 OVERWRAP

## (undated) DEVICE — 3000CC GUARDIAN II: Brand: GUARDIAN

## (undated) DEVICE — HOOKED-PRONG GRASPING FORCEPS: Brand: TRICEP

## (undated) DEVICE — GUIDEWIRE 035IN 210CM PTFE COAT FIX COR J TIP 15MM FIRM BODY

## (undated) DEVICE — JELLY LUBRICATING 10GM PREFIL SYR LUBE

## (undated) DEVICE — MEDI-VAC YANKAUER SUCTION HANDLE W/BULBOUS TIP: Brand: CARDINAL HEALTH

## (undated) DEVICE — CATHETER DIAG AD 5FR L125CM COR NYL MP AMPLATZ 2 W/ 2 SIDE

## (undated) DEVICE — INTRODUCER SHTH 11FR CANN L11CM DIL TIP 45MM YEL TUNGSTEN

## (undated) DEVICE — CONTAINER PREFIL FRMLN 40ML --

## (undated) DEVICE — KIT TRNSDUC FLSH DEV MACRODRIP IV SET 48IN AND 12IN PRSS

## (undated) DEVICE — CANNULA NSL ORAL AD FOR CAPNOFLEX CO2 O2 AIRLFE

## (undated) DEVICE — SUTURE VCRL SZ 1 L27IN ABSRB UD L36MM CP-1 1/2 CIR REV CUT J268H

## (undated) DEVICE — CYSTO/BLADDER IRRIGATION SET, REGULATING CLAMP

## (undated) DEVICE — KIT POS W/ FOAM ARM CRADL SHEARGUARD CHST PD CVR FOR SPNL

## (undated) DEVICE — BUTTON SWITCH PENCIL BLADE ELECTRODE, HOLSTER: Brand: EDGE

## (undated) DEVICE — CONNECTOR TBNG OD5-7MM O2 END DISP

## (undated) DEVICE — 1010 S-DRAPE TOWEL DRAPE 10/BX: Brand: STERI-DRAPE™

## (undated) DEVICE — T4 HOOD

## (undated) DEVICE — SINGLE-USE DIGITAL FLEXIBLE URETEROSCOPE: Brand: LITHOVUE

## (undated) DEVICE — GDWIRE 3CM FLX-TIP 0.038X150CM -- BX/5 SENSOR

## (undated) DEVICE — X-RAY SPONGES,12 PLY: Brand: DERMACEA

## (undated) DEVICE — DRAPE,TOP,102X53,STERILE: Brand: MEDLINE

## (undated) DEVICE — CATHETER DIAG 6FR L110CM PIG CRV SZ 6 SIDE H DBL BRAID WIRE

## (undated) DEVICE — SUTURE MCRYL SZ 3-0 L27IN ABSRB UD L19MM PS-2 3/8 CIR PRIM Y427H

## (undated) DEVICE — SUTURE VCRL + 3-0 L27IN ABSRB UD PS-2 L19MM 3/8 CIR PRIM VCP427H

## (undated) DEVICE — PACK PROCEDURE SURG POST LAMINECTOMY CDS

## (undated) DEVICE — CATHETER EP 7FR L115CM 2-8-2MM SPC TIP 2MM 10 ELECTRD F L

## (undated) DEVICE — NEEDLE SPNL 22GA L3.5IN BLK HUB S STL REG WALL FIT STYL W/

## (undated) DEVICE — MASTISOL ADHESIVE LIQ 2/3ML

## (undated) DEVICE — SUTURE STRATAFIX SPRL SZ 1 L5IN ABSRB VLT CT-1 L36MM 1/2 SXPD2B401

## (undated) DEVICE — SUTURE VCRL SZ 2-0 L27IN ABSRB UD L36MM CP-1 1/2 CIR REV J266H

## (undated) DEVICE — CONTAINER,SPECIMEN,O.R.STRL,4.5OZ: Brand: MEDLINE

## (undated) DEVICE — INTENDED FOR TISSUE SEPARATION, AND OTHER PROCEDURES THAT REQUIRE A SHARP SURGICAL BLADE TO PUNCTURE OR CUT.: Brand: BARD-PARKER SAFETY BLADES SIZE 15, STERILE

## (undated) DEVICE — CATHETER REPROC EP F-J BIOBD710FJ282CTR

## (undated) DEVICE — SOLUTION IRRIG 1000ML H2O PIC PLAS SHATTERPROOF CONTAINER

## (undated) DEVICE — FLOSEAL HEMOSTATIC MATRIX, 5 ML: Brand: FLOSEAL

## (undated) DEVICE — SUTURE STRATAFIX SZ 3-0 L30CM NONABSORBABLE UD L19MM FS-2 SXMP2B408

## (undated) DEVICE — (D)SYR 10ML 1/5ML GRAD NSAF -- PKGING CHANGE USE ITEM 338027

## (undated) DEVICE — CATHETER ABLATION QDOT MICRO FJ CURVE BIDIRECTIONAL

## (undated) DEVICE — AMD ANTIMICROBIAL NON-ADHERENT PAD,0.2% POLYHEXAMETHYLENE BIGUANIDE HCI (PHMB): Brand: TELFA

## (undated) DEVICE — PACK PROCEDURE SURG TOT KNEE

## (undated) DEVICE — DRAPE XR C ARM 41X74IN LF --

## (undated) DEVICE — TRAY CATH 16F DRN BG LTX -- CONVERT TO ITEM 363158

## (undated) DEVICE — SHEATH INTRO L10CM DIA8FR TIF TIP PINN